# Patient Record
Sex: FEMALE | Race: WHITE
[De-identification: names, ages, dates, MRNs, and addresses within clinical notes are randomized per-mention and may not be internally consistent; named-entity substitution may affect disease eponyms.]

---

## 2018-04-04 ENCOUNTER — HOSPITAL ENCOUNTER (EMERGENCY)
Dept: HOSPITAL 97 - ER | Age: 58
LOS: 1 days | Discharge: HOME | End: 2018-04-05
Payer: COMMERCIAL

## 2018-04-04 DIAGNOSIS — R10.9: Primary | ICD-10-CM

## 2018-04-04 DIAGNOSIS — Z88.6: ICD-10-CM

## 2018-04-04 DIAGNOSIS — Z88.1: ICD-10-CM

## 2018-04-04 DIAGNOSIS — Z85.3: ICD-10-CM

## 2018-04-04 DIAGNOSIS — Z88.5: ICD-10-CM

## 2018-04-04 DIAGNOSIS — Z90.13: ICD-10-CM

## 2018-04-04 DIAGNOSIS — F17.210: ICD-10-CM

## 2018-04-04 DIAGNOSIS — E11.9: ICD-10-CM

## 2018-04-04 LAB
ALBUMIN SERPL BCP-MCNC: 3.7 G/DL (ref 3.2–5.5)
ALP SERPL-CCNC: 188 IU/L (ref 42–121)
ALT SERPL W P-5'-P-CCNC: 45 IU/L (ref 10–60)
AMYLASE SERPL-CCNC: 40 U/L (ref 28–100)
AST SERPL W P-5'-P-CCNC: 55 IU/L (ref 10–42)
BUN BLD-MCNC: 9 MG/DL (ref 6–20)
GLUCOSE SERPLBLD-MCNC: 471 MG/DL (ref 65–120)
HCT VFR BLD CALC: 40.3 % (ref 36–45)
LIPASE SERPL-CCNC: 39 U/L (ref 22–51)
LYMPHOCYTES # SPEC AUTO: 1.4 K/UL (ref 0.7–4.9)
MCH RBC QN AUTO: 26.5 PG (ref 27–35)
MCV RBC: 81.7 FL (ref 80–100)
PMV BLD: 8.7 FL (ref 7.6–11.3)
POTASSIUM SERPL-SCNC: 3.5 MEQ/L (ref 3.6–5)
RBC # BLD: 4.93 M/UL (ref 3.86–4.86)
UA COMPLETE W REFLEX CULTURE PNL UR: (no result)

## 2018-04-04 PROCEDURE — 96361 HYDRATE IV INFUSION ADD-ON: CPT

## 2018-04-04 PROCEDURE — 80076 HEPATIC FUNCTION PANEL: CPT

## 2018-04-04 PROCEDURE — 82009 KETONE BODYS QUAL: CPT

## 2018-04-04 PROCEDURE — 99284 EMERGENCY DEPT VISIT MOD MDM: CPT

## 2018-04-04 PROCEDURE — 82962 GLUCOSE BLOOD TEST: CPT

## 2018-04-04 PROCEDURE — 96372 THER/PROPH/DIAG INJ SC/IM: CPT

## 2018-04-04 PROCEDURE — 80048 BASIC METABOLIC PNL TOTAL CA: CPT

## 2018-04-04 PROCEDURE — 83690 ASSAY OF LIPASE: CPT

## 2018-04-04 PROCEDURE — 81003 URINALYSIS AUTO W/O SCOPE: CPT

## 2018-04-04 PROCEDURE — 81015 MICROSCOPIC EXAM OF URINE: CPT

## 2018-04-04 PROCEDURE — 74177 CT ABD & PELVIS W/CONTRAST: CPT

## 2018-04-04 PROCEDURE — 82140 ASSAY OF AMMONIA: CPT

## 2018-04-04 PROCEDURE — 96375 TX/PRO/DX INJ NEW DRUG ADDON: CPT

## 2018-04-04 PROCEDURE — 85025 COMPLETE CBC W/AUTO DIFF WBC: CPT

## 2018-04-04 PROCEDURE — 82150 ASSAY OF AMYLASE: CPT

## 2018-04-04 PROCEDURE — 96374 THER/PROPH/DIAG INJ IV PUSH: CPT

## 2018-04-04 PROCEDURE — 36415 COLL VENOUS BLD VENIPUNCTURE: CPT

## 2018-04-04 PROCEDURE — 71045 X-RAY EXAM CHEST 1 VIEW: CPT

## 2018-04-04 NOTE — RAD REPORT
EXAM DESCRIPTION:  Chanel Single View4/4/2018 8:16 pm

 

CLINICAL HISTORY:  Abd  pain

 

COMPARISON:  2016

 

FINDINGS:   The lungs appear clear of acute infiltrate. The heart is normal size

 

IMPRESSION:   No acute abnormalities displayed

## 2018-04-04 NOTE — XMS REPORT
Patient Summary Document

 Created on:2018



Patient:SUZIE GROVER

Sex:Female

:1960

External Reference #:[id number]





Demographics







 Address  [address] [place] [address]

 

 Home Phone  [phone]

 

 Email Address  NONE

 

 Preferred Language  Unknown

 

 Marital Status  Unknown

 

 Mandaen Affiliation  Unknown

 

 Race  Unknown

 

 Additional Race(s)  Unavailable

 

 Ethnic Group  Unknown









Author







 Organization  [facility]

 

 Address  [address] [place] [address] [address] [address]

 

 Phone  [phone]









Care Team Providers







 Name  Role  Phone

 

 TONY MCMAHAN  Unavailable  Unavailable









Problems

This patient has no known problems.



Allergies, Adverse Reactions, Alerts

This patient has no known allergies or adverse reactions.



Medications

This patient has no known medications.



Results







 Test Description  Test Time  Test Comments  Text Results  Atomic Results  
Result Comments









 [facility]   [address] [address]      Patient Name: VALENTE GROVER  



 SERIES      MR #: [id number]    : 1960 Age/Sex: 56/F  Acct #: 
[id number]  



 W/PA CXR      Req #: [id number]  Adm Physician:     Ordered by: SMITHA MCMAHAN MD  Report #: [id number]   Location: ER  Room/Bed:  



       ________________________________________________________________________
_  



       __________________________    Procedure: [id number] DX/ABDOMEN ACUTE  



       SERIES W/PA CXR  Exam Date: 10/06/17                            Exam  



       Time: 0210       REPORT STATUS: Signed    EXAM: ABDOMEN ACUTE SERIES W/
PA  



       CXR, supine and erect views of the abdomen, AP   view of the chest  



       DATE: 10/6/2017 1:29 AM  Time stamp on exam: 0155 hours   INDICATION:  



       Abdominal pain   COMPARISON: CT of the abdomen and pelvis 2017  



         FINDINGS:   LINES/TUBES: None      LUNGS: No consolidations or edema.  



           PLEURA: No effusions or pneumothorax.      HEART AND MEDIASTINUM:  



       Normal size and contour.      BOWEL PATTERN: Non-obstructed bowel gas  



       pattern.      BONES AND SOFT TISSUES: No acute bone findings. No 
abnormal  



       calcifications. No   mass effect. Bilateral chest surgical clips.  



       Surgical clips right upper   quadrant of the abdomen.      IMPRESSION:  



       No acute thoracic abnormality.      No evidence for bowel obstruction.  



                  Signed by: Dr. Akiko Mehta M.D. on 10/6/2017 2:40 AM  



           Dictated By: AKIKO MEHTA MD  Electronically Signed By: AKIKO MEHTA MD on 10/06/17 0240  Transcribed By: CLIFFORD on 10/06/17 0240  



         COPY TO:   SMITHA MCMAHAN MD

## 2018-04-05 NOTE — ER
Nurse's Notes                                                                                     

 [facility]                                                                

Name: Deborah Alatorre                                                                              

Age: 57 yrs                                                                                       

Sex: Female                                                                                       

: 1960                                                                                   

MRN: [id number]                                                                                   

Arrival Date: 2018                                                                          

Time: 18:17                                                                                       

Account#: [id number]                                                                            

Bed 20                                                                                            

Private MD: Patricio Vicente                                                                        

Diagnosis: Unspecified abdominal pain                                                             

                                                                                                  

Presentation:                                                                                     

                                                                                             

18:36 Presenting complaint: Patient states: "I checked my sugar other and it read high. I     lk1 

      took my Novalog and the last reading was 576. My vision is blurry and I am having           

      abdominal pain. I feel lethargic". Transition of care: patient was not received from        

      another setting of care. Onset of symptoms was 2018 at 08:00. Care prior to       

      arrival: None.                                                                              

18:36 Method Of Arrival: Ambulatory                                                           lk1 

18:36 Acuity: CASEY 3                                                                           lk1 

                                                                                                  

Triage Assessment:                                                                                

18:38 General: Appears in no apparent distress. Behavior is calm, cooperative, appropriate    lk1 

      for age. Pain: Complains of pain in abdomen Pain currently is 8 out of 10 on a pain         

      scale. GI: Reports diarrhea. : Reports urinary frequency.                                 

                                                                                                  

Historical:                                                                                       

- Allergies:                                                                                      

18:38 Darvocet-N 100;                                                                         lk1 

18:38 tramadol;                                                                               lk1 

18:38 Zithromax;                                                                              lk1 

- PMHx:                                                                                           

18:38 Anxiety; MUSCLE WEAKNESS; Diabetes - IDDM; Depression; COPD; Cancer, Breast; colitis;   lk1 

- PSHx:                                                                                           

18:38 Appendectomy; Hysterectomy; Cholecystectomy; ; bilateral mastectomy; breast    lk1 

      reconstructive surgery;                                                                     

                                                                                                  

- Immunization history:: Adult Immunizations up to date.                                          

- Social history:: Smoking status: Patient uses tobacco products, smokes one pack                 

  cigarettes per day.                                                                             

                                                                                                  

                                                                                                  

Screenin:08 Abuse screen: Denies threats or abuse. Nutritional screening: No deficits noted.        jd3 

      Tuberculosis screening: No symptoms or risk factors identified. Fall Risk None              

      identified.                                                                                 

                                                                                                  

Assessment:                                                                                       

18:52 Reassessment: Patient states she checked her sugar at approximately 1700 and it was in  ae1 

      the "600s", then took 8 units Novolog insulin, then checked her sugar again                 

      approximately an hour later and sugar was still "576". Patient states she was in the        

      hospital 2 days prior, and was diagnosed with colitis and placed on antibiotics, and is     

      still having abdominal pain.                                                                

19:03 Reassessment: Report and hand off care to ROSANA Spring RN.                              ae1 

19:06 General: Appears in no apparent distress. uncomfortable, Behavior is calm, cooperative, jd3 

      appropriate for age. Pain: Complains of pain in abdomen Quality of pain is described as     

      stabbing, throbbing. Neuro: Level of Consciousness is awake, alert, obeys commands,         

      Oriented to person, place, time, situation. Cardiovascular: Heart tones S1 S2 present       

      Capillary refill < 3 seconds Patient's skin is warm and dry. Respiratory: Airway is         

      patent Respiratory effort is even, unlabored, Respiratory pattern is regular,               

      symmetrical, Breath sounds are clear bilaterally. GI: Abdomen is round Bowel sounds         

      present X 4 quads. Abd is soft Abdomen is tender to palpation X 4 quads. Reports lower      

      abdominal pain, upper abdominal pain, diarrhea. : No signs and/or symptoms were           

      reported regarding the genitourinary system. EENT: No signs and/or symptoms were            

      reported regarding the EENT system. Derm: Skin is intact, Skin is dry, Skin is normal,      

      Skin temperature is warm. Musculoskeletal: Circulation, motion, and sensation intact.       

      Range of motion: intact in all extremities.                                                 

20:11 Reassessment: Pt complained of abd pain and after discussion with Page PA pt was given  fc  

      Morphine.                                                                                   

20:49 Reassessment: Patient appears in no apparent distress at this time. Patient and/or      jd3 

      family updated on plan of care and expected duration. Pain level reassessed. Patient is     

      alert, oriented x 3, equal unlabored respirations, skin warm/dry/pink. pt finished oral     

      contrast CT notified.                                                                       

21:18 Reassessment: Patient appears in no apparent distress at this time. Patient and/or      jd3 

      family updated on plan of care and expected duration. Pain level reassessed. Patient is     

      alert, oriented x 3, equal unlabored respirations, skin warm/dry/pink. pt reporting         

      continued pain, provider notified, new orders received, see MAR.                            

22:46 Reassessment: Patient appears in no apparent distress at this time. Patient and/or      jd3 

      family updated on plan of care and expected duration. Pain level reassessed. Patient is     

      alert, oriented x 3, equal unlabored respirations, skin warm/dry/pink. pt back from CT.     

23:54 Reassessment: Patient appears in no apparent distress at this time. Patient and/or      jd3 

      family updated on plan of care and expected duration. Pain level reassessed. Patient is     

      alert, oriented x 3, equal unlabored respirations, skin warm/dry/pink.                      

                                                                                             

00:22 Reassessment: Patient appears in no apparent distress at this time. Patient and/or      jd3 

      family updated on plan of care and expected duration. Pain level reassessed. Patient is     

      alert, oriented x 3, equal unlabored respirations, skin warm/dry/pink. pt reported          

      understanding of discharge instructions, even and steady gait to front of ER to wait        

      for her ride.                                                                               

                                                                                                  

Vital Signs:                                                                                      

                                                                                             

18:39  / 109; Pulse 111; Resp 18; Temp 98.6(O); Pulse Ox 100% on R/A; Weight 69.4 kg    lk1 

      (R); Height 5 ft. 4 in. (162.56 cm) (R); Pain 8/10;                                         

19:18  / 100; Pulse 110; Resp 18 S; Pulse Ox 93% on R/A; Pain 8/10;                     jd3 

20:11  / 94; Pulse 103; Resp 16; Pulse Ox 94% on R/A; Pain 8/10;                        fc  

21:18  / 98; Pulse 102; Resp 18 S; Pulse Ox 98% on R/A;                                 jd3 

22:46  / 89; Pulse 90; Resp 17 S; Pulse Ox 97% on R/A;                                  jd3 

23:54  / 87; Pulse 96; Resp 17 S; Pulse Ox 100% ;                                       jd3 

18:39 Body Mass Index 26.26 (69.40 kg, 162.56 cm)                                             lk1 

                                                                                                  

ED Course:                                                                                        

18:17 Patient arrived in ED.                                                                  mr  

18:17 Patricio Vicente is Private Physician.                                                    mr  

18:37 Triage completed.                                                                       lk1 

18:41 Arm band placed on right wrist.                                                         lk1 

19:00 Placed in gown. Bed in low position. Call light in reach. Side rails up X 1. Adult w/   ae1 

      patient. Pulse ox on. NIBP on. Warm blanket given.                                          

19:06 Juan Franco, RN is Primary Nurse.                                                  jd3 

19:11 Sukhwinder Fernandez PA is PHCP.                                                                cp  

19:11 Sukhwinder Her MD is Attending Physician.                                             cp  

19:21 Initial lab(s) drawn, by me, sent to lab. Missed attempt(s): 22 gauge in left forearm.  cb2 

      Bleeding controlled, band aid applied, catheter tip intact.                                 

19:42 Urine collected: clean catch specimen, clear, nayely colored.                            cb2 

19:54 Inserted 18 gauge 10 cm midline to left upper basilic vein on first attempt. Pt         fc  

      tolerated it well. Line with good blood return and flushes well.                            

20:02 Notified Nurse Practitioner and/or Physician Assistant of a critical lab result(s),     jd3 

      glucose of 471.                                                                             

20:14 XRAY Chest (1 view) In Process Unspecified.                                             EDMS

20:14 X-ray completed. Portable x-ray completed in exam room. Patient tolerated procedure     jb2 

      well.                                                                                       

22:26 Manjeet Khanna MD is Attending Physician.                                                cp  

22:26 PHCP role handed off by Sukhwinder Fernandez PA                                                 snw 

22:26 Chelsi Aponte FNP-C is PHCP.                                                        snw 

22:30 Patient moved to CT via wheelchair.                                                     eh  

22:34 CT Abd/Pelvis - W/Contrast In Process Unspecified.                                      EDMS

23:59 Patricio Vicente is Referral Physician.                                                   snw 

04                                                                                             

00:17 No provider procedures requiring assistance completed. IV discontinued, intact,         jd3 

      bleeding controlled, No redness/swelling at site. Pressure dressing applied.                

                                                                                                  

Administered Medications:                                                                         

                                                                                             

20:05 Drug: NS 0.9% 500 ml Route: IV; Rate: bolus; Site: left upper arm;                      fc  

21:02 Follow up: Response: No adverse reaction; IV Status: Completed infusion; IV Intake:     jd3 

      500ml                                                                                       

20:06 Drug: Zofran 4 mg Route: IVP; Site: left upper arm;                                     fc  

21:02 Follow up: Response: No adverse reaction                                                jd3 

20:09 Drug: morphine 4 mg Route: IVP; Site: left upper arm;                                   fc  

21:02 Follow up: Response: No adverse reaction                                                jd3 

20:32 Drug: Insulin Regular Human 10 units {Co-Signature: bs1 (Eugenia Piper RN).} Route:  jd3 

      IVP; Site: left antecubital;                                                                

23:55 Follow up: Response: Blood sugar is lowered                                             jd3 

20:32 Drug: NS 0.9% 1000 ml Route: IV; Rate: 125 ml/hr; Site: left antecubital;               jd3 

                                                                                             

00:16 Follow up: Response: No adverse reaction; IV Status: Order to discontinue infusion; IV  jd3 

      Intake: 450ml                                                                               

                                                                                             

21:18 Drug: morphine 4 mg Route: IVP; Site: left antecubital;                                 jd3 

23:55 Follow up: Response: No adverse reaction                                                jd3 

                                                                                             

00:13 Drug: morphine 4 mg Route: IM; Site: right deltoid;                                     jd3 

00:23 Follow up: Response: Medication administered at discharge.                              jd3 

                                                                                                  

                                                                                                  

Point of Care Testing:                                                                            

      Blood Glucose:                                                                              

                                                                                             

18:59 Blood Glucose: 472 mg/dL;                                                               ae1 

21:23 Blood Glucose: 269 mg/dL;                                                               cb2 

      Ranges:                                                                                     

                                                                                                  

Intake:                                                                                           

21:02 IV: 500ml; Total: 500ml.                                                                jd3 

                                                                                             

00:16 IV: 450ml; Total: 950ml.                                                                jd3 

                                                                                                  

Outcome:                                                                                          

00:00 Discharge ordered by MD.                                                                snw 

00:18 Condition: stable                                                                       jd3 

00:18 Discharge instructions given to patient, Instructed on discharge instructions, follow       

      up and referral plans. Demonstrated understanding of instructions, follow-up care.          

00:22 Discharged to home ambulatory, with family.                                             jd3 

00:23 Patient left the ED.                                                                    jd3 

                                                                                                  

Signatures:                                                                                       

Dispatcher [employer]                           EDMS                                                 

Chelsi Aponte, FNP-C                 FNP-Csnw                                                  

Genny Lópze                                mr RossRolf                              jbEdilberto Shafer Felicia, RN                   RN   fc                                                   

Sukhwinder Fernandez PA PA cp Kluge, Leah, RN                         RN   lk1                                                  

Neville Parker RN                     RN   ae1                                                  

Murphy Morales Jonathon, RN                    RN   jd3                                                  

Eugenia Piper RN                          bs1                                                  

                                                                                                  

**************************************************************************************************

## 2018-04-05 NOTE — EDPHYS
Physician Documentation                                                                           

 [facility]                                                                

Name: Deborah Alatorre                                                                              

Age: 57 yrs                                                                                       

Sex: Female                                                                                       

: 1960                                                                                   

MRN: [id number]                                                                                   

Arrival Date: 2018                                                                          

Time: 18:17                                                                                       

Account#: [id number]                                                                            

Bed 20                                                                                            

Private MD: Patricio Vicente                                                                        

ED Physician Manjeet Khanna                                                                         

HPI:                                                                                              

                                                                                             

19:30 This 57 yrs old  Female presents to ER via Ambulatory with complaints of High  cp  

      Blood Sugar, Abdominal Pain.                                                                

19:30 The patient or guardian reports hyperglycemia. Onset: The symptoms/episode              cp  

      began/occurred gradually. Associated signs and symptoms: Pertinent positives: abdominal     

      pain.                                                                                       

19:30 Current symptoms: In the emergency department the patient's symptoms are unchanged from cp  

      the initial presentation, despite home interventions.                                       

                                                                                                  

Historical:                                                                                       

- Allergies:                                                                                      

18:38 Darvocet-N 100;                                                                         lk1 

18:38 tramadol;                                                                               lk1 

18:38 Zithromax;                                                                              lk1 

- PMHx:                                                                                           

18:38 Anxiety; MUSCLE WEAKNESS; Diabetes - IDDM; Depression; COPD; Cancer, Breast; colitis;   lk1 

- PSHx:                                                                                           

18:38 Appendectomy; Hysterectomy; Cholecystectomy; ; bilateral mastectomy; breast    lk1 

      reconstructive surgery;                                                                     

                                                                                                  

- Immunization history:: Adult Immunizations up to date.                                          

- Social history:: Smoking status: Patient uses tobacco products, smokes one pack                 

  cigarettes per day.                                                                             

                                                                                                  

                                                                                                  

ROS:                                                                                              

19:35 Constitutional: Negative for body aches, chills, fever, poor PO intake.                 cp  

19:35 Eyes: Negative for injury, pain, redness, and discharge.                                cp  

19:35 ENT: Negative for drainage from ear(s), ear pain, sore throat, difficulty swallowing,       

      difficulty handling secretions.                                                             

19:35 Cardiovascular: Negative for chest pain, edema, palpitations.                               

19:35 Respiratory: Negative for cough, shortness of breath, wheezing.                             

19:35 Abdomen/GI: Positive for abdominal pain, Negative for diarrhea, constipation,               

      black/tarry stool, rectal bleeding.                                                         

19:35 Back: Negative for pain at rest, pain with movement, radiated pain.                         

19:35 : Negative for urinary symptoms.                                                          

19:35 Skin: Negative for cellulitis, rash.                                                        

19:35 Neuro: Positive for general weakness, Negative for altered mental status, headache.         

19:35 All other systems are negative.                                                             

                                                                                                  

Exam:                                                                                             

19:42 Constitutional: The patient appears in no acute distress, alert, awake, non-toxic, well cp  

      developed, well nourished.                                                                  

19:42 Head/Face:  Normocephalic, atraumatic.                                                  cp  

19:42 Eyes: Periorbital structures: appear normal, Pupils: equal, round, and reactive to          

      light and accomodation, Extraocular movements: intact throughout, Conjunctiva: normal,      

      no exudate, no injection, Sclera: no appreciated abnormality, Lids and lashes: appear       

      normal, bilaterally.                                                                        

19:42 ENT: External ear(s): are unremarkable, Ear canal(s): are normal, clear, TM's:              

      dullness, bilaterally, Nose: is normal, Mouth: Lips: moist, Oral mucosa: dry, Posterior     

      pharynx: Airway: no evidence of obstruction, patent, Tonsils: are normal in appearance,     

      Uvula: midline, non-edematous, no erythema, swelling, is not appreciated, erythema,         

      that is mild, exudate, is not appreciated, Voice: is normal.                                

19:42 Neck: ROM/movement: is normal, is supple, without pain, no range of motions                 

      limitations, no nuchal rigidity.                                                            

19:42 Chest/axilla: Inspection: normal, Palpation: is normal, no crepitus, no tenderness.         

19:42 Cardiovascular: Rate: tachycardic, Rhythm: regular.                                         

19:42 Respiratory: the patient does not display signs of respiratory distress,  Respirations:     

      normal, no use of accessory muscles, no retractions, no splinting, no tachypnea,            

      labored breathing, is not present, Breath sounds: are clear throughout, no decreased        

      breath sounds, no stridor, no wheezing.                                                     

19:42 Abdomen/GI: Inspection: distension, that is mild, Bowel sounds: active, all quadrants,      

      Palpation: soft, in all quadrants, moderate abdominal tenderness, in all quadrants,         

      rebound tenderness, is not appreciated, voluntary guarding, is elicited in all              

      quadrants, involuntary guarding, is not appreciated.                                        

19:42 Back: ROM is normal.                                                                        

19:42 Skin: cellulitis, is not appreciated, no rash present.                                      

19:42 Neuro: Orientation: to person, place \T\ time. Mentation: lucid, able to follow commands,   

      Motor: moves all fours, negative for focal deficits, Sensation: no obvious gross            

      deficits.                                                                                   

                                                                                                  

Vital Signs:                                                                                      

18:39  / 109; Pulse 111; Resp 18; Temp 98.6(O); Pulse Ox 100% on R/A; Weight 69.4 kg    lk1 

      (R); Height 5 ft. 4 in. (162.56 cm) (R); Pain 8/10;                                         

19:18  / 100; Pulse 110; Resp 18 S; Pulse Ox 93% on R/A; Pain 8/10;                     jd3 

20:11  / 94; Pulse 103; Resp 16; Pulse Ox 94% on R/A; Pain 8/10;                        fc  

21:18  / 98; Pulse 102; Resp 18 S; Pulse Ox 98% on R/A;                                 jd3 

22:46  / 89; Pulse 90; Resp 17 S; Pulse Ox 97% on R/A;                                  jd3 

23:54  / 87; Pulse 96; Resp 17 S; Pulse Ox 100% ;                                       jd3 

18:39 Body Mass Index 26.26 (69.40 kg, 162.56 cm)                                             lk1 

                                                                                                  

MDM:                                                                                              

19:12 Patient medically screened.                                                             cp  

20:00 Differential diagnosis: DKA, hyperglycemia, ascites, colitis, diverticulitis.           cp  

22:10 Data reviewed: vital signs, nurses notes, lab test result(s).                           cp  

                                                                                                  

04                                                                                             

19:15 Order name: Amylase, Serum; Complete Time: 20:20                                        jd3 

                                                                                             

19:15 Order name: Basic Metabolic Panel; Complete Time: 20:20                                 jd3 

                                                                                             

19:15 Order name: CBC with Diff; Complete Time: 20:20                                         jd3 

                                                                                             

19:15 Order name: Creatinine for Radiology; Complete Time: 20:20                              jd3 

                                                                                             

19:15 Order name: Hepatic Function; Complete Time: 20:20                                      jd3 

                                                                                             

19:15 Order name: Lipase; Complete Time: 20:20                                                jd3 

                                                                                             

19:15 Order name: Urine Microscopic Only; Complete Time: 21:07                                jd3 

                                                                                             

19:26 Order name: Ketone, Serum; Complete Time: 20:20                                         cp  

                                                                                             

19:26 Order name: AMMONIA; Complete Time: 20:20                                               cp  

                                                                                             

19:26 Order name: XRAY Chest (1 view); Complete Time: 21:07                                   cp  

                                                                                             

19:48 Order name: Urine Dipstick--Ancillary (enter results); Complete Time: 20:20             rg2 

                                                                                             

20:21 Order name: CT Abd/Pelvis - W/Contrast                                                  cp  

                                                                                             

19:15 Order name: IV Saline Lock; Complete Time: 19:55                                        jd3 

                                                                                             

19:15 Order name: Labs collected and sent; Complete Time: 19:55                               jd3 

                                                                                             

19:15 Order name: Urine Dipstick-Ancillary (obtain specimen); Complete Time: 19:55            jd3 

                                                                                                  

Administered Medications:                                                                         

20:05 Drug: NS 0.9% 500 ml Route: IV; Rate: bolus; Site: left upper arm;                      fc  

21:02 Follow up: Response: No adverse reaction; IV Status: Completed infusion; IV Intake:     jd3 

      500ml                                                                                       

20:06 Drug: Zofran 4 mg Route: IVP; Site: left upper arm;                                     fc  

21:02 Follow up: Response: No adverse reaction                                                jd3 

20:09 Drug: morphine 4 mg Route: IVP; Site: left upper arm;                                   fc  

21:02 Follow up: Response: No adverse reaction                                                jd3 

20:32 Drug: Insulin Regular Human 10 units {Co-Signature: bs1 (Eugenia Piper RN).} Route:  jd3 

      IVP; Site: left antecubital;                                                                

23:55 Follow up: Response: Blood sugar is lowered                                             jd3 

20:32 Drug: NS 0.9% 1000 ml Route: IV; Rate: 125 ml/hr; Site: left antecubital;               jd3 

                                                                                             

00:16 Follow up: Response: No adverse reaction; IV Status: Order to discontinue infusion; IV  jd3 

      Intake: 450ml                                                                               

                                                                                             

21:18 Drug: morphine 4 mg Route: IVP; Site: left antecubital;                                 jd3 

23:55 Follow up: Response: No adverse reaction                                                jd3 

                                                                                             

00:13 Drug: morphine 4 mg Route: IM; Site: right deltoid;                                     jd3 

00:23 Follow up: Response: Medication administered at discharge.                              jd3 

                                                                                                  

                                                                                                  

Point of Care Testing:                                                                            

      Blood Glucose:                                                                              

                                                                                             

18:59 Blood Glucose: 472 mg/dL;                                                               ae1 

21:23 Blood Glucose: 269 mg/dL;                                                               cb2 

      Ranges:                                                                                     

      Critical Glucose Levels:Adult <50 mg/dl or >400 mg/dl  <40 mg/dl or >180 mg/dl       

Disposition:                                                                                      

                                                                                             

04:26 Co-signature as Attending Physician, Manjeet Khanna MD., rn  

                                                                                                  

Disposition:                                                                                      

18 00:00 Discharged to Home. Impression: Unspecified abdominal pain.                        

- Condition is Stable.                                                                            

- Discharge Instructions: Abdominal Pain, Adult, Hyperglycemia, Hypertension.                     

                                                                                                  

- Medication Reconciliation Form, Thank You Letter, Antibiotic Education, Prescription            

  Opioid Use form.                                                                                

- Follow up: Patricio Vicente; When: 1 - 2 days; Reason: Recheck today's complaints,                

  Continuance of care, Re-evaluation by your physician. Follow up: Emergency                      

  Department; When: As needed; Reason: Worsening of condition.                                    

                                                                                                  

                                                                                                  

                                                                                                  

Signatures:                                                                                       

Dispatcher [employer]                           EDMS                                                 

Chelsi Aponte, FNP-C                 FNP-Csnw                                                  

Lena Jackson, RN                   RN   fc                                                   

Manjeet Khanna MD MD rn Page, Corey, PA PA cp Kluge, Leah RN                         RN   lk1                                                  

Juan Franco RN                    RN   jd3                                                  

Eugenia Piper RN                          bs1                                                  

                                                                                                  

**************************************************************************************************

## 2018-04-05 NOTE — RAD REPORT
EXAM DESCRIPTION:  CT - Abdomen   Pelvis W Contrast - 4/5/2018 6:50 am

 

CLINICAL HISTORY:   Abdominal pain. Upper abdominal pain

 

COMPARISON:  February 2018

 

TECHNIQUE:  Computed axial tomography of the abdomen and pelvis was obtained. 100 cc Isovue-300 is ad
ministered intravenously. Oral contrast was given. A preliminary report was given by virtual radiolog
ic and reviewed prior to this dictation

 

All CT scans are performed using dose optimization technique as appropriate and may include automated
 exposure control or mA/KV adjustment according to patient size.

 

FINDINGS:

A cirrhotic liver is present. A discrete lesion is not seen the portal vein is distended. It is paten
t. The spleen measures 18 centimeters. A couple of granulomas are present within the liver and spleen


 

The pancreas and adrenals are unremarkable. Renal cysts are unchanged.

 

The appendix has been removed. There is no evidence of diverticulitis.

 

No ascites is seen. A moderate amount of stool is present throughout colon

 

IMPRESSION:  Cirrhosis with portal venous hypertension.

 

Moderate amount stool throughout the colon

## 2018-04-07 ENCOUNTER — HOSPITAL ENCOUNTER (EMERGENCY)
Dept: HOSPITAL 97 - ER | Age: 58
Discharge: HOME | End: 2018-04-07
Payer: COMMERCIAL

## 2018-04-07 DIAGNOSIS — E11.9: ICD-10-CM

## 2018-04-07 DIAGNOSIS — Z88.1: ICD-10-CM

## 2018-04-07 DIAGNOSIS — Z90.13: ICD-10-CM

## 2018-04-07 DIAGNOSIS — Z85.3: ICD-10-CM

## 2018-04-07 DIAGNOSIS — R19.7: Primary | ICD-10-CM

## 2018-04-07 DIAGNOSIS — F17.210: ICD-10-CM

## 2018-04-07 DIAGNOSIS — Z88.5: ICD-10-CM

## 2018-04-07 DIAGNOSIS — Z88.6: ICD-10-CM

## 2018-04-07 LAB
ALBUMIN SERPL BCP-MCNC: 3.6 G/DL (ref 3.2–5.5)
ALP SERPL-CCNC: 178 IU/L (ref 42–121)
ALT SERPL W P-5'-P-CCNC: 51 IU/L (ref 10–60)
ANISOCYTOSIS BLD QL: (no result)
AST SERPL W P-5'-P-CCNC: 72 IU/L (ref 10–42)
BLD SMEAR INTERP: (no result)
BUN BLD-MCNC: 12 MG/DL (ref 6–20)
GLUCOSE SERPLBLD-MCNC: 303 MG/DL (ref 65–120)
HCT VFR BLD CALC: 38.8 % (ref 36–45)
LIPASE SERPL-CCNC: 44 U/L (ref 22–51)
LYMPHOCYTES # SPEC AUTO: 1.3 K/UL (ref 0.7–4.9)
MCH RBC QN AUTO: 26.1 PG (ref 27–35)
MCV RBC: 80.5 FL (ref 80–100)
MORPHOLOGY BLD-IMP: (no result)
PMV BLD: 8.2 FL (ref 7.6–11.3)
POTASSIUM SERPL-SCNC: 3.1 MEQ/L (ref 3.6–5)
RBC # BLD: 4.81 M/UL (ref 3.86–4.86)
UA COMPLETE W REFLEX CULTURE PNL UR: (no result)

## 2018-04-07 PROCEDURE — 87088 URINE BACTERIA CULTURE: CPT

## 2018-04-07 PROCEDURE — 81003 URINALYSIS AUTO W/O SCOPE: CPT

## 2018-04-07 PROCEDURE — 93005 ELECTROCARDIOGRAM TRACING: CPT

## 2018-04-07 PROCEDURE — 36415 COLL VENOUS BLD VENIPUNCTURE: CPT

## 2018-04-07 PROCEDURE — 80048 BASIC METABOLIC PNL TOTAL CA: CPT

## 2018-04-07 PROCEDURE — 99285 EMERGENCY DEPT VISIT HI MDM: CPT

## 2018-04-07 PROCEDURE — 84484 ASSAY OF TROPONIN QUANT: CPT

## 2018-04-07 PROCEDURE — 85025 COMPLETE CBC W/AUTO DIFF WBC: CPT

## 2018-04-07 PROCEDURE — 71045 X-RAY EXAM CHEST 1 VIEW: CPT

## 2018-04-07 PROCEDURE — 80076 HEPATIC FUNCTION PANEL: CPT

## 2018-04-07 PROCEDURE — 87086 URINE CULTURE/COLONY COUNT: CPT

## 2018-04-07 PROCEDURE — 81015 MICROSCOPIC EXAM OF URINE: CPT

## 2018-04-07 PROCEDURE — 83690 ASSAY OF LIPASE: CPT

## 2018-04-07 NOTE — ER
Nurse's Notes                                                                                     

 [facility]                                                                

Name: Deborah Alatorre                                                                              

Age: 57 yrs                                                                                       

Sex: Female                                                                                       

: 1960                                                                                   

MRN: [id number]                                                                                   

Arrival Date: 2018                                                                          

Time: 07:42                                                                                       

Account#: [id number]                                                                            

Bed 8                                                                                             

Private MD:                                                                                       

Diagnosis: Diarrhea, unspecified                                                                  

                                                                                                  

Presentation:                                                                                     

                                                                                             

07:58 Presenting complaint: Patient states: seen at [facility] a week ago and        ss  

      diagnosed with colitis and seen in [facility] ER 3 days ago for same symptoms. PT           

      reports she is still having diarrhea and abd pain and now she is having a sharp pain to     

      her R anterior chest wall when taking a deep breath. Transition of care: patient was        

      not received from another setting of care. Onset of symptoms was 2018. Care       

      prior to arrival: None.                                                                     

07:58 Method Of Arrival: Ambulatory                                                             

07:58 Acuity: CASEY 3                                                                           ss  

                                                                                                  

Historical:                                                                                       

- Allergies:                                                                                      

08:00 Darvocet-N 100;                                                                         ss  

08:00 Zithromax;                                                                              ss  

08:00 tramadol;                                                                               ss  

- PMHx:                                                                                           

08:00 Anxiety; Cancer, Breast; Colitis; COPD; Depression; Diabetes - IDDM; MUSCLE WEAKNESS;   ss  

- PSHx:                                                                                           

08:00 Appendectomy; Hysterectomy; Cholecystectomy; ; bilateral mastectomy; breast    ss  

      reconstructive surgery;                                                                     

                                                                                                  

- Immunization history:: Adult Immunizations up to date.                                          

- Social history:: Smoking status: Patient uses tobacco products, smokes one-half pack            

  cigarettes per day.                                                                             

- Family history:: not pertinent.                                                                 

- Hospitalizations: : No recent hospitalization is reported.                                      

                                                                                                  

                                                                                                  

Screenin:44 Abuse screen: Denies threats or abuse. Denies injuries from another. Nutritional        ph  

      screening: No deficits noted. Tuberculosis screening: No symptoms or risk factors           

      identified. Fall Risk None identified.                                                      

                                                                                                  

Assessment:                                                                                       

08:32 General: Appears in no apparent distress. uncomfortable, well groomed, Behavior is      ph  

      calm, cooperative, appropriate for age, Denies fever. Pain: Complains of pain in chest      

      and abdomen. Neuro: Level of Consciousness is awake, alert, obeys commands, Oriented to     

      person, place, time, situation. Cardiovascular: Reports chest pain, fatigue,                

      lightheadedness, nausea, Denies palpitations, shortness of breath, syncope, Capillary       

      refill < 3 seconds in bilateral fingers Patient's skin is warm and dry. Chest pain          

      quality is sharp, stabbing, is located in right anterior chest wall is aggravated by        

      breathing. Respiratory: Airway is patent Respiratory effort is even, unlabored,             

      Respiratory pattern is regular, symmetrical. GI: Abdomen is round non-distended, Bowel      

      sounds present X 4 quads. Reports lower abdominal pain, upper abdominal pain, diarrhea,     

      nausea. Derm: Skin is intact, is healthy with good turgor, Skin is pink, warm \T\ dry.      

      Musculoskeletal: Circulation, motion, and sensation intact. Range of motion: intact in      

      all extremities.                                                                            

09:04 Reassessment: Patient appears in no apparent distress at this time. Patient is alert,   ph  

      oriented x 3, equal unlabored respirations, skin warm/dry/pink. Pt c/o abdominal and        

      chest pain 10/10, ERP notified, pt medicated per provider order.                            

10:08 Reassessment: Patient appears in no apparent distress at this time. Patient and/or      ph  

      family updated on plan of care and expected duration. Pain level reassessed. Patient is     

      alert, oriented x 3, equal unlabored respirations, skin warm/dry/pink. Pt reports that      

      chest pain and nausea have resolved, continues to c/o abdominal pain 7/10, ERP notified.    

                                                                                                  

Vital Signs:                                                                                      

08:00  / 107; Pulse 105; Resp 17; Pulse Ox 97% on R/A; Weight 72.57 kg; Height 5 ft. 4  ss  

      in. (162.56 cm); Pain 10/10;                                                                

09:04  / 98; Pulse 95; Resp 16; Pulse Ox 93% on R/A; Pain 10/10;                        ph  

10:08  / 94; Pulse 104; Resp 18; Pulse Ox 98% on R/A;                                   ph  

08:00 Body Mass Index 27.46 (72.57 kg, 162.56 cm)                                               

                                                                                                  

ED Course:                                                                                        

07:42 Patient arrived in ED.                                                                  as  

07:49 Manjeet Khanna MD is Attending Physician.                                                rn  

07:59 Triage completed.                                                                       ss  

08:00 Arm band placed on right wrist.                                                         ss  

08:05 Marija Naidu, BART is Primary Nurse.                                                    ph  

08:25 X-ray completed. Portable x-ray completed in exam room. Patient tolerated procedure     kp1 

      well.                                                                                       

08:26 XRAY Chest (1 view) In Process Unspecified.                                             EDMS

08:30 Missed attempt(s): 22 gauge in left forearm. Bleeding controlled, band aid applied,     ph  

      catheter tip intact. Patient maintains SpO2 saturation greater than 95% on room air.        

08:45 Patient has correct armband on for positive identification. Placed in gown. Bed in low  ph  

      position. Call light in reach. Side rails up X 1. Cardiac monitor on. Pulse ox on. NIBP     

      on. Warm blanket given.                                                                     

08:48 Inserted saline lock: 22 gauge in right forearm, using aseptic technique. inserted by   mary PLUMMER ED tech.                                                                           

10:46 No provider procedures requiring assistance completed. IV discontinued, intact,         ss  

      bleeding controlled, No redness/swelling at site. Pressure dressing applied.                

                                                                                                  

Administered Medications:                                                                         

09:02 Drug: NS 0.9% 500 ml Route: IV; Rate: bolus; Site: right forearm;                       ph  

10:07 Follow up: Response: No adverse reaction; IV Status: Completed infusion                 ph  

09:02 Drug: Demerol 25 mg Route: IVP; Site: right antecubital;                                ph  

09:44 Follow up: Response: No adverse reaction; Pain is decreased                             ph  

09:03 Drug: Zofran 4 mg Route: IVP; Site: right forearm;                                      ph  

09:45 Follow up: Response: No adverse reaction; Nausea is decreased                           ph  

09:44 Drug: Potassium Chloride 40 mEq Route: PO;                                              ph  

10:07 Follow up: Response: No adverse reaction                                                ph  

09:44 Drug: LoMOTIL 2 tabs Route: PO;                                                         ph  

10:07 Follow up: Response: No adverse reaction                                                ph  

                                                                                                  

                                                                                                  

Outcome:                                                                                          

10:10 Discharge ordered by MD.                                                                rn  

10:46 Discharged to home ambulatory.                                                          ss  

10:46 Condition: good                                                                             

10:46 Discharge instructions given to patient, Instructed on discharge instructions, follow       

      up and referral plans. Demonstrated understanding of instructions, follow-up care.          

10:47 Patient left the ED.                                                                    ss  

                                                                                                  

Signatures:                                                                                       

Dispatcher [employer]                           Ghada Coelman Roman, MD MD rn Smirch, Shelby, RN RN ss Hall, Patricia, RN RN ph Poole, Kathy                                 1                                                  

                                                                                                  

**************************************************************************************************

## 2018-04-07 NOTE — XMS REPORT
Patient Summary Document

 Created on:2018



Patient:SUZIE GROVER

Sex:Female

:1960

External Reference #:[id number]





Demographics







 Address  [address] [place] [address]

 

 Home Phone  [phone]

 

 Email Address  NONE

 

 Preferred Language  Unknown

 

 Marital Status  Unknown

 

 Congregation Affiliation  Unknown

 

 Race  Unknown

 

 Additional Race(s)  Unavailable

 

 Ethnic Group  Unknown









Author







 Organization  [facility]

 

 Address  [address] [place] [address] [address] [address]

 

 Phone  [phone]









Care Team Providers







 Name  Role  Phone

 

 TONY MCMAHAN  Unavailable  Unavailable









Problems

This patient has no known problems.



Allergies, Adverse Reactions, Alerts

This patient has no known allergies or adverse reactions.



Medications

This patient has no known medications.



Results







 Test Description  Test Time  Test Comments  Text Results  Atomic Results  
Result Comments









 [facility]   [address] [address]      Patient Name: VALENTE GROVER  



 SERIES      MR #: [id number]    : 1960 Age/Sex: 56/F  Acct #: 
[id number]  



 W/PA CXR      Req #: [id number]  Adm Physician:     Ordered by: SMITHA MCMAHAN MD  Report #: [id number]   Location: ER  Room/Bed:  



       ________________________________________________________________________
_  



       __________________________    Procedure: [id number] DX/ABDOMEN ACUTE  



       SERIES W/PA CXR  Exam Date: 10/06/17                            Exam  



       Time: 0210       REPORT STATUS: Signed    EXAM: ABDOMEN ACUTE SERIES W/
PA  



       CXR, supine and erect views of the abdomen, AP   view of the chest  



       DATE: 10/6/2017 1:29 AM  Time stamp on exam: 0155 hours   INDICATION:  



       Abdominal pain   COMPARISON: CT of the abdomen and pelvis 2017  



         FINDINGS:   LINES/TUBES: None      LUNGS: No consolidations or edema.  



           PLEURA: No effusions or pneumothorax.      HEART AND MEDIASTINUM:  



       Normal size and contour.      BOWEL PATTERN: Non-obstructed bowel gas  



       pattern.      BONES AND SOFT TISSUES: No acute bone findings. No 
abnormal  



       calcifications. No   mass effect. Bilateral chest surgical clips.  



       Surgical clips right upper   quadrant of the abdomen.      IMPRESSION:  



       No acute thoracic abnormality.      No evidence for bowel obstruction.  



                  Signed by: Dr. Akiko Mehta M.D. on 10/6/2017 2:40 AM  



           Dictated By: AKIKO MEHTA MD  Electronically Signed By: AKIKO MEHTA MD on 10/06/17 0240  Transcribed By: CLIFFORD on 10/06/17 0240  



         COPY TO:   SMITHA MCMAHAN MD

## 2018-04-07 NOTE — RAD REPORT
EXAM DESCRIPTION:  RAD - Chest Single View - 4/7/2018 8:31 am

 

CLINICAL HISTORY:  Chest pain.

 

COMPARISON:  04/04/2018

 

FINDINGS:  Portable technique limits examination quality.

 

The lungs are grossly clear. The heart is normal in size. No displaced fractures.Postsurgical clips a
re noted.

 

IMPRESSION:  No acute intrathoracic process suspected.

## 2018-04-07 NOTE — EDPHYS
Physician Documentation                                                                           

 [facility]                                                                

Name: Deborah Alatorre                                                                              

Age: 57 yrs                                                                                       

Sex: Female                                                                                       

: 1960                                                                                   

MRN: [id number]                                                                                   

Arrival Date: 2018                                                                          

Time: 07:42                                                                                       

Account#: [id number]                                                                            

Bed 8                                                                                             

Private MD:                                                                                       

ED Physician Manjeet Khanna                                                                         

HPI:                                                                                              

                                                                                             

09:00 This 57 yrs old  Female presents to ER via Ambulatory with complaints of Chest rn  

      Pain, Abdominal Pain.                                                                       

09:00 The patient presents with abdominal pain that is diffuse. Onset: The symptoms/episode   rn  

      began/occurred 1 week(s) ago. The symptoms do not radiate. Associated signs and             

      symptoms: Pertinent positives: diarrhea, Pertinent negatives: blood in stools, fever,       

      vomiting, vomiting blood. The symptoms are described as crampy. Severity of pain: At        

      its worst the pain was mild in the emergency department the pain is unchanged. The          

      patient has experienced similar episodes in the past. Reports diffuse abd cramping,         

      began a week ago, no fever, took abx for colitis, no blood in stool, reports chronic        

      abd pain, also report sharp right sided chest pain with breathing, no sob. .                

                                                                                                  

Historical:                                                                                       

- Allergies:                                                                                      

08:00 Darvocet-N 100;                                                                         ss  

08:00 Zithromax;                                                                              ss  

08:00 tramadol;                                                                               ss  

- PMHx:                                                                                           

08:00 Anxiety; Cancer, Breast; Colitis; COPD; Depression; Diabetes - IDDM; MUSCLE WEAKNESS;   ss  

- PSHx:                                                                                           

08:00 Appendectomy; Hysterectomy; Cholecystectomy; ; bilateral mastectomy; breast    ss  

      reconstructive surgery;                                                                     

                                                                                                  

- Immunization history:: Adult Immunizations up to date.                                          

- Social history:: Smoking status: Patient uses tobacco products, smokes one-half pack            

  cigarettes per day.                                                                             

- Family history:: not pertinent.                                                                 

- Hospitalizations: : No recent hospitalization is reported.                                      

                                                                                                  

                                                                                                  

ROS:                                                                                              

09:00 Constitutional: Negative for fever, chills, and weight loss, Eyes: Negative for injury, rn  

      pain, redness, and discharge, Neck: Negative for injury, pain, and swelling,                

      Cardiovascular: Negative for palpitations, and edema, Respiratory: Negative for             

      shortness of breath, wheezing Abdomen/GI: Negative for nausea, vomiting and                 

      constipation, Back: Negative for injury and pain, MS/Extremity: Negative for injury and     

      deformity, Skin: Negative for injury, rash, and discoloration, Neuro: Negative for          

      headache, weakness, numbness, tingling, and seizure.                                        

                                                                                                  

Exam:                                                                                             

09:00 Constitutional:  This is a well developed, well nourished patient who is awake, alert,  rn  

      and in no acute distress. Head/Face:  Normocephalic, atraumatic. Eyes:  Pupils equal        

      round and reactive to light, extra-ocular motions intact.  Lids and lashes normal.          

      Conjunctiva and sclera are non-icteric and not injected.  Cornea within normal limits.      

      Periorbital areas with no swelling, redness, or edema. Cardiovascular:  Regular rate        

      and rhythm with a normal S1 and S2.  No gallops, murmurs, or rubs.  Normal PMI, no JVD.     

       No pulse deficits. Respiratory:  Lungs have equal breath sounds bilaterally, clear to      

      auscultation and percussion.  No rales, rhonchi or wheezes noted.  No increased work of     

      breathing, no retractions or nasal flaring. Abdomen/GI:  soft, non-tender, + soft           

      periumbilical mass, no peritoneal signs MS/ Extremity:  Pulses equal, no cyanosis.          

      Neurovascular intact.  Full, normal range of motion.  Equal circumference. Neuro:           

      Awake and alert, GCS 15, oriented to person, place, time, and situation.  Cranial           

      nerves II-XII grossly intact.  Motor strength 5/5 in all extremities.  Sensory grossly      

      intact.  Cerebellar exam normal.  Normal gait.                                              

                                                                                                  

Vital Signs:                                                                                      

08:00  / 107; Pulse 105; Resp 17; Pulse Ox 97% on R/A; Weight 72.57 kg; Height 5 ft. 4  ss  

      in. (162.56 cm); Pain 10/10;                                                                

09:04  / 98; Pulse 95; Resp 16; Pulse Ox 93% on R/A; Pain 10/10;                        ph  

10:08  / 94; Pulse 104; Resp 18; Pulse Ox 98% on R/A;                                   ph  

08:00 Body Mass Index 27.46 (72.57 kg, 162.56 cm)                                             ss  

                                                                                                  

MDM:                                                                                              

07:49 Patient medically screened.                                                             rn  

10:10 Differential diagnosis: non-specific abd pain, dehydration, abd cramping, diarrhea.     rn  

      Data reviewed: vital signs, nurses notes, lab test result(s), radiologic studies, plain     

      films, and as a result, I will discharge patient. Counseling: I had a detailed              

      discussion with the patient and/or guardian regarding: the historical points, exam          

      findings, and any diagnostic results supporting the discharge/admit diagnosis, lab          

      results, radiology results, the need for outpatient follow up, to return to the             

      emergency department if symptoms worsen or persist or if there are any questions or         

      concerns that arise at home. Special discussion: Based on the patient's history, exam,      

      and Dx evaluation, there is no indication for emergent intervention or inpatient Tx. It     

      is understood by the patient/guardian that if the Sx's persist or worsen they need to       

      return immediately for re-evaluation. Based on the patient's Hx, exam, and Dx               

      evaluation, there is no indication for emergent surgery or inpatient Tx. It is              

      understood by the patient/guardian that if the Sx's persist or worsen they need to          

      return immediately for re-evaluation. I discussed with the patient/guardian in detail       

      that at this point there is no indication for admission to the hospital. It is              

      understood, however, that if the symptoms persist or worsen the patient needs to return     

      immediately for re-evaluation.                                                              

                                                                                                  

                                                                                             

08:02 Order name: Basic Metabolic Panel; Complete Time: 09:32                                 rn  

                                                                                             

08:02 Order name: CBC with Diff; Complete Time: 10:                                         rn  

                                                                                             

08:02 Order name: Creatinine for Radiology; Complete Time: 09:32                              rn  

                                                                                             

08:02 Order name: Hepatic Function; Complete Time: 09:32                                      rn  

                                                                                             

08:02 Order name: Lipase; Complete Time: 09:32                                                rn  

                                                                                             

08:02 Order name: Urine Microscopic Only                                                      rn  

                                                                                             

08:02 Order name: Troponin (emerg Dept Use Only); Complete Time: 09:32                        rn  

                                                                                             

08:02 Order name: XRAY Chest (1 view); Complete Time: 10:09                                   rn  

                                                                                             

09:05 Order name: CBC Smear Scan; Complete Time: 10:09                                        EDMS

                                                                                             

10:36 Order name: Urine Dipstick--Ancillary (enter results)                                   bd  

                                                                                             

08:02 Order name: IV Saline Lock; Complete Time: 08:49                                        rn  

                                                                                             

08:02 Order name: Labs collected and sent; Complete Time: 08:49                               rn  

                                                                                             

08:02 Order name: Urine Dipstick-Ancillary (obtain specimen); Complete Time: 08:49            rn  

                                                                                             

08:02 Order name: EKG; Complete Time: 08:02                                                   rn  

                                                                                             

08:02 Order name: EKG - Nurse/Tech; Complete Time: 08:17                                      rn  

                                                                                                  

Administered Medications:                                                                         

09:02 Drug: NS 0.9% 500 ml Route: IV; Rate: bolus; Site: right forearm;                       ph  

10:07 Follow up: Response: No adverse reaction; IV Status: Completed infusion                 ph  

09:02 Drug: Demerol 25 mg Route: IVP; Site: right antecubital;                                ph  

09:44 Follow up: Response: No adverse reaction; Pain is decreased                             ph  

09:03 Drug: Zofran 4 mg Route: IVP; Site: right forearm;                                      ph  

09:45 Follow up: Response: No adverse reaction; Nausea is decreased                           ph  

09:44 Drug: Potassium Chloride 40 mEq Route: PO;                                              ph  

10:07 Follow up: Response: No adverse reaction                                                ph  

09:44 Drug: LoMOTIL 2 tabs Route: PO;                                                         ph  

10:07 Follow up: Response: No adverse reaction                                                ph  

                                                                                                  

                                                                                                  

Disposition:                                                                                      

18 10:10 Discharged to Home. Impression: Diarrhea, unspecified.                             

- Condition is Stable.                                                                            

- Discharge Instructions: Dehydration, Adult, Diarrhea, Hypokalemia.                              

                                                                                                  

- Medication Reconciliation Form, Thank You Letter, Antibiotic Education, Prescription            

  Opioid Use form.                                                                                

- Follow up: Private Physician; When: As needed; Reason: Recheck today's complaints,              

  Re-evaluation by your physician.                                                                

- Problem is new.                                                                                 

- Symptoms have improved.                                                                         

                                                                                                  

                                                                                                  

                                                                                                  

Signatures:                                                                                       

Dispatcher [employer]                           Manjeet Petty MD MD rn Smirch, Shelby, RN RN                                                      

Marija Naidu RN RN                                                      

                                                                                                  

**************************************************************************************************

## 2018-04-07 NOTE — EKG
Test Date:    2018-04-07               Test Time:    08:13:13

Technician:   HB                                     

                                                     

MEASUREMENT RESULTS:                                       

Intervals:                                           

Rate:         92                                     

OH:           148                                    

QRSD:         72                                     

QT:           370                                    

QTc:          457                                    

Axis:                                                

P:            49                                     

OH:           148                                    

QRS:          13                                     

T:            51                                     

                                                     

INTERPRETIVE STATEMENTS:                                       

                                                     

Normal sinus rhythm

Normal ECG

Compared to ECG 02/01/2018 11:23:31

Sinus tachycardia no longer present



Electronically Signed On 04-07-18 12:01:53 CDT by Louis Grullon

## 2018-04-18 ENCOUNTER — HOSPITAL ENCOUNTER (OUTPATIENT)
Dept: HOSPITAL 88 - FSED | Age: 58
Setting detail: OBSERVATION
LOS: 2 days | Discharge: LEFT BEFORE DISCHARGE | End: 2018-04-20
Attending: INTERNAL MEDICINE | Admitting: INTERNAL MEDICINE
Payer: MEDICARE

## 2018-04-18 VITALS — BODY MASS INDEX: 24.98 KG/M2 | WEIGHT: 146.31 LBS | HEIGHT: 64 IN

## 2018-04-18 DIAGNOSIS — Z85.3: ICD-10-CM

## 2018-04-18 DIAGNOSIS — K70.30: ICD-10-CM

## 2018-04-18 DIAGNOSIS — R10.11: ICD-10-CM

## 2018-04-18 DIAGNOSIS — I69.351: ICD-10-CM

## 2018-04-18 DIAGNOSIS — I10: ICD-10-CM

## 2018-04-18 DIAGNOSIS — R07.9: Primary | ICD-10-CM

## 2018-04-18 DIAGNOSIS — K20.9: ICD-10-CM

## 2018-04-18 DIAGNOSIS — E11.65: ICD-10-CM

## 2018-04-18 PROCEDURE — 81003 URINALYSIS AUTO W/O SCOPE: CPT

## 2018-04-18 PROCEDURE — 96375 TX/PRO/DX INJ NEW DRUG ADDON: CPT

## 2018-04-18 PROCEDURE — 99284 EMERGENCY DEPT VISIT MOD MDM: CPT

## 2018-04-18 PROCEDURE — 83880 ASSAY OF NATRIURETIC PEPTIDE: CPT

## 2018-04-18 PROCEDURE — 78452 HT MUSCLE IMAGE SPECT MULT: CPT

## 2018-04-18 PROCEDURE — 71250 CT THORAX DX C-: CPT

## 2018-04-18 PROCEDURE — 85610 PROTHROMBIN TIME: CPT

## 2018-04-18 PROCEDURE — 96374 THER/PROPH/DIAG INJ IV PUSH: CPT

## 2018-04-18 PROCEDURE — 80076 HEPATIC FUNCTION PANEL: CPT

## 2018-04-18 PROCEDURE — 96372 THER/PROPH/DIAG INJ SC/IM: CPT

## 2018-04-18 PROCEDURE — 93306 TTE W/DOPPLER COMPLETE: CPT

## 2018-04-18 PROCEDURE — 85025 COMPLETE CBC W/AUTO DIFF WBC: CPT

## 2018-04-18 PROCEDURE — 36415 COLL VENOUS BLD VENIPUNCTURE: CPT

## 2018-04-18 PROCEDURE — 82607 VITAMIN B-12: CPT

## 2018-04-18 PROCEDURE — 82553 CREATINE MB FRACTION: CPT

## 2018-04-18 PROCEDURE — 93017 CV STRESS TEST TRACING ONLY: CPT

## 2018-04-18 PROCEDURE — 80307 DRUG TEST PRSMV CHEM ANLYZR: CPT

## 2018-04-18 PROCEDURE — 84484 ASSAY OF TROPONIN QUANT: CPT

## 2018-04-18 PROCEDURE — 93005 ELECTROCARDIOGRAM TRACING: CPT

## 2018-04-18 PROCEDURE — 96376 TX/PRO/DX INJ SAME DRUG ADON: CPT

## 2018-04-18 PROCEDURE — 71046 X-RAY EXAM CHEST 2 VIEWS: CPT

## 2018-04-18 PROCEDURE — 84443 ASSAY THYROID STIM HORMONE: CPT

## 2018-04-18 PROCEDURE — 82746 ASSAY OF FOLIC ACID SERUM: CPT

## 2018-04-18 PROCEDURE — 96360 HYDRATION IV INFUSION INIT: CPT

## 2018-04-18 PROCEDURE — 80048 BASIC METABOLIC PNL TOTAL CA: CPT

## 2018-04-18 PROCEDURE — 82948 REAGENT STRIP/BLOOD GLUCOSE: CPT

## 2018-04-18 PROCEDURE — 82550 ASSAY OF CK (CPK): CPT

## 2018-04-18 NOTE — XMS REPORT
Continuity of Care Document (Transition of Care)

 Created on: 2018



YARI SHI

External Reference #: [id number]

: 1960

Sex: Female



Demographics







 Address  [address] [place]  [address]

 

 Home Phone  [phone]

 

 Preferred Language  English

 

 Marital Status  Never 

 

 Christianity Affiliation  Worship

 

 Race  White

 

 Ethnic Group  Unknown





Author







 Author  [facility]  [address] [place]  [address]



 

 Phone  [phone]







Support







 Name  Relationship  Address  Phone

 

 CARISSA PEGUERO  [place]  [address] [phone]

 

 Vijay Rozina  PRS  [address], [place]  [address] [phone]







Care Team Providers







 Care Team Member Name  Role  Phone

 

 Unknown, U  PCP  Unavailable

 

 Nawaf Moore  Rndphys  [phone]

 

 Rozina Linares  Attphys  [phone]

 

 Flaquito Shaw  Rndphys  [phone]







Allergies, Adverse Reactions, Alerts







 Allergen  Type  Severity  Reaction  Last Updated  Verified  Status

 

 acetaminophen  Allergy     Unknown  2016  N  Active

 

 azithromycin  Allergy     Unknown  2016  N  Active

 

 propoxyphene napsylate  Allergy     Unknown  2016  N  Active

 

 tramadol  Allergy     Unknown  2016  N  Active

 

 tramadol HCl  Allergy     Unknown  2016  N  Active







Medications

Active Medications





 Medication  Dose  Units  Route  Sig  Qty  Days  Start Date  Discontinued Date  
Status  Instructions

 

 Hydrocodone/Acetaminophen  1  TAB  ORAL  EVERY EIGHT HOURS AS NEEDED PRN For 
Pain        2018     Active   







Problem List

Active Problems





 Medical Problem  Onset Date  Status

 

 Hyperglycemia due to type 2 diabetes mellitus     Active

 

 Diabetes mellitus     Active

 

 Anxiety     Active

 

 Enteritis     Active

 

 Cirrhosis     Active

 

 Breast cancer     Active

 

 COPD (chronic obstructive pulmonary disease)     Active

 

 HTN (hypertension)     Active







Procedures







 Procedure  Date  Status

 

 Abdomen & Pelvis W Contrast  2018  completed

 

 Influenza Type B Antigen Screen  2018  completed

 

 Influenza Type A Antigen Screen  2018  completed

 

 [place] Count  2018  active

 

    2018  active







Relevant Diagnostic Tests and/or Laboratory Data

Laboratory Results





 Test  Date/Time  Result  Interp.  Ref. Range  Result Comment

 

 White Blood Count  2018 11:15am  7.7 K/uL     4.3-10.9   

 

 Red Blood Count  2018 11:15am  5.04 M/uL  High  3.86-4.86   

 

 Hemoglobin  2018 11:15am  14.0 g/dL     12.0-15.0   

 

 Hematocrit  2018 11:15am  44.4 %     36.0-45.0   

 

 Mean Corpuscular Volume  2018 11:15am  87.9 fL        

 

 Mean Corpuscular Hemoglobin  2018 11:15am  27.8 pg     27.0-35.0   

 

 Mean Corpuscular Hemoglobin Concent  2018 11:15am  31.6 g/dL  Low  
32.0-36.0   

 

 Platelet Count  2018 11:15am  81 K/uL  Low  152-406   

 

 Red Cell Distribution Width  2018 11:15am  16.9 %  High  12.1-15.2
   

 

 Mean Platelet Volume  2018 11:15am  9.1 fL     7.6-11.3   

 

 Neutrophils %  2018 11:15am  74.9 %  High  41.7-73.7   

 

 Lymphocytes %  2018 11:15am  19.4 %     15.3-44.8   

 

 Monocytes %  2018 11:15am  5.2 %     3.3-12.3   

 

 Eosinophils %  2018 11:15am  0.1 %     0-4.4   

 

 Basophils %  2018 11:15am  0.4 %     0-1.3   

 

 Absolute Neutrophil  2018 11:15am  5.8 K/uL     1.8-8.0   

 

 Absolute Lymphocytes (CBC)  2018 11:15am  1.5 K/uL     0.7-4.9   

 

 Absolute Monocytes (CBC)  2018 11:15am  0.4 K/uL     0.1-1.3   

 

 Absolute Eosinophils (CBC)  2018 11:15am  0.0 K/uL     0-0.5   

 

 Absolute Basophils (CBC)  2018 11:15am  0.0 K/uL     0-0.5   

 

 Blood Morphology Comment  2018 11:15am  Not seen         

 

 Sodium Level  2018 2:52pm  132 mEq/L  Low  135-145   

 

 Potassium Level  2018 2:52pm  4.1 mEq/L     3.6-5.0   

 

 Chloride Level  2018 2:52pm  102 mEq/L     101-111   

 

 Carbon Dioxide Level  2018 2:52pm  24 mEq/L     21-31   

 

 Glucose Level  2018 2:52pm  528 mg/dL  High    Repeated & 
Called to ROSSANA AMBROSIO RN on 18 at 1517 by JARETH

Was there 100% Readback? Y

ADA Clinical Practice Recommendation:

<100 mg/dl=Normal Fasting Glucose

 

 Bedside Glucose  2018 7:31pm  295 mg/dl  High     

 

 Blood Urea Nitrogen  2018 2:52pm  16 mg/dL     6-20   

 

 Creatinine  2018 2:52pm  0.52 mg/dL     0.44-1.00  The creatinine 
method used has been calibrated to be

traceable to Isotope dilution Mass Spectrometry (IDMS). 

For more information:  www.nkdep.nih.gov 

 

 Estimat Glomerular Filtration Rate  2018 2:52pm  > 90 mL/min     90
-  FOR CHRONIC KIDNEY DISEASE:

 GFR       STAGE           DESCRIPTION 

=/>90    STAGE 1    NORMAL--OR-- 

                     MINIMAL KIDNEY DAMAGE WITH NORMAL GFR

 60-89    STAGE 2    MILD DECREASE IN GFR         

 30-59    STAGE 3    MODERATE DECREASE IN GFR

 15-29    STAGE 4    SEVERE DECREASE IN GFR

 <15      STAGE 5    KIDNEY FAILURE

The Glomerular Filtration Rate (GFR) has been calculated

using the IDMS-Traceable MDRD Study Equation.

 

 Aspartate Amino Transf (AST/SGOT)  2018 11:10am  61 IU/L  High  10-
42   

 

 Alanine Aminotransferase (ALT/SGPT)  2018 11:10am  47 IU/L     10-
60   

 

 Alkaline Phosphatase  2018 11:10am  209 IU/L  High     

 

 Total Bilirubin  2018 11:10am  1.5 mg/dL  High  0.3-1.2   

 

 Direct Bilirubin  2018 11:10am  0.4 mg/dL  High  0-0.2   

 

 Calcium Level  2018 2:52pm  8.3 mg/dL  Low  8.5-10.5  Delta: 9.5 
on 

 

 Serum Total Protein  2018 11:10am  7.8 g/dL     6.0-8.3   

 

 Albumin  2018 11:10am  3.6 g/dL     3.2-5.5   

 

 Globulin  2018 11:10am  4.2 g/dL  High  2.3-3.5   

 

 Albumin/Globulin Ratio  2018 11:10am  0.9  Low  1.1-1.8   

 

 Rapid Troponin I  2018 11:15am  < 0.03 ng/mL         

 

 Ammonia  2018 11:21am  63 umol/L  High  10-45   

 

 Lipase  2018 11:10am  67 U/L  High  22-51   

 

 Urine Specific Gravity  2018 11:40am  1.010         

 

 Urine Glucose  2018 11:40am  3+  High      

 

 Urine Ketones  2018 11:40am  Trace         

 

 Urine Blood  2018 11:40am  Negative         

 

 Urine pH  2018 11:40am  7.0         

 

 Urine Total Protein  2018 11:40am  Negative         

 

 Urine Nitrite  2018 11:40am  Negative         

 

 Urine Leukocyte Esterase  2018 11:40am  Negative         

 

 Urine WBC  2018 11:20am  <5 /HPF         

 

 Urine RBC  2018 11:20am  5-10 /HPF  High      

 

 Urine Bacteria  2018 11:20am  <20 /HPF         

 

 Urine Squamous Epithelial Cells  2018 11:20am  <5 /HPF         

 

 Urine Culture Reflexed  2018 11:20am  Not needed        Culture 
was ordered previously.





Microbiology Results





 Procedure  Source  Result  Collection Date/Time  Result Date/Time

 

 Influenza Type B Antigen Screen  Nasopharnyx  No results entered  2018 11:15am   

 

 Influenza Type A Antigen Screen  Nasopharnyx  No results entered  2018 11:15am   













 Discharge Summary

 

 Encounter: Discharged Inpatient

Admit Date: 2018 2:44pm

Discharge Date: 2018 10:53pm

 

  [facility]

 

NAME: YARI SHI  MRN: [id number]  

ADMITTING:   Rozina Linares MD  ADMIT DATE: 18  

ATTENDING: Rozina Linares MD  : 1960  

ACCOUNT NO: [id number]  PATIENT TYPE: ADM IN  

LOCATION: [address] [place] [address]                                            
                    

 Name: Yari Shi                                                          
                    

 Age: 57 yrs                                                                   
                    

 Sex: Female                                                                   
                    

 : 1960                                                               
                    

 MRN: [id number]                                                               
                    

 Arrival Date: 2018                                                      
                    

 Time: 10:17                                                                   
                    

 Account#: [id number]                                                        
                    

 Bed 23                                                                        
                    

 Private MD:                                                                   
                    

 Diagnosis: Enteritis;Dehydration;Hepatic Encephalopathy;Hyperglycemia, 
unspecified                

                                                                               
                    

 Presentation:                                                                 
                    

                                                                          
                    

 10:43 Presenting complaint: Patient states: "i have been nauseous and throwing 
up for 4 days, tw2 

       and i feel weak". Transition of care: patient was not received from 
another setting of      

       care. Onset of symptoms was 2018. Care prior to arrival: 
None.                 

 10:43 Method Of Arrival: Ambulatory                                           
                tw2 

 10:43 Acuity: ABIOLA 3                                                           
                tw2 

                                                                               
                    

 Triage Assessment:                                                            
                    

 10:45 General: Appears in no apparent distress. Behavior is calm, cooperative, 
appropriate    tw2 

       for age. Pain: Complains of pain in right lower quadrant. GI: Abdomen is 
round Bowel        

       sounds present X 4 quads. Reports lower abdominal pain, intolerance of 
fluids,              

       intolerance of food, nausea, vomiting.                                  
                    

                                                                               
                    

 Historical:                                                                   
                    

 - Allergies:                                                                  
                    

 10:45 Zithromax;                                                              
                tw2 

 10:45 Darvocet-N 100;                                                         
                tw2 

 10:45 tramadol;                                                               
                tw2 

 - Home Meds:                                                                  
                    

 17:20 Cymbalta oral oral [Active]; Lorazepam Oral 3 times per day [Active]; 
Phenergan Oral    kr2 

       [Active]; Seroquel 50 mg Oral tab 1 tab every night [Active]; Lantus 100 
unit/mL Sub-Q      

       soln 40 units [Active]; Novolog 100 unit/mL Sub-Q soln 8 units [Active]; 
metformin 500      

       mg Oral tab 1 tab [Active]; Norco  mg Oral tab 1 tab three times 
per day [Active];    

 - PMHx:                                                                       
                    

 10:45 Anxiety; Diabetes - IDDM; Cancer, Breast; COPD; Depression; MUSCLE 
WEAKNESS;            tw2 

 - PSHx:                                                                       
                    

 10:45 Cholecystectomy; Appendectomy; Hysterectomy; ; breast 
reconstructive surgery;  tw2 

       bilateral mastectomy;                                                   
                    

                                                                               
                    

 - Ebola Screening: : Patient denies travel to an Ebola-affected area in the 21 
days               

   before illness onset.                                                       
                    

 - Immunization history:: Adult Immunizations Adult Immunizations up to date, 
Flu                  

   vaccine is up to date.                                                      
                    

 - Social history:: Smoking status: Patient uses tobacco products, smokes one-
half pack            

   cigarettes per day.                                                         
                    

 - Family history:: not pertinent.                                             
                    

 - Hospitalizations: : No recent hospitalization is reported.                  
                    

                                                                               
                    

                                                                               
                    

 Screenin:29 Abuse screen: Denies threats or abuse. Denies injuries from another. 
Nutritional        ss  

       screening: No deficits noted. Tuberculosis screening: Never had TB. Fall 
Risk None          

       identified.                                                             
                    

                                                                               
                    

 Assessment:                                                                   
                    

 11:10 General: Appears in no apparent distress. comfortable, Behavior is calm, 
cooperative,   ss  

       appropriate for age, Reports feeling ill for > 3 days, Denies fever. Pain
: Complains of     

       pain in right upper quadrant Pain currently is 10 out of 10 on a pain 
scale. Quality of     

       pain is described as aching, pressure, tingling, Is continuous. Neuro: 
Level of             

       Consciousness is awake, alert, obeys commands, Oriented to person, place
, time,             

       situation. Cardiovascular: Heart tones S1 S2 present Capillary refill < 
3 seconds is        

       brisk in bilateral fingers. Respiratory: Airway is patent Respiratory 
effort is even,       

       unlabored, Respiratory pattern is regular, symmetrical, Breath sounds 
are clear             

       bilaterally. Denies cough, shortness of breath. GI: Reports lower 
abdominal pain, upper     

       abdominal pain, bloating, nausea, vomiting, since x 4 days. GI: Abdomen 
is round            

       distended, Bowel sounds present X 4 quads. Abd is soft X 4 quads Abdomen 
is tender to       

       palpation in right upper quadrant. : Reports urgency, urinary 
frequency. EENT: Oral       

       mucosa is moist. Throat is clear. Derm: Skin is intact, is fragile, Skin 
is dry, Skin       

       is pink, warm \T\ dry. normal. Musculoskeletal: Circulation, motion, and 
sensation          

       intact. Capillary refill < 3 seconds, is brisk, in bilateral fingers. 
Range of motion:      

       intact in all extremities.                                              
                    

 12:19 Reassessment: Patient appears in no apparent distress at this time. 
Suzanna Brown RN  ss  

       at bedside attempting to gain peripheral IV access. Respiratory: Airway 
is patent           

       Respiratory effort is even, unlabored, Respiratory pattern is regular, 
symmetrical.         

       Derm: Skin is intact, is healthy with good turgor, Skin is pink, warm \T
\ dry.              

 13:12 Reassessment: Pt is resting at this time, eyes closed. Respirations even 
and unlabored. ss  

       Pt awoke easily to soft verbal stimuli and reports that pain has 
improved.                  

 14:18 Reassessment: pt back from CT at this time. Awaiting results. Pt 
ambulated to bathroom  ss  

       with steady gait. Respirtaions remain even and unlabored.               
                    

 15:30 Reassessment: Patient appears in no apparent distress at this time. 
Patient is alert,   kr2 

       oriented x 3, equal unlabored respirations, skin warm/dry/pink.         
                    

 16:58 Reassessment: Attempted to call report to floor, held for 5 minutes, no 
answer from     kr2 

       nurse at this time.                                                     
                    

                                                                               
                    

 Vital Signs:                                                                  
                    

 10:43  / 100; Pulse 126; Resp 18; Temp 98.1(TE); Pulse Ox 97% on R/A; 
Weight 72.57 kg;  tw2 

       Height 5 ft. 4 in. (162.56 cm); Pain 10/10;                             
                    

 12:30  / 83; Pulse 114; Resp 15; Pulse Ox 98% on R/A; Pain 8/10;        
                ss  

 13:30  / 96; Pulse 112; Resp 26; Pulse Ox 97% on R/A;                   
                dh3 

 16:51  / 99; Pulse 98; Resp 13; Pulse Ox 95% on R/A;                    
                kr2 

 10:43 Body Mass Index 27.46 (72.57 kg, 162.56 cm)                             
                tw2 

                                                                               
                    

 ED Course:                                                                    
                    

 10:17 Patient arrived in ED.                                                  
                sb2 

 10:43 Triage completed.                                                       
                tw2 

 10:44 Arm band placed on.                                                     
                tw2 

 10:50 Nawaf Moroe MD is Attending Physician.                                
                rn  

 11:10 Missed attempt(s): 22 gauge in right antecubital area. Bleeding 
controlled, band aid    ss  

       applied, catheter tip intact.                                           
                    

 11:15 Inserted saline lock: 24 gauge in right antecubital area, using aseptic 
technique.      ss  

       Blood collected. Patient maintains SpO2 saturation greater than 95% on 
room air.            

 11:27 Rossana Ambrosio, RN is Primary Nurse.                                    
                ss  

 11:29 Patient has correct armband on for positive identification. Bed in low 
position. Call   ss  

       light in reach. Side rails up X 1.                                      
                    

 11:30 Flu and/or RSV swab sent to lab.                                        
                dh3 

 11:36 EKG done, by EKG tech.                                                  
                3 

 11:41 Inserted saline lock: 22 gauge in right antecubital area, using aseptic 
technique.      3 

 12:00 Missed attempt(s): 22 gauge in left antecubital area. missed attempt by 
Carolyn Harper RN. Bleeding controlled, band aid applied, catheter tip 
intact.                  

 13:24 Patient moved to CT via wheelchair.                                     
                jg1 

 13:33 Repeat lab(s) drawn. by me, sent to lab.                                
                3 

 13:59 Patient moved to CT via stretcher.                                      
                vr  

 14:09 CT Abd/Pelvis - W/Contrast In Process Unspecified.                      
                EDMS

 14:35 Rozina Linares MD is Hospitalizing Provider.                           
                rn  

 14:57 Repeat lab(s) drawn. by me, sent to lab.                                
                [facility] 

                                                                               
                    

 Administered Medications:                                                     
                    

 12:00 Drug: Zofran 4 mg Route: IVP; Site: left antecubital;                   
                ss  

 13:11 Follow up: Response: No adverse reaction; Nausea is decreased           
                ss  

 12:35 Drug: NS 0.9% 1000 ml Route: IV; Rate: 1000 ml; Site: right wrist;      
                ss  

 14:18 Follow up: IV Status: Completed infusion; IV Intake: 1000ml             
                ss  

 12:42 Drug: Insulin Regular Human 10 units {Co-Signature: hb (Zina Alanis RN
).} Route:     ss  

       Sub-Q; Site: right upper arm;                                           
                    

 16:04 Follow up: Response: No adverse reaction                                
                kr2 

 12:45 Drug: morphine 4 mg Route: IVP; Site: right wrist;                      
                ss  

 14:31 Follow up: Response: No adverse reaction; Pain is decreased             
                ss  

 12:46 Drug: Insulin Regular Human 10 units {Co-Signature: hb (Zina Alanis RN
).} Route:     ss  

       IVP; Site: right wrist;                                                 
                    

 16:04 Follow up: Response: No adverse reaction                                
                kr2 

 14:30 Drug: NS 0.9% 1000 ml Route: IV; Rate: 1000 ml; Site: right antecubital;
                ss  

 15:40 Follow up: Response: No adverse reaction; IV Status: Completed infusion 
                kr2 

 16:00 Drug: Insulin Regular Human 10 units {Co-Signature: rk2 (Audelia Goodrich RN
).} Route:     kr2 

       Sub-Q; Site: left upper arm;                                            
                    

 17:27 Follow up: Response: No adverse reaction; Blood sugar is lowered        
                kr2 

 16:03 Drug: morphine 4 mg Route: IVP; Site: right antecubital;                
                kr2 

 17:28 Follow up: Response: No adverse reaction; Pain is decreased             
                kr2 

 16:03 Drug: Zofran 4 mg Route: IVP; Site: right antecubital;                  
                kr2 

 17:28 Follow up: Response: No adverse reaction                                
                kr2 

                                                                               
                    

                                                                               
                    

 Point of Care Testing:                                                        
                    

       Blood Glucose:                                                          
                    

 10:49 Blood Glucose: High (>450 mg/dL);                                       
                tw2 

 13:20 Blood Glucose: High (>450 mg/dL);                                       
                ss  

 14:42 Blood Glucose: High (>450 mg/dL);                                       
                dh3 

 16:51 Blood Glucose: 369 mg/dL;                                               
                kr2 

 10:49 paty segura notified, pt moved to room #23 for f/u care, blood sugar by 
Franklin Torres      tw2 

 13:20 will send serum glucose level.                                          
                  

       Ranges:                                                                 
                    

                                                                               
                    

 Intake:                                                                       
                    

 14:18 IV: 1000ml; Total: 1000ml.                                              
                ss  

                                                                               
                    

 Outcome:                                                                      
                    

 14:36 Decision to Hospitalize by Provider.                                    
                rn  

 17:41 Patient left the ED.                                                    
                rk2 

                                                                               
                    

 Signatures:                                                                   
                    

 Dispatcher [employer]                           JASON Taylor Chanda                              [email or web address]                              
                    

 Nawaf Moore MD MD rn Smirch, Shelby, RN RN   ss                               
                    

 Viky Palomares Carrie, EKG Tech              EKG [facility]3                              
                    

 Sowmya Perez RN                          RN   tw2                              
                    

 Melissa Garza                              3                              
                    

 Emily Ayala RN                       RN   kr2                              
                    

 Audelia Goodrich RN                      RN   rk2                              
                    

 Alexa Saavedra                               2                              
                    

 Zina Alanis RN                                                           
                    

 Audelia Goodrich RN                             rk2                              
                    

                                                                               
                    

 *******************************************************************************
*******************

 

         18 1741

         







Advance Directives







 Advance Directive  Response  Recorded Date/Time

 

 Does Patient Have Living Will  No  2018 5:36pm

 

 Durable Power of  for Health Care  No  2018 5:36pm

 

 Would you like additional information  No  2018 4:29pm







Chief Complaint and Reason for Visit







 Encounter  Admit Date  Chief Complaint  Reason for Visit

 

 Discharged Inpatient  2018 2:44pm  HYPERGLYCEMIA,ENTERITIS,
DEHYDRATION,HEPATIC ENCEPH  Malignant neoplasm of breast

Hepatic cirrhosis

Enteritis

Type 2 diabetes mellitus with hyperglycemia

Anxiety

Chronic obstructive pulmonary disease

Diabetes mellitus

Hypertension







Hospital Discharge Instructions

No known hospital discharge instructions.



Hospital Discharge Medications







 Medication  Dose  Units  Route  Sig  Qty  Days  Order Date  Status  
Instructions

 

 Hydrocodone/Acetaminophen  1  TAB  ORAL  EVERY EIGHT HOURS AS NEEDED PRN For 
Pain        2018  Active   







Encounters







 Encounter  Facility  Location  Admit Date  Discharge Date  Attending Provider

 

 Discharged Inpatient  [facility]  MED/SURG FLOOR  2018 2:44pm  2018 10:53pm  Rozina Linares











 Encounter Diagnosis  Onset Date

 

 Malignant neoplasm of breast   

 

 Hepatic cirrhosis   

 

 Enteritis   

 

 Type 2 diabetes mellitus with hyperglycemia   

 

 Anxiety   

 

 Chronic obstructive pulmonary disease   

 

 Diabetes mellitus   

 

 Hypertension   







Family History







 Query  Response  Instance  Date Recorded  Comment

 

 Nurses notes  77 healthy woman  Mother  2018 4:29pm   

 

 Nurses notes  85 y/o healthy man  Father  2018 4:29pm   

 

 Medical History  Other (see notes)  Mother  2018 4:29pm   

 

 Medical History  Other (see notes)  Father  2018 4:29pm   







Functional Status







 Query  Response  Date Recorded  Comment

 

 Mental Status  Oriented to Own Ability  2018 4:29pm   











 Query  Response  Date Recorded  Comment

 

 [place] Self  Independent  2018 4:29pm   

 

 Completes ADL's Without Assistance  Yes  2018 4:29pm   

 

 Cook for Self  Independent  2018 4:29pm   

 

 Dress/Moran Self  Independent  2018 4:29pm   

 

 Feed Self  Independent  2018 4:29pm   

 

 Toileting  Independent  2018 4:29pm   







Immunizations

No known immunizations.



Payers







 Payer Name  Policy Type  Covered Party  Covered Party Id  Relationship  
Subscriber  Subscriber Id

 

 MEDICARE Medicare Primary  YARI SHI  [id number]  SAME AS PATIENT (SELF)
      

 

 [place] [employer]  Medicaid  YARI SHI  [id number]  SAME AS 
PATIENT (SELF)      







Plan of Care

No known plan of care.



Social History







 Query  Response  Date Recorded  Comment

 

 Alcohol Use?  No  2018 4:29pm   

 

 CD- Drugs?  No  2018 4:29pm   











 Query  Response  Start Date  Stop Date

 

 Smoking Status  Current every day smoker  1975   







Vital Signs







 Vital Reading  Result  Reference Range  Collection Date/Time

 

 Height  5 ft 4 in     2018 4:51pm

 

 Weight  160 lb     2018 4:29pm

 

 Temperature  98 F  96.8 F-100.9 F  2018 8:00pm

 

 Pulse  102 BPM  50-90  2018 8:00pm

 

 Respiration  18 RPM  12-20  2018 8:00pm

 

 Pulse Oximetry  92 %  91-  2018 8:00pm

 

 Blood Pressure Systolic  140    2018 8:00pm

 

 Blood Pressure Diastolic  71  60-90  2018 8:00pm

 

 Body Mass Index  27.4     2018 4:29pm

## 2018-04-18 NOTE — XMS REPORT
Patient Summary Document

 Created on: 2018



YARI JOSHUA

External Reference #: [id number]

: 1960

Sex: Female



Demographics







 Address  [address] [place] [address]  [address]

 

 Home Phone  [phone]

 

 Preferred Language  Unknown

 

 Marital Status  Unknown

 

 [employer] Affiliation  Unknown

 

 Race  Unknown

 

 Additional Race(s) 

 

 

 Ethnic Group  Unknown





Author







 Author  [facility]

 

 Address  Unknown

 

 Phone  Unavailable







Care Team Providers







 Care Team Member Name  Role  Phone

 

 Nawaf Moore  Unavailable  Unavailable

 

 Rozina Linares  Unavailable  Unavailable

 

 JARRETT TUCKER  Unavailable  Unavailable







Problems

This patient has no known problems.



Allergies, Adverse Reactions, Alerts

This patient has no known allergies or adverse reactions.



Medications

This patient has no known medications.



Results







 Test Description  Test Time  Test Comments  Text Results  Atomic Results  
Result Comments









 [place] count  2018 07:39:00     BETWEEN 10,000 & 100,000 CFU/ML^BETWEEN 
10,000 & 100,000 CFU/ML^L   

 

 Urine culture  2018 07:39:00       









   

 

 Urine culture (test code=630-4)  MIXED MACY.      





Microscopic examination of gmfnw5162-13-56 11:41:00* 





 Test Item  Value  Reference Range  Comments

 

 Urine WBC (test code=UWBC)  5-10  <5   

 

 Urine sediment erythrocyte count by microscopy (number/high power field) (test 
code=13945-1)  <5  NONE SEEN   

 

 Bacteria detection in urine sediment by light microscopy (test code=25145-4)  
20-50  <20   

 

 Sqamous Epithelial (test code=SQEP)  5-10  NONE SEEN   

 

 Urinalysis with reflex to culture (test code=58077-9)  REFLEXED      





Urine dipstick testing at point-of-zshq3873-47-83 10:51:00* 





 Test Item  Value  Reference Range  Comments

 

 Urine specific gravity measurement (test code=2965-2)  1.015  1.005-1.030   

 

 Urine glucose detection (test code=2349-9)  2+  NEG   

 

 Urine Ketones (test code=UKET)  NEGATIVE  NEG   

 

 Urine blood detection (test code=33051-4)  TRACE  NEG   

 

 Urine pH (test code=2756-5)  7.0  5.0-7.0   

 

 Urinalysis with microscopy (test code=24356-8)  TRACE  NEG   

 

 Urine Nitrate (test code=UNIT)  NEGATIVE  NEG   

 

 Urine Leukocyte Esterase (test code=UESTR)  1+  NEG   





Comment Bed:8 mrs TRACE 2+ NEG 1+ NEG 7.0 TRACE Y 1.015Complete blood count (CBC
) with automated white blood cell (WBC) szmtvxtbuakg7630-05-23 09:51:00* 





 Test Item  Value  Reference Range  Comments

 

 White blood cell count (test code=IMO0002)  6.8  4.3-10.9   

 

 Blood erythrocytes count (number/volume) (test code=26453-1)  4.81 M/ul  3.86-
4.86   

 

 Hemoglobin measurement (test code=IMO0002)  12.5 g/dL  12.0-15.0   

 

 Blood hematocrit (volume fraction) (test code=20570-8)  38.8 %  36.0-45.0   

 

 MCV (test code=30428-7)  80.5 fL     

 

 MCH (test code=28539-5)  26.1 pg  27.0-35.0   

 

 MCHC (test code=MCHC)  32.4 g/dL  32.0-36.0   

 

 Platelets (test code=PLT)  74  152-406  Repeated, Pt history OF LOW PLT COUNTS
, Broadcast at 0903 by [employer]

 

 Red Cell Distribution Width (test code=RDW)  15.7 %  12.1-15.2   

 

 Blood platelet mean volume (test code=28542-9)  8.2 fL  7.6-11.3   

 

 Neutrophils % (test code=JANE%)  71.5 %  41.7-73.7   

 

 Lymphocytes/leuk NFr Bld (test code=26478-8)  20.0 %  15.3-44.8   

 

 Monocyte percentage (test code=5905-5)  7.4 %  3.3-12.3   

 

 Eosinophil % (test code=713-8)  0.7 %  0-4.4   

 

 Basophil % (test code=26445-7)  0.4 %  0-1.3   

 

 Absolute neutrophil count (test code=751-8)  4.8  1.8-8.0   

 

 Absolute lymphocyte count (test code=20585-6)  1.3  0.7-4.9   

 

 Absolute monocyte count (test code=742-7)  0.5  0.1-1.3   

 

 Absolute Eosinophils (test code=EOA)  0.0  0-0.5   

 

 Absolute Basophils (test code=BASA)  0.0  0-0.5   





Blood smear scan (BSS)2018 09:51:00* 





 Test Item  Value  Reference Range  Comments

 

 Blood morphology interpretation narrative (test code=18314-5)  NOTED  NOT SEEN
   

 

 Blood anisocytosis detection (test code=38892-6)  1+      





Basic Metabolic Mymph7762-46-22 08:51:00* 





 Test Item  Value  Reference Range  Comments

 

 Sodium level (test code=IMO0002)  133 meq/L  135-145  3.1

 

 Chloride measurement (test code=IMO0002)  96 meq/L  101-111   

 

 Bicarbonate (test code=CO2)  30 meq/L  21-31   

 

 Glucose measurement (test code=IMO0002)  303 mg/dL    ADA Clinical 
Practice Recommendation: <100 mg/dl=Normal Fasting Glucose

 

 BUN Bld-mCnc (test code=6299-2)  12 mg/dL  6-20   

 

 Creatinine measurement (test code=IMO0002)  0.55 mg/dL  0.44-1.00  The 
creatinine method used has been calibrated to be traceable to Isotope dilution 
Mass Spectrometry (IDMS). For more information:  www.nkdep.nih.gov

 

 Estimated glomerular filtration rate (GFR) determination (test code=69405-9)  >
90 mL  =/>90  FOR CHRONIC KIDNEY DISEASE:  GFR       STAGE           DESCRIPTION
=/>90    STAGE 1    NORMAL--OR--                      MINIMAL KIDNEY DAMAGE 
WITH NORMAL GFR  60-89    STAGE 2    MILD DECREASE IN GFR  30-59    STAGE 3    
MODERATE DECREASE IN GFR  15-29    STAGE 4    SEVERE DECREASE IN GFR  <15      
STAGE 5    KIDNEY FAILURE The Glomerular Filtration Rate (GFR) has been 
calculated using the IDMS-Traceable MDRD Study Equation.

 

 Calcium Level (test code=CA)  9.0 mg/dL  8.5-10.5   





Liver (Hepatic) Qjrfzwvx8941-04-95 08:51:00* 





 Test Item  Value  Reference Range  Comments

 

 Aspartate aminotransferase (AST) measurement (test code=IMO0002)  72 [iU]/L  10
-42   

 

 ALT/SGPT (test code=SGPT)  51 [iU]/L  10-60   

 

 Alkaline Phosphatase (test code=ALK)  178 [iU]/L     

 

 Bilirubin total (test code=IMO0002)  1.2 mg/dL  0.3-1.2   

 

 Bilirubin direct (test code=1968-7)  0.3 mg/dL  0-0.2   

 

 Serum total protein measurement (test code=2885-2)  7.3 g/dL  6.0-8.3   

 

 Albumin measurement (test code=IMO0002)  3.6 g/dL  3.2-5.5   

 

 Globulin (test code=GLOB)  3.7 g/dL  2.3-3.5   

 

 Albumin/Globulin Ratio (test code=A/G)  1.0  1.1-1.8   





Lipase jodcgzzqlou2047-98-28 08:51:00* 





 Test Item  Value  Reference Range  Comments

 

 Lipase measurement (test code=3040-3)  44 U/L  22-51   





Troponin (Emerg Dept Use Only)2018 08:51:00* 





 Test Item  Value  Reference Range  Comments

 

 Troponin (Emerg Dept Use Only) (test code=TROPED)  < 0.03  <0.03   





Comment Bed:8Creatinine (Radiology Only)2018 08:47:00* 





 Test Item  Value  Reference Range  Comments

 

 Creatinine measurement (test code=IMO0002)  0.50 mg/dL  0.44-1.00  The 
creatinine method used has been calibrated to be traceable to Isotope dilution 
Mass Spectrometry (IDMS). For more information:  www.nkdep.nih.gov

 

 Estimated glomerular filtration rate (GFR) determination (test code=69405-9)  >
60 mL  >60   FOR CHRONIC KIDNEY DISEASE:        GFR             STAGE        >
60             1 or 2        30-59             3        15-29             4    
    <15(or dialysis)  5 The Glomerular Filtration Rate (GFR) has been 
calculated using the IDMS-Traceable MDRD Study Equation.





Glucose blood dhllcprkdpz5226-00-74 13:44:00* 





 Test Item  Value  Reference Range  Comments

 

 Glucose blood fingerstick (test code=IMO0001)  269 mg/dL     





Glucose blood zcjpckwdvrd9926-05-67 13:44:00* 





 Test Item  Value  Reference Range  Comments

 

 Glucose blood fingerstick (test code=IMO0001)  > 450     





Microscopic examination of qamqw7665-40-48 20:21:00* 





 Test Item  Value  Reference Range  Comments

 

 Urine WBC (test code=UWBC)  <5  <5   

 

 Urine sediment erythrocyte count by microscopy (number/high power field) (test 
code=13945-1)  <5  NONE SEEN   

 

 Bacteria detection in urine sediment by light microscopy (test code=25145-4)  <
20  <20   

 

 Sqamous Epithelial (test code=SQEP)  <5  NONE SEEN   

 

 Urinalysis with reflex to culture (test code=58077-9)  NOT NEEDED     Culture 
is not indicated. 





Comment Bed:20Ammonia ojpzedbrjhu4107-70-87 20:12:00* 





 Test Item  Value  Reference Range  Comments

 

 Ammonia measurement (test code=23656-2)  18 umol/L  10-45   





Comment Bed:20Acetone Gkfxs0442-70-26 20:11:00* 





 Test Item  Value  Reference Range  Comments

 

 Acetone Level (test code=ACET)  NEG  NEG   





Urine dipstick testing at point-of-jtai9409-57-51 20:06:00* 





 Test Item  Value  Reference Range  Comments

 

 Urine specific gravity measurement (test code=2965-2)  1.015  1.005-1.030   

 

 Urine glucose detection (test code=2349-9)  2+  NEG   

 

 Urine Ketones (test code=UKET)  NEGATIVE  NEG   

 

 Urine blood detection (test code=33051-4)  Negative  NEG   

 

 Urine pH (test code=2756-5)  7.5  5.0-7.0   

 

 Urinalysis with microscopy (test code=24356-8)  NEGATIVE  NEG   

 

 Urine Nitrate (test code=UNIT)  NEGATIVE  NEG   

 

 Urine Leukocyte Esterase (test code=UESTR)  NEGATIVE  NEG   





Comment Bed:20 cb NEG 2+ NEG NEG NEG 7.5 NEG Y 1.015Basic Metabolic Gpkux5119-47
-04 20:06:00* 





 Test Item  Value  Reference Range  Comments

 

 Sodium level (test code=IMO0002)  132 meq/L  135-145  3.5

 

 Chloride measurement (test code=IMO0002)  93 meq/L  101-111   

 

 Bicarbonate (test code=CO2)  30 meq/L  21-31   

 

 Glucose measurement (test code=IMO0002)  471 mg/dL    Repeated & Called 
to MADDY KOTHARI RN on 18 at 2002 by LULAHO Was there 100% Readback? Y 
ADA Clinical Practice Recommendation: <100 mg/dl=Normal Fasting Glucose

 

 BUN Bld-mCnc (test code=6299-2)  9 mg/dL  6-20   

 

 Creatinine measurement (test code=IMO0002)  0.57 mg/dL  0.44-1.00  The 
creatinine method used has been calibrated to be traceable to Isotope dilution 
Mass Spectrometry (IDMS). For more information:  www.nkdep.nih.gov

 

 Estimated glomerular filtration rate (GFR) determination (test code=69405-9)  >
90 mL  =/>90  FOR CHRONIC KIDNEY DISEASE:  GFR       STAGE           DESCRIPTION
=/>90    STAGE 1    NORMAL--OR--                      MINIMAL KIDNEY DAMAGE 
WITH NORMAL GFR  60-89    STAGE 2    MILD DECREASE IN GFR  30-59    STAGE 3    
MODERATE DECREASE IN GFR  15-29    STAGE 4    SEVERE DECREASE IN GFR  <15      
STAGE 5    KIDNEY FAILURE The Glomerular Filtration Rate (GFR) has been 
calculated using the IDMS-Traceable MDRD Study Equation.

 

 Calcium Level (test code=CA)  9.8 mg/dL  8.5-10.5   





Liver (Hepatic) Qgantwka4454-28-35 20:06:00* 





 Test Item  Value  Reference Range  Comments

 

 Aspartate aminotransferase (AST) measurement (test code=IMO0002)  55 [iU]/L  10
-42   

 

 ALT/SGPT (test code=SGPT)  45 [iU]/L  10-60   

 

 Alkaline Phosphatase (test code=ALK)  188 [iU]/L     

 

 Bilirubin total (test code=IMO0002)  1.0 mg/dL  0.3-1.2   

 

 Bilirubin direct (test code=1968-7)  0.2 mg/dL  0-0.2   

 

 Serum total protein measurement (test code=2885-2)  7.9 g/dL  6.0-8.3   

 

 Albumin measurement (test code=IMO0002)  3.7 g/dL  3.2-5.5   

 

 Globulin (test code=GLOB)  4.2 g/dL  2.3-3.5   

 

 Albumin/Globulin Ratio (test code=A/G)  0.9  1.1-1.8   





Amylase cizsmlkwvqp0562-48-76 20:06:00* 





 Test Item  Value  Reference Range  Comments

 

 Amylase measurement (test code=IMO0002)  40 U/L     





Lipase jjkdmhvfsjk1682-34-68 20:06:00* 





 Test Item  Value  Reference Range  Comments

 

 Lipase measurement (test code=3040-3)  39 U/L  22-51   





Complete blood count (CBC) with automated white blood cell (WBC) 
wvteklipijjb0294-82-46 19:59:00* 





 Test Item  Value  Reference Range  Comments

 

 White blood cell count (test code=IMO0002)  7.5  4.3-10.9   

 

 Blood erythrocytes count (number/volume) (test code=26453-1)  4.93 M/ul  3.86-
4.86   

 

 Hemoglobin measurement (test code=IMO0002)  13.1 g/dL  12.0-15.0   

 

 Blood hematocrit (volume fraction) (test code=20570-8)  40.3 %  36.0-45.0   

 

 MCV (test code=30428-7)  81.7 fL     

 

 MCH (test code=28539-5)  26.5 pg  27.0-35.0   

 

 MCHC (test code=MCHC)  32.4 g/dL  32.0-36.0   

 

 Platelets (test code=PLT)  80  152-406  Repeated, Pt history, Broadcast at 
1959 by [employer]

 

 Red Cell Distribution Width (test code=RDW)  16.0 %  12.1-15.2   

 

 Blood platelet mean volume (test code=28542-9)  8.7 fL  7.6-11.3   

 

 Neutrophils % (test code=JANE%)  73.0 %  41.7-73.7   

 

 Lymphocytes/leuk NFr Bld (test code=26478-8)  19.3 %  15.3-44.8   

 

 Monocyte percentage (test code=5905-5)  6.9 %  3.3-12.3   

 

 Eosinophil % (test code=713-8)  0.4 %  0-4.4   

 

 Basophil % (test code=26445-7)  0.4 %  0-1.3   

 

 Absolute neutrophil count (test code=751-8)  5.5  1.8-8.0   

 

 Absolute lymphocyte count (test code=20585-6)  1.4  0.7-4.9   

 

 Absolute monocyte count (test code=742-7)  0.5  0.1-1.3   

 

 Absolute Eosinophils (test code=EOA)  0.0  0-0.5   

 

 Absolute Basophils (test code=BASA)  0.0  0-0.5   





Creatinine (Radiology Only)2018 19:45:00* 





 Test Item  Value  Reference Range  Comments

 

 Creatinine measurement (test code=IMO0002)  0.60 mg/dL  0.44-1.00  The 
creatinine method used has been calibrated to be traceable to Isotope dilution 
Mass Spectrometry (IDMS). For more information:  www.nkdep.nih.gov

 

 Estimated glomerular filtration rate (GFR) determination (test code=69405-9)  >
60 mL  >60   FOR CHRONIC KIDNEY DISEASE:        GFR             STAGE        >
60             1 or 2        30-59             3        15-29             4    
    <15(or dialysis)  5 The Glomerular Filtration Rate (GFR) has been 
calculated using the IDMS-Traceable MDRD Study Equation.





Glucose blood opatbonxndk3130-06-96 16:22:00* 





 Test Item  Value  Reference Range  Comments

 

 Glucose blood fingerstick (test code=IMO0001)  369 mg/dL     





Glucose blood uvufiqeugri6026-05-64 16:22:00* 





 Test Item  Value  Reference Range  Comments

 

 Glucose blood fingerstick (test code=IMO0001)  > 450     





Glucose blood thiptdpnpdv3232-20-20 16:22:00* 





 Test Item  Value  Reference Range  Comments

 

 Glucose blood fingerstick (test code=IMO0001)  > 450     





Glucose blood yehqqalnlkg9056-93-00 16:22:00* 





 Test Item  Value  Reference Range  Comments

 

 Glucose blood fingerstick (test code=IMO0001)  > 450     





[place] rqofl3430-97-75 10:18:00Comment Bed:23BETWEEN 10,000 & 100,000 CFU/ML^
BETWEEN 10,000 & 100,000 CFU/ML^LUrine ypayxii3294-61-07 10:18:00* 





 Test Item  Value  Reference Range  Comments

 

 Urine culture (test code=630-4)  MIXED MACY.      





Comment Bed:23Glucose blood xgksxvxmook6347-73-90 21:19:00* 





 Test Item  Value  Reference Range  Comments

 

 Glucose blood fingerstick (test code=IMO0001)  295 mg/dL     





Basic Metabolic Zlisp4208-30-60 15:18:00* 





 Test Item  Value  Reference Range  Comments

 

 Sodium level (test code=IMO0002)  132 meq/L  135-145  4.1

 

 Chloride measurement (test code=IMO0002)  102 meq/L  101-111   

 

 Bicarbonate (test code=CO2)  24 meq/L  21-31   

 

 Glucose measurement (test code=IMO0002)  528 mg/dL    Repeated & Called 
to ADRIANA AMBROSIO RN on 18 at 1517 by JARETH Was there 100% Readback? Y 
ADA Clinical Practice Recommendation: <100 mg/dl=Normal Fasting Glucose

 

 BUN Bld-mCnc (test code=6299-2)  16 mg/dL  6-20   

 

 Creatinine measurement (test code=IMO0002)  0.52 mg/dL  0.44-1.00  The 
creatinine method used has been calibrated to be traceable to Isotope dilution 
Mass Spectrometry (IDMS). For more information:  www.nkdep.nih.gov

 

 Estimated glomerular filtration rate (GFR) determination (test code=69405-9)  >
90 mL  =/>90  FOR CHRONIC KIDNEY DISEASE:  GFR       STAGE           DESCRIPTION
=/>90    STAGE 1    NORMAL--OR--                      MINIMAL KIDNEY DAMAGE 
WITH NORMAL GFR  60-89    STAGE 2    MILD DECREASE IN GFR  30-59    STAGE 3    
MODERATE DECREASE IN GFR  15-29    STAGE 4    SEVERE DECREASE IN GFR  <15      
STAGE 5    KIDNEY FAILURE The Glomerular Filtration Rate (GFR) has been 
calculated using the IDMS-Traceable MDRD Study Equation.

 

 Calcium Level (test code=CA)  8.3 mg/dL  8.5-10.5   





Comment Bed:23Glucose nquxndxiduc2399-90-66 14:19:00* 





 Test Item  Value  Reference Range  Comments

 

 Glucose measurement (test code=IMO0002)  683 mg/dL    Repeated, Pt 
history, Broadcast at 1419 by MARY ADA Clinical Practice Recommendation: <
100 mg/dl=Normal Fasting Glucose





Comment Bed:23Complete blood count (CBC) with automated white blood cell (WBC) 
prxuudmkurgn4792-15-00 14:06:00* 





 Test Item  Value  Reference Range  Comments

 

 White blood cell count (test code=IMO0002)  7.7  4.3-10.9   

 

 Blood erythrocytes count (number/volume) (test code=26453-1)  5.04 M/ul  3.86-
4.86   

 

 Hemoglobin measurement (test code=IMO0002)  14.0 g/dL  12.0-15.0   

 

 Blood hematocrit (volume fraction) (test code=20570-8)  44.4 %  36.0-45.0   

 

 MCV (test code=30428-7)  87.9 fL     

 

 MCH (test code=28539-5)  27.8 pg  27.0-35.0   

 

 MCHC (test code=MCHC)  31.6 g/dL  32.0-36.0   

 

 Platelets (test code=PLT)  81  152-406   

 

 Red Cell Distribution Width (test code=RDW)  16.9 %  12.1-15.2   

 

 Blood platelet mean volume (test code=28542-9)  9.1 fL  7.6-11.3   

 

 Neutrophils % (test code=JANE%)  74.9 %  41.7-73.7   

 

 Lymphocytes/leuk NFr Bld (test code=26478-8)  19.4 %  15.3-44.8   

 

 Monocyte percentage (test code=5905-5)  5.2 %  3.3-12.3   

 

 Eosinophil % (test code=713-8)  0.1 %  0-4.4   

 

 Basophil % (test code=26445-7)  0.4 %  0-1.3   

 

 Absolute neutrophil count (test code=751-8)  5.8  1.8-8.0   

 

 Absolute lymphocyte count (test code=20585-6)  1.5  0.7-4.9   

 

 Absolute monocyte count (test code=742-7)  0.4  0.1-1.3   

 

 Absolute Eosinophils (test code=EOA)  0.0  0-0.5   

 

 Absolute Basophils (test code=BASA)  0.0  0-0.5   





Blood smear scan (BSS)2018 14:06:00* 





 Test Item  Value  Reference Range  Comments

 

 Blood morphology interpretation narrative (test code=18314-5)  NOT SEEN  NOT 
SEEN   





Basic Metabolic Oafdg0624-58-93 12:24:00* 





 Test Item  Value  Reference Range  Comments

 

 Sodium level (test code=IMO0002)  130 meq/L  135-145  5.0

 

 Chloride measurement (test code=IMO0002)  94 meq/L  101-111   

 

 Bicarbonate (test code=CO2)  23 meq/L  21-31   

 

 Glucose measurement (test code=IMO0002)  832 mg/dL    Repeated & Called 
to ROGELIO LINK RN on 18 at 1222 by MARY Was there 100% Readback? Y 
ADA Clinical Practice Recommendation: <100 mg/dl=Normal Fasting Glucose

 

 BUN Bld-mCnc (test code=6299-2)  20 mg/dL  6-20   

 

 Creatinine measurement (test code=IMO0002)  0.64 mg/dL  0.44-1.00  The 
creatinine method used has been calibrated to be traceable to Isotope dilution 
Mass Spectrometry (IDMS). For more information:  www.nkdep.nih.gov

 

 Estimated glomerular filtration rate (GFR) determination (test code=69405-9)  >
90 mL  =/>90  FOR CHRONIC KIDNEY DISEASE:  GFR       STAGE           DESCRIPTION
=/>90    STAGE 1    NORMAL--OR--                      MINIMAL KIDNEY DAMAGE 
WITH NORMAL GFR  60-89    STAGE 2    MILD DECREASE IN GFR  30-59    STAGE 3    
MODERATE DECREASE IN GFR  15-29    STAGE 4    SEVERE DECREASE IN GFR  <15      
STAGE 5    KIDNEY FAILURE The Glomerular Filtration Rate (GFR) has been 
calculated using the IDMS-Traceable MDRD Study Equation.

 

 Calcium Level (test code=CA)  9.5 mg/dL  8.5-10.5   





Liver (Hepatic) Oostzemo1601-41-97 12:24:00* 





 Test Item  Value  Reference Range  Comments

 

 Aspartate aminotransferase (AST) measurement (test code=IMO0002)  61 [iU]/L  10
-42   

 

 ALT/SGPT (test code=SGPT)  47 [iU]/L  10-60   

 

 Alkaline Phosphatase (test code=ALK)  209 [iU]/L     

 

 Bilirubin total (test code=IMO0002)  1.5 mg/dL  0.3-1.2   

 

 Bilirubin direct (test code=1968-7)  0.4 mg/dL  0-0.2   

 

 Serum total protein measurement (test code=2885-2)  7.8 g/dL  6.0-8.3   

 

 Albumin measurement (test code=IMO0002)  3.6 g/dL  3.2-5.5   

 

 Globulin (test code=GLOB)  4.2 g/dL  2.3-3.5   

 

 Albumin/Globulin Ratio (test code=A/G)  0.9  1.1-1.8   





Lipase fznyvodlnjr9319-08-71 12:24:00* 





 Test Item  Value  Reference Range  Comments

 

 Lipase measurement (test code=3040-3)  67 U/L  22-51   





Troponin (Emerg Dept Use Only)2018 11:53:00* 





 Test Item  Value  Reference Range  Comments

 

 Troponin (Emerg Dept Use Only) (test code=TROPED)  < 0.03  <0.03   





Comment Bed:23Ammonia wcadawaozbc3828-76-10 11:46:00* 





 Test Item  Value  Reference Range  Comments

 

 Ammonia measurement (test code=23656-2)  63 umol/L  10-45   





Influenza Type A Kfcgipd8823-74-15 11:46:00* 





 Test Item  Value  Reference Range  Comments

 

 FLU A ----- (test code=FLUA)  NEGATIVE (could be below detectable limits, 
suggest culture)      





Comment Bed:23Influenza Type B Pjrnfjy5415-22-92 11:46:00* 





 Test Item  Value  Reference Range  Comments

 

 FLU B ----- (test code=FLUB)  NEGATIVE (could be below detectable levels, 
suggest culture)      





Comment Bed:23Creatinine (Radiology Only)2018 11:45:00* 





 Test Item  Value  Reference Range  Comments

 

 Creatinine measurement (test code=IMO0002)  0.64 mg/dL  0.44-1.00  The 
creatinine method used has been calibrated to be traceable to Isotope dilution 
Mass Spectrometry (IDMS). For more information:  www.nkdep.nih.gov

 

 Estimated glomerular filtration rate (GFR) determination (test code=69405-9)  >
60 mL  >60   FOR CHRONIC KIDNEY DISEASE:        GFR             STAGE        >
60             1 or 2        30-59             3        15-29             4    
    <15(or dialysis)  5 The Glomerular Filtration Rate (GFR) has been 
calculated using the IDMS-Traceable MDRD Study Equation.





Urine dipstick testing at point-of-vzdc2550-71-14 11:42:00* 





 Test Item  Value  Reference Range  Comments

 

 Urine specific gravity measurement (test code=2965-2)  1.010  1.005-1.030   

 

 Urine glucose detection (test code=2349-9)  3+  NEG   

 

 Urine Ketones (test code=UKET)  TRACE  NEG   

 

 Urine blood detection (test code=33051-4)  Negative  NEG   

 

 Urine pH (test code=2756-5)  7.0  5.0-7.0   

 

 Urinalysis with microscopy (test code=24356-8)  NEGATIVE  NEG   

 

 Urine nitrate measurement (test code=21420-5)  NEGATIVE  NEG   

 

 Urine Leukocyte Esterase (test code=UESTR)  NEGATIVE  NEG   





Comment Bed:23 sbs NEG 3+ TRACE NEG NEG 7.0 NEG Y 1.010Microscopic examination 
of dizlf2671-21-95 11:42:00* 





 Test Item  Value  Reference Range  Comments

 

 Urine WBC (test code=UWBC)  <5  <5   

 

 Urine sediment erythrocyte count by microscopy (number/high power field) (test 
code=13945-1)  5  NONE SEEN   

 

 Bacteria detection in urine sediment by light microscopy (test code=25145-4)  <
20  <20   

 

 Sqamous Epithelial (test code=SQEP)  <5  NONE SEEN   

 

 Urinalysis with reflex to culture (test code=58077-9)  NOT NEEDED     Culture 
was ordered previously.





[place] [address]      RADIOLOGY SERVICES REPORT    Name: YARI JOSHUA         Acct Number: [id number]  :1960 Age:57 Sex:F Ord Phys: 
Nawaf Moore MD            Unit Number: [id number]         [facility]: Kennedy Sanchez MD  Status: REG ER ER    Accession #: [id number]                  Exam 
Date: 18    EXAM DESCRIPTION:  RAD - Chest Single View - 2018 8:31 am
       CLINICAL HISTORY:  Chest pain.       COMPARISON:  2018       
FINDINGS:  Portable technique limits examination quality.       The lungs are 
grossly clear. The heart is normal in size. No displaced fractures.Postsurgical 
clips are noted.       IMPRESSION:  No acute intrathoracic process suspected.  
       Signed By: Moises Rao MD        Signed AT: 18 1005           
Abdomen   [place] [address]      RADIOLOGY SERVICES REPORT    Name: YARI JOSHUA         Acct Number: [id number]  :1960 Age:57 Sex:F Ord 
Phys: Mehul Albert PAC            Unit Number: [id number]         [facility] [address]
: LAVERNE  Status: DEP ER ER    Accession #: [id number]                  Exam Date: 
18    EXAM DESCRIPTION:  CT - Abdomen   Pelvis W Contrast - 2018 6:50 
am       CLINICAL HISTORY:   Abdominal pain. Upper abdominal pain       
COMPARISON:  2018       TECHNIQUE:  Computed axial tomography of the 
abdomen and pelvis was obtained. 100 cc Isovue-300 is administered 
intravenously. Oral contrast was given. A preliminary report was given by 
virtual radiologic and reviewed prior to this dictation       All CT scans are 
performed using dose optimization technique as appropriate and may include 
automated exposure control or mA/KV adjustment according to patient size.       
FINDINGS:   A cirrhotic liver is present. A discrete lesion is not seen the 
portal vein is distended. It is patent. The spleen measures 18 centimeters. A 
couple of granulomas are present within the liver and spleen       The pancreas 
and adrenals are unremarkable. Renal cysts are unchanged.       The appendix 
has been removed. There is no evidence of diverticulitis.       No ascites is 
seen. A moderate amount of stool is present throughout colon       IMPRESSION:  
Cirrhosis with portal venous hypertension.       Moderate amount stool 
throughout the colon             Signed By: Daniel Cortez MD        Signed AT
: 18 0749           [place] [address]      RADIOLOGY SERVICES REPORT    
Name: YARI JOSHUA         Acct Number: [id number]  :1960 Age:
57 Sex:F Ord Phys: Rogelio Mehul RODOLFO BURNETT            Unit Number: [id number]         
[facility] Dr: U  Status: [facility]    Accession #: [id number]                  
Exam Date: 18    EXAM DESCRIPTION:  RADChest Single View2018 8:16 pm 
      CLINICAL HISTORY:  Abd  pain       COMPARISON:         FINDINGS:   
The lungs appear clear of acute infiltrate. The heart is normal size       
IMPRESSION:   No acute abnormalities displayed             Signed By: Daniel Cortez MD        Signed AT: 18           Abdomen   Pelvis W 
[place] [address]      RADIOLOGY SERVICES REPORT    Name: YARI JOSHUA         Acct 
Number: [id number]  :1960 Age:57 Sex:F Ord Phys: Nawaf Moore MD    
        Unit Number: [id number]         [facility] Dr: U  Status: [facility]    
Accession #: [id number]                  Exam Date: 18    EXAM 
DESCRIPTION:  CTAbdomen   Pelvis W Contrast - 2018 2:10 pm       CLINICAL 
HISTORY:  Abdominal pain.       COMPARISON:  2016       TECHNIQUE:  
Biphasic CT imaging of the abdomen and pelvis was performed with 100 ml non-
ionic IV contrast.       All CT scans are performed using dose optimization 
technique as appropriate and may include automated exposure control or mA/KV 
adjustment according to patient size.       FINDINGS:  The lung bases are 
clear.       Liver size is mildly prominent with a nodular liver contour 
compatible with cirrhosis. No aggressive liver lesion is seen. No biliary 
dilatation. Cholecystectomy clips are present. Mild to moderate splenomegaly is 
seen. The pancreas and adrenal glands are normal. A prominent duodenal 
diverticulum is noted. Kidneys show no stone or hydronephrosis. No mass is 
seen. 24 mm cyst is identified involving the right kidney, benign appearance.  
     Several thickened loops of small intestine are seen in the mid and upper 
abdomen. No pneumatosis coli seen. Mild edematous appearance to the small bowel 
mesentery and greater omentum is seen. No bowel obstruction, free fluid or 
abscess. The appendix is not identified as a discrete structure, however, no 
secondary findings of appendicitis are identified.   Mildly prominent lymph 
nodes are present in the small bowel mesentery and hepatic hilum.       No 
suspicious bony findings.       IMPRESSION:  Liver cirrhosis with significant 
splenomegaly.       Moderate thickening of multiple small bowel loops is 
identified, which may indicate infectious/ inflammatory enteritis or portal 
enteropathy.         Signed By: Moises Rao MD        Signed AT: 18 1426 
          ABDOMEN ACUTE SERIES W/PA CXR    [place] [address]      Patient Name: 
YARI JOSHUA   MR #: [id number]    : 1960 Age/Sex: 56/F  Acct #: 
[id number] Req #: [id number]  Adm Physician:     Ordered by: JARRETT TUCKER MD  Report #: [id number]   Location: ER  Room/Bed:     ________________
________________________________________________________________________________
___    Procedure: [id number] DX/ABDOMEN ACUTE SERIES W/PA CXR  Exam Date: 10/06/
17                            Exam Time: 0210       REPORT STATUS: Signed    
EXAM: ABDOMEN ACUTE SERIES W/PA CXR, supine and erect views of the abdomen, AP 
  view of the chest   DATE: 10/6/2017 1:29 AM  Time stamp on exam: 0155 hours   
INDICATION: Abdominal pain   COMPARISON: CT of the abdomen and pelvis 2017      FINDINGS:   LINES/TUBES: None      LUNGS: No consolidations or edema.
       PLEURA: No effusions or pneumothorax.      HEART AND MEDIASTINUM: Normal 
size and contour.      BOWEL PATTERN: Non-obstructed bowel gas pattern.      
BONES AND SOFT TISSUES: No acute bone findings. No abnormal calcifications. No 
  mass effect. Bilateral chest surgical clips. Surgical clips right upper   
quadrant of the abdomen.      IMPRESSION:   No acute thoracic abnormality.      
No evidence for bowel obstruction.               Signed by: Dr. Nabeel Aceves M.D. on 10/6/2017 2:40 AM        Dictated By: NABEEL ACEVES MD  
Electronically Signed By: NABEEL ACEVES MD on 10/06/17 0240  Transcribed By: 
JANETH on 10/06/17 0240       COPY TO:   JARRETT TUCKER MD

## 2018-04-19 VITALS — DIASTOLIC BLOOD PRESSURE: 81 MMHG | SYSTOLIC BLOOD PRESSURE: 151 MMHG

## 2018-04-19 VITALS — DIASTOLIC BLOOD PRESSURE: 82 MMHG | SYSTOLIC BLOOD PRESSURE: 159 MMHG

## 2018-04-19 VITALS — DIASTOLIC BLOOD PRESSURE: 75 MMHG | SYSTOLIC BLOOD PRESSURE: 129 MMHG

## 2018-04-19 VITALS — SYSTOLIC BLOOD PRESSURE: 165 MMHG | DIASTOLIC BLOOD PRESSURE: 80 MMHG

## 2018-04-19 VITALS — SYSTOLIC BLOOD PRESSURE: 148 MMHG | DIASTOLIC BLOOD PRESSURE: 88 MMHG

## 2018-04-19 VITALS — SYSTOLIC BLOOD PRESSURE: 159 MMHG | DIASTOLIC BLOOD PRESSURE: 82 MMHG

## 2018-04-19 VITALS — SYSTOLIC BLOOD PRESSURE: 185 MMHG | DIASTOLIC BLOOD PRESSURE: 93 MMHG

## 2018-04-19 VITALS — DIASTOLIC BLOOD PRESSURE: 91 MMHG | SYSTOLIC BLOOD PRESSURE: 186 MMHG

## 2018-04-19 VITALS — SYSTOLIC BLOOD PRESSURE: 116 MMHG | DIASTOLIC BLOOD PRESSURE: 64 MMHG

## 2018-04-19 VITALS — DIASTOLIC BLOOD PRESSURE: 86 MMHG | SYSTOLIC BLOOD PRESSURE: 176 MMHG

## 2018-04-19 LAB
CK MB SERPL-MCNC: 0.8 NG/ML (ref 0–5)
CK MB SERPL-MCNC: 1 NG/ML (ref 0–5)
CK MB SERPL-MCNC: 1.1 NG/ML (ref 0–5)
CK SERPL-CCNC: 35 IU/L (ref 29–168)
CK SERPL-CCNC: 38 IU/L (ref 29–168)
CK SERPL-CCNC: 39 IU/L (ref 29–168)
FOLATE SERPL-MCNC: 8.8 NG/ML (ref 7–15.4)
VIT B12 BLD-MCNC: 789 PG/ML (ref 213–816)

## 2018-04-19 RX ADMIN — Medication PRN MG: at 06:55

## 2018-04-19 RX ADMIN — INSULIN HUMAN SCH UNIT: 100 INJECTION, SOLUTION PARENTERAL at 12:00

## 2018-04-19 RX ADMIN — Medication PRN MG: at 17:27

## 2018-04-19 RX ADMIN — INSULIN HUMAN SCH UNIT: 100 INJECTION, SOLUTION PARENTERAL at 20:16

## 2018-04-19 RX ADMIN — METOPROLOL TARTRATE SCH MG: 50 TABLET, FILM COATED ORAL at 09:50

## 2018-04-19 RX ADMIN — FAMOTIDINE SCH MG: 20 TABLET, FILM COATED ORAL at 20:15

## 2018-04-19 RX ADMIN — Medication PRN MG: at 12:14

## 2018-04-19 RX ADMIN — FAMOTIDINE SCH MG: 20 TABLET, FILM COATED ORAL at 02:10

## 2018-04-19 RX ADMIN — INSULIN HUMAN SCH UNIT: 100 INJECTION, SOLUTION PARENTERAL at 16:30

## 2018-04-19 RX ADMIN — FAMOTIDINE SCH MG: 20 TABLET, FILM COATED ORAL at 08:40

## 2018-04-19 RX ADMIN — INSULIN HUMAN SCH UNIT: 100 INJECTION, SOLUTION PARENTERAL at 08:00

## 2018-04-19 RX ADMIN — Medication PRN MG: at 20:29

## 2018-04-19 RX ADMIN — METOPROLOL TARTRATE SCH MG: 50 TABLET, FILM COATED ORAL at 17:26

## 2018-04-19 RX ADMIN — Medication PRN MG: at 02:40

## 2018-04-19 RX ADMIN — LISINOPRIL SCH MG: 20 TABLET ORAL at 08:40

## 2018-04-19 NOTE — CONSULTATION
DATE OF CONSULTATION:  April 19, 2018 



CARDIOLOGY CONSULTATION 



ATTENDING PHYSICIAN:  Dr. Santi Seth



Thank you so much for asking me to see this nice lady in consultation.



Ms. Shi is a complex, 57-year-old woman with multiple medical problems, 

a resident of [facility].  



CHIEF COMPLAINT:  She visited an emergency room last evening.  She reports 

that she was "visiting with neighbors" and developed substernal chest 

discomfort.  



HISTORY OF PRESENT ILLNESS:  The patient reports she does not think she has 

ever felt this kind of symptoms before and has had no previous cardiac 

evaluation by her report.



PAST MEDICAL HISTORY:  Significant for hypertension and type-2 diabetes.  

She did have a cerebrovascular accident about 4 years ago, would have been 

about 2014, with some right-sided weakness, which she thinks has caused her 

to live in a nursing home.  She reports that she had bilateral breast 

cancer about 11 years ago, which would have been 2007, treated with 

bilateral mastectomies and reconstruction and also treated with 

chemotherapy and radiation treatments.  She reports that those treatments 

caused problems for her liver, causing cirrhosis, and some problems with 

the pancreas as well.  



HOME MEDICATIONS:  Include:

1. Insulin.  

2. Metoprolol tartrate 50 mg twice a day.  

3. Pepcid 20 mg twice a day.  

4. Lisinopril 20 mg daily.  



ALLERGIES:  SHE REPORTS SHE IS ALLERGIC TO AZITHROMYCIN, TRAMADOL,  AND 

PROPOXYPHENE. 



REVIEW OF SYSTEMS 

CARDIAC:  She has not known of any coronary problem and tells me she has 

not had a cholesterol check before.  

GASTROINTESTINAL:  She knows of cirrhosis and had an upper endoscopy 

probably 6 months ago that showed esophagitis.  She follows with a 

gastroenterologist in [place], who tells her that she has cancer in her 

liver as well, but she cannot provide any further details. 

 

PHYSICAL EXAMINATION

GENERAL:  Exam at this time shows a pleasant white woman who looks much 

older than her stated age. 

VITALS:  Blood pressure 160/80.

HEENT:  Unremarkable. 

NECK:  No jugular venous distention, no bruits audible.

THORAX:  Chest wall shows bilateral breast reconstructions.  Chest wall is 

nontender to palpation. 

HEART:  Sounds S1, S2 are equal.  No murmurs.  

LUNGS:  Clear. 

ABDOMEN:  Protuberant with a scar on the right lower quadrant which she 

reports is where her breast reconstruction materials were removed.  Her 

liver is hard and easily palpable at about 5 cm blow the right costal 

margin.  No other mass or organomegaly. 

EXTREMITIES:  No cyanosis, clubbing or edema.  



EKG shows sinus tachycardia. 



LABORATORY DATA:  Limited as she was seen an outside emergency room, but 

troponin is normal.



ASSESSMENT

1. Chest discomfort, etiology not clear.  

2. Type-2, adult-onset diabetes.

3. Hypertension.  

4. Cirrhosis with question of malignancy in the liver.

5. Esophagitis by recent history.  



PLAN:  Will review echocardiogram already ordered for day.  Will plan 

Cardiolite for the morning to assess for significance of any underlying 

coronary disease.  Her prognosis is guarded. 



Thank for asking me to see her in consultation.  



 





DD:  04/19/2018 10:40

DT:  04/19/2018 10:51

Job#:  [id number]

## 2018-04-19 NOTE — DIAGNOSTIC IMAGING REPORT
PROCEDURE: CT CHEST WITHOUT CONTRAST

CT scan of the chest WITHOUT intravenous contrast, using standard 

protocol.

 

TECHNIQUE:

The chest was scanned utilizing a multidetector helical scanner from 

the apex to the level of the adrenal glands.  No IV contrast was 

administered as per physician request.  Coronal and sagittal 

multiplanar reformations were obtained.

 

COMPARISON: None.

 

INDICATIONS:   CHEST PAIN

     

FINDINGS:

Lines/tubes:  None.

 

Lungs and Airways:  The lungs and airways are normal with no focal 

abnormality demonstrated. Right upper lobe scarring. Bibasilar 

dependent atelectasis.

 

Pleura: The pleural spaces are clear.

 

Heart and mediastinum:  The thyroid gland is normal. No significant 

mediastinal, hilar or axillary lymphadenopathy is seen. The heart and 

pericardium are within normal limits.

 

Soft tissues: Normal.

 

Abdomen: Nodular contour of the liver. The spleen is enlarged, 

measuring 17 cm in AP length. Multiple splenic and esophageal varices 

are partially visualized. Trace ascites is present in the right upper 

quadrant. The adrenal glands are normal.

 

Bones: The visualized bony thorax is within normal limits. 

 

IMPRESSION: 

 

1. No acute abnormality of the chest. 

2. Hepatic parenchymal appearance consistent with cirrhotic morphology. 

 

3. Splenomegaly and varices consistent with portal hypertension.

 

 

Dictated by:  Santi Lindquist M.D. on 4/19/2018 at 13:18     

Electronically approved by:  Santi Lindquist M.D. on 4/19/2018 at 13:18

## 2018-04-19 NOTE — HISTORY AND PHYSICAL
CHIEF COMPLAINT:  Chest pain and pressure, associated with nausea and 

vomiting.



HISTORY:  This is a 57-year-old female just came out of [facility] after she was treated for colitis approximately 10 to 14 

days ago.  Patient is resident of assisted living [facility].  She has 

history of CVA with right-sided weakness.  The patient came in this time 

with chest pain, severe, 10/10, pressure like.  No history of previous 

cardiac workup.  The patient set of cardiac enzyme was negative.  Patient 

was at outpatient ER when transferred to here to the acute care.  The 

patient is otherwise stable, baseline hypertension, diabetes, and 

depression.



PAST MEDICAL HISTORY:  Hypertension, diabetes, depression, CVA 4 years ago 

with right-sided weakness.  Recent colitis.  History of bilateral breast 

cancer, status post bilateral mastectomy.  Total abdominal hysterectomy.  

Liver cirrhosis secondary to chemotherapy and radiation for the breast 

cancer.  History of esophagitis on EGD approximately 6 months ago.  

.



SOCIAL HISTORY:  Patient is a resident of assisted living [facility].  No 

alcohol, but she does smoke.



ALLERGIES:  TO AZITHROMYCIN, __________ AND TRAMADOL.



HOME MEDICATIONS:  Xanax, Norco, insulin Levemir, and Seroquel.



REVIEW OF SYSTEMS:  As mentioned.



PHYSICAL EXAMINATION:

VITAL SIGNS:  Temperature is 98, blood pressure 177/106, pulse rate is 130, 

respiration 20.

GENERAL:  The patient is not in acute distress.  She is awake.

HEENT:  Normocephalic, atraumatic.  Sclerae anicteric.

NECK:  Supple grossly.

PULMONARY:  Diminished breath sounds bilaterally with some coarseness.

CARDIOVASCULAR:  Tachycardia.

ABDOMEN:  Soft.  Abdominal scar healed.  Non-distention but does have some 

tenderness.

EXTREMITIES:  No gross cyanosis or edema.

NEUROLOGIC:  There is no gross focal deficit.



LABORATORY:  Will be reviewed when available from outpatient longstanding 

ER.



IMPRESSION:

1. Chest pain with history of cerebrovascular accident; hypertension; 

diabetes, on insulin therapy.  The patient does have moderate to high risk 

for unstable angina, coronary disease.  She also is a smoker.

2. Multiple baseline problems.



PLAN:  Cardiology consultation.  Home medication resume, insulin sliding 

scale coverage.  TSH, glycohemoglobin A1c.  Echocardiogram.  CT of the 

chest without IV contrast.







DD:  2018 09:06

DT:  2018 09:40

Job#:  [id number]

## 2018-04-20 VITALS — SYSTOLIC BLOOD PRESSURE: 169 MMHG | DIASTOLIC BLOOD PRESSURE: 99 MMHG

## 2018-04-20 VITALS — DIASTOLIC BLOOD PRESSURE: 78 MMHG | SYSTOLIC BLOOD PRESSURE: 154 MMHG

## 2018-04-20 VITALS — SYSTOLIC BLOOD PRESSURE: 174 MMHG | DIASTOLIC BLOOD PRESSURE: 93 MMHG

## 2018-04-20 RX ADMIN — INSULIN HUMAN SCH UNIT: 100 INJECTION, SOLUTION PARENTERAL at 07:30

## 2018-04-20 RX ADMIN — INSULIN HUMAN SCH UNIT: 100 INJECTION, SOLUTION PARENTERAL at 12:28

## 2018-04-20 RX ADMIN — FAMOTIDINE SCH MG: 20 TABLET, FILM COATED ORAL at 11:23

## 2018-04-20 RX ADMIN — Medication PRN MG: at 11:27

## 2018-04-20 RX ADMIN — METOPROLOL TARTRATE SCH MG: 50 TABLET, FILM COATED ORAL at 11:23

## 2018-04-20 RX ADMIN — LISINOPRIL SCH MG: 20 TABLET ORAL at 11:23

## 2018-04-20 RX ADMIN — Medication PRN MG: at 04:08

## 2018-04-20 NOTE — CARDIOLOGY REPORT
DATE OF STUDY:  April 20, 2018 



LEXISCAN MYOVIEW



The patient had resting perfusion images after an injection of 11 

millicuries of technetium-99m Myoview.  Later, due to inability to 

exercise, she was given Lexiscan 0.4 mg intravenously, and shortly 

afterwards 33 millicuries of technetium-99m Myoview.  Perfusion images were 

taken by rotational tomography.



Comparison resting and Lexiscan stress images show no evidence of any 

perfusion defect.  Uptake is smooth and regular throughout.  No suggestion 

of any scar or any ischemia.  Additionally, gaited wall motion images were 

obtained and calculated ejection fraction normal at 54% without regional 

wall motion abnormality.  



FINAL IMPRESSION 

1.  Normal Lexiscan Myoview for perfusion.

2.  Normal left ventricular function, calculated ejection fraction 54%.  









DD:  04/20/2018 16:58

DT:  04/20/2018 18:08

Job#:  [id number]



cc:SADIQ BETHEA MD

## 2018-04-20 NOTE — DISCHARGE SUMMARY
Patient left against medical advice on April 20, 2018.



Patient is a 57-year-old female with baseline liver cirrhosis, came in with 

severe chest pain.  The patient underwent stress test.  The patient, 

subsequently, left against advice while waiting for the stress test 

results.  Patient left without being seen.  



The patient was instructed by nursing staff to follow up with her family 

doctor.  The patient was stable on left the hospital.  









DD:  04/20/2018 16:30

DT:  04/20/2018 22:41

Job#:  [id number]

## 2018-07-29 ENCOUNTER — HOSPITAL ENCOUNTER (EMERGENCY)
Dept: HOSPITAL 97 - ER | Age: 58
Discharge: HOME | End: 2018-07-29
Payer: COMMERCIAL

## 2018-07-29 DIAGNOSIS — Z88.3: ICD-10-CM

## 2018-07-29 DIAGNOSIS — Z79.4: ICD-10-CM

## 2018-07-29 DIAGNOSIS — Z88.6: ICD-10-CM

## 2018-07-29 DIAGNOSIS — F17.210: ICD-10-CM

## 2018-07-29 DIAGNOSIS — B19.10: ICD-10-CM

## 2018-07-29 DIAGNOSIS — Z88.1: ICD-10-CM

## 2018-07-29 DIAGNOSIS — E11.9: ICD-10-CM

## 2018-07-29 DIAGNOSIS — R10.11: Primary | ICD-10-CM

## 2018-07-29 LAB
ALBUMIN SERPL BCP-MCNC: 3.1 G/DL (ref 3.4–5)
ALP SERPL-CCNC: 184 U/L (ref 45–117)
ALT SERPL W P-5'-P-CCNC: 60 U/L (ref 12–78)
AMYLASE SERPL-CCNC: 45 U/L (ref 25–115)
AST SERPL W P-5'-P-CCNC: 63 U/L (ref 15–37)
BUN BLD-MCNC: 12 MG/DL (ref 7–18)
GLUCOSE SERPLBLD-MCNC: 169 MG/DL (ref 74–106)
HCT VFR BLD CALC: 37.7 % (ref 36–45)
LIPASE SERPL-CCNC: 128 U/L (ref 73–393)
LYMPHOCYTES # SPEC AUTO: 0.8 K/UL (ref 0.7–4.9)
MCH RBC QN AUTO: 26.3 PG (ref 27–35)
MCV RBC: 80.8 FL (ref 80–100)
PMV BLD: 7.7 FL (ref 7.6–11.3)
POTASSIUM SERPL-SCNC: 3.6 MMOL/L (ref 3.5–5.1)
RBC # BLD: 4.66 M/UL (ref 3.86–4.86)
UA COMPLETE W REFLEX CULTURE PNL UR: (no result)

## 2018-07-29 PROCEDURE — 82150 ASSAY OF AMYLASE: CPT

## 2018-07-29 PROCEDURE — 99284 EMERGENCY DEPT VISIT MOD MDM: CPT

## 2018-07-29 PROCEDURE — 83690 ASSAY OF LIPASE: CPT

## 2018-07-29 PROCEDURE — 74177 CT ABD & PELVIS W/CONTRAST: CPT

## 2018-07-29 PROCEDURE — 81003 URINALYSIS AUTO W/O SCOPE: CPT

## 2018-07-29 PROCEDURE — 96361 HYDRATE IV INFUSION ADD-ON: CPT

## 2018-07-29 PROCEDURE — 36415 COLL VENOUS BLD VENIPUNCTURE: CPT

## 2018-07-29 PROCEDURE — 96374 THER/PROPH/DIAG INJ IV PUSH: CPT

## 2018-07-29 PROCEDURE — 85025 COMPLETE CBC W/AUTO DIFF WBC: CPT

## 2018-07-29 PROCEDURE — 81015 MICROSCOPIC EXAM OF URINE: CPT

## 2018-07-29 PROCEDURE — 80048 BASIC METABOLIC PNL TOTAL CA: CPT

## 2018-07-29 PROCEDURE — 80076 HEPATIC FUNCTION PANEL: CPT

## 2018-07-29 PROCEDURE — 96375 TX/PRO/DX INJ NEW DRUG ADDON: CPT

## 2018-07-29 NOTE — XMS REPORT
Patient Summary Document

 Created on:2018



Patient:VALENTE GROVER

Sex:Female

:1960

External Reference #:[id number]





Demographics







 Address  [address] [place] [address]

 

 Home Phone  [phone]

 

 Email Address  NONE

 

 Preferred Language  Unknown

 

 Marital Status  Unknown

 

 Caodaism Affiliation  Unknown

 

 Race  Unknown

 

 Additional Race(s)  Unavailable

 

 Ethnic Group  Unknown









Author







 Organization  [facility]

 

 Address  [address] [address]

 

 Phone  [phone]









Care Team Providers







 Name  Role  Phone

 

 ROSE KITCHEN  Unavailable  Unavailable

 

 SMITHA MCMAHAN  Unavailable  Unavailable









Problems

This patient has no known problems.



Allergies, Adverse Reactions, Alerts

This patient has no known allergies or adverse reactions.



Medications

This patient has no known medications.



Results







 Test Description  Test Time  Test Comments  Text Results  Atomic Results  
Result Comments









 Stress Test -       [address]  



 Treadmill ONLY      [place], [place] [address]    Patient Name :  



       VALENTE GROVER         MR #: [id number]   : 1960  



         Age/Sex: 57/F      Acct # : [id number]           Adm  



       Physician  : ROSE KITCHEN MD       Admit Date  :  18  



       Location : [facility]    Room/Bed : [place] [address]  



       _______________________________________________________________  



       ____________________________________     REPORT: Cardiology  



       Report       DATE OF STUDY:  2018       LEXISCAN  



       MYOVIEW      The patient had resting perfusion images after an  



       injection of 11    millicuries of technetium-99m Myoview.  



       Later, due to inability to    exercise, she was given Lexiscan  



       0.4 mg intravenously, and shortly    afterwards 33 millicuries  



       of technetium-99m Myoview.  Perfusion images were    taken by  



       rotational tomography.      Comparison resting and Lexiscan  



       stress images show no evidence of any    perfusion defect.  



       Uptake is smooth and regular throughout.  No suggestion    of  



       any scar or any ischemia.  Additionally, gaited wall motion  



       images were    obtained and calculated ejection fraction normal  



       at 54% without regional    wall motion abnormality.  



       FINAL IMPRESSION    1.  Normal Lexiscan Myoview for perfusion.  



        2.  Normal left ventricular function, calculated ejection  



       fraction 54%.                 DD:  2018 16:58   DT:  



       2018 18:08   Job#:  [id number]      cc:   ROSE KITCHEN MD  



                Signature  



                            Date                           Dictated  



       By: OLIVIA ESTRADA MD  Transcribed By: SMEDS on 18  



       <Electronically signed by OLIVIA ESTRADA MD><<Signature on  



       File>&gt;18 1416    COPY TO:  

 

 CT CHEST WO          [place] [address]      Patient Name:  



       VALENTE GROVER   MR #: [id number]    : 1960 Age/Sex:  



       57/F  Acct #: [id number] Req #: [id number]  Adm Physician:  



       ROSE KITCHEN MD    Ordered by: ROSE KITCHEN MD  Report #:  



       [id number]   Location: [facility]  Room/Bed: [place] [address]  



       _______________________________________________________________  



       ____________________________________    Procedure: [id number]  



       CT/CT CHEST WO  Exam Date: 18  



       Exam Time: 1115       REPORT STATUS: Signed    PROCEDURE: CT  



       CHEST WITHOUT CONTRAST   CT scan of the chest WITHOUT  



       intravenous contrast, using standard    protocol.  



       TECHNIQUE:   The chest was scanned utilizing a multidetector  



       helical scanner from    the apex to the level of the adrenal  



       glands.  No IV contrast was    administered as per physician  



       request.  Coronal and sagittal    multiplanar reformations were  



       obtained.       COMPARISON: None.       INDICATIONS:   CHEST  



       PAIN           FINDINGS:   Lines/tubes:  None.       Lungs and  



       Airways:  The lungs and airways are normal with no focal  



       abnormality demonstrated. Right upper lobe scarring. Bibasilar  



         dependent atelectasis.       Pleura: The pleural spaces are  



       clear.       Heart and mediastinum:  The thyroid gland is  



       normal. No significant    mediastinal, hilar or axillary  



       lymphadenopathy is seen. The heart and    pericardium are  



       within normal limits.       Soft tissues: Normal.  



       Abdomen: Nodular contour of the liver. The spleen is enlarged,  



         measuring 17 cm in AP length. Multiple splenic and esophageal  



       varices    are partially visualized. Trace ascites is present  



       in the right upper    quadrant. The adrenal glands are normal.  



            Bones: The visualized bony thorax is within normal limits.  



              IMPRESSION:        1. No acute abnormality of the chest.  



          2. Hepatic parenchymal appearance consistent with cirrhotic  



       morphology.        3. Splenomegaly and varices consistent with  



       portal hypertension.           Dictated by:  Rose Newton M.D.  



       on 2018 at 13:18        Electronically approved by:  Rose Newton M.D. on 2018 at 13:18                Dictated By:  



       ROSE NEWTON MD  Electronically Signed By: ROSE NEWTON MD on  



       18  Transcribed By: PILAR on 18  



       COPY TO:   ROSE KITCHEN MD  

 

 ABDOMEN ACUTE          [address]  



 SERIES W/PA CXR      [place] [address]      Patient Name:  



       VALENTE GROVER   MR #: [id number]    : 1960 Age/Sex:  



       56/F  Acct #: [id number] Req #: [id number]  Adm Physician:  



        Ordered by: SMITHA MCMAHAN MD  Report #: [id number]  



       Location: ER  Room/Bed:  



       _______________________________________________________________  



       ____________________________________    Procedure: [id number]  



       DX/ABDOMEN ACUTE SERIES W/PA CXR  Exam Date: 10/06/17  



                        Exam Time: 0210       REPORT STATUS: Signed  



       EXAM: ABDOMEN ACUTE SERIES W/PA CXR, supine and erect views of  



       the abdomen, AP   view of the chest   DATE: 10/6/2017 1:29 AM  



       Time stamp on exam: 0155 hours   INDICATION: Abdominal pain  



       COMPARISON: CT of the abdomen and pelvis 2017  



       FINDINGS:   LINES/TUBES: None      LUNGS: No consolidations or  



       edema.       PLEURA: No effusions or pneumothorax.      HEART  



       AND MEDIASTINUM: Normal size and contour.      BOWEL PATTERN:  



       Non-obstructed bowel gas pattern.      BONES AND SOFT TISSUES:  



       No acute bone findings. No abnormal calcifications. No   mass  



       effect. Bilateral chest surgical clips. Surgical clips right  



       upper   quadrant of the abdomen.      IMPRESSION:   No acute  



       thoracic abnormality.      No evidence for bowel obstruction.  



                   Signed by: Dr. Akiko Mehta M.D. on 10/6/2017  



       2:40 AM        Dictated By: AKIKO MEHTA MD  Electronically  



       Signed By: AKIKO MEHTA MD on 10/06/17 0240  Transcribed By:  



       CLIFFORD on 10/06/17 0240       COPY TO:   SMITHA MCMAHAN MD

## 2018-07-29 NOTE — ER
Nurse's Notes                                                                                     

 [facility]                                                                

Name: Deborah Alatorre                                                                              

Age: 57 yrs                                                                                       

Sex: Female                                                                                       

: 1960                                                                                   

MRN: [id number]                                                                                   

Arrival Date: 2018                                                                          

Time: 10:54                                                                                       

Account#: [id number]                                                                            

Bed 15                                                                                            

Private MD:                                                                                       

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       

  hepatitis C;Medication Refill                                                                   

                                                                                                  

Presentation:                                                                                     

                                                                                             

11:03 Presenting complaint: Patient states: Abdominal pain with chills since last night.      aj  

      Patient reports she is out of her pain medication. Denies fever. Transition of care:        

      patient was not received from another setting of care. Onset of symptoms was 2018. Risk Assessment: Do you want to hurt yourself or someone else? Patient reports no     

      desire to harm self or others. Initial Sepsis Screen: Does the patient meet any 2           

      criteria? No. Patient's initial sepsis screen is negative. Does the patient have a          

      suspected source of infection? No. Patient's initial sepsis screen is negative. Care        

      prior to arrival: None.                                                                     

11:03 Method Of Arrival: Ambulatory                                                             

11:03 Acuity: CASEY 3                                                                           aj  

                                                                                                  

Triage Assessment:                                                                                

11:06 General: Appears in no apparent distress. comfortable, Behavior is calm, cooperative,   aj  

      appropriate for age. Pain: Complains of pain in right upper quadrant and left upper         

      quadrant. Neuro: Level of Consciousness is awake, alert, obeys commands, Oriented to        

      person, place, time, situation, Appropriate for age. Respiratory: Airway is patent          

      Respiratory effort is even, unlabored, Respiratory pattern is regular, symmetrical. GI:     

      Reports upper abdominal pain. Derm: Skin is intact, is healthy with good turgor, Skin       

      is pink, warm \T\ dry. normal.                                                              

                                                                                                  

Historical:                                                                                       

- Allergies:                                                                                      

11:06 Darvocet-N 100;                                                                         aj  

11:06 tramadol;                                                                               aj  

11:06 Zithromax;                                                                              aj  

11:06 Erythromycin;                                                                           aj  

- Home Meds:                                                                                      

11:06 lisinopril 40 mg Oral tab 1 tab once daily [Active]; Oxycodone HCl Oral [Active];       aj  

      Methadone Oral [Active]; Novolog 100 unit/mL Sub-Q soln 8 units [Active]; Lantus 100        

      unit/mL Sub-Q soln 40 units [Active]; Seroquel 50 mg Oral tab 1 tab every night             

      [Active]; Phenergan Oral [Active]; Cymbalta Oral [Active];                                  

- PMHx:                                                                                           

11:06 Anxiety; Cancer, Breast; Colitis; COPD; Depression; Diabetes - IDDM; MUSCLE WEAKNESS;   aj  

      Chronic pain;                                                                               

- PSHx:                                                                                           

11:06 Appendectomy; Hysterectomy; Cholecystectomy; ; bilateral mastectomy; breast    aj  

      reconstructive surgery; Liver Ablation;                                                     

                                                                                                  

- Immunization history:: Adult Immunizations up to date.                                          

- Social history:: Smoking status: Patient uses tobacco products, smokes one-half pack            

  cigarettes per day.                                                                             

- Ebola Screening: : Patient negative for fever greater than or equal to 101.5 degrees            

  Fahrenheit, and additional compatible Ebola Virus Disease symptoms Patient denies               

  exposure to infectious person Patient denies travel to an Ebola-affected area in the            

  21 days before illness onset No symptoms or risks identified at this time.                      

- Family history:: not pertinent.                                                                 

- Hospitalizations: : No recent hospitalization is reported.                                      

                                                                                                  

                                                                                                  

Screenin:45 Abuse screen: Denies threats or abuse. Nutritional screening: No deficits noted.        rb1 

      Tuberculosis screening: No symptoms or risk factors identified. Fall Risk None              

      identified.                                                                                 

                                                                                                  

Assessment:                                                                                       

11:45 General: Appears uncomfortable, Behavior is calm, cooperative, Denies fever. Pain:      rb1 

      Complains of pain in right upper quadrant Pain currently is 8 out of 10 on a pain           

      scale. Pain began 1 day ago. Neuro: Level of Consciousness is awake, alert, obeys           

      commands, Oriented to person, place, time, situation. Cardiovascular: Capillary refill      

      < 3 seconds is brisk in bilateral fingers. Respiratory: Airway is patent Respiratory        

      effort is even, unlabored, Respiratory pattern is regular, symmetrical. GI: Bowel           

      sounds present X 4 quads. Abdomen is tender to palpation in right upper quadrant            

      Reports nausea. : Derm: Skin is pink, warm \T\ dry. Musculoskeletal: Range of motion:     

      intact in all extremities.                                                                  

12:40 Reassessment: Patient appears in no apparent distress at this time. No changes from     rb1 

      previously documented assessment.                                                           

13:37 Reassessment: Patient appears in no apparent distress at this time. Patient and/or      rb1 

      family updated on plan of care and expected duration. Pain level reassessed. Patient is     

      alert, oriented x 3, equal unlabored respirations, skin warm/dry/pink.                      

14:25 Reassessment: Patient appears in no apparent distress at this time. Patient and/or      rb1 

      family updated on plan of care and expected duration. Pain level reassessed. Patient is     

      alert, oriented x 3, equal unlabored respirations, skin warm/dry/pink. pain unchanged;      

      provider notified.                                                                          

15:22 Reassessment: Patient appears in no apparent distress at this time. No changes from     [facility] 

      previously documented assessment.                                                           

16:00 Reassessment: Patient appears in no apparent distress at this time. Patient and/or      rb1 

      family updated on plan of care and expected duration. Pain level reassessed. Patient is     

      alert, oriented x 3, equal unlabored respirations, skin warm/dry/pink.                      

                                                                                                  

Vital Signs:                                                                                      

11:06  / 91; Pulse 89; Resp 17; Temp 98.3; Pulse Ox 99% on R/A; Weight 63.5 kg; Height  aj  

      5 ft. 4 in. (162.56 cm);                                                                    

13:00  / 80; Pulse 62; Resp 18; Pulse Ox 99% on R/A;                                    rb1 

14:00  / 79; Pulse 62; Resp 19; Pulse Ox 96% on R/A;                                    rb1 

15:00  / 68; Pulse 71; Resp 17; Pulse Ox 100% on R/A;                                   rb1 

16:00  / 79; Pulse 78; Resp 17; Pulse Ox 95% on R/A;                                    rb1 

11:06 Body Mass Index 24.03 (63.50 kg, 162.56 cm)                                               

                                                                                                  

ED Course:                                                                                        

10:54 Patient arrived in ED.                                                                  as  

11:04 Triage completed.                                                                       aj  

11:06 Arm band placed on right wrist. Patient placed in waiting room, Patient notified of       

      wait time.                                                                                  

11:45 Patient has correct armband on for positive identification. Bed in low position. Call   [facility] 

      light in reach. Side rails up X 1. Pulse ox on. NIBP on.                                    

12:06 Jo Diaz FNP is PHCP.                                                           kav 

12:06 Mahamed Hook MD is Attending Physician.                                              kav 

12:36 Giselle Mata, RN is Primary Nurse.                                                   rb1 

12:40 Inserted saline lock: 22 gauge in right hand, using aseptic technique. Blood collected. ss  

15:01 Patient moved to CT via stretcher.                                                      cw1 

15:07 CT Abd/Pelvis - W/Contrast In Process Unspecified.                                      EDMS

15:07 CT completed. Patient moved back from CT.                                               cw1 

16:12 No provider procedures requiring assistance completed. IV discontinued, intact,         rb1 

      bleeding controlled, No redness/swelling at site. Pressure dressing applied.                

                                                                                                  

Administered Medications:                                                                         

13:33 Drug: NS 0.9% 1000 ml Route: IV; Rate: 125 ml/hr; Site: right hand;                     rb1 

16:10 Follow up: IV Status: Completed infusion; IV Intake: 275ml                              rb1 

13:34 Drug: Zofran 4 mg Route: IVP; Site: right hand;                                         rb1 

14:10 Follow up: Response: No adverse reaction; Nausea is decreased                           rb1 

13:36 Drug: morphine 4 mg Route: IVP; Site: right hand;                                       rb1 

14:10 Follow up: Response: No adverse reaction; Pain is unchanged, physician notified         rb1 

14:33 Drug: fentaNYL (PF) 25 mcg Route: IVP; Site: right hand;                                rb1 

14:49 Follow up: Response: No adverse reaction; Pain is decreased                             rb1 

                                                                                                  

                                                                                                  

Intake:                                                                                           

16:10 IV: 275ml; Total: 275ml.                                                                rb1 

                                                                                                  

Outcome:                                                                                          

15:44 Discharge ordered by MD.                                                                burt 

16:12 Patient left the ED.                                                                    rb1 

16:12 Discharged to home ambulatory.                                                          rb1 

16:12 Discharged to home ambulatory, with family.                                                 

16:12 Condition: stable                                                                           

16:12 Discharge instructions given to patient, Instructed on discharge instructions, follow       

      up and referral plans. medication usage, Demonstrated understanding of instructions,        

      follow-up care, medications, Prescriptions given X 2.                                       

                                                                                                  

Signatures:                                                                                       

Dispatcher [employer]                           Natali Lezama, RN                       Jo Potter, FNP                    FNP  Ghada Land Shelby, RN RN ss Woodley, Crystal                             cw1                                                  

Giselle Mata, RN                     RN   rb1                                                  

                                                                                                  

**************************************************************************************************

## 2018-07-29 NOTE — EDPHYS
Physician Documentation                                                                           

 [facility]                                                                

Name: Deborah Alatorre                                                                              

Age: 57 yrs                                                                                       

Sex: Female                                                                                       

: 1960                                                                                   

MRN: [id number]                                                                                   

Arrival Date: 2018                                                                          

Time: 10:54                                                                                       

Account#: [id number]                                                                            

Bed 15                                                                                            

Private MD:                                                                                       

ED Physician Mahamed Hook                                                                       

HPI:                                                                                              

                                                                                             

12:50 This 57 yrs old  Female presents to ER via Ambulatory with complaints of       kav 

      Abdominal Pain.                                                                             

12:50 The patient presents with abdominal pain in the right upper quadrant. Onset: The        kav 

      symptoms/episode began/occurred acutely, 1 day(s) ago. The symptoms do not radiate.         

      Associated signs and symptoms: Pertinent positives: nausea, Pertinent negatives:            

      diarrhea. The symptoms are described as achy. Modifying factors: The symptoms are           

      alleviated by oxycodone and methadone. Severity of pain: At its worst the pain was          

      severe just prior to arrival. The patient has experienced a previous episode,               

      approximately 2 weeks ago. The patient has been recently seen by a physician: MD            

      [facility] approximately 2 weeks ago for a Liver Ablation. PMHX:      

      Liver Cancer \T\ Hepatitis B and Hepatitis C.                                               

12:53 Patient reports that she is supposed to see Dr. Vargas next week for pain managment.. kav 

15:40 Patient reports that she is out of her Methadone and Oxycodone and needs to make an     kav 

      appointment to f/u with Dr. Madison.                                                       

                                                                                                  

Historical:                                                                                       

- Allergies:                                                                                      

11:06 Darvocet-N 100;                                                                         aj  

11:06 tramadol;                                                                               aj  

11:06 Zithromax;                                                                              aj  

11:06 Erythromycin;                                                                           aj  

- Home Meds:                                                                                      

11:06 lisinopril 40 mg Oral tab 1 tab once daily [Active]; Oxycodone HCl Oral [Active];       aj  

      Methadone Oral [Active]; Novolog 100 unit/mL Sub-Q soln 8 units [Active]; Lantus 100        

      unit/mL Sub-Q soln 40 units [Active]; Seroquel 50 mg Oral tab 1 tab every night             

      [Active]; Phenergan Oral [Active]; Cymbalta Oral [Active];                                  

- PMHx:                                                                                           

11:06 Anxiety; Cancer, Breast; Colitis; COPD; Depression; Diabetes - IDDM; MUSCLE WEAKNESS;   aj  

      Chronic pain;                                                                               

- PSHx:                                                                                           

11:06 Appendectomy; Hysterectomy; Cholecystectomy; ; bilateral mastectomy; breast    aj  

      reconstructive surgery; Liver Ablation;                                                     

                                                                                                  

- Immunization history:: Adult Immunizations up to date.                                          

- Social history:: Smoking status: Patient uses tobacco products, smokes one-half pack            

  cigarettes per day.                                                                             

- Ebola Screening: : Patient negative for fever greater than or equal to 101.5 degrees            

  Fahrenheit, and additional compatible Ebola Virus Disease symptoms Patient denies               

  exposure to infectious person Patient denies travel to an Ebola-affected area in the            

  21 days before illness onset No symptoms or risks identified at this time.                      

- Family history:: not pertinent.                                                                 

- Hospitalizations: : No recent hospitalization is reported.                                      

                                                                                                  

                                                                                                  

ROS:                                                                                              

12:53 Constitutional: Negative for fever, chills, and weight loss, Eyes: Negative for injury, kav 

      pain, redness, and discharge, ENT: Negative for injury, pain, and discharge, Neck:          

      Negative for injury, pain, and swelling, Cardiovascular: Negative for chest pain,           

      palpitations, and edema, Respiratory: Negative for shortness of breath, cough,              

      wheezing, and pleuritic chest pain, Back: Negative for injury and pain, : Negative        

      for injury, bleeding, discharge, and swelling, MS/Extremity: Negative for injury and        

      deformity, Skin: Negative for injury, rash, and discoloration, Neuro: Negative for          

      headache, weakness, numbness, tingling, and seizure, Psych: Negative for depression,        

      anxiety, suicide ideation, homicidal ideation, and hallucinations, Allergy/Immunology:      

      Negative for hives, rash, and allergies, Endocrine: Negative for neck swelling,             

      polydipsia, polyuria, polyphagia, and marked weight changes, Hematologic/Lymphatic:         

      Negative for swollen nodes, abnormal bleeding, and unusual bruising.                        

12:53 Abdomen/GI: Positive for abdominal pain, nausea.                                            

                                                                                                  

Exam:                                                                                             

12:53 Constitutional:  This is a well developed, well nourished patient who is awake, alert,  kav 

      and in no acute distress. Head/Face:  Normocephalic, atraumatic. Eyes:  Pupils equal        

      round and reactive to light, extra-ocular motions intact.  Lids and lashes normal.          

      Conjunctiva and sclera are non-icteric and not injected.  Cornea within normal limits.      

      Periorbital areas with no swelling, redness, or edema. ENT:  Nares patent. No nasal         

      discharge, no septal abnormalities noted.  Tympanic membranes are normal and external       

      auditory canals are clear.  Oropharynx with no redness, swelling, or masses, exudates,      

      or evidence of obstruction, uvula midline.  Mucous membranes moist. Neck:  Trachea          

      midline, no thyromegaly or masses palpated, and no cervical lymphadenopathy.  Supple,       

      full range of motion without nuchal rigidity, or vertebral point tenderness.  No            

      Meningismus. Chest/axilla:  Normal chest wall appearance and motion.  Nontender with no     

      deformity.  No lesions are appreciated. Cardiovascular:  Regular rate and rhythm with a     

      normal S1 and S2.  No gallops, murmurs, or rubs.  Normal PMI, no JVD.  No pulse             

      deficits. Respiratory:  Lungs have equal breath sounds bilaterally, clear to                

      auscultation and percussion.  No rales, rhonchi or wheezes noted.  No increased work of     

      breathing, no retractions or nasal flaring. Back:  No spinal tenderness.  No                

      costovertebral tenderness.  Full range of motion. Skin:  Warm, dry with normal turgor.      

      Normal color with no rashes, no lesions, and no evidence of cellulitis. MS/ Extremity:      

      Pulses equal, no cyanosis.  Neurovascular intact.  Full, normal range of motion. Neuro:     

       Awake and alert, GCS 15, oriented to person, place, time, and situation.  Cranial          

      nerves II-XII grossly intact.  Motor strength 5/5 in all extremities.  Sensory grossly      

      intact.  Cerebellar exam normal.  Normal gait. Psych:  Awake, alert, with orientation       

      to person, place and time.  Behavior, mood, and affect are within normal limits.            

12:53 Abdomen/GI: Inspection: obese Bowel sounds: normal, in all quadrants, Palpation:            

      moderate abdominal tenderness, in all quadrants.                                            

                                                                                                  

Vital Signs:                                                                                      

11:06  / 91; Pulse 89; Resp 17; Temp 98.3; Pulse Ox 99% on R/A; Weight 63.5 kg; Height  aj  

      5 ft. 4 in. (162.56 cm);                                                                    

13:00  / 80; Pulse 62; Resp 18; Pulse Ox 99% on R/A;                                    rb1 

14:00  / 79; Pulse 62; Resp 19; Pulse Ox 96% on R/A;                                    rb1 

15:00  / 68; Pulse 71; Resp 17; Pulse Ox 100% on R/A;                                   rb1 

16:00  / 79; Pulse 78; Resp 17; Pulse Ox 95% on R/A;                                    rb1 

11:06 Body Mass Index 24.03 (63.50 kg, 162.56 cm)                                             aj  

                                                                                                  

MDM:                                                                                              

12:06 Medical screening is not applicable.                                                    kav 

15:33 Data reviewed: vital signs, nurses notes, lab test result(s), CBC, urinalysis, EKG.     kav 

      Awaiting: CT scan results.                                                                  

15:40 Data reviewed: radiologic studies, CT scan.                                             kav 

                                                                                                  

                                                                                             

12:14 Order name: Amylase, Serum; Complete Time: 13:52                                        v 

                                                                                             

12:14 Order name: Basic Metabolic Panel; Complete Time: 13:52                                                                                                                              

12:14 Order name: CBC with Diff; Complete Time: 13:52                                                                                                                                      

12:14 Order name: Creatinine for Radiology; Complete Time: 13:52                              v 

                                                                                             

12:14 Order name: Hepatic Function; Complete Time: 13:52                                                                                                                                   

12:14 Order name: Lipase; Complete Time: 13:52                                                v 

                                                                                             

12:14 Order name: Urine Microscopic Only; Complete Time: 13:52                                                                                                                             

12:14 Order name: IV Saline Lock; Complete Time: 13:37                                                                                                                                     

13:03 Order name: Urine Dipstick--Ancillary (enter results); Complete Time: 13:52             1 

                                                                                             

14:38 Order name: CT Abd/Pelvis - W/Contrast; Complete Time: 15:34                                                                                                                         

12:14 Order name: Labs collected and sent; Complete Time: 13:37                                                                                                                            

12:14 Order name: Urine Dipstick-Ancillary (obtain specimen); Complete Time: 13:03                                                                                                         

13:57 Order name: VS Recheck; Complete Time: 14:26                                            kav 

                                                                                                  

Administered Medications:                                                                         

13:33 Drug: NS 0.9% 1000 ml Route: IV; Rate: 125 ml/hr; Site: right hand;                     rb1 

16:10 Follow up: IV Status: Completed infusion; IV Intake: 275ml                              rb1 

13:34 Drug: Zofran 4 mg Route: IVP; Site: right hand;                                         rb1 

14:10 Follow up: Response: No adverse reaction; Nausea is decreased                           rb1 

13:36 Drug: morphine 4 mg Route: IVP; Site: right hand;                                       rb1 

14:10 Follow up: Response: No adverse reaction; Pain is unchanged, physician notified         rb1 

14:33 Drug: fentaNYL (PF) 25 mcg Route: IVP; Site: right hand;                                rb1 

14:49 Follow up: Response: No adverse reaction; Pain is decreased                             rb1 

                                                                                                  

                                                                                                  

Disposition:                                                                                      

16:21 Co-signature as Attending Physician, Mahamed Hook MD I agree with the assessment and   kdr 

      plan of care.                                                                               

                                                                                                  

Disposition:                                                                                      

18 15:44 Discharged to Home. Impression: Upper abdominal pain, unspecified, Unspecified     

  viral hepatitis B, Carrier of viral hepatitis C, Medication Refill.                             

- Condition is Stable.                                                                            

- Discharge Instructions: Abdominal Pain, Adult, Hepatitis B, Hepatitis C.                        

- Prescriptions for Tylenol- Codeine #3 300-30 mg Oral Tablet - take 2 tablets by ORAL            

  route every 6 hours As needed; 30 tablet. Zofran 8 mg Oral Tablet - take 1 tablet by            

  ORAL route every 12 hours As needed; 20 tablet.                                                 

- Medication Reconciliation Form, Thank You Letter, Antibiotic Education, Prescription            

  Opioid Use form.                                                                                

- Follow up: Private Physician; When: 2 - 3 days; Reason: Recheck today's complaints,             

  Continuance of care, Re-evaluation by your physician.                                           

- Problem is new.                                                                                 

- Symptoms have improved.                                                                         

- Notes: Please f/u with Dr. Madison/Pain Management in 3-5 days for post-ED                     

  evaluation and treatment.                                                                       

                                                                                                  

                                                                                                  

Signatures:                                                                                       

Dispatcher [employer]                           EDMS                                                 

Natali Parks, RN                       RN   Mahamed Diehl MD MD kdr Vern, Katherine, BENJAMINP                    FNP  Giselle Solano, RN                     RN   rb1                                                  

                                                                                                  

Corrections: (The following items were deleted from the chart)                                    

16:12 15:44 2018 15:44 Discharged to Home. Impression: Upper abdominal pain,            rb1 

      unspecified; Unspecified viral hepatitis B; Carrier of viral hepatitis C; Medication        

      Refill. Condition is Stable. Discharge Instructions: Abdominal Pain, Adult, Hepatitis       

      B, Hepatitis C. Forms are Medication Reconciliation Form, Thank You Letter, Antibiotic      

      Education, Prescription Opioid Use. Follow up: Private Physician; When: 2 - 3 days;         

      Reason: Recheck today's complaints, Continuance of care, Re-evaluation by your              

      physician. Problem is new. Symptoms have improved. kav                                      

                                                                                                  

**************************************************************************************************

## 2018-07-29 NOTE — XMS REPORT
Clinical Summary

 Created on:2018



Patient:Deborah Alatorre

Sex:Female

:1960

External Reference #:[id number]





Demographics







 Address  [address] [place] [address]

 

 Home Phone  [phone]

 

 Mobile Phone  [phone]

 

 Preferred Language  English

 

 Marital Status  Unknown

 

 [employer] Affiliation  Unknown

 

 Race  White

 

 Ethnic Group  Not  or 









Author







 Organization  [facility]

 

 Address  [address] [place] [id number]

 

 Phone  [phone]









Support







 Name  Relationship  Address  Phone

 

 Unavailable  Unavailable  Unavailable  [phone]









Care Team Providers







 Name  Role  Phone

 

 Unavailable  Primary Care Provider  Unavailable









Allergies

Not on File



Current Medications

Not on file



Active Problems

Not on file



Social History







 Tobacco Use  Types  Packs/Day  Years Used  Date

 

 Never Assessed        









 Sex Assigned at Birth  Date Recorded

 

 Not on file  







Last Filed Vital Signs

Not on file



Plan of Treatment

Not on file



Results

Not on fileafter 2017

## 2018-07-29 NOTE — RAD REPORT
EXAM DESCRIPTION:  CT - Abdomen   Pelvis W Contrast - 7/29/2018 3:07 pm

 

CLINICAL HISTORY:  Abdominal pain, fever, chills, history of breast cancer, colitis history and COPD 
history, history of liver ablation procedure

 

COMPARISON:  CT study April 2018

 

TECHNIQUE:  Biphasic, helical CT imaging of the abdomen and pelvis was performed following 100 ml non
-ionic IV contrast. Oral contrast was given.

 

All CT scans are performed using dose optimization technique as appropriate and may include automated
 exposure control or mA/KV adjustment according to patient size.

 

FINDINGS:  No suspicious findings in the lung bases.

 

Liver is prominent in size with a pronounced nodular liver capsule. Nodularity is similar to the prio
r study. In the posterior inferior right lobe of the liver there is a 6 cm x 3.7 cm area of diminishe
d attenuation. Patient detailed a history of liver lesion ablation. This irregularly shaped area of d
iminished attenuation would be compatible with that procedure. Configuration or appearance is not typ
ical for neoplastic lesion. No other focal or significant finding within the liver parenchyma. Absces
s within the ablated parenchyma is not suspected.

 

Splenomegaly to 18 cm noted. No pancreatic acute finding. Gallbladder is absent. No biliary tree dila
tation.

 

Symmetric renal function is seen with no hydronephrosis or suspicious renal mass. No urinary bladder 
abnormality. Uterus is absent.

 

No gastric dilatation or wall thickening. No acute large or small bowel finding. Moderate stool volum
e in the colon.  No free air, free fluid or inflammatory stranding.  Postsurgical changes are noted t
o the anterior abdominal wall from reconstruction. No hernia, mass or bulky lymphadenopathy. No adren
al abnormality.

 

No suspicious bony findings.

 

 

IMPRESSION:  Cirrhosis or hepatic parenchymal disease changes. There is a 6 x 4 cm irregularly shaped
 low-density mass effect in the posterior inferior right lobe new from April.

 

There finding is believed to be the sequela of the historically stated liver ablation. Primary or brenda
plastic process is a lesser consideration. No imaging is available demonstrating the liver lesion sub
jected to ablation.

 

Splenomegaly without focal splenic finding. No acute GI or  process identified.

## 2018-07-29 NOTE — XMS REPORT
Continuity of Care Document

 Created on:2018



Patient:DEBORAH ALATORRE

Sex:Female

:1960

External Reference #:[id number]





Demographics







 Address  [address] [place] [address]

 

 Home Phone  [phone]

 

 Preferred Language  Unknown

 

 Marital Status  Unknown

 

 Advent Affiliation  Unknown

 

 Race  White

 

 Ethnic Group  Unknown









Author







 Organization  [facility]

 

 Address  [address] [place] [address]

 

 Phone  Unavailable









Support







 Name  Relationship  Address  Phone

 

 NO, FAMILY  Unavailable  Unavailable  Unavailable

 

 NO, PCP  Unavailable  Unavailable  Unavailable

 

 NIMO SLAUGHTER MD  Unavailable  Unavailable  Unavailable

 

 ROSE KITCHEN MD  Unavailable  [address]  [phone]



     [place] [address]  

 

 ROSE KITCHEN MD  Unavailable  [address]  [phone]



     [place] [address]  

 

 MAYELIN ROJAS  Unavailable  [address]  [phone]



     [place] [address]  









Care Team Providers







 Name  Role  Phone

 

 NO, PCP  Primary Care Physician  Unavailable









Insurance Providers







 Guarantor  Deborah Alatorre

 

 Address  [address] [place] [address]

 

 Phone  [phone]

 

 Email  NONE











 Payer  [employer]

 

 Policy Number  [id number]

 

 Subscriber's Name  Deborah Alatorre

 

 Relationship  18 Self / Same As Patient

 

 Effective Date  18











 Payer  [employer]

 

 Policy Number  [id number]

 

 Subscriber's Name  Deborah Alatorre

 

 Relationship  18 Self / Same As Patient

 

 Effective Date  17







Advance Directives







 Directive  Response  Recorded Date/Time

 

 Does the patient have an advance directive?  No  18 2:10am

 

 If yes, is advance directive on file with [facility]?  No  17 6:50pm

 

 If not on file with [facility] will patient provide a copy?  Yes  10/06/17 3:40am

 

 Do you have a Directive to Physician?  No  18 12:03am

 

 Do you have a Medical Power of ?  No  18 12:03am

 

 Do you have an out of hospital Do Not Resuscitate Order?  No  18 12:03am

 

 Do you have any special needs we should be aware of?  No  18 12:03am

 

 Do you have a support person here with you today?  No  18 12:03am

 

 Did patient receive Notice of Privacy Practices?  Yes  18 12:03am

 

 Did patient receive patient rights and responsibilities?  Yes  18 12:03am







Problems







 Medical Problem  Onset Date  Status

 

 Chest pain  Unknown  

 

 Cirrhosis  Unknown  







Medications

Current Home Medications





 Medication  Dose  Units  Route  Directions  Days  Qty  Instructions  Start



                 Date

 

 Alprazolam  0.25  Mg  Oral  Twice A Day        



 (Xanax) 0.25                



 Mg Tablet                

 

 Hydrocodone  1  Tab  Oral  Every 4 Hours        



 Bit/Acetaminop        as needed for        



 hen ([place]        Pain        



  Tablet)                



 1 Each Tablet                

 

 Insulin  35    Subcutaneously  Every 12        



 Detemir        Hours        



 (Levemir) 100                



 Unit/1 Ml Vial                

 

 Quetiapine  50  Mg  Oral  Bedtime    60 Tab    



 Fumarate                



 (Seroquel) 25                



 Mg Tablet                





Past Home Medications





 Medication  Directions  Ordered  Status

 

 Insulin Aspart (Novolog Mix 70-30 Vial) 100 Units/Ml      Discontinued



 Ml, Unknown Dose      

 

 Insulin Glargine (Lantus) 100 Units/Ml Ml, Unknown      Discontinued



 Dose      

 

 Trazodone Hcl 50 Mg Tablet, Unknown Dose  Oral  Daily    Discontinued







Social History







 Social History Problem  Response  Recorded Date/Time  Onset Date  Status

 

 Hx Psychiatric Problems  No  2018 2:10am  Not Applicable  Not Applicable

 

 Hx Eating Disorder  No  2018 2:10am  Not Applicable  Not Applicable

 

 Hx Substance Use Disorder  No  2018 2:10am  Not Applicable  Not 
Applicable

 

 Hx Depression  No  2018 2:10am  Not Applicable  Not Applicable

 

 Hx Alcohol Use  No  2018 2:10am  Not Applicable  Not Applicable

 

 Hx Substance Use Treatment  No  2018 2:10am  Not Applicable  Not 
Applicable

 

 Hx Physical Abuse  No  2018 2:10am  Not Applicable  Not Applicable











 Smoking Status  Start Date  Stop Date

 

 Current every day smoker    







Hospital Discharge Instructions

No hospital discharge instruction information available.



Plan of Care







 Discharge Date  18 1:30pm

 

 Disposition  HOME, SELF-CARE

 

 Prescriptions  See Medication Section







Functional Status







 Query  Response  Date Recorded

 

 Assistive Devices  None  2018 2:10am

 

 Ambulation Ability  Independent  2018 2:10am

 

 Toileting Ability  Independent  2018 2:10am







Allergies, Adverse Reactions, Alerts







 Allergen  Type  Severity  Reaction  Status  Last Updated

 

 Propoxyphene  Allergy  Unknown  Migraines  Active  17

 

 Azithromycin  Allergy  Unknown  Rash  Active  17

 

 Tramadol  Allergy  Unknown  Insomnia for days  Active  17







Immunizations

No immunization information available.



Vital Signs

Acute Vital Signs





 Vital  Response  Date/Time

 

 Temperature (Fahrenheit)  97.0 degrees F (97.6 - 99.5)  2018 12:30pm

 

 Pulse    

 

    Pulse Rate (adult)  89 bpm (60 - 90)  2018 12:30pm

 

 Respiratory Rate  18 bpm (12 - 24)  2018 12:30pm

 

 Blood Pressure  169/99 mm Hg  2018 12:30pm

 

 Height  5 ft 4 in  2018 9:38pm

 

 Weight  146.31 lb  2018 11:55pm

 

 Body Mass Index  25.1 kg/m^2  2018 11:55pm







Results

Laboratory Results





 Test Name  Result  Units  Flags  Reference  Collection  Result  Comments



           Date/Time  Date/Time  

 

 Platelet  SLIGHTLY        2017  



 Estimate  DECREASED        3:44pm  5:00pm  

 

 Platelet  FEW LARGE        2017  No platelet



 Morphology          3:44pm  5:00pm  clumps seen on



 Comment              smear

 

 Red Cell  NORMAL        2017  



 Morphology          3:44pm  5:00pm  



 Comment              

 

 Urine Hyaline  2-5     H  0-1  2017  



 Casts          1:10pm  2:57pm  

 

 Urine Yeast  FEW     H  NONE  2017  



           1:10pm  2:57pm  

 

 Amylase Level  49  U/L      10/06/2017  10/06/2017  



           1:34am  2:32am  

 

 Lipase  51  U/L    8-78  10/06/2017  10/06/2017  



           1:34am  2:32am  

 

 Prothrombin Time  12.9  seconds    11.9-14.5  2017  



           7:44pm  8:34pm  

 

 Prothromb Time  0.93        2017  Oral Anticoagulant Therapy 
INR Values:

 



 International          7:44pm  8:34pm  1. Low Intensity Therapy        1.5 - 
2.0

 



 Ratio              2. Moderate Intensity Therapy   2.0 - 3.0

 



               3. High Intensity Therapy(1)    2.5 - 3.5

 



               4. High Intensity Therapy(2)    3.0 - 4.0

 



               5. Panic Value INR              > 5.0

 

 Activated  27.5  seconds    23.8-35.5  2017  



 Partial          7:44pm  8:34pm  



 Thromboplast              



 Time              

 

 Urine Color  STRAW      YELLOW  2017  



           6:53pm  7:42pm  

 

 Urine Clarity  CLEAR      CLEAR  2017  



           6:53pm  7:42pm  

 

 Urine Specific  1.005     L  1.010-1.02  2017  



 Gravity        5  6:53pm  7:42pm  

 

 Urine pH  7      5 - 7  2017  



           6:53pm  7:42pm  

 

 Urine Leukocyte  NEGATIVE      NEGATIVE  2017  



 Esterase          6:53pm  7:42pm  

 

 Urine Nitrite  NEGATIVE      NEGATIVE  2017  



           6:53pm  7:42pm  

 

 Urine Protein  NEGATIVE      NEGATIVE  2017  



           6:53pm  7:42pm  

 

 Urine Glucose  3+     H  NEGATIVE  2017  



 (UA)          6:53pm  7:42pm  

 

 Urine Ketones  NEGATIVE      NEGATIVE  2017  



           6:53pm  7:42pm  

 

 Urine  1  mg/dL    0.2 - 1  2017  



 Urobilinogen          6:53pm  7:42pm  

 

 Urine Bilirubin  NEGATIVE      NEGATIVE  2017  



           6:53pm  7:42pm  

 

 Urine Blood  NEGATIVE      NEGATIVE  2017  



           6:53pm  7:42pm  

 

 Urine WBC  0-5  /HPF    0-5  2017  



           6:53pm  8:13pm  

 

 Urine RBC  NONE  /HPF    0-5  2017  



           6:53pm  8:13pm  

 

 Urine Bacteria  FEW  /HPF    NONE  2017  



           6:53pm  8:13pm  

 

 Urine Epithelial  FEW  /LPF    NONE  2017  



 Cells          6:53pm  8:13pm  

 

 Magnesium Level  1.7  MG/DL    1.3-2.1  2017  



           7:44pm  8:42pm  

 

 White Blood  6.48  x10e3/uL    4.8-10.8  11/10/2017  11/10/2017  



 Count          8:48pm  8:53pm  

 

 Red Blood Count  4.15  x10e6/uL    3.6-5.1  11/10/2017  11/10/2017  



           8:48pm  8:53pm  

 

 Hemoglobin  12.4  g/dL    12.0-16.0  11/10/2017  11/10/2017  



           8:48pm  8:53pm  

 

 Hematocrit  36.8  %    34.2-44.1  11/10/2017  11/10/2017  



           8:48pm  8:53pm  

 

 Mean Corpuscular  88.7  fL    81-99  11/10/2017  11/10/2017  



 Volume          8:48pm  8:53pm  

 

 Mean Corpuscular  29.9  pg    28-32  11/10/2017  11/10/2017  



 Hemoglobin          8:48pm  8:53pm  

 

 Mean Corpuscular  33.7  g/dL    31-35  11/10/2017  11/10/2017  



 Hemoglobin          8:48pm  8:53pm  



 Concent              

 

 Red Cell  14.0  %    11.7-14.4  11/10/2017  11/10/2017  



 Distribution          8:48pm  8:53pm  



 Width              

 

 Platelet Count  54  x10e3/uL   L  140-360  11/10/2017  11/10/2017  



           8:48pm  8:53pm  

 

 Neutrophils (%)  72.3  %    38.7-80.0  11/10/2017  11/10/2017  



 (Auto)          8:48pm  8:53pm  

 

 Lymphocytes (%)  21.1  %    18.0-39.1  11/10/2017  11/10/2017  



 (Auto)          8:48pm  8:53pm  

 

 Monocytes (%)  5.1   %    4.4-11.3  11/10/2017  11/10/2017  



 (Auto)          8:48pm  8:53pm  

 

 Eosinophils (%)  0.9  %    0.0-6.0  11/10/2017  11/10/2017  



 (Auto)          8:48pm  8:53pm  

 

 Basophils (%)  0.3  %    0.0-1.0  11/10/2017  11/10/2017  



 (Auto)          8:48pm  8:53pm  

 

 IM GRANULOCYTES  0.3  %    0.0-1.0  11/10/2017  11/10/2017  



 %          8:48pm  8:53pm  

 

 Neutrophils #  4.7      2.1-6.9  11/10/2017  11/10/2017  



 (Auto)          8:48pm  8:53pm  

 

 Lymphocytes #  1.4      1.0-3.2  11/10/2017  11/10/2017  



 (Auto)          8:48pm  8:53pm  

 

 Monocytes #  0.3      0.2-0.8  11/10/2017  11/10/2017  



 (Auto)          8:48pm  8:53pm  

 

 Eosinophils #  0.1      0.0-0.4  11/10/2017  11/10/2017  



 (Auto)          8:48pm  8:53pm  

 

 Basophils #  0.0      0.0-0.1  11/10/2017  11/10/2017  



 (Auto)          8:48pm  8:53pm  

 

 Absolute  0.02  x10e3/uL    0-0.1  11/10/2017  11/10/2017  



 Immature          8:48pm  8:53pm  



 Granulocyte              



 (auto              

 

 Sodium Level  139  mmol/L  #  136-145  2017  



           12:32am  12:57am  

 

 Potassium Level  2.9  mmol/L  #*L  3.5-5.1  2017  Results 
called to [SAMMY ARRIAGA/ER] at 0053 on 17 by



           12:32am  12:57am  Carlie Chen RB OK.

 

 Chloride Level  109  mmol/L   H    2017  



           12:32am  12:57am  

 

 Carbon Dioxide  20  mmol/L   L  22-29  2017  



 Level          12:32am  12:57am  

 

 Anion Gap  12.9  mmol/L    8-16  2017  



           12:32am  12:57am  

 

 Blood Urea  6  mg/dL   L  7-26  2017  



 Nitrogen          12:32am  12:57am  

 

 Creatinine  0.67  mg/dL    0.57-1.11  2017  



           12:32am  12:57am  

 

 BUN/Creatinine  9      6-25  2017  



 Ratio          12:32am  12:57am  

 

 Estimat  > 60  ML/MIN    60-  2017  Ranges were taken from 
the National Kidney Disease Education



 Glomerular          12:32am  12:57am  Program and the National Kidney 
Foundation literature.

 



 Filtration Rate              

 



               Reference ranges:

 



               60 or greater: Normal

 



               16-59 (for 3 consecutive months): Chronic kidney disease 

 



               15 or less: Kidney failure

 

 Glucose Level  272  mg/dL   H    2017  



           12:32am  12:57am  

 

 Calcium Level  8.1  mg/dL   L  8.4-10.2  2017  



           12:32am  12:57am  

 

 Total Bilirubin  0.6  mg/dL    0.2-1.2  11/10/2017  11/10/2017  



           8:48pm  9:06pm  

 

 Aspartate Amino  50  IU/L   H  5-34  11/10/2017  11/10/2017  



 Transf          8:48pm  9:06pm  



 (AST/SGOT)              

 

 Alanine  46  IU/L    0-55  11/10/2017  11/10/2017  



 Aminotransferase          8:48pm  9:06pm  



 (ALT/SGPT)              

 

 Total Protein  7.3  g/dL    6.5-8.1  11/10/2017  11/10/2017  



           8:48pm  9:06pm  

 

 Albumin  3.1  g/dL   L  3.5-5.0  11/10/2017  11/10/2017  



           8:48pm  9:06pm  

 

 Globulin  4.2  g/dL   H  2.3-3.5  11/10/2017  11/10/2017  



           8:48pm  9:06pm  

 

 Albumin/Globulin  0.7     L  0.8-2.0  11/10/2017  11/10/2017  



 Ratio          8:48pm  9:06pm  

 

 Alkaline  196  IU/L   H    11/10/2017  11/10/2017  



 Phosphatase          8:48pm  9:06pm  

 

 Bedside Glucose  310  mg/dL   H    2018  Meter ID:



           11:20am  11:44am  [id number]

 

 

 Creatine Kinase  35  IU/L      2018  



           7:10pm  7:44pm  

 

 Creatine Kinase  0.80  ng/mL    0-5.0  2018  



 MB          7:10pm  7:51pm  

 

 Troponin I  < 0.001  ng/mL    0-0.300  2018  



           7:10pm  7:51pm  

 

 Vitamin B12  789  pg/mL    213-816  2018  



 Level          3:31am  10:03am  

 

 Folate  8.8  ng/mL    7.0-15.4  2018  



           3:31am  10:03am  

 

 Thyroid  2.053  uIU/mL    0.350-4.94  2018  



 Stimulating        0  3:31am  9:51am  



 Hormone (TSH)              





Microbiology Results





 Procedure  Source  Organism/Result  Collection  Result  Result



       Date/Time  Date/Time  Status

 

 Blood Culture  Blood  NO GROWTH AFTER 5  2017  Final



     DAYS, FINAL REPORT  7:53pm  8:02pm  







Procedures







 Procedure  Status  Date  Provider(s)

 

 Computed tomography of chest without contrast  Active  18  ROSE KITCHEN MD







Encounters







 Encounter  Location  Arrival/Admit Date  Discharge/Depart Date  Attending



         Provider

 

 Discharged  [facility]  18 1:06am  18 1:30pm  ROSE KITCHEN MD



 Inpatient (obs)  Patients [facility]      

 

 Departed  [facility]  11/10/17 7:37pm  17 1:16am  MARTIR



 Emergency Room  Patients [facility]      SMITHA JIMENEZ MD



   [place]      

 

 Departed  [facility]  17 5:55pm  17 2:48am  SWEET, LAIRD A



 Emergency Room  Patients [facility]      MD



   [place]      

 

 Registered  [facility]  10/06/17 1:09am    MARTIR



 Emergency Room  Patients [facility]      SMITHA JIMENEZ MD



   [place]      

 

 Departed  [facility]  17 12:49pm  17 7:28pm  ZANE DE LA CRUZ



 Emergency Room  Patients [facility]      MD



   [place]

## 2018-08-30 ENCOUNTER — HOSPITAL ENCOUNTER (EMERGENCY)
Dept: HOSPITAL 97 - ER | Age: 58
Discharge: HOME | End: 2018-08-30
Payer: COMMERCIAL

## 2018-08-30 DIAGNOSIS — Z88.3: ICD-10-CM

## 2018-08-30 DIAGNOSIS — Z88.1: ICD-10-CM

## 2018-08-30 DIAGNOSIS — Z90.13: ICD-10-CM

## 2018-08-30 DIAGNOSIS — Z85.3: ICD-10-CM

## 2018-08-30 DIAGNOSIS — Z88.5: ICD-10-CM

## 2018-08-30 DIAGNOSIS — E11.65: ICD-10-CM

## 2018-08-30 DIAGNOSIS — R03.0: Primary | ICD-10-CM

## 2018-08-30 DIAGNOSIS — F17.210: ICD-10-CM

## 2018-08-30 LAB
ALBUMIN SERPL BCP-MCNC: 2.9 G/DL (ref 3.4–5)
ALP SERPL-CCNC: 179 U/L (ref 45–117)
ALT SERPL W P-5'-P-CCNC: 41 U/L (ref 12–78)
AMYLASE SERPL-CCNC: 63 U/L (ref 25–115)
AST SERPL W P-5'-P-CCNC: 49 U/L (ref 15–37)
BUN BLD-MCNC: 18 MG/DL (ref 7–18)
GLUCOSE SERPLBLD-MCNC: 293 MG/DL (ref 74–106)
HCT VFR BLD CALC: 34.6 % (ref 36–45)
INR BLD: 1.03
LIPASE SERPL-CCNC: 342 U/L (ref 73–393)
LYMPHOCYTES # SPEC AUTO: 0.7 K/UL (ref 0.7–4.9)
MAGNESIUM SERPL-MCNC: 1.8 MG/DL (ref 1.8–2.4)
MCH RBC QN AUTO: 25.9 PG (ref 27–35)
MCV RBC: 79.1 FL (ref 80–100)
PMV BLD: 7.7 FL (ref 7.6–11.3)
POTASSIUM SERPL-SCNC: 3.3 MMOL/L (ref 3.5–5.1)
RBC # BLD: 4.37 M/UL (ref 3.86–4.86)
UA COMPLETE W REFLEX CULTURE PNL UR: (no result)

## 2018-08-30 PROCEDURE — 36415 COLL VENOUS BLD VENIPUNCTURE: CPT

## 2018-08-30 PROCEDURE — 80076 HEPATIC FUNCTION PANEL: CPT

## 2018-08-30 PROCEDURE — 99285 EMERGENCY DEPT VISIT HI MDM: CPT

## 2018-08-30 PROCEDURE — 96374 THER/PROPH/DIAG INJ IV PUSH: CPT

## 2018-08-30 PROCEDURE — 82150 ASSAY OF AMYLASE: CPT

## 2018-08-30 PROCEDURE — 80048 BASIC METABOLIC PNL TOTAL CA: CPT

## 2018-08-30 PROCEDURE — 93005 ELECTROCARDIOGRAM TRACING: CPT

## 2018-08-30 PROCEDURE — 96361 HYDRATE IV INFUSION ADD-ON: CPT

## 2018-08-30 PROCEDURE — 85730 THROMBOPLASTIN TIME PARTIAL: CPT

## 2018-08-30 PROCEDURE — 82010 KETONE BODYS QUAN: CPT

## 2018-08-30 PROCEDURE — 83690 ASSAY OF LIPASE: CPT

## 2018-08-30 PROCEDURE — 74177 CT ABD & PELVIS W/CONTRAST: CPT

## 2018-08-30 PROCEDURE — 83735 ASSAY OF MAGNESIUM: CPT

## 2018-08-30 PROCEDURE — 82140 ASSAY OF AMMONIA: CPT

## 2018-08-30 PROCEDURE — 71045 X-RAY EXAM CHEST 1 VIEW: CPT

## 2018-08-30 PROCEDURE — 81015 MICROSCOPIC EXAM OF URINE: CPT

## 2018-08-30 PROCEDURE — 85610 PROTHROMBIN TIME: CPT

## 2018-08-30 PROCEDURE — 85025 COMPLETE CBC W/AUTO DIFF WBC: CPT

## 2018-08-30 PROCEDURE — 82962 GLUCOSE BLOOD TEST: CPT

## 2018-08-30 NOTE — XMS REPORT
Clinical Summary

 Created on:2018



Patient:Deborah Alatorre

Sex:Female

:1960

External Reference #:[id number]





Demographics







 Address  [address] [place] [address]

 

 Home Phone  [phone]

 

 Mobile Phone  [phone]

 

 Preferred Language  English

 

 Marital Status  Unknown

 

 [employer] Affiliation  Unknown

 

 Race  White

 

 Ethnic Group  Not  or 









Author







 Organization  [facility]

 

 Address  [address] [place] [address]

 

 Phone  [phone]









Support







 Name  Relationship  Address  Phone

 

 Unavailable  Unavailable  Unavailable  [phone]









Care Team Providers







 Name  Role  Phone

 

 Unavailable  Primary Care Provider  Unavailable









Allergies

Not on File



Current Medications

Not on file



Active Problems

Not on file



Social History







 Tobacco Use  Types  Packs/Day  Years Used  Date

 

 Never Assessed        









 Sex Assigned at Birth  Date Recorded

 

 Not on file  







Last Filed Vital Signs

Not on file



Plan of Treatment

Not on file



Results

Not on fileafter 2017

## 2018-08-30 NOTE — EKG
Test Date:    2018-08-30               Test Time:    15:52:16

Technician:   ANKIT                                     

                                                     

MEASUREMENT RESULTS:                                       

Intervals:                                           

Rate:         75                                     

KS:           156                                    

QRSD:         114                                    

QT:           436                                    

QTc:          486                                    

Axis:                                                

P:            51                                     

KS:           156                                    

QRS:          -11                                    

T:            61                                     

                                                     

INTERPRETIVE STATEMENTS:                                       

                                                     

Normal sinus rhythm

Incomplete left bundle branch block

Moderate voltage criteria for LVH, may be normal variant

Prolonged QT

Abnormal ECG

Compared to ECG 04/07/2018 08:13:13

Left bundle-branch block now present

Left ventricular hypertrophy now present

Prolonged QT interval now present



Electronically Signed On 08-30-18 16:25:42 CDT by Jacek Burgess

## 2018-08-30 NOTE — RAD REPORT
EXAM DESCRIPTION:  RADChest Single View8/30/2018 4:22 pm

 

CLINICAL HISTORY:  abdominal pain /hypertension

 

COMPARISON:  Chest Single View dated 4/7/2018;

 

FINDINGS:   The lungs appear clear of acute infiltrate. The heart is normal size

 

IMPRESSION:   No acute abnormalities displayed

## 2018-08-30 NOTE — XMS REPORT
Patient Summary Document

 Created on:2018



Patient:VALENTE GROVER

Sex:Female

:1960

External Reference #:[id number]





Demographics







 Address  [address] [place] [address]

 

 Home Phone  [phone]

 

 Email Address  NONE

 

 Preferred Language  Unknown

 

 Marital Status  Unknown

 

 [employer] Affiliation  Unknown

 

 Race  Unknown

 

 Additional Race(s)  Unavailable

 

 Ethnic Group  Unknown









Author







 Organization  [facility]

 

 Address  [address] [address]

 

 Phone  [phone]









Care Team Providers







 Name  Role  Phone

 

 ROSE KITCHEN  Unavailable  Unavailable

 

 SMITHA MCMAHAN  Unavailable  Unavailable









Problems

This patient has no known problems.



Allergies, Adverse Reactions, Alerts

This patient has no known allergies or adverse reactions.



Medications

This patient has no known medications.



Results







 Test Description  Test Time  Test Comments  Text Results  Atomic Results  
Result Comments









 Stress Test -       [address]  



 Treadmill ONLY      [place], [place] [address]    Patient Name :  



       VALENTE GROVER         MR #: [id number]   : 1960  



         Age/Sex: 57/F      Acct # : [id number]           Adm  



       Physician  : ROSE KITCHEN MD       Admit Date  :  18  



       Location : [facility]    Room/Bed : [place] [address]  



       _______________________________________________________________  



       ____________________________________     REPORT: Cardiology  



       Report       DATE OF STUDY:  2018       LEXISCAN  



       MYOVIEW      The patient had resting perfusion images after an  



       injection of 11    millicuries of technetium-99m Myoview.  



       Later, due to inability to    exercise, she was given Lexiscan  



       0.4 mg intravenously, and shortly    afterwards 33 millicuries  



       of technetium-99m Myoview.  Perfusion images were    taken by  



       rotational tomography.      Comparison resting and Lexiscan  



       stress images show no evidence of any    perfusion defect.  



       Uptake is smooth and regular throughout.  No suggestion    of  



       any scar or any ischemia.  Additionally, gaited wall motion  



       images were    obtained and calculated ejection fraction normal  



       at 54% without regional    wall motion abnormality.  



       FINAL IMPRESSION    1.  Normal Lexiscan Myoview for perfusion.  



        2.  Normal left ventricular function, calculated ejection  



       fraction 54%.                 DD:  2018 16:58   DT:  



       2018 18:08   Job#:  [id number]      cc:   ROSE KITCHEN MD  



                Signature  



                            Date                           Dictated  



       By: OLIVIA ESTRADA MD  Transcribed By: SMEDS on 18  



       <Electronically signed by OLIVIA ESTRADA MD><<Signature on  



       File>&gt;18 1416    COPY TO:  

 

 CT CHEST WO          [place] [address]      Patient Name:  



       VALENTE GROVER   MR #: [id number]    : 1960 Age/Sex:  



       57/F  Acct #: [id number] Req #: [id number]  Adm Physician:  



       ROSE KITCHEN MD    Ordered by: ROSE KITCHEN MD  Report #:  



       [id number]   Location: [facility]  Room/Bed: [place] [address]  



       _______________________________________________________________  



       ____________________________________    Procedure: [id number]  



       CT/CT CHEST WO  Exam Date: 18  



       Exam Time: 1115       REPORT STATUS: Signed    PROCEDURE: CT  



       CHEST WITHOUT CONTRAST   CT scan of the chest WITHOUT  



       intravenous contrast, using standard    protocol.  



       TECHNIQUE:   The chest was scanned utilizing a multidetector  



       helical scanner from    the apex to the level of the adrenal  



       glands.  No IV contrast was    administered as per physician  



       request.  Coronal and sagittal    multiplanar reformations were  



       obtained.       COMPARISON: None.       INDICATIONS:   CHEST  



       PAIN           FINDINGS:   Lines/tubes:  None.       Lungs and  



       Airways:  The lungs and airways are normal with no focal  



       abnormality demonstrated. Right upper lobe scarring. Bibasilar  



         dependent atelectasis.       Pleura: The pleural spaces are  



       clear.       Heart and mediastinum:  The thyroid gland is  



       normal. No significant    mediastinal, hilar or axillary  



       lymphadenopathy is seen. The heart and    pericardium are  



       within normal limits.       Soft tissues: Normal.  



       Abdomen: Nodular contour of the liver. The spleen is enlarged,  



         measuring 17 cm in AP length. Multiple splenic and esophageal  



       varices    are partially visualized. Trace ascites is present  



       in the right upper    quadrant. The adrenal glands are normal.  



            Bones: The visualized bony thorax is within normal limits.  



              IMPRESSION:        1. No acute abnormality of the chest.  



          2. Hepatic parenchymal appearance consistent with cirrhotic  



       morphology.        3. Splenomegaly and varices consistent with  



       portal hypertension.           Dictated by:  Rose Newton M.D.  



       on 2018 at 13:18        Electronically approved by:  Rose Newton M.D. on 2018 at 13:18                Dictated By:  



       ROSE NEWTON MD  Electronically Signed By: ROSE NEWTON MD on  



       18  Transcribed By: PILAR on 18  



       COPY TO:   ROSE KITCHEN MD  

 

 ABDOMEN ACUTE          [address]  



 SERIES W/PA CXR      [place] [address]      Patient Name:  



       VALENTE GROVER   MR #: [id number]    : 1960 Age/Sex:  



       56/F  Acct #: [id number] Req #: [id number]  Adm Physician:  



        Ordered by: SMITHA MCMAHAN MD  Report #: [id number]  



       Location: ER  Room/Bed:  



       _______________________________________________________________  



       ____________________________________    Procedure: [id number]  



       DX/ABDOMEN ACUTE SERIES W/PA CXR  Exam Date: 10/06/17  



                        Exam Time: 0210       REPORT STATUS: Signed  



       EXAM: ABDOMEN ACUTE SERIES W/PA CXR, supine and erect views of  



       the abdomen, AP   view of the chest   DATE: 10/6/2017 1:29 AM  



       Time stamp on exam: 0155 hours   INDICATION: Abdominal pain  



       COMPARISON: CT of the abdomen and pelvis 2017  



       FINDINGS:   LINES/TUBES: None      LUNGS: No consolidations or  



       edema.       PLEURA: No effusions or pneumothorax.      HEART  



       AND MEDIASTINUM: Normal size and contour.      BOWEL PATTERN:  



       Non-obstructed bowel gas pattern.      BONES AND SOFT TISSUES:  



       No acute bone findings. No abnormal calcifications. No   mass  



       effect. Bilateral chest surgical clips. Surgical clips right  



       upper   quadrant of the abdomen.      IMPRESSION:   No acute  



       thoracic abnormality.      No evidence for bowel obstruction.  



                   Signed by: Dr. Akiko Mehta M.D. on 10/6/2017  



       2:40 AM        Dictated By: AKIKO MEHTA MD  Electronically  



       Signed By: AKIKO MEHTA MD on 10/06/17 0240  Transcribed By:  



       CLIFFORD on 10/06/17 0240       COPY TO:   SMITHA MCMAHAN MD

## 2018-08-30 NOTE — EDPHYS
Physician Documentation                                                                           

 [facility]                                                                

Name: Deborah Alatorre                                                                              

Age: 57 yrs                                                                                       

Sex: Female                                                                                       

: 1960                                                                                   

MRN: [id number]                                                                                   

Arrival Date: 2018                                                                          

Time: 13:51                                                                                       

Account#: [id number]                                                                            

Bed 28                                                                                            

Private MD:                                                                                       

ED Physician Manjeet Khanna                                                                         

HPI:                                                                                              

                                                                                             

15:45 This 57 yrs old  Female presents to ER via Ambulatory with complaints of High  cp  

      Blood Pressure, Abdominal Pain, Back Pain.                                                  

15:45 The patient has elevated blood pressure and discovered this at home, with a home        cp  

      device. Onset: The symptoms/episode began/occurred today. Associated signs and              

      symptoms: Pertinent positives: abdominal pain times 1 week, elevated blood glucose.         

      Associated signs and symptoms: Pertinent negatives: chest pain, headache,                   

      lightheadedness, vomiting, weakness. Severity of symptoms: in the emergency department      

      the blood pressure is unchanged, despite home interventions.                                

                                                                                                  

Historical:                                                                                       

- Allergies:                                                                                      

14:31 Darvocet-N 100;                                                                         ss  

14:31 Erythromycin;                                                                           ss  

14:31 tramadol;                                                                               ss  

14:31 Zithromax;                                                                              ss  

17:17 Toradol;                                                                                rv  

- PMHx:                                                                                           

14:31 Anxiety; Cancer, Breast; Chronic pain; Colitis; COPD; MUSCLE WEAKNESS; Diabetes - IDDM; ss  

      Depression;                                                                                 

- PSHx:                                                                                           

14:31 Appendectomy; Hysterectomy; Cholecystectomy; ; bilateral mastectomy; breast    ss  

      reconstructive surgery; Liver Ablation;                                                     

                                                                                                  

- Immunization history:: Adult Immunizations up to date.                                          

- Social history:: Smoking status: Patient uses tobacco products, smokes one-half pack            

  cigarettes per day.                                                                             

- Ebola Screening: : Patient denies exposure to infectious person Patient denies travel           

  to an Ebola-affected area in the 21 days before illness onset.                                  

                                                                                                  

                                                                                                  

ROS:                                                                                              

16:00 Constitutional: Negative for body aches, chills, fever, poor PO intake.                 cp  

16:00 Eyes: Negative for injury, pain, redness, and discharge.                                cp  

16:00 ENT: Negative for drainage from ear(s), ear pain, sore throat, difficulty swallowing,       

      difficulty handling secretions.                                                             

16:00 Cardiovascular: Negative for chest pain, edema, palpitations.                               

16:00 Respiratory: Negative for cough, shortness of breath, wheezing.                             

16:00 Abdomen/GI: Positive for abdominal pain, of the right upper quadrant and left upper         

      quadrant, Negative for nausea, vomiting, and diarrhea, constipation, black/tarry stool,     

      rectal bleeding.                                                                            

16:00 Back: Negative for pain at rest, pain with movement, radiated pain.                         

16:00 : Negative for urinary symptoms.                                                          

16:00 Skin: Negative for cellulitis, rash.                                                        

16:00 Neuro: Negative for altered mental status, dizziness, headache, syncope, near syncope,      

      weakness.                                                                                   

16:00 All other systems are negative.                                                             

                                                                                                  

Exam:                                                                                             

16:00 ECG was reviewed by the Attending Physician.                                            cp  

16:05 Constitutional: The patient appears in no acute distress, alert, awake, non-toxic, well cp  

      developed, well nourished.                                                                  

16:05 Head/Face:  Normocephalic, atraumatic.                                                  cp  

16:05 Eyes: Periorbital structures: appear normal, Conjunctiva: normal, no exudate, no            

      injection, Sclera: no appreciated abnormality, Lids and lashes: appear normal,              

      bilaterally.                                                                                

16:05 ENT: External ear(s): are unremarkable, Nose: is normal, Mouth: Lips: moist, Oral           

      mucosa: pink and intact, moist, Posterior pharynx: is normal, airway is patent, no          

      erythema, no exudate.                                                                       

16:05 Neck: ROM/movement: is normal, is supple, without pain, no range of motions                 

      limitations, no nuchal rigidity.                                                            

16:05 Chest/axilla: Inspection: normal, Palpation: is normal, no crepitus, no tenderness.         

16:05 Cardiovascular: Rate: normal, Rhythm: regular, Edema: is not appreciated, JVD: is not       

      appreciated.                                                                                

16:05 Respiratory: the patient does not display signs of respiratory distress,  Respirations:     

      normal, no use of accessory muscles, no retractions, no splinting, no tachypnea,            

      labored breathing, is not present, Breath sounds: are clear throughout, no decreased        

      breath sounds, no stridor, no wheezing.                                                     

16:05 Abdomen/GI: Inspection: scar(s), Bowel sounds: active, all quadrants, Palpation: soft,      

      in all quadrants, moderate abdominal tenderness, in the right upper quadrant, rebound       

      tenderness, is not appreciated, voluntary guarding, is not appreciated, involuntary         

      guarding, is not appreciated.                                                               

16:05 Back: pain, is absent, ROM is normal.                                                       

16:05 Musculoskeletal/extremity: Exam is negative for decreased range of motion, deformity,       

      injury.                                                                                     

16:05 Skin: cellulitis, is not appreciated, no rash present.                                      

16:05 Neuro: Orientation: to person, place \T\ time. Mentation: lucid, able to follow commands,   

      Cerebellar function: is grossly normal, Motor: moves all fours, strength is normal,         

      Sensation: no obvious gross deficits, Gait: is steady.                                      

                                                                                                  

Vital Signs:                                                                                      

14:29  / 94; Pulse 89; Resp 16; Temp 98.0(TE); Pulse Ox 99% on R/A; Weight 64.86 kg;    ss  

      Height 5 ft. 4 in. (162.56 cm); Pain 8/10;                                                  

15:11  / 88; Pulse 79; Pulse Ox 99% on R/A;                                             rv  

16:57  / 94; Pulse 80; Pulse Ox 100% on R/A;                                            rv  

18:17  / 93; Pulse 72; Pulse Ox 100% on R/A;                                            rv  

18:54  / 89; Pulse 71; Pulse Ox 100% on R/A;                                            rv  

14:29 Body Mass Index 24.55 (64.86 kg, 162.56 cm)                                             ss  

                                                                                                  

MDM:                                                                                              

15:17 Patient medically screened.                                                             cp  

16:00 Differential diagnosis: hypertensive crisis, Malignant HTN, bowel obstruction,          cp  

      constipation, pancreatitis.                                                                 

18:40 Data reviewed: vital signs, nurses notes, lab test result(s), EKG, radiologic studies,  cp  

      CT scan, plain films.                                                                       

18:40 Test interpretation: by ED physician or midlevel provider: ECG, plain radiologic        cp  

      studies. Counseling: I had a detailed discussion with the patient and/or guardian           

      regarding: the historical points, exam findings, and any diagnostic results supporting      

      the discharge/admit diagnosis, lab results, radiology results, to return to the             

      emergency department if symptoms worsen or persist or if there are any questions or         

      concerns that arise at home. Response to treatment: the patient's symptoms have             

      markedly improved after treatment, and as a result, I will discharge patient. Special       

      discussion: Based on the patient's Hx, exam, and Dx evaluation, there is no indication      

      for emergent surgery or inpatient Tx. It is understood by the patient/guardian that if      

      the Sx's persist or worsen they need to return immediately for re-evaluation.               

                                                                                                  

                                                                                             

15:40 Order name: Amylase, Serum; Complete Time: 16:47                                        cp  

                                                                                             

15:40 Order name: Basic Metabolic Panel; Complete Time: 16:47                                 cp  

                                                                                             

16:47 Interpretation: Normal except: K 3.3; GLUC 293; GFR 51.                                 cp  

                                                                                             

15:40 Order name: CBC with Diff; Complete Time: 17:29                                         cp  

                                                                                             

17:29 Interpretation: Normal except: WBC 4.0; HGB 11.3; HCT 34.6; MCV 79.1; MCH 25.9; PLT 58; cp  

      RDW 17.3; SHANA% 73.8.                                                                        

                                                                                             

15:40 Order name: Creatinine for Radiology; Complete Time: 16:47                              cp  

                                                                                             

16:48 Interpretation: Normal except: GFR 51.                                                  cp  

                                                                                             

15:40 Order name: Hepatic Function; Complete Time: 16:47                                      cp  

                                                                                             

16:47 Interpretation: Normal except: AST 49; ; ALB 2.9; GLOB 4.3; A/G 0.7.             cp  

                                                                                             

15:40 Order name: Lipase; Complete Time: 16:47                                                cp  

                                                                                             

16:47 Interpretation: Within normal limits: .                                          cp  

                                                                                             

15:40 Order name: Urine Microscopic Only; Complete Time: 17:08                                cp  

                                                                                             

15:40 Order name: Magnesium; Complete Time: 16:47                                             cp  

                                                                                             

15:40 Order name: AMMONIA; Complete Time: 16:47                                               cp  

                                                                                             

15:40 Order name: PT-INR; Complete Time: 17:29                                                cp  

                                                                                             

15:40 Order name: Ptt, Activated; Complete Time: 17:29                                        cp  

                                                                                             

15:40 Order name: Ketone, Serum; Complete Time: 16:47                                         cp  

                                                                                             

15:40 Order name: XRAY Chest (1 view); Complete Time: 16:47                                   cp  

                                                                                             

18:05 Order name: Glucose, Ancillary Testing; Complete Time: 18:24                            EDMS

                                                                                             

15:40 Order name: IV Saline Lock; Complete Time: 16:19                                        cp  

                                                                                             

15:40 Order name: Labs collected and sent; Complete Time: 15:56                               cp  

                                                                                             

15:40 Order name: Urine Dipstick-Ancillary (obtain specimen); Complete Time: 15:41            cp  

                                                                                             

15:40 Order name: EKG; Complete Time: 15:40                                                   cp  

                                                                                             

15:40 Order name: CT Abd/Pelvis - W/Contrast; Complete Time: 18:32                            cp  

                                                                                             

18:36 Order name: PO challenge                                                                cp  

                                                                                                  

EC:00 Rate is 75 beats/min. Rhythm is regular. WA interval is normal. QRS interval is         cp  

      prolonged at 114 msec. QT interval is prolonged at 436 msec. Interpreted by me.             

      Reviewed by me.                                                                             

                                                                                                  

Administered Medications:                                                                         

16:18 Drug: NS 0.9% 1000 ml Route: IV; Rate: 1 bolus; Site: left forearm;                     rv  

18:54 Follow up: Response: No adverse reaction; IV Status: Completed infusion                 rv  

16:18 Drug: Bentyl 20 mg Route: PO;                                                           rv  

18:54 Follow up: Response: No adverse reaction; Pain is unchanged, physician notified         rv  

16:19 Drug: Insulin Regular Human 5 units {Co-Signature: kr2 (Es Reyes RN).} Route: IVP;  rv  

      Site: left forearm;                                                                         

18:54 Follow up: Response: No adverse reaction                                                rv  

17:17 Not Given (Patient Refused): TORadol 30 mg IVP once                                     rv  

18:27 CANCELLED (Physician Discretion): Insulin Regular Human 10 units IVP once               cp  

18:27 Not Given (Physician Discretion): NS 0.9% 1000 ml IV at 1 bolus Per protocol; 1000 mL   cp  

      bolus                                                                                       

18:49 Drug: Potassium Effervescent Tablet 25 mEq Route: PO;                                   rv  

18:49 Follow up: Response: Medication administered at discharge.                              rv  

                                                                                                  

                                                                                                  

Point of Care Testing:                                                                            

      Blood Glucose:                                                                              

15:11 Blood Glucose: 328 mg/dL;                                                               rv  

16:57 Blood Glucose: 156 mg/dL;                                                               rv  

      Ranges:                                                                                     

      Critical Glucose Levels:Adult <50 mg/dl or >400 mg/dl  <40 mg/dl or >180 mg/dl       

Disposition:                                                                                      

18:58 Co-signature as Attending Physician, Manjeet Khanna MD.                                    rn  

                                                                                                  

Disposition:                                                                                      

18 18:42 Discharged to Home. Impression: Unspecified abdominal pain, Elevated               

  blood-pressure reading, without diagnosis of hypertension, Hyperglycemia,                       

  unspecified.                                                                                    

- Condition is Stable.                                                                            

- Discharge Instructions: Abdominal Pain, Adult, Constipation, Adult.                             

- Prescriptions for Bentyl 20 mg Oral Tablet - take 2 tablet by ORAL route every 6                

  hours As needed; 40 tablet. Zofran 4 mg Oral Tablet - take 1 tablet by ORAL route               

  every 12 hours As needed; 20 tablet. Miralax 17 gram/dose Oral - take 1 packet by               

  ORAL route once daily dilute powder in 8 ounces of water or juice; 15 packet.                   

- Medication Reconciliation Form, Thank You Letter, Antibiotic Education, Prescription            

  Opioid Use form.                                                                                

- Follow up: Private Physician; When: 1 - 2 days; Reason: Recheck today's complaints.             

- Problem is new.                                                                                 

- Symptoms have improved.                                                                         

                                                                                                  

                                                                                                  

                                                                                                  

Signatures:                                                                                       

Dispatcher [employer]                           EDMS                                                 

Manjeet Khanna MD MD rn Smirch, Shelby, RN RN ss Page, Corey, PA PA   cp                                                   

Breezy Reinoso RN RN   rv                                                   

Es Reyes RN                              kr2                                                  

                                                                                                  

Corrections: (The following items were deleted from the chart)                                    

18:27 18:25 Insulin Regular Human 10 units IVP once ordered. cp                               cp  

18:51 18:42 2018 18:42 Discharged to Home. Impression: Unspecified abdominal pain.      rv  

      Condition is Stable. Forms are Medication Reconciliation Form, Thank You Letter,            

      Antibiotic Education, Prescription Opioid Use. Follow up: Private Physician; When: 1 -      

      2 days; Reason: Recheck today's complaints. Problem is new. Symptoms have improved. cp      

18:53 18:51 2018 18:42 Discharged to Home. Impression: Unspecified abdominal pain.      cp  

      Condition is Stable. Discharge Instructions: Abdominal Pain, Adult, Constipation,           

      Adult. Prescriptions for Bentyl 20 mg Oral Tablet - take 2 tablet by ORAL route every 6     

      hours As needed; 40 tablet, Zofran 4 mg Oral Tablet - take 1 tablet by ORAL route every     

      12 hours As needed; 20 tablet, Miralax 17 gram/dose Oral - take 1 packet by ORAL route      

      once daily dilute powder in 8 ounces of water or juice; 15 packet. and Forms are            

      Medication Reconciliation Form, Thank You Letter, Antibiotic Education, Prescription        

      Opioid Use. Follow up: Private Physician; When: 1 - 2 days; Reason: Recheck today's         

      complaints. Problem is new. Symptoms have improved. rv                                      

18:55 18:53 2018 18:42 Discharged to Home. Impression: Unspecified abdominal pain;      rv  

      Elevated blood-pressure reading, without diagnosis of hypertension; Hyperglycemia,          

      unspecified. Condition is Stable. Discharge Instructions: Abdominal Pain, Adult,            

      Constipation, Adult. Prescriptions for Bentyl 20 mg Oral Tablet - take 2 tablet by ORAL     

      route every 6 hours As needed; 40 tablet, Zofran 4 mg Oral Tablet - take 1 tablet by        

      ORAL route every 12 hours As needed; 20 tablet, Miralax 17 gram/dose Oral - take 1          

      packet by ORAL route once daily dilute powder in 8 ounces of water or juice; 15 packet.     

      and Forms are Medication Reconciliation Form, Thank You Letter, Antibiotic Education,       

      Prescription Opioid Use. Follow up: Private Physician; When: 1 - 2 days; Reason:            

      Recheck today's complaints. Problem is new. Symptoms have improved. cp                      

                                                                                                  

**************************************************************************************************

## 2018-08-30 NOTE — RAD REPORT
EXAM DESCRIPTION:  CTAbdomen   Pelvis W Contrast - 8/30/2018 6:19 pm

 

CLINICAL HISTORY:  Abdominal pain.

ABD PAIN

 

COMPARISON:  Abdomen   Pelvis W Contrast dated 7/29/2018; Abdomen   Pelvis W Contrast dated 4/4/2018;
 Abdomen   Pelvis W Contrast dated 2/1/2018

 

TECHNIQUE:  Biphasic CT imaging of the abdomen and pelvis was performed with 100 ml non-ionic IV cont
rast.

 

All CT scans are performed using dose optimization technique as appropriate and may include automated
 exposure control or mA/KV adjustment according to patient size.

 

FINDINGS:  The lung bases are clear.

 

Prominent liver cirrhosis pattern is again noted. Area of diminished density in the right lobe of the
 liver inferiorly is again noted, likely related to prior ablation. Cholecystectomy clips are seen. S
ignificant splenomegaly is present. Portal hypertension is seen. The adrenal glands, pancreas and kid
neys are within normal limits. The benign cyst is present in the right kidney.

 

No bowel obstruction, free air, free fluid or abscess. Aortoiliac atherosclerosis. Appendectomy noted
. Moderate stool is present in the colon. No evidence of significant lymphadenopathy.

 

No suspicious bony findings.

 

IMPRESSION:  Hepatomegaly with liver cirrhosis, splenomegaly and portal hypertension.

 

Moderate stool is retained in the colon.

## 2018-08-30 NOTE — ER
Nurse's Notes                                                                                     

 [facility]                                                                

Name: Deborah Alatorre                                                                              

Age: 57 yrs                                                                                       

Sex: Female                                                                                       

: 1960                                                                                   

MRN: [id number]                                                                                   

Arrival Date: 2018                                                                          

Time: 13:51                                                                                       

Account#: [id number]                                                                            

Bed 28                                                                                            

Private MD:                                                                                       

Diagnosis: Unspecified abdominal pain;Elevated blood-pressure reading, without diagnosis of       

  hypertension;Hyperglycemia, unspecified                                                         

                                                                                                  

Presentation:                                                                                     

                                                                                             

14:29 Presenting complaint: Patient states: high blood pressure, high blood sugar, and        ss  

      increased abd pain that began 1 week ago, but is worse today. Transition of care:           

      patient was not received from another setting of care. Onset of symptoms was 2018. Risk Assessment: Do you want to hurt yourself or someone else? Patient reports no     

      desire to harm self or others. Initial Sepsis Screen: Does the patient meet any 2           

      criteria? No. Patient's initial sepsis screen is negative. Does the patient have a          

      suspected source of infection? No. Patient's initial sepsis screen is negative. Note        

      Patient reports a history of Liver CA and usually has this abd pain, but is worse the       

      past few days. Care prior to arrival: None.                                                 

14:29 Method Of Arrival: Ambulatory                                                           ss  

14:29 Acuity: CASEY 3                                                                           ss  

                                                                                                  

Historical:                                                                                       

- Allergies:                                                                                      

14:31 Darvocet-N 100;                                                                         ss  

14:31 Erythromycin;                                                                           ss  

14:31 tramadol;                                                                               ss  

14:31 Zithromax;                                                                              ss  

17:17 Toradol;                                                                                rv  

- PMHx:                                                                                           

14:31 Anxiety; Cancer, Breast; Chronic pain; Colitis; COPD; MUSCLE WEAKNESS; Diabetes - IDDM; ss  

      Depression;                                                                                 

- PSHx:                                                                                           

14:31 Appendectomy; Hysterectomy; Cholecystectomy; ; bilateral mastectomy; breast    ss  

      reconstructive surgery; Liver Ablation;                                                     

                                                                                                  

- Immunization history:: Adult Immunizations up to date.                                          

- Social history:: Smoking status: Patient uses tobacco products, smokes one-half pack            

  cigarettes per day.                                                                             

- Ebola Screening: : Patient denies exposure to infectious person Patient denies travel           

  to an Ebola-affected area in the 21 days before illness onset.                                  

                                                                                                  

                                                                                                  

Screening:                                                                                        

15:18 Abuse screen: Denies threats or abuse. Denies injuries from another. Nutritional        rv  

      screening: No deficits noted. Tuberculosis screening: No symptoms or risk factors           

      identified. Fall Risk None identified.                                                      

                                                                                                  

Assessment:                                                                                       

15:17 General: Appears in no apparent distress. comfortable, Behavior is calm, cooperative.   rv  

      Pain: Denies pain. Neuro: Level of Consciousness is awake, alert, obeys commands,           

      Oriented to person, place, time, situation. Cardiovascular: Capillary refill < 3            

      seconds. Respiratory: Airway is patent. GI: Bowel sounds present X 4 quads. Abd is soft     

      and non tender X 4 quads. : No signs and/or symptoms were reported regarding the          

      genitourinary system. EENT: No signs and/or symptoms were reported regarding the EENT       

      system. Derm: Skin is intact.                                                               

                                                                                                  

Vital Signs:                                                                                      

14:29  / 94; Pulse 89; Resp 16; Temp 98.0(TE); Pulse Ox 99% on R/A; Weight 64.86 kg;    ss  

      Height 5 ft. 4 in. (162.56 cm); Pain 8/10;                                                  

15:11  / 88; Pulse 79; Pulse Ox 99% on R/A;                                             rv  

16:57  / 94; Pulse 80; Pulse Ox 100% on R/A;                                            rv  

18:17  / 93; Pulse 72; Pulse Ox 100% on R/A;                                            rv  

18:54  / 89; Pulse 71; Pulse Ox 100% on R/A;                                            rv  

14:29 Body Mass Index 24.55 (64.86 kg, 162.56 cm)                                             ss  

                                                                                                  

ED Course:                                                                                        

13:51 Patient arrived in ED.                                                                  sb2 

14:29 Arm band placed on right wrist.                                                         ss  

14:30 Triage completed.                                                                       ss  

15:14 Sukhwinder Fernandez PA is PHCP.                                                                cp  

15:14 Manjeet Khanna MD is Attending Physician.                                                cp  

15:19 Patient has correct armband on for positive identification. Placed in gown. Bed in low  rv  

      position. Call light in reach. Side rails up X 1. Pulse ox on. NIBP on.                     

15:45 Initial lab(s) drawn, by ED staff, sent to lab. Urine collected: clean catch specimen,  jp3 

      clear, nayely colored.                                                                       

16:03 EKG done, by EKG tech. reviewed by Sukhwinder VELASQUEZ.                                       sm3 

16:04 Warm blanket given. Pillow given.                                                       jp3 

16:06 Ketone, Serum Sent.                                                                     rv  

16:06 Ptt, Activated Sent.                                                                    rv  

16:07 PT-INR Sent.                                                                            rv  

16:07 AMMONIA Sent.                                                                           rv  

16:07 Magnesium Sent.                                                                         rv  

16:07 Amylase, Serum Sent.                                                                    rv  

16:07 Basic Metabolic Panel Sent.                                                             rv  

16:07 CBC with Diff Sent.                                                                     rv  

16:07 Creatinine for Radiology Sent.                                                          rv  

16:07 Hepatic Function Sent.                                                                  rv  

16:07 Lipase Sent.                                                                            rv  

16:07 Urine Microscopic Only Sent.                                                            rv  

16:18 Inserted saline lock: 22 gauge in left forearm, using aseptic technique.                rv  

16:21 X-ray completed. Portable x-ray completed in exam room. Patient tolerated procedure     mh1 

      well.                                                                                       

16:23 XRAY Chest (1 view) In Process Unspecified.                                             EDMS

16:50 Lab(s) recollected, by me, sent to lab.                                                 jp3 

16:55 Ptt, Activated Sent.                                                                    jp3 

16:55 PT-INR Sent.                                                                            jp3 

16:55 CBC with Diff Sent.                                                                     jp3 

18:15 Patient moved to CT.                                                                    nj  

18:19 CT Abd/Pelvis - W/Contrast In Process Unspecified.                                      EDMS

18:20 CT completed. Patient tolerated procedure well. Patient moved back from CT.             nj  

18:50 No provider procedures requiring assistance completed. IV discontinued, bleeding        rv  

      controlled, No redness/swelling at site. Pressure dressing applied.                         

                                                                                                  

Administered Medications:                                                                         

16:18 Drug: NS 0.9% 1000 ml Route: IV; Rate: 1 bolus; Site: left forearm;                     rv  

18:54 Follow up: Response: No adverse reaction; IV Status: Completed infusion                 rv  

16:18 Drug: Bentyl 20 mg Route: PO;                                                           rv  

18:54 Follow up: Response: No adverse reaction; Pain is unchanged, physician notified         rv  

16:19 Drug: Insulin Regular Human 5 units {Co-Signature: kr2 (Es Reyes RN).} Route: IVP;  rv  

      Site: left forearm;                                                                         

18:54 Follow up: Response: No adverse reaction                                                rv  

17:17 Not Given (Patient Refused): TORadol 30 mg IVP once                                     rv  

18:27 CANCELLED (Physician Discretion): Insulin Regular Human 10 units IVP once               cp  

18:27 Not Given (Physician Discretion): NS 0.9% 1000 ml IV at 1 bolus Per protocol; 1000 mL   cp  

      bolus                                                                                       

18:49 Drug: Potassium Effervescent Tablet 25 mEq Route: PO;                                   rv  

18:49 Follow up: Response: Medication administered at discharge.                              rv  

                                                                                                  

                                                                                                  

Point of Care Testing:                                                                            

      Blood Glucose:                                                                              

15:11 Blood Glucose: 328 mg/dL;                                                               rv  

16:57 Blood Glucose: 156 mg/dL;                                                               rv  

      Ranges:                                                                                     

                                                                                                  

Outcome:                                                                                          

18:42 Discharge ordered by MD.                                                                cp  

18:50 Discharged to home ambulatory.                                                          rv  

18:50 Condition: good                                                                             

18:50 Discharge instructions given to patient, Instructed on discharge instructions, follow       

      up and referral plans. medication usage, Prescriptions given X 2.                           

18:51 Patient left the ED.                                                                    rv  

18:55 Patient left the ED.                                                                    rv  

                                                                                                  

Signatures:                                                                                       

Dispatcher [employer]                           EDMS                                                 

Citlalli Emmanuel                               1                                                  

Roxane Mcleod, RN                      RN   ss                                                   

Sukhwinder Fernandez PA PA cp Jordan, Nathan nj Billeau, Sheri                               sb2                                                  

Tati Colvin                              3                                                  

Breezy Reinoso, RN                    RN   rv                                                   

Justin Joiner                              3                                                  

Es Reyes RN                              kr2                                                  

                                                                                                  

**************************************************************************************************

## 2018-09-26 ENCOUNTER — HOSPITAL ENCOUNTER (EMERGENCY)
Dept: HOSPITAL 97 - ER | Age: 58
Discharge: LEFT BEFORE DISCHARGE | End: 2018-09-26
Payer: COMMERCIAL

## 2018-09-26 DIAGNOSIS — Z88.3: ICD-10-CM

## 2018-09-26 DIAGNOSIS — Z88.5: ICD-10-CM

## 2018-09-26 DIAGNOSIS — R07.9: Primary | ICD-10-CM

## 2018-09-26 DIAGNOSIS — Z90.13: ICD-10-CM

## 2018-09-26 DIAGNOSIS — Z85.3: ICD-10-CM

## 2018-09-26 LAB
BUN BLD-MCNC: 13 MG/DL (ref 7–18)
GLUCOSE SERPLBLD-MCNC: 533 MG/DL (ref 74–106)
HCT VFR BLD CALC: 42.5 % (ref 36–45)
LYMPHOCYTES # SPEC AUTO: 1.8 K/UL (ref 0.7–4.9)
MCH RBC QN AUTO: 26.9 PG (ref 27–35)
MCV RBC: 80.2 FL (ref 80–100)
PMV BLD: 8 FL (ref 7.6–11.3)
POTASSIUM SERPL-SCNC: 3.9 MMOL/L (ref 3.5–5.1)
RBC # BLD: 5.3 M/UL (ref 3.86–4.86)
TROPONIN (EMERG DEPT USE ONLY): < 0.02 NG/ML (ref 0–0.04)

## 2018-09-26 PROCEDURE — 84484 ASSAY OF TROPONIN QUANT: CPT

## 2018-09-26 PROCEDURE — 51702 INSERT TEMP BLADDER CATH: CPT

## 2018-09-26 PROCEDURE — 96361 HYDRATE IV INFUSION ADD-ON: CPT

## 2018-09-26 PROCEDURE — 85025 COMPLETE CBC W/AUTO DIFF WBC: CPT

## 2018-09-26 PROCEDURE — 96375 TX/PRO/DX INJ NEW DRUG ADDON: CPT

## 2018-09-26 PROCEDURE — 96374 THER/PROPH/DIAG INJ IV PUSH: CPT

## 2018-09-26 PROCEDURE — 82962 GLUCOSE BLOOD TEST: CPT

## 2018-09-26 PROCEDURE — 71045 X-RAY EXAM CHEST 1 VIEW: CPT

## 2018-09-26 PROCEDURE — 93005 ELECTROCARDIOGRAM TRACING: CPT

## 2018-09-26 PROCEDURE — 82010 KETONE BODYS QUAN: CPT

## 2018-09-26 PROCEDURE — 80048 BASIC METABOLIC PNL TOTAL CA: CPT

## 2018-09-26 PROCEDURE — 99285 EMERGENCY DEPT VISIT HI MDM: CPT

## 2018-09-26 PROCEDURE — 36415 COLL VENOUS BLD VENIPUNCTURE: CPT

## 2018-09-26 PROCEDURE — 96372 THER/PROPH/DIAG INJ SC/IM: CPT

## 2018-09-26 NOTE — EDPHYS
Physician Documentation                                                                           

 [facility]                                                                

Name: Deborah Alatorre                                                                              

Age: 57 yrs                                                                                       

Sex: Female                                                                                       

: 1960                                                                                   

MRN: [id number]                                                                                   

Arrival Date: 2018                                                                          

Time: 11:48                                                                                       

Account#: [id number]                                                                            

Bed 3                                                                                             

Private MD:                                                                                       

ED Physician Manjeet Khanna                                                                         

HPI:                                                                                              

                                                                                             

11:50 This 57 yrs old  Female presents to ER via [facility] with complaints of chest rn  

      pain, shaking.                                                                              

11:50 The patient or guardian reports chest pain that is located primarily in the chest       rn  

      diffusely. Onset: 2 hour(s) ago. The pain does not radiate. Associated signs and            

      symptoms: Pertinent positives: None. Pertinent negatives: abdominal pain, cough,            

      diaphoresis, near syncope, palpitations, shortness of breath, syncope, vomiting. The        

      chest pain is described as sharp. Duration: The patient or guardian reports a single        

      episode, that is still ongoing. Severity of pain: At its worst the pain was moderate in     

      the emergency department the pain is unchanged. It is unknown whether or not the            

      patient has had similar symptoms in the past. Reports chest pain that began 2 hours         

      ago, no recent procedures, no fever/cough/sob/abd pain, no vomiting/diarrhea. Reports       

      known hx of liver problems/cancer, and has had hepatic encephalopathy in past. .            

                                                                                                  

Historical:                                                                                       

- Allergies:                                                                                      

14:02 Darvocet-N 100;                                                                         sv  

14:02 Erythromycin;                                                                           sv  

14:02 Toradol;                                                                                sv  

14:02 tramadol;                                                                               sv  

14:02 Zithromax;                                                                              sv  

14:02 Demerol;                                                                                sv  

- PMHx:                                                                                           

14:02 Anxiety; Cancer, Breast; Chronic pain; Colitis; COPD; Depression; Diabetes - IDDM;      sv  

      MUSCLE WEAKNESS;                                                                            

- PSHx:                                                                                           

14:02 Appendectomy; Hysterectomy; Cholecystectomy; ; bilateral mastectomy; breast    sv  

      reconstructive surgery; Liver Ablation;                                                     

                                                                                                  

- Immunization history:: Adult Immunizations unknown.                                             

- Family history:: not pertinent.                                                                 

- Ebola Screening: : No symptoms or risks identified at this time.                                

- Social history:: Smoking status: unknown.                                                       

- Hospitalizations: : No recent hospitalization is reported.                                      

                                                                                                  

                                                                                                  

ROS:                                                                                              

11:50 Constitutional: Negative for fever, chills, and weight loss, Eyes: Negative for injury, rn  

      pain, redness, and discharge, Neck: Negative for injury, pain, and swelling,                

      Cardiovascular: Negative for edema Respiratory: Negative for shortness of breath,           

      cough, wheezing, and pleuritic chest pain, Abdomen/GI: Negative for abdominal pain,         

      nausea, vomiting, diarrhea, and constipation, Back: Negative for injury and pain,           

      MS/Extremity: Negative for injury and deformity, Skin: Negative for injury, rash, and       

      discoloration, Neuro: Negative for headache, numbness, tingling, and seizure.               

                                                                                                  

Exam:                                                                                             

11:50 Constitutional:  This is a well developed, well nourished patient who is awake, alert,  rn  

      shaking all 4 extremities but wide awake, speaking, and seems able to control shaking       

      at times.  Head/Face:  Normocephalic, atraumatic. Eyes:  Pupils equal round and             

      reactive to light, extra-ocular motions intact.  Lids and lashes normal.  Conjunctiva       

      and sclera are non-icteric and not injected.  Cornea within normal limits.  Periorbital     

      areas with no swelling, redness, or edema. ENT:  MMM, no stridor Cardiovascular:            

      Regular rate and rhythm with a normal S1 and S2.  No gallops, murmurs, or rubs.  Normal     

      PMI, no JVD.  No pulse deficits. Respiratory:  Lungs have equal breath sounds               

      bilaterally, clear to auscultation and percussion.  No rales, rhonchi or wheezes noted.     

       No increased work of breathing, no retractions or nasal flaring. Abdomen/GI:  Soft,        

      non-tender, with normal bowel sounds.  No distension or tympany.  No guarding or            

      rebound.  No evidence of tenderness throughout. Skin:  Warm, dry, and no evidence of        

      cellulitis. MS/ Extremity:  Pulses equal, no cyanosis.  Neurovascular intact.  Full,        

      normal range of motion.  Equal circumference. Neuro:  Awake and alert, GCS 15, oriented     

      to person, place, time, and situation.  Cranial nerves II-XII grossly intact.  Motor        

      strength 5/5 in all extremities.  Sensory grossly intact.                                   

                                                                                                  

Vital Signs:                                                                                      

12:13  / 97; Pulse 116; Resp 24; Pulse Ox 99% on R/A; Pain 8/10;                        sv  

13:00  / 98; Pulse 98; Resp 16; Pulse Ox 96% on R/A;                                    sv  

14:00  / 98; Pulse 96; Resp 20; Pulse Ox 96% on R/A;                                    dh3 

                                                                                                  

MDM:                                                                                              

11:48 Patient medically screened.                                                             rn  

14:16 Differential diagnosis: acute myocardial infarction, coronary artery disease            rn  

      costochondritis, pleurisy, pneumonia, pneumothorax, hyperglycemia, DKA. Data reviewed:      

      vital signs, nurses notes, lab test result(s), radiologic studies, plain films, and as      

      a result, I will admit patient. Counseling: I had a detailed discussion with the            

      patient and/or guardian regarding: the historical points, exam findings, and any            

      diagnostic results supporting the discharge/admit diagnosis, lab results, radiology         

      results, the need for further work-up and treatment in the hospital. Response to            

      treatment: the patient's symptoms have mildly improved after treatment, and as a            

      result, I will admit patient. Admission orders: after a detailed discussion of the          

      patient's condition and case, the admit orders are written by me. ED course: Pt             

      admitted to [facility] for hyperglycemia, acidosis, and chest pain, improved glucose,        

      ketones still pending. .                                                                    

                                                                                                  

                                                                                             

11:50 Order name: CBC with Diff; Complete Time: 12:40                                         rn  

                                                                                             

11:50 Order name: Basic Metabolic Panel; Complete Time: 12:55                                 rn  

                                                                                             

11:50 Order name: Troponin (emerg Dept Use Only); Complete Time: 12:55                        rn  

                                                                                             

13:01 Order name: Acetone, Serum                                                              bd  

                                                                                             

14:02 Order name: Glucose, Ancillary Testing; Complete Time: 14:16                            [facility]

                                                                                             

14:02 Order name: Glucose, Ancillary Testing; Complete Time: 14:16                            [facility]

                                                                                             

11:50 Order name: IV Start; Complete Time: 12:21                                              rn  

                                                                                             

11:50 Order name: EKG; Complete Time: 11:50                                                   rn  

                                                                                             

11:50 Order name: XRAY Chest (1 view); Complete Time: 13:39                                   rn  

                                                                                             

11:50 Order name: EKG - Nurse/Tech; Complete Time: 13:23                                      rn  

                                                                                                  

Administered Medications:                                                                         

12:20 Drug: NS 0.9% 500 ml Route: IV; Rate: bolus; Site: right forearm;                       sv  

13:00 Follow up: Response: No adverse reaction; IV Status: Completed infusion; IV Intake:     sv  

      500ml                                                                                       

13:00 Follow up: Response: No adverse reaction; IV Status: Completed infusion; IV Intake:     sv  

      500ml                                                                                       

12:21 Drug: Demerol 25 mg Route: IVP; Site: right forearm;                                    sv  

12:30 Follow up: Response: No adverse reaction; No change in condition                        sv  

13:13 Drug: Insulin Regular Human 10 units {Co-Signature: raymond (Lauren Luu RN).} Route:    hb  

      Sub-Q; Site: right upper arm;                                                               

14:00 Follow up: Response: No adverse reaction; Blood sugar is lowered                        sv  

13:14 Drug: Insulin Regular Human 5 units {Co-Signature: ramyond (Lauren Luu RN).} Route:     hb  

      IVP; Site: right forearm;                                                                   

14:00 Follow up: Response: No adverse reaction                                                sv  

13:30 Drug: Demerol 25 mg Route: IVP; Site: right forearm;                                    sv  

14:00 Follow up: Response: No adverse reaction                                                sv  

                                                                                                  

                                                                                                  

Point of Care Testing:                                                                            

      Blood Glucose:                                                                              

14:01 Blood Glucose: 305 mg/dL;                                                               sv  

      Ranges:                                                                                     

      Critical Glucose Levels:Adult <50 mg/dl or >400 mg/dl  <40 mg/dl or >180 mg/dl       

Disposition:                                                                                      

18 14:48 Patient has left against medical advice. - Patients states they are going to       

  Home.                                                                                           

- Condition is Stable.                                                                            

                                                                                                  

                                                                                                  

                                                                                                  

                                                                                                  

                                                                                                  

                                                                                                  

                                                                                                  

Signatures:                                                                                       

Dispatcher [employer]                           Lauren Dimas RN RN   sv                                                   

Manjeet Khanna MD MD rn Baxter, Heather, RN RN                                                      

Lauren Luu RN                                                                              

                                                                                                  

Corrections: (The following items were deleted from the chart)                                    

14:48 14:18 Hospitalization Ordered by Rob Zuluaga MD for Observation. Preliminary diagnosis sv  

      is Chest pain, unspecified; Hyperglycemia, unspecified; Dehydration. Bed requested for      

      Telemetry/MedSurg (observation). Status is Observation. Condition is Stable. Problem is     

      new. Symptoms have improved. UTI on Admission? No. rn                                       

                                                                                                  

**************************************************************************************************

## 2018-09-26 NOTE — EKG
Test Date:    2018-09-26               Test Time:    12:29:33

Technician:   BLADIMIR                                   

                                                     

MEASUREMENT RESULTS:                                       

Intervals:                                           

Rate:         111                                    

OH:           142                                    

QRSD:         70                                     

QT:           376                                    

QTc:          511                                    

Axis:                                                

P:            76                                     

OH:           142                                    

QRS:          75                                     

T:            65                                     

                                                     

INTERPRETIVE STATEMENTS:                                       

                                                     

Sinus tachycardia

Otherwise normal ECG

Compared to ECG 08/30/2018 15:52:16

Sinus rhythm no longer present

Left bundle-branch block no longer present

Left ventricular hypertrophy no longer present

Prolonged QT interval no longer present



Electronically Signed On 09-26-18 13:29:07 CDT by Louis Grullon

## 2018-09-26 NOTE — RAD REPORT
EXAM DESCRIPTION:  Chanel Single View9/26/2018 1:23 pm

 

CLINICAL HISTORY:  Chest pain

 

COMPARISON:  April 2018

 

FINDINGS:   The lungs appear clear of acute infiltrate. The heart is normal size

 

IMPRESSION:   No acute abnormalities displayed

## 2018-09-26 NOTE — XMS REPORT
Patient Summary Document

 Created on:2018



Patient:VALENTE GROVER

Sex:Female

:1960

External Reference #:[id number]





Demographics







 Address  [address] [place] [address]

 

 Home Phone  [phone]

 

 Email Address  NONE

 

 Preferred Language  Unknown

 

 Marital Status  Unknown

 

 Gnosticism Affiliation  Unknown

 

 Race  Unknown

 

 Additional Race(s)  Unavailable

 

 Ethnic Group  Unknown









Author







 Organization  [facility]

 

 Address  [address] [address]

 

 Phone  [phone]









Care Team Providers







 Name  Role  Phone

 

 ROSE KITCHEN  Unavailable  Unavailable

 

 SMITHA MCMAHAN  Unavailable  Unavailable









Problems

This patient has no known problems.



Allergies, Adverse Reactions, Alerts

This patient has no known allergies or adverse reactions.



Medications

This patient has no known medications.



Results







 Test Description  Test Time  Test Comments  Text Results  Atomic Results  
Result Comments









 Stress Test -       [address]  



 Treadmill ONLY      [place], [place] [address]    Patient Name :  



       VALENTE GROVER         MR #: [id number]   : 1960  



         Age/Sex: 57/F      Acct # : [id number]           Adm  



       Physician  : ROSE KITCHEN MD       Admit Date  :  18  



       Location : [facility]    Room/Bed : [place] [address]  



       _______________________________________________________________  



       ____________________________________     REPORT: Cardiology  



       Report       DATE OF STUDY:  2018       LEXISCAN  



       MYOVIEW      The patient had resting perfusion images after an  



       injection of 11    millicuries of technetium-99m Myoview.  



       Later, due to inability to    exercise, she was given Lexiscan  



       0.4 mg intravenously, and shortly    afterwards 33 millicuries  



       of technetium-99m Myoview.  Perfusion images were    taken by  



       rotational tomography.      Comparison resting and Lexiscan  



       stress images show no evidence of any    perfusion defect.  



       Uptake is smooth and regular throughout.  No suggestion    of  



       any scar or any ischemia.  Additionally, gaited wall motion  



       images were    obtained and calculated ejection fraction normal  



       at 54% without regional    wall motion abnormality.  



       FINAL IMPRESSION    1.  Normal Lexiscan Myoview for perfusion.  



        2.  Normal left ventricular function, calculated ejection  



       fraction 54%.                 DD:  2018 16:58   DT:  



       2018 18:08   Job#:  [id number]      cc:   ROSE KITCHEN MD  



                Signature  



                            Date                           Dictated  



       By: OLIVIA ESTRADA MD  Transcribed By: SMEDS on 18  



       <Electronically signed by OLIVIA ESTRADA MD><<Signature on  



       File>&gt;18 1416    COPY TO:  

 

 CT CHEST WO          [place] [address]      Patient Name:  



       VALENTE GROVER   MR #: [id number]    : 1960 Age/Sex:  



       57/F  Acct #: [id number] Req #: [id number]  Adm Physician:  



       ROSE KITCHEN MD    Ordered by: ROSE KITCHEN MD  Report #:  



       [id number]   Location: [facility]  Room/Bed: [place] [address]  



       _______________________________________________________________  



       ____________________________________    Procedure: [id number]  



       CT/CT CHEST WO  Exam Date: 18  



       Exam Time: 1115       REPORT STATUS: Signed    PROCEDURE: CT  



       CHEST WITHOUT CONTRAST   CT scan of the chest WITHOUT  



       intravenous contrast, using standard    protocol.  



       TECHNIQUE:   The chest was scanned utilizing a multidetector  



       helical scanner from    the apex to the level of the adrenal  



       glands.  No IV contrast was    administered as per physician  



       request.  Coronal and sagittal    multiplanar reformations were  



       obtained.       COMPARISON: None.       INDICATIONS:   CHEST  



       PAIN           FINDINGS:   Lines/tubes:  None.       Lungs and  



       Airways:  The lungs and airways are normal with no focal  



       abnormality demonstrated. Right upper lobe scarring. Bibasilar  



         dependent atelectasis.       Pleura: The pleural spaces are  



       clear.       Heart and mediastinum:  The thyroid gland is  



       normal. No significant    mediastinal, hilar or axillary  



       lymphadenopathy is seen. The heart and    pericardium are  



       within normal limits.       Soft tissues: Normal.  



       Abdomen: Nodular contour of the liver. The spleen is enlarged,  



         measuring 17 cm in AP length. Multiple splenic and esophageal  



       varices    are partially visualized. Trace ascites is present  



       in the right upper    quadrant. The adrenal glands are normal.  



            Bones: The visualized bony thorax is within normal limits.  



              IMPRESSION:        1. No acute abnormality of the chest.  



          2. Hepatic parenchymal appearance consistent with cirrhotic  



       morphology.        3. Splenomegaly and varices consistent with  



       portal hypertension.           Dictated by:  Rose Newton M.D.  



       on 2018 at 13:18        Electronically approved by:  Rose Newton M.D. on 2018 at 13:18                Dictated By:  



       ROSE NEWTON MD  Electronically Signed By: ROSE NEWTON MD on  



       18  Transcribed By: PILAR on 18  



       COPY TO:   ROSE KITCHEN MD  

 

 ABDOMEN ACUTE          [address]  



 SERIES W/PA CXR      [place] [address]      Patient Name:  



       VALENTE GROVER   MR #: [id number]    : 1960 Age/Sex:  



       56/F  Acct #: [id number] Req #: [id number]  Adm Physician:  



        Ordered by: SMITHA MCMAHAN MD  Report #: [id number]  



       Location: ER  Room/Bed:  



       _______________________________________________________________  



       ____________________________________    Procedure: [id number]  



       DX/ABDOMEN ACUTE SERIES W/PA CXR  Exam Date: 10/06/17  



                        Exam Time: 0210       REPORT STATUS: Signed  



       EXAM: ABDOMEN ACUTE SERIES W/PA CXR, supine and erect views of  



       the abdomen, AP   view of the chest   DATE: 10/6/2017 1:29 AM  



       Time stamp on exam: 0155 hours   INDICATION: Abdominal pain  



       COMPARISON: CT of the abdomen and pelvis 2017  



       FINDINGS:   LINES/TUBES: None      LUNGS: No consolidations or  



       edema.       PLEURA: No effusions or pneumothorax.      HEART  



       AND MEDIASTINUM: Normal size and contour.      BOWEL PATTERN:  



       Non-obstructed bowel gas pattern.      BONES AND SOFT TISSUES:  



       No acute bone findings. No abnormal calcifications. No   mass  



       effect. Bilateral chest surgical clips. Surgical clips right  



       upper   quadrant of the abdomen.      IMPRESSION:   No acute  



       thoracic abnormality.      No evidence for bowel obstruction.  



                   Signed by: Dr. Akiko Mehta M.D. on 10/6/2017  



       2:40 AM        Dictated By: AKIKO MEHTA MD  Electronically  



       Signed By: AKIKO MEHTA MD on 10/06/17 0240  Transcribed By:  



       CLIFFORD on 10/06/17 0240       COPY TO:   SMITHA MCMAHAN MD

## 2018-09-26 NOTE — XMS REPORT
Clinical Summary

 Created on:2018



Patient:Deborah Alatorre

Sex:Female

:1960

External Reference #:[id number]





Demographics







 Address  [address] [place] [address]

 

 Home Phone  [phone]

 

 Mobile Phone  [phone]

 

 Preferred Language  English

 

 Marital Status  Unknown

 

 Church Affiliation  Unknown

 

 Race  White

 

 Ethnic Group  Not  or 









Author







 Organization  [facility] [facility]

 

 Address  [address] [place] [address]

 

 Phone  [phone]









Support







 Name  Relationship  Address  Phone

 

 Unavailable  Unavailable  Unavailable  [phone]









Care Team Providers







 Name  Role  Phone

 

 Unavailable  Primary Care Provider  Unavailable









Allergies

Not on File



Current Medications

Not on file



Active Problems

Not on file



Social History







 Tobacco Use  Types  Packs/Day  Years Used  Date

 

 Never Assessed        









 Sex Assigned at Birth  Date Recorded

 

 Not on file  







Last Filed Vital Signs

Not on file



Plan of Treatment

Not on file



Results

Not on fileafter 2017

## 2018-09-26 NOTE — ER
Nurse's Notes                                                                                     

 [facility]                                                                

Name: Deborah Alatorre                                                                              

Age: 57 yrs                                                                                       

Sex: Female                                                                                       

: 1960                                                                                   

MRN: [id number]                                                                                   

Arrival Date: 2018                                                                          

Time: 11:48                                                                                       

Account#: [id number]                                                                            

Bed 3                                                                                             

Private MD:                                                                                       

Diagnosis:                                                                                        

                                                                                                  

Presentation:                                                                                     

                                                                                             

11:45 Presenting complaint: Patient states: chest pain, was reported by Ann Marie ARRIAGA, triage       sv  

      nurse, that pt signed in and was placed in a wheelchair and started "shaking." Pt was       

      brought back to ER #3 in a wheelchair and was talking and shaking. Pt able to get           

      herself up from the wheelchair and to the stretcher with 1 person assist. Transition of     

      care: patient was not received from another setting of care. Onset of symptoms is           

      unknown. Risk Assessment: Do you want to hurt yourself or someone else? Patient reports     

      no desire to harm self or others. Initial Sepsis Screen: Does the patient meet any 2        

      criteria? No. Patient's initial sepsis screen is negative. Does the patient have a          

      suspected source of infection? No. Patient's initial sepsis screen is negative. Care        

      prior to arrival: None.                                                                     

11:45 Method Of Arrival: Wheelchair                                                           sv  

11:45 Acuity: CASEY 2                                                                           sv  

                                                                                                  

Triage Assessment:                                                                                

11:45 General: Appears distressed, Behavior is cooperative, shaking. Pain: Complains of pain  sv  

      in chest Quality of pain is described as unable to assess Pain began unknown Noted to       

      be restless, shaking. EENT: No signs and/or symptoms were reported regarding the EENT       

      system. Neuro: Level of Consciousness is awake, alert, obeys commands, Oriented to          

      person, Speech Pt able to answer questions. Cardiovascular: Patient's skin is warm and      

      dry. Rhythm is sinus tachycardia. Respiratory: Respiratory effort is even, labored,         

      Respiratory pattern is tachypnea. Derm: Skin is normal.                                     

                                                                                                  

Historical:                                                                                       

- Allergies:                                                                                      

14:02 Darvocet-N 100;                                                                         sv  

14:02 Erythromycin;                                                                           sv  

14:02 Toradol;                                                                                sv  

14:02 tramadol;                                                                               sv  

14:02 Zithromax;                                                                              sv  

14:02 Demerol;                                                                                sv  

- PMHx:                                                                                           

14:02 Anxiety; Cancer, Breast; Chronic pain; Colitis; COPD; Depression; Diabetes - IDDM;      sv  

      MUSCLE WEAKNESS;                                                                            

- PSHx:                                                                                           

14:02 Appendectomy; Hysterectomy; Cholecystectomy; ; bilateral mastectomy; breast    sv  

      reconstructive surgery; Liver Ablation;                                                     

                                                                                                  

- Immunization history:: Adult Immunizations unknown.                                             

- Family history:: not pertinent.                                                                 

- Ebola Screening: : No symptoms or risks identified at this time.                                

- Social history:: Smoking status: unknown.                                                       

- Hospitalizations: : No recent hospitalization is reported.                                      

                                                                                                  

                                                                                                  

Screenin:20 Abuse screen: Denies threats or abuse. Denies injuries from another. Nutritional        sv  

      screening: No deficits noted. Tuberculosis screening: No symptoms or risk factors           

      identified. Fall Risk No fall in past 12 months (0 pts). No secondary diagnosis (0          

      pts). IV access (20 points). Ambulatory Aid- None/Bed Rest/Nurse Assist (0 pts). Gait-      

      Impaired (20 pts.). Mental Status- Overestimates/Forgets Limitations (15 pts.). Total       

      Toribio Fall Scale indicates High Risk Score (45 or more points). Fall prevention             

      measures have been instituted. Side Rails Up X 2 Placed Close to Nursing Station            

      Frequent Obs/Assessments Occuring As available patient and family educated on Fall          

      Prevention Program and Strategies.                                                          

                                                                                                  

Assessment:                                                                                       

12:20 Reassessment: Patient appears in no apparent distress at this time. Patient and/or      sv  

      family updated on plan of care and expected duration. Pain level reassessed. Patient is     

      alert, oriented x 3, equal unlabored respirations, skin warm/dry/pink.                      

13:13 Reassessment: Patient appears in no apparent distress at this time. Patient and/or      sv  

      family updated on plan of care and expected duration. Pain level reassessed. Patient is     

      alert, oriented x 3, equal unlabored respirations, skin warm/dry/pink. Patient states       

      feeling better. Patient states symptoms have improved.                                      

14:20 Reassessment: Pt stated that she did not want to stay and be admitted, she said that    sv  

      she has an appt at [facility] for treatment that she cannot miss. Informed pt the          

      importance of staying for further evaluation. Pt stated that she cannot change her          

      appt. Informed Dr Khanna. Pt to sign AMA form.                                               

                                                                                                  

Vital Signs:                                                                                      

12:13  / 97; Pulse 116; Resp 24; Pulse Ox 99% on R/A; Pain 8/10;                        sv  

13:00  / 98; Pulse 98; Resp 16; Pulse Ox 96% on R/A;                                    sv  

14:00  / 98; Pulse 96; Resp 20; Pulse Ox 96% on R/A;                                    dh3 

                                                                                                  

ED Course:                                                                                        

11:45 Missed attempt(s): 22 gauge in left upper arm. Bleeding controlled, band aid applied,   sv  

      catheter tip intact.                                                                        

11:45 Patient has correct armband on for positive identification. Placed in gown. Bed in low  sv  

      position. Side rails up X2. Seizure precautions initiated. Cardiac monitor on. Pulse ox     

      on. NIBP on. Door closed. Warm blanket given. Head of bed elevated.                         

11:45 Arm band placed on right wrist. Patient placed in an exam room, on a stretcher, in view sv  

      of staff members, on cardiac monitor, on pulse oximetry.                                    

11:48 Patient arrived in ED.                                                                  rn  

11:48 Manjeet Khanna MD is Attending Physician.                                                rn  

11:50 Missed attempt(s): 20 gauge in right antecubital area. Bleeding controlled, band aid    sv  

      applied, catheter tip intact.                                                               

12:00 Initial lab(s) drawn, by me, sent to lab. Inserted saline lock: 20 gauge in right       sv  

      forearm, using aseptic technique. Blood collected. Flushed right forearm with 5 ml          

      normal saline.                                                                              

12:20 Lauren Luu RN is Primary Nurse.                                                  sv  

12:20 Magaña cath inserted, using sterile technique, 16 Fr., by me, balloon inflated, to       sv  

      gravity drainage, returned clear yellow urine. Patient tolerated poorly.                    

12:25 Triage completed.                                                                       sv  

12:39 EKG done, by EKG tech. reviewed by Manjeet Khanna MD.                                      at1 

13:23 XRAY Chest (1 view) In Process Unspecified.                                             EDMS

13:23 X-ray completed. Portable x-ray completed in exam room. Patient tolerated procedure     sw  

      well.                                                                                       

14:18 Rob Zuluaga MD is Hospitalizing Provider.                                            rn  

14:23 Urine collected: Magaña catheter specimen, clear, Amount Returned: 200mL.                3 

14:45 No provider procedures requiring assistance completed. IV discontinued, intact,         sv  

      bleeding controlled, No redness/swelling at site. Pressure dressing applied.                

14:46 Magaña cath removed intact, balloon deflated.                                            sv  

                                                                                                  

Administered Medications:                                                                         

12:20 Drug: NS 0.9% 500 ml Route: IV; Rate: bolus; Site: right forearm;                       sv  

13:00 Follow up: Response: No adverse reaction; IV Status: Completed infusion; IV Intake:     sv  

      500ml                                                                                       

13:00 Follow up: Response: No adverse reaction; IV Status: Completed infusion; IV Intake:     sv  

      500ml                                                                                       

12:21 Drug: Demerol 25 mg Route: IVP; Site: right forearm;                                    sv  

12:30 Follow up: Response: No adverse reaction; No change in condition                        sv  

13:13 Drug: Insulin Regular Human 10 units {Co-Signature: raymond (Lauren Luu RN).} Route:    hb  

      Sub-Q; Site: right upper arm;                                                               

14:00 Follow up: Response: No adverse reaction; Blood sugar is lowered                        sv  

13:14 Drug: Insulin Regular Human 5 units {Co-Signature: sv (Lauren Luu RN).} Route:     hb  

      IVP; Site: right forearm;                                                                   

14:00 Follow up: Response: No adverse reaction                                                sv  

13:30 Drug: Demerol 25 mg Route: IVP; Site: right forearm;                                    sv  

14:00 Follow up: Response: No adverse reaction                                                sv  

                                                                                                  

                                                                                                  

Point of Care Testing:                                                                            

      Blood Glucose:                                                                              

14:01 Blood Glucose: 305 mg/dL;                                                               sv  

      Ranges:                                                                                     

                                                                                                  

Intake:                                                                                           

13:00 IV: 500ml; Total: 500ml.                                                                sv  

13:00 IV: 500ml; Total: 1000ml.                                                               sv  

                                                                                                  

Outcome:                                                                                          

14:18 Decision to Hospitalize by Provider.                                                    rn  

14:47 AMA AMA form signed                                                                     sv  

14:47 Condition: stable                                                                           

14:48 Patient left the ED.                                                                    sv  

                                                                                                  

Signatures:                                                                                       

Dispatcher [employer]                           Lauren Dimas, RN                    BART   sv                                                   

Manjeet Khanna MD MD rn Gonzales, Amanda, EKG Tech              EKG Tat1                                                  

Heide Rees Heather, RN RN hb Herrera, Deanna                              3                                                  

Laruen Luu RN                           sv                                                   

                                                                                                  

Corrections: (The following items were deleted from the chart)                                    

15:29 14:00 Blood Glucose: Blood Glucose Aatghjb=811 mg/dL. sv                                sv  

                                                                                                  

**************************************************************************************************

## 2019-01-18 ENCOUNTER — HOSPITAL ENCOUNTER (EMERGENCY)
Dept: HOSPITAL 97 - ER | Age: 59
Discharge: HOME | End: 2019-01-18
Payer: COMMERCIAL

## 2019-01-18 DIAGNOSIS — Z85.05: ICD-10-CM

## 2019-01-18 DIAGNOSIS — Z85.3: ICD-10-CM

## 2019-01-18 DIAGNOSIS — R10.30: Primary | ICD-10-CM

## 2019-01-18 DIAGNOSIS — Z88.5: ICD-10-CM

## 2019-01-18 DIAGNOSIS — Z88.3: ICD-10-CM

## 2019-01-18 NOTE — EDPHYS
Physician Documentation                                                                           

 [facility]                                                                

Name: Deborah Alatorre                                                                              

Age: 58 yrs                                                                                       

Sex: Female                                                                                       

: 1960                                                                                   

MRN: [id number]                                                                                   

Arrival Date: 2019                                                                          

Time: 17:18                                                                                       

Account#: [id number]                                                                            

Bed 25                                                                                            

Private MD:                                                                                       

ED Physician Manjeet Khanna                                                                         

HPI:                                                                                              

                                                                                             

18:08 This 58 yrs old  Female presents to ER via Ambulatory with complaints of Lump  jmm 

      on abd.                                                                                     

18:08 The patient presents with abdominal pain in the lower abdomen. Onset: The               jmm 

      symptoms/episode began/occurred gradually, 4 day(s) ago. The symptoms do not radiate.       

      Associated signs and symptoms: Pertinent positives: nausea, Pertinent negatives: fever.     

      This is a 58 year old female with a history of colitis, breast cancer presents to the       

      ED with complains of abdominal swelling, pain. Patient states her abdoment feel like it     

      is torn inside. CT was performed today by Dr. Hewitt. Denies vomiting or diarrhea.         

      Denies fever. Complains of nausea. .                                                        

                                                                                                  

Historical:                                                                                       

- Allergies:                                                                                      

17:23 Darvocet-N 100;                                                                         sv  

17:23 Demerol;                                                                                sv  

17:23 Erythromycin;                                                                           sv  

17:23 Toradol;                                                                                sv  

17:23 tramadol;                                                                               sv  

17:23 Zithromax;                                                                              sv  

- PMHx:                                                                                           

17:23 Anxiety; Cancer, Breast; Chronic pain; Colitis; COPD; Depression; Diabetes - IDDM;      sv  

      MUSCLE WEAKNESS; Liver cell carcinoma;                                                      

- PSHx:                                                                                           

17:23 Appendectomy; Hysterectomy; Cholecystectomy; ; bilateral mastectomy; breast    sv  

      reconstructive surgery; Liver Ablation;                                                     

                                                                                                  

- Immunization history:: Flu vaccine is not up to date.                                           

- Social history:: Smoking status: .                                                              

- Ebola Screening: : No symptoms or risks identified at this time.                                

                                                                                                  

                                                                                                  

ROS:                                                                                              

18:08 Constitutional: Negative for fever, chills, and weight loss, Cardiovascular: Negative   jmm 

      for chest pain, palpitations, and edema, Respiratory: Negative for shortness of breath,     

      cough, wheezing, and pleuritic chest pain.                                                  

18:08 Abdomen/GI: Positive for abdominal pain, nausea.                                            

18:08 All other systems are negative.                                                             

                                                                                                  

Exam:                                                                                             

18:08 Head/Face:  atraumatic. Eyes:  EOMI, no conjunctival erythema appreciated ENT:  Moist   jmm 

      Mucus Membranes Neck:  Trachea midline, Supple Chest/axilla:  Normal chest wall             

      appearance and motion.   Cardiovascular:  Regular rate and rhythm.  No edema                

      appreciated Respiratory:  Normal respirations, no respiratory distress appreciated          

18:08 Skin:  General appearance color normal MS/ Extremity:  Moves all extremities, no            

      obvious deformities appreciated, no edema noted to the lower extremities  Neuro:  Awake     

      and alert, normal gait Psych:  Behavior is normal, Mood is normal, Patient is               

      cooperative and pleasant                                                                    

18:08 Constitutional: The patient appears in no acute distress, alert, awake.                     

18:08 Abdomen/GI: Inspection: distension, that is moderate, Bowel sounds: normal, Palpation:      

      soft, in all quadrants, mild abdominal tenderness, in the left lower quadrant.              

                                                                                                  

Vital Signs:                                                                                      

17:22  / 94; Pulse 103; Resp 18; Temp 98.6; Pulse Ox 97% ; Height 5 ft. 4 in. (162.56   sv  

      cm); Pain 10/10;                                                                            

18:29  / 70; Pulse 90; Resp 18; Pulse Ox 100% on R/A; Pain 2/10;                        mg2 

                                                                                                  

MDM:                                                                                              

18:08 Patient medically screened.                                                             [facility] 

18:08 Data reviewed: vital signs, nurses notes, radiologic studies, CT scan. ED course: Ct    [facility] 

      abdomen was reviewed. no acute process is found. patient's abdomen is soft. i do not        

      suspect an acute process. patient is alert and non toxic in appearance. patient given       

      strict return precautions. .                                                                

                                                                                                  

Administered Medications:                                                                         

18:19 Not Given (Patient Refused): Zofran 4 mg IVP once; over 2 minutes                       tl3 

                                                                                                  

                                                                                                  

Disposition:                                                                                      

19:04 Co-signature as Attending Physician, Manjeet Khanna MD.                                    rn  

                                                                                                  

Disposition:                                                                                      

19 18:25 Discharged to Home. Impression: Unspecified abdominal pain.                        

- Condition is Stable.                                                                            

- Discharge Instructions: Abdominal Pain, Adult.                                                  

                                                                                                  

- Medication Reconciliation Form, Thank You Letter, Antibiotic Education, Prescription            

  Opioid Use form.                                                                                

- Follow up: Private Physician; When: 2 - 3 days; Reason: Recheck today's complaints,             

  Continuance of care, Re-evaluation by your physician.                                           

                                                                                                  

                                                                                                  

                                                                                                  

Signatures:                                                                                       

Lauren Luu, BART                    RN   Ricardo Grover PA PA jmm Nieto, Roman, MD MD rn Gardose, Michele, RN RN mg2 Lowrey, Tammy RN   tl3                                                  

                                                                                                  

Corrections: (The following items were deleted from the chart)                                    

18:19 18:09 Urine Dipstick-Ancillary ordered. [facility]                                             tl3 

18:36 18:25 2019 18:25 Discharged to Home. Impression: Unspecified abdominal pain.      mg2 

      Condition is Stable. Forms are Medication Reconciliation Form, Thank You Letter,            

      Antibiotic Education, Prescription Opioid Use. Follow up: Private Physician; When: 2 -      

      3 days; Reason: Recheck today's complaints, Continuance of care, Re-evaluation by your      

      physician. denisa                                                                              

                                                                                                  

**************************************************************************************************

## 2019-01-18 NOTE — XMS REPORT
Patient Summary Document

 Created on:2019



Patient:VALENTE GROVER

Sex:Female

:1960

External Reference #:[id number]





Demographics







 Address  [address] [place] [address]

 

 Home Phone  [phone]

 

 Preferred Language  Unknown

 

 Marital Status  Unknown

 

 Hoahaoism Affiliation  Unknown

 

 Race  Unknown

 

 Additional Race(s)  Unavailable

 

 Ethnic Group  Unknown









Author







 Organization  [facility]

 

 Address  [address] [address]

 

 Phone  [phone]









Care Team Providers







 Name  Role  Phone

 

 ROSE KITCHEN  Unavailable  Unavailable

 

 SMITHA MCMAHAN  Unavailable  Unavailable









Problems

This patient has no known problems.



Allergies, Adverse Reactions, Alerts

This patient has no known allergies or adverse reactions.



Medications

This patient has no known medications.



Results







 Test Description  Test Time  Test Comments  Text Results  Atomic Results  
Result Comments









 Stress Test -       [address]  



 Treadmill ONLY      [place] [address]    Patient Name :  



       VALENTE GROVER         MR #: [id number]   : 1960  



         Age/Sex: 57/F      Acct # : [id number]           Adm  



       Physician  : ROSE KITCHEN MD       Admit Date  :  18  



       Location : [facility]    Room/Bed : [place] [address]  



       _______________________________________________________________  



       ____________________________________     REPORT: Cardiology  



       Report       DATE OF STUDY:  2018       LEXISCAN  



       MYOVIEW      The patient had resting perfusion images after an  



       injection of 11    millicuries of technetium-99m Myoview.  



       Later, due to inability to    exercise, she was given Lexiscan  



       0.4 mg intravenously, and shortly    afterwards 33 millicuries  



       of technetium-99m Myoview.  Perfusion images were    taken by  



       rotational tomography.      Comparison resting and Lexiscan  



       stress images show no evidence of any    perfusion defect.  



       Uptake is smooth and regular throughout.  No suggestion    of  



       any scar or any ischemia.  Additionally, gaited wall motion  



       images were    obtained and calculated ejection fraction normal  



       at 54% without regional    wall motion abnormality.  



       FINAL IMPRESSION    1.  Normal Lexiscan Myoview for perfusion.  



        2.  Normal left ventricular function, calculated ejection  



       fraction 54%.                 DD:  2018 16:58   DT:  



       2018 18:08   Job#:  [id number]      cc:   ROSE KITCHEN MD  



                Signature  



                            Date                           Dictated  



       By: OLIVIA ESTRADA MD  Transcribed By: SMEDS on 18  



       <Electronically signed by OLIVIA ESTRADA MD><<Signature on  



       File>&gt;18 1416    COPY TO:  

 

 CT CHEST WO          [place] [address]      Patient Name:  



       VALENTE GROVER   MR #: [id number]    : 1960 Age/Sex:  



       57/F  Acct #: [id number] Req #: [id number]  Adm Physician:  



       ROSE KITCHEN MD    Ordered by: ROSE KITCHEN MD  Report #:  



       [id number]   Location: [facility]  Room/Bed: [place] [address]  



       _______________________________________________________________  



       ____________________________________    Procedure: [id number]  



       CT/CT CHEST WO  Exam Date: 18  



       Exam Time: 1115       REPORT STATUS: Signed    PROCEDURE: CT  



       CHEST WITHOUT CONTRAST   CT scan of the chest WITHOUT  



       intravenous contrast, using standard    protocol.  



       TECHNIQUE:   The chest was scanned utilizing a multidetector  



       helical scanner from    the apex to the level of the adrenal  



       glands.  No IV contrast was    administered as per physician  



       request.  Coronal and sagittal    multiplanar reformations were  



       obtained.       COMPARISON: None.       INDICATIONS:   CHEST  



       PAIN           FINDINGS:   Lines/tubes:  None.       Lungs and  



       Airways:  The lungs and airways are normal with no focal  



       abnormality demonstrated. Right upper lobe scarring. Bibasilar  



         dependent atelectasis.       Pleura: The pleural spaces are  



       clear.       Heart and mediastinum:  The thyroid gland is  



       normal. No significant    mediastinal, hilar or axillary  



       lymphadenopathy is seen. The heart and    pericardium are  



       within normal limits.       Soft tissues: Normal.  



       Abdomen: Nodular contour of the liver. The spleen is enlarged,  



         measuring 17 cm in AP length. Multiple splenic and esophageal  



       varices    are partially visualized. Trace ascites is present  



       in the right upper    quadrant. The adrenal glands are normal.  



            Bones: The visualized bony thorax is within normal limits.  



              IMPRESSION:        1. No acute abnormality of the chest.  



          2. Hepatic parenchymal appearance consistent with cirrhotic  



       morphology.        3. Splenomegaly and varices consistent with  



       portal hypertension.           Dictated by:  Rose Newton M.D.  



       on 2018 at 13:18        Electronically approved by:  Rose Newton M.D. on 2018 at 13:18                Dictated By:  



       ROSE NEWTON MD  Electronically Signed By: ROSE NEWTON MD on  



       18 1318  Transcribed By: PILAR on 18 1318  



       COPY TO:   ROSE KITCHEN MD  

 

 ABDOMEN ACUTE          [address]  



 SERIES W/PA CXR      [place] [address]      Patient Name:  



       VALENTE GROVER   MR #: [id number]    : 1960 Age/Sex:  



       56/F  Acct #: [id number] Req #: [id number]  Adm Physician:  



        Ordered by: SMITHA MCMAHAN MD  Report #: [id number]  



       Location: ER  Room/Bed:  



       _______________________________________________________________  



       ____________________________________    Procedure: [id number]  



       DX/ABDOMEN ACUTE SERIES W/PA CXR  Exam Date: 10/06/17  



                        Exam Time: 0210       REPORT STATUS: Signed  



       EXAM: ABDOMEN ACUTE SERIES W/PA CXR, supine and erect views of  



       the abdomen, AP   view of the chest   DATE: 10/6/2017 1:29 AM  



       Time stamp on exam: 0155 hours   INDICATION: Abdominal pain  



       COMPARISON: CT of the abdomen and pelvis 2017  



       FINDINGS:   LINES/TUBES: None      LUNGS: No consolidations or  



       edema.       PLEURA: No effusions or pneumothorax.      HEART  



       AND MEDIASTINUM: Normal size and contour.      BOWEL PATTERN:  



       Non-obstructed bowel gas pattern.      BONES AND SOFT TISSUES:  



       No acute bone findings. No abnormal calcifications. No   mass  



       effect. Bilateral chest surgical clips. Surgical clips right  



       upper   quadrant of the abdomen.      IMPRESSION:   No acute  



       thoracic abnormality.      No evidence for bowel obstruction.  



                   Signed by: Dr. Akiko Mehta M.D. on 10/6/2017  



       2:40 AM        Dictated By: AKIKO MEHTA MD  Electronically  



       Signed By: AKIKO MEHTA MD on 10/06/17 0240  Transcribed By:  



       CLIFFORD on 10/06/17 0240       COPY TO:   SMITHA MCMAHAN MD

## 2019-01-18 NOTE — XMS REPORT
Clinical Summary

 Created on:2019



Patient:Deborah Alatorre

Sex:Female

:1960

External Reference #:[id number]





Demographics







 [place] [address]

 

 Home Phone  [phone]

 

 Mobile Phone  [phone]

 

 Preferred Language  English

 

 Marital Status  Unknown

 

 Worship Affiliation  Unknown

 

 Race  White

 

 Ethnic Group  Not  or 









Author







 Organization  [facility]

 

 Address  [address] [place] [address]









Support







 Name  Relationship  Address  Phone

 

 Lavern Hoover  Unavailable  Unavailable  [phone]









Care Team Providers







 Name  Role  Phone

 

 Unavailable  Primary Care Provider  Unavailable









Allergies

Not on File



Medications

Not on file



Active Problems

Not on file



Social History







 Tobacco Use  Types  Packs/Day  Years Used  Date

 

 Never Assessed        









 Sex Assigned at Birth  Date Recorded

 

 Not on file  









 Job Start Date  Occupation  Industry

 

 Not on file  Not on file  Not on file









 Travel History  Travel Start  Travel End









 No recent travel history available.







Last Filed Vital Signs

Not on file



Plan of Treatment

Not on file



Results

Not on fileafter 2018



Insurance







 Payer  Benefit Plan / Group  Subscriber ID  Type  Phone  Address

 

 [place]ICA[place]  xxxxxxxxx  Medicaid    









 Guarantor Name  Account Type  Relation to  Date of  Phone  Billing Address



     Patient  Birth    

 

 Deborah Alatorre  Personal/Famil  Self  1960  [phone]   
Sirisha [address]yes      (Home)  [place]



           [address]

## 2019-01-18 NOTE — ER
Nurse's Notes                                                                                     

 [facility]                                                                

Name: Deborah Alatorre                                                                              

Age: 58 yrs                                                                                       

Sex: Female                                                                                       

: 1960                                                                                   

MRN: [id number]                                                                                   

Arrival Date: 2019                                                                          

Time: 17:18                                                                                       

Account#: [id number]                                                                            

Bed 25                                                                                            

Private MD:                                                                                       

Diagnosis: Unspecified abdominal pain                                                             

                                                                                                  

Presentation:                                                                                     

                                                                                             

17:21 Presenting complaint: Patient states: left sided "lump" started 2 weeks ago and now has sv  

      a "lump" on her right side, has seen Dr Hewitt and had a CT done today but states that     

      the pain has gotten worse. c/o abd numbness x 3 days. Transition of care: patient was       

      not received from another setting of care. Onset of symptoms was 2019. Care         

      prior to arrival: None.                                                                     

17:21 Method Of Arrival: Ambulatory                                                           sv  

17:21 Acuity: CASEY 3                                                                           sv  

18:31 Risk Assessment: Do you want to hurt yourself or someone else? Patient reports no       mg2 

      desire to harm self or others.                                                              

18:35 Initial Sepsis Screen: Does the patient meet any 2 criteria? No. Patient's initial      mg2 

      sepsis screen is negative. Does the patient have a suspected source of infection? No.       

      Patient's initial sepsis screen is negative.                                                

                                                                                                  

Historical:                                                                                       

- Allergies:                                                                                      

17:23 Darvocet-N 100;                                                                         sv  

17:23 Demerol;                                                                                sv  

17:23 Erythromycin;                                                                           sv  

17:23 Toradol;                                                                                sv  

17:23 tramadol;                                                                               sv  

17:23 Zithromax;                                                                              sv  

- PMHx:                                                                                           

17:23 Anxiety; Cancer, Breast; Chronic pain; Colitis; COPD; Depression; Diabetes - IDDM;      sv  

      MUSCLE WEAKNESS; Liver cell carcinoma;                                                      

- PSHx:                                                                                           

17:23 Appendectomy; Hysterectomy; Cholecystectomy; ; bilateral mastectomy; breast    sv  

      reconstructive surgery; Liver Ablation;                                                     

                                                                                                  

- Immunization history:: Flu vaccine is not up to date.                                           

- Social history:: Smoking status: .                                                              

- Ebola Screening: : No symptoms or risks identified at this time.                                

                                                                                                  

                                                                                                  

Screenin:15 Fall Risk IV access (20 points).                                                        mg2 

18:30 Abuse screen: Denies threats or abuse. Denies injuries from another. Nutritional        mg2 

      screening: No deficits noted. Tuberculosis screening: No symptoms or risk factors           

      identified.                                                                                 

                                                                                                  

Assessment:                                                                                       

18:20 General: Appears comfortable, well groomed, well developed, well nourished, Behavior is tl3 

      calm, cooperative, appropriate for age. Pain: Complains of pain in abdomen. Neuro:          

      Level of Consciousness is awake, alert, obeys commands, Oriented to person, place,          

      time, situation, Appropriate for age. Cardiovascular: Patient's skin is warm and dry.       

      Respiratory: Airway is patent Respiratory effort is even, unlabored, Respiratory            

      pattern is regular, symmetrical. GI: Abdomen is round. : No signs and/or symptoms         

      were reported regarding the genitourinary system. EENT: No signs and/or symptoms were       

      reported regarding the EENT system. Derm: No signs and/or symptoms reported regarding       

      the dermatologic system.                                                                    

18:30 Reassessment: Patient appears in no apparent distress at this time. patient refused for mg2 

      the urine dip and medication, she prefers her own medication at home. discharged            

      ambulatory.                                                                                 

                                                                                                  

Vital Signs:                                                                                      

17:22  / 94; Pulse 103; Resp 18; Temp 98.6; Pulse Ox 97% ; Height 5 ft. 4 in. (162.56   sv  

      cm); Pain 10/10;                                                                            

18:29  / 70; Pulse 90; Resp 18; Pulse Ox 100% on R/A; Pain 2/10;                        mg2 

                                                                                                  

ED Course:                                                                                        

17:18 Patient arrived in ED.                                                                  mr  

17:22 Triage completed.                                                                       sv  

17:23 Arm band placed on.                                                                       

17:47 [facility]                                                              [facility] 

17:47 Manjeet Khanna MD is Attending Physician.                                                [facility] 

18:20 Yasmeen Michaud, RN is Primary Nurse.                                                     tl3 

18:29 No provider procedures requiring assistance completed. IV discontinued, intact,         mg2 

      bleeding controlled, No redness/swelling at site. Pressure dressing applied.                

18:35 Patient has correct armband on for positive identification. Pulse ox on. NIBP on.       mg2 

                                                                                                  

Administered Medications:                                                                         

18:19 Not Given (Patient Refused): Zofran 4 mg IVP once; over 2 minutes                       tl3 

                                                                                                  

                                                                                                  

Outcome:                                                                                          

18:25 Discharge ordered by MD.                                                                [facility] 

18:36 Discharged to home ambulatory.                                                          mg2 

18:36 Condition: stable                                                                           

18:36 Discharge instructions given to patient, Instructed on discharge instructions, follow       

      up and referral plans. Demonstrated understanding of instructions, follow-up care.          

18:36 Patient left the ED.                                                                    mg2 

                                                                                                  

Signatures:                                                                                       

Lauren Luu RN RN                                                      

Ricardo Hannah PA                       PA   [facility]                                                  

Sarahy López                                 mr                                                   

Yasmeen Michaud, RN                       RN   tl3                                                  

Gardose, Ken, BART                    RN   mg2                                                  

                                                                                                  

Corrections: (The following items were deleted from the chart)                                    

17:25 17:21 Presenting complaint: Patient states: left sided "lump" started 2 weeks ago and   sv  

      now has a "lump" on her right side, has seen Dr Hewitt and had a CT done today but         

      states that the pain has gotten worse. sv                                                   

17:26 17:22 Pulse 103bpm; Resp 18bpm; Pulse Ox 97%; Temp 98.6F; Height 5 ft. 4 in.; Pain      sv  

      10/10; sv                                                                                   

                                                                                                  

**************************************************************************************************

## 2019-04-01 ENCOUNTER — HOSPITAL ENCOUNTER (EMERGENCY)
Dept: HOSPITAL 97 - ER | Age: 59
Discharge: HOME | End: 2019-04-01
Payer: COMMERCIAL

## 2019-04-01 DIAGNOSIS — Z88.5: ICD-10-CM

## 2019-04-01 DIAGNOSIS — Z90.13: ICD-10-CM

## 2019-04-01 DIAGNOSIS — Z88.1: ICD-10-CM

## 2019-04-01 DIAGNOSIS — Z85.3: ICD-10-CM

## 2019-04-01 DIAGNOSIS — R19.7: Primary | ICD-10-CM

## 2019-04-01 DIAGNOSIS — Z88.3: ICD-10-CM

## 2019-04-01 LAB
ALBUMIN SERPL BCP-MCNC: 3.1 G/DL (ref 3.4–5)
ALP SERPL-CCNC: 172 U/L (ref 45–117)
ALT SERPL W P-5'-P-CCNC: 51 U/L (ref 12–78)
AST SERPL W P-5'-P-CCNC: 55 U/L (ref 15–37)
BLD SMEAR INTERP: (no result)
BUN BLD-MCNC: 15 MG/DL (ref 7–18)
GLUCOSE SERPLBLD-MCNC: 207 MG/DL (ref 74–106)
HCT VFR BLD CALC: 38.8 % (ref 36–45)
LIPASE SERPL-CCNC: 72 U/L (ref 73–393)
LYMPHOCYTES # SPEC AUTO: 1.1 K/UL (ref 0.7–4.9)
MORPHOLOGY BLD-IMP: (no result)
PMV BLD: 7.4 FL (ref 7.6–11.3)
POTASSIUM SERPL-SCNC: 3.5 MMOL/L (ref 3.5–5.1)
RBC # BLD: 4.62 M/UL (ref 3.86–4.86)

## 2019-04-01 PROCEDURE — 99284 EMERGENCY DEPT VISIT MOD MDM: CPT

## 2019-04-01 PROCEDURE — 85025 COMPLETE CBC W/AUTO DIFF WBC: CPT

## 2019-04-01 PROCEDURE — 36415 COLL VENOUS BLD VENIPUNCTURE: CPT

## 2019-04-01 PROCEDURE — 80048 BASIC METABOLIC PNL TOTAL CA: CPT

## 2019-04-01 PROCEDURE — 80076 HEPATIC FUNCTION PANEL: CPT

## 2019-04-01 PROCEDURE — 96375 TX/PRO/DX INJ NEW DRUG ADDON: CPT

## 2019-04-01 PROCEDURE — 74176 CT ABD & PELVIS W/O CONTRAST: CPT

## 2019-04-01 PROCEDURE — 96374 THER/PROPH/DIAG INJ IV PUSH: CPT

## 2019-04-01 PROCEDURE — 82962 GLUCOSE BLOOD TEST: CPT

## 2019-04-01 PROCEDURE — 83690 ASSAY OF LIPASE: CPT

## 2019-04-01 NOTE — XMS REPORT
Patient Summary Document

 Created on:2019



Patient:VALENTE GROVER

Sex:Female

:1960

External Reference #:[id number]





Demographics







 Address  [address] [place] [address]

 

 Home Phone  [phone]

 

 Preferred Language  Unknown

 

 Marital Status  Unknown

 

 Sabianist Affiliation  Unknown

 

 Race  Unknown

 

 Additional Race(s)  Unavailable

 

 Ethnic Group  Unknown









Author







 Organization  [facility]

 

 Address  [address] [place] [address]

 

 Phone  [phone]









Care Team Providers







 Name  Role  Phone

 

 GILES SAMUEL ESTRADA  Unavailable  Unavailable

 

 ROSE KITCHEN  Unavailable  Unavailable

 

 SMITHA MCMAHAN  Unavailable  Unavailable









Problems

This patient has no known problems.



Allergies, Adverse Reactions, Alerts

This patient has no known allergies or adverse reactions.



Medications

This patient has no known medications.



Results







 Test Description  Test Time  Test Comments  Text Results  Atomic Results  
Result Comments









 ALPHA FETOPROTEIN (AFP), TUMOR MARKER  2019 16:30:00    









   Test Item  Value  Reference Range  Comments









 ALPHA-FETOPROTEIN (BEAKER) (test code=1094)  10.9 ng/mL  <10.0  



HEPATIC FUNCTION [id number] 16:13:00





 Test Item  Value  Reference Range  Comments

 

 TOTAL PROTEIN (BEAKER) (test code=770)  7.2 gm/dL  6.0-8.3  

 

 ALBUMIN (BEAKER) (test code=1145)  3.5 g/dL  3.5-5.0  

 

 BILIRUBIN TOTAL (BEAKER) (test code=377)  0.8 mg/dL  0.2-1.2  

 

 BILIRUBIN DIRECT (BEAKER) (test code=706)  0.4 mg/dL  0.1-0.5  

 

 ALKALINE PHOSPHATASE (BEAKER) (test code=346)  107 U/L    

 

 AST (SGOT) (BEAKER) (test code=353)  31 U/L  5-34  

 

 ALT (SGPT) (BEAKER) (test code=347)  15 U/L  6-55  



BASIC METABOLIC [id number] 16:13:00





 Test Item  Value  Reference Range  Comments

 

 SODIUM (BEAKER) (test  138 meq/L  136-145  



 axtn=032)      

 

 POTASSIUM (BEAKER) (test  3.8 meq/L  3.5-5.1  



 code=379)      

 

 CHLORIDE (BEAKER) (test  103 meq/L    



 code=382)      

 

 CO2 (BEAKER) (test  27 meq/L  22-29  



 code=355)      

 

 BLOOD UREA NITROGEN  7 mg/dL  7-21  



 (BEAKER) (test code=354)      

 

 CREATININE (BEAKER) (test  0.78 mg/dL  0.57-1.25  



 code=358)      

 

 GLUCOSE RANDOM (BEAKER)  226 mg/dL    



 (test code=652)      

 

 CALCIUM (BEAKER) (test  9.2 mg/dL  8.4-10.2  



 code=697)      

 

 EGFR (BEAKER) (test  76 mL/min/1.73 sq m    ESTIMATED GFR IS NOT AS



 code=1092)      ACCURATE AS CREATININE



       CLEARANCE IN PREDICTING



       GLOMERULAR FILTRATION



       RATE. ESTIMATED GFR IS



       NOT APPLICABLE FOR



       DIALYSIS PATIENTS.



PROTHROMBIN [id number] 16:05:00





 Test Item  Value  Reference Range  Comments

 

 PROTIME (BEAKER) (test code=759)  14.9 seconds  11.7-14.7  

 

 INR (BEAKER) (test code=370)  1.2  <=5.9  



RECOMMENDED COUMADIN/WARFARIN INR THERAPY RANGESSTANDARD DOSE: 2.0 - 3.0   
Includes: PROPHYLAXIS forvenous thrombosis, systemic embolization; TREATMENT 
for venous thrombosis and/or pulmonary embolus.HIGH RISK: Target INR is 2.5-3.5 
for patients with mechanical heart valves.CBC W/PLT COUNT &amp; AUTO 
[id number] 15:57:00





 Test Item  Value  Reference Range  Comments

 

 WHITE BLOOD CELL COUNT (BEAKER) (test code=775)  3.3 K/ L  3.5-10.5  

 

 RED BLOOD CELL COUNT (BEAKER) (test code=761)  3.88 M/ L  3.93-5.22  

 

 HEMOGLOBIN (BEAKER) (test code=410)  10.4 GM/DL  11.2-15.7  

 

 HEMATOCRIT (BEAKER) (test code=411)  34.3 %  34.1-44.9  

 

 MEAN CORPUSCULAR VOLUME (BEAKER) (test code=753)  88.4 fL  79.4-94.8  

 

 MEAN CORPUSCULAR HEMOGLOBIN (BEAKER) (test  26.8 pg  25.6-32.2  



 kkms=810)      

 

 MEAN CORPUSCULAR HEMOGLOBIN CONC (BEAKER) (test  30.3 GM/DL  32.2-35.5  



 code=752)      

 

 RED CELL DISTRIBUTION WIDTH (BEAKER) (test  17.0 %  11.7-14.4  



 code=412)      

 

 PLATELET COUNT (BEAKER) (test code=756)  42 K/CU MM  150-450  

 

 MEAN PLATELET VOLUME (BEAKER) (test code=754)  10.6 fL  9.4-12.3  

 

 NUCLEATED RED BLOOD CELLS (BEAKER) (test  0 /100 WBC  0-0  



 code=413)      

 

 NEUTROPHILS RELATIVE PERCENT (BEAKER) (test  68 %    



 code=429)      

 

 LYMPHOCYTES RELATIVE PERCENT (BEAKER) (test  25 %    



 code=430)      

 

 MONOCYTES RELATIVE PERCENT (BEAKER) (test  7 %    



 code=431)      

 

 EOSINOPHILS RELATIVE PERCENT (BEAKER) (test  1 %    



 code=432)      

 

 BASOPHILS RELATIVE PERCENT (BEAKER) (test  0 %    



 code=437)      

 

 NEUTROPHILS ABSOLUTE COUNT (BEAKER) (test  2.21 K/ L  1.56-6.13  



 code=670)      

 

 LYMPHOCYTES ABSOLUTE COUNT (BEAKER) (test  0.80 K/ L  1.18-3.74  



 code=414)      

 

 MONOCYTES ABSOLUTE COUNT (BEAKER) (test code=415)  0.22 K/ L  0.24-0.36  

 

 EOSINOPHILS ABSOLUTE COUNT (BEAKER) (test  0.03 K/ L  0.04-0.36  



 code=416)      

 

 BASOPHILS ABSOLUTE COUNT (BEAKER) (test code=417)  0.01 K/ L  0.01-0.08  

 

 IMMATURE GRANULOCYTES-RELATIVE PERCENT (BEAKER)  0 %  0-1  



 (test code=2801)      



Stress Test - Treadmill ONLY [place] [address]    Patient Name : VALENTE GROVER  
       MR #: [id number]   : 1960         Age/Sex: 57/F      Acct # : 
[id number]           Adm Physician  : ROSE KITCHEN MD       Admit Date  :         Location : [facility]    Room/Bed : [place] [address]          ____________________
_______________________________________________________________________________
     REPORT: Cardiology Report       DATE OF STUDY:  2018       
LEXISCAN MYOVIEW      The patient had resting perfusion images after an 
injection of11    millicuries of technetium-99m Myoview.  Later, due to 
inability to    exercise, she was given Lexiscan 0.4 mg intravenously, and 
shortly    afterwards 33 millicuries of technetium-99m Myoview.  Perfusion 
images were    taken by rotational tomography.      Comparison resting and 
Lexiscan stress images show no evidence of any    perfusion defect.  Uptake is 
smooth and regular throughout.  No suggestion    of any scar or any ischemia.  
Additionally, gaited wall motion images were    obtained and calculated 
ejection fraction normal at 54% without regional    wall motion abnormality.   
     FINAL IMPRESSION    1.  Normal Lexiscan Myoview for perfusion.   2.  
Normal left ventricular function, calculated ejection fraction 54%.            
     DD:  2018 16:58   DT:  2018 18:08   Job#:  [id number]      cc:
   ROSE KITCHEN MD           Signature                     Date                 
          Dictated By: OLIVIA ESTRADA MD  Transcribed By: EDS on 18   &
lt;Electronically signed by OLIVIA ESTRADA MD&gt;&lt;&lt;Signature on File&gt;&
gt;18 1416    COPY TO:CT CHEST WO    [place] [address]  Patient Name: VALENTE GROVER   MR #: [id number]    : 1960 Age/Sex: 57/F  Acct #: 
[id number] Req #: [id number]  [facility] Physician: ROSE KITCHEN MD    Ordered by: ROSE KITCHEN MD  Report #: [id number]   Location: [facility]  Room/Bed: [place] [address]    ______
________________________________________________________________________________
_____________    Procedure: [id number] CT/CT CHEST WO  Exam Date: 18      
                      Exam Time: 1115       REPORT STATUS: Signed    PROCEDURE: 
CT CHEST WITHOUT CONTRAST   CT scan of the chest WITHOUT intravenous contrast, 
using standard    protocol.       TECHNIQUE:   The chest was scanned utilizing 
a multidetector helical scanner from    the apex to the level of the adrenal 
glands.  No IV contrast was    administered as per physician request.  Coronal 
and sagittal    multiplanar reformations were obtained.       COMPARISON: None.
       INDICATIONS:   CHEST PAIN           FINDINGS:   Lines/tubes:  None.     
  Lungs and Airways:  The lungs and airways are normal with no focal    
abnormality demonstrated. Right upper lobe scarring. Bibasilar    
dependentatelectasis.       Pleura: The pleural spaces are clear.       Heart 
and mediastinum:  The thyroid gland is normal. No significant    mediastinal, 
hilar or axillary lymphadenopathy is seen. The heart and    pericardium are 
within normal limits.       Soft tissues: Normal.       Abdomen: Nodular 
contour of the liver. The spleen is enlarged,    measuring 17 cm in AP length. 
Multiple splenic and esophageal varices    are partially visualized. Trace 
ascites is present in the right upper    quadrant. Theadrenal glands are 
normal.       Bones: The visualized bony thorax is within normal limits.        
IMPRESSION:        1. No acute abnormality of the chest.    2. Hepatic 
parenchymal appearance consistent with cirrhotic morphology.        3. 
Splenomegaly and varices consistent with portal hypertension.          Dictated 
by:  Rose Netwon M.D. on 2018 at 13:18        Electronically approved by:
  Rose Newton M.D. on 2018 at 13:18                Dictated By: ROSE NEWTON MD  Electronically Signed By: ROSE NEWTON MD on 18 1318  
Transcribed By: PILAR on 18 1318       COPY TO:   ROSE KITCHEN [facility] 
ACUTE SERIES W/PA CXR    [place] [address]  Patient Name: VALENTE GROVER   MR #
: [id number]    : 1960 Age/Sex: 56/F  Acct #: [id number] Req #: [id number]
[id number]  Adm Physician:     Ordered by: SMITHA MCMAHAN MD  Report #: [id number]
-0006 Location: ER  Room/Bed:     ______________________________________________
_____________________________________________________    Procedure: [id number] DX
/ABDOMEN ACUTE SERIES W/PA CXR  Exam Date: 10/06/17                            
Exam Time: 0210       REPORT STATUS: Signed    EXAM: ABDOMEN ACUTE SERIES W/PA 
CXR, supine and erect views of the abdomen, AP   view of the chest   DATE: 10/6/
2017 1:29 AM  Time stamp on exam: 0155 hours   INDICATION: Abdominal pain   
COMPARISON: CT of the abdomen and pelvis 2017      FINDINGS:   LINES/
TUBES: None      LUNGS: No consolidations or edema.       PLEURA: No effusions 
or pneumothorax.      HEART AND MEDIASTINUM: Normal size and contour.      
BOWEL PATTERN: Non-obstructed bowel gas pattern.      BONES AND SOFT TISSUES: 
No acute bone findings. No abnormal calcifications. No   mass effect. Bilateral 
chest surgical clips. Surgical clips right upperquadrant of the abdomen.      
IMPRESSION:   No acute thoracic abnormality.      No evidence for bowel 
obstruction.               Signed by: Dr. Akiko Mehta M.D. on 10/6/2017 2
:40 AM        Dictated By: AKIKO MEHTA MD  Electronically Signed By: AKIKO MEHTA MD on 10/06/17 0240  Transcribed By: CLIFFORD on 10/06/17 0240       
COPY TO:   SMITHA MCMAHAN MD

## 2019-04-01 NOTE — ER
Nurse's Notes                                                                                     

 [facility]                                                                 

Name: Deborah Alatorre                                                                              

Age: 58 yrs                                                                                       

Sex: Female                                                                                       

: 1960                                                                                   

MRN: [id number]                                                                                   

Arrival Date: 2019                                                                          

Time: 18:21                                                                                       

Account#: [id number]                                                                            

Bed 5                                                                                             

Private MD:                                                                                       

Diagnosis: Vomiting, unspecified;Diarrhea, unspecified                                            

                                                                                                  

Presentation:                                                                                     

                                                                                             

18:28 Presenting complaint: Patient states: RLQ, groin, suprapubic pain, diarrhea, n/v x 1    sv  

      day. Transition of care: patient was not received from another setting of care. Onset       

      of symptoms was 2019. Care prior to arrival: None.                                

18:28 Method Of Arrival: Wheelchair                                                           sv  

18:28 Acuity: CASEY 3                                                                           sv  

22:01 Risk Assessment: Do you want to hurt yourself or someone else? Patient reports no       ao  

      desire to harm self or others. Initial Sepsis Screen: Does the patient meet any 2           

      criteria? No. Patient's initial sepsis screen is negative. Does the patient have a          

      suspected source of infection? No. Patient's initial sepsis screen is negative.             

                                                                                                  

Historical:                                                                                       

- Allergies:                                                                                      

18:29 Darvocet-N 100;                                                                         sv  

18:29 Demerol;                                                                                sv  

18:29 Erythromycin;                                                                           sv  

18:29 Toradol;                                                                                sv  

18:29 tramadol;                                                                               sv  

18:29 Zithromax;                                                                              sv  

- PMHx:                                                                                           

18:29 Anxiety; Cancer, Breast; Chronic pain; Colitis; COPD; Depression; Diabetes - IDDM;      sv  

      Liver cell carcinoma; MUSCLE WEAKNESS;                                                      

- PSHx:                                                                                           

18:29 Appendectomy; Hysterectomy; Cholecystectomy; ; bilateral mastectomy; breast    sv  

      reconstructive surgery; Liver Ablation;                                                     

                                                                                                  

- Immunization history:: Adult Immunizations unknown.                                             

- Social history:: Smoking status: Patient/guardian denies using tobacco.                         

- Ebola Screening: : Patient negative for fever greater than or equal to 101.5 degrees            

  Fahrenheit, and additional compatible Ebola Virus Disease symptoms Patient denies               

  exposure to infectious person Patient denies travel to an Ebola-affected area in the            

  21 days before illness onset.                                                                   

                                                                                                  

                                                                                                  

Screenin:59 Abuse screen: Denies threats or abuse. Denies injuries from another. Nutritional        ao  

      screening: No deficits noted. Tuberculosis screening: No symptoms or risk factors           

      identified. Fall Risk None identified.                                                      

                                                                                                  

Assessment:                                                                                       

10:20 Pain: Complains of pain in abdomen Pain currently is 8 out of 10 on a pain scale.       ao  

      Neuro: Level of Consciousness is awake, alert, obeys commands, Oriented to person,          

      place, time, situation, Appropriate for age Moves all extremities. Full function Speech     

      is normal, Facial symmetry appears normal. Cardiovascular: Capillary refill < 3 seconds     

      Patient's skin is warm and dry. Respiratory: Airway is patent Respiratory effort is         

      even, unlabored, Respiratory pattern is regular, symmetrical. GI: Abdomen is                

      non-distended, Bowel sounds present X 4 quads. Abd is soft and non tender Abd is soft.      

      GI: Reports lower abdominal pain, upper abdominal pain, bloating, diarrhea, nausea. :     

      No signs and/or symptoms were reported regarding the genitourinary system. EENT: No         

      signs and/or symptoms were reported regarding the EENT system. Derm: No signs and/or        

      symptoms reported regarding the dermatologic system. Musculoskeletal: No signs and/or       

      symptoms reported regarding the musculoskeletal system.                                     

19:20 General: Appears in no apparent distress. uncomfortable, Behavior is calm, cooperative, ao  

      anxious.                                                                                    

20:20 Reassessment: Patient appears in no apparent distress at this time. Patient and/or      ao  

      family updated on plan of care and expected duration. Pain level reassessed. Patient is     

      alert, oriented x 3, equal unlabored respirations, skin warm/dry/pink.                      

21:40 Reassessment: Patient appears in no apparent distress at this time. Patient and/or      ao  

      family updated on plan of care and expected duration. Pain level reassessed. Patient is     

      alert, oriented x 3, equal unlabored respirations, skin warm/dry/pink.                      

21:55 Reassessment: Patient's IV blow and patient refused get a new IV. Dr Buitrago was         ao  

      notified and ordered CT without contrast.                                                   

22:40 Reassessment: Patient appears in no apparent distress at this time. Patient and/or      ao  

      family updated on plan of care and expected duration. Pain level reassessed.                

23:54 Reassessment: DC instructions given to patient. Patient expressed she is unhappy with   ao  

      the care given. Dr Buitrago was notified and Charge nurse BART Inman. Both Dr Buitrago and      

      Charge nurse spoke to patient.                                                              

                                                                                                  

Vital Signs:                                                                                      

18:29  / 95; Pulse 96; Resp 20; Temp 98; Pulse Ox 99% ; Weight 63.05 kg; Height 5 ft. 4 sv  

      in. (162.56 cm);                                                                            

21:55  / 92; Pulse 78; Resp 18; Pulse Ox 100% on R/A; Pain 7/10;                        ao  

22:50  / 83; Pulse 87; Resp 20; Pulse Ox 99% on R/A;                                    ao  

23:57  / 84; Pulse 82; Resp 16; Pulse Ox 100% on R/A; Pain 0/10;                        ao  

18:29 Body Mass Index 23.86 (63.05 kg, 162.56 cm)                                             sv  

                                                                                                  

ED Course:                                                                                        

18:21 Patient arrived in ED.                                                                  mr  

18:21 Ayaan Vicentee is Private Physician.                                                    mr  

18:29 Triage completed.                                                                       sv  

18:54 Arm band placed on.                                                                     hb  

19:14 Mikhail Malone, RN is Primary Nurse.                                                       ao  

19:21 Dmitri Buitrago MD is Attending Physician.                                            tw4 

19:58 Radiology exam delayed due to lab results not completed at this time. (BUN/Creatinine). vm2 

20:14 Radiology exam delayed due to lab results not completed at this time. (BUN/Creatinine). vm2 

20:21 Inserted saline lock: 22 gauge in left hand, using aseptic technique.                   ao  

20:50 Radiology exam delayed due to IV insertion attempt and/or patient not having            vm2 

      appropriate IV at this time.                                                                

21:21 Radiology exam delayed due to IV insertion attempt and/or patient not having            nj  

      appropriate IV at this time.                                                                

21:31 Inserted saline lock: 22 gauge in left upper arm, using aseptic technique.              ao  

21:56 CT completed. Pt tolerated procedure poorly. Patient moved back from CT.                vm2 

22:00 No provider procedures requiring assistance completed.                                  ao  

22:01 Patient has correct armband on for positive identification. Pulse ox on. NIBP on.       ao  

22:03 Abdomen In Process Unspecified.                                                         EDMS

23:58 IV discontinued, intact, bleeding controlled, No redness/swelling at site. Pressure     ao  

      dressing applied.                                                                           

                                                                                                  

Administered Medications:                                                                         

20:44 Drug: Zofran 4 mg Route: IVP; Site: left hand;                                          ao  

23:37 Follow up: Response: No adverse reaction                                                ao  

21:03 Drug: morphine 4 mg Route: IVP; Site: left hand;                                        ao  

23:37 Follow up: Response: No adverse reaction                                                ao  

21:04 Not Given (Patient Refused): Bentyl 20 mg IM once                                       ao  

                                                                                                  

                                                                                                  

Point of Care Testing:                                                                            

      Blood Glucose:                                                                              

18:29 Blood Glucose: 228 mg/dL;                                                               sv  

      Ranges:                                                                                     

                                                                                                  

Outcome:                                                                                          

23:23 Discharge ordered by MD.                                                                tw4 

23:57 Discharged to home ambulatory.                                                          ao  

23:57 Condition: stable                                                                           

23:57 Discharge instructions given to patient, Instructed on discharge instructions, follow       

      up and referral plans. Demonstrated understanding of Patient refused to sign dc papers      

23:58 Patient left the ED.                                                                    ao  

                                                                                                  

Signatures:                                                                                       

Dispatcher [employer]                           EDLauren Garrison RN RN sv                                                   

LópezSarahy Alex, RN RN ao Baxter, Heather, RN RN hb Jordan, Clementina Loera                            [facility]                                                  

Dmitri Buitrago MD MD   tw4                                                  

                                                                                                  

Corrections: (The following items were deleted from the chart)                                    

21:55 19:20 General: Appears ao                                                               ao  

                                                                                                  

**************************************************************************************************

## 2019-04-01 NOTE — XMS REPORT
Clinical Summary

 Created on:2019



Patient:Deborah Alatorre

Sex:Female

:1960

External Reference #:[id number]





Demographics







 Address  [address] [place] [address]

 

 Home Phone  [phone]

 

 Mobile Phone  [phone]

 

 Preferred Language  English

 

 Marital Status  Unknown

 

 [employer] Affiliation  Unknown

 

 Race  White

 

 Ethnic Group  Not  or 









Author







 Organization  [facility]

 

 Address  [address] [place] [address]









Support







 Name  Relationship  Address  Phone

 

 Lavern Hoover  Unavailable  Unavailable  [phone]









Care Team Providers







 Name  Role  Phone

 

 Pcp, No  Primary Care Provider  Unavailable









Allergies







 Active Allergy  Reactions  Severity  Noted Date  Comments

 

 Azithromycin  Rash, Other (See Comments)  Low  2016  

 

 Erythromycin      2019  

 

 Ketorolac      2019  

 

 Tramadol  Other (See Comments)    2016  Other reaction(s):



         Insomnia for days







         Unable to sleep







         







Medications







 Medication  Sig  Dispensed  Refills  Start Date  End Date  Status

 

 sertraline  Take 100 mg by mouth    0      Active



 (ZOLOFT) 100 MG  daily.          



 tablet            

 

 metFORMIN  Take 500 mg by mouth    0      Active



 (GLUCOPHAGE) 500  2 (two) times daily          



 MG tablet  with breakfast and          



   dinner.          

 

 methadone HCl  Take 100 mg by mouth    0      Active



 (METHADONE ORAL)  daily.          

 

 insulin lispro  Inject 30 Units    0      Active



 (HUMALOG) 100  subcutaneously 3          



 unit/mL injection  (three) times daily          



   before meals.          

 

 insulin   Inject 35 Units    0      Active



 unit/mL (3 mL)  subcutaneously 2          



 InPn  (two) times daily          



   before meals NOVOLIN          



   .          

 

 furosemide  Take 1 tablet (20 mg  30 tablet  11  2019  Active



 (LASIX) 20 MG  total) by mouth          



 tabletIndications  daily.          



 : Lower extremity            



 edema            







Active Problems







 Problem  Noted Date

 

 Cirrhosis  2019









 Last Assessment & Plan: 







 Diagnosis based on the laboratory parameters and imaging. Etiology is likely 
due to



 HBV/HCV. Her condition has decompensated with features of portal hypertension.



 Cirrhosis guidelines reviewed.









 Hepatocellular carcinoma  2019









 Last Assessment & Plan: 







 Diagnosis of HCC in 2018 s/p radiofrequency ablation. She was referred 
for liver



 transplant evaluation at this time but expressed desire not to follow 
through.There



 is no evidence of residual tumor on recent imaging. Extensive discussion 
provided



 that liver transplant is the only definitive treatment for HCC and recurrence 
is



 likely. She understands and maintains her position.









 Immunity status testing  2019









 Last Assessment & Plan: 



All patients with chronic liver disease should be immunized to prevent 
hepatitis A and hepatitis B. Previous serology indicates immunity to HAV. 
Recommend HBV booster which can be provided by primary care.









 Hepatitis C  2019









 Last Assessment & Plan: 







 HCV diagnosed in  s/p partial treatment with Interferon / Ribavirin. HCV 
RNA 2018 was undetectable. No further intervention necessary.









 Chronic viral hepatitis B without delta agent and without coma  2019









 Last Assessment & Plan: 



She has evidence of past HBV infection without immunity. HBV DNA from May 2018 
undetectable. She may benefit from vaccination which can be obtained by her 
primary care.









 Hernia of anterior abdominal wall  2019









 Last Assessment & Plan: 







 She has an umbilical hernia that is being evaluated for repair. She has a 34% 
one



 year mortality based on the [place] surgical risk score. In addition, she is Child
-Clark



 Class A giving her a 10% abdominal surgery barron-operative mortality risk.









 Heart murmur  2019









 Last Assessment & Plan: 







 Physical examination revealed an audible fixed S2 split on cardiac examination 
which



 may be secondary to a bundle branch block. We recommend cardiology 
consultation prior



 to procedure.









 Lower extremity edema  2019









 Last Assessment & Plan: 







 Assymetrical lower extremity edema on physical examination. We ordered a venous



 doppler of the lower extremity to assess for venous thrombosis. Previously on



 Furosemide 20 mg daily. We write for a refill.







Encounters







 Date  Type  Specialty  Care Team  Description

 

 2019  Office Visit  Hepatology  Bar Jin  Cirrhosis of liver 
without ascites, unspecified hepatic cirrhosis type (HCC);



       MD Radha  Hepatocellular carcinoma (HCC);



         Immunity status testing;



         Chronic hepatitis C without hepatic coma (HCC);



         Chronic viral hepatitis B without delta agent and without coma (HCC);



         Hernia of anterior abdominal wall;



         Heart murmur;



         Lower extremity edema

 

 2019  Telephone  Hepatology  Gertrude Edouard,  Rec CD (1)



       MA  



after 2018



Social History







 Tobacco Use  Types  Packs/Day  Years Used  Date

 

 Current Every Day Smoker  Cigarettes  0.25  40  









 Alcohol Use  Drinks/Week  oz/Week  Comments

 

 No      









 Alcohol Habits  Answer  Date Recorded

 

 How often do you have a drink containing alcohol?  Never  2019

 

 How many drinks containing alcohol do you have on a typical  Not asked  



 day when you are drinking?    

 

 How often do you have six or more drinks on one occasion?  Not asked  









 Sex Assigned at Birth  Date Recorded

 

 Not on file  









 Job Start Date  Occupation  Industry

 

 Not on file  Not on file  Not on file









 Travel History  Travel Start  Travel End









 No recent travel history available.







Last Filed Vital Signs







 Vital Sign  Reading  Time Taken

 

 Blood Pressure  137/85  2019  1:12 PM CST

 

 Pulse  100  2019  1:12 PM CST

 

 Temperature  36.8 C (98.3 F)  2019  1:12 PM CST

 

 Respiratory Rate  16  2019  1:12 PM CST

 

 Oxygen Saturation  97%  2019  1:12 PM CST

 

 Inhaled Oxygen Concentration  -  -

 

 Weight  71.6 kg (157 lb 14.4 oz)  2019  1:12 PM CST

 

 Height  158.5 cm (5' 2.4")  2019  1:12 PM CST

 

 Body Mass Index  28.51  2019  1:12 PM CST







Plan of Treatment

Not on file



Procedures







 Procedure Name  Priority  Date/Time  Associated Diagnosis  Comments

 

 CBC W/PLT COUNT &  Routine  2019  2:47  Cirrhosis of liver  Results for 
this



 AUTO DIFFERENTIAL    PM CST  without ascites,  procedure are in



       unspecified hepatic  the results



       cirrhosis type (HCC)  section.

 

 ALPHA FETOPROTEIN  Routine  2019  2:47  Cirrhosis of liver  Results for 
this



 (AFP), TUMOR MARKER    PM CST  without ascites,  procedure are in



       unspecified hepatic  the results



       cirrhosis type (HCC)  section.

 

 PROTHROMBIN TIME/INR  Routine  2019  2:47  Cirrhosis of liver  Results 
for this



     PM CST  without ascites,  procedure are in



       unspecified hepatic  the results



       cirrhosis type (HCC)  section.

 

 CBC W/PLT COUNT &  Routine  2019  2:47  Cirrhosis of liver  Results for 
this



 AUTO DIFFERENTIAL    PM CST  without ascites,  procedure are in



       unspecified hepatic  the results



       cirrhosis type (HCC)  section.

 

 HEPATIC FUNCTION  Routine  2019  2:46  Cirrhosis of liver  Results for 
this



 PANEL    PM CST  without ascites,  procedure are in



       unspecified hepatic  the results



       cirrhosis type (HCC)  section.

 

 BASIC METABOLIC PANEL  Routine  2019  2:46  Cirrhosis of liver  Results 
for this



 (7)    PM CST  without ascites,  procedure are in



       unspecified hepatic  the results



       cirrhosis type (HCC)  section.



after 2018



Results

CBC with platelet count + automated diff (2019  2:47 PM CST)





 Component  Value  Ref Range  Performed At

 

 WBC  3.3 (L)  3.5 - 10.5 K/L  [facility]

 

 RBC  3.88 (L)  3.93 - 5.22 M/L  [facility]

 

 Hemoglobin  10.4 (L)  11.2 - 15.7 GM/DL  [facility]

 

 Hematocrit  34.3  34.1 - 44.9 %  [facility]

 

 MCV  88.4  79.4 - 94.8 fL  [facility]

 

 MCH  26.8  25.6 - 32.2 pg  [facility]

 

 MCHC  30.3 (L)  32.2 - 35.5 GM/DL  [facility]

 

 RDW  17.0 (H)  11.7 - 14.4 %  [facility]

 

 Platelets  42 (L)  150 - 450 K/CU MM  [facility]

 

 MPV  10.6  9.4 - 12.3 fL  [facility]

 

 nRBC  0  0 - 0 /100 WBC  [facility]

 

 % Neutros  68  %  [facility]

 

 % Lymphs  25  %  [facility]

 

 % Monos  7  %  [facility]

 

 % Eos  1  %  [facility]



       [facility] [place]

 

 % Baso  0  %  [facility]

 

 # Neutros  2.21  1.56 - 6.13 K/L  [facility]

 

 # Lymphs  0.80 (L)  1.18 - 3.74 K/L  [facility]

 

 # Monos  0.22 (L)  0.24 - 0.36 K/L  [facility]

 

 # Eos  0.03 (L)  0.04 - 0.36 K/L  [facility]

 

 # Baso  0.01  0.01 - 0.08 K/L  [facility]

 

 Immature Granulocytes-Relative  0  0 - 1 %  [facility]









 Specimen

 

 Blood









 Performing Organization  Address  City/[facility]/Zipcode  Phone Number

 

 [facility]  [address] [place] [id number] [phone]
[phone]



 [place]      



Alpha fetoprotein (AFP), tumor marker (2019  2:47 PM CST)





 Component  Value  Ref Range  Performed At

 

 Alpha-Fetoprotein  10.9 (H)  <10.0 ng/mL  [facility]









 Specimen

 

 Blood









 Performing Organization  Address  City/[facility]  Phone Number

 

 [id number] [place] [address] [phone]
[phone]



 CENTER      



Pro-time/INR (2019  2:47 PM CST)





 Component  Value  Ref Range  Performed At

 

 Protime  14.9 (H)  11.7 - 14.7 seconds  [facility]

 

 INR  1.2  <=5.9  [facility]









 Specimen

 

 Blood









 Narrative  Performed At

 

 RECOMMENDED COUMADIN/WARFARIN INR THERAPY  [facility]



 RANGES



  



 STANDARD DOSE: 2.0 - 3.0 Includes:  



 PROPHYLAXIS for venous thrombosis, systemic  



 embolization; TREATMENT for venous thrombosis  



 and/or pulmonary embolus.



  



 HIGH RISK: Target INR is 2.5-3.5 for patients  



 with mechanical heart valves.  









 Performing Organization  Address  City/[facility]  Phone Number

 

 [address] [address] [phone]
[phone]



 [place]      



Hepatic function panel (2019  2:46 PM CST)





 Component  Value  Ref Range  Performed At

 

 Protein, Total  7.2  6.0 - 8.3 gm/dL  [facility]

 

 Albumin  3.5  3.5 - 5.0 g/dL  [facility]

 

 Total Bilirubin  0.8  0.2 - 1.2 mg/dL  [facility]

 

 Bilirubin, Direct  0.4  0.1 - 0.5 mg/dL  [facility]

 

 Alkaline Phosphatase  107  40 - 150 U/L  [facility]

 

 AST  31  5 - 34 U/L  [facility]

 

 ALT  15  6 - 55 U/L  [facility]









 Specimen

 

 Blood









 Performing Organization  Address  City/State/Zipcode  Phone Number

 

 [facility]  [address] [place] [address] [phone]
[phone]



 [place]      



Basic Metabolic Panel (2019  2:46 PM CST)





 Component  Value  Ref Range  Performed At

 

 Sodium  138  136 - 145 meq/L  [facility]

 

 Potassium  3.8  3.5 - 5.1 meq/L  [facility]

 

 Chloride  103  98 - 107 meq/L  [facility]

 

 CO2  27  22 - 29 meq/L  [facility]

 

 BUN  7  7 - 21 mg/dL  [facility]

 

 Creatinine  0.78  0.57 - 1.25 mg/dL  [facility]

 

 Glucose  226 (H)  70 - 105 mg/dL  [facility]

 

 Calcium  9.2  8.4 - 10.2 mg/dL  [facility]

 

 EGFR  76Comment: ESTIMATED GFR IS  mL/min/1.73 sq m  [facility]



   NOT AS ACCURATE AS CREATININE    [facility]



   CLEARANCE IN PREDICTING    



   GLOMERULAR FILTRATION RATE.    



   ESTIMATED GFR IS NOT    



   APPLICABLE FOR DIALYSIS    



   PATIENTS.    









 Specimen

 

 Blood









 Performing Organization  Address  City/State/Zipcode  Phone Number

 

 [facility]  [address] [place] [address] [phone]
[phone]



 CENTER      



after 2018



Insurance







 Payer  Benefit Plan /  Subscriber ID  Type  Phone  Address



   Group        

 

 MEDICARE  MEDICARE A B  xxxxxxxxxxx  Medicare    

 

 MEDICAID - MEDICAID  [facility] STAR  [id number]  Medicaid Contracted    



 [employer] CARE  PLAN        









 Guarantor Name  Account Type  Relation to  Date of  Phone  Billing



     Patient  Birth    Address

 

 Deborah Alatorre  Personal/Family  Self  1960  [phone] [address]        (Home)  [place] [address]

## 2019-04-01 NOTE — EDPHYS
Physician Documentation                                                                           

 [facility]                                                                 

Name: Deborah Alatorre                                                                              

Age: 58 yrs                                                                                       

Sex: Female                                                                                       

: 1960                                                                                   

MRN: [id number]                                                                                   

Arrival Date: 2019                                                                          

Time: 18:21                                                                                       

Account#: [id number]                                                                            

Bed 5                                                                                             

Private MD:                                                                                       

ED Physician Dmitri Buitrago                                                                     

HPI:                                                                                              

                                                                                             

22:59 This 58 yrs old  Female presents to ER via Wheelchair with complaints of       tw4 

      Abdominal Pain, Vomiting/Diarrhea.                                                          

23:20 The patient presents to the emergency department with nausea, that is moderate,         tw4 

      vomiting. Onset: The symptoms/episode began/occurred today. Possible causes: unknown.       

      The symptoms are aggravated by nothing. The symptoms are alleviated by nothing.             

      Associated signs and symptoms: The patient has no apparent associated signs or              

      symptoms. Severity of symptoms: At their worst the symptoms were moderate in the            

      emergency department the symptoms. The patient has not experienced similar symptoms in      

      the past.                                                                                   

                                                                                                  

Historical:                                                                                       

- Allergies:                                                                                      

18:29 Darvocet-N 100;                                                                         sv  

18:29 Demerol;                                                                                sv  

18:29 Erythromycin;                                                                           sv  

18:29 Toradol;                                                                                sv  

18:29 tramadol;                                                                               sv  

18:29 Zithromax;                                                                              sv  

- PMHx:                                                                                           

18:29 Anxiety; Cancer, Breast; Chronic pain; Colitis; COPD; Depression; Diabetes - IDDM;      sv  

      Liver cell carcinoma; MUSCLE WEAKNESS;                                                      

- PSHx:                                                                                           

18:29 Appendectomy; Hysterectomy; Cholecystectomy; ; bilateral mastectomy; breast    sv  

      reconstructive surgery; Liver Ablation;                                                     

                                                                                                  

- Immunization history:: Adult Immunizations unknown.                                             

- Social history:: Smoking status: Patient/guardian denies using tobacco.                         

- Ebola Screening: : Patient negative for fever greater than or equal to 101.5 degrees            

  Fahrenheit, and additional compatible Ebola Virus Disease symptoms Patient denies               

  exposure to infectious person Patient denies travel to an Ebola-affected area in the            

  21 days before illness onset.                                                                   

                                                                                                  

                                                                                                  

ROS:                                                                                              

23:20 Constitutional: Negative for fever, chills, and weight loss, Cardiovascular: Negative   tw4 

      for chest pain, palpitations, and edema, Respiratory: Negative for shortness of breath,     

      cough, wheezing, and pleuritic chest pain, MS/Extremity: Negative for injury and            

      deformity, Skin: Negative for injury, rash, and discoloration, Neuro: Negative for          

      headache, weakness, numbness, tingling, and seizure.                                        

23:20 Abdomen/GI: Positive for abdominal pain, nausea and vomiting, nausea, vomiting, and         

      diarrhea, nausea, vomiting, diarrhea, Negative for anorexia, dysphagia, hematemesis,        

      black/tarry stool, rectal pain.                                                             

                                                                                                  

Exam:                                                                                             

23:20 Constitutional:  This is a well developed, well nourished patient who is awake, alert,  tw4 

      and in no acute distress. Head/Face:  Normocephalic, atraumatic. Eyes:  Pupils equal        

      round and reactive to light, extra-ocular motions intact.  Lids and lashes normal.          

      Conjunctiva and sclera are non-icteric and not injected.  Cornea within normal limits.      

      Periorbital areas with no swelling, redness, or edema. Chest/axilla:  Normal chest wall     

      appearance and motion.  Nontender with no deformity.  No lesions are appreciated.           

      Cardiovascular:  Regular rate and rhythm with a normal S1 and S2.  No gallops, murmurs,     

      or rubs.  Normal PMI, no JVD.  No pulse deficits. Respiratory:  Lungs have equal breath     

      sounds bilaterally, clear to auscultation and percussion.  No rales, rhonchi or wheezes     

      noted.  No increased work of breathing, no retractions or nasal flaring. Back:  No          

      spinal tenderness.  No costovertebral tenderness.  Full range of motion. MS/ Extremity:     

       Pulses equal, no cyanosis.  Neurovascular intact.  Full, normal range of motion.           

      Neuro:  Awake and alert, GCS 15, oriented to person, place, time, and situation.            

      Cranial nerves II-XII grossly intact.  Motor strength 5/5 in all extremities.  Sensory      

      grossly intact.  Cerebellar exam normal.  Normal gait.                                      

23:20 Abdomen/GI: Inspection: abdomen appears normal, Bowel sounds: normal, Palpation:            

      moderate abdominal tenderness, in the right upper quadrant and right lower quadrant.        

                                                                                                  

Vital Signs:                                                                                      

18:29  / 95; Pulse 96; Resp 20; Temp 98; Pulse Ox 99% ; Weight 63.05 kg; Height 5 ft. 4 sv  

      in. (162.56 cm);                                                                            

21:55  / 92; Pulse 78; Resp 18; Pulse Ox 100% on R/A; Pain 7/10;                        ao  

22:50  / 83; Pulse 87; Resp 20; Pulse Ox 99% on R/A;                                    ao  

23:57  / 84; Pulse 82; Resp 16; Pulse Ox 100% on R/A; Pain 0/10;                        ao  

18:29 Body Mass Index 23.86 (63.05 kg, 162.56 cm)                                             sv  

                                                                                                  

MDM:                                                                                              

19:21 Patient medically screened.                                                             tw4 

23:25 Differential diagnosis: Nonspecific abd pain, cholecystitis, pancreatitis. Data         tw4 

      reviewed: vital signs, nurses notes. Data interpreted: Pulse oximetry: Interpretation:      

      normal. Test interpretation: by ED physician or midlevel provider: ECG. Counseling: I       

      had a detailed discussion with the patient and/or guardian regarding: the historical        

      points, exam findings, and any diagnostic results supporting the discharge/admit            

      diagnosis. Special discussion: I discussed with the patient/guardian in detail that at      

      this point there is no indication for admission to the hospital. It is understood,          

      however, that if the symptoms persist or worsen the patient needs to return immediately     

      for re-evaluation.                                                                          

                                                                                                  

                                                                                             

19:11 Order name: Glucose, Ancillary Testing                                                  [facility]

                                                                                             

19:21 Order name: Basic Metabolic Panel; Complete Time: 22:58                                 [facility] 

                                                                                             

22:58 Interpretation: Normal except: GLUC 207.                                                tw 

                                                                                             

19:21 Order name: CBC with Diff; Complete Time: 22:58                                         tw 

                                                                                             

22:58 Interpretation: Normal except: MCV 84.0; SHANA% 76.9; MPV 7.4; RDW 16.8.                  tw 

                                                                                             

19:21 Order name: Creatinine for Radiology; Complete Time: 22:59                              tw 

                                                                                             

19:21 Order name: Hepatic Function; Complete Time: 22:58                                      [facility] 

                                                                                             

22:58 Interpretation: Normal except: AST 55; ; BILIT 1.4; BILID 0.5; ALB 3.1; GLOB     tw4 

      4.9; A/G 0.6.                                                                               

                                                                                             

19:21 Order name: Lipase; Complete Time: 22:58                                                tw 

                                                                                             

22:59 Interpretation: Within normal limits: LIP 72.                                           tw 

                                                                                             

21:11 Order name: CBC Smear Scan                                                              [facility]

                                                                                             

21:50 Order name: Abdomen                                                                     [facility]

                                                                                             

19:21 Order name: IV Saline Lock; Complete Time: 20:20                                        tw4 

                                                                                             

19:21 Order name: Labs collected and sent; Complete Time: 20:20                               tw4 

                                                                                                  

Administered Medications:                                                                         

20:44 Drug: Zofran 4 mg Route: IVP; Site: left hand;                                          ao  

23:37 Follow up: Response: No adverse reaction                                                ao  

21:03 Drug: morphine 4 mg Route: IVP; Site: left hand;                                        ao  

23:37 Follow up: Response: No adverse reaction                                                ao  

21:04 Not Given (Patient Refused): Bentyl 20 mg IM once                                       ao  

                                                                                                  

                                                                                                  

Point of Care Testing:                                                                            

      Blood Glucose:                                                                              

18:29 Blood Glucose: 228 mg/dL;                                                               sv  

      Ranges:                                                                                     

      Critical Glucose Levels:Adult <50 mg/dl or >400 mg/dl  <40 mg/dl or >180 mg/dl       

Disposition:                                                                                      

19 23:23 Discharged to Home. Impression: Vomiting, unspecified, Diarrhea, unspecified.      

- Condition is Stable.                                                                            

- Discharge Instructions: Diarrhea, Adult, Nausea and Vomiting, Adult.                            

- Prescriptions for Zofran 4 mg Oral Tablet - take 1 tablet by ORAL route every 12                

  hours As needed; 6 tablet. Lomotil 2.5- 0.025 mg Oral Tablet - take 2 tablet by ORAL            

  route once daily As needed; 20 tablet.                                                          

- Medication Reconciliation Form, Thank You Letter, Antibiotic Education, Prescription            

  Opioid Use form.                                                                                

- Follow up: Private Physician; When: Upon discharge from the Emergency Department;               

  Reason: If symptoms return, Recheck today's complaints, Continuance of care.                    

- Problem is new.                                                                                 

- Symptoms have improved.                                                                         

                                                                                                  

                                                                                                  

                                                                                                  

Signatures:                                                                                       

Dispatcher [employer]                           Lauren Dimas RN                    RN   Mikhail Andrea RN                         RN   Dmitri Hutchinson MD MD   tw4                                                  

                                                                                                  

Corrections: (The following items were deleted from the chart)                                    

21:50 19:45 Abdomen Pelvis W Con+CT.RAD.BRZ ordered. EDMS                                     EDMS

22:57 22:52 Abdomen Pelvis Wo Con+CT.RAD.BRZ ordered. [facility]                                    EDMS

23:54 19:21 Urine Dipstick-Ancillary ordered. tw4                                             ao  

23:58 23:23 2019 23:23 Discharged to Home. Impression: Vomiting, unspecified; Diarrhea, ao  

      unspecified. Condition is Stable. Forms are Medication Reconciliation Form, Thank You       

      Letter, Antibiotic Education, Prescription Opioid Use. Follow up: Private Physician;        

      When: Upon discharge from the Emergency Department; Reason: If symptoms return, Recheck     

      today's complaints, Continuance of care. Problem is new. Symptoms have improved. tw4        

                                                                                                  

**************************************************************************************************

## 2019-04-02 NOTE — RAD REPORT
EXAM DESCRIPTION:  CT - Abdomen   Pelvis Wo Contrast - 4/1/2019 10:27 pm

 

CLINICAL HISTORY:  58 years   Female   right lower quadrant pain, groin and suprapubic pain, diarrhea
, nausea/vomiting x1 day.

 

TECHNIQUE:  Contiguous axial images obtained through the abdomen and pelvis without the administratio
n of IV contrast.   Coronal and sagittal reformatted images provided.

This CT exam was performed according to our departmental dose-optimization program, which includes on
e or more of the following dose reduction techniques: automated exposure control, adjustment of the m
A and/or kV according to patient size, and/or use of iterative reconstruction technique.

 

COMPARISON:  Comparison is made to the prior examination dated 1/18/2019.

 

FINDINGS:  Patchy bibasilar atelectasis.

Again seen is cirrhosis of the liver. Stable dystrophic calcifications in the liver and spleen consis
tent with prior granulomatous disease. Stable scarring in the inferior right lobe of the liver. No vi
sualized new hepatic lesion on this noncontrast study.

Again seen is moderate splenomegaly. There is trace perihepatic ascites with diffuse abdominal varice
s.

Prior cholecystectomy without abnormal biliary dilatation.

Pancreatic atrophy without focal lesion on this noncontrast study. Shotty periportal and peripancreat
ic lymph nodes are stable.

Stable right renal cyst. No hydronephrosis on either side. Both adrenal glands, the left kidney, and 
the urinary bladder are stable in appearance. Prior hysterectomy

Mild diffuse thickening of the distal stomach, ascending colon, and rectosigmoid are nonspecific in t
he setting of portal hypertension. There is no bowel obstruction, pneumatosis, free intraperitoneal a
ir, or visualized abscess. Prior appendectomy.

Atherosclerosis of the abdominal aorta without aneurysm or evidence of acute retroperitoneal hemorrha
ge.

Chronic degenerative changes present throughout the spine.   Stable chronic compression deformity at 
the superior endplate of L2. Likely bone island again noted in the left femoral neck.

 

IMPRESSION:  Cirrhosis and portal hypertension. No new hepatic lesion on this noncontrast study.

Mild diffuse thickening of the distal stomach, ascending colon, and rectosigmoid are nonspecific in t
he setting of portal hypertension. Correlate clinically for gastritis and/or colitis. No bowel obstru
ction or perforation. Prior appendectomy.

Electronically signed by:   Blanca Lomax MD   4/1/2019 10:15 PM CDT Workstation: [id number]

 

 

Due to temporary technical issues with the PACS/Fluency reporting system, reports are being signed by
 the in house radiologist as a courtesy to ensure prompt reporting. The interpreting radiologist is f
ully responsible for the content of the report.

## 2019-07-21 ENCOUNTER — HOSPITAL ENCOUNTER (EMERGENCY)
Dept: HOSPITAL 97 - ER | Age: 59
Discharge: HOME | End: 2019-07-21
Payer: COMMERCIAL

## 2019-07-21 DIAGNOSIS — R51: ICD-10-CM

## 2019-07-21 DIAGNOSIS — Z79.4: ICD-10-CM

## 2019-07-21 DIAGNOSIS — F41.9: ICD-10-CM

## 2019-07-21 DIAGNOSIS — C50.919: ICD-10-CM

## 2019-07-21 DIAGNOSIS — F32.9: ICD-10-CM

## 2019-07-21 DIAGNOSIS — E11.9: ICD-10-CM

## 2019-07-21 DIAGNOSIS — Z88.1: ICD-10-CM

## 2019-07-21 DIAGNOSIS — J44.9: ICD-10-CM

## 2019-07-21 DIAGNOSIS — N39.0: Primary | ICD-10-CM

## 2019-07-21 DIAGNOSIS — Z88.5: ICD-10-CM

## 2019-07-21 DIAGNOSIS — C22.7: ICD-10-CM

## 2019-07-21 DIAGNOSIS — R20.2: ICD-10-CM

## 2019-07-21 LAB
BLD SMEAR INTERP: (no result)
HCT VFR BLD CALC: 35 % (ref 36–45)
LYMPHOCYTES # SPEC AUTO: 0.9 K/UL (ref 0.7–4.9)
MORPHOLOGY BLD-IMP: (no result)
PMV BLD: 8.1 FL (ref 7.6–11.3)
RBC # BLD: 3.87 M/UL (ref 3.86–4.86)
UA COMPLETE W REFLEX CULTURE PNL UR: (no result)

## 2019-07-21 PROCEDURE — 81003 URINALYSIS AUTO W/O SCOPE: CPT

## 2019-07-21 PROCEDURE — 70450 CT HEAD/BRAIN W/O DYE: CPT

## 2019-07-21 PROCEDURE — 81015 MICROSCOPIC EXAM OF URINE: CPT

## 2019-07-21 PROCEDURE — 93005 ELECTROCARDIOGRAM TRACING: CPT

## 2019-07-21 PROCEDURE — 71045 X-RAY EXAM CHEST 1 VIEW: CPT

## 2019-07-21 PROCEDURE — 36415 COLL VENOUS BLD VENIPUNCTURE: CPT

## 2019-07-21 PROCEDURE — 87088 URINE BACTERIA CULTURE: CPT

## 2019-07-21 PROCEDURE — 99284 EMERGENCY DEPT VISIT MOD MDM: CPT

## 2019-07-21 PROCEDURE — 87086 URINE CULTURE/COLONY COUNT: CPT

## 2019-07-21 PROCEDURE — 85025 COMPLETE CBC W/AUTO DIFF WBC: CPT

## 2019-07-21 NOTE — XMS REPORT
Clinical Summary

 Created on:2019



Patient:Deborah Alatorre

Sex:Female

:1960

External Reference #:[id number]





Demographics







 Address  [address] [place] [address]

 

 Home Phone  [phone]

 

 Mobile Phone  [phone]

 

 Preferred Language  English

 

 Marital Status  Unknown

 

 Anabaptist Affiliation  Unknown

 

 Race  White

 

 Ethnic Group  Not  or 









Author







 Organization  [facility]

 

 Address  [address] [place] [address]









Support







 Name  Relationship  Address  Phone

 

 Lavern Hoover  Unavailable  Unavailable  [phone]









Care Team Providers







 Name  Role  Phone

 

 Pcp, No  Primary Care Provider  Unavailable









Allergies







 Active Allergy  Reactions  Severity  Noted Date  Comments

 

 Azithromycin  Rash, Other (See Comments)  Low  2016  

 

 Erythromycin      2019  

 

 Ketorolac      2019  

 

 Tramadol  Other (See Comments)    2016  Other reaction(s):



         Insomnia for days







         Unable to sleep







         







Medications







 Medication  Sig  Dispensed  Refills  Start Date  End Date  Status

 

 sertraline  Take 100 mg by mouth    0      Active



 (ZOLOFT) 100 MG  daily.          



 tablet            

 

 metFORMIN  Take 500 mg by mouth    0      Active



 (GLUCOPHAGE) 500  2 (two) times daily          



 MG tablet  with breakfast and          



   dinner.          

 

 methadone HCl  Take 100 mg by mouth    0      Active



 (METHADONE ORAL)  daily.          

 

 insulin lispro  Inject 30 Units    0      Active



 (HUMALOG) 100  subcutaneously 3          



 unit/mL injection  (three) times daily          



   before meals.          

 

 insulin   Inject 35 Units    0      Active



 unit/mL (3 mL)  subcutaneously 2          



 InPn  (two) times daily          



   before meals NOVOLIN          



   .          

 

 furosemide  Take 1 tablet (20 mg  30 tablet  11  2019  Active



 (LASIX) 20 MG  total) by mouth          



 tabletIndications  daily.          



 : Lower extremity            



 edema            







Active Problems







 Problem  Noted Date

 

 Cirrhosis  2019









 Last Assessment & Plan: 







 Diagnosis based on the laboratory parameters and imaging. Etiology is likely 
due to



 HBV/HCV. Her condition has decompensated with features of portal hypertension.



 Cirrhosis guidelines reviewed.









 Hepatocellular carcinoma  2019









 Last Assessment & Plan: 







 Diagnosis of HCC in 2018 s/p radiofrequency ablation. She was referred 
for liver



 transplant evaluation at this time but expressed desire not to follow 
through.There



 is no evidence of residual tumor on recent imaging. Extensive discussion 
provided



 that liver transplant is the only definitive treatment for HCC and recurrence 
is



 likely. She understands and maintains her position.









 Immunity status testing  2019









 Last Assessment & Plan: 



All patients with chronic liver disease should be immunized to prevent 
hepatitis A and hepatitis B. Previous serology indicates immunity to HAV. 
Recommend HBV booster which can be provided by primary care.









 Hepatitis C  2019









 Last Assessment & Plan: 







 HCV diagnosed in  s/p partial treatment with Interferon / Ribavirin. HCV 
RNA 2018 was undetectable. No further intervention necessary.









 Chronic viral hepatitis B without delta agent and without coma  2019









 Last Assessment & Plan: 



She has evidence of past HBV infection without immunity. HBV DNA from May 2018 
undetectable. She may benefit from vaccination which can be obtained by her 
primary care.









 Hernia of anterior abdominal wall  2019









 Last Assessment & Plan: 







 She has an umbilical hernia that is being evaluated for repair. She has a 34% 
one



 year mortality based on the [place] surgical risk score. In addition, she is Child
-Clark



 Class A giving her a 10% abdominal surgery barron-operative mortality risk.









 Heart murmur  2019









 Last Assessment & Plan: 







 Physical examination revealed an audible fixed S2 split on cardiac examination 
which



 may be secondary to a bundle branch block. We recommend cardiology 
consultation prior



 to procedure.









 Lower extremity edema  2019









 Last Assessment & Plan: 







 Assymetrical lower extremity edema on physical examination. We ordered a venous



 doppler of the lower extremity to assess for venous thrombosis. Previously on



 Furosemide 20 mg daily. We write for a refill.







Encounters







 Date  Type  Specialty  Care Team  Description

 

 2019  Office Visit  Hepatology  Bar Jin  Cirrhosis of liver 
without ascites, unspecified hepatic cirrhosis type (HCC);



       MD Radha  Hepatocellular carcinoma (HCC);



         Immunity status testing;



         Chronic hepatitis C without hepatic coma (HCC);



         Chronic viral hepatitis B without delta agent and without coma (HCC);



         Hernia of anterior abdominal wall;



         Heart murmur;



         Lower extremity edema

 

 2019  Telephone  Hepatology  Gertrude Edouard,  Rec CD (1)



       MA  



after 2018



Social History







 Tobacco Use  Types  Packs/Day  Years Used  Date

 

 Current Every Day Smoker  Cigarettes  0.25  40  









 Alcohol Use  Drinks/Week  oz/Week  Comments

 

 No      









 Alcohol Habits  Answer  Date Recorded

 

 How often do you have a drink containing alcohol?  Never  2019

 

 How many drinks containing alcohol do you have on a typical  Not asked  



 day when you are drinking?    

 

 How often do you have six or more drinks on one occasion?  Not asked  









 Sex Assigned at Birth  Date Recorded

 

 Not on file  









 Job Start Date  Occupation  Industry

 

 Not on file  Not on file  Not on file









 Travel History  Travel Start  Travel End









 No recent travel history available.







Last Filed Vital Signs







 Vital Sign  Reading  Time Taken

 

 Blood Pressure  137/85  2019  1:12 PM CST

 

 Pulse  100  2019  1:12 PM CST

 

 Temperature  36.8 C (98.3 F)  2019  1:12 PM CST

 

 Respiratory Rate  16  2019  1:12 PM CST

 

 Oxygen Saturation  97%  2019  1:12 PM CST

 

 Inhaled Oxygen Concentration  -  -

 

 Weight  71.6 kg (157 lb 14.4 oz)  2019  1:12 PM CST

 

 Height  158.5 cm (5' 2.4")  2019  1:12 PM CST

 

 Body Mass Index  28.51  2019  1:12 PM CST







Plan of Treatment

Not on file



Procedures







 Procedure Name  Priority  Date/Time  Associated Diagnosis  Comments

 

 CBC W/PLT COUNT &  Routine  2019  2:47  Cirrhosis of liver  Results for 
this



 AUTO DIFFERENTIAL    PM CST  without ascites,  procedure are in



       unspecified hepatic  the results



       cirrhosis type (HCC)  section.

 

 ALPHA FETOPROTEIN  Routine  2019  2:47  Cirrhosis of liver  Results for 
this



 (AFP), TUMOR MARKER    PM CST  without ascites,  procedure are in



       unspecified hepatic  the results



       cirrhosis type (HCC)  section.

 

 PROTHROMBIN TIME/INR  Routine  2019  2:47  Cirrhosis of liver  Results 
for this



     PM CST  without ascites,  procedure are in



       unspecified hepatic  the results



       cirrhosis type (HCC)  section.

 

 CBC W/PLT COUNT &  Routine  2019  2:47  Cirrhosis of liver  Results for 
this



 AUTO DIFFERENTIAL    PM CST  without ascites,  procedure are in



       unspecified hepatic  the results



       cirrhosis type (HCC)  section.

 

 HEPATIC FUNCTION  Routine  2019  2:46  Cirrhosis of liver  Results for 
this



 PANEL    PM CST  without ascites,  procedure are in



       unspecified hepatic  the results



       cirrhosis type (HCC)  section.

 

 BASIC METABOLIC PANEL  Routine  2019  2:46  Cirrhosis of liver  Results 
for this



 (7)    PM CST  without ascites,  procedure are in



       unspecified hepatic  the results



       cirrhosis type (HCC)  section.



after 2018



Results

CBC with platelet count + automated diff (2019  2:47 PM CST)





 Component  Value  Ref Range  Performed At

 

 WBC  3.3 (L)  3.5 - 10.5 K/L  [facility]

 

 RBC  3.88 (L)  3.93 - 5.22 M/L  [facility]

 

 Hemoglobin  10.4 (L)  11.2 - 15.7 GM/DL  [facility]

 

 Hematocrit  34.3  34.1 - 44.9 %  [facility]

 

 MCV  88.4  79.4 - 94.8 fL  [facility]

 

 MCH  26.8  25.6 - 32.2 pg  [facility]

 

 MCHC  30.3 (L)  32.2 - 35.5 GM/DL  [facility]

 

 RDW  17.0 (H)  11.7 - 14.4 %  [facility]

 

 Platelets  42 (L)  150 - 450 K/CU MM  [facility]

 

 MPV  10.6  9.4 - 12.3 fL  [facility]

 

 nRBC  0  0 - 0 /100 WBC  [facility]

 

 % Neutros  68  %  [facility]

 

 % Lymphs  25  %  [facility]

 

 % Monos  7  %  [facility]

 

 % Eos  1  %  [facility]



       [facility] [place]

 

 % Baso  0  %  [facility]

 

 # Neutros  2.21  1.56 - 6.13 K/L  [facility]

 

 # Lymphs  0.80 (L)  1.18 - 3.74 K/L  [facility]

 

 # Monos  0.22 (L)  0.24 - 0.36 K/L  [facility]

 

 # Eos  0.03 (L)  0.04 - 0.36 K/L  [facility]

 

 # Baso  0.01  0.01 - 0.08 K/L  [facility]

 

 Immature Granulocytes-Relative  0  0 - 1 %  [facility]









 Specimen

 

 Blood









 Performing Organization  Address  City/[facility]/Zipcode  Phone Number

 

 [facility]  [address] [place] [address] [phone]
[phone]



 [place]      



Alpha fetoprotein (AFP), tumor marker (2019  2:47 PM CST)





 Component  Value  Ref Range  Performed At

 

 Alpha-Fetoprotein  10.9 (H)  <10.0 ng/mL  [facility]









 Specimen

 

 Blood









 Performing Organization  Address  City/[facility]  Phone Number

 

 [place] [id number] [place] [address] [phone]
[phone]



 CENTER      



Pro-time/INR (2019  2:47 PM CST)





 Component  Value  Ref Range  Performed At

 

 Protime  14.9 (H)  11.7 - 14.7 seconds  [facility]

 

 INR  1.2  <=5.9  [facility]









 Specimen

 

 Blood









 Narrative  Performed At

 

 RECOMMENDED COUMADIN/WARFARIN INR THERAPY  [facility]



 RANGES



  



 STANDARD DOSE: 2.0 - 3.0 Includes:  



 PROPHYLAXIS for venous thrombosis, systemic  



 embolization; TREATMENT for venous thrombosis  



 and/or pulmonary embolus.



  



 HIGH RISK: Target INR is 2.5-3.5 for patients  



 with mechanical heart valves.  









 Performing Organization  Address  City/[facility]  Phone Number

 

 [address] [address] [phone]
[phone]



 [place]      



Hepatic function panel (2019  2:46 PM CST)





 Component  Value  Ref Range  Performed At

 

 Protein, Total  7.2  6.0 - 8.3 gm/dL  [facility]

 

 Albumin  3.5  3.5 - 5.0 g/dL  [facility]

 

 Total Bilirubin  0.8  0.2 - 1.2 mg/dL  [facility]

 

 Bilirubin, Direct  0.4  0.1 - 0.5 mg/dL  [facility]

 

 Alkaline Phosphatase  107  40 - 150 U/L  [facility]

 

 AST  31  5 - 34 U/L  [facility]

 

 ALT  15  6 - 55 U/L  [facility]









 Specimen

 

 Blood









 Performing Organization  Address  City/State/Zipcode  Phone Number

 

 [facility]  [address] [place] [address] [phone]
[phone]



 [place]      



Basic Metabolic Panel (2019  2:46 PM CST)





 Component  Value  Ref Range  Performed At

 

 Sodium  138  136 - 145 meq/L  [facility]

 

 Potassium  3.8  3.5 - 5.1 meq/L  [facility]

 

 Chloride  103  98 - 107 meq/L  [facility]

 

 CO2  27  22 - 29 meq/L  [facility]

 

 BUN  7  7 - 21 mg/dL  [facility]

 

 Creatinine  0.78  0.57 - 1.25 mg/dL  [facility]

 

 Glucose  226 (H)  70 - 105 mg/dL  [facility]

 

 Calcium  9.2  8.4 - 10.2 mg/dL  [facility]

 

 EGFR  76Comment: ESTIMATED GFR IS  mL/min/1.73 sq m  [facility]



   NOT AS ACCURATE AS CREATININE    [facility]



   CLEARANCE IN PREDICTING    



   GLOMERULAR FILTRATION RATE.    



   ESTIMATED GFR IS NOT    



   APPLICABLE FOR DIALYSIS    



   PATIENTS.    









 Specimen

 

 Blood









 Performing Organization  Address  City/State/Zipcode  Phone Number

 

 [facility]  [address] [place] [address] [phone]
[phone]



 CENTER      



after 2018



Insurance







 Payer  Benefit Plan /  Subscriber ID  Type  Phone  Address



   Group        

 

 MEDICARE  MEDICARE A B  xxxxxxxxxxx  Medicare    

 

 MEDICAID - MEDICAID  [facility] STAR  [id number]  Medicaid Contracted    



 [employer] CARE  PLAN        









 Guarantor Name  Account Type  Relation to  Date of  Phone  Billing



     Patient  Birth    Address

 

 Deborah Alatorre  Personal/Family  Self  1960  [phone] [address]        (Home)  [place] [address]

## 2019-07-21 NOTE — EDPHYS
Physician Documentation                                                                           

 [facility]                                                                 

Name: Deborah Alatorre                                                                              

Age: 58 yrs                                                                                       

Sex: Female                                                                                       

: 1960                                                                                   

MRN: [id number]                                                                                   

Arrival Date: 2019                                                                          

Time: 20:01                                                                                       

Account#: [id number]                                                                            

Bed 7                                                                                             

Private MD:                                                                                       

ED Physician Dmitri Buitrago                                                                     

HPI:                                                                                              

                                                                                             

20:39 This 58 yrs old  Female presents to ER via Ambulatory with complaints of       pm1 

      Headache, right side of chest numbness.                                                     

20:39 The patient complains of pain to the right side of head. The patient describes the      pm1 

      headache as aching. Onset: The symptoms/episode began/occurred today. Associated signs      

      and symptoms: Pertinent positives: reports right sided breast numbness, Pertinent           

      negatives: dizziness, fever, nausea, rash, vomiting, weakness. Severity of symptoms: in     

      the emergency department the pain is unchanged. The symptoms are alleviated by nothing.     

      the symptoms are aggravated by nothing. The patient has not experienced similar             

      symptoms in the past. The patient has not recently seen a physician. Patient also           

      complaining of five days of burning with urination.                                         

                                                                                                  

Historical:                                                                                       

- Allergies:                                                                                      

20:07 Zithromax;                                                                              aj1 

20:07 tramadol;                                                                               aj1 

20:07 Toradol;                                                                                aj1 

20:07 Erythromycin;                                                                           aj1 

20:07 Demerol;                                                                                aj1 

20:07 Darvocet-N 100;                                                                         aj1 

- Home Meds:                                                                                      

20:07 insulin 70/30 [Active]; Coreg Oral [Active]; ernestro [Active]; Seroquel Oral [Active]; aj1 

      Methadone Oral [Active];                                                                    

- PMHx:                                                                                           

20:07 Anxiety; Cancer, Breast; Chronic pain; Colitis; COPD; Depression;                       aj1 

20:51 Diabetes - IDDM; Liver cell carcinoma; MUSCLE WEAKNESS;                                 ak1 

- PSHx:                                                                                           

20:51 Appendectomy; Hysterectomy; Cholecystectomy; ; bilateral mastectomy; breast    ak1 

      reconstructive surgery; Liver Ablation;                                                     

                                                                                                  

- Immunization history:: Flu vaccine is not up to date.                                           

- Social history:: Smoking status: Patient uses tobacco products, smokes one-half pack            

  cigarettes per day.                                                                             

- Ebola Screening: : Patient denies travel to an Ebola-affected area in the 21 days               

  before illness onset.                                                                           

                                                                                                  

                                                                                                  

ROS:                                                                                              

20:39 Constitutional: Negative for fever, chills, and weight loss, Eyes: Negative for injury, pm1 

      pain, redness, and discharge, ENT: Negative for injury, pain, and discharge, Neck:          

      Negative for injury, pain, and swelling, Cardiovascular: Negative for chest pain,           

      palpitations, and edema, Respiratory: Negative for shortness of breath, cough,              

      wheezing, and pleuritic chest pain, Abdomen/GI: Negative for abdominal pain, nausea,        

      vomiting, diarrhea, and constipation, Back: Negative for injury and pain, MS/Extremity:     

      Negative for injury and deformity, Skin: Negative for injury, rash, and discoloration.      

20:39 : Positive for burning with urination.                                                    

20:39 Neuro: Positive for headache, right breast numbness, Negative for dizziness, weakness.      

                                                                                                  

Exam:                                                                                             

20:39 Constitutional:  This is a well developed, well nourished patient who is awake, alert,  pm1 

      and in no acute distress. Head/Face:  Normocephalic, atraumatic. Eyes:  Pupils equal        

      round and reactive to light, extra-ocular motions intact.  Lids and lashes normal.          

      Conjunctiva and sclera are non-icteric and not injected.  Cornea within normal limits.      

      Periorbital areas with no swelling, redness, or edema. ENT:  Nares patent. No nasal         

      discharge, no septal abnormalities noted.  Tympanic membranes are normal and external       

      auditory canals are clear.  Oropharynx with no redness, swelling, or masses, exudates,      

      or evidence of obstruction, uvula midline.  Mucous membranes moist. Neck:  Trachea          

      midline, no thyromegaly or masses palpated, and no cervical lymphadenopathy.  Supple,       

      full range of motion without nuchal rigidity, or vertebral point tenderness.  No            

      Meningismus. Chest/axilla:  Normal chest wall appearance and motion.  Nontender with no     

      deformity.  No lesions are appreciated. Cardiovascular:  Regular rate and rhythm with a     

      normal S1 and S2.  No gallops, murmurs, or rubs.  No pulse deficits. Respiratory:           

      Lungs have equal breath sounds bilaterally, clear to auscultation and percussion.  No       

      rales, rhonchi or wheezes noted.  No increased work of breathing, no retractions or         

      nasal flaring. Abdomen/GI:  Soft, non-tender, with normal bowel sounds.  No distension      

      or tympany.  No guarding or rebound.  No evidence of tenderness throughout. Back:  No       

      spinal tenderness.  No costovertebral tenderness.  Full range of motion. Skin:  Warm,       

      dry with normal turgor.  Normal color with no rashes, no lesions, and no evidence of        

      cellulitis. MS/ Extremity:  Pulses equal, no cyanosis.  Neurovascular intact.  Full,        

      normal range of motion.                                                                     

20:39 Neuro: Orientation: is normal, Motor: is normal, moves all fours, Sensation: is normal,     

      no obvious gross deficits, Gait: is steady, at a normal pace, without difficulty.           

                                                                                                  

Vital Signs:                                                                                      

20:07  / 91; Pulse 98; Resp 20; Temp 98.6(O); Pulse Ox 100% on R/A; Weight 63.5 kg (R); aj1 

      Height 5 ft. 4 in. (162.56 cm) (R); Pain 7/10;                                              

22:06  / 84; Pulse 88; Resp 16; Temp 98.5; Pulse Ox 100% on R/A;                        ak1 

20:07 Body Mass Index 24.03 (63.50 kg, 162.56 cm)                                             aj1 

                                                                                                  

MDM:                                                                                              

20:23 Patient medically screened.                                                             pm1 

20:39 Data reviewed: vital signs. Data interpreted: Pulse oximetry: on room air is 100 %.     pm1 

      Interpretation: normal.                                                                     

21:59 Refusal of service: The patient/guardian displays adequate decision making capability   pm1 

      and despite a detailed discussion of alternatives, benefits, risks, and consequences        

      refuses: all lab tests, Patient does not want recollection of her lab work. Patient's       

      headache has resolved and she wants to go home. Will give the patient prescription          

      medications for UTI.                                                                        

22:01 Counseling: I had a detailed discussion with the patient and/or guardian regarding: the pm1 

      historical points, exam findings, and any diagnostic results supporting the                 

      discharge/admit diagnosis, the need for outpatient follow up, to return to the              

      emergency department if symptoms worsen or persist or if there are any questions or         

      concerns that arise at home.                                                                

                                                                                                  

                                                                                             

20:27 Order name: LFT's                                                                       pm1 

                                                                                             

20:27 Order name: Basic Metabolic Panel                                                       pm1 

                                                                                             

20:27 Order name: CBC with Diff; Complete Time: 22:02                                         pm1 

                                                                                             

20:27 Order name: Urine Microscopic Only; Complete Time: 21:15                                pm1 

                                                                                             

20:27 Order name: XRAY Chest (1 view)                                                         pm1 

                                                                                             

20:27 Order name: CT Head Brain wo Cont                                                       pm1 

                                                                                             

20:49 Order name: Urine Dipstick--Ancillary (enter results); Complete Time: 21:48             cm6 

                                                                                             

21:01 Order name: CBC Smear Scan; Complete Time: 22:02                                        [facility]

                                                                                             

21:16 Order name: Urine Culture                                                               [facility]

                                                                                             

20:27 Order name: EKG; Complete Time: 20:29                                                   pm1 

                                                                                             

20:27 Order name: Cardiac monitoring; Complete Time: 21:15                                    pm1 

                                                                                             

20:27 Order name: EKG - Nurse/Tech; Complete Time: 21:15                                      pm1 

                                                                                             

20:27 Order name: IV Saline Lock; Complete Time: 20:52                                        pm1 

                                                                                             

20:27 Order name: Labs collected and sent; Complete Time: 20:51                               pm1 

                                                                                             

20:27 Order name: O2 Per Protocol; Complete Time: 20:51                                       pm1 

                                                                                             

20:27 Order name: O2 Sat Monitoring; Complete Time: 20:51                                     pm1 

                                                                                             

20:27 Order name: Urine Dipstick-Ancillary (obtain specimen); Complete Time: 20:51            pm1 

                                                                                                  

Administered Medications:                                                                         

22:05 Not Given (Patient Refused; pt removed her IV): Rocephin 1 grams IV at calculated rate  ak1 

      once; Given slow IV push per pharmacy instructions                                          

                                                                                                  

                                                                                                  

Disposition:                                                                                      

19 22:01 Discharged to Home. Impression: Headache, Urinary tract infection, site not        

  specified, Paresthesia of skin.                                                                 

- Condition is Stable.                                                                            

- Discharge Instructions: General Headache Without Cause, Paresthesia, Urinary Tract              

  Infection, Adult.                                                                               

- Prescriptions for Macrobid 100 mg Oral Capsule - take 1 capsule by ORAL route every             

  12 hours for 10 days; 20 capsule.                                                               

- Medication Reconciliation Form, Thank You Letter, Antibiotic Education, Prescription            

  Opioid Use form.                                                                                

- Follow up: Emergency Department; When: As needed; Reason: Worsening of condition.               

  Follow up: Private Physician; When: 2 - 3 days; Reason: Recheck today's complaints,             

  Continuance of care, Re-evaluation by your physician.                                           

- Problem is new.                                                                                 

- Symptoms have improved.                                                                         

                                                                                                  

                                                                                                  

                                                                                                  

Addendum:                                                                                         

2019                                                                                        

     04:47 Co-signature as Attending Physician, Dmitri Buitrago MD I agree with the assessment and t
w4

           plan of care.                                                                          

                                                                                                  

Signatures:                                                                                       

Dispatcher [employer]                           Ursula Oro RN                     RN   aj1                                                  

Krenek, Fiorella, RN                       RN   ak1                                                  

Michael Garcia, NP                    NP   pm1                                                  

Dmitri Buitrago MD MD   tw4                                                  

                                                                                                  

Corrections: (The following items were deleted from the chart)                                    

                                                                                             

22:05 20:27 Urine Pregnancy Test ordered. pm1                                                 ak1 

22:14 22:01 2019 22:01 Discharged to Home. Impression: Headache; Urinary tract          ak1 

      infection, site not specified; Paresthesia of skin. Condition is Stable. Forms are          

      Medication Reconciliation Form, Thank You Letter, Antibiotic Education, Prescription        

      Opioid Use. Follow up: Emergency Department; When: As needed; Reason: Worsening of          

      condition. Follow up: Private Physician; When: 2 - 3 days; Reason: Recheck today's          

      complaints, Continuance of care, Re-evaluation by your physician. Problem is new.           

      Symptoms have improved. pm1                                                                 

                                                                                                  

**************************************************************************************************

## 2019-07-21 NOTE — XMS REPORT
Patient Summary Document

 Created on:2019



Patient:VALENTE GROVER

Sex:Female

:1960

External Reference #:[id number]





Demographics







 Address  [address] [place] [address]

 

 Home Phone  [phone]

 

 Email Address  NONE

 

 Preferred Language  Unknown

 

 Marital Status  Unknown

 

 Scientologist Affiliation  Unknown

 

 Race  Unknown

 

 Additional Race(s)  Unavailable

 

 Ethnic Group  Unknown









Author







 Organization  [facility]

 

 Address  [address] [place] [address]

 

 Phone  [phone]









Care Team Providers







 Name  Role  Phone

 

 SAMUEL SKAGGS  Unavailable  Unavailable

 

 ROSE KITCHEN  Unavailable  Unavailable

 

 SMITHA MCMAHAN  Unavailable  Unavailable









Problems

This patient has no known problems.



Allergies, Adverse Reactions, Alerts

This patient has no known allergies or adverse reactions.



Medications

This patient has no known medications.



Results







 Test Description  Test Time  Test Comments  Text Results  Atomic Results  
Result Comments









 ALPHA FETOPROTEIN (AFP), TUMOR MARKER  2019 16:30:00    









   Test Item  Value  Reference Range  Comments









 ALPHA-FETOPROTEIN (BEAKER) (test code=1094)  10.9 ng/mL  <10.0  



HEPATIC FUNCTION [id number] 16:13:00





 Test Item  Value  Reference Range  Comments

 

 TOTAL PROTEIN (BEAKER) (test code=770)  7.2 gm/dL  6.0-8.3  

 

 ALBUMIN (BEAKER) (test code=1145)  3.5 g/dL  3.5-5.0  

 

 BILIRUBIN TOTAL (BEAKER) (test code=377)  0.8 mg/dL  0.2-1.2  

 

 BILIRUBIN DIRECT (BEAKER) (test code=706)  0.4 mg/dL  0.1-0.5  

 

 ALKALINE PHOSPHATASE (BEAKER) (test code=346)  107 U/L    

 

 AST (SGOT) (BEAKER) (test code=353)  31 U/L  5-34  

 

 ALT (SGPT) (BEAKER) (test code=347)  15 U/L  6-55  



BASIC METABOLIC [id number] 16:13:00





 Test Item  Value  Reference Range  Comments

 

 SODIUM (BEAKER) (test  138 meq/L  136-145  



 axcc=024)      

 

 POTASSIUM (BEAKER) (test  3.8 meq/L  3.5-5.1  



 code=379)      

 

 CHLORIDE (BEAKER) (test  103 meq/L    



 code=382)      

 

 CO2 (BEAKER) (test  27 meq/L  22-29  



 code=355)      

 

 BLOOD UREA NITROGEN  7 mg/dL  7-21  



 (BEAKER) (test code=354)      

 

 CREATININE (BEAKER) (test  0.78 mg/dL  0.57-1.25  



 code=358)      

 

 GLUCOSE RANDOM (BEAKER)  226 mg/dL    



 (test code=652)      

 

 CALCIUM (BEAKER) (test  9.2 mg/dL  8.4-10.2  



 code=697)      

 

 EGFR (BEAKER) (test  76 mL/min/1.73 sq m    ESTIMATED GFR IS NOT AS



 code=1092)      ACCURATE AS CREATININE



       CLEARANCE IN PREDICTING



       GLOMERULAR FILTRATION



       RATE. ESTIMATED GFR IS



       NOT APPLICABLE FOR



       DIALYSIS PATIENTS.



PROTHROMBIN TIME/[id number] 16:05:00





 Test Item  Value  Reference Range  Comments

 

 PROTIME (BEAKER) (test code=759)  14.9 seconds  11.7-14.7  

 

 INR (BEAKER) (test code=370)  1.2  <=5.9  



RECOMMENDED COUMADIN/WARFARIN INR THERAPY RANGESSTANDARD DOSE: 2.0 - 3.0   
Includes: PROPHYLAXIS forvenous thrombosis, systemic embolization; TREATMENT 
for venous thrombosis and/or pulmonary embolus.HIGH RISK: Target INR is 2.5-3.5 
for patients with mechanical heart valves.CBC W/PLT COUNT &amp; AUTO 
[id number] 15:57:00





 Test Item  Value  Reference Range  Comments

 

 WHITE BLOOD CELL COUNT (BEAKER) (test code=775)  3.3 K/ L  3.5-10.5  

 

 RED BLOOD CELL COUNT (BEAKER) (test code=761)  3.88 M/ L  3.93-5.22  

 

 HEMOGLOBIN (BEAKER) (test code=410)  10.4 GM/DL  11.2-15.7  

 

 HEMATOCRIT (BEAKER) (test code=411)  34.3 %  34.1-44.9  

 

 MEAN CORPUSCULAR VOLUME (BEAKER) (test code=753)  88.4 fL  79.4-94.8  

 

 MEAN CORPUSCULAR HEMOGLOBIN (BEAKER) (test  26.8 pg  25.6-32.2  



 zmgh=733)      

 

 MEAN CORPUSCULAR HEMOGLOBIN CONC (BEAKER) (test  30.3 GM/DL  32.2-35.5  



 code=752)      

 

 RED CELL DISTRIBUTION WIDTH (BEAKER) (test  17.0 %  11.7-14.4  



 code=412)      

 

 PLATELET COUNT (BEAKER) (test code=756)  42 K/CU MM  150-450  

 

 MEAN PLATELET VOLUME (BEAKER) (test code=754)  10.6 fL  9.4-12.3  

 

 NUCLEATED RED BLOOD CELLS (BEAKER) (test  0 /100 WBC  0-0  



 code=413)      

 

 NEUTROPHILS RELATIVE PERCENT (BEAKER) (test  68 %    



 code=429)      

 

 LYMPHOCYTES RELATIVE PERCENT (BEAKER) (test  25 %    



 code=430)      

 

 MONOCYTES RELATIVE PERCENT (BEAKER) (test  7 %    



 code=431)      

 

 EOSINOPHILS RELATIVE PERCENT (BEAKER) (test  1 %    



 code=432)      

 

 BASOPHILS RELATIVE PERCENT (BEAKER) (test  0 %    



 code=437)      

 

 NEUTROPHILS ABSOLUTE COUNT (BEAKER) (test  2.21 K/ L  1.56-6.13  



 code=670)      

 

 LYMPHOCYTES ABSOLUTE COUNT (BEAKER) (test  0.80 K/ L  1.18-3.74  



 code=414)      

 

 MONOCYTES ABSOLUTE COUNT (BEAKER) (test code=415)  0.22 K/ L  0.24-0.36  

 

 EOSINOPHILS ABSOLUTE COUNT (BEAKER) (test  0.03 K/ L  0.04-0.36  



 code=416)      

 

 BASOPHILS ABSOLUTE COUNT (BEAKER) (test code=417)  0.01 K/ L  0.01-0.08  

 

 IMMATURE GRANULOCYTES-RELATIVE PERCENT (BEAKER)  0 %  0-1  



 (test code=2801)      



Stress Test - Treadmill ONLY [place] [address]    Patient Name : VALENTE GROVER  
       MR #: [id number]   : 1960         Age/Sex: 57/F      Acct # : 
[id number]           Adm Physician  : ROSE KITCHEN MD       Admit Date  :         Location : [facility]    Room/Bed : [place] [address]          ____________________
_______________________________________________________________________________
     REPORT: Cardiology Report       DATE OF STUDY:  2018       
LEXISCAN MYOVIEW      The patient had resting perfusion images after an 
injection of11    millicuries of technetium-99m Myoview.  Later, due to 
inability to    exercise, she was given Lexiscan 0.4 mg intravenously, and 
shortly    afterwards 33 millicuries of technetium-99m Myoview.  Perfusion 
images were    taken by rotational tomography.      Comparison resting and 
Lexiscan stress images show no evidence of any    perfusion defect.  Uptake is 
smooth and regular throughout.  No suggestion    of any scar or any ischemia.  
Additionally, gaited wall motion images were    obtained and calculated 
ejection fraction normal at 54% without regional    wall motion abnormality.   
     FINAL IMPRESSION    1.  Normal Lexiscan Myoview for perfusion.   2.  
Normal left ventricular function, calculated ejection fraction 54%.            
     DD:  2018 16:58   DT:  2018 18:08   Job#:  [id number]      cc:
   ROSE KITCHEN MD           Signature                     Date                 
          Dictated By: OLIVIA ESTRADA MD  Transcribed By: EDS on 18   &
lt;Electronically signed by OLIVIA ESTRADA MD&gt;&lt;&lt;Signature on File&gt;&
gt;18 7183    COPY TO:CT CHEST WO    [place] [address]  Patient Name: VALENTE GROVER   MR #: [id number]    : 1960 Age/Sex: 57/F  Acct #: 
[id number] Req #: [id number]  [facility] Physician: ROSE KITCHEN MD    Ordered by: ROSE KITCHEN MD  Report #: [id number]   Location: [facility]  Room/Bed: [place] [address]    ______
________________________________________________________________________________
_____________    Procedure: [id number] CT/CT CHEST WO  Exam Date: 18      
                      Exam Time: 1115       REPORT STATUS: Signed    PROCEDURE: 
CT CHEST WITHOUT CONTRAST   CT scan of the chest WITHOUT intravenous contrast, 
using standard    protocol.       TECHNIQUE:   The chest was scanned utilizing 
a multidetector helical scanner from    the apex to the level of the adrenal 
glands.  No IV contrast was    administered as per physician request.  Coronal 
and sagittal    multiplanar reformations were obtained.       COMPARISON: None.
       INDICATIONS:   CHEST PAIN           FINDINGS:   Lines/tubes:  None.     
  Lungs and Airways:  The lungs and airways are normal with no focal    
abnormality demonstrated. Right upper lobe scarring. Bibasilar    
dependentatelectasis.       Pleura: The pleural spaces are clear.       Heart 
and mediastinum:  The thyroid gland is normal. No significant    mediastinal, 
hilar or axillary lymphadenopathy is seen. The heart and    pericardium are 
within normal limits.       Soft tissues: Normal.       Abdomen: Nodular 
contour of the liver. The spleen is enlarged,    measuring 17 cm in AP length. 
Multiple splenic and esophageal varices    are partially visualized. Trace 
ascites is present in the right upper    quadrant. Theadrenal glands are 
normal.       Bones: The visualized bony thorax is within normal limits.        
IMPRESSION:        1. No acute abnormality of the chest.    2. Hepatic 
parenchymal appearance consistent with cirrhotic morphology.        3. 
Splenomegaly and varices consistent with portal hypertension.          Dictated 
by:  Rose Newton M.D. on 2018 at 13:18        Electronically approved by:
  Rose Newton M.D. on 2018 at 13:18                Dictated By: ROSE NEWTON MD  Electronically Signed By: ROSE NEWTON MD on 18 1318  
Transcribed By: PILAR on 18 1318       [facility] W/[facility]    [place] [address]  Patient Name: VALENTE GROVER   MR #
: [id number]    : 1960 Age/Sex: 56/F  Acct #: [id number] Req #: [id number]
[id number]  Adm Physician:     Ordered by: SMITHA MCMAHAN MD  Report #: [id number]
-0006 Location: ER  Room/Bed:     ______________________________________________
_____________________________________________________    Procedure: [id number] DX
/ABDOMEN ACUTE SERIES W/PA CXR  Exam Date: 10/06/17                            
Exam Time: 0210       REPORT STATUS: Signed    EXAM: ABDOMEN ACUTE SERIES W/PA 
CXR, supine and erect views of the abdomen, AP   view of the chest   DATE: 10/6/
2017 1:29 AM  Time stamp on exam: 0155 hours   INDICATION: Abdominal pain   
COMPARISON: CT of the abdomen and pelvis 2017      FINDINGS:   LINES/
TUBES: None      LUNGS: No consolidations or edema.       PLEURA: No effusions 
or pneumothorax.      HEART AND MEDIASTINUM: Normal size and contour.      
BOWEL PATTERN: Non-obstructed bowel gas pattern.      BONES AND SOFT TISSUES: 
No acute bone findings. No abnormal calcifications. No   mass effect. Bilateral 
chest surgical clips. Surgical clips right upperquadrant of the abdomen.      
IMPRESSION:   No acute thoracic abnormality.      No evidence for bowel 
obstruction.               Signed by: Dr. Akiko Garcia M.D. on 10/6/2017 2
:40 AM        Dictated By: AKIKO GARCIA MD  Electronically Signed By: AKIKO GARCIA MD on 10/06/17 0240  Transcribed By: CLIFFORD on 10/06/17 0240       
COPY TO:   SMITHA MCMAHAN MD

## 2019-07-21 NOTE — ER
Nurse's Notes                                                                                     

 [facility]                                                                 

Name: Deborah Alatorre                                                                              

Age: 58 yrs                                                                                       

Sex: Female                                                                                       

: 1960                                                                                   

MRN: [id number]                                                                                   

Arrival Date: 2019                                                                          

Time: 20:01                                                                                       

Account#: [id number]                                                                            

Bed 7                                                                                             

Private MD:                                                                                       

Diagnosis: Headache;Urinary tract infection, site not specified;Paresthesia of skin               

                                                                                                  

Presentation:                                                                                     

                                                                                             

20:04 Presenting complaint: Patient states: "My chest is numb on the right side and my head   aj1 

      is hurting" Reports the numbness started at 1600 today and the headache started an hour     

      ago. Patient also reports that she is having some swelling to her legs and abdomen.         

      Denies SOB. Transition of care: patient was not received from another setting of care.      

      Onset of symptoms was 2019. Risk Assessment: Do you want to hurt yourself or       

      someone else? Patient reports no desire to harm self or others. Initial Sepsis Screen:      

      Does the patient meet any 2 criteria? HR > 90 bpm. No. Patient's initial sepsis screen      

      is negative. Does the patient have a suspected source of infection? No. Patient's           

      initial sepsis screen is negative. Care prior to arrival: None.                             

20:04 Method Of Arrival: Ambulatory                                                           aj1 

20:04 Acuity: CASEY 3                                                                           aj1 

                                                                                                  

Triage Assessment:                                                                                

20:07 Headache History: Denies prior headaches. General: Appears in no apparent distress.     aj1 

      comfortable, Behavior is calm, cooperative, appropriate for age. Pain: Complains of         

      pain in forehead Pain does not radiate. Pain currently is 7 out of 10 on a pain scale.      

      Pain began 1 hour ago. Also complains of no other associated symptoms. Neuro: Level of      

      Consciousness is awake, alert, obeys commands, Oriented to person, place, time,             

      situation, Reports headache. Cardiovascular: Patient's skin is warm and dry.                

      Respiratory: Airway is patent Respiratory effort is even, unlabored, Respiratory            

      pattern is regular, symmetrical.                                                            

                                                                                                  

Historical:                                                                                       

- Allergies:                                                                                      

20:07 Zithromax;                                                                              aj1 

20:07 tramadol;                                                                               aj1 

20:07 Toradol;                                                                                aj1 

20:07 Erythromycin;                                                                           aj1 

20:07 Demerol;                                                                                aj1 

20:07 Darvocet-N 100;                                                                         aj1 

- Home Meds:                                                                                      

20:07 insulin 70/30 [Active]; Coreg Oral [Active]; ernestro [Active]; Seroquel Oral [Active]; aj1 

      Methadone Oral [Active];                                                                    

- PMHx:                                                                                           

20:07 Anxiety; Cancer, Breast; Chronic pain; Colitis; COPD; Depression;                       aj1 

20:51 Diabetes - IDDM; Liver cell carcinoma; MUSCLE WEAKNESS;                                 ak1 

- PSHx:                                                                                           

20:51 Appendectomy; Hysterectomy; Cholecystectomy; ; bilateral mastectomy; breast    ak1 

      reconstructive surgery; Liver Ablation;                                                     

                                                                                                  

- Immunization history:: Flu vaccine is not up to date.                                           

- Social history:: Smoking status: Patient uses tobacco products, smokes one-half pack            

  cigarettes per day.                                                                             

- Ebola Screening: : Patient denies travel to an Ebola-affected area in the 21 days               

  before illness onset.                                                                           

                                                                                                  

                                                                                                  

Screenin:14 Abuse screen: Denies threats or abuse. Denies injuries from another. Nutritional        ak1 

      screening: No deficits noted. Tuberculosis screening: No symptoms or risk factors           

      identified. Fall Risk None identified.                                                      

                                                                                                  

Assessment:                                                                                       

20:48 General: Appears in no apparent distress. comfortable, Behavior is calm, cooperative.   ak1 

      Pain: Complains of pain in chest and face and forehead. Neuro: Level of Consciousness       

      is awake, alert, obeys commands, Oriented to person, place, time, situation,  are      

      equal bilaterally Moves all extremities. Gait is steady, Speech is normal, Facial           

      symmetry appears normal, Numbness in chest pt was able to feel provider touching her        

      chest during assessment. . Cardiovascular: Reports chest pain, Heart tones S1 S2            

      present Capillary refill < 3 seconds. Respiratory: No deficits noted. GI: No signs          

      and/or symptoms were reported involving the gastrointestinal system. : No signs           

      and/or symptoms were reported regarding the genitourinary system. EENT: No signs and/or     

      symptoms were reported regarding the EENT system. Derm: Reports numbness to chest. pt       

      able to feel sensation with eyes closed when provider assessed numbness to chest area.      

      Musculoskeletal: Circulation, motion, and sensation intact. pt with steady gait to ER       

      restroom.                                                                                   

22:02 Reassessment: pt refused recollect lab work. pt stated she is ready to go home. ERP     ak1 

      notified.                                                                                   

22:06 Reassessment: pt refused IV antibiotics for UTI, pt removed her own IV. pt left with    ak1 

      steady gait to ER lobby where she stated her friend was waiting.                            

                                                                                                  

Vital Signs:                                                                                      

20:07  / 91; Pulse 98; Resp 20; Temp 98.6(O); Pulse Ox 100% on R/A; Weight 63.5 kg (R); aj1 

      Height 5 ft. 4 in. (162.56 cm) (R); Pain 7/10;                                              

22:06  / 84; Pulse 88; Resp 16; Temp 98.5; Pulse Ox 100% on R/A;                        ak1 

20:07 Body Mass Index 24.03 (63.50 kg, 162.56 cm)                                             aj1 

                                                                                                  

ED Course:                                                                                        

20:01 Patient arrived in ED.                                                                  ag3 

20:05 Triage completed.                                                                       aj1 

20:07 Arm band placed on Patient placed in an exam room.                                      aj1 

20:11 Michael Garcia NP is PHCP.                                                           pm1 

20:11 Dmitri Buitrago MD is Attending Physician.                                            pm1 

20:13 Fiorella Nova, RN is Primary Nurse.                                                     ak1 

20:14 Patient has correct armband on for positive identification.                             ak1 

20:47 Initial lab(s) drawn, by me, sent to lab. Urine collected: clean catch specimen, clear. ak1 

      Inserted saline lock: 22 gauge in right hand, using aseptic technique. Blood collected.     

20:50 No provider procedures requiring assistance completed.                                  ak1 

20:51 XRAY Chest (1 view) Sent.                                                               ak1 

20:53 XRAY Chest (1 view) In Process Unspecified.                                             EDMS

20:59 CT Head Brain wo Cont In Process Unspecified.                                           EDMS

22:14 IV discontinued, intact, bleeding controlled, No redness/swelling at site. Pressure     ak1 

      dressing applied.                                                                           

                                                                                                  

Administered Medications:                                                                         

22:05 Not Given (Patient Refused; pt removed her IV): Rocephin 1 grams IV at calculated rate  ak1 

      once; Given slow IV push per pharmacy instructions                                          

                                                                                                  

                                                                                                  

Outcome:                                                                                          

22:01 Discharge ordered by MD.                                                                pm1 

22:14 Discharged to home ambulatory, with friend.                                             ak1 

22:14 Condition: good                                                                             

22:14 Discharge instructions given to patient, Instructed on discharge instructions, follow       

      up and referral plans. no drinking with medication, no driving heavy equipment,             

      medication usage, Demonstrated understanding of instructions, follow-up care,               

      medications, Prescriptions given X 1.                                                       

22:14 Patient left the ED.                                                                    ak1 

                                                                                                  

Signatures:                                                                                       

Dispatcher [employer]                           EDMS                                                 

Ursula Garcia RN RN   aj                                                  

Fiorella Nova RN                       RN   ak1                                                  

Marinas, Michael, NP                    NP   pm1                                                  

Laboy, Barbi                                 ag3                                                  

                                                                                                  

**************************************************************************************************

## 2019-07-22 NOTE — RAD REPORT
EXAM DESCRIPTION:  CT - Head Brain Wo Cont - 7/21/2019 9:38 pm

 

CLINICAL HISTORY:  58 years   Female   HEADACHE

 

COMPARISON:  None

 

TECHNIQUE:  Images were obtained in axial, sagittal, and coronal planes.

This exam was performed according to our departmental dose-optimization program which includes use of
 Automated Exposure Control, adjustment of the mA and/or kV according to patient size and/or use of i
terative reconstruction technique.

 

FINDINGS:  Ventricular system appears normal.

No abnormal areas of increased or decreased attenuation are seen involving the brain parenchyma.

No evidence for skull fracture. Unremarkable paranasal sinuses. Sclerotic changes left mastoid air ce
lls.

 

IMPRESSION:  No acute intracranial abnormality. No evidence for hemorrhage, mass lesion, or large acu
te infarction.

 

Electronically signed by:   Judi Callahan MD   7/21/2019 9:33 PM CDT Workstation: [id number]

 

 

 

 

Due to temporary technical issues with the PACS/Fluency reporting system, reports are being signed by
 the in house radiologist as a courtesy to ensure prompt reporting. The interpreting radiologist is f
ully responsible for the content of the report.

## 2019-07-22 NOTE — RAD REPORT
EXAM DESCRIPTION:  Chanel Single View7/21/2019 8:53 pm

 

CLINICAL HISTORY:  Chest pain

 

COMPARISON:  September 2018

 

FINDINGS:   The lungs appear clear of acute infiltrate. Calcified lung granulomas.

 

The heart is normal size

 

IMPRESSION:   No acute abnormalities displayed

## 2019-07-22 NOTE — EKG
Test Date:    2019-07-21               Test Time:    20:58:53

Technician:   EDWAR                                     

                                                     

MEASUREMENT RESULTS:                                       

Intervals:                                           

Rate:         93                                     

LA:           190                                    

QRSD:         126                                    

QT:           422                                    

QTc:          524                                    

Axis:                                                

P:            69                                     

LA:           190                                    

QRS:          8                                      

T:            86                                     

                                                     

INTERPRETIVE STATEMENTS:                                       

                                                     

Normal sinus rhythm

Left bundle branch block

Abnormal ECG

Compared to ECG 09/26/2018 12:29:33

Left bundle-branch block now present

Sinus tachycardia no longer present



Electronically Signed On 07-22-19 10:16:09 CDT by Louis Grullon

## 2020-07-14 ENCOUNTER — HOSPITAL ENCOUNTER (EMERGENCY)
Dept: HOSPITAL 97 - ER | Age: 60
Discharge: HOME | End: 2020-07-14
Payer: COMMERCIAL

## 2020-07-14 VITALS — DIASTOLIC BLOOD PRESSURE: 64 MMHG | OXYGEN SATURATION: 94 % | SYSTOLIC BLOOD PRESSURE: 104 MMHG | TEMPERATURE: 98.2 F

## 2020-07-14 DIAGNOSIS — Z85.05: ICD-10-CM

## 2020-07-14 DIAGNOSIS — L03.116: ICD-10-CM

## 2020-07-14 DIAGNOSIS — Z88.5: ICD-10-CM

## 2020-07-14 DIAGNOSIS — F34.1: ICD-10-CM

## 2020-07-14 DIAGNOSIS — Z88.1: ICD-10-CM

## 2020-07-14 DIAGNOSIS — L03.115: ICD-10-CM

## 2020-07-14 DIAGNOSIS — F17.210: ICD-10-CM

## 2020-07-14 DIAGNOSIS — I10: ICD-10-CM

## 2020-07-14 DIAGNOSIS — Z85.3: ICD-10-CM

## 2020-07-14 DIAGNOSIS — Z90.13: ICD-10-CM

## 2020-07-14 DIAGNOSIS — E11.65: Primary | ICD-10-CM

## 2020-07-14 DIAGNOSIS — Z88.3: ICD-10-CM

## 2020-07-14 LAB
BUN BLD-MCNC: 13 MG/DL (ref 7–18)
GLUCOSE SERPLBLD-MCNC: 532 MG/DL (ref 74–106)
HCT VFR BLD CALC: 34.2 % (ref 36–45)
LYMPHOCYTES # SPEC AUTO: 2 K/UL (ref 0.7–4.9)
MORPHOLOGY BLD-IMP: (no result)
PMV BLD: 9.4 FL (ref 7.6–11.3)
POTASSIUM SERPL-SCNC: 4.1 MMOL/L (ref 3.5–5.1)
RBC # BLD: 3.68 M/UL (ref 3.86–4.86)

## 2020-07-14 PROCEDURE — 83605 ASSAY OF LACTIC ACID: CPT

## 2020-07-14 PROCEDURE — 71045 X-RAY EXAM CHEST 1 VIEW: CPT

## 2020-07-14 PROCEDURE — 36415 COLL VENOUS BLD VENIPUNCTURE: CPT

## 2020-07-14 PROCEDURE — 84145 PROCALCITONIN (PCT): CPT

## 2020-07-14 PROCEDURE — 85025 COMPLETE CBC W/AUTO DIFF WBC: CPT

## 2020-07-14 PROCEDURE — 80048 BASIC METABOLIC PNL TOTAL CA: CPT

## 2020-07-14 PROCEDURE — 82010 KETONE BODYS QUAN: CPT

## 2020-07-14 PROCEDURE — 96374 THER/PROPH/DIAG INJ IV PUSH: CPT

## 2020-07-14 PROCEDURE — 99284 EMERGENCY DEPT VISIT MOD MDM: CPT

## 2020-07-14 PROCEDURE — 82947 ASSAY GLUCOSE BLOOD QUANT: CPT

## 2020-07-14 PROCEDURE — 93970 EXTREMITY STUDY: CPT

## 2020-07-14 PROCEDURE — 87040 BLOOD CULTURE FOR BACTERIA: CPT

## 2020-07-14 NOTE — ER
Nurse's Notes                                                                                     

 [facility]                                                                 

Name: Deborah Alatorre                                                                              

Age: 59 yrs                                                                                       

Sex: Female                                                                                       

: 1960                                                                                   

MRN: [id number]                                                                                   

Arrival Date: 2020                                                                          

Time: 10:23                                                                                       

Account#: [id number]                                                                            

Bed 27                                                                                            

Private MD: Sarah Barone C                                                                  

Diagnosis: Hyperglycemia, unspecified;Cellulitis of right lower limb;Cellulitis of left lower limb

                                                                                                  

Presentation:                                                                                     

                                                                                             

10:51 Coronavirus screen: Patient reports a cough. Patient denies shortness of breath or      dm5 

      difficulty breathing. Patient reports a measured and/or subjective temperature greater      

      than 100.4F. Patient denies travel on a cruise ship or to a country the [facility][note 3] currently       

      lists as an affected area. Patient denies contact with known and/or suspected case of       

      COVID-19. Ebola Screen: Patient negative for fever greater than or equal to 101.5           

      degrees Fahrenheit, and additional compatible Ebola Virus Disease symptoms Patient          

      denies exposure to infectious person. Patient denies travel to an Ebola-affected area       

      in the 21 days before illness onset. No symptoms or risks identified at this time.          

      Initial Sepsis Screen: Does the patient meet any 2 criteria? No. Patient's initial          

      sepsis screen is negative. Does the patient have a suspected source of infection? No.       

      Patient's initial sepsis screen is negative. Risk Assessment: Do you want to hurt           

      yourself or someone else? Patient reports no desire to harm self or others. Onset of        

      symptoms was 2020.                                                                  

10:51 Method Of Arrival: Wheelchair                                                           dm5 

10:51 Acuity: CASEY 3                                                                           dm5 

10:52 Chief complaint: Patient states: Pt reports cough, fever, high blood sugar, lower       dm5 

      extremity swelling/redness/warmth for 2 weeks. Went to [facility]ai's office and was sent        

      here for evaluation.                                                                        

                                                                                                  

Historical:                                                                                       

- Allergies:                                                                                      

10:56 Zithromax;                                                                              dm5 

10:56 tramadol;                                                                               dm5 

10:56 Toradol;                                                                                dm5 

10:56 Erythromycin;                                                                           dm5 

10:56 Demerol;                                                                                dm5 

10:56 Darvocet-N 100;                                                                         dm5 

- Home Meds:                                                                                      

10:56 Methadone Oral [Active]; Seroquel Oral [Active]; Coreg Oral [Active]; ernestro [Active];dm5 

- PMHx:                                                                                           

10:56 Anxiety; Cancer, Breast; Chronic pain; Colitis; COPD; Depression; Diabetes - IDDM;      dm5 

      Liver cell carcinoma; MUSCLE WEAKNESS; CHF; Hypertension;                                   

- PSHx:                                                                                           

10:56 Cholecystectomy; ; Mastectomy, Left; Mastectomy, Right; Appendectomy;          dm5 

      Hysterectomy;                                                                               

                                                                                                  

- Immunization history:: Adult Immunizations up to date.                                          

- Social history:: Smoking status: Patient reports the use of cigarette tobacco                   

  products, smokes one-half pack cigarettes per day.                                              

                                                                                                  

                                                                                                  

Vital Signs:                                                                                      

10:51  / 64; Pulse 77; Resp 18; Temp 98.2; Pulse Ox 94% ; Weight 72.57 kg; Height 5 ft. dm5 

      3 in. (160.02 cm); Pain 10/10;                                                              

14:30  / 67; Pulse 71; Pulse Ox 95% on R/A;                                             vc  

10:51 Body Mass Index 28.34 (72.57 kg, 160.02 cm)                                             dm5 

                                                                                                  

ED Course:                                                                                        

10:23 Patient arrived in ED.                                                                  am2 

10:24 Sarah Barone FNP is Private Physician.                                            am2 

10:28 [facility] Pad[facility]                                                        kb  

10:28 Sukhwinder Her MD is Attending Physician.                                             kb  

10:52 Triage completed.                                                                       dm5 

10:57 Arm band placed on right wrist.                                                         dm5 

13:21 Roxane Mcleod, RN is Primary Nurse.                                                    ss  

13:24 US Extremity Venous W Compression Theron In Process Unspecified.                           EDMS

13:48 Chest Single View XRAY In Process Unspecified.                                          EDMS

13:50 Inserted saline lock: 22 gauge in left antecubital area, using aseptic technique. Blood jp3 

      collected.                                                                                  

13:52 Initial lab(s) drawn, by me, sent to lab. First set of blood cultures drawn by me,      jp3 

      X-ray(s) taken. Patient maintains SpO2 saturation greater than 95% on room air.             

14:05 Second set of blood cultures drawn by me.                                               jp3 

14:10 Placed in gown. Bed in low position. Call light in reach. Verbal reassurance given.     jp3 

      Pulse ox on. NIBP on.                                                                       

                                                                                                  

Administered Medications:                                                                         

13:45 Drug: Insulin Regular Human 10 units {Co-Signature: vc (Christie Lane RN).} Route:    ah  

      IVP; Site: right antecubital;                                                               

14:36 Not Given (Patient Refused): NS 0.9% 1000 ml IV at 1000 ml once                           

                                                                                                  

                                                                                                  

Point of Care Testing:                                                                            

      Blood Glucose:                                                                              

10:57 Blood Glucose: 493 mg/dL;                                                               dm5 

      Ranges:                                                                                     

                                                                                                  

Outcome:                                                                                          

14:46 Discharge ordered by MD.                                                                  

14:55 Patient left the ED.                                                                    jp3 

                                                                                                  

Signatures:                                                                                       

Dispatcher [employer]                           EDMarietta Solano FNP-C FNP-Renita Trevino RN RN   dm5                                                  

Roxane Mcleod RN RN                                                      

Natali Barnett Jacob                              jp3                                                  

Christie Lane RN RN vc Harris, Amy, RN RN                                                      

Christie Lane RN, vc                                                   

                                                                                                  

**************************************************************************************************

## 2020-07-14 NOTE — XMS REPORT
Continuity of Care Document

                            Created on:2020



Patient:VALENTE GROVER

Sex:Female

:1960

External Reference #:[id number]





Demographics







                          Address                   [address] 



                                                    [place] [address]

 

                          Home Phone                [phone]

 

                          Work Phone                [phone]

 

                          Mobile Phone              [phone]

 

                          Email Address             [email or web address]

 

                          Preferred Language        English

 

                          Marital Status            Unknown

 

                          Evangelical Affiliation     Unknown

 

                          Race                      Unknown

 

                          Additional Race(s)        Unavailable



                                                    Unavailable



                                                    Unavailable



                                                    White

 

                          Ethnic Group              Unknown









Author







                          Organization              [facility]

t

 

                          Address                   [address]



                                                    [place] [address]

 

                          Phone                     [phone]









Support







                Name            Relationship    Address         Phone

 

                Raheem          Other           Unavailable     [phone]

 

                Sneha         Relative        Unavailable     [phone]

 

                Raheem          Mother          [address] [phone]

[phone]



                                                [place] [address] 

 

                one else per patient Unavailable     Unavailable     Unavailable









Care Team Providers







                    Name                Role                Phone

 

                    MARIA ELENA               Primary Care Physician Unavailable

 

                    MARIA ELENA               Attending Clinician Unavailable

 

                    ANG SARABIA     Attending Clinician Unavailable

 

                    SIMA LEAVITT          Attending Clinician Unavailable

 

                    LISHA Gay        Attending Clinician [phone]

 

                    Singer WILDER           Attending Clinician [phone]

 

                    Kenan ARRIAGA            Attending Clinician [phone]

 

                    Dioni Stahl MD  Attending Clinician [phone]

 

                    NATALIE SKAGGS    Attending Clinician Unavailable

 

                    WECHSLER            Attending Clinician Unavailable

 

                    FIDELINA                Attending Clinician Unavailable

 

                    TONY MCMAHAN      Attending Clinician Unavailable

 

                    FIDELINA                Admitting Clinician Unavailable









Payers







           Payer Name Policy     Policy Number Effective  Expiration Source



                      Type                  Date       Date       

 

           MEDICARE PART A AND B            [id number] 2007            



                                            00:00:00              

 

           [place]ICA[place]            [id number]  2017            



           TRADITIONAL STAR PLUS                       00:00:00              



           [facility]                                                    

 

           MEDICAREMEDICARE PART            xxxxxxxxxxx 2007            [facility]                            00:00:00              [facility]



           [id number]                                             



           [employer] CastanedaMedicare                                             

 

           [facility] [place]ICAID[place]            xxxxxxxxx  2019            [place]

[place]



           COMM STAR+                       00:00:00              [facility]



           [id number]-                                             



           [facility]                                             

 

           [employer]            [id number]  2018            [facility]

s



                                            00:00:00              - Patients



                                                                  Medical



                                                                  Center

 

           [employer]            [id number]  2017            [facility] 

[facility]



                                            00:00:00              - Patients



                                                                  Medical



                                                                  Center







Problems







       Condition Condition Condition Status Onset  Resolution Last   Treating Co

mments 

Source



       Name   Details Category        Date   Date   Treatment Clinician        



                                                 Date                 

 

       Cirrhosis Cirrhosis Disease Active                       [facility]

 St



                                   2-                        [facility]



                                   00:00:                      t & Plan: Medical



                                   00                          Diagnosis Center



                                                               based on 



                                                               the    



                                                               laborator 



                                                               y      



                                                               parameter 



                                                               s and  



                                                               imaging. 



                                                               Etiology 



                                                               is likely 



                                                               due to 



                                                               HBV/HCV. 



                                                               Her    



                                                               condition 



                                                               has    



                                                               decompens 



                                                               ated with 



                                                               features 



                                                               of portal 



                                                               hypertens 



                                                               ion.   



                                                               Cirrhosis 



                                                               guideline 



                                                               s      



                                                               reviewed. 

 

       Hepatocell Hepatocell Disease Active                       Last   C

[facility]



       ular   ular                                         [facility]



       carcinoma carcinoma               00:00:                      t & Plan: M

edical



                                   00                          Diagnosis Center



                                                               of HCC in 



                                                               2018 



                                                               s/p    



                                                               radiofreq 



                                                               uency  



                                                               ablation. 



                                                               She was 



                                                               referred 



                                                               for liver 



                                                               transplan 



                                                               t      



                                                               evaluatio 



                                                               n at this 



                                                               time but 



                                                               expressed 



                                                               desire 



                                                               not to 



                                                               follow 



                                                               through.T 



                                                               here is 



                                                               no     



                                                               evidence 



                                                               of     



                                                               residual 



                                                               tumor on 



                                                               recent 



                                                               imaging. 



                                                               Extensive 



                                                               discussio 



                                                               n      



                                                               provided 



                                                               that   



                                                               liver  



                                                               transplan 



                                                               t is the 



                                                               only   



                                                               definitiv 



                                                               e      



                                                               treatment 



                                                               for HCC 



                                                               and    



                                                               recurrenc 



                                                               e is   



                                                               likely. 



                                                               She    



                                                               understan 



                                                               ds and 



                                                               maintains 



                                                               her    



                                                               position. 

 

       Immunity Immunity Disease Active                       Last   [facility]

t



       status status               2                        [facility]



       testing testing               00:00:                      t & Plan: Medic

al



                                   00                          All    Center



                                                               patients 



                                                               with   



                                                               chronic 



                                                               liver  



                                                               disease 



                                                               should be 



                                                               immunized 



                                                               to     



                                                               prevent 



                                                               hepatitis 



                                                               A and  



                                                               hepatitis 



                                                               B.     



                                                               Previous 



                                                               serology 



                                                               indicates 



                                                               immunity 



                                                               to HAV. 



                                                               Recommend 



                                                               HBV    



                                                               booster 



                                                               which can 



                                                               be     



                                                               provided 



                                                               by     



                                                               primary 



                                                               care.  

 

       Hepatitis Hepatitis Disease Active                       [facility]



       C      C                    -                        [facility]



                                   00:00:                      t & Plan: Medical



                                   00                          HCV    Center



                                                               diagnosed 



                                                               in  



                                                               s/p    



                                                               partial 



                                                               treatment 



                                                               with   



                                                               Interfero 



                                                               n /    



                                                               Ribavirin 



                                                               . HCV RNA 



                                                               2018 



                                                               was    



                                                               undetecta 



                                                               ble. No 



                                                               further 



                                                               intervent 



                                                               ion    



                                                               necessary 



                                                               .      

 

       Chronic Chronic Disease Active                       Last   [facility]



       viral  viral                2-                        [facility]



       hepatitis hepatitis               00:00:                      t & Plan: M

edical



       B without B without               00                          She has [facility]

ter



       delta  delta                                            evidence 



       agent and agent and                                           of past 



       without without                                           HBV    



       coma   coma                                             infection 



                                                               without 



                                                               immunity. 



                                                               HBV DNA 



                                                               from May 



                                                               2018   



                                                               undetecta 



                                                               ble. She 



                                                               may    



                                                               benefit 



                                                               from   



                                                               vaccinati 



                                                               on which 



                                                               can be 



                                                               obtained 



                                                               by her 



                                                               primary 



                                                               care.  

 

       Hernia of Hernia of Disease Active                       [facility]



       anterior anterior               2-                        [facility]

es -



       abdominal abdominal               00:00:                      t & Plan: M

edical



       wall   wall                 00                          She has Center



                                                               an     



                                                               umbilical 



                                                               hernia 



                                                               that is 



                                                               being  



                                                               evaluated 



                                                               for    



                                                               repair. 



                                                               She has a 



                                                               34% one 



                                                               year   



                                                               mortality 



                                                               based on 



                                                               the [place] 



                                                               surgical 



                                                               risk   



                                                               score. In 



                                                               addition, 



                                                               she is 



                                                               Child-Pug 



                                                               h Class A 



                                                               giving 



                                                               her a 10% 



                                                               abdominal 



                                                               surgery 



                                                               barron-oper 



                                                               ative  



                                                               mortality 



                                                               risk.  

 

       Heart  Heart  Disease Active                       Last   [facility]



       murmur murmur               2                        [facility]



                                   00:00:                      t & Plan: Medical



                                   00                          Physical Center



                                                               examinati 



                                                               on     



                                                               revealed 



                                                               an     



                                                               audible 



                                                               fixed S2 



                                                               split on 



                                                               cardiac 



                                                               examinati 



                                                               on which 



                                                               may be 



                                                               secondary 



                                                               to a   



                                                               bundle 



                                                               branch 



                                                               block. We 



                                                               recommend 



                                                               cardiolog 



                                                               y      



                                                               consultat 



                                                               ion prior 



                                                               to     



                                                               procedure 



                                                               .      

 

       Lower  Lower  Disease Active                       Last   [facility]



       extremity extremity               2-                        [facility]

ukes -



       edema  edema                00:00:                      t & Plan: Medical



                                   00                          [facility]



                                                               carlos lower 



                                                               extremity 



                                                               edema on 



                                                               physical 



                                                               examinati 



                                                               on. We 



                                                               ordered a 



                                                               venous 



                                                               doppler 



                                                               of the 



                                                               lower  



                                                               extremity 



                                                               to assess 



                                                               for    



                                                               venous 



                                                               thrombosi 



                                                               s.     



                                                               Previousl 



                                                               y on   



                                                               Furosemid 



                                                               e 20 mg 



                                                               daily. We 



                                                               write for 



                                                               a refill. 

 

       Chest pain Chest pain Problem Active                                    C

[facility]



                                                                      [facility]



                                                                      Patient



                                                                      s



                                                                      Medical



                                                                      Center







Allergies, Adverse Reactions, Alerts







       Allergy Allergy Status Severity Reaction(s) Onset  Inactive Treating Comm

ents 

Source



       Name   Type                        Date   Date   Clinician        

 

       Erythrom Propensi Active                                     [facility]



       ycin   ty to                                               [facility]



              adverse                      00:00:                      Medical



              reaction                      00                          Center



              s                                                       

 

       Ketorola Propensi Active                                     [facility]



       c      ty to                                               [facility]



              adverse                      00:00:                      Medical



              reaction                      00                          Center



              s                                                       

 

       Propoxyp Allergy Active        Migraines                       [facility]



       hene   to                                                  [facility]



              Substanc                      00:00:                      Patient



              e                           00                          s



                                                                      Medical



                                                                      Center

 

       Azithrom Allergy Active        Rash                         [facility]



       ycin   to                                                  [facility]



              Substanc                      00:00:                      Patient



              e                           00                          [facility]

 

       Tramadol Allergy Active        Insomnia for                       C

[facility]



              to                   days                           [facility]



              Substanc                      00:00:                      Patient



              e                           00                          [facility]

 

       PROPOXYP DRUG   Active                                     MD PASCUAL                               5-18                        Anderso



       N-ACETAM                             00:00:                      n



       INOPHEN                             00                          

 

       PROPOXYP DRUG   Active                                     MD PASCUAL                               5-18                        Anderso



       N-ACETAM                             00:00:                      n



       INOPHEN                             00                          

 

       Azithrom Propensi Active        Rash, Other                       C

[facility]



       ycin   ty to                (See                           [facility]



              adverse               Comments) 00:00:                      Medica

l



              reaction                      00                          Center



              s                                                       

 

       Tramadol Propensi Active        Other (See                Other  [facility]

[facility]



              ty to                Comments)                  reaction( [facility]

s -



              adverse                      00:00:               s):    Medical



              reaction                      00                   [facility]



              s                                                for    



                                                               daysUnabl 



                                                               e to   



                                                               sleep  

 

       Toradol Adverse Active        Info Not                             [facility]



              Reaction               Available                             [facility]

 

       Zithroma Adverse Active        Info Not                             [facility]

t



       x      Reaction               Available                             [facility]

 

       Tramadol Adverse Active        Info Not                             [facility]

t



       HCl    Reaction               Available                             [facility]

 

       Erythrom Adverse Active        Info Not                             [facility]

t



       ycin   Reaction               Available                             [facility]

 

       Demerol Adverse Active        Info Not                             [facility]



              Reaction               Available                             [facility]

 

       Darvocet Adverse Active        Info Not                             [facility]

t



       -N 100 Reaction               Available                             [facility]

 

       Azithrom Adverse Active        Info Not                             [facility]

t



       ycin   Reaction               Available                             [facility]







Social History







           Social Habit Start Date Stop Date  Quantity   Comments   Source

 

           History of tobacco                       Cigarette Smoker            

[facility]



           use                                                    [facility]

 

           History [facility]

 -



           Alcohol Std Drinks                                             Medica

[facility]

 

           History [facility]

 -



           Alcohol Binge                                             Medical [facility]

ter

 

           Sex Assigned At                                             [facility]



           Birth                                                  [facility]

 

           Alcohol intake 2019 Lifetime              [place]



                      00:00:00   00:00:00   non-drinker            [facility]



                                            (finding)             

 

           Cigarettes smoked 2019                       [facility]



           current (pack per 00:00:00   00:00:00                         [facility]

 Center



           day) - Reported                                             

 

           Cigarette  2019                       [facility] -



           pack-years 00:00:00   00:00:00                         [facility]

 

           History SDOH 2019 1                     [facility]

 -



           Alcohol Frequency 00:00:00   00:00:00                         [facility]

 Center









                Smoking Status  Start Date      Stop Date       Source

 

                Current every day smoker 2019 00:00:00                 [facility]







Medications







       Ordered Filled Start  Stop   Current Ordering Indication Dosage Frequency

 Signature

                    Comments            Components          Source



     Medication Medication Date Date Medication? Clinician                (SIG) 

          



     Name Name                                                   

 

     Lactulose Lactulose       Yes  Alfredo                1 packet       

    [facility]



               1-20           [facility]                               [facility]



               00:00:                                              [facility]



               00                                                l



                                                                 Outpati



                                                                 ent



                                                                 Clinics

 

     Valsartan Valsartan       Yes  Alfredo                1 tablet       

    [facility]



               1-20           [facility]                               [facility]



               00:00:                                              [facility]



               00                                                l



                                                                 Outpati



                                                                 ent



                                                                 Clinics

 

     insulin NPH            Yes            35U       Inject 35           C

[facility]



     100 unit/mL      2-21                               Units           [facility] -



     (3 mL) InPn      13:37:                               subcutaneo           

Medical



               55                                 usly 2           Center



                                                  (two)           



                                                  times           



                                                  daily           



                                                  before           



                                                  meals           



                                                  NOVOLIN .           

 

     methadone            Yes            100mg QD   Take 100           [facility]



     HCl       2-21                               mg by           [facility]



     (METHADONE      13:33:                               mouth           Medica

l



     ORAL)      34                                 daily.           Center

 

     insulin            Yes            30U       Inject 30           [facility]

t



     lispro      2-21                               Units           [facility]



     (HUMALOG)      13:33:                               subcutaneo           Me

dical



     100 unit/mL      34                                 usly 3           Center



     injection                                         (three)           



                                                  times           



                                                  daily           



                                                  before           



                                                  meals.           

 

     sertraline            Yes            100mg QD   Take 100           [facility]

[facility]



     (ZOLOFT)      2-21                               mg by           [facility]



     100 MG      13:33:                               mouth           Medical



     tablet      33                                 daily.           Center

 

     metFORMIN            Yes            500mg      Take 500           [facility]



     (GLUCOPHAGE      2-21                               mg by           [facility]



     ) 500 MG      13:33:                               mouth 2           Medica

l



     tablet      33                                 (two)           Center



                                                  times           



                                                  daily with           



                                                  breakfast           



                                                  and            



                                                  dinner.           

 

     furosemide      2020- No        Lower 20mg QD   Take 1           [facility]



     (LASIX) 20      2-21 02-21           extremity           tablet (20        

   [facility]



     MG tablet      00:00: 23:59           edema           mg total)           M

edical



               00   :00                           by mouth           Center



                                                  daily.           

 

     Quetiapine Quetiapine           Yes  Alfredo                not            

[facility]



     Fumarate Fumarate                [facility]                defined           [facility]

s -



                                                                 [facility]



                                                                 l



                                                                 Outpati



                                                                 ent



                                                                 Clinics

 

     Carvedilol Carvedilol           Yes  Alfredo                not            

[facility]



                              [facility]                defined           [facility] -



                                                                 [facility]



                                                                 l



                                                                 Outpati



                                                                 ent



                                                                 Clinics

 

     Furosemide Furosemide           Yes  Alfredo                not            

[facility]



                              [facility]                defined           [facility]



                                                                 [facility]



                                                                 l



                                                                 Outpati



                                                                 ent



                                                                 Clinics

 

     NovoLog Mix NovoLog Mix           Yes  Alfredo                not          

  [facility]



     70/30 70/30                [facility]                defined           [facility]



     Flexpen Flexpen                                                   [facility]



                                                                 l



                                                                 Outpati



                                                                 ent



                                                                 Clinics

 

     Alprazolam Alprazolam           Yes            .25       Twice A           

[facility]



     (Xanax) (Xanax)                                    Day            [facility]



     0.25 Mg 0.25 Mg                                                   Patient



     Tablet Tablet                                                   s



                                                                 Medical



                                                                 Center

 

     Hydrocodone Hydrocodone           Yes            1         Every 4         

  [facility]



     Bit/Acetami Bit/Acetami                                    Hours as        

   [facility]



     nophen nophen                                    needed for           [facility]

nt



     (Norco (Norco                                    Pain           s



                                                         Medical



     Tablet) 1 Tablet) 1                                                   [facility]

r



     Each Tablet Each Tablet                                                   

 

     Insulin Insulin           Yes            35        Every 12           [facility]

t.



     Detemir Detemir                                    Hours           [facility]



     (Levemir) (Levemir)                                                   [facility]

nt



     100 Unit/1 100 Unit/1                                                   s



     Ml Vial Ml Vial                                                   Medical



                                                                 Center

 

     Quetiapine Quetiapine           Yes            50        Bedtime           

[facility]



     Fumarate Fumarate                                                   [facility]



     (Seroquel) (Seroquel)                                                   Pat

ient



     25 Mg 25 Mg                                                   s



     Tablet Tablet                                                   Medical



                                                                 Center

 

     Insulin Insulin      2018- No                                      [facility]



     Aspart Aspart                                               [facility]



     (Novolog (Novolog      00:00                                         [facility]

t



     Mix 70-30 Mix 70-30      :00                                          s



     Vial) 100 Vial) 100                                                   Medic

al



     Units/Ml Units/Ml                                                   Center



     Ml, Unknown Ml, Unknown                                                   



     Dose Dose                                                   

 

     Insulin Insulin      2018- No                                      [facility]



     Glargine Glargine                                               [facility] 

-



     (Lantus) (Lantus)      00:00                                         [facility]

t



     100  100       :00                                          s



     Units/Ml Units/Ml                                                   Medical



     Ml, Unknown Ml, Unknown                                                   C

enter



     Dose Dose                                                   

 

     Trazodone Trazodone      2018- No                       Daily           [facility]



     Hcl 50 Mg Hcl 50 Mg                                               [facility]

s -



     Tablet, Tablet,      00:00                                         Patient



     Unknown Unknown      :00                                          s



     Dose  Oral Dose  Oral                                                   Med

[facility]



                                                                 Center







Procedures







                Procedure       Date / Time Performed Performing Clinician [facility]

e

 

                Computed tomography of 2018 00:00:00 [facility]

t. [facility]



                chest without contrast                                 Patients 

Medical



                                                                Center







Plan of Care







             Planned Activity Planned Date Details      Comments     Source

 

             Future Scheduled 2020   INFLUENZA VACCINE              [facility]

[facility]



             Test         00:00:00     [code = INFLUENZA              



                                       VACCINE]                  

 

             Future Scheduled 2011-10-22   BREAST CANCER              [facility]

thodist



             Test         00:00:00     SCREENING [code =              



                                       BREAST CANCER              



                                       SCREENING]                

 

             Future Scheduled 2011-10-22   COLONOSCOPY SCREENING              

yess[facility]



             Test         00:00:00     [code = COLONOSCOPY              



                                       SCREENING]                

 

             Future Scheduled 2011-10-22   SHINGLES VACCINES              Jerto

n [facility]



             Test         00:00:00     (#1) [code = SHINGLES              



                                       VACCINES (#1)]              

 

             Future Scheduled 1982-10-22   Screening for              [place] [facility]

[facility]



             Test         00:00:00     malignant neoplasm of              



                                       cervix (procedure)              



                                       [code = 063864001]              

 

             Future Scheduled 1971-10-22   DIABETIC FOOT EXAM              [place]

on [facility]



             Test         00:00:00     [code = DIABETIC FOOT              



                                       EXAM]                     

 

             Future Scheduled 1961   DIABETIC RETINAL EYE              [facility]le

[facility]



             Test         00:00:00     EXAM [code = DIABETIC              



                                       RETINAL EYE EXAM]              







Encounters







        Start   End     Encounter Admission Attending Care    Care    Encounter 

Source



        Date/Time Date/Time Type    Type    Clinicians Facility Department ID   

   

 

        2020 Outpatient ANGELICA ARREDONDOCARLTON     [id number]

841 MD



        00:00:00 00:00:00                 TARSHA mercado

 

        2020 Outpatient ANGELICA      [place] SARABIA     [id number]

[id number] MD



        00:00:00 00:00:00                 NORRIS mercado

 

        2020 Outpatient ANGELICA      DAGMARCARLTON     [id number]

635 MD



        00:00:00 00:00:00                 JASEN mercado

 

        2020 Emergency                 [place]     MDA     [id number]

93 MD



        08:14:08 08:14:08                                                 Guillermo mercado

 

        2020 Emergency         Mahamed Bain [facility]    [id number] 

[id number] 



        19:10:30 01:03:00                 LISHA Rich [id number]         



                                                [place] [id number]         



                                                [place]  [phone]         



                                                        084             

 

        2020 Emergency         Singer [facility]    [id number]

[id number] 



        12:20:39 14:51:00                 Pk Rich [id number]         



                                                [place] [id number]         



                                                [place]  [phone]         



                                                        084             

 

        2020 Transition         Pilar Grayson  [id number]

[id number] 



        00:00:00 00:00:00 of Care         Mayra Hutchins   [id number]         



                                                [place]   [id number]         



                                                        [phone]         



                                                        403             

 

        2020 Outpatient                 [place] [address]

[id number] [facility]



        13:42:00 13:42:00                         t Bone  Bone and         [facility]

 -



                                                and [facility]           [facility]

a



                                                [facility]         ent



                                                                        [facility]

 

        2020 Outpatient                 Neli Roberson 28

[id number] [facility]



        08:30:00 08:30:00                         t Bone  Bone and         [facility]

 -



                                                and Joint Joint           [facility]

a



                                                [facility]         ent



                                                                        Clinics

 

        2018 Outpatient EL WECHSLER, MDA     [facility]     [id number]

[id number] MD



        00:00:00 00:00:00                 ADRIAN Li

o



                                                                        n

 

        2018 Discharged ER      ROSE KITCHEN [facility]  A00

[id number] [facility]



        01:06:00 13:30:00 Inpatient                                 07      [facility]

s -



                        (obs)                                           [facility]

 

        2017-11-10 2017 [facility]  [id number]

[address] [facility]



        19:37:00 01:16:00 Emergency                                 41      [place]

s -



                        Room                                            Patient



                                                                        [facility]

 

        2017 [facility]  [id number]

[address] [facility]



        17:55:00 02:48:00 Emergency                                 [address]      [place]

s -



                        Room                                            Patient



                                                                        [facility]

 

        2017-10-06 2017-10-06 Registered ER      [facility]  A00

[id number] [facility]



        01:09:00 01:09:00 Emergency         SMITHA                 [address]      [place]

s -



                        Room                                            Patient



                                                                        [facility]

 

        2017 [facility]  [id number]

[address] [facility]



        12:49:00 19:28:00 Emergency                                 [address]      [place]

s -



                        Room                                            Patient



                                                                        [facility]







Results







           Test Description Test Time  Test Comments Results    Result Comments 

Source









                    ALPHA FETOPROTEIN (AFP), TUMOR MARKER 2019 16:30:00 









                      Test Item  Value      Reference Range Interpretation Comme

nts









             ALPHA-FETOPROTEIN (BEAKER) (test code = 1094) 10.9 ng/mL   <10.0   

     H            



HEPATIC FUNCTION [id number] 16:13:00





             Test Item    Value        Reference Range Interpretation Comments

 

             TOTAL PROTEIN (BEAKER) (test code = 7.2 gm/dL    6.0-8.3           

        



             770)                                                

 

             ALBUMIN (BEAKER) (test code = 1145) 3.5 g/dL     3.5-5.0           

        

 

             BILIRUBIN TOTAL (BEAKER) (test code 0.8 mg/dL    0.2-1.2           

        



             = 377)                                              

 

             BILIRUBIN DIRECT (BEAKER) (test 0.4 mg/dL    0.1-0.5               

    



             code = 706)                                         

 

             ALKALINE PHOSPHATASE (BEAKER) (test 107 U/L                  

        



             code = 346)                                         

 

             AST (SGOT) (BEAKER) (test code = 31 U/L       5-34                 

     



             353)                                                

 

             ALT (SGPT) (BEAKER) (test code = 15 U/L       6-55                 

     



             347)                                                



BASIC METABOLIC [id number] 16:13:00





             Test Item    Value        Reference Range Interpretation Comments

 

             SODIUM (BEAKER) 138 meq/L    136-145                   



             (test code = 381)                                        

 

             POTASSIUM (BEAKER) 3.8 meq/L    3.5-5.1                   



             (test code = 379)                                        

 

             CHLORIDE (BEAKER) 103 meq/L                        



             (test code = 382)                                        

 

             CO2 (BEAKER) (test 27 meq/L     22-29                     



             code = 355)                                         

 

             BLOOD UREA NITROGEN 7 mg/dL      7-21                      



             (BEAKER) (test code                                        



             = 354)                                              

 

             CREATININE (BEAKER) 0.78 mg/dL   0.57-1.25                 



             (test code = 358)                                        

 

             GLUCOSE RANDOM 226 mg/dL           H            



             (BEAKER) (test code                                        



             = 652)                                              

 

             CALCIUM (BEAKER) 9.2 mg/dL    8.4-10.2                  



             (test code = 697)                                        

 

             EGFR (BEAKER) (test 76 mL/min/1.73                           ESTIMA

NIKOLAI GFR IS



             code = 1092) sq m                                   NOT AS ACCURATE

 AS



                                                                 CREATININE



                                                                 CLEARANCE IN



                                                                 PREDICTING



                                                                 GLOMERULAR



                                                                 FILTRATION RATE

.



                                                                 ESTIMATED GFR I

S



                                                                 NOT APPLICABLE 

FOR



                                                                 DIALYSIS PATIEN

TS.



PROTHROMBIN TIME/[id number] 16:05:00





             Test Item    Value        Reference Range Interpretation Comments

 

             PROTIME (BEAKER) (test code = 14.9 seconds 11.7-14.7    H          

  



             759)                                                

 

             INR (BEAKER) (test code = 370) 1.2          <=5.9                  

   



RECOMMENDED COUMADIN/WARFARIN INR THERAPY RANGESSTANDARD DOSE: 2.0 - 3.0   
Includes: PROPHYLAXIS forvenous thrombosis, systemic embolization; TREATMENT for
venous thrombosis and/or pulmonary embolus.HIGH RISK: Target INR is 2.5-3.5 for 
patients with mechanical heart valves.CBC W/PLT COUNT &amp; AUTO DIFFERENTIAL
2019 15:57:00





             Test Item    Value        Reference Range Interpretation Comments

 

             WHITE BLOOD CELL COUNT (BEAKER) 3.3 K/ L     3.5-10.5     L        

    



             (test code = 775)                                        

 

             RED BLOOD CELL COUNT (BEAKER) 3.88 M/ L    3.93-5.22    L          

  



             (test code = 761)                                        

 

             HEMOGLOBIN (BEAKER) (test code = 10.4 GM/DL   11.2-15.7    L       

     



             410)                                                

 

             HEMATOCRIT (BEAKER) (test code = 34.3 %       34.1-44.9            

     



             411)                                                

 

             MEAN CORPUSCULAR VOLUME (BEAKER) 88.4 fL      79.4-94.8            

     



             (test code = 753)                                        

 

             MEAN CORPUSCULAR HEMOGLOBIN 26.8 pg      25.6-32.2                 



             (BEAKER) (test code = 751)                                        

 

             MEAN CORPUSCULAR HEMOGLOBIN CONC 30.3 GM/DL   32.2-35.5    L       

     



             (BEAKER) (test code = 752)                                        

 

             RED CELL DISTRIBUTION WIDTH 17.0 %       11.7-14.4    H            



             (BEAKER) (test code = 412)                                        

 

             PLATELET COUNT (BEAKER) (test code 42 K/CU MM   150-450      L     

       



             = 756)                                              

 

             MEAN PLATELET VOLUME (BEAKER) 10.6 fL      9.4-12.3                

  



             (test code = 754)                                        

 

             NUCLEATED RED BLOOD CELLS (BEAKER) 0 /100 WBC   0-0                

       



             (test code = 413)                                        

 

             NEUTROPHILS RELATIVE PERCENT 68 %                                  

 



             (BEAKER) (test code = 429)                                        

 

             LYMPHOCYTES RELATIVE PERCENT 25 %                                  

 



             (BEAKER) (test code = 430)                                        

 

             MONOCYTES RELATIVE PERCENT 7 %                                    



             (BEAKER) (test code = 431)                                        

 

             EOSINOPHILS RELATIVE PERCENT 1 %                                   

 



             (BEAKER) (test code = 432)                                        

 

             BASOPHILS RELATIVE PERCENT 0 %                                    



             (BEAKER) (test code = 437)                                        

 

             NEUTROPHILS ABSOLUTE COUNT 2.21 K/ L    1.56-6.13                 



             (BEAKER) (test code = 670)                                        

 

             LYMPHOCYTES ABSOLUTE COUNT 0.80 K/ L    1.18-3.74    L            



             (BEAKER) (test code = 414)                                        

 

             MONOCYTES ABSOLUTE COUNT (BEAKER) 0.22 K/ L    0.24-0.36    L      

      



             (test code = 415)                                        

 

             EOSINOPHILS ABSOLUTE COUNT 0.03 K/ L    0.04-0.36    L            



             (BEAKER) (test code = 416)                                        

 

             BASOPHILS ABSOLUTE COUNT (BEAKER) 0.01 K/ L    0.01-0.08           

      



             (test code = 417)                                        

 

             IMMATURE GRANULOCYTES-RELATIVE 0 %          0-1                    

   



             PERCENT (BEAKER) (test code =                                      

  



             2801)                                               



Bedside [id number] 11:44:00





             Test Item    Value        Reference Range Interpretation Comments

 

             Bedside Glucose (test code = 06171-5) 310                 H  

          



Meter ID: [id number] [facility] CenterCreat[facility] Kin[facility] MB
2018 19:51:00





             Test Item    Value        Reference Range Interpretation Comments

 

             Creatine Kinase MB (test code = 0.80         0-5.0                 

    



             38487-7)                                            



[facility] -47-28 19:51:00





             Test Item    Value        Reference Range Interpretation Comments

 

             Troponin I (test code = VYW1714) -0.001       0-0.300              

     



[facility] HospitalCreatine Sdgqcz1236-72-84 19:44:00





             Test Item    Value        Reference Range Interpretation Comments

 

             Creatine Kinase (test code = 2157-6) 35                      

         



[facility] HospitalVitamin B12 Ymand1283-90-83 10:03:00





             Test Item    Value        Reference Range Interpretation Comments

 

             Vitamin B12 Level (test code = 60636-9) 789          213-816       

            



[facility] HospitalFolate2018-04-19 10:03:00





             Test Item    Value        Reference Range Interpretation Comments

 

             Folate (test code = 2284-8) 8.8          7.0-15.4                  



[facility] HospitalThyroid Stimulating Hormone (TSH)
2018 09:51:00





             Test Item    Value        Reference Range Interpretation Comments

 

             Thyroid Stimulating Hormone (TSH) (test 2.053        0.350-4.940   

            



             code = 73388-4)                                        



[facility] HospitalBlood Fgxraga0596-67-97 20:02:00





             Test Item    Value        Reference Range Interpretation Comments

 

             Blood Culture (test NO GROWTH AFTER 5                           



             code = 52687319) DAYS, FINAL REPORT                           



[facility] Epthrnb6327-24-85 20:02:00





             Test Item    Value        Reference Range Interpretation Comments

 

             Blood Culture (test NO GROWTH AFTER 5                           



             code = 29741602) DAYS, FINAL REPORT                           



[facility] Center[facility] Brami5800-54-10 00:57:00





             Test Item    Value        Reference Range Interpretation Comments

 

             Sodium Level (test code = 2951-2) 139          136-145             

      



[facility] HospitalPotassium Mcqjy3340-90-31 00:57:00





             Test Item    Value        Reference Range Interpretation Comments

 

             Potassium Level (test code = 2823-3) 2.9          3.5-5.1      LL  

         



Results called to [SAMMY RN/ER] at 0053 on 17 by Carlie Leiva. RB OK.
[facility] HospitalChloride Wkein2971-18-46 00:57:00





             Test Item    Value        Reference Range Interpretation Comments

 

             Chloride Level (test code = 2075-0) 109                 H    

        



[facility] HospitalCarbon Dioxide Qketn8310-22-60 00:57:00





             Test Item    Value        Reference Range Interpretation Comments

 

             Carbon Dioxide Level (test code = 20           22-29        L      

      



             -9)                                             



[facility] HospitalAnion Kac5168-24-18 00:57:00





             Test Item    Value        Reference Range Interpretation Comments

 

             Anion Gap (test code = 33037-3) 12.9         8-16                  

    



[facility] HospitalBlood Urea Weowtcvw9096-77-18 00:57:00





             Test Item    Value        Reference Range Interpretation Comments

 

             Blood Urea Nitrogen (test code = 6            7-26         L       

     



             3094-0)                                             



[facility] HospitalCreatinine2017-11-11 00:57:00





             Test Item    Value        Reference Range Interpretation Comments

 

             Creatinine (test code = 2160-0) 0.67         0.57-1.11             

    



[facility] HospitalBUN/Creatinine Tybiq1407-36-80 00:57:00





             Test Item    Value        Reference Range Interpretation Comments

 

             BUN/Creatinine Ratio (test code = 9            6-25                

      



             3097-3)                                             



[facility] HospitalEstimat Glomerular Filtration Rate
2017 00:57:00





             Test Item    Value        Reference Range Interpretation Comments

 

             Estimat Glomerular Filtration Rate 60-          >60                

       



             (test code = 07014-0)                                        



Ranges were taken from the National Kidney Disease Education Program and the 
National Kid[employer] Foundation literature.Reference ranges:60 or greater: Cpterr83-
59 (for 3 consecutive months): Chronic kidneydisease 15 or less: Kidney failure
[facility] HospitalGlucose Asqdn4860-96-35 00:57:00





             Test Item    Value        Reference Range Interpretation Comments

 

             Glucose Level (test code = HZW0971) 272                 H    

        



[facility] HospitalCalcium Iszkk9287-07-69 00:57:00





             Test Item    Value        Reference Range Interpretation Comments

 

             Calcium Level (test code = 08358-6) 8.1          8.4-10.2     L    

        



[facility] Centerodium Yjxei1170-98-36 00:57:00





             Test Item    Value        Reference Range Interpretation Comments

 

             Sodium Level (test code = 2951-2) 139          136-145             

      



[facility] HospitalPotassium Erwut1753-81-49 00:57:00





             Test Item    Value        Reference Range Interpretation Comments

 

             Potassium Level (test code = 2823-3) 2.9          3.5-5.1      LL  

         



Results called to [SAMMY RN/ER] at 0053 on 17 by Carlie Lieva. RB OK.
[facility] HospitalChloride Gfitc7180-35-52 00:57:00





             Test Item    Value        Reference Range Interpretation Comments

 

             Chloride Level (test code = 2075-0) 109                 H    

        



[facility] HospitalCarbon Dioxide Fldxq6601-24-25 00:57:00





             Test Item    Value        Reference Range Interpretation Comments

 

             Carbon Dioxide Level (test code = 20           22-29        L      

      



             8-9)                                             



[facility] HospitalAnion Nrw3274-27-58 00:57:00





             Test Item    Value        Reference Range Interpretation Comments

 

             Anion Gap (test code = 33037-3) 12.9         8-16                  

    



[facility] HospitalBlood Urea Ytlrnbgc6881-08-19 00:57:00





             Test Item    Value        Reference Range Interpretation Comments

 

             Blood Urea Nitrogen (test code = 6            7-26         L       

     



             3094-0)                                             



[facility] HospitalCreatinine2017-11-11 00:57:00





             Test Item    Value        Reference Range Interpretation Comments

 

             Creatinine (test code = 2160-0) 0.67         0.57-1.11             

    



[facility] HospitalBUN/Creatinine Fkzpm8547-27-66 00:57:00





             Test Item    Value        Reference Range Interpretation Comments

 

             BUN/Creatinine Ratio (test code = 9            6-25                

      



             3097-3)                                             



[facility] HospitalEstimat Glomerular Filtration Rate
2017 00:57:00





             Test Item    Value        Reference Range Interpretation Comments

 

             Estimat Glomerular Filtration Rate 60-          >60                

       



             (test code = 99628-2)                                        



Ranges were taken from the National Kidney Disease Education Program and the 
National Kidney Foundation literature.Reference ranges:60 or greater: Adbkut28-
59 (for 3 consecutive months): Chronic kidneydisease 15 or less: Kidney failure
[facility] HospitalGlucose Rylcl1278-76-28 00:57:00





             Test Item    Value        Reference Range Interpretation Comments

 

             Glucose Level (test code = NJZ9893) 272                 H    

        



[facility] HospitalCalcium Xhzkm4765-89-30 00:57:00





             Test Item    Value        Reference Range Interpretation Comments

 

             Calcium Level (test code = 23545-4) 8.1          8.4-10.2     L    

        



[facility] HospitalTo[facility] Bilirubin2017-11-10 21:06:00





             Test Item    Value        Reference Range Interpretation Comments

 

             Total Bilirubin (test code = 1975-2) 0.6          0.2-1.2          

         



[facility] HospitalAspartate Amino Transf (AST/SGOT)
2017-11-10 21:06:00





             Test Item    Value        Reference Range Interpretation Comments

 

             Aspartate Amino Transf (AST/SGOT) (test 50           5-34         H

            



             code = Aspartate Amino Transf                                      

  



             (AST/SGOT))                                         



[facility] HospitalAlanine Aminotransferase (ALT/SGPT)
2017-11-10 21:06:00





             Test Item    Value        Reference Range Interpretation Comments

 

             Alanine Aminotransferase (ALT/SGPT) 46           0-55              

        



             (test code = 1742-6)                                        



[facility] HospitalTotal Protein2017-11-10 21:06:00





             Test Item    Value        Reference Range Interpretation Comments

 

             Total Protein (test code = 2885-2) 7.3          6.5-8.1            

       



[facility] HospitalAlbumin2017-11-10 21:06:00





             Test Item    Value        Reference Range Interpretation Comments

 

             Albumin (test code = 1751-7) 3.1          3.5-5.0      L           

 



[facility] HospitalGlobulin2017-11-10 21:06:00





             Test Item    Value        Reference Range Interpretation Comments

 

             Globulin (test code = 50691-4) 4.2          2.3-3.5      H         

   



[facility] HospitalAlbumin/Globulin Ratio2017-11-10 21:06:00





             Test Item    Value        Reference Range Interpretation Comments

 

             Albumin/Globulin Ratio (test code = 0.7          0.8-2.0      L    

        



             1759-0)                                             



[facility] HospitalAlkaline Phosphatase2017-11-10 21:06:00





             Test Item    Value        Reference Range Interpretation Comments

 

             Alkaline Phosphatase (test code = 196                 H      

      



             6768-6)                                             



[facility] HospitalTotal Bilirubin2017-11-10 21:06:00





             Test Item    Value        Reference Range Interpretation Comments

 

             Total Bilirubin (test code = 1975-2) 0.6          0.2-1.2          

         



[facility] HospitalAspartate Amino Transf (AST/SGOT)
2017-11-10 21:06:00





             Test Item    Value        Reference Range Interpretation Comments

 

             Aspartate Amino Transf (AST/SGOT) (test 50           5-34         H

            



             code = Aspartate Amino Transf                                      

  



             (AST/SGOT))                                         



[facility] HospitalAlanine Aminotransferase (ALT/SGPT)
2017-11-10 21:06:00





             Test Item    Value        Reference Range Interpretation Comments

 

             Alanine Aminotransferase (ALT/SGPT) 46           0-55              

        



             (test code = 1742-6)                                        



[facility] Protein2017-11-10 21:06:00





             Test Item    Value        Reference Range Interpretation Comments

 

             Total Protein (test code = 2885-2) 7.3          6.5-8.1            

       



[facility] HospitalAlbumin2017-11-10 21:06:00





             Test Item    Value        Reference Range Interpretation Comments

 

             Albumin (test code = 1751-7) 3.1          3.5-5.0      L           

 



[facility] HospitalGlobulin2017-11-10 21:06:00





             Test Item    Value        Reference Range Interpretation Comments

 

             Globulin (test code = 14550-7) 4.2          2.3-3.5      H         

   



[facility] HospitalAlbumin/Globulin Ratio2017-11-10 21:06:00





             Test Item    Value        Reference Range Interpretation Comments

 

             Albumin/Globulin Ratio (test code = 0.7          0.8-2.0      L    

        



             1759-0)                                             



[facility] HospitalAlkaline Phosphatase2017-11-10 21:06:00





             Test Item    Value        Reference Range Interpretation Comments

 

             Alkaline Phosphatase (test code = 196                 H      

      



             6768-6)                                             



[facility] HospitalWhite Blood Count2017-11-10 20:53:00





             Test Item    Value        Reference Range Interpretation Comments

 

             White Blood Count (test code = 6690-2) 6.48         4.8-10.8       

           



[facility] HospitalRed Blood Count2017-11-10 20:53:00





             Test Item    Value        Reference Range Interpretation Comments

 

             Red Blood Count (test code = 789-8) 4.15         3.6-5.1           

        



[facility] HospitalHemoglobin2017-11-10 20:53:00





             Test Item    Value        Reference Range Interpretation Comments

 

             Hemoglobin (test code = 83304-6) 12.4         12.0-16.0            

     



[facility] HospitalHematocrit2017-11-10 20:53:00





             Test Item    Value        Reference Range Interpretation Comments

 

             Hematocrit (test code = 4544-3) 36.8         34.2-44.1             

    



[facility]2017[facility] 
20:53:00





             Test Item    Value        Reference Range Interpretation Comments

 

             Mean Corpuscular Volume (test code = 88.7         81-99            

         



             787-2)                                              



[facility] Hemoglobin2017-11-10 
20:53:00





             Test Item    Value        Reference Range Interpretation Comments

 

             Mean Corpuscular Hemoglobin (test code 29.9         28-32          

           



             = 785-6)                                            



[facility] Hemoglobin Concent
2017-11-10 20:53:00





             Test Item    Value        Reference Range Interpretation Comments

 

             Mean Corpuscular Hemoglobin Concent 33.7         31-35             

        



             (test code = 786-4)                                        



[facility] Width2017-11-10 
20:53:00





             Test Item    Value        Reference Range Interpretation Comments

 

             Red Cell Distribution Width (test code 14.0         11.7-14.4      

           



             = 93371-6)                                          



[facility] HospitalPlatelet Count2017-11-10 20:53:00





             Test Item    Value        Reference Range Interpretation Comments

 

             Platelet Count (test code = 777-3) 54           140-360      L     

       



[facility] HospitalNeutrophils (%) (Auto)2017-11-10 20:53:00





             Test Item    Value        Reference Range Interpretation Comments

 

             Neutrophils (%) (Auto) (test code = 72.3         38.7-80.0         

        



             94171-2)                                            



[facility] HospitalLymphocytes (%) (Auto)2017-11-10 20:53:00





             Test Item    Value        Reference Range Interpretation Comments

 

             Lymphocytes (%) (Auto) (test code = 21.1         18.0-39.1         

        



             736-9)                                              



[facility] HospitalMonocytes (%) (Auto)2017-11-10 20:53:00





             Test Item    Value        Reference Range Interpretation Comments

 

             Monocytes (%) (Auto) (test code = 5.1          4.4-11.3            

      



             5905-5)                                             



[facility] HospitalEosinophils (%) (Auto)2017-11-10 20:53:00





             Test Item    Value        Reference Range Interpretation Comments

 

             Eosinophils (%) (Auto) (test code = 0.9          0.0-6.0           

        



             713-8)                                              



[facility] HospitalBasophils (%) (Auto)2017-11-10 20:53:00





             Test Item    Value        Reference Range Interpretation Comments

 

             Basophils (%) (Auto) (test code = 0.3          0.0-1.0             

      



             706-2)                                              



[facility] HospitalIM GRANULOCYTES %2017-11-10 20:53:00





             Test Item    Value        Reference Range Interpretation Comments

 

             IM GRANULOCYTES % (test code = IM 0.3          0.0-1.0             

      



             GRANULOCYTES %)                                        



[facility] HospitalNeutrophils # (Auto)2017-11-10 20:53:00





             Test Item    Value        Reference Range Interpretation Comments

 

             Neutrophils # (Auto) (test code = 4.7          2.1-6.9             

      



             751-8)                                              



[facility] HospitalLymphocytes # (Auto)2017-11-10 20:53:00





             Test Item    Value        Reference Range Interpretation Comments

 

             Lymphocytes # (Auto) (test code = 1.4          1.0-3.2             

      



             14550-6)                                            



[facility] HospitalMonocytes # (Auto)2017-11-10 20:53:00





             Test Item    Value        Reference Range Interpretation Comments

 

             Monocytes # (Auto) (test code = 742-7) 0.3          0.2-0.8        

           



[facility] HospitalEosinophils # (Auto)2017-11-10 20:53:00





             Test Item    Value        Reference Range Interpretation Comments

 

             Eosinophils # (Auto) (test code = 0.1          0.0-0.4             

      



             711-2)                                              



[facility] HospitalBasophils # (Auto)2017-11-10 20:53:00





             Test Item    Value        Reference Range Interpretation Comments

 

             Basophils # (Auto) (test code = 704-7) 0.0          0.0-0.1        

           



[facility] HospitalAbsolute Immature Granulocyte (auto
2017-11-10 20:53:00





             Test Item    Value        Reference Range Interpretation Comments

 

             Absolute Immature Granulocyte (auto 0.02         0-0.1             

        



             (test code = Absolute Immature                                     

   



             Granulocyte (auto)                                        



[facility] HospitalWhite Blood Count2017-11-10 20:53:00





             Test Item    Value        Reference Range Interpretation Comments

 

             White Blood Count (test code = 6690-2) 6.48         4.8-10.8       

           



[facility] HospitalRed Blood Count2017-11-10 20:53:00





             Test Item    Value        Reference Range Interpretation Comments

 

             Red Blood Count (test code = 789-8) 4.15         3.6-5.1           

        



[facility] HospitalHemoglobin2017-11-10 20:53:00





             Test Item    Value        Reference Range Interpretation Comments

 

             Hemoglobin (test code = 50576-4) 12.4         12.0-16.0            

     



[facility] HospitalHematocrit2017-11-10 20:53:00





             Test Item    Value        Reference Range Interpretation Comments

 

             Hematocrit (test code = 4544-3) 36.8         34.2-44.1             

    



[facility]Mean Corpuscular Volume2017-11-10 
20:53:00





             Test Item    Value        Reference Range Interpretation Comments

 

             Mean Corpuscular Volume (test code = 88.7         81-99            

         



             787-2)                                              



[facility]Mean Corpuscular Hemoglobin2017-11-10 
20:53:00





             Test Item    Value        Reference Range Interpretation Comments

 

             Mean Corpuscular Hemoglobin (test code 29.9         28-32          

           



             = 785-6)                                            



[facility]Mean Corpuscular Hemoglobin Concent
2017-11-10 20:53:00





             Test Item    Value        Reference Range Interpretation Comments

 

             Mean Corpuscular Hemoglobin Concent 33.7         31-35             

        



             (test code = 786-4)                                        



[facility] HospitalRed Cell Distribution Width2017-11-10 
20:53:00





             Test Item    Value        Reference Range Interpretation Comments

 

             Red Cell Distribution Width (test code 14.0         11.7-14.4      

           



             = 19137-7)                                          



[facility] HospitalPlatelet Count2017-11-10 20:53:00





             Test Item    Value        Reference Range Interpretation Comments

 

             Platelet Count (test code = 777-3) 54           140-360      L     

       



[facility] HospitalNeutrophils (%) (Auto)2017-11-10 20:53:00





             Test Item    Value        Reference Range Interpretation Comments

 

             Neutrophils (%) (Auto) (test code = 72.3         38.7-80.0         

        



             78159-8)                                            



[facility] HospitalLymphocytes (%) (Auto)2017-11-10 20:53:00





             Test Item    Value        Reference Range Interpretation Comments

 

             Lymphocytes (%) (Auto) (test code = 21.1         18.0-39.1         

        



             736-9)                                              



[facility] HospitalMonocytes (%) (Auto)2017-11-10 20:53:00





             Test Item    Value        Reference Range Interpretation Comments

 

             Monocytes (%) (Auto) (test code = 5.1          4.4-11.3            

      



             5905-5)                                             



[facility] HospitalEosinophils (%) (Auto)2017-11-10 20:53:00





             Test Item    Value        Reference Range Interpretation Comments

 

             Eosinophils (%) (Auto) (test code = 0.9          0.0-6.0           

        



             713-8)                                              



[facility] HospitalBasophils (%) (Auto)2017-11-10 20:53:00





             Test Item    Value        Reference Range Interpretation Comments

 

             Basophils (%) (Auto) (test code = 0.3          0.0-1.0             

      



             706-2)                                              



[facility] HospitalIM GRANULOCYTES %2017-11-10 20:53:00





             Test Item    Value        Reference Range Interpretation Comments

 

             IM GRANULOCYTES % (test code = IM 0.3          0.0-1.0             

      



             GRANULOCYTES %)                                        



[facility] HospitalNeutrophils # (Auto)2017-11-10 20:53:00





             Test Item    Value        Reference Range Interpretation Comments

 

             Neutrophils # (Auto) (test code = 4.7          2.1-6.9             

      



             751-8)                                              



[facility] HospitalLymphocytes # (Auto)2017-11-10 20:53:00





             Test Item    Value        Reference Range Interpretation Comments

 

             Lymphocytes # (Auto) (test code = 1.4          1.0-3.2             

      



             20235-2)                                            



[facility] HospitalMonocytes # (Auto)2017-11-10 20:53:00





             Test Item    Value        Reference Range Interpretation Comments

 

             Monocytes # (Auto) (test code = 742-7) 0.3          0.2-0.8        

           



[facility] HospitalEosinophils # (Auto)2017-11-10 20:53:00





             Test Item    Value        Reference Range Interpretation Comments

 

             Eosinophils # (Auto) (test code = 0.1          0.0-0.4             

      



             711-2)                                              



[facility] HospitalBasophils # (Auto)2017-11-10 20:53:00





             Test Item    Value        Reference Range Interpretation Comments

 

             Basophils # (Auto) (test code = 704-7) 0.0          0.0-0.1        

           



[facility] HospitalAbsolute Immature Granulocyte (auto
2017-11-10 20:53:00





             Test Item    Value        Reference Range Interpretation Comments

 

             Absolute Immature Granulocyte (auto 0.02         0-0.1             

        



             (test code = Absolute Immature                                     

   



             Granulocyte (auto)                                        



[facility] HospitalBedside Tbjzjlc2284-29-75 02:22:00





             Test Item    Value        Reference Range Interpretation Comments

 

             Bedside Glucose (test code = 51212-4) 337                 H  

          



Meter ID: [id number] [facility] HospitalCreatine Kinase MB
2017 20:46:00





             Test Item    Value        Reference Range Interpretation Comments

 

             Creatine Kinase MB (test code = 2.30         0.00-5.00             

    



             75531-0)                                            



[facility] HospitalTroponin -54-72 20:46:00





             Test Item    Value        Reference Range Interpretation Comments

 

             Troponin I (test code = JHM3964) 0.007        0-0.300              

     



[facility] HospitalMagnesium Lyohv8410-60-83 20:42:00





             Test Item    Value        Reference Range Interpretation Comments

 

             Magnesium Level (test code = 83552-4) 1.7          1.3-2.1         

          



[facility] HospitalCreatine Dmwmcp2491-14-21 20:42:00





             Test Item    Value        Reference Range Interpretation Comments

 

             Creatine Kinase (test code = 2157-6) 81                      

         



[facility] HospitalMagnesium Gfgjz8743-65-51 20:42:00





             Test Item    Value        Reference Range Interpretation Comments

 

             Magnesium Level (test code = 69759-3) 1.7          1.3-2.1         

          



[facility] HospitalProthrombin Pvyw2526-17-48 20:34:00





             Test Item    Value        Reference Range Interpretation Comments

 

             Prothrombin Time (test code = 5902-2) 12.9         11.9-14.5       

          



[facility] HospitalProthromb Time International Ratio
2017 20:34:00





             Test Item    Value        Reference Range Interpretation Comments

 

             Prothromb Time International Ratio 0.93                            

       



             (test code = 6301-6)                                        



Oral Anticoagulant Therapy INR Values:1. Low Intensity Therapy        1.5 - 
2.02. Moderate IntensityTherapy   2.0 - 3.03. High Intensity Therapy(1)    2.5 -
3.54. High Intensity Therapy(2)    3.0 - 4.05. Panic Value INR              &gt;
5.0[facility] HospitalActivated Partial Thromboplast Time
2017 20:34:00





             Test Item    Value        Reference Range Interpretation Comments

 

             Activated Partial Thromboplast Time 27.5         23.8-35.5         

        



             (test code = 65979-3)                                        



[facility] HospitalProthrombin Kguw5139-55-78 20:34:00





             Test Item    Value        Reference Range Interpretation Comments

 

             Prothrombin Time (test code = 5902-2) 12.9         11.9-14.5       

          



[facility] HospitalProthromb Time International Ratio
2017 20:34:00





             Test Item    Value        Reference Range Interpretation Comments

 

             Prothromb Time International Ratio 0.93                            

       



             (test code = 6301-6)                                        



Oral Anticoagulant Therapy INR Values:1. Low Intensity Therapy        1.5 - 
2.02. Moderate IntensityTherapy   2.0 - 3.03. High Intensity Therapy(1)    2.5 -
3.54. High Intensity Therapy(2)    3.0 - 4.05. Panic Value INR              &gt;
5.0[facility] HospitalActivated Partial Thromboplast Time
2017 20:34:00





             Test Item    Value        Reference Range Interpretation Comments

 

             Activated Partial Thromboplast Time 27.5         23.8-35.5         

        



             (test code = 34795-1)                                        



[facility] [id number] 20:13:00





             Test Item    Value        Reference Range Interpretation Comments

 

             Urine WBC (test code = 5821-4) 0-5          0-5                    

   



[facility] [id number] 20:13:00





             Test Item    Value        Reference Range Interpretation Comments

 

             Urine RBC (test code = 49257-5) NONE         0-5                   

    



[facility] Jwgxeobc6019-13-76 20:13:00





             Test Item    Value        Reference Range Interpretation Comments

 

             Urine Bacteria (test code = 42434-9) FEW          NONE             

         



[facility] HospitalUrine Epithelial Kyzpo3292-33-88 20:13:00





             Test Item    Value        Reference Range Interpretation Comments

 

             Urine Epithelial Cells (test code = FEW          NONE              

        



             18798-9)                                            



[facility] UIB5971-41-93 20:13:00





             Test Item    Value        Reference Range Interpretation Comments

 

             Urine WBC (test code = 5821-4) 0-5          0-5                    

   



[facility] GVC4787-81-80 20:13:00





             Test Item    Value        Reference Range Interpretation Comments

 

             Urine RBC (test code = 76732-3) NONE         0-5                   

    



[facility] Znewkjrg5890-27-30 20:13:00





             Test Item    Value        Reference Range Interpretation Comments

 

             Urine Bacteria (test code = 50948-2) FEW          NONE             

         



[facility] HospitalUrine Epithelial Dsnqo6373-72-40 20:13:00





             Test Item    Value        Reference Range Interpretation Comments

 

             Urine Epithelial Cells (test code = FEW          NONE              

        



             34552-6)                                            



[facility] Gnino7365-36-99 19:42:00





             Test Item    Value        Reference Range Interpretation Comments

 

             Urine Color (test code = 5778-6) STRAW        YELLOW               

     



[facility] HospitalUrine Idgxsae5731-60-79 19:42:00





             Test Item    Value        Reference Range Interpretation Comments

 

             Urine Clarity (test code = 10389-3) CLEAR        CLEAR             

        



[facility] HospitalUrine Specific Rimfiho8266-74-33 19:42:00





             Test Item    Value        Reference Range Interpretation Comments

 

             Urine Specific Gravity (test code = 1.005        1.010-1.025  L    

        



             5811-5)                                             



[facility] HospitalUrine jI6094-26-17 19:42:00





             Test Item    Value        Reference Range Interpretation Comments

 

             Urine pH (test code = 16816-5) 7            5-7                    

   



[facility] Leukocyte Ncljzanu7791-25-80 
19:42:00





             Test Item    Value        Reference Range Interpretation Comments

 

             Urine Leukocyte Esterase (test code NEGATIVE     NEGATIVE          

        



             = 5799-2)                                           



[facility] Jxuzkce7964-82-40 19:42:00





             Test Item    Value        Reference Range Interpretation Comments

 

             Urine Nitrite (test code = 59963-6) NEGATIVE     NEGATIVE          

        



[facility] Pwxktxq8917-78-98 19:42:00





             Test Item    Value        Reference Range Interpretation Comments

 

             Urine Protein (test code = 5804-0) NEGATIVE     NEGATIVE           

       



[facility] Glucose (UA)2017 19:42:00





             Test Item    Value        Reference Range Interpretation Comments

 

             Urine Glucose (UA) (test code = 2349-9) 3+           NEGATIVE     H

            



[facility] Gbygkaj4429-72-06 19:42:00





             Test Item    Value        Reference Range Interpretation Comments

 

             Urine Ketones (test code = 19065-7) NEGATIVE     NEGATIVE          

        



[facility] Obarjagruwqp8971-29-87 19:42:00





             Test Item    Value        Reference Range Interpretation Comments

 

             Urine Urobilinogen (test code = 1            0.2-1                 

    



             98973-1)                                            



[facility] Khbyftqmb0523-95-87 19:42:00





             Test Item    Value        Reference Range Interpretation Comments

 

             Urine Bilirubin (test code = 1978-6) NEGATIVE     NEGATIVE         

         



[facility] Iakgh8844-27-05 19:42:00





             Test Item    Value        Reference Range Interpretation Comments

 

             Urine Blood (test code = 89139-4) NEGATIVE     NEGATIVE            

      



[facility] HospitalU[facility] Rvion5866-93-47 19:42:00





             Test Item    Value        Reference Range Interpretation Comments

 

             Urine Color (test code = 5778-6) STRAW        YELLOW               

     



[facility] HospitalUrine Imtqbwg5593-61-86 19:42:00





             Test Item    Value        Reference Range Interpretation Comments

 

             Urine Clarity (test code = 22128-6) CLEAR        CLEAR             

        



[facility] HospitalUrine Specific Ktysvuy1734-91-23 19:42:00





             Test Item    Value        Reference Range Interpretation Comments

 

             Urine Specific Gravity (test code = 1.005        1.010-1.025  L    

        



             5811-5)                                             



[facility] HospitalUrine aW6330-08-60 19:42:00





             Test Item    Value        Reference Range Interpretation Comments

 

             Urine pH (test code = 94795-3) 7            5-7                    

   



[facility] Leukocyte Gllizwtm1871-29-55 
19:42:00





             Test Item    Value        Reference Range Interpretation Comments

 

             Urine Leukocyte Esterase (test code NEGATIVE     NEGATIVE          

        



             = 5799-2)                                           



[facility] Hhplqro7457-19-68 19:42:00





             Test Item    Value        Reference Range Interpretation Comments

 

             Urine Nitrite (test code = 79268-3) NEGATIVE     NEGATIVE          

        



[facility] Mcnpvcg7299-47-68 19:42:00





             Test Item    Value        Reference Range Interpretation Comments

 

             Urine Protein (test code = 5804-0) NEGATIVE     NEGATIVE           

       



[facility] HospitalU[facility] Glucose (UA)2017 19:42:00





             Test Item    Value        Reference Range Interpretation Comments

 

             Urine Glucose (UA) (test code = 2349-9) 3+           NEGATIVE     H

            



[facility] Dgenbht0361-68-01 19:42:00





             Test Item    Value        Reference Range Interpretation Comments

 

             Urine Ketones (test code = 68554-3) NEGATIVE     NEGATIVE          

        



[facility] Knbuhizrjpqg6982-02-37 19:42:00





             Test Item    Value        Reference Range Interpretation Comments

 

             Urine Urobilinogen (test code = 1            0.2-1                 

    



             55270-6)                                            



[facility] Zglgjwonq7693-82-98 19:42:00





             Test Item    Value        Reference Range Interpretation Comments

 

             Urine Bilirubin (test code = 1978-6) NEGATIVE     NEGATIVE         

         



[facility] HospitalU[facility] Ttzgf9632-81-92 19:42:00





             Test Item    Value        Reference Range Interpretation Comments

 

             Urine Blood (test code = 03184-1) NEGATIVE     NEGATIVE            

      



[facility] HospitalAmylase Level2017-10-06 02:32:00





             Test Item    Value        Reference Range Interpretation Comments

 

             Amylase Level (test code = 1798-8) 49                        

       



[facility]





             Test Item    Value        Reference Range Interpretation Comments

 

             Lipase (test code = 3040-3) 51           8-78                      



[facility]2017-10[facility] 02:32:00





             Test Item    Value        Reference Range Interpretation Comments

 

             Amylase Level (test code = 1798-8) 49                        

       



[facility] 02:32:00





             Test Item    Value        Reference Range Interpretation Comments

 

             Lipase (test code = 3040-3) 51           -78                      



[facility] Kuofucei8679-47-38 17:00:00





             Test Item    Value        Reference Range Interpretation Comments

 

             Platelet Estimate (test SLIGHTLY DECREASED                         

  



             code = 54783-2)                                        



[facility] Morph[facility] Nqkvzjw1592-05-23 
17:00:00





             Test Item    Value        Reference Range Interpretation Comments

 

             Platelet Morphology Comment (test FEW LARGE                        

      



             code = 18040-4)                                        



No platelet clumps seen on smearFormerly [facility] HospitalRed Cell 
Morphology Ygqijwl2626-36-46 17:00:00





             Test Item    Value        Reference Range Interpretation Comments

 

             Red Cell Morphology Comment (test code NORMAL                      

           



             = 6742-1)                                           



[facility] Gnsqazgh3516-27-04 17:00:00





             Test Item    Value        Reference Range Interpretation Comments

 

             Platelet Estimate (test SLIGHTLY DECREASED                         

  



             code = 23009-1)                                        



[facility] Morph[facility] Pocayun0977-83-81 
17:00:00





             Test Item    Value        Reference Range Interpretation Comments

 

             Platelet Morphology Comment (test FEW LARGE                        

      



             code = 48818-1)                                        



No platelet clumps seen on smearFormerly [facility] Hospital[facility] Cell 
Morphology Cshxojn2169-04-29 17:00:00





             Test Item    Value        Reference Range Interpretation Comments

 

             Red Cell Morphology Comment (test code NORMAL                      

           



             = 6742-1)                                           



[facility] Hyaline Aqmuq1196-90-72 14:57:00





             Test Item    Value        Reference Range Interpretation Comments

 

             Urine Hyaline Casts (test code = 2-5          0-1          H       

     



             14309-0)                                            



[facility] Epotb6336-52-43 14:57:00





             Test Item    Value        Reference Range Interpretation Comments

 

             Urine Yeast (test code = 19626-3) FEW          NONE         H      

      



[facility] Hyaline Syxek0587-56-84 14:57:00





             Test Item    Value        Reference Range Interpretation Comments

 

             Urine Hyaline Casts (test code = 2-5          0-1          H       

     



             43496-3)                                            



[facility] Lvdqu9661-40-55 14:57:00





             Test Item    Value        Reference Range Interpretation Comments

 

             Urine Yeast (test code = 49120-6) FEW          NONE         H      

      



[facility] [place] [address]    Patient Name : VALENTE GROVER         MR #: [id number]   : 
1960         Age/Sex: 57/F      Acct # : [id number]           Adm 
Physician  : ROSE KITCHEN MD       Admit Date  :  18       Location : [facility]  
 Room/Bed : [place] [address]          
________________________________________________________
___________________________________________     REPORT: Cardiology Report       
DATE OF STUDY:  2018       LEXISCAN MYOVIEW      The patient had 
resting perfusion images after an injection of11    millicuries of technetium-
99m Myoview.  Later, due to inability to    exercise, she was given Lexiscan 0.4
mg intravenously, and shortly    afterwards 33 millicuries of technetium-99m 
Myoview.  Perfusion images were    taken by rotational tomography.      
Comparison resting and Lexiscan stress images show no evidence of any    
perfusion defect.  Uptake is smooth and regular throughout.  No suggestion    of
any scar or any ischemia.  Additionally, gaited wall motion images were    
obtained and calculated ejection fraction normal at 54% without regional    wall
motion abnormality.        FINAL IMPRESSION    1.  Normal Lexiscan Myoview for 
perfusion.   2.  Normal left ventricular function, calculated ejection fraction 
54%.                 DD:  2018 16:58   DT:  2018 18:08   Job#:  [id number]
[id number]      cc:   ROSE KITCHEN MD           Signature                     Date
                          Dictated By: OLIVIA ESTRADA MD  Transcribed By: SMEDS 
on 18   &lt;Electronically signed by OLIVIA ESTRADA MD&gt;&lt;&lt;Signature on File&gt;&gt;18 9699    COPY TO:CT CHEST WO    
[place] [address]  Patient Name: VALENTE GROVER   MR #: [id number]    : 
1960 Age/Sex: 57/F  Acct #: [id number] Req #: [id number]  Adm Physician:
ROSE KITCHEN MD    Ordered by: ROSE KITCHEN MD  Report #: [id number]   Location: 
[facility]  Room/Bed: [place] [address]    
_________________________________________________________
__________________________________________    Procedure: [id number] CT/CT CHEST 
WO  Exam Date: 18                            Exam Time: 1115       REPORT 
STATUS: Signed    PROCEDURE: CT CHEST WITHOUT CONTRAST   CT scan of the chest 
WITHOUT intravenous contrast, using standard    protocol.       TECHNIQUE:   The
chest was scanned utilizing a multidetector helical scanner from    the apex to 
the level of the adrenal glands.  No IV contrast was    administered as per 
physician request.  Coronal and sagittal    multiplanar reformations were 
obtained.       COMPARISON: None.       INDICATIONS:   CHEST PAIN           
FINDINGS:   Lines/tubes:  None.       Lungs and Airways:  The lungs and airways 
are normal with no focal    abnormality demonstrated. Right upper lobe scarring.
Bibasilar    dependentatelectasis.       Pleura: The pleural spaces are clear.  
    Heart and mediastinum:  The thyroid gland is normal. No significant    
mediastinal, hilar or axillary lymphadenopathy is seen. The heart and    
pericardium are within normal limits.       Soft tissues: Normal.       Abdomen:
Nodular contour of the liver. The spleen is enlarged,    measuring 17 cm in AP 
length. Multiple splenic and esophageal varices    are partially visualized. 
Trace ascites is present in the right upper    quadrant. Theadrenal glands are 
normal.       Bones: The visualized bony thorax is within normal limits.        
IMPRESSION:        1. No acute abnormality of the chest.    2. Hepatic 
parenchymal appearance consistent with cirrhotic morphology.        3. 
Splenomegaly and varices consistent with portal hypertension.          Dictated 
by:  Rose Newton M.D. on 2018 at 13:18        Electronically approved by: 
Rose Newton M.D. on 2018 at 13:18                Dictated By: ROSE NEWTON MD  Electronically Signed By: ROSE NEWTON MD on 18 1318  Transcribed By: 
PILAR on 18 1318       COPY TO:   ROSE KITCHENBDCHARLES ACUTE SERIES W/PA 
CXR    [place] [address]  Patient Name: VALENTE GROVER   MR #: [id number]    : 
1960 Age/Sex: 56/F  Acct #: [id number] Req #: [id number]  Adm Physician:
    Ordered by: SMITHA MCMAHAN MD  Report #: [id number] Location: ER  
Room/Bed:     
________________________________________________________________________
___________________________    Procedure: [id number] DX/ABDOMEN ACUTE SERIES W/PA
CXR  Exam Date: 10/06/17                            Exam Time: 0210       REPORT
STATUS: Signed    EXAM: ABDOMEN ACUTE SERIES W/PA CXR, supine and erect views of
the abdomen, AP   view of the chest   DATE: 10/6/2017 1:29 AM  Time stamp on 
exam: 0155 hours   INDICATION: Abdominal pain   COMPARISON: CT of the abdomen 
and pelvis 2017      FINDINGS:   LINES/TUBES: None      LUNGS: No 
consolidations or edema.       PLEURA: No effusions or pneumothorax.      HEART 
AND MEDIASTINUM: Normal size and contour.      BOWEL PATTERN: Non-obstructed 
bowel gas pattern.      BONES AND SOFT TISSUES: No acute bone findings. No abn
ormal calcifications. No   mass effect. Bilateral chest surgical clips. Surgical
clips right upperquadrant of the abdomen.      IMPRESSION:   No acute thoracic 
abnormality.      No evidence for bowel obstruction.               Signed by: 
Dr. Akiko Mehta M.D. on 10/6/2017 2:40 AM        Dictated By: AKIKO MEHTA MD  Electronically Signed By: AKIKO MEHTA MD on 10/06/17 0240  
Transcribed By: CLIFFORD on 10/06/17 0240       COPY TO:   SMITHA MCMAHAN MD

## 2020-07-14 NOTE — XMS REPORT
Clinical Summary

                            Created on:2020



Patient:Deborah Alatorre

Sex:Female

:1960

External Reference #:[id number]





Demographics







                          Address                   [address] [place] [address]

 

                          Home Phone                [phone]

 

                          Mobile Phone              [phone]

 

                          Preferred Language        English

 

                          Marital Status            Unknown

 

                          Jewish Affiliation     Unknown

 

                          Race                      White

 

                          Ethnic Group              Not  or 









Author







                          Organization              [facility]

 

                          Address                   [address] [place] [address]









Support







                Name            Relationship    Address         Phone

 

                Lavern Hoover   Unavailable     Unavailable     [phone]









Care Team Providers







                    Name                Role                Phone

 

                    Pcp                 Primary Care Provider Unavailable









Allergies







             Active Allergy Reactions    Severity     Noted Date   Comments

 

             Azithromycin Rash, Other (See Comments) Low          2016   

 

             Erythromycin                           2019   

 

             Ketorolac                              2019   

 

             Tramadol     Other (See Comments)              2016   Other r

eaction(s):



                                                                 Insomnia for da

ys







                                                                 Unable to sleep







                                                                 







Medications







          Medication Sig       Dispensed Refills   Start Date End Date  Status

 

          sertraline Take 100 mg by mouth           0                           

  Active



          (ZOLOFT) 100 MG daily.                                            



          tablet                                                      

 

          metFORMIN Take 500 mg by mouth           0                            

 Active



          (GLUCOPHAGE) 500 2 (two) times daily                                  

       



          MG tablet with breakfast and                                         



                    dinner.                                           

 

          methadone HCl Take 100 mg by mouth           0                        

     Active



          (METHADONE ORAL) daily.                                            

 

          insulin lispro Inject 30 Units           0                            

 Active



          (HUMALOG) 100 subcutaneously 3                                        

 



          unit/mL injection (three) times daily                                 

        



                    before meals.                                         

 

          insulin  Inject 35 Units           0                           

  Active



          unit/mL (3 mL) subcutaneously 2                                       

  



          InPn      (two) times daily                                         



                    before meals NOVOLIN                                        

 



                    .                                                 

 

          furosemide Take 1 tablet (20 mg 30 tablet 11        2019

020 



          (LASIX) 20 MG total) by mouth                                         



          tabletIndications daily.                                            



          : Lower extremity                                                   



          edema                                                       







Active Problems







                          Problem                   Noted Date

 

                          Cirrhosis                 2019









                                        Last Assessment & Plan: 







                                        Diagnosis based on the laboratory parame

ters and imaging. Etiology is likely due

to



                                        HBV/HCV. Her condition has decompensated

 with features of portal hypertension.



                                        Cirrhosis guidelines reviewed.









                          Hepatocellular carcinoma  2019









                                        Last Assessment & Plan: 







                                        Diagnosis of HCC in 2018 s/p radiof

requency ablation. She was referred for 

liver



                                        transplant evaluation at this time but e

xpressed desire not to follow 

through.There



                                        is no evidence of residual tumor on rece

nt imaging. Extensive discussion 

provided



                                        that liver transplant is the only defini

tive treatment for HCC and recurrence is



                                        likely. She understands and maintains he

r position.









                          Immunity status testing   2019









                                        Last Assessment & Plan: 



All patients with chronic liver disease should be immunized to prevent hepatitis
A and hepatitis B. Previous serology indicates immunity to HAV. Recommend HBV 
booster which can be provided by primary care.









                          Hepatitis C               2019









                                        Last Assessment & Plan: 







                                        HCV diagnosed in  s/p partial treatm

ent with Interferon / Ribavirin. HCV RNA

2018 was undetectable. No further interv

ention necessary.









                          Chronic viral hepatitis B without delta agent and with

out coma 2019









                                        Last Assessment & Plan: 



She has evidence of past HBV infection without immunity. HBV DNA from May 2018 
undetectable. She may benefit from vaccination which can be obtained by her 
primary care.









                          Hernia of anterior abdominal wall 2019









                                        Last Assessment & Plan: 







                                        She has an umbilical hernia that is bein

g evaluated for repair. She has a 34% 

one



                                        year mortality based on the [place] surgica

l risk score. In addition, she is 

Child-Clark



                                        Class A giving her a 10% abdominal surge

ry barron-operative mortality risk.









                          Heart murmur              2019









                                        Last Assessment & Plan: 







                                        Physical examination revealed an audible

 fixed S2 split on cardiac examination 

which



                                        may be secondary to a bundle branch bloc

k. We recommend cardiology consultation 

prior



                                        to procedure.









                          Lower extremity edema     2019









                                        Last Assessment & Plan: 







                                        Assymetrical lower extremity edema on ph

ysical examination. We ordered a venous



                                        doppler of the lower extremity to assess

 for venous thrombosis. Previously on



                                        Furosemide 20 mg daily. We write for a r

efill.







Social History







             Tobacco Use  Types        Packs/Day    Years Used   Date

 

             Current Every Day Smoker Cigarettes   0.25         40           









                Alcohol Use     Drinks/Week     oz/Week         Comments

 

                No                                              









                    Alcohol Habits      Answer              Date Recorded

 

                    How often do you have a drink containing alcohol? Never     

          2019

 

                    How many drinks containing alcohol do you have on a typical 

Not asked           



                    day when you are drinking?                     

 

                    How often do you have six or more drinks on one occasion? No

t asked           









                          Sex Assigned at Birth     Date Recorded

 

                          Not on file               









                    Job Start Date      Occupation          Industry

 

                    Not on file         Not on file         Not on file









                    Travel History      Travel Start        Travel End









                                        No recent travel history available.







Last Filed Vital Signs

Not on file



Plan of Treatment

Not on file



Results

Not on fileafter 2019



Insurance







           Payer      Benefit Plan / Subscriber ID Type       Phone      Address



                      Group                                       

 

           MEDICARE   MEDICARE A B xxxxxxxxxxx Medicare              

 

           MEDICAID - MEDICAID [place] [facility] [employer] xxxxxxxxx  Medicaid Contracted  

          



           [facility] C[employer]   PLAN                                        









           Guarantor Name Account Type Relation to Date of    Phone      Billing



                                 Patient    Birth                 Address

 

           Deborah Alatorre Personal/Family Self       1960 [phone]                                        (Home)     [place] [address]

## 2020-07-14 NOTE — RAD REPORT
EXAM DESCRIPTION:  USExtrem Venous W Compress Bil7/14/2020 1:24 pm

 

CLINICAL HISTORY:  Bilateral leg swelling

 

COMPARISON:  none

 

FINDINGS:  The common femoral, superficial femoral, popliteal and posterior tibial veins bilaterally 
are compressible and demonstrate augmentation.

 

Doppler demonstrates good flow.

 

IMPRESSION:  No evidence of deep venous thrombosis involving  either lower extremity.

## 2020-07-14 NOTE — XMS REPORT
Clinical Summary

                            Created on:2020



Patient:Deborah Alatorre

Sex:Female

:1960

External Reference #:[id number]





Demographics







                          Address                   [address] [place] [address]

 

                          Home Phone                [phone]

 

                          Mobile Phone              [phone]

 

                          Email Address             [email or web address]

 

                          Preferred Language        English

 

                          Marital Status            

 

                          Amish Affiliation     Unknown

 

                          Race                      White

 

                          Ethnic Group              Not  or 









Author







                          Organization              [facility]

 

                          Address                   [address] [place] [address]









Support







                Name            Relationship    Address         Phone

 

                Ankush Maciel    Unavailable     Unavailable     [phone]









Care Team Providers







                    Name                Role                Phone

 

                    Asked,  Pcp         Primary Care Provider Unavailable









Allergies

No Known Allergies



Medications

No known medications



Active Problems

No known active problems



Encounters







             Date         Type         Specialty    Care Team    Description

 

             2019   Office Visit General Surgery Ayo Stahl   Constipation

, unspecified



                                                    MD Dioni constipation ty

pe (Primary



                                                                 Dx)



after 2019



Social History







             Tobacco Use  Types        Packs/Day    Years Used   Date

 

             Current Every Day Smoker                           5            









                Smokeless Tobacco: Never Used                                 









                Alcohol Use     Drinks/Week     oz/Week         Comments

 

                Never                                           









                    Alcohol Habits      Answer              Date Recorded

 

                    How often do you have a drink containing alcohol? Never     

          2019

 

                    How many drinks containing alcohol do you have on a typical 

Not asked           



                    day when you are drinking?                     

 

                    How often do you have six or more drinks on one occasion? No

t asked           









                          Sex Assigned at Birth     Date Recorded

 

                          Not on file               









                    Job Start Date      Occupation          Industry

 

                    Not on file         Not on file         Not on file









                    Travel History      Travel Start        Travel End









                                        No recent travel history available.







Last Filed Vital Signs

Not on file



Plan of Treatment







                Health Maintenance Due Date        Last Done       Comments

 

                DIABETIC RETINAL EYE EXAM 1960                      

 

                DIABETIC FOOT EXAM 10/22/1971                      

 

                CERVICAL CANCER SCREENING 10/22/1982                      

 

                BREAST CANCER SCREENING 10/22/2011                      

 

                COLONOSCOPY SCREENING 10/22/2011                      

 

                SHINGLES VACCINES (#1) 10/22/2011                      

 

                INFLUENZA VACCINE 2020                      







Results

Not on fileafter 2019



Insurance







          Payer     Benefit Plan / Subscriber ID Effective Dates Phone     Addre

ss   Type



                    Group                                             

 

          MEDICARE  MEDICARE PART A xxxxxxxxxxx 3/1/2007-Present           [place]

[place]



                    AND B                                             

 

          [facility] MEDICAID [facility] COMM xxxxxxxxx 2019-Present                 

    HMO



                    [employer]                                         









           Guarantor Name Account Type Relation to Date of    Phone      Billing

 Address



                                 Patient    Birth                 

 

           Deborah Alatorre Personal/Famil Self       1960 [phone] [address]

[address]                                ([address]







                                                                  [place], [place]



                                                                  [address]







Advance Directives

For more information, please contact: [phone]





                Type            Date Recorded   Patient Representative Explanati

on

 

                Advance Directives, Living Will and                             

    



                Medical Power of

## 2020-07-14 NOTE — XMS REPORT
Summary of Care

                             Created on:May 6, 2020



Patient:Deborah Alatorre

Sex:Female

:1960

External Reference #:[id number]





Demographics







                          Address                   [address]



                                                    [place] [address]

 

                          Mobile Phone              [phone]

 

                          Home Phone                [phone]

 

                          Work Phone                [phone]

 

                          Preferred Language        English

 

                          Marital Status            Single

 

                          Yazidism Affiliation     Unknown

 

                          Race                      White

 

                          Ethnic Group              Not  or 









Author







                          Organization              [facility]

 

                          Address                   [address] [place] [address]









Support







                Name            Relationship    Address         Phone

 

                Rekha Maciel  Unavailable     [address] [phone]

[phone]



                                                [place] [address] 

 

                No one else per patient Unavailable     Unavailable     Unavaila

ble









Care Team Providers







                    Name                Role                Phone

 

                    DANN Barone         Primary Care Provider [phone]









Reason for Visit







                          Reason                    Comments

 

                          Transition Of Care        







Encounter Details







             Date         Type         Department   Care Team    Description

 

             2020   Transition of Care [facility] Mayra Grayson

 Transition Of 

Care



                                                [facility]



                                        



                                                    [phone] 







Allergies







             Active Allergy Reactions    Severity     Noted Date   Comments

 

             Propoxyphene N-Acetaminophen Other - See comments              2018   migraine

 

             Ondansetron  Itching                   2020   

 

             Tramadol     Other - See comments              2018   Anxious

 

             Azithromycin Rash                      2018   



documented as of this encounter (statuses as of 2020)



Medications







          Medication Sig       Dispensed Refills   Start Date End Date  Status

 

          QUEtiapine (SEROQUEL) Take 350 mg by           0                      

       Active



          300 mg tablet mouth at                                          



                    bedtime.                                          

 

          carvediloL 6.25 mg Take 6.25 mg by           0                        

     Active



          tablet    mouth 2 (two)                                         



                    times daily                                         



                    with meals.                                         

 

          sacubitril-valsartan Take 1 tablet           0                        

     Active



          (ENTRESTO) 49-51 mg by mouth 2                                        

 



          tablet    (two) times                                         



                    daily.                                            

 

          insulin aspart inject 30 Units           0                            

 Active



          prot/insuln asp (NOVOLOG under the skin                               

          



          MIX 70-30 SC) 3 (three) times                                         



                    daily.                                            

 

          potassium chloride Take 40 mEq by           0                         

    Active



          (KCL-20 ORAL) mouth at                                          



                    bedtime.                                          

 

          LACTULOSE ORAL Take 30 mg by           0                             A

ctive



                    mouth 2 (two)                                         



                    times daily.                                         

 

          furosemide (LASIX) 40 mg Take 80 mg by           0                    

         Active



          tablet    mouth daily.                                         

 

          HYDROcodone-acetaminophe Take 1 tablet           0                    

         Active



          n  mg tablet by mouth every                                     

    



                    4 (four) hours                                         



                    as needed.                                         

 

          methadone 5 mg tablet Take 10 mg by           0                       

      Active



                    mouth 3 (three)                                         



                    times daily.                                         

 

          vancomycin 250 mg Take 2 capsules 22 capsule 0         2020     

      Active



          capsuleIndications: by mouth 4                                        

 



          Vomiting and diarrhea, (four) times                                   

      



          Partial small bowel daily.                                            



          obstruction, Colitis,                                                 

  



          Gastrointestinal                                                   



          hemorrhage with melena,                                               

    



          C. difficile colitis,                                                 

  



          Hematemesis with nausea                                               

    



documented as of this encounter (statuses as of 2020)



Active Problems







                          Problem                   Noted Date

 

                          Obesity (BMI 30-39.9)     2020

 

                          Hematemesis               2020

 

                          Melena                    2020

 

                          Partial small bowel obstruction 2020

 

                          Hyperglycemia             2020

 

                          Cocaine dependence, continuous 2006



documented as of this encounter (statuses as of 2020)



Social History







             Tobacco Use  Types        Packs/Day    Years Used   Date

 

             Current Every Day Smoker                                        









                Smokeless Tobacco: Never Used                                 









                          Sex Assigned at Birth     Date Recorded

 

                          Not on file               









                    Job Start Date      Occupation          Industry

 

                    Not on file         Not on file         Not on file









                    Travel History      Travel Start        Travel End









                                        No recent travel history available.









                    COVID-19 Exposure   Response            Date Recorded

 

                    In the last month, have you been in contact with No / Unsure

         2020  6:20 PM 

CDT



                    someone who was confirmed or suspected to have              

       



                    Coronavirus / COVID-19?                     



documented as of this encounter



Last Filed Vital Signs

Not on filedocumented in this encounter



Plan of Treatment







                Health Maintenance Due Date        Last Done       Comments

 

                HEPATITIS C (HCV) SCREEN 1960                      

 

                PNEUMOCOCCAL 0-64 YEARS COMBINED SERIES (1 of  - 10/22/1967    

                  



                PPSV23)                                         

 

                DTaP,Tdap,and Td Vaccines (1 - Tdap) 10/22/1972                 

     

 

                PAP SMEAR       10/22/1982                      

 

                Breast Cancer Screening (MAMMOGRAM) 10/22/2001                  

    

 

                COLONOSCOPY     10/22/2011                      

 

                Zoster Recombinant Vaccine (SHINGRIX) (1 of 2) 10/22/2011       

               

 

                LUNG CANCER SCREEN: Recommended for age 55-80 with 30 + 10/22/20

16                      



                pack year history                                 

 

                INFLUENZA VACCINE (Season Ended) 2020                     

 



documented as of this encounter



Results

Not on filedocumented in this encounter



Insurance







          Payer     Benefit Plan / Subscriber ID Effective Dates Phone     Addre

   Type



                    Group                                             

 

          MEDICARE  MEDICARE PART xxxxxxxxxxx 3/1/2007-Ben [phone] [address] 

[facility] 

Medicare



                      [address]                 t          2          [id number]



                                        



                                                            [place] 



                                                            [id number] 

 

          [facility]      MEDICAID OF xxxxxxxxx 2018-Prese [phone] [address]   

Medicaid



                      [place]                 [address]         [address]          [id number]



                                        



                                                            [place], [place] 



                                                            [address] 



documented as of this encounter

## 2020-07-14 NOTE — XMS REPORT
Summary of Care

                             Created on:May 5, 2020



Patient:Deborah Alatorre

Sex:Female

:1960

External Reference #:[id number]





Demographics







                          Address                   [address]



                                                    [place] [address]

 

                          Mobile Phone              [phone]

 

                          Home Phone                [phone]

 

                          Work Phone                [phone]

 

                          Preferred Language        English

 

                          Marital Status            Single

 

                          Buddhist Affiliation     Unknown

 

                          Race                      White

 

                          Ethnic Group              Not  or 









Author







                          Organization              [facility]

 

                          Address                   [address] [place] [address]









Support







                Name            Relationship    Address         Phone

 

                Rekha Maciel  Unavailable     [address] [phone]

[phone]



                                                [place] [address] 

 

                No one else per patient Unavailable     Unavailable     Unavaila

[facility]









Care Team Providers







                    Name                Role                Phone

 

                    DANN Zayas         Primary Care Provider [phone]









Reason for Referral

 (Routine)





           Status     Reason     Specialty  Diagnoses / Referred By Referred To



                                            Procedures Contact    Contact

 

                New Request                     UNKNOWN PHYSICIAN Diagnoses



Vomiting and diarrhea



Partial small bowel obstruction



Colitis



Gastrointestinal hemorrhage with melena



C. difficile colitis



Hematemesis with nausea   Stephani Lynsey               



                                                SPECIALTY       



Procedures



Discharge Follow-Up: Specialty Service UNKNOWN PHYSICIAN SPECIALTY; As Needed 

DO Latha



                                        



                                                       [address] 



                                                                 [address]



                                        



                                                       Phone:     



                                                                 [phone]



                                        



                                                       Fax: [phone] 





 (Routine)





           Status     Reason     Specialty  Diagnoses / Referred By Referred To



                                            Procedures Contact    Contact

 

                Pending Review                                  Diagnoses



Vomiting and diarrhea



Partial small bowel obstruction



Colitis



Gastrointestinal hemorrhage with melena



C. difficile colitis



Hematemesis with nausea   Styles, Lynsey Abisai Azul,



                                                                



Procedures



Discharge Follow-up: PCP LAUREN ZAYAS; 2 Weeks DO Lauren QUIGLEY







                                                       [address] [place] [address]



                                        [address]







                                                       [place], [place] Phone:



                                                                 [phone]







                                                       Phone:     Fax: [phone]



                                        



                                                       Fax: [phone] 





Radiology Services (ASAP)





           Status     Reason     Specialty  Diagnoses / Referred By Referred To



                                            Procedures Contact    Contact

 

                New Request                     Diagnostic      Diagnoses



Partial small bowel obstruction Styles, Lynsey               



                                                Radiology       



Procedures



XR KUB                                  DO Latha



                                        



                                                       [address] 



                                                                 [address]



                                        



                                                                 [address] [place] [address] 



                                                                 [address]



                                        



                                                       Phone:     



                                                                 [phone]



                                        



                                                       Fax: [phone] 





Radiology Services (Routine)





           Status     Reason     Specialty  Diagnoses / Referred By Referred To



                                            Procedures Contact    Contact

 

                New Request                     Diagnostic      Diagnoses



Gastrointestinal hemorrhage with melena Styles, Lynsey               



                                                Radiology       



Procedures



XR KUB                                  Latha, DO



                                        



                                                       [address] 



                                                                 [address]



                                        



                                                                 [place] [address] [place] [address] 



                                                                 [address]



                                        



                                                       Phone:     



                                                                 [phone]



                                        



                                                       Fax: [phone] 





Radiology Services (ASAP)





           Status     Reason     Specialty  Diagnoses / Referred By Referred To



                                            Procedures Contact    Contact

 

                New Request                     Diagnostic      Diagnoses



Partial small bowel obstruction Styles, Lynsey               



                                                Radiology       



Procedures



XR KUB                                  Latha, DO



                                        



                                                       [address] 



                                                                 [address]



                                        



                                                                 [place] [address] [place] [address] 



                                                                 [address]



                                        



                                                       Phone:     



                                                                 [phone]



                                        



                                                       Fax: [phone] 





MRI/CAT Scan (STAT)





           Status     Reason     Specialty  Diagnoses / Referred By Referred To



                                            Procedures Contact    Contact

 

                New Request                     Diagnostic      Diagnoses



Gastrointestinal hemorrhage with melena



Hematochezia              Julio Shah,           



                                                Radiology       



Procedures



CT ANGIOGRAM ABDOMEN/PELVIS



CT ANGIOGRAM ABDOMEN/PELVIS             MD



                                        



                                                       [address] 



                                                                 [id number] [place] [address]



                                        



                                                       Phone:     



                                                                 [phone]



                                        



                                                       Fax:       



                                                       [phone] 





MRI/CAT Scan (ASAP)





           Status     Reason     Specialty  Diagnoses / Referred By Referred To



                                            Procedures Contact    Contact

 

                New Request                     Diagnostic      Diagnoses



Partial small bowel obstruction Julio Shah,           



                                                Radiology       



Procedures



CT ABDOMEN PELVIS W CONTRAST            MD



                                        



                                                       [address] [facility] 



                                                                 [id number] [place] [address]



                                        



                                                       Phone:     



                                                                 [phone]



                                        



                                                       Fax:       



                                                       [phone] 





Radiology Services (Routine)





           Status     Reason     Specialty  Diagnoses / Referred By Referred To



                                            Procedures Contact    Contact

 

                New Request                     Diagnostic      Diagnoses



Fever in adult



Partial small bowel obstruction



Colitis



Gastrointestinal hemorrhage with melena Julio Shah,           



                                                Radiology       



Procedures



Abdominal 1 View - To confirm nasogastric tube placement. MD



                                        



                                                       [address] 



                                                                 [id number] [place], [place] [address]



                                        



                                                       Phone:     



                                                                 [phone]



                                        



                                                       Fax:       



                                                       [phone] 





Radiology Services (STAT)





           Status     Reason     Specialty  Diagnoses / Referred By Referred To



                                            Procedures Contact    Contact

 

                New Request                     Diagnostic      Diagnoses



Fever in adult            Vincent,                  



                                                Radiology       



Procedures



XR CHEST 1 VW                           Shinta, FNP



                                        



                                                       [address] [facility] 



                                                                 [id number]



                                        



                                                       [place] 



                                                                 [address]



                                        



                                                       Phone:     



                                                                 [phone]



                                        



                                                       Fax:       



                                                       [phone] 





MRI/CAT Scan (STAT)





           Status     Reason     Specialty  Diagnoses / Referred By Referred To



                                            Procedures Contact    Contact

 

                New Request                     Diagnostic      Diagnoses



Fever in adult



Melena



Vomiting and diarrhea     Tata Mcneil             



                                                Radiology       



Procedures



CT ABDOMEN PELVIS W CONTRAST            R, EMBIJAL



                                        



                                                       [address] 



                                                                 [address]



                                        



                                                       Phone:     



                                                                 [phone]



                                        



                                                       Fax:       



                                                       [phone] 









Reason for Visit







                          Reason                    Comments

 

                          Vomiting                  

 

                          Weakness                  



Auth/Cert





           Status     Reason     Specialty  Diagnoses / Referred By Referred To



                                            Procedures Contact    Contact

 

                                                Emergency Medicine Diagnoses



SOB;VOMITING                                        [facility]



                                                                  Dept







                                                                  [address] [facility]



                                                                  [place]



                                                                  [address]







                                                                  Phone:



                                                                  [phone]







                                                                  Fax: [phone]

[phone]









Encounter Details







             Date         Type         Department   Care Team    Description

 

                    2020 -        Hospital            Medicine ([facility])



                                        Tata Mcneil R, EMNP



[address] [address] [phone] (Fax)                      Partial small bowel



                    2020          Encounter           [address] [facility]



                                        



Alberto Rivera FNP



[address] [id number] [phone] (Fax)                      obstruction



                                                [place], [place]   



Shamar Beaver MD



[address] [place] [id number] [phone] (Fax)                      



                                                            [address]



                                        



Julio Shah MD



[address] [place] [address] [id number] [place] [id number] [phone] (Fax)                      



                                                [phone]    



Lynsey Styles, 



[address]



[address]



[place], [place] [address] [phone] (Fax)                      







Allergies







             Active Allergy Reactions    Severity     Noted Date   Comments

 

             Propoxyphene N-Acetaminophen Other - See comments              2018   migraine

 

             Ondansetron  Itching                   2020   

 

             Tramadol     Other - See comments              2018   Anxious

 

             Azithromycin Rash                      2018   



documented as of this encounter (statuses as of 2020)



Medications







          Medication Sig       Dispensed Refills   Start Date End Date  Status

 

          QUEtiapine (SEROQUEL) Take 350 mg by           0                      

       Active



          300 mg tablet mouth at                                          



                    bedtime.                                          

 

          carvediloL 6.25 mg Take 6.25 mg by           0                        

     Active



          tablet    mouth 2 (two)                                         



                    times daily                                         



                    with meals.                                         

 

          sacubitril-valsartan Take 1 tablet           0                        

     Active



          (ENTRESTO) 49-51 mg by mouth 2                                        

 



          tablet    (two) times                                         



                    daily.                                            

 

          insulin aspart inject 30 Units           0                            

 Active



          prot/insuln asp (NOVOLOG under the skin                               

          



          MIX 70-30 SC) 3 (three) times                                         



                    daily.                                            

 

          potassium chloride Take 40 mEq by           0                         

    Active



          (KCL-20 ORAL) mouth at                                          



                    bedtime.                                          

 

          LACTULOSE ORAL Take 30 mg by           0                             A

ctive



                    mouth 2 (two)                                         



                    times daily.                                         

 

          furosemide (LASIX) 40 mg Take 80 mg by           0                    

         Active



          tablet    mouth daily.                                         

 

          HYDROcodone-acetaminophe Take 1 tablet           0                    

         Active



          n  mg tablet by mouth every                                     

    



                    4 (four) hours                                         



                    as needed.                                         

 

          methadone 5 mg tablet Take 10 mg by           0                       

      Active



                    mouth 3 (three)                                         



                    times daily.                                         

 

          vancomycin 250 mg Take 2 capsules 22 capsule 0         2020     

      Active



          capsuleIndications: by mouth 4                                        

 



          Vomiting and diarrhea, (four) times                                   

      



          Partial small bowel daily.                                            



          obstruction, Colitis,                                                 

  



          Gastrointestinal                                                   



          hemorrhage with melena,                                               

    



          C. difficile colitis,                                                 

  



          Hematemesis with nausea                                               

    



documented as of this encounter (statuses as of 2020)



Active Problems







                          Problem                   Noted Date

 

                          Obesity (BMI 30-39.9)     2020

 

                          Hematemesis               2020

 

                          Melena                    2020

 

                          Partial small bowel obstruction 2020

 

                          Hyperglycemia             2020

 

                          Cocaine dependence, continuous 2006



documented as of this encounter (statuses as of 2020)



Social History







             Tobacco Use  Types        Packs/Day    Years Used   Date

 

             Current Every Day Smoker                                        









                Smokeless Tobacco: Never Used                                 









                          Sex Assigned at Birth     Date Recorded

 

                          Not on file               









                    Job Start Date      Occupation          Industry

 

                    Not on file         Not on file         Not on file









                    Travel History      Travel Start        Travel End









                                        No recent travel history available.









                    COVID-19 Exposure   Response            Date Recorded

 

                    In the last month, have you been in contact with No / Unsure

         2020  6:20 PM 

CDT



                    someone who was confirmed or suspected to have              

       



                    Coronavirus / COVID-19?                     



documented as of this encounter



Last Filed Vital Signs







                Vital Sign      Reading         Time Taken      Comments

 

                Blood Pressure  144/76          2020  7:41 AM CDT 

 

                Pulse           74              2020  7:41 AM CDT 

 

                Temperature     36.9 C (98.4 F) 2020  7:41 AM CDT 

 

                Respiratory Rate 18              2020  7:41 AM CDT 

 

                Oxygen Saturation 96%             2020  7:41 AM CDT 

 

                Inhaled Oxygen Concentration -               -               

 

                Weight          84 kg (185 lb 3 oz) 2020  8:00 AM CDT 

 

                Height          160 cm (5' 3")  2020 10:58 PM CDT 

 

                Body Mass Index 32.8            2020 10:58 PM CDT 



documented in this encounter



Discharge Instructions

AppointmentsVika Steele - 2020  8:40 AM CDTYour follow up 
appointment with Dr Zayas

Monday May 11th @ 10:00 a.m

[address] [place] [address] [address]

[facility] [address]

[phone]

If you need to make changes to this appointment please call the office.
Electronically signed by Vika Steele at 2020  8:40 AM CDT

AttachmentsThe following attachments cannot be sent through Care Everywhere.
Clostridium Difficile Toxin (Stool) (English)documented in this encounter



Progress Notes

Saw Chew MD - 2020  8:28 PM CDTBbrooklyn Luz Marina Alatorre is a 
59 year old female admitted for sevre C diff infection in background of chronic 
liver failure following with [facility]; Patient also with HX opiate abuse and DMT2 
appears decently controlled on 70/30 at home but was transitioned to Novolog and
Lantus while inpatient.



She became unresponsive overnight at  reported by nursing as lasting roughly
15 minutes; When I arrived bedside patient was speaking full sentences but 
confused which quickly resolved. BP at that time was 200s/100s as patient had 
refused meds earlier. Patient denied any issues when she started talking but was
scared as she stated this had never happened before. EKG normal sinus rhythm 
when assessed.



Reported it could be 2/2 to elevated BP which she continued stating she dies not
understand why she is on so many meds.Patient also requesting an insulin change 
due to uncontrolled BG though she has been refusing Novolog regularly while 
admitted.



In terms of diarrhea patient reporting it has improved significantly since 
admission with only 3 reported BMs today though par GI report they got from 
patient she is having multiple BMs (nursing only reported 1 BM today).



Concern patient is showing manipulative behavior at this time. Concern as she 
conts asking for increased opiate supplementation when she is showing no signs 
of withdrawal.



Will order basic labs including seizure CASTANEDA. Will monitor closely throughout 
night.



Malingering

Pseudoseizure

- BMP, Mg, Prolactin, CK, Trop



Saw Chew MD

Internal Medicine PGY-3

[facility] Team

Doctor # [id number]

Pager: [phone]

Electronically signed by Saw Chew MD at 2020  8:35 PM 
Miguel Link MD - 2020 12:38 PM CDTFormatting of this
note might be different from the original.

Medicine [facility] Progress Note

Date of Service: 2020 12:38, HD #: 6



CC: dark red colored stool/abdominal pain



24-Hour Events:

 KUB yesterday/today; normal. No SBO, no toxic megacolon

 Started methadone 30mg qd

 D4/10 PO vanc



Subjective:

States 3x BMs since midnight, with blood, but not as gross as prior. States she 
is withdrawing resulting to abdominal cramps and worsening her diarrhea



*OF NOTE, per chart review, patient has had x1 BM recorded



Physical Exam:

Temp:  [36.4 C (97.6 F)-37.1 C (98.7 F)]

Pulse:  []

Resp:  [18]

BP: (127-176)/()



No intake or output data in the 24 hours ending 20 1238



General: alert and oriented x 4 (person, place and date/time); no apparent 
distress

HEENT: normocephalic atraumatic

Neck: supple, no lymphadenopathy, no bruits, no JVD

Lungs: clear to auscultation bilaterally

Cardio: S1, S2 normal; no murmurs, rubs or gallops

Abdomen: soft, TTP midepigastric- unchanged

Extremities: no clubbing, cyanosis, or edema

Skin: no rashes

Neuro: alert and oriented x 3



Labs:

reviewed



Img:

Reviewed



Assessment and Plan:

Deborah Alatorre is a 59 year old female admitted with the following hospital
 issues



Abd pain/hematochezia/sepsis 2/2 cdiff (fulminant? Per GI) type colitis; 
negative CTA

Partial SBO- resolved per KUB 5/2

R Liver lesionx1 3.6cm per CTAP

Hx of HCV cirrhosis c/w HCC s/p ablation c/w esophageal varices s/p banding 

Anemia/thrombocytopenia 2/2 cirrhosis

Being treated for fulminant type CDI colitis c/w hematochezia which appears to 
be improving, along with sx. Partial sbo also improved. BCX NGTD, UCX NG

-Hgb checks q24

-tx HH &gt;7 PRN

-c/w PO VANC now

-c/w PO protonix BID

-GI on board; appreciate reccs; stop IV flagyl, monitor patient due to ongoing 
diarrhea w/ bleeding

-GS signed off; no intervention on partial SBO as patient improving

-adv diet PRN

-Serial KUBs- stable, normal so far



Hx of opioid withdrawal on home methadone

*With GI permission, MICU started morphine 2mh q8hPRN only to avoid withdrawal

-monitor withdrawal

-PAIN service: Methadone 15mg BID

-Start methadone 30mg qd

-hold morphine now



NICM

LBBB; old since 2018

-monitor



HTN

-slow resumption of BP meds as tolerated

-restart home lasix

-consider restarting coreg and entresto tomorrow if BP/P permits



Hx of breast CA s/p  tx

-monitor



DMII

-c/w SSI



Hospital Care Issues:

Pain control: tylenol.

Prophylaxis: DVT-SCDs

Stress Ulcer: pantoprazole

Code Status: FULL



Miguel Cano MD, MPH

Internal Medicine PGY-3

[place] Team

Physician # [id number]

Pager # [phone]





--------------------------------------END OF DAILY PROGRESS 
NOTE--------------------------------------



Hospital Course:

Deborah Alatorre is a 59 year old femalewith PMHx IDDM, HTN and 
cardiopathy, breast cancer treated in , Hep C cirrhosis c/b HCC s/p 
ablation in  and esophageal varices s/p banding admitted for 3 days history 
of hematemesis and melena. Concerning for ischemic colitis given acute abdominal
 pain. CT scan shows patent flow of SMA and celiac arteries per Surgery, not 
concerning for ischemiccolitis. Repeat CTAP r/o SBO with persistent colitis.+ 
c diff colitis. Started on PO and rectal Vanc as well as IV flagyl. Monitoring 
ongoing hematochezia, serial KUBs for pSBO progression, and ongoing PO VANC+IV 
flagyl for fulminant CDI c/w hematochezia. Abd remained tender so will monitor 
and advance diet cautiously. At floors, patient abd pain ongoing, but diarrhea 
with bleeding less now although patient states ongoing crampy sensation and 
diarrhea patient attributes now to opioid withdrawal. GI adjusted abx regimen, 
now on PO VANC for CDI. Pain service consulted for her extensive opioid on met
hadone hx now. Methadone restarted after PAIN service has discussed case with 
patient's OSH pain doctor. Ongoing PO VANC for now, and monitoring diarrhea

CURRENT MEDICATIONS

Current Facility-Administered Medications

Medication Dose Route Frequency Last Rate Last Dose

 insulin glargine (LANTUS U-100) injection 17 Units  0.2 Units/kg/day 
Subcutaneous QHS

 methadone (DOLOPHINE HCL) tablet 30 mg  30 mg Oral DAILY   30 mg at 20
 1100

 carvediloL (COREG) tablet 6.25 mg  6.25 mg Oral BID MEALS   6.25 mg at 
20 0820

 QUEtiapine (SEROQUEL) tablet 300 mg  300 mg Oral QHS   300 mg at 20 
2107

 pantoprazole (PROTONIX) EC tablet 40 mg  40 mg Oral BID   40 mg at 20 
0820

 Sliding Scale Insulin - Aspart (NOVOLOG) + Fsbg Testing   Subcutaneous TID 
MEALS+HS   3 Units at20 0819

 vancomycin (FIRVANQ) 50 mg/mL oral solution 500 mg  500 mg Oral QID   500 mg
 at 20 0820

 dextrose 10% (D10W) bolus infusion 250 mL  250 mL IV Infusion PRN - SEE 
INSTRUCTIONS

 glucagon (GLUCAGEN DIAGNOSTIC KIT) injection 1 mg  1 mg Intramuscular PRN

 proMETHazine (PHENERGAN) 25 mg in NaCl 0.9% (NS) 50 mL piggyback  25 mg IV 
Piggyback Q6HPRN   25mg at 20 0941



Electronically signed by Lynsey Styles DO at 2020  2:43 PM CDT

Associated attestation - Lynsey Styles DO - 2020  2:43 PM CDTI 
personally examined the patient on 20 and agree with Dr. Cano's resident
 note with the following addition(s):

- Pt presented with severe C.diff infection and has been improving on vancomycin
 enema, oral vancomycin, and iv metronidazole. Enema and iv metronidazole are 
discontinued. C/w oral vancomycin and monitor for symptoms. She continues to 
have bloody loose bowel movement (3 within 1 hour this morning).

- Opioid dependence: restart methadone today per pain service and outpatient 
pain physician.

- Patient stated that she is going through withdrawal and her symptoms are 
abdominal cramping, diarrhea, and anxiety. Pt was agitated during round, 
threatening to leave because her pain medication has not been addressed to her 
"liking".

- Plan of care was discussed with patient about methadone. She is in agreement 
with methadone 30 mg per day.



I actively participated in the decision-making process.  Please see the 
resident's note for additional details.



Lynsey Styles, /PhD

Chief Resident &amp; Clinical Instructor

# [id number]



Celia Saba, PT - 2020 12:17 PM CDTPHYSICAL THERAPY DISCHARGE NOTE

Patient seen for evaluation and functional training.  PT goals: all met except 
for stairs as she is on isolation, however, pateint demonstrates functional 
strength and balance and should be able to traverse stairs using railing.  Refer
 to PT note dated 20 for patients functional status upon discharge.  
Please also refer to the evaluation and progress notes for further functional 
details, progress and recommendations along with education provided.  Discharge 
from acute PT due to All goals met and does not require further PT.  Thank you.



Recommend discharge home.



Celia Saba PT, DPT, CWS

p

dept: [id number]



Electronically signed by Celia Saba, PT at 2020 12:18 PM Francois Duran, PIPO - 2020 11:57 AM CDTPhysical Therapy Progress Note



Recommendations

Primary Discharge Plan: Same as prior living situation

Equipment Recommendations: No Device



PAIN none



PRECAUTIONS

Weight Bearing Precaution: WBAT

General Precautions: Fall, surgical mask, gloves, extended contact isolation

Bracing/Cast present or required: none

Oxygen: none



S: Patient found in bed and agreeable to working with PT.  Patient stating she 
has been very mobile,active, and hasn't had any problems.  Patient stating she 
Independently showered last night.



O: Patient seen and instruction provided for correct and safe performance of all
 the following functional tasks:

Bed Mobility

 supine &lt;-&gt; sit: Mod I

 task performed with HOB slightly elevated

 verbal/visual cueing provided for correct and safe performance

 patient able to return demo correctly with cueing and time

 sitting static/dynamic balance: Excellent

Transfers

 sit &lt;-&gt; stand: Independent using no AD

 task performed from/onto bed

 standing static/dynamic balance: Excellent

Gait

 No AD x 300' performed Independently

 patient presenting slow and steady step-through gait pattern with no LOB

 patient instructed in directional changes and head movements in all planes 
during gait trial resulting in no LOB

Therapeutic Exercise

 sitting BLE AROM x multiple reps: AP, LAQ, knee raises

 provided patient with verbal and tactile cueing for correct technique

 provided patient with verbal HEP along with detailed instructions 
(visual/verbal/tactile) on how to correctly perform all exercise reps

 stressed the importance of compliance with performance of all exercises 
throughout day to help with increasing overall strength, endurance, flexibility,
 and ROM



Stair Training: unable to attempt task as patient currently is on extended 
contact isolation precautions however anticipate patient will be able to go 
up/down stairs when/if needed as evidenced by patient's current level of 
function



Patient Teaching

Provided patient with preferred teaching of verbal and visual information on 
above instructions. Patient presenting readiness to learn. Patient verbalizing 
understanding to all discussed.  Patient leftin bed and call bell provided.



A: Patient progressing towards goals as expected.



P: Will notify Supervising PT with patient current status.



Francois Stone PTA

Pager #: [phone]

Supervising PT Celia Saba, PT, DPT, CWS



Total Timed Tx Codes in Minutes: 25 min

Total Treatment Time in Minutes: 25 min



Electronically signed by Francois Stone PTA at 2020 12:02 PM Judit Winters MD - 2020 11:51 AM CDTGastroenterology and Hepatology
 Progress Note



Date of Service: 2020 11:51



Chief Complaint: Hematochezia



SUBJECTIVE/ MAJOR EVENTS:

- Patient reports having multiple loose and bloody bowel movements yesterday.  
She had one formed bowel movement this morning with scant amount of blood.



PHYSICAL EXAM:

Temp:  [36.4 C (97.6 F)-37.1 C (98.7 F)]

Pulse:  []

Resp:  [18]

BP: (127-176)/()



No intake or output data in the 24 hours ending 20 1151



General: Patient is alert and oriented x4, in no distress

Cardiovascular: Regular rate and rhythm, no murmurs; no LE edema.

Respiratory: Clear to auscultation bilaterally.

Abdomen: distended, tender to palpation (improved compared to prior), sluggish 
bowel sounds



LABS/IMAGING - REVIEWED



CURRENT MEDICATIONS - REVIEWED



ASSESSMENT/PLAN

Deborah Alatorre is a 59 year old female with PMH as listed above, admitted 
to the hospital with abdominal pain and hematochezia, GI consulted for:



Severe C.diff Colitis



- The patient is presenting with acute abdominal pain followed by hematochezia, 
stool studies showed C.diff. She has had 4 days of antibiotics with improvement 
in her clinical status (pain, bleeding and tenderness), however, she still has 
loose bowel movements. Tolerating diet well.



Plan

- c/w vancomycin 500 mg QID.

- Serial abdominal exams.

- Daily BMP, Mg and phos, replace as needed





Decompensated HCV Cirrhosis MELD 10

Chronic Hepatic Failure



Etiology: HCV, treated by has not achieved SVR. Decompensated with HE, ascites 
and bleeding EV.

Ascites: perihepatic ascites on CT scan. Low salt diet.

Encephalopathy: Grade 0. Minimize narcotics, correct electrolytes.

Varices: Hx of banding last year.

SBP: No previous episodes

HCC Surveillance: The hypodensity seen on CT is likely post-treatment changes. 
Surveillance imaging to be obtained on non urgent basis

Labs: Check daily INR, CBC and CMP.



Patient was seen and discussed with Dr. De La Torre. Please call with questions. GI 
will continue to follow.



Judit Perez MD

PGY-4 Gastroenterology and Hepatology

Pager [phone]





Electronically signed by Carmen De La Torre MD at 2020  7:27 PM CDT

Associated attestation - Carmen De La Torre MD - 2020  7:27 PM CDTI have 
reviewed this patient's case with Dr. Perez.  I have seen and examined the 
patient, and agree with Dr. Perez's findings and recommendations as above with 
the following annotations:



Ms. Alatorre reports that she continues to have frequent bloody bowel movements 
but this is not supported by nursing documentation. In discussion with primary 
team only 1 bowel movement was recorded overnight. Her Hb is stable. She has 
been insisting on narcotic pain medications for her abdominal pain symptoms. 
Continue oral vancomycin. Discontinue IV flagyl. Patient may be discharged after
 her diarrhea has demonstrated improvement.



Carmen De La Torre MD

Gastroenterology/ Transplant Hepatology

Pager [phone]

2020 19:25

Erik Conrad OT - 2020 10:16 AM CDT2020

1016



OCCUPATIONAL THERAPY NOTE:

Consult received and chart reviewed. Attempted OT eval, however patient reports 
she is already mobile and does not want to participate with OT services at this 
time. Pt states she is nauseas and does not want to get up right now. OT 
educated patient on benefits of participating in OT services, howeverpatient 
continues to refuse eval. Will attempt again later, as time permits.



SUSAN Osorio

Pager [phone] signed by Erik Conrad OT at 2020 
10:19 AM Kermit North MBBS - 2020  2:10 PM CDTFormatting of this 
note might be different from the original.

GI Service Progress Note



Reason for consult: hematochezia



Chief Complaint:

hematochezia



SUBJECTIVE/Interval history:

Has loose stools but attributes them to vancomycin enemas. Still has some blood 
in stool but has come down in amounts. Belly distended but able to tolerate 
clear liquid diet.



CURRENT MEDICATIONS - reviewed.



PHYSICAL EXAM:



BP (!) 153/78 (Patient Position: Supine)  | Pulse 83  | Temp 36.4 C (97.5 F)
 (Oral)  | Resp 18  | Ht 1.6 m (5' 3")  | Wt 84 kg (185 lb 3 oz)  | SpO2 95%  | 
BMI 32.80 kg/m



General :  awake and alert, NAD

ENT: Moist  mucous membranes, pupils equal, EOMI

Cardiovascular: RRR, normal S1 and S2, No lower extremity edema

Respiratory:  Clear to auscultation bilaterally, normal effort, no crackles or 
wheezing

Gastrointestinal: soft, diffuse tenderness to palpation, positive for 
distension, normal bowel sounds

Psychiatric: oriented x 3, appropriate affect and cognition



LABS/IMAGING - reviewed, pertinent results as below:



 5/3/2020 05:03

WBC x10^3 5.60

RBC x10^6 3.69 (L)

HGB 10.9 (L)

HCT 32.8 (L)

MCV 88.9

MCH 29.5

MCHC 33.2

RDW-SD 53.9 (H)

RDW-CV 17.2 (H)

PLT x10^3 45 (LL)



 5/3/2020 05:03



K 3.3 (L)



CO2 TOTAL 25

AGAP 6

BUN 12

GLUCOSE 275 (H)

CREATININE 0.60

TOTAL BILI 1.2 (H)

BILI UNCON 0.7

BILI CONJ 0.0

CALCIUM 7.4 (L)

MAGNESIUM 1.5 (L)

T PROTEIN 6.1 (L)

ALBUMIN 2.7 (L)

ALK PHOS 92

ALTv 20

AST(SGOT) 37



XR abdomen 5/3/20:

IMPRESSION



No radiographic findings to suggest with toxic megacolon.



ASSESSMENT/PLAN

Deborah Alatorre is a 59 year old female hx of decompensated cirrhosis of 
liver with abdominal pain, distension and hematochezia



C.diff colitis with hematochezia

On oral vancomycin and IV flagyl

Overall patient appears to be improving though she has signs of belly distension

HB stable and her diarrhea is improving.

She is able to tolerate liquid diet.



Decompensated cirrhosis of liver: Listed for transplant--No due to cardiac 
comorbidities as per patient



Etiology-HCV, duration 20 years ago



MELD-Na score: 10 at 5/3/2020  5:03 AM

MELD score: 10 at 5/3/2020  5:03 AM

Calculated from:

Serum Creatinine: 0.60 mg/dL (Rounded to 1 mg/dL) at 5/3/2020  5:03 AM

Serum Sodium: 136 mmol/L at 5/3/2020  5:03 AM

Total Bilirubin: 1.2 mg/dL at 5/3/2020  5:03 AM

INR(ratio): 1.3 at 5/3/2020  5:03 AM

Age: 59 years



Ascites - trace perihepatic ascites

Previous hx of SBP-No



Esophageal Varices:

Recent banding in 2019.(done at [facility])

EGD surveillance as outpatient



Hepatic Encephalopathy:

Grade -1

Lactulose at home. On hold sec to belly distension



HCC screening

Hx of HCC s/p ablation in 2018. CT triple phase as out patient.



TIPS/BRTO-No



Recommendations

Continue serial abdominal exams

Xray abdomen daily to follow on abdominal distension

Please do not use narcotics to treat pain

Continue oral vancomycin, and IV flagyl for now. Can discontinue vancomycin 
enema

Advance diet as tolerated to low sodium regular food



Patient seen and discussed with faculty Carmen Dumont



GI service will continue to follow



Dr. Kermit James

Division of Gastroenterology and Hepatology, PGY-4

Pager: [phone]



Electronically signed by Carmen De La Torre MD at 2020  6:17 PM CDT

Associated attestation - Carmen De La Torre MD - 2020  6:17 PM CDTI have 
reviewed this patient's case with Dr. James.  I have seen and examined the 
patient, and agree with Dr. James's findings and recommendations as above.



Carmen De La Torre MD

Gastroenterology/ Transplant Hepatology

Pager [phone]

5/3/2020 18:17



Miguel Cano MD - 2020  8:37 AM CDTFormatting of this 
note might be different from the original.

[facility] Progress Note

Date of Service: 5/3/2020 08:37, HD #: [id number]



CC: dark red colored stool/abdominal pain



24-Hour Events:

 Transferred from MICU

 Ongoing reported hematochezia but STABLE Hgb/GI following

 GenSurg: no intervention for partial SBO. Repeat KUB NO SBO now; signed off



Subjective:

States 5/10 abd pain if with morphine still. States able to move around ok. Also
 had 6xBM w/ blood still but less now compared to the past few days.



Physical Exam:

Temp:  [36.2 C (97.2 F)-36.8 C (98.2 F)]

Pulse:  [70-84]

Resp:  [16-18]

BP: (140-177)/(68-82)

MAP (mmHg):  [103]



Intake/Output Summary (Last 24 hours) at 5/3/2020 0837

Last data filed at 2020 0900

Gross per 24 hour

Intake 490.7 ml

Output 200 ml

Net 290.7 ml



General: alert and oriented x 4 (person, place and date/time); no apparent 
distress

HEENT: normocephalic atraumatic

Neck: supple, no lymphadenopathy, no bruits, no JVD

Lungs: clear to auscultation bilaterally

Cardio: S1, S2 normal; no murmurs, rubs or gallops

Abdomen: soft, TTP midepigastric

Extremities: no clubbing, cyanosis, or edema

Skin: no rashes

Neuro: alert and oriented x 3



Labs:

HH 9.9&gt; 10.2&gt;11.5&gt; 10.9



Img:

Xr Kub



Result Date: 2020

Nonspecific, mild gaseous distention of large and small bowel with no evidence 
of mechanical obstruction. Right-sided pleural effusion.



Ct Angiogram Abdomen/pelvis



Result Date: 2020

Study is limited secondary to presence of intraluminal contrast on the 
noncontrast study from the prior CT scan. No active extravasation of contrast 
seen in the area where there was no contrast on the noncontrast CT from today. 
no arterial secondary signs  aneurysm, early draining vein ( AVM ) seen Changes 
consistent  right and transverse colon with mild dilatation of transverse colon 
with air fluid levels . Consider infectious colitis.  No changes of ischemia or 
vessel occlusion. Small sliding hiatal hernia. Trace perihepatic and pericolonic
 ascites, likely reactive. Cirrhosis without focal hepatic lesion with sequela 
of portal hypertension including splenomegaly and gastroesophageal varices. Prel
iminary Report Dictated by Resident: David Boone MD., have reviewed thisstudy and agree with the above report.



Assessment and Plan:

Deborah Alatorre is a 59 year old female admitted with the following hospital
 issues



Abd pain/hematochezia/sepsis 2/2 cdiff (fulminant? Per GI) type colitis; 
negative CTA

Partial SBO- resolved per KUB 

R Liver lesionx1 3.6cm per CTAP

Hx of HCV cirrhosis c/w HCC s/p ablation c/w esophageal varices s/p banding 

Anemia/thrombocytopenia 2/2 cirrhosis

Being treated for fulminant type CDI colitis c/w hematochezia which appears to 
be improving, along with sx. Partial sbo also improved. BCX NGTD, UCX NG

-Hgb checks q12h&gt; q24h now

-tx HH &gt;7 PRN

-c/w IV flagyl + PO VANC +rectal vanc d#3/10

-c/w PO protonix BID

-GI on board; appreciate reccs; need PO VANC, as for IV flagyl, will check 
response, and decide if needs to be continued or not

-GS signed off; no intervention on partial SBO as patient improving

-adv diet PRN

-MEDICATION [facility] request for PO VANC +/- flagyl TBD per GI reccs



Hx of opioid withdrawal on home methadone

*With GI permission, MICU started morphine 2mh q8hPRN only to avoid withdrawal

-c/w q8hprn low dose morphine

-monitor withdrawal

-restart methadone at d/c

-consult PAIN service regarding methadone and opioid mgt



NICM

LBBB; old since 

-monitor



HTN

-slow resumption of BP meds as tolerated

-restart home lasix

-consider restarting coreg and entresto tomorrow if BP/P permits



Hx of breast CA s/p  tx

-monitor



DMII

-c/w SSI



Hospital Care Issues:

Pain control: tylenol.

Prophylaxis: DVT-SCDs

Stress Ulcer: pantoprazole

Code Status: FULL



Miguel Cano MD, MPH

Internal Medicine PGY-3

[place] Team

Physician # [id number]

Pager # [phone]





--------------------------------------END OF DAILY PROGRESS 
NOTE--------------------------------------



Hospital Course:

Deborah Alatorre is a 59 year old femalewith PMHx IDDM, HTN and 
cardiopathy, breast cancer treated in , Hep C cirrhosis c/b HCC s/p 
ablation in 2018 and esophageal varices s/p banding admitted for 3 days history 
of hematemesis and melena. Concerning for ischemic colitis given acute abdominal
 pain. CT scan shows patent flow of SMA and celiac arteries per Surgery, not 
concerning for ischemiccolitis. Repeat CTAP r/o SBO with persistent colitis.+ 
c diff colitis. Started on PO and rectal Vanc as well as IV flagyl. Monitoring 
ongoing hematochezia, serial KUBs for pSBO progression, and ongoing PO VANC+IV 
flagyl for fulminant CDI c/w hematochezia. Abd remained tender so will monitor 
and advance diet cautiously. At floors, patient abd pain ongoing, but bleeding 
less now. GI deciding whether to continue or stop IV flagyl. If so, will need 
d/c planning/[facility] medication request for PO VANC +/- IV flagyl. Pain service 
called for her extensive opioid on methadone hx now.

F/U:

1. GI reccs/ if no IV flagyl needed, [facility] medication request for PO vanc/PICC for
 d/c planning

2. F/U Methadone clinic at d/c (Pt very afraid of opioid withdrawal)



CURRENT MEDICATIONS

Current Facility-Administered Medications

Medication Dose Route Frequency Last Rate Last Dose

 furosemide (LASIX) tablet 80 mg  80 mg Oral DAILY   80 mg at 20 1241

 pantoprazole (PROTONIX) EC tablet 40 mg  40 mg Oral BID   40 mg at 20

 Sliding Scale Insulin - Aspart (NOVOLOG) + Fsbg Testing   Subcutaneous TID 
MEALS+HS

 metroNIDAZOLE (FLAGYL I.V.) Piggyback 500 mg  500 mg IV Piggyback Q8H ABX   
500 mg at 20 0455

 morpHINE injection 2 mg  2 mg Slow IV Push Q8HPRN   2 mg at 20 0456

 vancomycin (FIRVANQ) 50 mg/mL oral solution 500 mg  500 mg Oral QID   500 mg
 at 20

 vancomycin (VANCOCIN) 500 mg in NaCl 0.9% (NS) enema  500 mg Rectal Q6H ABX 
  500 mg at 200614

 dextrose 10% (D10W) bolus infusion 250 mL  250 mL IV Infusion PRN - SEE 
INSTRUCTIONS

 glucagon (GLUCAGEN DIAGNOSTIC KIT) injection 1 mg  1 mg Intramuscular PRN

 proMETHazine (PHENERGAN) 25 mg in NaCl 0.9% (NS) 50 mL piggyback  25 mg IV 
Piggyback Q6HPRN   25mg at 20 0958



Electronically signed by Lynsey Styles DO at 2020 12:06 AM CDT

Associated attestation - Lynsey Styles DO - 2020 12:06 AM CDTI 
personally examined the patient on 5/3/20 and agree with Dr. Cano's resident
 note with the following addition(s):

- C.diff infection: abdomen is soft and tympanic. KUB is reviewed and no sign of
 toxic megacolon as of now.Still endorsed hematochezia however has slowed down.

- Opioid dependent: patient is on methadone outpatient however narcotics has 
been minimalized due Adry. Diff infection. Pt reported feeling anxious and 
jittery however "not bad". She is on 6 mg morphine daily. Monitor for sign of 
opioid withdrawal. Appreciate pain service to help with her chronic painregiment
 in this setting.

- NICM and HTN: BP meds were held in the setting of multiple daily BM and 
hematochezia. Lasix was restarted however would not be the best choice at this 
time due to ongoing multiple BM and patient appears to be euvolemic on 
examination. Will monitor and if needed restart at low dose. BP is trending up
and will restart coreg and possibly adding entresto later.



I actively participated in the decision-making process.  Please see the 
resident's note for additional details.



Lynsey Styles DO/PhD

Chief Resident &amp; Clinical Instructor

# [id number]

Kermit James MBBS - 2020  5:15 PM CDTFormatting of this note might be 
different from the original.

GI Service Progress Note



Reason for consult: hematochezia



Chief Complaint:

hematochezia



SUBJECTIVE/Interval history:

Patient has 4-5 loose stools yesterday which are bloody. Overnight has 2 bowel 
movements. Able to tolerate clear liquids. CT abdomen and xray abdomen in past 
24 hrs were as below.



CURRENT MEDICATIONS - reviewed.



PHYSICAL EXAM:



BP (!) 152/81 (Patient Position: Supine)  | Pulse 72  | Temp 36.2 C (97.2 F)
 (Oral)  | Resp 16  | Ht 1.6 m (5' 3")  | Wt 84 kg (185 lb 3 oz)  | SpO2 94%  | 
BMI 32.80 kg/m



General :  awake and alert, NAD

ENT: Moist  mucous membranes, pupils equal, EOMI

Cardiovascular: RRR, normal S1 and S2, No lower extremity edema

Respiratory:  Clear to auscultation bilaterally, normal effort, no crackles or 
wheezing

Gastrointestinal: soft, diffuse tenderness to palpation, positive for 
distension, normal bowel sounds

Psychiatric: oriented x 3, appropriate affect and cognition



LABS/IMAGING - reviewed, pertinent results as below:



 2020 10:06

WBC x10^3 6.58

RBC x10^6 3.42 (L)

HGB 10.2 (L)

HCT 30.5 (L)

MCV 89.2

MCH 29.8

MCHC 33.4

RDW-SD 55.7 (H)

RDW-CV 17.5 (H)

PLT x10^3 45 (LL)



 2020 04:25



K 3.7

 (H)

CO2 TOTAL 21 (L)

AGAP 6

BUN 16

GLUCOSE 151 (H)

CREATININE 0.59

eGFR CALCULATION (non ) 104.7

eGFR CALCULATION () 126.9

TOTAL BILI 1.7 (H)

BILI UNCON 1.3 (H)

BILI CONJ 0.0

CALCIUM 7.1 (L)

MAGNESIUM 2.3

T PROTEIN 5.7 (L)

ALBUMIN 2.5 (L)

ALK PHOS 77

ALTv 20

AST(SGOT) 33



 CT Abdomen and pelvis with and without contrast: 20

IMPRESSION



Study is limited secondary to presence of intraluminal contrast on the

noncontrast study from the prior CT scan. No active extravasation of

contrast seen in the area where there was no contrast on the noncontrast CT

from today. no arterial secondary signs  aneurysm, early draining vein (

AVM ) seen



Changes consistent  right and transverse colon with mild dilatation of

transverse colon with air fluid levels . Consider infectious colitis.  No

changes of ischemia or vessel occlusion.



Small sliding hiatal hernia.



Trace perihepatic and pericolonic ascites, likely reactive.



Cirrhosis without focal hepatic lesion with sequela of portal hypertension

including splenomegaly and gastroesophageal varices



Xray abdomen: 20

IMPRESSION



Nonspecific, mild gaseous distention of large and small bowel with no

evidence of mechanical obstruction.



Right-sided pleural effusion.



ASSESSMENT/PLAN

Deborah Alatorre is a 59 year old female hx of decompensated cirrhosis of 
liver with abdominal pain, distension and hematochezia



C.diff colitis with hematochezia

On oral vancomycin, vancomycin enema and IV flagyl

Overall patient appears to be improving though she has signs of belly distension

CT showed colitis as above

HB stable and her diarrhea is improving.

She is able to tolerate liquid diet.

Surgery team on board as well



Decompensated cirrhosis of liver: Listed for transplant--No due to cardiac 
comorbidities as per patient



Etiology-HCV, duration 20 years ago



MELD-Na score: 10 at 2020  4:25 AM

MELD score: 10 at 2020  4:25 AM

Calculated from:

Serum Creatinine: 0.59 mg/dL (Rounded to 1 mg/dL) at 2020  4:25 AM

Serum Sodium: 136 mmol/L at 2020  4:25 AM

Total Bilirubin: 1.7 mg/dL at 2020  4:25 AM

INR(ratio): 1.1 at 2020  4:25 AM

Age: 59 years



Ascites - trace perihepatic ascites

Previous hx of SBP-No



Esophageal Varices:

Recent banding in 2019.(done at [facility])

EGD surveillance as outpatient



Hepatic Encephalopathy:

Grade -1

Lactulose at home. On hold sec to belly distension



HCC screening

Hx of HCC s/p ablation in 2018. CT triple phase as out patient.



TIPS/BRTO-No



Recommendations

Continue serial abdominal exams

Xray abdomen daily to follow on abdominal distension

Please do not use narcotics to treat pain

Continue oral vancomycin, vancomycin enemas and IV flagyl for now.

Discussed with primary team



Patient seen and discussed with faculty Carmen Dumont



GI service will continue to follow



Dr. Kermit James

Division of Gastroenterology and Hepatology, PGY-4

Pager: [phone]



Electronically signed by Carmen De La Torre MD at 2020 10:06 PM CDT

Associated attestation - Carmen De La Torre MD - 2020 10:06 PM CDTI have 
reviewed this patient's case with Dr. James.  I have seen and examined the 
patient, and agree with Dr. James's findings and recommendations as above.



Carmen De La Torre MD

Gastroenterology/ Transplant Hepatology

Pager [phone]

2020 22:06



Sarahy Naidu RN - 2020  2:43 PM CDTCare Management Continued Stay 
Assessment



LOS Day: 4      Estimated /Planned Discharge Date:  20



[place]

female 59 year old



Date CM/SW last Face to Face completed with patient/family: 20



Funding source: Payor: MEDICARE / Plan: MEDICARE PART A &amp; B / Product Type: 
Medicare /

Insurance DC planner:  none



PCP:Lauren Zayas



Patient/Family/MPOA/Caregiver Engaged with Transitional Care Plan:  yes

Patient/Family/MPOA/Caregiver concurs with proposed discharge plan:   yes

Name, Relationship to Patient and contact number of individual acting on behalf 
of the patient: Patient



Chief Complaint/Admitting Dx:Partial small bowel obstruction

Colitis



Hospital Problems:   Partial small bowel obstruction

  Hematemesis

  Melena

  Obesity (BMI 30-39.9)



Summary of hospital course: Per Chart:Deborah Alatorre is a 59 year old 
femalewith PMHx IDDM, HTN and cardiopathy, breast cancer treated in , 
Hep C cirrhosis c/b HCC s/p ablation in 2018 andesophageal varices s/p banding
 admitted for 3 days history of hematemesis and melena. Concerning forischemic 
colitis given acute abdominal pain. CT scan shows patent flow of SMA and celiac 
arteries per Surgery, not concerning for ischemic colitis. Repeat CTAP r/o SBO 
with persistent colitis.+ c diff colitis. Started on PO and rectal Vanc as 
well as IV flagyl. Monitoring ongoing hematochezia, serial KUBs for pSBO 
progression, and ongoing PO VANC+IV flagyl for fulminant CDI c/w hematochezia. 
Abd remained tender so will monitor and advance diet cautiously.



CM/SW Interventions/Resources provided: Initial CM screening and initial 
discharge plan established.



CM/SW Interventions/Resources still needed: continue to follow for d/c 
planning/needs, may need PAP depending on d/c meds (potential to d/c with PO 
Vanc)



Anticipated Discharge Destination:  Home



If DC to home, who will support patient: Mother



Anticipated DME needs:  None



Referrals sent: not applicable  If no, why/when will referral be sent:



Has patient been accepted: not applicable



Revised plan if not accepted: Home with Home health



What is the clinical care happening right now that must be done in the hospital 
and only the hospital:



GI clearance/final rec's pending

Serial KUB's monitoring SBO progression

Chg IV abx therapy to PO

Hgb check q24hr



SILVINA Farah, RN

[email or web address]

O: [phone]

C: [phone] (not for patient use)



_________________________________________________________________



Please addend note following Length of Stay rounds and complete section below



Were any recommendations made during LOS rounds on this patient:not applicable



If yes, what new recommendations were made at LOS:







Electronically signed by Sarahy Naidu RN at 2020  2:59 PM Miguel Link MD - 2020 10:37 AM CDTFormatting of this note might 
be different from the original.

[facility] Progress Note/ACCEPTANCE

Date of Service: 2020 10:37, HD #: 4



CC: dark red colored stool/abdominal pain



24-Hour Events:

 Transferred from MICU

 Ongoing reported hematochezia but STABLE Hgb/GI following

 GenSurg: no intervention for partial SBO. Repeat KUB NO SBO now; signed off



Subjective:

States 5/10 abd pain, still some blood on stool but better than yesterday. 
States hungry and wants to try advanced diet.



Physical Exam:

Temp:  [36 C (96.8 F)-36.7 C (98.1 F)]

Heart Rate (monitor):  [66-79]

Pulse:  []

Resp:  [8-19]

BP: (149-179)/()

MAP (mmHg):  []



Intake/Output Summary (Last 24 hours) at 2020 1037

Last data filed at 2020 0900

Gross per 24 hour

Intake 2816.7 ml

Output 3404 ml

Net -587.3 ml



General: alert and oriented x 4 (person, place and date/time); no apparent 
distress

HEENT: normocephalic atraumatic

Neck: supple, no lymphadenopathy, no bruits, no JVD

Lungs: clear to auscultation bilaterally

Cardio: S1, S2 normal; no murmurs, rubs or gallops

Abdomen: soft, TTP midepigastric

Extremities: no clubbing, cyanosis, or edema

Skin: no rashes

Neuro: alert and oriented x 3



Labs:

HH 9.9&gt; 10.2



Img:

Ct Angiogram Abdomen/pelvis



Result Date: 2020

Study is limited secondary to presence of intraluminal contrast on the 
noncontrast study from the prior CT scan. No active extravasation of contrast 
seen in the area where there was no contrast on the noncontrast CT from today. 
no arterial secondary signs  aneurysm, early draining vein ( AVM ) seen Changes 
consistent  right and transverse colon with mild dilatation of transverse colon 
with air fluid levels . Consider infectious colitis.  No changes of ischemia or 
vessel occlusion. Small sliding hiatal hernia. Trace perihepatic and pericolonic
 ascites, likely reactive. Cirrhosis without focal hepatic lesion with sequela 
of portal hypertension including splenomegaly and gastroesophageal varices. Prel
iminary Report Dictated by Resident: David Boone MD., have reviewed thisstudy and agree with the above report.



Assessment and Plan:

Deborah Alatorre is a 59 year old female admitted with the following hospital
 issues



Abd pain/hematochezia/sepsis 2/2 cdiff (fulminant? Per GI) type colitis; 
negative CTA

Partial SBO

R Liver lesionx1 3.6cm per CTAP

Hx of HCV cirrhosis c/w HCC s/p ablation c/w esophageal varices s/p banding 

Anemia/thrombocytopenia 2/2 cirrhosis

Being treated for fulminant type CDI colitis c/w hematochezia which appears to 
be improving, along with sx. Partial sbo also improved. BCX NGTD, UCX NG

-Hgb checks q12h; adjust tomorrow to q24s

-tx HH &gt;7 PRN

-c/w IV flagyl + PO VANC d#2/10

-switch IV to PO protonix BID

-serial KUBs; monitor pSBO progression

-GI on board; appreciate reccs; need PO VANC, as for IV flagyl, will check 
response, and decide if needs to be continued or not

-GS signed off; no intervention on partial SBO as patient improving

-adv diet PRN

-MEDICATION [facility] request for PO VANC +- flagyl TBD per GI reccs tomorrow



Hx of opioid withdrawal on home methadone

*With GI permission, MICU started morphine 2mh q8hPRN only to avoid withdrawal

-c/w q8hprn low dose morphine

-monitor withdrawal

-restart methadone at d/c



NICM

LBBB; old since 2018

-monitor



HTN

-slow resumption of BP meds as tolerated

-restart home lasix

-consider restarting coreg and entresto tomorrow if BP/P permits



Hx of breast CA s/p  tx

-monitor



DMII

-c/w SSI



Hospital Care Issues:

Pain control: tylenol.

Prophylaxis: DVT-SCDs

Stress Ulcer: pantoprazole

Code Status: FULL



Miguel Cano MD, MPH

Internal Medicine PGY-3

[place] Team

Physician # [id number]

Pager # [phone]





--------------------------------------END OF DAILY PROGRESS 
NOTE--------------------------------------



Hospital Course:

Deborah Alatorre is a 59 year old femalewith PMHx IDDM, HTN and 
cardiopathy, breast cancer treated in , Hep C cirrhosis c/b HCC s/p 
ablation in  and esophageal varices s/p banding admitted for 3 days history 
of hematemesis and melena. Concerning for ischemic colitis given acute abdominal
 pain. CT scan shows patent flow of SMA and celiac arteries per Surgery, not 
concerning for ischemiccolitis. Repeat CTAP r/o SBO with persistent colitis.+ 
c diff colitis. Started on PO and rectal Vanc as well as IV flagyl. Monitoring 
ongoing hematochezia, serial KUBs for pSBO progression, and ongoing PO VANC+IV 
flagyl for fulminant CDI c/w hematochezia. Abd remained tender so will monitor 
and advance diet cautiously.



CURRENT MEDICATIONS

Current Facility-Administered Medications

Medication Dose Route Frequency Last Rate Last Dose

 pantoprazole (PROTONIX) 40 mg in NaCl 0.9% (NS) 100 mL MINI-BAG  40 mg IV 
Piggyback Q12H

 metroNIDAZOLE (FLAGYL I.V.) Piggyback 500 mg  500 mg IV Piggyback Q8H ABX   
500 mg at 20 0448

 morpHINE injection 2 mg  2 mg Slow IV Push Q8HPRN   2 mg at 20 1003

 vancomycin (FIRVANQ) 50 mg/mL oral solution 500 mg  500 mg Oral QID   500 mg
 at 20 0721

 vancomycin (VANCOCIN) 500 mg in NaCl 0.9% (NS) enema  500 mg Rectal Q6H ABX 
  500 mg at 200718

 dextrose 10% (D10W) bolus infusion 250 mL  250 mL IV Infusion PRN - SEE 
INSTRUCTIONS

 glucagon (GLUCAGEN DIAGNOSTIC KIT) injection 1 mg  1 mg Intramuscular PRN

 proMETHazine (PHENERGAN) 25 mg in NaCl 0.9% (NS) 50 mL piggyback  25 mg IV 
Piggyback Q6HPRN   25mg at 20 0958

 Sliding Scale Insulin - Aspart (NOVOLOG) + Fsbg Testing   Subcutaneous Q4H  
 Stopped at 



Electronically signed by Miguel Cano MD at 2020  2:36 
PM Hans Sanders MD - 2020  9:15 AM CDTShort transfer note

 Hospital course

Deborah Alatorre is a 59 year old femalewith PMHx IDDM, HTN and 
cardiopathy, breast cancer treated in , Hep C cirrhosis c/b HCC s/p 
ablation in 2018 and esophageal varices s/p banding admitted for 3 days history 
of hematemesis and melena. Concerning for ischemic colitis given acute abdominal
 pain. CT scan shows patent flow of SMA and celiac arteries per Surgery, not 
concerning for ischemiccolitis. Repeat CTAP r/o SBO with persistent colitis. + c
 diff colitis. Started on PO and rectal Vanc as well as IV flagyl.



Items to follow up

- GI bleeding: likely related to c diff colitis. Currently treating as fulminant
 colitis with PO/rectal vanc and flagyl. Tolerating CLD. Still has some bloody 
BMs. Hgb remained stable after transfusions. HH q12 to q24.

Patient on opioids 2/2 methadone dependence and patient's fear of withdrawal.

GI following, surgery signed off.

- Hx of non ischemic HFrEF: Home meds: coreg, Entresto and lasix. Last EF in 
care everywhere EF 35%.EKG with LBB. Consider checking QTc if restarting home 
seroquel.Electronically signed by Hans Rangel MD at 2020  9:22 AM CDT
Genny Hand MD - 2020  8:46 AM CDTFormatting of this note might be 
different from the original.

TRAUMA SURGERY DAILY PROGRESS NOTE



Date of Service: 2020



Hospital Day #5

Patient Age: 59 year old

Gender: female



DIAGNOSIS/INJURIES/PROBLEMS

Active Problems:

  Partial small bowel obstruction

  Hematemesis

  Melena

  Obesity (BMI 30-39.9)





Deborah Alatorre is a 59 year old-year-old female with hx of cirrhosis who 
presented to ED on  with abdominal pain, emesis, and bloody diarrhea. CT 
revealed thickening of large bowel suggestive of colitis and partial small bowel
 obstruction. Being managed conservatively in MICU.



Events/Interventions past 24 hours:

C diff positive but afebrile with normal WBC

Continues to be on protonix and octreotide drips

Pain improved today per patient

Abdominal exam improved from yesterday



Medications:

Current Facility-Administered Medications

Medication Dose Route Frequency Last Rate Last Dose

 metroNIDAZOLE (FLAGYL I.V.) Piggyback 500 mg  500 mg IV Piggyback Q8H ABX   
500 mg at 20 0448

 morpHINE injection 2 mg  2 mg Slow IV Push Q8HPRN   2 mg at 20 0130

 vancomycin (FIRVANQ) 50 mg/mL oral solution 500 mg  500 mg Oral QID   500 mg
 at 20 0721

 vancomycin (VANCOCIN) 500 mg in NaCl 0.9% (NS) enema  500 mg Rectal Q6H ABX 
  500 mg at 200718

 dextrose 10% (D10W) bolus infusion 250 mL  250 mL IV Infusion PRN - SEE 
INSTRUCTIONS

 glucagon (GLUCAGEN DIAGNOSTIC KIT) injection 1 mg  1 mg Intramuscular PRN

 octreotide (SANDOSTATIN) 1,250 mcg in NaCl 0.9% (NS) infusion  50 mcg/hr IV 
Infusion CONTINUOUS 10 mL/hr at 20 0530 50 mcg/hr at 20 0530

 pantoprazole (PROTONIX) 80 mg in NaCl 0.9% (NS) 500 mL infusion  8 mg/hr IV 
Piggyback [id number] mL/hr at 20 2133 8 mg/hr at 20 2133

 proMETHazine (PHENERGAN) 25 mg in NaCl 0.9% (NS) 50 mL piggyback  25 mg IV 
Piggyback Q6HPRN   25mg at 20 0958

 Sliding Scale Insulin - Aspart (NOVOLOG) + Fsbg Testing   Subcutaneous Q4H  
 Stopped at 





Nutrition:

NPO



BP (!) 163/81  | Pulse 73  | Temp 36.7 C (98 F) (Tympanic)  | Resp 13  | Ht 
1.6 m (5' 3")  | Wt 84 kg (185 lb 3 oz)  | SpO2 95%  | BMI 32.80 kg/m



Intake/Output Summary (Last 24 hours) at 2020 0846

Last data filed at 2020 0600

Gross per 24 hour

Intake 2724 ml

Output 3204 ml

Net -480 ml



LABORATORY

CBC BMP PT/INR

WBC x10^3 (/CMM)

Date Value

2006 8.9



WBC (10*3/L)

Date Value

2020 6.40

  NA

Date Value

2020 136 mmol/L

2006 144 MMOL/L

  No results found for: PT

RBC x10^6 (/CMM)

Date Value

2006 5.11 (H)



RBC (10*6/L)

Date Value

2020 3.38 (L)

 K

Date Value

2020 3.7 mmol/L

2006 3.5 MMOL/L

 INR (no units)

Date Value

2020 1.1



PLT x10^3 (/CMM)

Date Value

2006 223



PLT (10*3/L)

Date Value

2020 44 (LL)

 CALCIUM

Date Value

2020 7.1 mg/dL (L)

2006 8.8 MG/DL



HGB

Date Value

2020 9.9 g/dL (L)

2006 14.5 G/DL

  CL

Date Value

2020 109 mmol/L (H)

2006 109 MMOL/L (H)

  aPTT

HCT (%)

Date Value

2020 30.1 (L)

2006 42.1

  BUN

Date Value

2020 16 mg/dL

2006 17 MG/DL

  APTT Patient (Seconds)

Date Value

2020 25



 CREATININE

Date Value

2020 0.59 mg/dL

2006 0.65 MG/DL (L)



 GLUCOSE

Date Value

2020 151 mg/dL (H)

2006 134 MG/DL (H)



 CO2 TOTAL

Date Value

2020 21 mmol/L (L)

2006 28 MMOL/L





PHYSICAL EXAM

General: alert and oriented, cooperative

CV: hemodynamically stable

Resp: unlabored, no increased work of breathing, equal bilateral chest rise

Extremities/Musculoskeletal: moves extremities well, no edema or cyanosis

Skin: skin color, texture, and turgor normal; no rashes or lesions

Abdomen: moderately distended but soft, mild tenderness diffusely



INJURIES/PROBLEMS/DIAGNOSES and TREATMENT PLAN:

59 year-old woman with colitis, likely infectious. Clinical picture is 
improving.

-No surgical intervention

-Continue antibiotics for C diff

-SURC will sign off. Please page if patient acutely worsens and surgical 
evaluation is needed.



Genny Hand MD

Plastic Surgery PGY-2

Pager [phone]

2020



Electronically signed by Amrik Whitman MD at 2020  9:38 AM CDT

Associated attestation - Amrik Whitman MD - 2020  9:38 AM CDTI 
personally examined the patient on  and agree with Dr. Hand's  note.

I actively participated in the decision-making process.

Please see the resident's note for additional details.

Diana Aguila DO - 2020  2:49 AM CDTFormatting of this note might be 
different from the original.

MICU Progress Note



Date of Service: 2020 02:49



Reason for ICU admission: hematemesis, melena

ICU Day: 4

Intubation Day: n/a



Code Status: Full



Last 24 hour events (major events):

- C diff positive

- started oral and rectal vanc and IV flagyl

- dark red BM overnight



Subjective:

Patient reports morphine is not enough for pain. She is going to get withdrawal.



Ventilator Bundle:

None



Lines (with dates):

Peripheral IV 20 1815 Left Arm Ultrasound not used 20  Size: 20 G

Peripheral IV 20 0045 Left Hand Ultrasound not used 20  Size: 22 G

Peripheral IV 20 Left Arm Ultrasound Used 20  Size: 18 G

Peripheral IV 20 1049 Right;Superior Arm Ultrasound Used 20  Size: 
18 G Ultrasound



Vann:

none



Intake/Output:



Intake/Output Summary (Last 24 hours) at 2020 0249

Last data filed at 2020 2100

Gross per 24 hour

Intake 4846 ml

Output 3005 ml

Net 1841 ml



Physical Exam:

Temp:  [36 C (96.8 F)-36.7 C (98.1 F)]

Heart Rate (monitor):  [67-79]

Pulse:  []

Resp:  [8-19]

BP: (137-179)/()

MAP (mmHg):  []



Constitutional: in mild distress from pain

Cardiovascular: Normal rate and regular rhythm.  No murmur or gallops noted

Pulmonary/Chest: CTAB. Effort normal

Abdominal: Soft. TTP diffusely

Musculoskeletal: Normal range of motion. no edema or tenderness.





Labs (pertinent only)/Imaging:



Hgb 10.4



Xr Chest 1 Vw



Result Date: 2020

No acute intrathoracic abnormality. Preliminary Report Dictated by Resident: 
Ziggy Longoria MD., have reviewed this study and agree with the 
above report.



Ct Abdomen Pelvis W Contrast



Result Date: 2020

1. Dilated small bowel loops in the upper abdomen with loops of jejunum 
measuring up to 2.4 cm representing partial small bowel obstruction. Transition 
point is seen in the central abdomen on 2:63 withdistally collapsed loops of 
jejunum. No evidence of ischemia. 2. Cirrhotic liver morphology with 4.5cm 
hypodense segment VI lesion measuring 47 Hounsfield units concerning for 
malignancy. Recommend GIconsultation and correlation with prior imaging studies 
and/or follow-up triple phase abdominal CT. 3. Moderate portal hypertension 
including splenomegaly, dilated main portal vein, small gastroesophageal varices
 and portal colopathy. 4. Diffuse thickening of the large bowel could represent 
infectiousor inflammatory colitis Findings relayed to Dr. Abarca at time of 
dictation. Preliminary Report Dictated by Resident: Juvencio Savage I, Ziggy Bowers MD., have reviewed this study and agree with the above report.



Microbiology:

 BCx [id number]

 Ucx contaminated



Assessment/Plan:

Deborah Alatorre is a 59 year old female admitted with Hematemesis and 
Hematochezia secondary to HCC cirrhosis



Neuro

No acute issues.

- Monitor mentation given cirrhosis and concern for encephalopathy



Resp

No acute issues



Cardiovascular

Hx of HTN

Possible Hx of cardiopathy

Patient with Hx of hypertension and cardiopathy on Coreg and Entresto as well as
 Lasix.

- Currently holding given ongoing GI bleed

- Reintroduce as tolerated



FEN/GI

C diff colitis

Acute blood loss anemia secondary to GIB 2/2 to C diff colitis

Sepsis secondary to infectious colitis with lactic acidosis, present on 
admission; resolved

Hep C Cirrhosis

Hx of esophageal varices s/p banding

Hx of HCC

Concerning liver lesion on CT

Hx of Hysterectomy and cholecystectomy

+ c diff colitis. Started on PO and rectal Vanc as well as IV flagyl.

- HH Q6H

- Transfuse for Hgb &lt;7

- Follow up GI recs

- Consult surgery

 - no surgical intervention

- vanc (PO and rectal )+ IV flagyl D2

- Continue with Protonix and Octreotide drips



ID

Possible infectious colitis

As above

- Stool studies including C diff

- merrem and linezolid



Renal

No acute issues



Endo

IDDM

Patient with hyperglycemia, AGAP closed. Recently admitted for hyperosmolar 
hyperglycemic coma.

- Continue with SSI



OtherDVT prophylaxis: contraindicated



Diana Aguila DO



Hospital Course

Deborah Alatorre is a 59 year old female with PMHx IDDM, HTN and cardiopathy,
 breast cancer treated in ,  Hep C cirrhosis c/b HCC s/p ablation in 2018 
and esophageal varices s/p banding admittedfor 3 days history of hematemesis and
 melena. Concerning for ischemic colitis given acute abdominal pain. CT scan 
shows patent flow of SMA and celiac arteries per Surgery, not concerning for 
ischemic colitis. Repeat CTAP r/o SBO with persistent colitis. + c diff colitis.
 Started on PO and rectal Vanc as well as IV flagyl.

Electronically signed by Julio Shah MD at 2020  7:52 AM CDT

Associated attestation - Julio Shah MD - 2020  7:52 AM CDTI saw and 
examined this pt and case discussed with Dr. Aguila on rounds this morning and I
 agree with the presentation, assessment, and plan from 2020.



Deborah Alatorre is a 59 year old female

1. Acute GIB due to colitis C diff nfectious (Hgb baseline 14 to 7)

2. Symptomatic anemia due to #1

3. TCP severe (20k), increasing INR



Plan:

-maintain 2 large bore PIV (16g or larger) or cordis

-Monitor H/H

-PRBC for goal Hgb &gt;7

-C diff Judit Apodaca MD - 2020  1:25 PM CDTFormatting of
 this note might be different from the original.

Gastroenterology and Hepatology Progress Note



Date of Service: 2020 13:25



Chief Complaint: Hematochezia



SUBJECTIVE/ MAJOR EVENTS:

- Patient reports worsening abdominal pain today. Had bloody bowel movements 
overnight. Hb dropped from 7.8 to 5.5 then increased to 11.3 after 2 units.



PHYSICAL EXAM:

Temp:  [36 C (96.8 F)-37.1 C (98.8 F)]

Heart Rate (monitor):  [72-95]

Pulse:  [66-95]

Resp:  [8-19]

BP: (115-176)/(57-95)

MAP (mmHg):  []



Intake/Output Summary (Last 24 hours) at 2020 1325

Last data filed at 2020 1254

Gross per 24 hour

Intake 6304 ml

Output 1600 ml

Net 4704 ml



General: Patient is alert and oriented x4, in distress

Cardiovascular: Regular rate and rhythm, no murmurs; no LE edema.

Respiratory: Clear to auscultation bilaterally.

Abdomen: distended, tender to palpation, sluggish bowel sounds



LABS/IMAGING - REVIEWED



CURRENT MEDICATIONS - REVIEWED



ASSESSMENT/PLAN

Deborah Alatorre is a 59 year old female with PMH as listed above, admitted 
to the hospital with abdominal pain and hematochezia, GI consulted for:



Abdominal Pain

Hematochezia

Severe C.diff Colitis



- The patient is presenting with acute abdominal pain followed by hematochezia, 
initial concern for ischemic colitis. Stool studies came back positive for 
C.diff.

- The patient has episodes of hematochezia and hemoglobin drop, likely related 
to colitis and worsened by thrombocytopenia. Hb drop this morning is likely 
dilutional given the amount of rise in Hb.



Plan

- Start vancomycin 500 mg QID PO and enema

- Start flagyl 500 mg IV TID

- Serial abdominal exams.

- Daily BMP, Mg and phos, replace as needed

- Minimize narcotic use



Decompensated HCV Cirrhosis MELD 16

Chronic Hepatic Failure



Etiology: HCV, treated by has not achieved SVR. Decompensated with HE, ascites 
and bleeding EV.

Ascites: perihepatic ascites on CT scan. Low salt diet when able to tolerate 
food.

Encephalopathy: Grade 1. Minimize narcotics, correct electrolytes.

Varices: Hx of banding last year.

SBP: No previous episodes

HCC Surveillance: The hypodensity seen on CT is likely post-treatment changes. 
Surveillance imaging to be obtained on non urgent basis

Labs: Check daily INR, CBC and CMP.



Patient was seen and discussed with Dr. Pereyra. Please call with questions. GI 
will continue to follow.



Judit Perez MD

PGY-4 Gastroenterology and Hepatology

Pager [phone]





Electronically signed by Stephan Pereyra MD at 2020  1:52 PM CDT

Associated attestation - Stephan Pereyra MD - 2020  1:52 PM CDTI personally
 examined the patient on 20 and agree with Dr. Preez's resident/fellow note
 as written .  I actively participated in the decision-making process.  Please 
see the resident's note for additional details.



Stephan Pereyra MD [facility]



Gastroenterology

Hans Rangel MD - 2020 12:24 PM CDTBrief MICU Note

Date: 2020 12:25

Code Status: Full

ICU day: 3

Intubation Day: NA



Deborah Alatorre is a 59 year old female who was admitted to the MICU with 
Upper and lower GI bleed



12 Hour Events (includes major events throughout the day, patient status, 
significant labs, radiology, consult updates):

- CT AP redemonstrated colitis, probably infectious, without signs of 
ischemia/vessel occlusion.

- Patient got overloaded after 2 u pRBC. Ordered lasix 40 IV once

- transfused 1U platelets

- Repeat HH shows Hgb 11, plt 60.

- Patient refuses Tylenol IV and requests methadone. Currently on morphine 2mg 
Q8

- C diff positive, d/c merrem and linezolid. Started oral and rectal vanc as 
well as IV flagyl

- CLD initiated.



Temp:  [36 C (96.8 F)-37.1 C (98.8 F)]

Heart Rate (monitor):  [72-95]

Pulse:  [66-95]

Resp:  [8-21]

BP: (115-183)/(57-95)

MAP (mmHg):  []



Family Update:

Family updated



Plan for the Next 12 Hours (includes anticipated events, complications to watch 
for, pending labs/radiology/consults):

- Abx: Vanc oral 500 QID and rectal, flagyl 500 TID IV d1 2020

-  Monitor HH and transfuse for Hgb &lt;7

- Monitor for sxs of opioid withdrawal

- Follow up on tolerance of CLD. Discontinue if N/V



Hans Rangel MD

PGY-1 Neurology

Pager number [phone]



Electronically signed by Hans Rangel MD at 2020  5:12 PM JULIÁNTCDee Dee valenzuela MD - 2020  8:35 AM CDTFormatting of this note might be 
different from the original.

TRAUMA/ACUTE CARE SURGERY PROGRESS NOTE



Patient: Deborah Alatorre

MRN: [id number]



SUBJECTIVE:

Hemoglobin down to 5.5 this morning, receiving pRBCs



OBJECTIVE:

Vitals:

 20 0700 20 0715 20 0730 20 0800

BP: (!) 158/73  (!) 167/77 (!) 164/82

Pulse: 69 74 89 72

Resp: 10  13 12

Temp:   36.6 C (97.8 F)

TempSrc:   Tympanic

SpO2: 92%  97% 94%

Weight:

Height:



Intake/Output Summary (Last 24 hours) at 2020 0836

Last data filed at 2020 0730

Gross per 24 hour

Intake 5599 ml

Output 2175 ml

Net 3424 ml





Labs: Hemoglobin 5.5, platelets up to 28, WBC 5.46



Physical Exam:

General: Awake and alert, appears comfortable

CV: Regular rate and rhythm

Pulm: Breathing comfortably, clear breath sounds

GI: Soft and non-distended, moderately tender diffusely, no peritonitis

MSK: No peripheral edema or cyanosis



ASSESSMENT/PLAN:

Deborah Alatorre is a 59 year old female with colitis of undetermined 
etiology.



- Will follow up new CT results once done

- Continue supportive care including ceci and linezolid

- Will continue to follow with serial abdominal exams



Patient seen and discussed with Dr. Whitman, Faculty.



Dee Dee Ortega MD

PGY-3 General Surgery

[phone]

2020Electronically signed by Amrik Whitman MD at 2020 11:28 AM 
CDT

Associated attestation - Amrik Whitman MD - 2020 11:28 AM CDTI 
personally examined the patient on  and agree with Dr Ortega's  note.

I actively participated in the decision-making process.

Please see the resident's note for additional details.

Diana Aguila DO - 2020  1:03 AM CDTFormatting of this note might be 
different from the original.

MICU Progress Note



Date of Service: 2020 01:03



Reason for ICU admission: hematemesis, melena

ICU Day: 3

Intubation Day: n/a



Code Status: Full



Last 24 hour events (major events):

- repeat CTAP r/o SBO with presence of colitis

- Hgb 6.9 -&gt; 1 unit PRBC -&gt; 7.7

- BRBPR x 1 (~100cc) at 1630

- Hgb 7.8 -&gt; 5.5 ( 2 unit pRBC ordered)



Subjective:

Patient reports that abd is more tense with diffuse pain. 1 bloody BM around 4. 
Denies hematemesis.



Ventilator Bundle:

None



Lines (with dates):

Peripheral IV 20 Left Arm Ultrasound not used 20  Size: 20 G

Peripheral IV 20 Left Hand Ultrasound not used 20  Size: 22 G

Peripheral IV 20 Left Arm Ultrasound Used 20  Size: 18 G

Peripheral IV 20 1049 Right;Superior Arm Ultrasound Used 20  Size: 
18 G Ultrasound



Vann:

none



Intake/Output:



Intake/Output Summary (Last 24 hours) at 2020 0103

Last data filed at 2020 2200

Gross per 24 hour

Intake 4068.8 ml

Output 2225 ml

Net 1843.8 ml



Physical Exam:

Temp:  [36.3 C (97.3 F)-37.1 C (98.8 F)]

Heart Rate (monitor):  [72-99]

Pulse:  [72-95]

Resp:  [8-21]

BP: (115-162)/()

MAP (mmHg):  []



Constitutional: in mild distress from pain

Cardiovascular: Normal rate and regular rhythm.  No murmur or gallops noted

Pulmonary/Chest: CTAB. Effort normal

Abdominal: Soft. TTP diffusely

Musculoskeletal: Normal range of motion. no edema or tenderness.





Labs (pertinent only)/Imaging:



Hgb 7.8

Plt 30



Xr Chest 1 Vw



Result Date: 2020

No acute intrathoracic abnormality. Preliminary Report Dictated by Resident: 
Ziggy Longoria MD., have reviewed this study and agree with the 
above report.



Ct Abdomen Pelvis W Contrast



Result Date: 2020

1. Dilated small bowel loops in the upper abdomen with loops of jejunum 
measuring up to 2.4 cm representing partial small bowel obstruction. Transition 
point is seen in the central abdomen on 2:63 withdistally collapsed loops of 
jejunum. No evidence of ischemia. 2. Cirrhotic liver morphology with 4.5cm 
hypodense segment VI lesion measuring 47 Hounsfield units concerning for 
malignancy. Recommend GIconsultation and correlation with prior imaging studies 
and/or follow-up triple phase abdominal CT. 3. Moderate portal hypertension 
including splenomegaly, dilated main portal vein, small gastroesophageal varices
 and portal colopathy. 4. Diffuse thickening of the large bowel could represent 
infectiousor inflammatory colitis Findings relayed to Dr. Abarca at time of 
dictation. Preliminary Report Dictated by Resident: Ziggy Gamboa MD., have reviewed this study and agree with the above report.



Microbiology:

 BCx NG48H

 Ucx contaminated



Assessment/Plan:

Deborah Alatorre is a 59 year old female admitted with Hematemesis and 
Hematochezia secondary to HCC cirrhosis



Neuro

No acute issues.

- Monitor mentation given cirrhosis and concern for encephalopathy



Resp

No acute issues



Cardiovascular

Hx of HTN

Possible Hx of cardiopathy

Patient with Hx of hypertension and cardiopathy on Coreg and Entresto as well as
 Lasix.

- Currently holding given ongoing GI bleed

- Reintroduce as tolerated



FEN/GI

Infectious colitis

Hep C Cirrhosis

Hx of esophageal varices s/p banding

Hx of HCC

Concerning liver lesion on CT

Hx of Hysterectomy and cholecystectomy

Repeat CTAP r/o SBO with persistent colitis. Pending stool studies.

- HH Q6H

- Transfuse for Hgb &lt;7

- Follow up GI recs

- Consult surgery

 - no surgical intervention

- merrem and linezolid

- Continue with Protonix and Octreotide drips



ID

Possible infectious colitis

As above

- Stool studies including C diff

- merrem and linezolid



Renal

No acute issues



Endo

IDDM

Patient with hyperglycemia, AGAP closed. Recently admitted for hyperosmolar 
hyperglycemic coma.

- Continue with SSI



OtherDVT prophylaxis: contraindicated



Diana Aguila, 



Hospital Course

Deborah Alatorre is a 59 year old female with PMHx IDDM, HTN and cardiopathy,
 breast cancer treated in ,  Hep C cirrhosis c/b HCC s/p ablation in 2018 
and esophageal varices s/p banding admittedfor 3 days history of hematemesis and
 melena. Concerning for ischemic colitis given acute abdominal pain. CT scan 
shows patent flow of SMA and celiac arteries per Surgery, not concerning for 
ischemic colitis. Repeat CTAP r/o SBO with persistent colitis. Pending stool 
studies.

Electronically signed by Julio Shah MD at 2020  8:46 AM CDT

Associated attestation - Julio Shah MD - 2020  8:46 AM CDTI saw and 
examined this pt and case discussed with Dr. Aguila on rounds this morning and I
 agree with the presentation, assessment, and plan from 2020.



Deborah Alatorre is a 59 year old female

1. Acute GIB due to colitis ischemic v infectious (Hgb baseline 14 to 7)

2. Symptomatic anemia due to #1

3. TCP severe (20k), increasing INR



Plan:

-maintain 2 large bore PIV (16g or larger) or cordis

-Monitor H/H

-PRBC for goal Hgb &gt;7

-f/u cultures, empiric antibiotics

-surgery and GI appreciated

-plts for &gt;20K

-pending CTA and poss GI procedureAmrani, Hans, MD - 2020  5:01 PM CDT
Brief MICU Note

Date: 2020 17:01

Code Status: Full

ICU day: 2

Intubation Day: NA



Deborah Alatorre is a 59 year old female who was admitted to the MICU with GI
 bleed



12 Hour Events (includes major events throughout the day, patient status, 
significant labs, radiology, consult updates):

- Repeat CT r/o SBO.

- Concern for colitis still present

- Remains afebrile

- Had a Hgb drop down to 6.9, received 1U pRBC.

- Had BRBPB around 100cc at 4.30PM



Temp:  [37 C (98.6 F)-37.9 C (100.2 F)]

Heart Rate (monitor):  []

Pulse:  [75-95]

Resp:  [8-18]

BP: (118-162)/()

MAP (mmHg):  []



Plan for the Next 12 Hours (includes anticipated events, complications to watch 
for, pending labs/radiology/consults):

- Respiratory Support On RA

- Pressors: NA

- Sedation: NA

- Abx: Merrem and Linezolid D2

-  Monitor Hemodynamics and hematemesis/melena/hematochezia



Hans Rangel MD

PGY-1 Neurology

Pager number [phone]



Electronically signed by Hans Rangel MD at 2020  5:04 PM JULIÁNTTJudit horne MD - 2020  1:49 PM CDTFormatting of this note might 
be different from the original.

Gastroenterology and Hepatology Progress Note



Date of Service: 2020 13:49



Chief Complaint: Hematochezia



SUBJECTIVE/ MAJOR EVENTS:

- Patient continues to have abdominal pain, rated as 8/10 vs 10/10 yesterday. 
She feels more distended, able to pass gas, no bowel movements since last night.
 No nausea or vomiting

- Hb and all cell lines dropped.



PHYSICAL EXAM:

Temp:  [37 C (98.6 F)-37.9 C (100.2 F)]

Heart Rate (monitor):  []

Pulse:  [80-95]

Resp:  [8-17]

BP: (110-149)/()

MAP (mmHg):  []



Intake/Output Summary (Last 24 hours) at 2020 1349

Last data filed at 2020 1200

Gross per 24 hour

Intake 1826.8 ml

Output 1875 ml

Net -48.2 ml



General: Patient is alert and oriented x4, in distress

Cardiovascular: Regular rate and rhythm, no murmurs; no LE edema.

Respiratory: Clear to auscultation bilaterally.

Abdomen: more distended than yesterday, tender to palpation, bowel sounds 
present.



LABS/IMAGING - REVIEWED



CURRENT MEDICATIONS - REVIEWED



ASSESSMENT/PLAN

Deborah Alatorre is a 59 year old female with PMH as listed above, admitted 
to the hospital with abdominal pain and hematochezia, GI consulted for:



Abdominal Pain

Hematochezia

Ischemic Colitis



- The patient is presenting with acute abdominal pain followed by maroon colored
 stool, likely representing ischemic colitis. Infectious etiology or portal 
hypertensive colopathy are less likely.

-Hemoglobin dropped with minimal amount of bleeding since admission, likely 
dilutional since all cell lines have dropped.

- Overall, there is a slight improvement in the patient's clinical status 
compared to yesterday.

- Agree with broad spectrum antibiotics.

- c/w IVF, avoid hypotension

- If the patient has bowel movement, then send for C.diff and stool PCR

- If repeat imaging to be done, please do CT angiogram.





Decompensated HCV Cirrhosis MELD 15

Etiology: HCV, treated by has not achieved SVR. Decompensated with HE, ascites 
and bleeding EV.

Ascites: Not present

Encephalopathy: Grade 0.

Varices: Hx of banding last year.

SBP: No previous episodes

HCC Surveillance: The hypodensity seen on CT is likely post-treatment changes. 
Surveillance imaging to be obtained on non urgent basis

Labs: Check daily INR, CBC and CMP.



Patient was seen and discussed with Dr. Pereyra. Please call with questions. GI 
will continue to follow.



Judit Perez MD

PGY-4 Gastroenterology and Hepatology

Pager [phone]





Electronically signed by Stephan Pereyra MD at 2020  2:58 PM CDT

Associated attestation - Stephan Pereyra MD - 2020  2:58 PM CDTI personally
 examined the patient on 20 and agree with Dr. Perez's resident/fellow 
note as written .  I actively participated in the decision-making process.  
Please see the resident's note for additional details.



Stephan Pereyra MD [facility]



Gastroenterology

Dustin Casillas MD - 2020  1:42 PM CDTFormatting of this note 
might be different from the original.

Trauma Surgery Progress Note



Date of Service: 2020



24-Hour Events:  No acute events, patient has had 2 episodes of bloody BMs since
 last night, abdominal pain has partially improved.



MEDICATIONS:

Current Facility-Administered Medications

Medication Dose Route Frequency Last Rate Last Dose

 linezolid in dextrose 5% (ZYVOX) 600 mg/300 mL infusion 600 mg  600 mg IV 
Piggyback Q12H   600 mg at 20 1105

 morpHINE injection 2 mg  2 mg Slow IV Push Q6HPRN   2 mg at 20 0901

 dextrose 10% (D10W) bolus infusion 250 mL  250 mL IV Infusion PRN - SEE 
INSTRUCTIONS

 glucagon (GLUCAGEN DIAGNOSTIC KIT) injection 1 mg  1 mg Intramuscular PRN

 insulin glargine (LANTUS U-100) injection 10 Units  10 Units Subcutaneous 
DAILY   10 Units at 20 0900

 lactated ringers IV infusion 1,000 mL  1,000 mL IV Infusion CONTINUOUS 125 
mL/hr at 20 1721 1,000 mL at 20 1721

 meropenem (MERREM) 500 mg in NaCl 0.9% (NS) 50 mL MINI-BAG  500 mg IV 
Piggyback Q6H ABX   500 mgat 20 1221

 NaCl 0.9% (NS) IV infusion 1,000 mL  1,000 mL IV Infusion CONTINUOUS   
Stopped at 20 1700

 octreotide (SANDOSTATIN) 1,250 mcg in NaCl 0.9% (NS) infusion  50 mcg/hr IV 
Infusion CONTINUOUS 10 mL/hr at 20 0333 50 mcg/hr at 20 0333

 pantoprazole (PROTONIX) 80 mg in NaCl 0.9% (NS) 500 mL infusion  8 mg/hr IV 
Piggyback [id number] mL/hr at 20 1055 8 mg/hr at 20 1055

 proMETHazine (PHENERGAN) 25 mg in NaCl 0.9% (NS) 50 mL piggyback  25 mg IV 
Piggyback Q6HPRN   25mg at 20

 Sliding Scale Insulin - Aspart (NOVOLOG) + Fsbg Testing   Subcutaneous Q4H  
 2 Units at 



PHYSICAL EXAM:

Temp:  [37 C (98.6 F)-37.9 C (100.2 F)]

Heart Rate (monitor):  []

Pulse:  [80-95]

Resp:  [8-17]

BP: (110-149)/()

MAP (mmHg):  []



Intake/Output Summary (Last 24 hours) at 2020 1342

Last data filed at 2020 1200

Gross per 24 hour

Intake 1826.8 ml

Output 1875 ml

Net -48.2 ml





General: A &amp; O x 3

HEENT: no scleral icterus, moist mucous membranes

Respiratory: CTAB

Cardio: normal rate and sinus rhythm

Abdomen: soft, moderate distension, tenderness to palpation diffusely, no signs 
of peritonitis.

Extremities: DP and PT pulses 2+ bilaterally.  No edema.

Skin: no rashes



LABORATORY:

Hemogram

Recent Labs

  20

1815 20

0057 20

1831 20

0027 20

0432 20

0912

WBC 20.39* 13.47* 10.04 7.81 7.81 8.56

HGB 14.4 12.6 8.8* 7.0* 7.3* 6.9*

HCT 43.2 38.9 27.7* 21.9* 23.6* 22.9*

PLT 82* 67* 34* 32* 22* 38*



Chemistry

Recent Labs

  20

1815 20

2102 20

0057 20

0028 20

0542

 137 139 138 136

K 5.8* 5.3* 4.7 3.6 3.6

 105 107 111* 110*

TCO2 28 22* 19* 20* 20*

BUN 27* 24* 22 17 17

CREAT 0.92 0.79 0.79 0.46* 0.47*

* 339* 344* 136* 153*

PHOS  --   --   --   --  1.4*

MG  --   --   --  1.3*  --

CA 9.2 8.1* 8.3* 6.5* 6.6*



Arterial Blood Gas

No results for input(s): ACPH, ACPCO2, ACPO2, ACHCO3, ACNA, ACK, ACCAIONZ, ACBE 
in the last 72 hours.



Coagulation Profile

Recent Labs

  20

0028

PTPAT 14.1 16.6*

PTINR 1.1 1.5

APTTPAT 25  --



Urinalysis

Recent Labs

  20

1931

UPROTEIN Negative

UGLUCOSE 500 mg/dL*

UKETONES 5 mg/dL*

UBILI Negative

ULEUKEST Negative

UNITRITE Negative

USPGRAV 1.020



LFTs

Recent Labs

  20

0057 20

0028 20

0542

AST 82* 61* 27 27

ALT 43* 36* 19 21

ALKPHOS 161* 128* 59 79

LIPASE 120  --   --   --

BILIT 3.3* 3.8* 2.1* 2.0*

BILICONJ  --  0.2 0.0  --

BILIUNCON  --  2.9* 1.6*  --



Hospital Problem list:

Patient Active Problem List

 Diagnosis Date Noted

 Hematemesis 2020

 Melena 2020

 Partial small bowel obstruction 2020

 Hyperglycemia 2020

 Cocaine dependence, continuous 2006



Assessment &amp; Plan:

Deborah Alatorre is a 59 year old female with moderate-severe abdominal pain,
 and dark blood per rectum with signs of acute infection.  Based on her clinic 
picture, patient likely has portal colopathy vs. severe infectious colitis.  
Celiac trunk and SMA are patent throughout with good vascularity to the colon 
making ischemic colitis less likely.  No signs of small bowel obstruction. 
Patient is slightly improving.



1. Continue Meropenem and Linezolid.

2. Continue aggressive resuscitation.

3. Continue management of baseline cirrhosis.

4. No surgical intervention will be recommended at this time. We will continue 
to follow with abdominal exams.

5. Please notify if any acute changes.



Dustin Minor MD   2020

General Surgery, PGY-2

C: [phone]



Electronically signed by Amrik Whitman MD at 2020 11:03 AM CDT

Associated attestation - Amrik Whitman MD - 2020 11:03 AM CDTI 
personally examined the patient on  and agree with Dr. Ramirez's  note.

I actively participated in the decision-making process.

Please see the resident's note for additional details.   Pt's pain improved, WBC
 .

Diana Aguila DO - 2020  3:31 AM CDTFormatting of this note might be 
different from the original.

MICU Progress Note



Date of Service: 2020 03:31



Reason for ICU admission: hematemesis, melena

ICU Day: 2

Intubation Day: n/a



Code Status: Full



Last 24 hour events (major events):

Admitted to ICU

GI recommended surgery consult for concern of ischemic colitis

Concern for infectious colitis per Surgery

Started merrem and linezolid

Patient continues to refuse NG tube

Surgery recommend CTAP with contrast in AM for evaluation of progress

Pt had a BM overnight, with small amount of blood clots, no bright red blood



Subjective:

Patient reports diffuse abd pain



Ventilator Bundle:

None



Lines (with dates):

Peripheral IV 20 Left Arm Ultrasound not used 20  Size: 20 G

Peripheral IV 20 Left Hand Ultrasound not used 20  Size: 22 G

Peripheral IV 20 Left Arm Ultrasound Used 20  Size: 20 G



Vann:

none



Intake/Output:



Intake/Output Summary (Last 24 hours) at 2020 0331

Last data filed at 2020 0000

Gross per 24 hour

Intake 1329 ml

Output 665 ml

Net 664 ml



Physical Exam:

Temp:  [36.3 C (97.3 F)-37.9 C (100.2 F)]

Heart Rate (monitor):  []

Pulse:  [79-95]

Resp:  [12-20]

BP: (103-136)/(55-91)

MAP (mmHg):  [71-98]



Constitutional: in no distress

Cardiovascular: Normal rate and regular rhythm.  No murmur or gallops noted

Pulmonary/Chest: CTAB. Effort normal

Abdominal: Soft. TTP diffusely

Musculoskeletal: Normal range of motion. no edema or tenderness.





Labs (pertinent only)/Imaging:



Hgb 14 -&gt; 12 -&gt; 8.8 -&gt; 7

Plt 82 -&gt; 67 -&gt; 34 -&gt; 32



Xr Chest 1 Vw



Result Date: 2020

No acute intrathoracic abnormality. Preliminary Report Dictated by Resident: 
Ziggy Longoria MD., have reviewed this study and agree with the 
above report.



Ct Abdomen Pelvis W Contrast



Result Date: 2020

1. Dilated small bowel loops in the upper abdomen with loops of jejunum 
measuring up to 2.4 cm representing partial small bowel obstruction. Transition 
point is seen in the central abdomen on 2:63 withdistally collapsed loops of 
jejunum. No evidence of ischemia. 2. Cirrhotic liver morphology with 4.5cm 
hypodense segment VI lesion measuring 47 Hounsfield units concerning for 
malignancy. Recommend GIconsultation and correlation with prior imaging studies 
and/or follow-up triple phase abdominal CT. 3. Moderate portal hypertension 
including splenomegaly, dilated main portal vein, small gastroesophageal varices
 and portal colopathy. 4. Diffuse thickening of the large bowel could represent 
infectiousor inflammatory colitis Findings relayed to Dr. Abarca at time of 
dictation. Preliminary Report Dictated by Resident: Ziggy Gamboa MD., have reviewed this study and agree with the above report.



Microbiology:

 BCx NG24H

 Ucx pending



Assessment/Plan:

Deborah Alatorre is a 59 year old female admitted with Hematemesis and 
Hematochezia secondary to HCC cirrhosis



Neuro

No acute issues.

- Monitor mentation given cirrhosis and concern for encephalopathy



Resp

Breathing on RA. No acute issues



Cardiovascular

Hx of HTN

Possible Hx of cardiopathy

Patient with Hx of hypertension and cardiopathy on Coreg and Entresto as well as
 Lasix.

- Currently holding given ongoing GI bleed

- Reintroduce as tolerated

- Follow up on TTE



FEN/GI

Infectious colitis

Hep C Cirrhosis

Hx of esophageal varices s/p banding

Hx of HCC

Concerning liver lesion on CT

Hx of Hysterectomy and cholecystectomy

CT scan shows patent flow of SMA and celiac arteries per Surgery, not concerning
 for ischemic colitis. SBO unlikely given patient has BM overnight. Will collect
 stool sample to r/o infectious etiology

- HH Q6H

- Transfuse for Hgb &lt;7

- Follow up GI recs

- Consult surgery

- merrem and linezolid

- Continue with Protonix and Octreotide drips



ID

Possible infectious colitis

As above

- Stool studies including C diff

- merrem and linezolid



Renal

No acute issues



Endo

IDDM

Patient with hyperglycemia, AGAP closed. Recently admitted for hyperosmolar 
hyperglycemic coma.

- Continue with SSI



OtherDVT prophylaxis: contraindicated



Diana Aguila DO



Hospital Course

Deborah Alatorre is a 59 year old female with PMHx IDDM, HTN and cardiopathy,
 breast cancer treated in ,  Hep C cirrhosis c/b HCC s/p ablation in 2018 
and esophageal varices s/p banding admittedfor 3 days history of hematemesis and
 melena. Concerning for ischemic colitis given acute abdominal pain. CT scan 
shows patent flow of SMA and celiac arteries per Surgery, not concerning for 
ischemic colitis. SBO unlikely given patient has BM overnight. Will collect 
stool sample to r/o infectious etiology

Electronically signed by Julio Shah MD at 2020  9:25 AM CDT

Associated attestation - Julio Shah MD - 2020  9:25 AM CDTI saw and 
examined this pt and case discussed with Dr. Aguila on rounds this morning and I
 agree with the presentation, assessment, and plan from 2020.  I have also 
reviewed the H&amp;P by Dr. Rangel and I agree with the history, physical 
examination, assessment and plan from the 20 admission.



1. Acute GIB due to colitis ischemic v infectious (Hgb baseline 14 to 7)

2. Symptomatic anemia due to #1

3. TCP severe (20k), increasing INR



Plan:

-maintain 2 large bore PIV (16g or larger) or cordis

-Monitor H/H

-PRBC for goal Hgb &gt;7

-f/u cultures, empiric antibiotics

-surgery and GI appreciated

-plts for &gt;20KPfost, Rosibel Norris MD - 2020  6:40 PM CDTFormatting 
of this note might be different from the original.

TAC Chief Note

Date 2020



S:  Abdominal pain, vomiting, hematochezia 3 days ago

PMH: alcoholic and Hep C cirrhosis, DM, CHF, smoker, breast cancer s/p 
mastectomy, hepatocellular carcinoma s/p ablation 1 year ago

PSH: tram flap reconstruction for breast cancer; c section; cholecystectomy, 
hysterectomy



O:

Vitals:

 20 0752 20 1110 20 1530 20 1800

BP: 103/60 107/75 110/57 118/57

Pulse: 79 90 85 90

Resp:  13 17

Temp: 37 C (98.6 F) 36.3 C (97.3 F) 37 C (98.6 F)

TempSrc: Tympanic Tympanic Tympanic

SpO2: 94% 93% 95% 97%

Weight:

Height:



     PE:

     Gen: alert and oriented

     Abdomen: soft, diffuse tenderness; large midline and transverse incisions

     Rectum: blood and stool in rectal vault



WBC 13 down from 20

Tbili 3.8 - 2.9 unconjugated

Lactic acid 2.4 down from 4.5



CT

4.5 cm lobulated lesion in liver with intrahepatic biliary dilation

Splenomegaly

Enlarged priscilla hepatis lymph nodes

Hiatal hernia

Distended stomach

Large bowel thickening and decreased enhancement with surrounding pericolonic 
stranding consistent with portal colopathy

Dilated main portal vein 1.5 cm

Gastroesophageal collateral vessels

Mesenteric root vessels patent



A/P: Deborah Alatorre is a 59 year old female with colitis. CT scan shows 
patent flow of SMA and celiac arteries. Likely etiology infectious vs portal 
hypertension colopathy vs phlegmonous colitis. Unlikely to have SBO due to stool
 in rectum and only 1 day of no bowel movement.



- continue medical therapy - antibiotics - merrem

- please add linezolid to cover enterococcus

- place NG tube and lavage

- no acute surgical intervention at this time



Rosibel Aguirre MD

Trauma/Acute Care Pager: [phone]

Electronically signed by Rosibel Aguirre MD at 2020  7:02 PM CDT
Abisai Avery MD - 2020 12:14 PM CDTFormatting of this note might be 
different from the original.

Hospitalist Progress Note



SUBJECTIVE:

+ abd pain, n/v, still having blood per rectum



CURRENT MEDICATIONS - reviewed.

Current Facility-Administered Medications

Medication Dose Route Frequency Last Rate Last Dose

 dextrose 50 % in water (D50W) injection 25 mL  25 mL Slow IV Push PRN

 glucagon (GLUCAGEN DIAGNOSTIC KIT) injection 1 mg  1 mg Intramuscular PRN

 octreotide (SANDOSTATIN) 1,250 mcg in NaCl 0.9% (NS) infusion  50 mcg/hr IV 
Infusion CONTINUOUS 10 mL/hr at 20 0115 50 mcg/hr at 20 0115

 pantoprazole (PROTONIX) 80 mg in NaCl 0.9% (NS) 500 mL infusion  8 mg/hr IV 
Piggyback [id number] mL/hr at 20 0115 8 mg/hr at 20 0115

 proMETHazine (PHENERGAN) 25 mg in NaCl 0.9% (NS) 50 mL piggyback  25 mg IV 
Piggyback Q6HPRN   25mg at 20 0950

 Sliding Scale Insulin - Lispro (HumaLOG) + Fsbg Testing   Subcutaneous Q4H  
 4 Units at 201138

 morpHINE injection 2 mg  2 mg Slow IV Push Q4HPRN   2 mg at 20 0941

 NaCl 0.9% (NS) IV infusion 1,000 mL  1,000 mL IV Infusion CONTINUOUS 125 
mL/hr at 20 0730 1,000 mL at 20 0730

 piperacillin-tazobactam (ZOSYN) 3.375 gram/50 mL Piggyback RTU 3.375 g  
3.375 g IV Piggyback Q6HABX   3.375 g at 20 1152



PHYSICAL EXAM:



/75  | Pulse 90  | Temp 36.3 C (97.3 F) (Tympanic)  | Resp 20  | Ht 
1.6 m (5' 3")  | Wt 74kg (163 lb 1.6 oz)  | SpO2 93%  | BMI 28.89 kg/m



Temp:  [36.3 C (97.3 F)-37.9 C (100.2 F)]

Heart Rate (monitor):  [100]

Pulse:  []

Resp:  [15-22]

BP: (103-130)/()

MAP (mmHg):  [76-92]



Intake/Output Summary (Last 24 hours) at 2020 1227

Last data filed at 2020 1000

Gross per 24 hour

Intake 1200 ml

Output 15 ml

Net 1185 ml



NAD

Anicteric sclera, oral mucosa clear

Good air entry b/l

RRR, nl s1s2

Abd soft, + tender

AAO, no gross deficits

Skin warm and dry



LABS/IMAGING - reviewed, pertinent results as below:



CBC BMP PT/INR

WBC x10^3 (/CMM)

Date Value

2006 8.9



WBC (10*3/L)

Date Value

2020 13.47 (H)

  NA

Date Value

2020 139 mmol/L

2006 144 MMOL/L

  No results found for: PT

RBC x10^6 (/CMM)

Date Value

2006 5.11 (H)



RBC (10*6/L)

Date Value

2020 4.15

 K

Date Value

2020 4.7 mmol/L

2006 3.5 MMOL/L

 INR (no units)

Date Value

2020 1.1



PLT x10^3 (/CMM)

Date Value

2006 223



PLT (10*3/L)

Date Value

2020 67 (L)

 CALCIUM

Date Value

2020 8.3 mg/dL (L)

2006 8.8 MG/DL



HGB

Date Value

2020 12.6 g/dL

2006 14.5 G/DL

  CL

Date Value

2020 107 mmol/L

2006 109 MMOL/L (H)

  aPTT

HCT (%)

Date Value

2020 38.9

2006 42.1

  BUN

Date Value

2020 22 mg/dL

2006 17 MG/DL

  APTT Patient (Seconds)

Date Value

2020 25



 CREATININE

Date Value

2020 0.79 mg/dL

2006 0.65 MG/DL (L)





IMAGING-

Hospital Encounter on 20

CT ABDOMEN PELVIS W CONTRAST

 Narrative

 CT ABDOMEN AND PELVIS WITH CONTRAST



HISTORY: Abd pain, acute, generalized, with fever



COMPARISON: None.



TECHNIQUE: Contiguous axial imaging from the level of the lung bases

through the pubic symphysis was performed after the administration of 120

cc of intravenous Omnipaque contrast. Coronal and sagittal reconstructions

were obtained.



FINDINGS:



LOWER THORAX: Scattered calcified granulomas are seen in the lung bases. No

pleural effusion is present. No cardiomegaly.



LIVER: The liver is enlarged measuring nearly 20 cm in craniocaudal

dimension with heterogenous parenchyma and micronodular contour, consistent

with cirrhosis. A 4.5 cm lobulated hypodense lesion in segment VI measures

47 Hounsfield units, indeterminate. Scattered subcentimeter calcifications

are seen.



GALLBLADDER: Prior cholecystectomy. Mild central intrahepatic biliary

ductal dilation and prominence of the distal common bile duct at 7 mm

likely representing reservoir phenomenon from prior cholecystectomy.



SPLEEN: Splenomegaly up to 16 cm in craniocaudal dimension.



PANCREAS: No ductal dilation or masses. Somewhat atrophic pancreas.



ADRENAL GLANDS: No adrenal nodules.



KIDNEYS: No hydronephrosis, stones, or solid masses. 3.0 cm simple cyst at

the right midpole. Subcentimeter cortical hypodensities bilaterally are too

small to characterize. No obstructive nephrolithiasis. No hydronephrosis.



PERITONEUM AND RETROPERITONEUM: No free air. Small volume of abdominal

ascites in the right subdiaphragmatic space and in the right paracolic

gutter. Diffuse ill-defined mesenteric stranding in the upper abdomen

around the mesenteric root vessels and large bowel is nonspecific, but

likely related to underlying liver disease.



LYMPH NODES: Mildly enlarged priscilla hepatis lymph nodes measuring up to 1.2

cm, likely reactive to liver disease.



GI TRACT: Small sliding hiatal hernia. The stomach is distended with gas

and layering ingested fluid material. Dilated small bowel loops in the

upper abdomen with loops of jejunum measuring up to 2.4 cm with transition

point on 2:63 in the central abdomen with collapsed loops of distal

jejunum. Fecalization of small bowel suggesting delayed GI transit. Large

bowel wall thickening and decreased enhancement with surrounding

pericolonic stranding consistent presenting portal colopathy. The

descending and rectosigmoid colon are normally enhancing. Status post

appendectomy.



PELVIS: The urinary bladder is underdistended. Prior hysterectomy



VESSELS: Dilated main portal vein measuring 1.5 cm. Tiny gastroesophageal

collateral vessels. Conventional hepatic arterial anatomy. The mesenteric

root vessels are patent.



BONES AND SOFT TISSUES: No aggressive or suspicious osseous lesions.

Diastasis recti and postsurgical changes in the anterior abdominal wall

without competition.



 Impression

 1. Dilated small bowel loops in the upper abdomen with loops of jejunum

measuring up to 2.4 cm representing partial small bowel obstruction.

Transition point is seen in the central abdomen on 2:63 with distally

collapsed loops of jejunum. No evidence of ischemia.



2. Cirrhotic liver morphology with 4.5 cm hypodense segment VI lesion

measuring 47 Hounsfield units concerning for malignancy. Recommend GI

consultation and correlation with prior imaging studies and/or follow-up

triple phase abdominal CT.



3. Moderate portal hypertension including splenomegaly, dilated main portal

vein, small gastroesophageal varices and portal colopathy.



4. Diffuse thickening of the large bowel could represent infectious or

inflammatory colitis



Findings relayed to Dr. Abarca at time of dictation.



Preliminary Report Dictated by Resident: Ziggy Gamboa MD., have reviewed this study and agree with the above

report.

XR CHEST 1 VW

 Narrative

 XR CHEST 1 VW



Comparison: Chest x-ray 2020



History: fever



Findings:



The lungs are clear. No focal consolidation. No pleural effusion or

pneumothorax is identified.



The heart is normal in size.



No acute osseous abnormality. Multiple surgical staples overlie the thorax.



 Impression

 No acute intrathoracic abnormality.



Preliminary Report Dictated by Resident: Ziggy Longoria MD., have reviewed this study and agree with the above

report.





ASSESSMENT/PLAN

Deborah Alatorre is a 59 year old female with



Sepsis 2/2 small bowel obstruction, colitis with lactic acidosis, Acute GI 
Bleeding (BRBPR, coffee ground emesis). Hx of HCV cirrhosis, liver cancer, 
esophageal varices s/p banding in the past.

Partial per CT. Concern for adhesions vs malignancy vs infectious colitis

NPO, IV fluids, anti-emetics

Given CHF, low EF, need to be careful with fluids. Holding lasix as npo

Lactic acid improving with fluids

Surgery consult. Dr. Gray was notified from ER.

Discussed with Dr. Angeles surgeon who recommends transfer to [place] for 
higher level of care. Also as per surgeon recommend will insert vann and ng 
tube as patient is vomiting currently in the morning.

Patient accepted to [facility] ICU by Dr. Shah

given low-grade fever, leukocytosis will start on zosyn day 1. Check C diff, 
stool panel as patient reported diarrhea on admission

F/u AFP

puls ox and telemetry monitoring





Possible hematemesis

Coags, Hb normal

IV protonix, octreotide drips for hx esophageal varices. Last banded 1 year ago 
at [facility].

GI consult Dr. Lacy





Hyperglycemia

Hx type 2 DM on insulin. Takes 50 units 70/30 BID.

A1c 8.2

SSI q4h and lantus 10 units daily while NPO





Hx liver cancer, HCV cirrhosis

Follows at [facility]. S/p ablation 1 year ago. Now on surveillance.





Thrombocytopenia

Hx cirrhosis. At baseline.

Avoid heparin products

INR 1.1





Hyperkalemia on admission

Unclear etiology. Resolved spontaneously.

Monitor BMP





Chronic pain syndrome

Hold methadone, norco

IV morphine prn





Nonischemic cardiomyopathy. Chronic systolic and diastolic CHF, EF 35%, Follows 
Dr. Carroll for cardiology.. Hold lasix 80 mg qd, coreg, kcl, entresto





Chronic hypoxic respiratory failure on 2 L at night

COPD

Former smoker

Mood disorder. Bipolar disorder. Holding seroquel as NPO

HCC s/p radio freq ablation, currently surveillance

Hx of breast cancer s/p chemo, b/l mastectomy, in remission

Chronic constipatoin

Hep C cirrhosis, portal hypertension, esophageal varices

AMRIT, unclear if compliant with CPAP

Umbilical hernia



Prophylaxis: DVT- SCD

Stress Ulcer: pantoprazole



Full Code, advance care planning discussed with patient on admission, surrogate 
decision maker in chartElectronically signed by Abisai Avery MD at 2020
 12:29 PM William Vuong LMSW - 2020 10:29 AM CDTCare Management Social
 Functional Assessment

Patient Name: Deborah Alatorre     Age: 59 year old     Sex: female     MRN: 
[id number]

Patient's Previous Admission Date at [facility]: 2020

Due to the current COVID-19 pandemic this form/assessment was completed 
telephonically.  Copies of required forms will be emailed or mailed.

Current diagnosis and co-morbidities:

Partial small bowel obstruction

Colitis



Readmission Questions:

Was patient discharged from any acute care hospital within the last 30 days: No



Social Functional Assessment:

Primary language spoken/preferred: English

Mental Status: Alert &amp; Oriented to Person,Place &amp; Time

Information given by: Self

Patient's support system: Parent

Name and number of support system: Raheem Da Silva [phone] (mother)

Primary Care Giver: Self

MPOA: No

Living Arrangement: Apartment

Address of living arrangement : [address], [place] [address]

Persons living in home: Self;Parent

Names &amp; numbers of persons living in home: Raheem Da Silva @[phone] 
(mother)

Barriers to returning home: None

Baseline functional status- ambulation: Independent

Functional status-baseline personal care: Independent

Baseline functional status- driving: Dependent

Baseline functional status- grocery shopping: Independent

Functional status-baseline housekeeping: Independent

Functional status-baseline meal prep: Independent

Current functional status same as prior: Yes

Do you have a PCP?: Yes

Name of PCP: Abisai Aguilar

Home Health Care Agency: No

Provider Services: No

DME Company: Yes

Previous or current DME company: Current(Pt does not remember the name of the 
company)

Equipment: O2: LPM(Pt does not know the name of the company she uses.At night pt
 uses O2)

Hemodialysis: No

Community resources utilized: [employer];SSA/SSI/Medicaid

Funding Resources: Medicare A &amp; B

Prescription coverage plan: Medicaid-3 slots

Pharmacy where meds are filled: Other

Other pharmacy: [facility],[place]

Anticipated services prior to disharge: Continue Medical Eval

Expected mode of discharge transportation: Family

Name and phone number of the friend or family member picking up the patient: 
Raheem Da Silva [phone] (mother)

Additional Recommendations for DC: Pending needs

Recommended discharge plan: Home



SFA Complete:

Social Functional Assessment complete: Yes



Alcohol Use Screening (AUDIT-C)

How often do you have a drink containing alcohol?: Never

SCORE: 0

Did patient elect to have resources provided: No



Role of Care Management explained.yes

William Crawford LMSW

, Care Management

Phone 

Fax 



Electronically signed by William Nguyễn LMSW at 2020 10:30 AM CDT
documented in this encounter



Plan of Treatment







             Name         Type         Priority     Associated Diagnoses Order S

chedule

 

             FECAL PATHOGENS BY PCR LAB          Routine                   ONCE 

for 1 Occurrences



                                                                 starting 2020 until



                                                                 2020

 

             FECES CULTURE LAB          Routine                   ONCE for 1 Occ

urrences



                                                                 starting 2020 until



                                                                 2020









                Health Maintenance Due Date        Last Done       Comments

 

                HEPATITIS C (HCV) SCREEN 1961                      

 

                PNEUMOCOCCAL 0-64 YEARS COMBINED SERIES (1 of 1 - 10/22/1967    

                  



                PPSV23)                                         

 

                DTaP,Tdap,and Td Vaccines (1 - Tdap) 10/22/1972                 

     

 

                PAP SMEAR       10/22/1982                      

 

                Breast Cancer Screening (MAMMOGRAM) 10/22/2001                  

    

 

                COLONOSCOPY     10/22/2011                      

 

                Zoster Recombinant Vaccine (SHINGRIX) (1 of 2) 10/22/2011       

               

 

                LUNG CANCER SCREEN: Recommended for age 55-80 with 30 + 10/22/20

16                      



                pack year history                                 

 

                INFLUENZA VACCINE (Season Ended) 2020                     

 



documented as of this encounter



Procedures







             Procedure Name Priority     Date/Time    Associated Diagnosis Comme

nts

 

             POCT GLUCOSE Routine      2020  6:02              Results for

 this



             (AUTOMATED)               AM CDT                    procedure are i

n



                                                                 the results



                                                                 section.

 

             POCT GLUCOSE Routine      2020 10:12              Results for

 this



             (AUTOMATED)               PM CDT                    procedure are i

n



                                                                 the results



                                                                 section.

 

             EXTRA TUBE RED Routine      2020  8:40              



                                       PM CDT                    

 

             EXTRA TUBE LT. BLUE Routine      2020  8:40              



                                       PM CDT                    

 

             EXTRA TUBE LAV Routine      2020  8:40              



                                       PM CDT                    

 

             BASIC METABOLIC Routine      2020  8:38              Results 

for this



             PANEL (NA, K, CL,              PM CDT                    procedure 

are in



             CO2, GLUCOSE, BUN,                                        the resul

ts



             CREATININE, CA)                                        section.

 

             TROPONIN I   Routine      2020  8:38              Results for

 this



                                       PM CDT                    procedure are i

n



                                                                 the results



                                                                 section.

 

             PROLACTIN    Routine      2020  8:38              Results for

 this



                                       PM CDT                    procedure are i

n



                                                                 the results



                                                                 section.

 

             MAGNESIUM    Routine      2020  8:38              Results for

 this



                                       PM CDT                    procedure are i

n



                                                                 the results



                                                                 section.

 

             CREATINE KINASE Routine      2020  8:38              Results 

for this



                                       PM CDT                    procedure are i

n



                                                                 the results



                                                                 section.

 

             POCT GLUCOSE Routine      2020  1:50              Results for

 this



             (AUTOMATED)               PM CDT                    procedure are i

n



                                                                 the results



                                                                 section.

 

             XR KUB       ASAP         2020 11:40 Partial small bowel Resu

lts for this



                                       AM CDT       obstruction  procedure are i

n



                                                                 the results



                                                                 section.

 

             POCT GLUCOSE Routine      2020  8:01              Results for

 this



             (AUTOMATED)               AM CDT                    procedure are i

n



                                                                 the results



                                                                 section.

 

             CBC WITH     Routine      2020  4:36              Results for

 this



             DIFFERENTIAL              AM CDT                    procedure are i

n



                                                                 the results



                                                                 section.

 

             CBC WITH     Routine      2020  4:36              Results for

 this



             DIFFERENTIAL              AM CDT                    procedure are i

n



                                                                 the results



                                                                 section.

 

             BASIC METABOLIC Routine      2020  4:36              Results 

for this



             PANEL (NA, K, CL,              AM CDT                    procedure 

are in



             CO2, GLUCOSE, BUN,                                        the resul

ts



             CREATININE, CA)                                        section.

 

             POCT GLUCOSE Routine      2020  9:59              Results for

 this



             (AUTOMATED)               PM CDT                    procedure are i

n



                                                                 the results



                                                                 section.

 

             POCT GLUCOSE Routine      2020  4:18              Results for

 this



             (AUTOMATED)               PM CDT                    procedure are i

n



                                                                 the results



                                                                 section.

 

             XR KUB       Routine      2020 12:04 Gastrointestinal Results

 for this



                                       PM CDT       hemorrhage with melena proce

dure are in



                                                                 the results



                                                                 section.

 

             POCT GLUCOSE Routine      2020 11:44              Results for

 this



             (AUTOMATED)               AM CDT                    procedure are i

n



                                                                 the results



                                                                 section.

 

             CBC WITH     Routine      2020  5:03              Results for

 this



             DIFFERENTIAL              AM CDT                    procedure are i

n



                                                                 the results



                                                                 section.

 

             PROTHROMBIN TIME / Routine      2020  5:03              Resul

ts for this



             INR                       AM CDT                    procedure are i

n



                                                                 the results



                                                                 section.

 

             CBC WITH     Routine      2020  5:03              Results for

 this



             DIFFERENTIAL              AM CDT                    procedure are i

n



                                                                 the results



                                                                 section.

 

             BASIC METABOLIC Routine      2020  5:03              Results 

for this



             PANEL (NA, K, CL,              AM CDT                    procedure 

are in



             CO2, GLUCOSE, BUN,                                        the resul

ts



             CREATININE, CA)                                        section.

 

             HEPATIC FUNCTION Routine      2020  5:03              Results

 for this



             PANEL ([id number])              AM CDT                    procedure are 

in



             (ALB,T.PRO,BILI                                        the results



             T,BU/BC,ALT,AST,ALK                                        section.



             PHOS)                                               

 

             MAGNESIUM    Routine      2020  5:03              Results for

 this



                                       AM CDT                    procedure are i

n



                                                                 the results



                                                                 section.

 

             POCT GLUCOSE Routine      2020 10:05              Results for

 this



             (AUTOMATED)               PM CDT                    procedure are i

n



                                                                 the results



                                                                 section.

 

             CBC WITH     Routine      2020  6:03              Results for

 this



             DIFFERENTIAL              PM CDT                    procedure are i

n



                                                                 the results



                                                                 section.

 

             CBC WITH     Routine      2020  6:03              Results for

 this



             DIFFERENTIAL              PM CDT                    procedure are i

n



                                                                 the results



                                                                 section.

 

             POCT GLUCOSE Routine      2020  5:57              Results for

 this



             (AUTOMATED)               PM CDT                    procedure are i

n



                                                                 the results



                                                                 section.

 

             XR KUB       ASAP         2020 12:01 Partial small bowel Resu

lts for this



                                       PM CDT       obstruction  procedure are i

n



                                                                 the results



                                                                 section.

 

             POCT GLUCOSE Routine      2020 11:52              Results for

 this



             (AUTOMATED)               AM CDT                    procedure are i

n



                                                                 the results



                                                                 section.

 

             EKG-12 LEAD  Routine      2020 11:46              



                                       AM CDT                    

 

             PROFILE / HEMOGRAM ASAP         2020 10:06              Resul

ts for this



                                       AM CDT                    procedure are i

n



                                                                 the results



                                                                 section.

 

             POCT GLUCOSE Routine      2020  7:54              Results for

 this



             (AUTOMATED)               AM CDT                    procedure are i

n



                                                                 the results



                                                                 section.

 

             PROTHROMBIN TIME / ASAP         2020  4:25              Resul

ts for this



             INR                       AM CDT                    procedure are i

n



                                                                 the results



                                                                 section.

 

             PROFILE / HEMOGRAM ASAP         2020  4:25              Resul

ts for this



                                       AM CDT                    procedure are i

n



                                                                 the results



                                                                 section.

 

             BASIC METABOLIC ASAP         2020  4:25              Results 

for this



             PANEL (NA, K, CL,              AM CDT                    procedure 

are in



             CO2, GLUCOSE, BUN,                                        the resul

ts



             CREATININE, CA)                                        section.

 

             HEPATIC FUNCTION ASAP         2020  4:25              Results

 for this



             PANEL ([id number])              AM CDT                    procedure are 

in



             (ALB,T.PRO,BILI                                        the results



             T,BU/BC,ALT,AST,ALK                                        section.



             PHOS)                                               

 

             FERRITIN SERUM Add-on       2020  4:25              Results f

or this



                                       AM CDT                    procedure are i

n



                                                                 the results



                                                                 section.

 

             MAGNESIUM    ASAP         2020  4:25              Results for

 this



                                       AM CDT                    procedure are i

n



                                                                 the results



                                                                 section.

 

             POCT GLUCOSE Routine      2020 11:32              Results for

 this



             (AUTOMATED)               PM CDT                    procedure are i

n



                                                                 the results



                                                                 section.

 

             PROFILE / HEMOGRAM ASAP         2020 10:14              Resul

ts for this



                                       PM CDT                    procedure are i

n



                                                                 the results



                                                                 section.

 

             POCT GLUCOSE Routine      2020  7:42              Results for

 this



             (AUTOMATED)               PM CDT                    procedure are i

n



                                                                 the results



                                                                 section.

 

             POCT GLUCOSE Routine      2020  4:27              Results for

 this



             (AUTOMATED)               PM CDT                    procedure are i

n



                                                                 the results



                                                                 section.

 

             PROFILE / HEMOGRAM ASAP         2020  4:15              Resul

ts for this



                                       PM CDT                    procedure are i

n



                                                                 the results



                                                                 section.

 

             BASIC METABOLIC ASAP         2020  4:15              Results 

for this



             PANEL (NA, K, CL,              PM CDT                    procedure 

are in



             CO2, GLUCOSE, BUN,                                        the resul

ts



             CREATININE, CA)                                        section.

 

             MAGNESIUM    ASAP         2020  4:15              Results for

 this



                                       PM CDT                    procedure are i

n



                                                                 the results



                                                                 section.

 

             TRANSFUSE PLATELETS STAT         2020 12:56              



                                       PM CDT                    

 

             POCT GLUCOSE Routine      2020 11:53              Results for

 this



             (AUTOMATED)               AM CDT                    procedure are i

n



                                                                 the results



                                                                 section.

 

             PROFILE / HEMOGRAM ASAP         2020 10:58              Resul

ts for this



                                       AM CDT                    procedure are i

n



                                                                 the results



                                                                 section.

 

             PREPARE PLATELETS STAT         2020 10:46              Result

s for this



                                       AM CDT                    procedure are i

n



                                                                 the results



                                                                 section.

 

             TRANSFUSE PACKED RBC Routine      2020 10:36              



                                       AM CDT                    

 

             CT ANGIOGRAM STAT         2020  9:33 Gastrointestinal Results

 for this



                ABDOMEN/PELVIS                  AM CDT          hemorrhage with 

melena



                                        procedure are in



                                                    Hematochezia the results



                                                                 section.

 

             PREPARE PACKED RBC Routine      2020  8:34              Resul

ts for this



                                       AM CDT                    procedure are i

n



                                                                 the results



                                                                 section.

 

             POCT GLUCOSE Routine      2020  7:38              Results for

 this



             (AUTOMATED)               AM CDT                    procedure are i

n



                                                                 the results



                                                                 section.

 

             TRANSFUSE PACKED RBC Routine      2020  7:36              



                                       AM CDT                    

 

             POCT GLUCOSE Routine      2020  5:06              Results for

 this



             (AUTOMATED)               AM CDT                    procedure are i

n



                                                                 the results



                                                                 section.

 

             POCT GLUCOSE Routine      2020  5:04              Results for

 this



             (AUTOMATED)               AM CDT                    procedure are i

n



                                                                 the results



                                                                 section.

 

             CBC WITH     Routine      2020  4:59              Results for

 this



             DIFFERENTIAL              AM CDT                    procedure are i

n



                                                                 the results



                                                                 section.

 

             PROTHROMBIN TIME / ASAP         2020  4:59              Resul

ts for this



             INR                       AM CDT                    procedure are i

n



                                                                 the results



                                                                 section.

 

             CBC WITH     Routine      2020  4:59              Results for

 this



             DIFFERENTIAL              AM CDT                    procedure are i

n



                                                                 the results



                                                                 section.

 

             BASIC METABOLIC ASAP         2020  4:59              Results 

for this



             PANEL (NA, K, CL,              AM CDT                    procedure 

are in



             CO2, GLUCOSE, BUN,                                        the resul

ts



             CREATININE, CA)                                        section.

 

             HEPATIC FUNCTION ASAP         2020  4:59              Results

 for this



             PANEL ([id number])              AM CDT                    procedure are 

in



             (ALB,T.PRO,BILI                                        the results



             T,BU/BC,ALT,AST,ALK                                        section.



             PHOS)                                               

 

             MAGNESIUM    ASAP         2020  4:59              Results for

 this



                                       AM CDT                    procedure are i

n



                                                                 the results



                                                                 section.

 

             POCT GLUCOSE Routine      2020 11:35              Results for

 this



             (AUTOMATED)               PM CDT                    procedure are i

n



                                                                 the results



                                                                 section.

 

             CBC WITH     Routine      2020 11:28              Results for

 this



             DIFFERENTIAL              PM CDT                    procedure are i

n



                                                                 the results



                                                                 section.

 

             CBC WITH     Routine      2020 11:28              Results for

 this



             DIFFERENTIAL              PM CDT                    procedure are i

n



                                                                 the results



                                                                 section.

 

             POCT GLUCOSE Routine      2020  8:14              Results for

 this



             (AUTOMATED)               PM CDT                    procedure are i

n



                                                                 the results



                                                                 section.

 

             CBC WITH     Routine      2020  4:54              Results for

 this



             DIFFERENTIAL              PM CDT                    procedure are i

n



                                                                 the results



                                                                 section.

 

             CBC WITH     Routine      2020  4:54              Results for

 this



             DIFFERENTIAL              PM CDT                    procedure are i

n



                                                                 the results



                                                                 section.

 

             POCT GLUCOSE Routine      2020  4:49              Results for

 this



             (AUTOMATED)               PM CDT                    procedure are i

n



                                                                 the results



                                                                 section.

 

             TRANSFUSE PACKED RBC STAT         2020  4:25              



                                       PM CDT                    

 

             CLOSTRIDIUM  Routine      2020  3:50              Results for

 this



             DIFFICILE TOXIN              PM CDT                    procedure ar

e in



                                                                 the results



                                                                 section.

 

             PREPARE PACKED RBC STAT         2020  1:52              Resul

ts for this



                                       PM CDT                    procedure are i

n



                                                                 the results



                                                                 section.

 

             HB ABO GROUPING Routine      2020 12:58              Results 

for this



                                       PM CDT                    procedure are i

n



                                                                 the results



                                                                 section.

 

             POCT GLUCOSE Routine      2020 12:28              Results for

 this



             (AUTOMATED)               PM CDT                    procedure are i

n



                                                                 the results



                                                                 section.

 

             POCT GLUCOSE Routine      2020 11:24              Results for

 this



             (AUTOMATED)               AM CDT                    procedure are i

n



                                                                 the results



                                                                 section.

 

             CT ABDOMEN PELVIS W ASAP         2020 10:05 Partial small bow

el Results for this



             CONTRAST                  AM CDT       obstruction  procedure are i

n



                                                                 the results



                                                                 section.

 

             CBC WITH     Routine      2020  9:12              Results for

 this



             DIFFERENTIAL              AM CDT                    procedure are i

n



                                                                 the results



                                                                 section.

 

             FIBRINOGEN   ASAP         2020  9:12              Results for

 this



                                       AM CDT                    procedure are i

n



                                                                 the results



                                                                 section.

 

             CBC WITH     Routine      2020  9:12              Results for

 this



             DIFFERENTIAL              AM CDT                    procedure are i

n



                                                                 the results



                                                                 section.

 

             POCT GLUCOSE Routine      2020  7:45              Results for

 this



             (AUTOMATED)               AM CDT                    procedure are i

n



                                                                 the results



                                                                 section.

 

             N-TERMINAL PRO-BNP ASAP         2020  5:42              Resul

ts for this



                                       AM CDT                    procedure are i

n



                                                                 the results



                                                                 section.

 

             COMP. METABOLIC ASAP         2020  5:42              Results 

for this



             PANEL ([id number])              AM CDT                    procedure are 

in



                                                                 the results



                                                                 section.

 

             PHOSPHORUS   ASAP         2020  5:42              Results for

 this



                                       AM CDT                    procedure are i

n



                                                                 the results



                                                                 section.

 

             CBC WITH     Routine      2020  4:32              Results for

 this



             DIFFERENTIAL              AM CDT                    procedure are i

n



                                                                 the results



                                                                 section.

 

             CBC WITH     Routine      2020  4:32              Results for

 this



             DIFFERENTIAL              AM CDT                    procedure are i

n



                                                                 the results



                                                                 section.

 

             POCT GLUCOSE Routine      2020  4:29              Results for

 this



             (AUTOMATED)               AM CDT                    procedure are i

n



                                                                 the results



                                                                 section.

 

             PROTHROMBIN TIME / ASAP         2020 12:28              Resul

ts for this



             INR                       AM CDT                    procedure are i

n



                                                                 the results



                                                                 section.

 

             BASIC METABOLIC ASAP         2020 12:28              Results 

for this



             PANEL (NA, K, CL,              AM CDT                    procedure 

are in



             CO2, GLUCOSE, BUN,                                        the resul

ts



             CREATININE, CA)                                        section.

 

             HEPATIC FUNCTION ASAP         2020 12:28              Results

 for this



             PANEL ([id number])              AM CDT                    procedure are 

in



             (ALB,T.PRO,BILI                                        the results



             T,BU/BC,ALT,AST,ALK                                        section.



             PHOS)                                               

 

             MAGNESIUM    ASAP         2020 12:28              Results for

 this



                                       AM CDT                    procedure are i

n



                                                                 the results



                                                                 section.

 

             CBC WITH     Routine      2020 12:27              Results for

 this



             DIFFERENTIAL              AM CDT                    procedure are i

n



                                                                 the results



                                                                 section.

 

             CBC WITH     Routine      2020 12:27              Results for

 this



             DIFFERENTIAL              AM CDT                    procedure are i

n



                                                                 the results



                                                                 section.

 

             POCT GLUCOSE Routine      2020 12:02              Results for

 this



             (AUTOMATED)               AM CDT                    procedure are i

n



                                                                 the results



                                                                 section.

 

             POCT GLUCOSE Routine      2020  9:27              Results for

 this



             (AUTOMATED)               PM CDT                    procedure are i

n



                                                                 the results



                                                                 section.

 

                XR ABDOMEN 1 VW STAT            2020  9:15 Fever in adult



                                        Results for this



                                       PM CDT       Partial small bowel procedur

e are in



                                                                obstruction



                                        the results



                                                                Colitis



                                        section.



                                                    Gastrointestinal 



                                                    hemorrhage with melena 

 

             MRSA / MSSA SCREEN ASAP         2020  8:23              Resul

ts for this



             BY PCR, NARES              PM CDT                    procedure are 

in



                                                                 the results



                                                                 section.

 

             CBC WITH     Routine      2020  6:31              Results for

 this



             DIFFERENTIAL              PM CDT                    procedure are i

n



                                                                 the results



                                                                 section.

 

             LACTIC ACID WHOLE STAT         2020  6:31              Result

s for this



             BLOOD                     PM CDT                    procedure are i

n



                                                                 the results



                                                                 section.

 

             CBC WITH     Routine      2020  6:31              Results for

 this



             DIFFERENTIAL              PM CDT                    procedure are i

n



                                                                 the results



                                                                 section.

 

             IRON PANEL   Add-on       2020  6:31              Results for

 this



                                       PM CDT                    procedure are i

n



                                                                 the results



                                                                 section.

 

             ALPHA FETOPROTEIN Routine      2020  6:31              Result

s for this



                                       PM CDT                    procedure are i

n



                                                                 the results



                                                                 section.

 

             POCT GLUCOSE Routine      2020  4:30              Results for

 this



             (AUTOMATED)               PM CDT                    procedure are i

n



                                                                 the results



                                                                 section.

 

             POCT GLUCOSE Routine      2020 11:10              Results for

 this



             (AUTOMATED)               AM CDT                    procedure are i

n



                                                                 the results



                                                                 section.

 

             POCT GLUCOSE Routine      2020  7:52              Results for

 this



             (AUTOMATED)               AM CDT                    procedure are i

n



                                                                 the results



                                                                 section.

 

             LACTIC ACID WHOLE STAT         2020  5:28              Result

s for this



             BLOOD                     AM CDT                    procedure are i

n



                                                                 the results



                                                                 section.

 

             POCT GLUCOSE Routine      2020  4:17              Results for

 this



             (AUTOMATED)               AM CDT                    procedure are i

n



                                                                 the results



                                                                 section.

 

             CBC WITH     Routine      2020 12:57              Results for

 this



             DIFFERENTIAL              AM CDT                    procedure are i

n



                                                                 the results



                                                                 section.

 

             N-TERMINAL PRO-BNP Add-on       2020 12:57              Resul

ts for this



                                       AM CDT                    procedure are i

n



                                                                 the results



                                                                 section.

 

             GLYCOSYLATED Add-on       2020 12:57              Results for

 this



             HEMOGLOBIN (A1C)              AM CDT                    procedure a

re in



                                                                 the results



                                                                 section.

 

             CBC WITH     Routine      2020 12:57              Results for

 this



             DIFFERENTIAL              AM CDT                    procedure are i

n



                                                                 the results



                                                                 section.

 

             BASIC METABOLIC Routine      2020 12:57              Results 

for this



             PANEL (NA, K, CL,              AM CDT                    procedure 

are in



             CO2, GLUCOSE, BUN,                                        the resul

ts



             CREATININE, CA)                                        section.

 

             HEPATIC FUNCTION Add-on       2020 12:57              Results

 for this



             PANEL ([id number])              AM CDT                    procedure are 

in



             (ALB,T.PRO,BILI                                        the results



             T,BU/BC,ALT,AST,ALK                                        section.



             PHOS)                                               

 

             LACTIC ACID WHOLE STAT         2020 12:56              Result

s for this



             BLOOD                     AM CDT                    procedure are i

n



                                                                 the results



                                                                 section.

 

             POCT GLUCOSE Routine      2020 11:07              Results for

 this



             (AUTOMATED)               PM CDT                    procedure are i

n



                                                                 the results



                                                                 section.

 

             XR CHEST 1 VW STAT         2020  9:46 Fever in adult Results 

for this



                                       PM CDT                    procedure are i

n



                                                                 the results



                                                                 section.

 

             URINE CULTURE STAT         2020  9:31 Fever in adult Results 

for this



                                       PM CDT                    procedure are i

n



                                                                 the results



                                                                 section.

 

                LACTIC ACID WHOLE STAT            2020  9:02 Fever in adul

t



                                        Results for this



                BLOOD                           PM CDT          [place]



                                        procedure are in



                                                    Vomiting and diarrhea the re

sults



                                                                 section.

 

             BASIC METABOLIC STAT         2020  9:02 Hyperkalemia Results 

for this



             PANEL (NA, K, CL,              PM CDT                    procedure 

are in



             CO2, GLUCOSE, BUN,                                        the resul

ts



             CREATININE, CA)                                        section.

 

                URINALYSIS      STAT            2020  7:31 Fever in adult



                                        Results for this



                                                PM CDT          [place]



                                        procedure are in



                                                    Vomiting and diarrhea the re

sults



                                                                 section.

 

             EKG-12 LEAD  Routine      2020  7:30              



                                       PM CDT                    

 

                CT ABDOMEN PELVIS W STAT            2020  7:23 Fever in ad

ult



                                        Results for this



                CONTRAST                        PM CDT          [place]



                                        procedure are in



                                                    Vomiting and diarrhea the re

sults



                                                                 section.

 

             EKG-12 LEAD  STAT         2020  6:53              



                                       PM CDT                    

 

                BLOOD CULTURE SCREEN STAT            2020  6:18 Fever in a

dult



                                        Results for this



                                                PM CDT          [place]



                                        procedure are in



                                                    Vomiting and diarrhea the re

sults



                                                                 section.

 

                LACTIC ACID WHOLE STAT            2020  6:17 Fever in adul

t



                                        Results for this



                BLOOD                           PM CDT          [place]



                                        procedure are in



                                                    Vomiting and diarrhea the re

sults



                                                                 section.

 

                BLOOD CULTURE SCREEN STAT            2020  6:16 Fever in a

dult



                                        Results for this



                                                PM CDT          [place]



                                        procedure are in



                                                    Vomiting and diarrhea the re

sults



                                                                 section.

 

                CORONAVIRUS COVID-19 STAT            2020  6:15 Fever in a

dult



                                        Results for this



                TESTING                         PM CDT          [place]



                                        procedure are in



                                                    Vomiting and diarrhea the re

sults



                                                                 section.

 

                CBC WITH        STAT            2020  6:15 Fever in adult



                                        Results for this



                DIFFERENTIAL                    PM CDT          [place]



                                        procedure are in



                                                    Vomiting and diarrhea the re

sults



                                                                 section.

 

                HB ABO GROUPING STAT            2020  6:15 Fever in adult



                                        Results for this



                                                PM CDT          [place]



                                        procedure are in



                                                    Vomiting and diarrhea the re

sults



                                                                 section.

 

                ACTIVATED PARTIAL STAT            2020  6:15 Fever in adul

t



                                        Results for this



                THRMPLAS DENISE                    PM CDT          [place]



                                        procedure are in



                                                    Vomiting and diarrhea the re

sults



                                                                 section.

 

                PROTHROMBIN TIME / STAT            2020  6:15 Fever in berry

lt



                                        Results for this



                INR                             PM CDT          [place]



                                        procedure are in



                                                    Vomiting and diarrhea the re

sults



                                                                 section.

 

                COMP. METABOLIC STAT            2020  6:15 Fever in adult



                                        Results for this



                PANEL ([id number])                   PM CDT          [place]



                                        procedure are in



                                                    Vomiting and diarrhea the re

sults



                                                                 section.

 

                LIPASE          STAT            2020  6:15 Fever in adult



                                        Results for this



                                                PM CDT          [place]



                                        procedure are in



                                                    Vomiting and diarrhea the re

sults



                                                                 section.

 

             HOSPITAL ADMISSION Routine      2020 12:01              



             MISC - MEDICARE              AM CDT                    



             PATIENTS RIGHTS                                        



             IMPORTANT MESSAGE                                        

 

             HOSPITAL ADMISSION Routine      2020 12:01              



                                       AM CDT                    

 

             EMERGENCY DEPARTMENT Routine      2020 12:01              



             DOCUMENTS                 AM CDT                    



documented in this encounter



Results

POCT GLUCOSE (AUTOMATED) (2020  6:02 AM CDT)





             Component    Value        Ref Range    Performed At [facility]

nature

 

             POCT GLU     272 (H)      70 - 110 mg/dL [facility] 









                                        Specimen

 

                                        Blood









                Performing Organization Address         City/[facility] Phone

 Number

 

                [facility] CLIA: [id number], [address] [place] [address]

[phone]



                                [facility]                 



POCT GLUCOSE (AUTOMATED) (2020 10:12 PM CDT)





             Component    Value        Ref Range    Performed At [facility]

nature

 

             POCT GLU     295 (H)      70 - 110 mg/dL [facility] 









                                        Specimen

 

                                        Blood









                Performing Organization Address         [facility] Phone

 Number

 

                [facility] CLIA: [id number], [address] [place] [address]

[phone]



                                [facility]                 



EXTRA TUBE RED (2020  8:40 PM CDT)





                                        Specimen

 

                                        Blood









                Performing Organization Address         City/[facility]/Zipcode Phone

 Number

 

                [facility] CLIA:  [id number], [address] [place] [address]

[phone]



                                [email or web address]                 



EXTRA TUBE LT. BLUE (2020  8:40 PM CDT)





                                        Specimen

 

                                        Blood









                Performing Organization Address         City/[facility]/Zipcode Phone

 Number

 

                [facility] CLIA:  [id number], [address] [place] [address]

[phone]



                                [address]                 



EXTRA TUBE LAV (2020  8:40 PM CDT)





                                        Specimen

 

                                        Blood









                Performing Organization Address         City/[facility]/Zipcode Phone

 Number

 

                [facility] CLIA:  [id number], [address] [place] [address]

[phone]



                                [facility]                 



TROPONIN I (2020  8:38 PM CDT)





             Component    Value        Ref Range    Performed At [facility]

[facility]

 

             TROPONIN I   0.003        <=0.034 ng/mL [facility] 









                                        Specimen

 

                                        Blood - ARM, RIGHT









                          Narrative                 Performed At

 

                                        Equal or Less than 0.034 ng/ml---Normal 





                                        [facility]



                          Note: Cardiac troponin begins to rise 3-4 hours after 

the 



                          onset of ischemia. Repeat in 4-6 hours if the sample w

as 



                          drawn within 3-4 hours of the onset of the symptom and

 found 



                                        normal. 



                                        



                                         



                                        



                          Between 0.035 and 0.120 ng/mL--- Borderline. Questiona

ble 



                                        myocardial injury or necrosis  



                                        



                          Note: Serial measurement may be necessary to confirm o

r 



                          exclude the diagnosis of myocardial injury or necrosis

; 



                          Clinical correlation (symptoms, EKGs, imaging studies,

 and 



                          others) required; Repeat in 4-6 hours if clinically 



                                        indicated.    



                                        



                                         



                                        



                          Equal or Higher than 0.121 ng/mL---Abnormal. Myocardia

l 



                                        Injury or Necrosis Likely     



                                        



                                         



                                        



                          Biotin has been reported to cause a negative bias, int

erpret 



                          results relative to patient's use of biotin.    

  



                                                     

    



                                        



                                            

   



                                               



                                        



                                                    









                Performing Organization Address         City/[facility] Phone

 Number

 

                [facility] CLIA:  [id number], [address] [place] [address]

[phone]



                                [facility]                 



PROLACTIN (2020  8:38 PM CDT)





             Component    Value        Ref Range    Performed At [facility]

[facility]

 

             PROLACTIN    8.1          2.7 - 19.6 ng/mL [facility]

 









                                        Specimen

 

                                        Blood - ARM, RIGHT









                Performing Organization Address         City/[facility]/Zipcode Phone

 Number

 

                [facility] CLIA:  [id number], [address] [place] [address]

[phone]



                                [facility]                 



CREATINE KINASE (2020  8:38 PM CDT)





             Component    Value        Ref Range    Performed At [facility]

nature

 

             CK           35           33 - 194 U/L [facility] 









                                        Specimen

 

                                        Blood - ARM, RIGHT









                Performing Organization Address         City/[facility]/Zipcode Phone

 Number

 

                [facility] CLIA:  [id number], [address] [place] [address]

[phone]



                                [facility]                 



MAGNESIUM (2020  8:38 PM CDT)





             Component    Value        Ref Range    Performed At [facility]

nature

 

             MAGNESIUM    1.8          1.7 - 2.4 mg/dL [facility] 









                                        Specimen

 

                                        Blood - ARM, RIGHT









                Performing Organization Address         City/State/Zipcode Phone

 Number

 

                [facility] CLIA:  [id number], [address] [place], [place] [address]

[phone]



                                [facility]                 



BASIC METABOLIC PANEL (NA, K, CL, CO2, GLUCOSE, BUN, CREATININE, CA) (2020
  8:38 PM CDT)





             Component    Value        Ref Range    Performed At [facility]

nature

 

             NA           137          135 - 145    [facility] 



                                       mmol/L       SERVICES     

 

             K            3.6          3.5 - 5.0    [facility] 



                                       mmol/L       SERVICES     

 

             CL           104          98 - 108 mmol/L [facility] 



                                                    SERVICES     

 

             CO2 TOTAL    24           23 - 31 mmol/L [facility] 



                                                    SERVICES     

 

             AGAP         9            2 - 16       [facility] 



                                                    SERVICES     

 

             BUN          11           7 - 23 mg/dL [facility] 



                                                    SERVICES     

 

             GLUCOSE      321 (H)      70 - 110 mg/dL [facility] 



                                                    SERVICES     

 

             CREATININE   0.65         0.50 - 1.04  [facility] 



                                       mg/dL        SERVICES     

 

             CALCIUM      8.2 (L)      8.6 - 10.6   [facility] 



                                       mg/dL        SERVICES     

 

             eGFR Calculation 93.6         mL/min/1.73m2 [facility] 



             (Non-                           SERVICES     



             American)                                           

 

             eGFR Calculation 113.5        mL/min/1.73m2 [facility] 



             (African American)                           SERVICES     









                                        Specimen

 

                                        Blood - ARM, RIGHT









                          Narrative                 Performed At

 

                          [facility] of Glomerular Filtration Rate (GFR) and St

aging [facility] 

SERVICES



                                        of Kidney Disease*



                                        



                                         



                                        



                          +-----------------------+---------------------+-------

------ 



                                        ------------+



                                        



                          | GFR (mL/min/1.73 m2) | With Kidney Damage | Wi

thout 



                                        Kidney Damage



                                        



                          +-----------------------+---------------------+-------

------ 



                                        ------------+



                                        



                          | >90         | Stage one   

  



                                        |  Normal        



                                        



                          +-----------------------+---------------------+-------

------ 



                                        ------------+



                                        



                          | 60-89        | Stage two   

  



                                        |  Decreased GFR     



                                        



                          +-----------------------+---------------------+-------

------ 



                                        ------------+



                                        



                          | 30-59        | Stage three  

  



                                        |  Stage three      



                                        



                          +-----------------------+---------------------+-------

------ 



                                        ------------+



                                        



                          | 15-29        | Stage four   

  



                                        |  Stage four      



                                        



                          +-----------------------+---------------------+-------

------ 



                                        ------------+



                                        



                          | <15 (or dialysis)  | Stage five     

|  



                                        Stage five      



                                        



                          +-----------------------+---------------------+-------

------ 



                                        ------------+



                                        



                                         



                                        



                          *Each stage assumes the associated GFR level has been 

in 



                          effect for at least three months. Stages 1 to 5, wit

h or 



                                        without kidney disease, indicate chronic

 kidney disease.



                                        



                                         



                                        



                          Notes: Determination of stages one and two (with eGFR 



                          >59mL/min/1.73 m2) requires estimation of kidney damag

e for 



                          at least three months as defined by structural or func

tional 



                                        abnormalities of the kidney, manifested 

by either:



                                        



                          Pathological abnormalities or Markers of kidney damage

 



                          (including abnormalities in the composition of the blo

od or 



                                        urine or abnormalities in imaging tests)

. 



                                        



                                                    









                Performing Organization Address         City/State/Zipcode Phone

 Number

 

                [facility] CLIA:  [id number], [address] [place] [address]

[phone]



                                [facility]                 



POCT GLUCOSE (AUTOMATED) (2020  1:50 PM CDT)





             Component    Value        Ref Range    Performed At Pathologist Sig

nature

 

             POCT GLU     310 (H)      70 - 110 mg/dL [facility] 









                                        Specimen

 

                                        Blood









                Performing Organization Address         City/[facility]/Zipcode Phone

 Number

 

                [facility] CLIA: [id number], [address] [place] [address]

[phone]



                                [facility]                 



XR KUB (2020 11:40 AM CDT)





                                        Specimen

 

                                        









                          Narrative                 Performed At

 

                                        EXAM: XR KUB



                                        PACS/VR/DOSE



                                         



                                        



                                        HISTORY: serial progression r/o toxic me

gacolon, ,sbo r/o 



                                        



                                         



                                        



                                        COMPARISON: None.



                                        



                                         



                                        



                                        FINDINGS:



                                        



                                         



                                        



                                        Gas is present throughout nondilated loo

ps of small and large intestine,



                                        



                          and obstruction is not suspected. Neither is toxic angelo

acolon 



                                        demonstrated.



                                        



                          The bowel gas pattern is normal and no opaque stones o

r masses are 



                          found.                    









                                        Procedure Note

 

                                        [facility], Radiant Results Inft User - 2020 12:27 PM CDT



EXAM: XR KUB



                                        



                                        HISTORY: serial progression r/o toxic me

gacolon,  ,sbo r/o



                                        



                                        COMPARISON: None.



                                        



                                        FINDINGS:



                                        



                                        Gas is present throughout nondilated loo

ps of small and large intestine,



                                        and obstruction is not suspected. Neithe

r is toxic megacolon demonstrated.



                                        The bowel gas pattern is normal and no o

paque stones or masses are found.









                Performing Organization Address         City/State/Zipcode Phone

 Number

 

                PACS/VR/DOSE                                    



POCT GLUCOSE (AUTOMATED) (2020  8:01 AM CDT)





             Component    Value        Ref Range    Performed At Pathologist Sig

nature

 

             POCT GLU     270 (H)      70 - 110 mg/dL [facility] 









                                        Specimen

 

                                        Blood









                Performing Organization Address         City/[facility]/Zipcode Phone

 Number

 

                [facility] CLIA: [id number], [address] [place] [address]

[phone]



                                [facility]                 



CBC WITH DIFFERENTIAL (2020  4:36 AM CDT)





             Component    Value        Ref Range    Performed At Pathologist



                                                                 Signature

 

             WBC          5.62         4.30 - 11.10 [facility] 



                                       10*3/L     SERVICES     

 

             RBC          3.64 (L)     3.93 - 5.25  [facility] 



                                       10*6/L     SERVICES     

 

             HGB          10.9 (L)     11.6 - 15.0  [facility] 



                                       g/dL         SERVICES     

 

             HCT          32.8 (L)     35.7 - 45.2 % [facility] 



                                                    SERVICES     

 

             MCV          90.1         80.6 - 95.5  [facility] 



                                       fL           SERVICES     

 

             MCH          29.9         25.9 - 32.8  [facility] 



                                       pg           SERVICES     

 

             MCHC         33.2         31.6 - 35.1  [facility] 



                                       g/dL         SERVICES     

 

             RDW-SD       54.9 (H)     39.0 - 49.9  [facility] 



                                       fL           SERVICES     

 

             RDW-CV       17.2 (H)     12.0 - 15.5 % [facility] 



                                                    SERVICES     

 

             PLT          45 (LL)      166 - 358    [facility] 



                                       10*3/L     SERVICES     

 

             MPV          9.9          9.5 - 12.9 fL [facility] 



                                                    SERVICES     

 

             IPF %        4.4Comment: Platelet 1.3 - 7.7 %  [facility] 



                          count measured by              SERVICES     



                          fluorescence method.                           

 

             NRBC/100 WBC 0.0          0.0 - 10.0   [facility] 



                                       /100 WBCs    SERVICES     

 

             NRBC x10^3   <0.01        10*3/L     [facility] 



                                                    SERVICES     

 

             GRAN MAT (NEUT) % 75.2         %            [facility] 



                                                    SERVICES     

 

             IMM GRAN %   1.40         %            [facility] 



                                                    SERVICES     

 

             LYMPH %      15.7         %            [facility] 



                                                    SERVICES     

 

             MONO %       6.8          %            [facility] 



                                                    SERVICES     

 

             EOS %        0.7          %            [facility] 



                                                    SERVICES     

 

             BASO %       0.2          %            [facility] 



                                                    SERVICES     

 

             GRAN MAT     4.23         1.88 - 7.09  [facility] 



             x10^3(ANC)                10*3/uL      SERVICES     

 

             IMM GRAN x10^3 0.08 (H)     0.00 - 0.06  [facility] 



                                       10*3/uL      SERVICES     

 

             LYMPH x10^3  0.88 (L)     1.32 - 3.29  [facility] 



                                       10*3/uL      SERVICES     

 

             MONO x10^3   0.38         0.33 - 0.92  [facility] 



                                       10*3/uL      SERVICES     

 

             EOS x10^3    0.04         0.03 - 0.39  [facility] 



                                       10*3/uL      SERVICES     

 

             BASO x10^3   <0.03        0.01 - 0.07  [facility] 



                                       10*3/uL      SERVICES     









                                        Specimen

 

                                        Blood - LINE, VENOUS









                Performing Organization Address         City/State/Zipcode Phone

 Number

 

                [facility] CLIA:  [id number], [address] [place], [place] [address]

[phone]



                                [facility]                 



Basic Metabolic Panel (NA, K, CL, CO2, Glucose, BUN, Creatinine, CA) (2020
  4:36 AM CDT)





             Component    Value        Ref Range    Performed At Pathologist



                                                                 Signature

 

             NA           137          135 - 145    [facility] 



                                       mmol/L       SERVICES     

 

             K            3.7Comment:  3.5 - 5.0    [facility] 



                          Slight hemolysis mmol/L       SERVICES     

 

             CL           105          98 - 108     [facility] 



                                       mmol/L       SERVICES     

 

             CO2 TOTAL    25           23 - 31      [facility] 



                                       mmol/L       SERVICES     

 

             AGAP         7            2 - 16       [facility] 



                                                    SERVICES     

 

             BUN          10Comment: Slight 7 - 23 mg/dL [facility] 



                          hemolysis                 SERVICES     

 

             GLUCOSE      307 (H)      70 - 110     [facility] 



                                       mg/dL        SERVICES     

 

             CREATININE   0.56         0.50 - 1.04  [facility] 



                                       mg/dL        SERVICES     

 

             CALCIUM      7.4 (L)      8.6 - 10.6   [facility] 



                                       mg/dL        SERVICES     

 

             eGFR Calculation 111.2        mL/min/1.73m2 [facility] 



             (Non-                           SERVICES     



             American)                                           

 

             eGFR Calculation 134.8        mL/min/1.73m2 [facility] 



             (African American)                           SERVICES     









                                        Specimen

 

                                        Blood - LINE, VENOUS









                          Narrative                 Performed At

 

                          Association of Glomerular Filtration Rate (GFR) and St

aging [facility] 

SERVICES



                                        of Kidney Disease*



                                        



                                         



                                        



                          +-----------------------+---------------------+-------

------ 



                                        ------------+



                                        



                          | GFR (mL/min/1.73 m2) | With Kidney Damage | Denis elam 



                                        Kidney Damage



                                        



                          +-----------------------+---------------------+-------

------ 



                                        ------------+



                                        



                          | >90         | Stage one   

  



                                        |  Normal        



                                        



                          +-----------------------+---------------------+-------

------ 



                                        ------------+



                                        



                          | 60-89        | Stage two   

  



                                        |  Decreased GFR     



                                        



                          +-----------------------+---------------------+-------

------ 



                                        ------------+



                                        



                          | 30-59        | Stage three  

  



                                        |  Stage three      



                                        



                          +-----------------------+---------------------+-------

------ 



                                        ------------+



                                        



                          | 15-29        | Stage four   

  



                                        |  Stage four      



                                        



                          +-----------------------+---------------------+-------

------ 



                                        ------------+



                                        



                          | <15 (or dialysis)  | Stage five     

|  



                                        Stage five      



                                        



                          +-----------------------+---------------------+-------

------ 



                                        ------------+



                                        



                                         



                                        



                          *Each stage assumes the associated GFR level has been 

in 



                          effect for at least three months. Stages 1 to 5, wit

h or 



                                        without kidney disease, indicate chronic

 kidney disease.



                                        



                                         



                                        



                          Notes: Determination of stages one and two (with eGFR 



                          >59mL/min/1.73 m2) requires estimation of kidney damag

e for 



                          at least three months as defined by structural or func

tional 



                                        abnormalities of the kidney, manifested 

by either:



                                        



                          Pathological abnormalities or Markers of kidney damage

 



                          (including abnormalities in the composition of the blo

od or 



                                        urine or abnormalities in imaging tests)

. 



                                        



                                                    









                Performing Organization Address         City/[facility] Phone

 Number

 

                [facility] CLIA:  [id number], [address] [place] [address]

[phone]



                                [facility]                 



POCT GLUCOSE (AUTOMATED) (2020  9:59 PM CDT)





             Component    Value        Ref Range    Performed At Pathologist Sig

nature

 

             POCT GLU     285 (H)      70 - 110 mg/dL [facility] 









                                        Specimen

 

                                        Blood









                Performing Organization Address         City/[facility] Phone

 Number

 

                [facility] CLIA: [id number], [address] [place] [address]

[phone]



                                [facility]                 



POCT GLUCOSE (AUTOMATED) (2020  4:18 PM CDT)





             Component    Value        Ref Range    Performed At Pathologist Sig

nature

 

             POCT GLU     337 (H)      70 - 110 mg/dL [facility] 









                                        Specimen

 

                                        Blood









                Performing Organization Address         City/[facility] Phone

 Number

 

                [facility] CLIA: [id number], [address] [place] [address]

[phone]



                                [facility]                 



XR KUB (2020 12:04 PM CDT)





                                        Specimen

 

                                        









                          Impressions               Performed At

 

                                         



                                        PACS/VR/DOSE



                          No radiographic findings to suggest with toxic megacol

on. 









                          Narrative                 Performed At

 

                                        EXAM: XR KUB



                                        PACS/VR/DOSE



                                         



                                        



                                        COMPARISON: 2020.



                                        



                                         



                                        



                                        HISTORY: monitor for toxic megacolon 



                                        



                                         



                                        



                                        FINDINGS:



                                        



                                         



                                        



                                        The colon appears normally dilated with 

preservation of the haustral



                                        



                                        markings and no evidence of mucosal mu

a. Air is noted throughout the



                                        



                                        colon to the level of the rectum. The nimco

wel gas pattern is overall



                                        



                                        nonobstructive.



                                        



                                         



                                        



                                        No intra-abdominal free air seen.



                                        



                                         



                                        



                                        Cholecystectomy clips are present. The b

gloria structures are grossly



                                        



                                        unremarkable.



                                        



                                                    









                                        Procedure Note

 

                                        [facility], Radiant Results Inft User - 2020 12:44 PM CDT



EXAM: XR KUB



                                        



                                        COMPARISON: 2020.



                                        



                                        HISTORY: monitor for toxic megacolon



                                        



                                        FINDINGS:



                                        



                                        The colon appears normally dilated with 

preservation of the haustral



                                        markings and no evidence of mucosal mu

a. Air is noted throughout the



                                        colon to the level of the rectum. The nimco

wel gas pattern is overall



                                        nonobstructive.



                                        



                                        No intra-abdominal free air seen.



                                        



                                        Cholecystectomy clips are present. The b

gloria structures are grossly



                                        unremarkable.



                                        



                                        IMPRESSION



                                        



                                        No radiographic findings to suggest with

 toxic megacolon.









                Performing Organization Address         City/State/Zipcode Phone

 Number

 

                PACS/VR/DOSE                                    



POCT GLUCOSE (AUTOMATED) (2020 11:44 AM CDT)





             Component    Value        Ref Range    Performed At Pathologist Sig

nature

 

             POCT GLU     319 (H)Comment: 70 - 110 mg/dL [facility] 



                          Notified Provider              HOSPITAL     









                                        Specimen

 

                                        Blood









                Performing Organization Address         City/State/Zipcode Phone

 Number

 

                [facility] CLIA: [id number], [address] [place], [place] [address]

[phone]



                                [facility]                 



CBC WITH DIFFERENTIAL (2020  5:03 AM CDT)





             Component    Value        Ref Range    Performed At Pathologist



                                                                 Signature

 

             WBC          5.60         4.30 - 11.10 [facility] 



                                       10*3/L     SERVICES     

 

             RBC          3.69 (L)     3.93 - 5.25  [facility] 



                                       10*6/L     SERVICES     

 

             HGB          10.9 (L)     11.6 - 15.0  [facility] 



                                       g/dL         SERVICES     

 

             HCT          32.8 (L)     35.7 - 45.2 % [facility] 



                                                    SERVICES     

 

             MCV          88.9         80.6 - 95.5  [facility] 



                                       fL           SERVICES     

 

             MCH          29.5         25.9 - 32.8  [facility] 



                                       pg           SERVICES     

 

             MCHC         33.2         31.6 - 35.1  [facility] 



                                       g/dL         SERVICES     

 

             RDW-SD       53.9 (H)     39.0 - 49.9  [facility] 



                                       fL           SERVICES     

 

             RDW-CV       17.2 (H)     12.0 - 15.5 % [facility] 



                                                    SERVICES     

 

             PLT          45 (LL)      166 - 358    [facility] 



                                       10*3/L     SERVICES     

 

             MPV          10.0         9.5 - 12.9 fL [facility] 



                                                    SERVICES     

 

             IPF %        3.2Comment: Platelet 1.3 - 7.7 %  [facility] 



                          count measured by              SERVICES     



                          fluorescence method.                           

 

             NRBC/100 WBC 0.0          0.0 - 10.0   [facility] 



                                       /100 WBCs    SERVICES     

 

             NRBC x10^3   <0.01        10*3/L     [facility] 



                                                    SERVICES     

 

             GRAN MAT (NEUT) % 73.9         %            [facility] 



                                                    SERVICES     

 

             IMM GRAN %   1.60         %            [facility] 



                                                    SERVICES     

 

             LYMPH %      16.1         %            [facility] 



                                                    SERVICES     

 

             MONO %       6.6          %            [facility] 



                                                    SERVICES     

 

             EOS %        1.4          %            [facility] 



                                                    SERVICES     

 

             BASO %       0.4          %            [facility] 



                                                    SERVICES     

 

             GRAN MAT     4.14         1.88 - 7.09  [facility] 



             x10^3(ANC)                10*3/uL      SERVICES     

 

             IMM GRAN x10^3 0.09 (H)     0.00 - 0.06  [facility] 



                                       10*3/uL      SERVICES     

 

             LYMPH x10^3  0.90 (L)     1.32 - 3.29  [facility] 



                                       10*3/uL      SERVICES     

 

             MONO x10^3   0.37         0.33 - 0.92  [facility] 



                                       10*3/uL      SERVICES     

 

             EOS x10^3    0.08         0.03 - 0.39  [facility] 



                                       10*3/uL      SERVICES     

 

             BASO x10^3   <0.03        0.01 - 0.07  [facility] 



                                       10*3/uL      SERVICES     

 

             BANDS        Increased (A)              [facility] 



                                                    SERVICES     









                                        Specimen

 

                                        Blood - LINE, VENOUS









                Performing Organization Address         City/[facility] Phone

 Number

 

                [facility] CLIA:  [id number], [address] [place] [address]

[phone]



                                [facility]                 



Hepatic Function Panel (ALB, T.PRO, BILI T, BU/BC, ALT, AST, ALK, PHOS) 
(2020  5:03 AM CDT)





             Component    Value        Ref Range    Performed At Pathologist Sig

nature

 

             TOTAL BILI   1.2 (H)      0.1 - 1.1 mg/dL [facility] 

 

             BILI UNCON   0.7          0.1 - 1.1 mg/dL [facility] 

 

             BILI CONJ    0.0          0.0 - 0.3 mg/dL [facility] 

 

             T PROTEIN    6.1 (L)      6.3 - 8.2 g/dL [facility] 

 

             ALBUMIN      2.7 (L)      3.5 - 5.0 g/dL [facility] 

 

             ALK PHOS     92           34 - 122 U/L [facility] 

 

             ALTv         20           5 - 35 U/L   [facility] 

 

             AST(SGOT)    37           13 - 40 U/L  [facility] 









                                        Specimen

 

                                        Blood - LINE, VENOUS









                Performing Organization Address         City/[facility] Phone

 Number

 

                [facility] CLIA:  [id number], [address] [place] [address] 77 [phone]



                                [facility]                 



Magnesium Serum (2020  5:03 AM CDT)





             Component    Value        Ref Range    Performed At Pathologist Sig

nature

 

             MAGNESIUM    1.5 (L)      1.7 - 2.4 mg/dL [facility] 









                                        Specimen

 

                                        Blood - LINE, VENOUS









                Performing Organization Address         City/State/Zipcode Phone

 Number

 

                [facility] CLIA:  [id number], [address], [place] [address]

[phone]



                                [facility]                 



Basic Metabolic Panel (NA, K, CL, CO2, Glucose, BUN, Creatinine, CA) (2020
  5:03 AM CDT)





             Component    Value        Ref Range    Performed At Pathologist



                                                                 Signature

 

             NA           136          135 - 145    [facility] 



                                       mmol/L       SERVICES     

 

             K            3.3 (L)Comment: 3.5 - 5.0    [facility] 



                          Slight hemolysis mmol/L       SERVICES     

 

             CL           105          98 - 108     [facility] 



                                       mmol/L       SERVICES     

 

             CO2 TOTAL    25           23 - 31      [facility] 



                                       mmol/L       SERVICES     

 

             AGAP         6            2 - 16       [facility] 



                                                    SERVICES     

 

             BUN          12Comment: Slight 7 - 23 mg/dL [facility] 



                          hemolysis                 SERVICES     

 

             GLUCOSE      275 (H)      70 - 110     [facility] 



                                       mg/dL        SERVICES     

 

             CREATININE   0.60         0.50 - 1.04  [facility] 



                                       mg/dL        SERVICES     

 

             CALCIUM      7.4 (L)      8.6 - 10.6   [facility] 



                                       mg/dL        SERVICES     

 

             eGFR Calculation 102.7        mL/min/1.73m2 [facility] 



             (Non-                           SERVICES     



             American)                                           

 

             eGFR Calculation 124.4        mL/min/1.73m2 [facility] 



             (African American)                           SERVICES     









                                        Specimen

 

                                        Blood - LINE, VENOUS









                          Narrative                 Performed At

 

                          Association of Glomerular Filtration Rate (GFR) and St

aging [facility] 

SERVICES



                                        of Kidney Disease*



                                        



                                         



                                        



                          +-----------------------+---------------------+-------

------ 



                                        ------------+



                                        



                          | GFR (mL/min/1.73 m2) | With Kidney Damage | Wi

thout 



                                        Kidney Damage



                                        



                          +-----------------------+---------------------+-------

------ 



                                        ------------+



                                        



                          | >90         | Stage one   

  



                                        |  Normal        



                                        



                          +-----------------------+---------------------+-------

------ 



                                        ------------+



                                        



                          | 60-89        | Stage two   

  



                                        |  Decreased GFR     



                                        



                          +-----------------------+---------------------+-------

------ 



                                        ------------+



                                        



                          | 30-59        | Stage three  

  



                                        |  Stage three      



                                        



                          +-----------------------+---------------------+-------

------ 



                                        ------------+



                                        



                          | 15-29        | Stage four   

  



                                        |  Stage four      



                                        



                          +-----------------------+---------------------+-------

------ 



                                        ------------+



                                        



                          | <15 (or dialysis)  | Stage five     

|  



                                        Stage five      



                                        



                          +-----------------------+---------------------+-------

------ 



                                        ------------+



                                        



                                         



                                        



                          *Each stage assumes the associated GFR level has been 

in 



                          effect for at least three months. Stages 1 to 5, wit

h or 



                                        without kidney disease, indicate chronic

 kidney disease.



                                        



                                         



                                        



                          Notes: Determination of stages one and two (with eGFR 



                          >59mL/min/1.73 m2) requires estimation of kidney damag

e for 



                          at least three months as defined by structural or func

tional 



                                        abnormalities of the kidney, manifested 

by either:



                                        



                          Pathological abnormalities or Markers of kidney damage

 



                          (including abnormalities in the composition of the blo

od or 



                                        urine or abnormalities in imaging tests)

. 



                                        



                                                    









                Performing Organization Address         City/[facility] Phone

 Number

 

                [facility] CLIA:  [id number], [address] [place] [address]

[phone]



                                [facility]                 



Prothrombin Time  / INR (2020  5:03 AM CDT)





             Component    Value        Ref Range    Performed At Pathologist



                                                                 [facility]

 

             PROTIME PATIENT 14.3 (H)     10.1 - 12.6  [facility] 



                                       Seconds      SERVICES     

 

             INR          1.3Comment: Normal              [facility] 



                          INR <1.1; Warfarin              SERVICES     



                          Therapeutic range                           



                          2.0 to 3.0 or 2.5                           



                          to 3.5, depending                           



                          upon the                               



                          indications.                           









                                        Specimen

 

                                        Blood - LINE, VENOUS









                Performing Organization Address         City/[facility] Phone

 Number

 

                [facility] CLIA:  [id number], [address] [place] [address]

[phone]



                                [facility]                 



POCT GLUCOSE (AUTOMATED) (2020 10:05 PM CDT)





             Component    Value        Ref Range    Performed At Pathologist Sig

nature

 

             POCT GLU     274 (H)      70 - 110 mg/dL [facility] 









                                        Specimen

 

                                        Blood









                Performing Organization Address         City/[facility] Phone

 Number

 

                [facility] CLIA: [id number], [address] [place] [address]

[phone]



                                [facility]                 



CBC WITH DIFFERENTIAL (2020  6:03 PM CDT)





             Component    Value        Ref Range    Performed At Pathologist



                                                                 [facility]

 

             WBC          8.24         4.30 - 11.10 [facility] 



                                       10*3/L     SERVICES     

 

             RBC          3.91 (L)     3.93 - 5.25  [facility] 



                                       10*6/L     SERVICES     

 

             HGB          11.5 (L)     11.6 - 15.0  [facility] 



                                       g/dL         SERVICES     

 

             HCT          35.1 (L)     35.7 - 45.2 % [facility] 



                                                    SERVICES     

 

             MCV          89.8         80.6 - 95.5  [facility] 



                                       fL           SERVICES     

 

             MCH          29.4         25.9 - 32.8  [facility] 



                                       pg           SERVICES     

 

             MCHC         32.8         31.6 - 35.1  [facility] 



                                       g/dL         SERVICES     

 

             RDW-SD       55.3 (H)     39.0 - 49.9  [facility] 



                                       fL           SERVICES     

 

             RDW-CV       17.3 (H)     12.0 - 15.5 % [facility] 



                                                    SERVICES     

 

             PLT          55 (L)       166 - 358    [facility] 



                                       10*3/L     SERVICES     

 

             MPV          10.2         9.5 - 12.9 fL [facility] 



                                                    SERVICES     

 

             IPF %        2.0Comment: Platelet 1.3 - 7.7 %  [facility] 



                          count measured by              SERVICES     



                          fluorescence method.                           

 

             NRBC/100 WBC 0.0          0.0 - 10.0   [facility] 



                                       /100 WBCs    SERVICES     

 

             NRBC x10^3   <0.01        10*3/L     [facility] 



                                                    SERVICES     

 

             GRAN MAT (NEUT) % 78.7         %            [facility] 



                                                    SERVICES     

 

             IMM GRAN %   1.30         %            [facility] 



                                                    SERVICES     

 

             LYMPH %      12.3         %            [facility] 



                                                    SERVICES     

 

             MONO %       6.4          %            [facility] 



                                                    SERVICES     

 

             EOS %        1.2          %            [facility] 



                                                    SERVICES     

 

             BASO %       0.1          %            [facility] 



                                                    SERVICES     

 

             GRAN MAT     6.48         1.88 - 7.09  [facility] 



             x10^3(ANC)                10*3/uL      SERVICES     

 

             IMM GRAN x10^3 0.11 (H)     0.00 - 0.06  [facility] 



                                       10*3/uL      SERVICES     

 

             LYMPH x10^3  1.01 (L)     1.32 - 3.29  [facility] 



                                       10*3/uL      SERVICES     

 

             MONO x10^3   0.53         0.33 - 0.92  [facility] 



                                       10*3/uL      SERVICES     

 

             EOS x10^3    0.10         0.03 - 0.39  [facility] 



                                       10*3/uL      SERVICES     

 

             BASO x10^3   <0.03        0.01 - 0.07  [facility] 



                                       10*3/uL      SERVICES     









                                        Specimen

 

                                        Blood - ARM, RIGHT









                Performing Organization Address         City/State/Zipcode Phone

 Number

 

                [facility] CLIA:  [id number], [address] [place] [address]

[phone]



                                [facility]                 



POCT GLUCOSE (AUTOMATED) (2020  5:57 PM CDT)





             Component    Value        Ref Range    Performed At Pathologist Sig

nature

 

             POCT GLU     181 (H)      70 - 110 mg/dL [facility] 









                                        Specimen

 

                                        Blood









                Performing Organization Address         City/[facility]/Zipcode Phone

 Number

 

                [facility] CLIA: [id number], [address] [place] [address]

[phone]



                                [facility]                 



XR KUB (2020 12:01 PM CDT)





                                        Specimen

 

                                        









                          Impressions               Performed At

 

                                         



                                        PACS/VR/DOSE



                                        Nonspecific, mild gaseous distention of 

large and small bowel with no



                                        



                                        evidence of mechanical obstruction.



                                        



                                         



                                        



                          Right-sided pleural effusion. 









                          Narrative                 Performed At

 

                                        EXAM: XR KUB



                                        PACS/VR/DOSE



                                         



                                        



                                        COMPARISON: CT dated 2020.



                                        



                                         



                                        



                                        HISTORY: partial SBO, monitor progressio

n 



                                        



                                         



                                        



                                        FINDINGS:



                                        



                                         



                                        



                          Nonspecific loops of large and small bowel demonstrate

 gaseous 



                                        distention



                                        



                                        with no localizing findings to suggest o

bstruction. There is air noted



                                        



                                        throughout the colon into the distal col

on.



                                        



                                         



                                        



                                        No intra-abdominal free air is seen.



                                        



                                         



                                        



                                        Cholecystectomy changes are noted.



                                        



                                         



                                        



                                        Incidental note of a right-sided pleural

 effusion.



                                        



                                         



                                        



                                        No acute or aggressive bony lesions.



                                        



                                                    









                                        Procedure Note

 

                                        [facility], Radiant Results Inft User - 2020  2:23 PM CDT



EXAM: XR KUB



                                        



                                        COMPARISON: CT dated 2020.



                                        



                                        HISTORY: partial SBO, monitor progressio

n



                                        



                                        FINDINGS:



                                        



                                        Nonspecific loops of large and small bow

el demonstrate gaseous distention



                                        with no localizing findings to suggest o

bstruction. There is air noted



                                        throughout the colon into the distal col

on.



                                        



                                        No intra-abdominal free air is seen.



                                        



                                        Cholecystectomy changes are noted.



                                        



                                        Incidental note of a right-sided pleural

 effusion.



                                        



                                        No acute or aggressive bony lesions.



                                        



                                        IMPRESSION



                                        



                                        Nonspecific, mild gaseous distention of 

large and small bowel with no



                                        evidence of mechanical obstruction.



                                        



                                        Right-sided pleural effusion.









                Performing Organization Address         City/State/Zipcode Phone

 Number

 

                PACS/VR/DOSE                                    



POCT GLUCOSE (AUTOMATED) (2020 11:52 AM CDT)





             Component    Value        Ref Range    Performed At Pathologist Sig

nature

 

             POCT GLU     181 (H)      70 - 110 mg/dL [facility] 









                                        Specimen

 

                                        Blood









                Performing Organization Address         City/State/Zipcode Phone

 Number

 

                [facility] CLIA: [id number], [address] [place], [place] [address]

[phone]



                                [facility]                 



PROFILE / HEMOGRAM (2020 10:06 AM CDT)





             Component    Value        Ref Range    Performed At Pathologist



                                                                 Signature

 

             WBC          6.58         4.30 - 11.10 [facility] 



                                       10*3/L     SERVICES     

 

             RBC          3.42 (L)     3.93 - 5.25  [facility] 



                                       10*6/L     SERVICES     

 

             HGB          10.2 (L)     11.6 - 15.0  [facility] 



                                       g/dL         SERVICES     

 

             HCT          30.5 (L)     35.7 - 45.2 % [facility] 



                                                    SERVICES     

 

             MCH          29.8         25.9 - 32.8 pg [facility] 



                                                    SERVICES     

 

             MCV          89.2         80.6 - 95.5 fL [facility] 



                                                    SERVICES     

 

             MCHC         33.4         31.6 - 35.1  [facility] 



                                       g/dL         SERVICES     

 

             PLT          45 (LL)      166 - 358    [facility] 



                                       10*3/L     SERVICES     

 

             MPV          9.0 (L)      9.5 - 12.9 fL [facility] 



                                                    SERVICES     

 

             RDW-CV       17.5 (H)     12.0 - 15.5 % [facility] 



                                                    SERVICES     

 

             RDW-SD       55.7 (H)     39.0 - 49.9 fL [facility] 



                                                    SERVICES     

 

             NRBC x10^3   0.02         10*3/L     [facility] 



                                                    SERVICES     

 

             NRBC/100 WBC 0.3          0.0 - 10.0   [facility] 



                                       /100 WBCs    SERVICES     

 

             IPF %        3.2Comment: Platelet 1.3 - 7.7 %  [facility] 



                          count measured by              SERVICES     



                          fluorescence method.                           









                                        Specimen

 

                                        Blood - VENOUS









                Performing Organization Address         City/[facility]/Zipcode Phone

 Number

 

                [facility] CLIA:  [id number], [address] [place] [address]

[phone]



                                [facility]                 



POCT GLUCOSE (AUTOMATED) (2020  7:54 AM CDT)





             Component    Value        Ref Range    Performed At Pathologist Sig

nature

 

             POCT GLU     151 (H)      70 - 110 mg/dL [facility] 









                                        Specimen

 

                                        Blood









                Performing Organization Address         City/[facility] Phone

 Number

 

                [facility] CLIA: [id number], [address] [place] [address]

[phone]



                                [facility]                 



FERRITIN SERUM (2020  4:25 AM CDT)





             Component    Value        Ref Range    Performed At Pathologist [facility]

[facility]

 

             FERRITIN     382.0 (H)    11.0 - 264.0 ng/mL [facility]

ES 









                                        Specimen

 

                                        Blood - VENOUS









                          Narrative                 Performed At

 

                          Biotin has been reported to cause a negative bias, int

erpret [facility] 

SERVICES



                          results relative to patient's use of biotin. 









                Performing Organization Address         City/[facility] Cent[facility] Phone

 Number

 

                [facility] CLIA:  [id number], [address] [place] [address]

[phone] [address]                 



Hepatic Function Panel (ALB, T.PRO, BILI T, BU/BC, ALT, AST, ALK, PHOS) 
(2020  4:25 AM CDT)





             Component    Value        Ref Range    Performed At Pathologist Sig

nature

 

             TOTAL BILI   1.7 (H)      0.1 - 1.1 mg/dL [facility] 

 

             BILI UNCON   1.3 (H)      0.1 - 1.1 mg/dL [facility] 

 

             BILI CONJ    0.0          0.0 - 0.3 mg/dL [facility] 

 

             T PROTEIN    5.7 (L)      6.3 - 8.2 g/dL [facility] 

 

             ALBUMIN      2.5 (L)      3.5 - 5.0 g/dL [facility] 

 

             ALK PHOS     77           34 - 122 U/L [facility] 

 

             ALTv         20           5 - 35 U/L   [facility] 

 

             AST(SGOT)    33           13 - 40 U/L  [facility] 









                                        Specimen

 

                                        Blood - VENOUS









                Performing Organization Address         City/State/Zipcode Phone

 Number

 

                [facility] CLIA:  [id number], [address] [place] [address]

[phone] [place] [address]                 



Magnesium Serum (2020  4:25 AM CDT)





             Component    Value        Ref Range    Performed At Pathologist Sig

nature

 

             MAGNESIUM    2.3          1.7 - 2.4 mg/dL [facility] 









                                        Specimen

 

                                        Blood - VENOUS









                Performing Organization Address         City/[facility]ode Phone

 Number

 

                [facility] CLIA:  [id number], [address] [place] [address]

[phone]



                                [facility]                 



Basic Metabolic Panel (NA, K, CL, CO2, Glucose, BUN, Creatinine, CA) (2020
  4:25 AM CDT)





             Component    Value        Ref Range    Performed At Pathologist Sig

nature

 

             NA           136          135 - 145    [facility] 



                                       mmol/L       SERVICES     

 

             K            3.7          3.5 - 5.0    [facility] 



                                       mmol/L       SERVICES     

 

             CL           109 (H)      98 - 108 mmol/L [facility] 



                                                    SERVICES     

 

             CO2 TOTAL    21 (L)       23 - 31 mmol/L [facility] 



                                                    SERVICES     

 

             AGAP         6            2 - 16       [facility] 



                                                    SERVICES     

 

             BUN          16           7 - 23 mg/dL [facility] 



                                                    SERVICES     

 

             GLUCOSE      151 (H)      70 - 110 mg/dL [facility] 



                                                    SERVICES     

 

             CREATININE   0.59         0.50 - 1.04  [facility] 



                                       mg/dL        SERVICES     

 

             CALCIUM      7.1 (L)      8.6 - 10.6   [facility] 



                                       mg/dL        SERVICES     

 

             eGFR Calculation 104.7        mL/min/1.73m2 [facility] 



             (Non-                           SERVICES     



             American)                                           

 

             eGFR Calculation 126.9        mL/min/1.73m2 [facility] 



             (African American)                           SERVICES     









                                        Specimen

 

                                        Blood - VENOUS









                          Narrative                 Performed At

 

                          Association of Glomerular Filtration Rate (GFR) and St

aging [facility] 

SERVICES



                                        of Kidney Disease*



                                        



                                         



                                        



                          +-----------------------+---------------------+-------

------ 



                                        ------------+



                                        



                          | GFR (mL/min/1.73 m2) | With Kidney Damage | Wi

thout 



                                        Kidney Damage



                                        



                          +-----------------------+---------------------+-------

------ 



                                        ------------+



                                        



                          | >90         | Stage one   

  



                                        |  Normal        



                                        



                          +-----------------------+---------------------+-------

------ 



                                        ------------+



                                        



                          | 60-89        | Stage two   

  



                                        |  Decreased GFR     



                                        



                          +-----------------------+---------------------+-------

------ 



                                        ------------+



                                        



                          | 30-59        | Stage three  

  



                                        |  Stage three      



                                        



                          +-----------------------+---------------------+-------

------ 



                                        ------------+



                                        



                          | 15-29        | Stage four   

  



                                        |  Stage four      



                                        



                          +-----------------------+---------------------+-------

------ 



                                        ------------+



                                        



                          | <15 (or dialysis)  | Stage five     

|  



                                        Stage five      



                                        



                          +-----------------------+---------------------+-------

------ 



                                        ------------+



                                        



                                         



                                        



                          *Each stage assumes the associated GFR level has been 

in 



                          effect for at least three months. Stages 1 to 5, wit

h or 



                                        without kidney disease, indicate chronic

 kidney disease.



                                        



                                         



                                        



                          Notes: Determination of stages one and two (with eGFR 



                          >59mL/min/1.73 m2) requires estimation of kidney damag

e for 



                          at least three months as defined by structural or func

tional 



                                        abnormalities of the kidney, manifested 

by either:



                                        



                          Pathological abnormalities or Markers of kidney damage

 



                          (including abnormalities in the composition of the blo

od or 



                                        urine or abnormalities in imaging tests)

. 



                                        



                                                    









                Performing Organization Address         City/State/Zipcode Phone

 Number

 

                [facility] CLIA:  [id number], [address] [place], [place] [address]

[phone]



                                [facility]                 



Prothrombin Time  / INR (2020  4:25 AM CDT)





             Component    Value        Ref Range    Performed At Pathologist



                                                                 [facility]

 

             PROTIME PATIENT 13.0 (H)     10.1 - 12.6  [facility] 



                                       Seconds      SERVICES     

 

             INR          1.1Comment: Normal              [facility] 



                          INR <1.1; Warfarin              SERVICES     



                          Therapeutic range                           



                          2.0 to 3.0 or 2.5                           



                          to 3.5, depending                           



                          upon the                               



                          indications.                           









                                        Specimen

 

                                        Blood - VENOUS









                Performing Organization Address         City/State/Zipcode Phone

 Number

 

                [facility] CLIA:  [id number], [address], [place] [address] [phone]



                                [facility]                 



PROFILE / HEMOGRAM (2020  4:25 AM CDT)





             Component    Value        Ref Range    Performed At Pathologist



                                                                 Signature

 

             WBC          6.40         4.30 - 11.10 [facility] 



                                       10*3/L     SERVICES     

 

             RBC          3.38 (L)     3.93 - 5.25  [facility] 



                                       10*6/L     SERVICES     

 

             HGB          9.9 (L)      11.6 - 15.0  [facility] 



                                       g/dL         SERVICES     

 

             HCT          30.1 (L)     35.7 - 45.2 % [facility] 



                                                    SERVICES     

 

             MCH          29.3         25.9 - 32.8 pg [facility] 



                                                    SERVICES     

 

             MCV          89.1         80.6 - 95.5 fL [facility] 



                                                    SERVICES     

 

             MCHC         32.9         31.6 - 35.1  [facility] 



                                       g/dL         SERVICES     

 

             PLT          44 (LL)      166 - 358    [facility] 



                                       10*3/L     SERVICES     

 

             MPV          8.4 (L)      9.5 - 12.9 fL [facility] 



                                                    SERVICES     

 

             RDW-CV       17.5 (H)     12.0 - 15.5 % [facility] 



                                                    SERVICES     

 

             RDW-SD       56.2 (H)     39.0 - 49.9 fL [facility] 



                                                    SERVICES     

 

             NRBC x10^3   0.02         10*3/L     [facility] 



                                                    SERVICES     

 

             NRBC/100 WBC 0.3          0.0 - 10.0   [facility] 



                                       /100 WBCs    SERVICES     

 

             IPF %        2.0Comment: Platelet 1.3 - 7.7 %  [facility] 



                          count measured by              SERVICES     



                          fluorescence method.                           









                                        Specimen

 

                                        Blood - VENOUS









                Performing Organization Address         City/State/Zipcode Phone

 Number

 

                [facility] CLIA:  [id number], [address] [place] [address]

[phone]



                                [facility]                 



POCT GLUCOSE (AUTOMATED) (2020 11:32 PM CDT)





             Component    Value        Ref Range    Performed At Pathologist [facility]

nature

 

             POCT GLU     164 (H)      70 - 110 mg/dL [facility] 









                                        Specimen

 

                                        Blood









                Performing Organization Address         City/State/Zipcode Phone

 Number

 

                [facility] CLIA: [id number], [address] [place] [address]

[phone]



                                [facility]                 



PROFILE / HEMOGRAM (2020 10:14 PM CDT)





             Component    Value        Ref Range    Performed At Pathologist



                                                                 Signature

 

             WBC          7.61         4.30 - 11.10 [facility] 



                                       10*3/L     SERVICES     

 

             RBC          3.55 (L)     3.93 - 5.25  [facility] 



                                       10*6/L     SERVICES     

 

             HGB          10.4 (L)     11.6 - 15.0  [facility] 



                                       g/dL         SERVICES     

 

             HCT          31.4 (L)     35.7 - 45.2 % [facility] 



                                                    SERVICES     

 

             MCH          29.3         25.9 - 32.8 pg [facility] 



                                                    SERVICES     

 

             MCV          88.5         80.6 - 95.5 fL [facility] 



                                                    SERVICES     

 

             MCHC         33.1         31.6 - 35.1  [facility] 



                                       g/dL         SERVICES     

 

             PLT          50 (LL)      166 - 358    [facility] 



                                       10*3/L     SERVICES     

 

             MPV          9.1 (L)      9.5 - 12.9 fL [facility] 



                                                    SERVICES     

 

             RDW-CV       17.6 (H)     12.0 - 15.5 % [facility] 



                                                    SERVICES     

 

             RDW-SD       56.1 (H)     39.0 - 49.9 fL [facility] 



                                                    SERVICES     

 

             NRBC x10^3   0.03         10*3/L     [facility] 



                                                    SERVICES     

 

             NRBC/100 WBC 0.4          0.0 - 10.0   [facility] 



                                       /100 WBCs    SERVICES     

 

             IPF %        1.9Comment: Platelet 1.3 - 7.7 %  [facility] 



                          count measured by              SERVICES     



                          fluorescence method.                           









                                        Specimen

 

                                        Blood - VENOUS









                Performing Organization Address         City/State/Zipcode Phone

 Number

 

                [facility] CLIA:  [id number], [address] [place] [address]

[phone]



                                [facility]                 



POCT GLUCOSE (AUTOMATED) (2020  7:42 PM CDT)





             Component    Value        Ref Range    Performed At Pathologist Sig

nature

 

             POCT GLU     201 (H)      70 - 110 mg/dL [facility] 









                                        Specimen

 

                                        Blood









                Performing Organization Address         City/[facility] Phone

 Number

 

                [facility] CLIA: [id number], [address] [place] [address]

[phone]



                                [facility]                 



POCT GLUCOSE (AUTOMATED) (2020  4:27 PM CDT)





             Component    Value        Ref Range    Performed At Pathologist Sig

nature

 

             POCT GLU     135 (H)      70 - 110 mg/dL [facility] 









                                        Specimen

 

                                        Blood









                Performing Organization Address         City/[facility] Phone

 Number

 

                [facility] CLIA: [id number], [address] [place] [address]

[phone]



                                [facility]                 



MAGNESIUM (2020  4:15 PM CDT)





             Component    Value        Ref Range    Performed At Pathologist Sig

nature

 

             MAGNESIUM    2.7 (H)      1.7 - 2.4 mg/dL [facility] 









                                        Specimen

 

                                        Blood - VENOUS









                Performing Organization Address         City/State/Zipcode Phone

 Number

 

                [facility] CLIA:  [id number], [address] [place], [place] [address]

[phone]



                                [facility]                 



BASIC METABOLIC PANEL (NA, K, CL, CO2, GLUCOSE, BUN, CREATININE, CA) (2020
  4:15 PM CDT)





             Component    Value        Ref Range    Performed At Pathologist Sig

nature

 

             NA           136          135 - 145    [facility] 



                                       mmol/L       SERVICES     

 

             K            4.1          3.5 - 5.0    [facility] 



                                       mmol/L       SERVICES     

 

             CL           107          98 - 108 mmol/L [facility] 



                                                    SERVICES     

 

             CO2 TOTAL    24           23 - 31 mmol/L [facility] 



                                                    SERVICES     

 

             AGAP         5            2 - 16       [facility] 



                                                    SERVICES     

 

             BUN          18           7 - 23 mg/dL [facility] 



                                                    SERVICES     

 

             GLUCOSE      133 (H)      70 - 110 mg/dL [facility] 



                                                    SERVICES     

 

             CREATININE   0.60         0.50 - 1.04  [facility] 



                                       mg/dL        SERVICES     

 

             CALCIUM      7.4 (L)      8.6 - 10.6   [facility] 



                                       mg/dL        SERVICES     

 

             eGFR Calculation 102.7        mL/min/1.73m2 [facility] 



             (Non-                           SERVICES     



             American)                                           

 

             eGFR Calculation 124.4        mL/min/1.73m2 [facility] 



             (African American)                           SERVICES     









                                        Specimen

 

                                        Blood - VENOUS









                          Narrative                 Performed At

 

                          Association of Glomerular Filtration Rate (GFR) and St

aging [facility] 

SERVICES



                                        of Kidney Disease*



                                        



                                         



                                        



                          +-----------------------+---------------------+-------

------ 



                                        ------------+



                                        



                          | GFR (mL/min/1.73 m2) | With Kidney Damage | Wi

thout 



                                        Kidney Damage



                                        



                          +-----------------------+---------------------+-------

------ 



                                        ------------+



                                        



                          | >90         | Stage one   

  



                                        |  Normal        



                                        



                          +-----------------------+---------------------+-------

------ 



                                        ------------+



                                        



                          | 60-89        | Stage two   

  



                                        |  Decreased GFR     



                                        



                          +-----------------------+---------------------+-------

------ 



                                        ------------+



                                        



                          | 30-59        | Stage three  

  



                                        |  Stage three      



                                        



                          +-----------------------+---------------------+-------

------ 



                                        ------------+



                                        



                          | 15-29        | Stage four   

  



                                        |  Stage four      



                                        



                          +-----------------------+---------------------+-------

------ 



                                        ------------+



                                        



                          | <15 (or dialysis)  | Stage five     

|  



                                        Stage five      



                                        



                          +-----------------------+---------------------+-------

------ 



                                        ------------+



                                        



                                         



                                        



                          *Each stage assumes the associated GFR level has been 

in 



                          effect for at least three months. Stages 1 to 5, wit

h or 



                                        without kidney disease, indicate chronic

 kidney disease.



                                        



                                         



                                        



                          Notes: Determination of stages one and two (with eGFR 



                          >59mL/min/1.73 m2) requires estimation of kidney damag

e for 



                          at least three months as defined by structural or func

tional 



                                        abnormalities of the kidney, manifested 

by either:



                                        



                          Pathological abnormalities or Markers of kidney damage

 



                          (including abnormalities in the composition of the blo

od or 



                                        urine or abnormalities in imaging tests)

. 



                                        



                                                    









                Performing Organization Address         City/State/Zipcode Phone

 Number

 

                [facility] CLIA:  [id number], [address] [place] [address]

[phone]



                                [facility]                 



PROFILE / HEMOGRAM (2020  4:15 PM CDT)





             Component    Value        Ref Range    Performed At Pathologist



                                                                 Signature

 

             WBC          9.67         4.30 - 11.10 [facility] 



                                       10*3/L     SERVICES     

 

             RBC          3.97         3.93 - 5.25  [facility] 



                                       10*6/L     SERVICES     

 

             HGB          11.4 (L)     11.6 - 15.0  [facility] 



                                       g/dL         SERVICES     

 

             HCT          35.5 (L)     35.7 - 45.2 % [facility] 



                                                    SERVICES     

 

             MCH          28.7         25.9 - 32.8 pg [facility] 



                                                    SERVICES     

 

             MCV          89.4         80.6 - 95.5 fL [facility] 



                                                    SERVICES     

 

             MCHC         32.1         31.6 - 35.1  [facility] 



                                       g/dL         SERVICES     

 

             PLT          60 (L)       166 - 358    [facility] 



                                       10*3/L     SERVICES     

 

             MPV          9.0 (L)      9.5 - 12.9 fL [facility] 



                                                    SERVICES     

 

             RDW-CV       17.5 (H)     12.0 - 15.5 % [facility] 



                                                    SERVICES     

 

             RDW-SD       56.3 (H)     39.0 - 49.9 fL [facility] 



                                                    SERVICES     

 

             NRBC x10^3   0.04         10*3/L     [facility] 



                                                    SERVICES     

 

             NRBC/100 WBC 0.4          0.0 - 10.0   [facility] 



                                       /100 WBCs    SERVICES     

 

             IPF %        2.3Comment: Platelet 1.3 - 7.7 %  [facility] 



                          count measured by              SERVICES     



                          fluorescence method.                           









                                        Specimen

 

                                        Blood - VENOUS









                Performing Organization Address         City/State/Zipcode Phone

 Number

 

                [facility] CLIA:  [id number], [address] [place] [address]

[phone]



                                [facility]                 



POCT GLUCOSE (AUTOMATED) (2020 11:53 AM CDT)





             Component    Value        Ref Range    Performed At Pathologist Sig

nature

 

             POCT GLU     127 (H)      70 - 110 mg/dL [facility] 









                                        Specimen

 

                                        Blood









                Performing Organization Address         City/[facility]/Zipcode Phone

 Number

 

                [facility] CLIA: [id number], [address] [place] [address]

[phone]



                                [facility]                 



PROFILE / HEMOGRAM - 30 minutes after transfusion of each RBC (2020 10:58 
AM CDT)





             Component    Value        Ref Range    Performed At Pathologist



                                                                 Signature

 

             WBC          9.97         4.30 - 11.10 [facility] 



                                       10*3/L     SERVICES     

 

             RBC          3.90 (L)     3.93 - 5.25  [facility] 



                                       10*6/L     SERVICES     

 

             HGB          11.3 (L)     11.6 - 15.0  [facility] 



                                       g/dL         SERVICES     

 

             HCT          35.1 (L)     35.7 - 45.2 % [facility] 



                                                    SERVICES     

 

             MCH          29.0         25.9 - 32.8 pg [facility] 



                                                    SERVICES     

 

             MCV          90.0         80.6 - 95.5 fL [facility] 



                                                    SERVICES     

 

             MCHC         32.2         31.6 - 35.1  [facility] 



                                       g/dL         SERVICES     

 

             PLT          40 (LL)      166 - 358    [facility] 



                                       10*3/L     SERVICES     

 

             MPV          9.1 (L)      9.5 - 12.9 fL [facility] 



                                                    SERVICES     

 

             RDW-CV       17.2 (H)     12.0 - 15.5 % [facility] 



                                                    SERVICES     

 

             RDW-SD       57.1 (H)     39.0 - 49.9 fL [facility] 



                                                    SERVICES     

 

             NRBC x10^3   0.03         10*3/L     [facility] 



                                                    SERVICES     

 

             NRBC/100 WBC 0.3          0.0 - 10.0   [facility] 



                                       /100 WBCs    SERVICES     

 

             IPF %        2.7Comment: Platelet 1.3 - 7.7 %  [facility] 



                          count measured by              SERVICES     



                          fluorescence method.                           









                                        Specimen

 

                                        Blood - LINE, VENOUS









                Performing Organization Address         City/State/Zipcode Phone

 Number

 

                [facility] CLIA:  [id number], [address] [place] [address]

[phone]



                                [facility]                 



Prepare Platelets (in units): 1 Units~Indication: 2) Platelets &lt; 50,000 with 
active hemorrhage orpotential to bleed from invasive procedure (2020 10:46
 AM CDT)





             Component    Value        Ref Range    Performed At Pathologist



                                                                 Signature

 

             Unit Blood Type A Pos                     LAB          

 

             ISBT Blood Type Code 6200                      LAB          

 

             Unit Number  [id number]              LAB          

 

             Blood Expiration Date &               LAB          



             Time                                                

 

             Status Information Issued                    LAB          

 

             Product Identification Platelets                 LAB          

 

             Product Code [id number]                  LAB          



                          Comment:                               



                          Performed at [facility]

                           



                          [address] [facility], [place], [place] [address]           

                



                          Toll Free: [phone]                           



                          IA No. [id number]                           



                                                                 









                                        Specimen

 

                                        









                Performing Organization Address         City/State/Zipcode Phone

 Number

 

                [facility]                                             

 

                LAB                                             



CT ANGIOGRAM ABDOMEN/PELVIS (2020  9:33 AM CDT)





                                        Specimen

 

                                        









                          Impressions               Performed At

 

                                         



                                        PACS/VR/DOSE



                                        Study is limited secondary to presence o

f intraluminal contrast on the



                                        



                                        noncontrast study from the prior CT scan

. No active extravasation of



                                        



                          contrast seen in the area where there was no contrast 

on the noncontrast 



                                        CT



                                        



                          from today. no arterial secondary signs aneurysm, ea

rly draining vein 



                                        (



                                        



                                        AVM ) seen



                                        



                                         



                                        



                                        Changes consistent right and transvers

e colon with mild dilatation of



                                        



                          transverse colon with air fluid levels . Consider infe

ctious colitis. 



                                        No



                                        



                                        changes of ischemia or vessel occlusion.



                                        



                                         



                                        



                                        Small sliding hiatal hernia.



                                        



                                         



                                        



                                        Trace perihepatic and pericolonic ascite

s, likely reactive.



                                        



                                         



                                        



                          Cirrhosis without focal hepatic lesion with sequela of

 portal 



                                        hypertension



                                        



                                        including splenomegaly and gastroesophag

eal varices.



                                        



                                         



                                        



                                        Preliminary Report Dictated by Resident:

 David Boone MD., have reviewed this study

 and agree with 



                                        the



                                        



                          above report.             









                          Narrative                 Performed At

 

                                        EXAM: CT ABDOMEN AND PELVIS WITH AND WIT

HOUT CONTRAST



                                        PACS/VR/DOSE



                                         



                                        



                                        HISTORY: GI bleed With and without contr

ast



                                        



                                         



                                        



                                        COMPARISON: 2020 CT abdomen an

d pelvis with contrast.



                                        



                                         



                                        



                                        DOSE: 3760.82 mGy-cm



                                        



                                         



                                        



                          TECHNIQUE AND FINDINGS: Contiguous axial imaging from 

the level of the 



                                        lung



                                        



                                        bases through the pubic symphysis was pe

rformed before and after the



                                        



                          uncomplicated administration of 120 mL of intravenous 

Omnipaque 



                                        contrast.



                                        



                                        Precontrast, arterial, venous and 90 sec

onds delayed phase images were



                                        



                                        obtained according to the GI bleed nay

col. Coronal and sagittal



                                        



                                        reconstructions were obtained. Auto mA

 and/or iterative reconstruction



                                        



                                        were used to reduce radiation dose.



                                        



                                         



                                        



                                        FINDINGS:



                                        



                                         



                                        



                                        Suboptimal study secondary to presence o

f contrast from the previous CT



                                        



                                        scan from 2020 within the bowel lum

en at various sites on the



                                        



                                        precontrast study..



                                        



                                         



                                        



                                        LOWER THORAX: Bibasilar atelectasis. Sca

ttered interstitial septal



                                        



                          thickening, bronchiectasis and bronchial wall thickeni

ng. No 



                                        cardiomegaly.



                                        



                                         



                                        



                          LIVER: Cirrhotic liver morphology with nodular contour

s and hypertrophy 



                                        of



                                        



                          the segment 4, 1 2 and 3. A nonenhancing 3.7 cm segmen

t 5 oblong 



                                        hypodense



                                        



                                        structure measures simple cyst attenuati

on. Mild periportal edema is



                                        



                                        present.



                                        



                                         



                                        



                                        GALLBLADDER AND BILIARY TREE: Prior chol

ecystectomy with



                                        



                                        postcholecystectomy reservoir phenomenon

.



                                        



                                         



                                        



                                        SPLEEN: Splenomegaly at 19.5 cm.



                                        



                                         



                                        



                                        PANCREAS: No ductal dilation or masses.



                                        



                                         



                                        



                                        ADRENAL GLANDS: No adrenal nodules.



                                        



                                         



                                        



                                        KIDNEYS: No hydronephrosis, stones, or m

asses. 2.4 cm right interpolar



                                        



                                        simple cyst.



                                        



                                         



                                        



                          PERITONEUM AND RETROPERITONEUM: No free air. Trace vol

ume perihepatic 



                                        and



                                        



                                        pericolonic simple ascites.



                                        



                                         



                                        



                                        LYMPH NODES: No lymphadenopathy.



                                        



                                         



                                        



                          GI TRACT: Small sliding hiatal hernia. Again noted is 

extensive 



                                        prominent



                                        



                          ascending colon, and transverse colon mucosal enhanc

ement and 



                                        subjacent



                                        



                                        fatty stranding, with relatively sparing

 of left colon. Dilatation of



                                        



                                        transverse colon and air fluid levels. A

ppendix is nonvisualized.



                                        



                                         



                                        



                                        PELVIS/BLADDER: Urinary bladder is under

distended with concern wall



                                        



                                        thickening secondary to underdistention.



                                        



                                         



                                        



                                        VESSELS: Mild scattered aortoiliac ather

osclerotic plaquing and



                                        



                                        calcification results in no significant 

stenosis of the branch vessel



                                        



                                        ostia.



                                        



                                         



                                        



                                        Small gastroesophageal varices..



                                        



                                         



                                        



                                        BONES AND SOFT TISSUES: No suspicious ly

tic or sclerotic bony lesions.



                                        



                                                    









                                        Procedure Note

 

                                        [facility], Radiant Results Inft User - 2020  3:22 PM CDT



EXAM: CT ABDOMEN AND PELVIS WITH AND WITHOUT CONTRAST



                                        



                                        HISTORY: GI bleed With and without contr

ast



                                        



                                        COMPARISON: 2020 CT abdomen an

d pelvis with contrast.



                                        



                                        DOSE: 3760.82 mGy-cm



                                        



                                        TECHNIQUE AND FINDINGS: Contiguous axial

 imaging from the level of the lung



                                        bases through the pubic symphysis was pe

rformed before and after the



                                        uncomplicated administration of 120 mL o

f intravenous Omnipaque contrast.



                                        Precontrast, arterial, venous and 90 sec

onds delayed phase images were



                                        obtained according to the GI bleed nay

col. Coronal and sagittal



                                        reconstructions were obtained.  Auto mA 

and/or iterative reconstruction



                                        were used to reduce radiation dose.



                                        



                                        FINDINGS:



                                        



                                        Suboptimal study secondary to presence o

f contrast from the previous CT



                                        scan from 2020 within the bowel lum

en at various sites on the



                                        precontrast study..



                                        



                                        LOWER THORAX: Bibasilar atelectasis. Sca

ttered interstitial septal



                                        thickening, bronchiectasis and bronchial

 wall thickening. No cardiomegaly.



                                        



                                        LIVER: Cirrhotic liver morphology with n

odular contours and hypertrophy of



                                        the segment 4, 1 2 and 3. A nonenhancing

 3.7 cm segment 5 oblong hypodense



                                        structure measures simple cyst attenuati

on. Mild periportal edema is



                                        present.



                                        



                                        GALLBLADDER AND BILIARY TREE: Prior chol

ecystectomy with



                                        postcholecystectomy reservoir phenomenon

.



                                        



                                        SPLEEN: Splenomegaly at 19.5 cm.



                                        



                                        PANCREAS: No ductal dilation or masses.



                                        



                                        ADRENAL GLANDS: No adrenal nodules.



                                        



                                        KIDNEYS: No hydronephrosis, stones, or m

asses. 2.4 cm right interpolar



                                        simple cyst.



                                        



                                        PERITONEUM AND RETROPERITONEUM: No free 

air. Trace volume perihepatic and



                                        pericolonic simple ascites.



                                        



                                        LYMPH NODES: No lymphadenopathy.



                                        



                                        GI TRACT: Small sliding hiatal hernia. A

gain noted is extensive prominent



                                        ascending colon, and transverse colon  m

ucosal enhancement and subjacent



                                        fatty stranding, with relatively sparing

 of left colon. Dilatation of



                                        transverse colon and air fluid levels. A

ppendix is nonvisualized.



                                        



                                        PELVIS/BLADDER: Urinary bladder is under

distended with concern wall



                                        thickening secondary to underdistention.



                                        



                                        VESSELS: Mild scattered aortoiliac ather

osclerotic plaquing and



                                        calcification results in no significant 

stenosis of the branch vessel



                                        ostia.



                                        



                                        Small gastroesophageal varices..



                                        



                                        BONES AND SOFT TISSUES: No suspicious ly

tic or sclerotic bony lesions.



                                        



                                        IMPRESSION



                                        



                                        Study is limited secondary to presence o

f intraluminal contrast on the



                                        noncontrast study from the prior CT scan

. No active extravasation of



                                        contrast seen in the area where there wa

s no contrast on the noncontrast CT



                                        from today. no arterial secondary signs 

 aneurysm, early draining vein (



                                        AVM ) seen



                                        



                                        Changes consistent  right and transverse

 colon with mild dilatation of



                                        transverse colon with air fluid levels .

 Consider infectious colitis.  No



                                        changes of ischemia or vessel occlusion.



                                        



                                        Small sliding hiatal hernia.



                                        



                                        Trace perihepatic and pericolonic ascite

s, likely reactive.



                                        



                                        Cirrhosis without focal hepatic lesion w

ith sequela of portal hypertension



                                        including splenomegaly and gastroesophag

eal varices.



                                        



                                        Preliminary Report Dictated by Resident:

 David Boone MD., have revie

wed this study and agree with the



                                        above report.









                Performing Organization Address         City/State/Zipcode Phone

 Number

 

                PACS/VR/DOSE                                    



Prepare Packed RBC (in units), 2 Units (2020  8:34 AM CDT)





             Component    Value        Ref Range    Performed At Pathologist



                                                                 Signature

 

             Cross Match Result Compatible                LAB          

 

             ISBT Blood Type Code 6200                      LAB          

 

             Unit Blood Type A Pos                     LAB          

 

             Unit Number  [id number]              LAB          

 

             Blood Expiration Date &               LAB          



             Time                                                

 

             Status Information Issued                    LAB          

 

             Product Identification Red Blood Cells              LAB          

 

             Product Code [id number]                  LAB          



                          Comment:                               



                          Performed at [facility] Blood [place] [address]           

                



                          Toll Free: [phone]                           



                          CLIA No. [id number]                           



                                                                 

 

             Cross Match Result Compatible                LAB          

 

             ISBT Blood Type Code 6200                      LAB          

 

             Unit Blood Type A Pos                     LAB          

 

             Unit Number  [id number]              LAB          

 

             Blood Expiration Date &               LAB          



             Time                                                

 

             Status Information Issued                    LAB          

 

             Product Identification Red Blood Cells              LAB          

 

             Product Code [id number]                  LAB          



                          Comment:                               



                          Performed at [facility] Blood [place] [address]           

                



                          Toll Free: [phone]                           



                          CLIA No. [id number]                           



                                                                 









                                        Specimen

 

                                        









                Performing Organization Address         City[facility] Phone

 Number

 

                [facility]                                             

 

                LAB                                             



POCT GLUCOSE (AUTOMATED) (2020  7:38 AM CDT)





             Component    Value        Ref Range    Performed At Pathologist Sig

nature

 

             POCT GLU     109          70 - 110 mg/dL [facility] 









                                        Specimen

 

                                        Blood









                Performing Organization Address         City/[facility] Phone

 Number

 

                [facility] CLIA: [id number], [address] [place] [address]

[phone]



                                [facility]                 



POCT GLUCOSE (AUTOMATED) (2020  5:06 AM CDT)





             Component    Value        Ref Range    Performed At Pathologist Sig

nature

 

             POCT GLU     101          70 - 110 mg/dL [facility] 









                                        Specimen

 

                                        Blood









                Performing Organization Address         City/[facility] Phone

 Number

 

                [facility] CLIA: [id number], [address] [place] [address]

[phone]



                                [facility]                 



POCT GLUCOSE (AUTOMATED) (2020  5:04 AM CDT)





             Component    Value        Ref Range    Performed At Pathologist Sig

nature

 

             POCT GLU     45 (LL)      70 - 110 mg/dL [facility] 









                                        Specimen

 

                                        Blood









                Performing Organization Address         City/[facility] Phone

 Number

 

                [facility] CLIA: [id number], [address] [place] [address]

[phone]



                                [facility]                 



CBC WITH DIFFERENTIAL (2020  4:59 AM CDT)





             Component    Value        Ref Range    Performed At Pathologist



                                                                 Signature

 

             WBC          5.46         4.30 - 11.10 [facility] 



                                       10*3/L     SERVICES     

 

             RBC          1.76 (L)     3.93 - 5.25  [facility] 



                                       10*6/L     SERVICES     

 

             HGB          5.5 (L)      11.6 - 15.0  [facility] 



                                       g/dL         SERVICES     

 

             HCT          17.1 (L)     35.7 - 45.2 % [facility] 



                                                    SERVICES     

 

             MCV          97.2 (H)     80.6 - 95.5  [facility] 



                                       fL           SERVICES     

 

             MCH          31.3         25.9 - 32.8  [facility] 



                                       pg           SERVICES     

 

             MCHC         32.2         31.6 - 35.1  [facility] 



                                       g/dL         SERVICES     

 

             RDW-SD       53.6 (H)     39.0 - 49.9  [facility] 



                                       fL           SERVICES     

 

             RDW-CV       15.3         12.0 - 15.5 % [facility] 



                                                    SERVICES     

 

             PLT          28 (LL)      166 - 358    [facility] 



                                       10*3/L     SERVICES     

 

             MPV          9.6          9.5 - 12.9 fL [facility] 



                                                    SERVICES     

 

             IPF %        1.9Comment: Platelet 1.3 - 7.7 %  [facility] 



                          count measured by              SERVICES     



                          fluorescence method.                           

 

             NRBC/100 WBC 0.0          0.0 - 10.0   [facility] 



                                       /100 WBCs    SERVICES     

 

             NRBC x10^3   <0.01        10*3/L     [facility] 



                                                    SERVICES     

 

             GRAN MAT (NEUT) % 81.5         %            [facility] 



                                                    SERVICES     

 

             IMM GRAN %   1.10         %            [facility] 



                                                    SERVICES     

 

             LYMPH %      11.7         %            [facility] 



                                                    SERVICES     

 

             MONO %       4.6          %            [facility] 



                                                    SERVICES     

 

             EOS %        0.9          %            [facility] 



                                                    SERVICES     

 

             BASO %       0.2          %            [facility] 



                                                    SERVICES     

 

             GRAN MAT     4.45         1.88 - 7.09  [facility] 



             x10^3(ANC)                10*3/uL      SERVICES     

 

             IMM GRAN x10^3 0.06         0.00 - 0.06  [facility] 



                                       10*3/uL      SERVICES     

 

             LYMPH x10^3  0.64 (L)     1.32 - 3.29  [facility] 



                                       10*3/uL      SERVICES     

 

             MONO x10^3   0.25 (L)     0.33 - 0.92  [facility] 



                                       10*3/uL      SERVICES     

 

             EOS x10^3    0.05         0.03 - 0.39  [facility] 



                                       10*3/uL      SERVICES     

 

             BASO x10^3   <0.03        0.01 - 0.07  [facility] 



                                       10*3/uL      SERVICES     









                                        Specimen

 

                                        Blood - ARM, LEFT









                Performing Organization Address         City/[facility] Phone

 Number

 

                [facility] CLIA:  [id number], [address] [place] [address]

[phone]



                                [facility]                 



Hepatic Function Panel (ALB, T.PRO, BILI T, BU/BC, ALT, AST, ALK, PHOS) 
(2020  4:59 AM CDT)





             Component    Value        Ref Range    Performed At Pathologist Sig

nature

 

             TOTAL BILI   0.7          0.1 - 1.1 mg/dL [facility] 

 

             BILI UNCON   0.5          0.1 - 1.1 mg/dL [facility] 

 

             BILI CONJ    0.0          0.0 - 0.3 mg/dL [facility] 

 

             T PROTEIN    3.0 (L)      6.3 - 8.2 g/dL [facility] 

 

             ALBUMIN      1.2 (L)      3.5 - 5.0 g/dL [facility] 

 

             ALK PHOS     39           34 - 122 U/L [facility] 

 

             ALTv         10           5 - 35 U/L   [facility] 

 

             AST(SGOT)    18           13 - 40 U/L  [facility] 









                                        Specimen

 

                                        Blood - ARM, LEFT









                Performing Organization Address         City/[facility] Phone

 Number

 

                [facility] CLIA:  [id number], [address] [place] [address]

[phone]



                                [facility]                 



Magnesium Serum (2020  4:59 AM CDT)





             Component    Value        Ref Range    Performed At Pathologist Sig

nature

 

             MAGNESIUM    1.2 (L)      1.7 - 2.4 mg/dL [facility] 









                                        Specimen

 

                                        Blood - ARM, LEFT









                Performing Organization Address         City/[facility] Phone

 Number

 

                [facility] CLIA:  [id number], [address] [place] [address]

[phone]



                                [facility]                 



Basic Metabolic Panel (NA, K, CL, CO2, Glucose, BUN, Creatinine, CA) (2020
  4:59 AM CDT)





             Component    Value        Ref Range    Performed At Pathologist



                                                                 Signature

 

             NA           133 (L)      135 - 145    [facility] 



                                       mmol/L       SERVICES     

 

             K            3.2 (L)      3.5 - 5.0    [facility] 



                                       mmol/L       SERVICES     

 

             CL           112 (H)      98 - 108 mmol/L [facility] 



                                                    SERVICES     

 

             CO2 TOTAL    12 (L)       23 - 31 mmol/L [facility] 



                                                    SERVICES     

 

             AGAP         9            2 - 16       [facility] 



                                                    SERVICES     

 

             BUN          9            7 - 23 mg/dL [facility] 



                                                    SERVICES     

 

             GLUCOSE      46 (LL)      70 - 110 mg/dL [facility] 



                                                    SERVICES     

 

             CREATININE   0.22 (L)     0.50 - 1.04  [facility] 



                                       mg/dL        SERVICES     

 

             CALCIUM      5.5 (LL)     8.6 - 10.6   [facility] 



                                       mg/dL        SERVICES     

 

             eGFR Calculation 326.8        mL/min/1.73m2 [facility] 



             (Non-                           SERVICES     



             American)                                           

 

             eGFR Calculation 396.1        mL/min/1.73m2 [facility] 



             (African American)                           SERVICES     









                                        Specimen

 

                                        Blood - ARM, LEFT









                          Narrative                 Performed At

 

                          [facility] of Glomerular Filtration Rate (GFR) and St

aging [facility] 

SERVICES



                                        of Kidney Disease*



                                        



                                         



                                        



                          +-----------------------+---------------------+-------

------ 



                                        ------------+



                                        



                          | GFR (mL/min/1.73 m2) | With Kidney Damage | [place]

thout 



                                        Kidney Damage



                                        



                          +-----------------------+---------------------+-------

------ 



                                        ------------+



                                        



                          | >90         | Stage one   

  



                                        |  Normal        



                                        



                          +-----------------------+---------------------+-------

------ 



                                        ------------+



                                        



                          | 60-89        | Stage two   

  



                                        |  Decreased GFR     



                                        



                          +-----------------------+---------------------+-------

------ 



                                        ------------+



                                        



                          | 30-59        | Stage three  

  



                                        |  Stage three      



                                        



                          +-----------------------+---------------------+-------

------ 



                                        ------------+



                                        



                          | 15-29        | Stage four   

  



                                        |  Stage four      



                                        



                          +-----------------------+---------------------+-------

------ 



                                        ------------+



                                        



                          | <15 (or dialysis)  | Stage five     

|  



                                        Stage five      



                                        



                          +-----------------------+---------------------+-------

------ 



                                        ------------+



                                        



                                         



                                        



                          *Each stage assumes the associated GFR level has been 

in 



                          effect for at least three months. Stages 1 to 5, wit

h or 



                                        without kidney disease, indicate chronic

 kidney disease.



                                        



                                         



                                        



                          Notes: Determination of stages one and two (with eGFR 



                          >59mL/min/1.73 m2) requires estimation of kidney damag

e for 



                          at least three months as defined by structural or func

tional 



                                        abnormalities of the kidney, manifested 

by either:



                                        



                          Pathological abnormalities or Markers of kidney damage

 



                          (including abnormalities in the composition of the blo

od or 



                                        urine or abnormalities in imaging tests)

. 



                                        



                                                    









                Performing Organization Address         City/State/Zipcode Phone

 Number

 

                [facility] CLIA:  [id number], [address] [address]

[phone]



                                [facility]                 



Prothrombin Time  / INR (2020  4:59 AM CDT)





             Component    Value        Ref Range    Performed At Pathologist



                                                                 Signature

 

             PROTIME PATIENT 17.8 (H)     10.1 - 12.6  [facility] 



                                       Seconds      SERVICES     

 

             INR          1.6Comment: Normal              [facility] 



                          INR <1.1; Warfarin              SERVICES     



                          Therapeutic range                           



                          2.0 to 3.0 or 2.5                           



                          to 3.5, depending                           



                          upon the                               



                          indications.                           









                                        Specimen

 

                                        Blood - ARM, LEFT









                Performing Organization Address         City/State/Zipcode Phone

 Number

 

                [facility] CLIA:  [id number], [address] [place], [place] [address]

[phone]



                                [address]                 



POCT GLUCOSE (AUTOMATED) (2020 11:35 PM CDT)





             Component    Value        Ref Range    Performed At Pathologist Sig

nature

 

             POCT GLU     113 (H)      70 - 110 mg/dL [facility] 









                                        Specimen

 

                                        Blood









                Performing Organization Address         City/State/Zipcode Phone

 Number

 

                [facility] CLIA: [id number], [address] [place], [place] [address]

[phone]



                                [facility]                 



CBC WITH DIFFERENTIAL (2020 11:28 PM CDT)





             Component    Value        Ref Range    Performed At Pathologist



                                                                 Signature

 

             WBC          6.52         4.30 - 11.10 [facility] 



                                       10*3/L     SERVICES     

 

             RBC          2.52 (L)     3.93 - 5.25  [facility] 



                                       10*6/L     SERVICES     

 

             HGB          7.8 (L)      11.6 - 15.0  [facility] 



                                       g/dL         SERVICES     

 

             HCT          23.8 (L)     35.7 - 45.2 % [facility] 



                                                    SERVICES     

 

             MCV          94.4         80.6 - 95.5  [facility] 



                                       fL           SERVICES     

 

             MCH          31.0         25.9 - 32.8  [facility] 



                                       pg           SERVICES     

 

             MCHC         32.8         31.6 - 35.1  [facility] 



                                       g/dL         SERVICES     

 

             RDW-SD       52.9 (H)     39.0 - 49.9  [facility] 



                                       fL           SERVICES     

 

             RDW-CV       15.4         12.0 - 15.5 % [facility] 



                                                    SERVICES     

 

             PLT          30 (LL)      166 - 358    [facility] 



                                       10*3/L     SERVICES     

 

             MPV          10.1         9.5 - 12.9 fL [facility] 



                                                    SERVICES     

 

             IPF %        2.1Comment: Platelet 1.3 - 7.7 %  [facility] 



                          count measured by              SERVICES     



                          fluorescence method.                           

 

             NRBC/100 WBC 0.0          0.0 - 10.0   [facility] 



                                       /100 WBCs    SERVICES     

 

             NRBC x10^3   <0.01        10*3/L     [facility] 



                                                    SERVICES     

 

             GRAN MAT (NEUT) % 78.1         %            [facility] 



                                                    SERVICES     

 

             IMM GRAN %   3.10         %            [facility] 



                                                    SERVICES     

 

             LYMPH %      13.3         %            [facility] 



                                                    SERVICES     

 

             MONO %       4.4          %            [facility] 



                                                    SERVICES     

 

             EOS %        0.9          %            [facility] 



                                                    SERVICES     

 

             BASO %       0.2          %            [facility] 



                                                    SERVICES     

 

             GRAN MAT     5.09         1.88 - 7.09  [facility] 



             x10^3(ANC)                10*3/uL      SERVICES     

 

             IMM GRAN x10^3 0.20 (H)     0.00 - 0.06  [facility] 



                                       10*3/uL      SERVICES     

 

             LYMPH x10^3  0.87 (L)     1.32 - 3.29  [facility] 



                                       10*3/uL      SERVICES     

 

             MONO x10^3   0.29 (L)     0.33 - 0.92  [facility] 



                                       10*3/uL      SERVICES     

 

             EOS x10^3    0.06         0.03 - 0.39  [facility] 



                                       10*3/uL      SERVICES     

 

             BASO x10^3   <0.03        0.01 - 0.07  [facility] 



                                       10*3/uL      SERVICES     

 

             BANDS        Increased (A)              [facility] 



                                                    SERVICES     

 

             TOXIC CHANGES Present (A)               [facility] 



                                                    SERVICES     









                                        Specimen

 

                                        Blood - ARM, LEFT









                Performing Organization Address         City/State/Zipcode Phone

 Number

 

                [facility] CLIA:  [id number], [address] [place] [address]

[phone]



                                [address]                 



POCT GLUCOSE (AUTOMATED) (2020  8:14 PM CDT)





             Component    Value        Ref Range    Performed At Pathologist Sig

nature

 

             POCT GLU     103          70 - 110 mg/dL [facility] 









                                        Specimen

 

                                        Blood









                Performing Organization Address         City/[facility]/Zipcode Phone

 Number

 

                [facility] CLIA: [id number], [address] [place] [address]

[phone]



                                [facility]                 



CBC WITH DIFFERENTIAL (2020  4:54 PM CDT)





             Component    Value        Ref Range    Performed At Pathologist



                                                                 Signature

 

             WBC          7.79         4.30 - 11.10 [facility] 



                                       10*3/L     SERVICES     

 

             RBC          2.49 (L)     3.93 - 5.25  [facility] 



                                       10*6/L     SERVICES     

 

             HGB          7.7 (L)      11.6 - 15.0  [facility] 



                                       g/dL         SERVICES     

 

             HCT          24.0 (L)     35.7 - 45.2 % [facility] 



                                                    SERVICES     

 

             MCV          96.4 (H)     80.6 - 95.5  [facility] 



                                       fL           SERVICES     

 

             MCH          30.9         25.9 - 32.8  [facility] 



                                       pg           SERVICES     

 

             MCHC         32.1         31.6 - 35.1  [facility] 



                                       g/dL         SERVICES     

 

             RDW-SD       53.6 (H)     39.0 - 49.9  [facility] 



                                       fL           SERVICES     

 

             RDW-CV       15.3         12.0 - 15.5 % [facility] 



                                                    SERVICES     

 

             PLT          38 (LL)      166 - 358    [facility] 



                                       10*3/L     SERVICES     

 

             MPV          10.5         9.5 - 12.9 fL [facility] 



                                                    SERVICES     

 

             IPF %        3.0Comment: Platelet 1.3 - 7.7 %  [facility] 



                          count measured by              SERVICES     



                          fluorescence method.                           

 

             NRBC/100 WBC 0.0          0.0 - 10.0   [facility] 



                                       /100 WBCs    SERVICES     

 

             NRBC x10^3   <0.01        10*3/L     [facility] 



                                                    SERVICES     

 

             GRAN MAT (NEUT) % 82.8         %            [facility] 



                                                    SERVICES     

 

             IMM GRAN %   1.50         %            [facility] 



                                                    SERVICES     

 

             LYMPH %      10.9         %            [facility] 



                                                    SERVICES     

 

             MONO %       4.2          %            [facility] 



                                                    SERVICES     

 

             EOS %        0.5          %            [facility] 



                                                    SERVICES     

 

             BASO %       0.1          %            [facility] 



                                                    SERVICES     

 

             GRAN MAT     6.44         1.88 - 7.09  [facility] 



             x10^3(ANC)                10*3/uL      SERVICES     

 

             IMM GRAN x10^3 0.12 (H)     0.00 - 0.06  [facility] 



                                       10*3/uL      SERVICES     

 

             LYMPH x10^3  0.85 (L)     1.32 - 3.29  [facility] 



                                       10*3/uL      SERVICES     

 

             MONO x10^3   0.33         0.33 - 0.92  [facility] 



                                       10*3/uL      SERVICES     

 

             EOS x10^3    0.04         0.03 - 0.39  [facility] 



                                       10*3/uL      SERVICES     

 

             BASO x10^3   <0.03        0.01 - 0.07  [facility] 



                                       10*3/uL      SERVICES     

 

             BANDS        Increased (A)              [facility] 



                                                    SERVICES     









                                        Specimen

 

                                        Blood - ARM, LEFT









                Performing Organization Address         City/[facility]/Zipcode Phone

 Number

 

                [facility] CLIA:  [id number], [address] [place] [address]

[phone]



                                [facility]                 



POCT GLUCOSE (AUTOMATED) (2020  4:49 PM CDT)





             Component    Value        Ref Range    Performed At Pathologist Sig

nature

 

             POCT GLU     105          70 - 110 mg/dL [facility] 









                                        Specimen

 

                                        Blood









                Performing Organization Address         City/[facility]/Zipcode Phone

 Number

 

                [facility] CLIA: [id number], [address] [place] [address]

[phone] [place] [address]                 



CLOSTRIDIUM DIFFICILE TOXIN (2020  3:50 PM CDT)





             Component    Value        Ref Range    Performed At Pathologist



                                                                 [facility]

 

             Clostridioides Positive (A) Negative     [facility] 



             (Clostridium) difficile                           SERVICES     









                                        Specimen

 

                                        Stool - ANAL









                Performing Organization Address         City/[facility]/Zipcode Phone

 Number

 

                [facility] CLIA:  [id number], [address] [place] [address]

555 [phone]



                                [facility]                 



Prepare Packed RBC (in units), 1 Units (2020  1:52 PM CDT)





             Component    Value        Ref Range    Performed At Pathologist



                                                                 [facility]

 

             Cross Match Result Compatible                LAB          

 

             ISBT Blood Type Code 6200                      LAB          

 

             Unit Blood Type A Pos                     LAB          

 

             Unit Number  [id number]              LAB          

 

             Blood Expiration Date & [id number]              LAB          



             Time                                                

 

             Status Information Issued                    LAB          

 

             Product Identification Red Blood Cells              LAB          

 

             Product Code [id number]                  LAB          



                          Comment:                               



                          Performed at [facility] [address] [place] [address]           

                



                          Toll Free: [phone]                           



                          CLIA No. [id number]                           



                                                                 









                                        Specimen

 

                                        









                Performing Organization Address         City/[facility] Phone

 Number

 

                [facility]                                             

 

                LAB                                             



Type and Screen - ONCE Routine (2020 12:58 PM CDT)





             Component    Value        Ref Range    Performed At Pathologist Sig

nature

 

             ABO & RH     A POSITIVE                LAB          



                          Comment:                               



                          Performed at [facility] [address] [place] [address]           

                



                          Toll Free: [phone]                           



                          CLIA No. [id number]                           



                                                                 

 

             IAT          Negative                  LAB          



                          Comment:                               



                          Performed at [facility] [address] [place] [address]           

                



                          Toll Free: [phone]                           



                          CLIA No. [id number]                           



                                                                 









                                        Specimen

 

                                        Blood - VENOUS









                Performing Organization Address         City/[facility]/Zipcode Phone

 Number

 

                [facility]                                             

 

                LAB                                             



POCT GLUCOSE (AUTOMATED) (2020 12:28 PM CDT)





             Component    Value        Ref Range    Performed At Pathologist Sig

nature

 

             POCT GLU     233 (H)      70 - 110 mg/dL [facility] 









                                        Specimen

 

                                        Blood









                Performing Organization Address         City/[facility]/Zipcode Phone

 Number

 

                [facility] CLIA: [id number], [address] [place] [address]

5 [phone]



                                [facility]                 



POCT GLUCOSE (AUTOMATED) (2020 11:24 AM CDT)





             Component    Value        Ref Range    Performed At Pathologist Sig

nature

 

             POCT GLU     211 (H)      70 - 110 mg/dL [facility] 









                                        Specimen

 

                                        Blood









                Performing Organization Address         City/State/Zipcode Phone

 Number

 

                [facility] CLIA: [id number], [address] [place], [place] [address]

5 [phone]



                                [facility]                 



CT ABDOMEN PELVIS W CONTRAST (2020 10:05 AM CDT)





                                        Specimen

 

                                        









                          Impressions               Performed At

 

                                         



                                        PACS/VR/DOSE



                          1. No evidence of bowel obstruction. Prominent mucos

al enhancement in 



                                        the



                                        



                          distal stomach suggests gastritis. Mural thickening an

d mucosal 



                                        enhancement



                                        



                          seen involving the ascending and transverse colon may 

represent 



                                        congestive



                                        



                          changes versus colitis. There is surrounding free flui

d and fat 



                                        stranding.



                                        



                                         



                                        



                          2. Cirrhosis with portal hypertension. A hypodensity

 is seen in the 



                                        right



                                        



                                        lobe of the liver (segment VI) measuring

 approximately 2.6 cm,



                                        



                          indeterminate on this single phase study. Recommend fu

rther evaluation 



                                        with



                                        



                                        MRI.



                                        



                                                    









                          Narrative                 Performed At

 

                                        EXAM: CT ABDOMEN AND PELVIS WITH CONTRAS

T



                                        PACS/VR/DOSE



                                         



                                        



                                        HISTORY: 59-year-old female with bowel o

bstruction.



                                        



                                         



                                        



                                        COMPARISON: 2020



                                        



                                         



                                        



                                        DOSE: Total exam  mGy-cm



                                        



                                         



                                        



                                        TECHNIQUE AND FINDINGS: Contiguous axial

 imaging was performed after the



                                        



                          uncomplicated administration of 120 cc of intravenous 

Omnipaque 



                                        contrast.



                                        



                                        Coronal and sagittal reconstructions wer

e obtained.



                                        



                                         



                                        



                                        FINDINGS:



                                        



                          LOWER THORAX: Bibasilar atelectasis is seen but no abundio

dence of pleural 



                                        or



                                        



                                        pericardial effusion. 



                                        



                                         



                                        



                                        LIVER: Cirrhosis with portal hypertensio

n. A hypodensity is seen in the



                                        



                                        right lobe of the liver (segment VI) neo

suring approximately 3.6 cm,



                                        



                                        indeterminate on this single phase study

. Patent portal vein and hepatic



                                        



                                        venous branches. Mild periportal edema i

s unchanged.



                                        



                                         



                                        



                          GALLBLADDER AND BILIARY TREE: Prominent extrahepatic C

BD likely 



                                        secondary



                                        



                                        to prior cholecystectomy.



                                        



                                         



                                        



                                        SPLEEN: Enlarged spleen but no focal les

ions.



                                        



                                         



                                        



                                        PANCREAS: No ductal dilation or focal le

sions.



                                        



                                         



                                        



                                        ADRENAL GLANDS: No adrenal nodules.



                                        



                                         



                                        



                          KIDNEYS: No hydronephrosis, stones, or solid lesions. 

Stable cyst is 



                                        seen



                                        



                                        in the right kidney.



                                        



                                         



                                        



                                        PERITONEUM AND RETROPERITONEUM: Minimal 

free fluid is seen in the upper



                                        



                                        abdomen, slightly worse when compared to

 the prior study.



                                        



                                         



                                        



                                        LYMPH NODES: No lymphadenopathy is seen.



                                        



                                         



                                        



                          GI TRACT: No evidence of dilated bowel loops. Prominen

t mucosal 



                                        enhancement



                                        



                          is seen in the distal stomach suggesting gastritis (30

1:41). The 



                                        ascending



                                        



                                        and transverse colon show mural thickeni

ng and mucosal enhancement with



                                        



                                        some surrounding fat stranding which may

 represent colitis versus



                                        



                                        congestive changes. No evidence of appen

dicitis or diverticulitis.



                                        



                                         



                                        



                          PELVIS/BLADDER: The urinary bladder appears normal. No

 adnexal masses 



                                        are



                                        



                                        seen.



                                        



                                         



                                        



                          VESSELS: Scattered atherosclerotic calcifications. An 

enlarged main 



                                        portal



                                        



                                        vein.



                                        



                                         



                                        



                                        BONES AND SOFT TISSUES: No suspicious ly

tic or sclerotic bone lesions.



                                        



                                         



                                        



                                                    









                                        Procedure Note

 

                                        [facility], Radiant Results Inft User - 2020 10:29 AM CDT



EXAM: CT ABDOMEN AND PELVIS WITH CONTRAST



                                        



                                        HISTORY: 59-year-old female with bowel o

bstruction.



                                        



                                        COMPARISON: 2020



                                        



                                        DOSE: Total exam  mGy-cm



                                        



                                        TECHNIQUE AND FINDINGS: Contiguous axial

 imaging was performed after the



                                        uncomplicated administration of 120 cc o

f intravenous Omnipaque contrast.



                                        Coronal and sagittal reconstructions wer

e obtained.



                                        



                                        FINDINGS:



                                        LOWER THORAX: Bibasilar atelectasis is s

een but no evidence of pleural or



                                        pericardial effusion.



                                        



                                        LIVER: Cirrhosis with portal hypertensio

n. A hypodensity is seen in the



                                        right lobe of the liver (segment VI) neo

suring approximately 3.6 cm,



                                        indeterminate on this single phase study

. Patent portal vein and hepatic



                                        venous branches. Mild periportal edema i

s unchanged.



                                        



                                        GALLBLADDER AND BILIARY TREE: Prominent 

extrahepatic CBD likely secondary



                                        to prior cholecystectomy.



                                        



                                        SPLEEN: Enlarged spleen but no focal les

ions.



                                        



                                        PANCREAS: No ductal dilation or focal le

sions.



                                        



                                        ADRENAL GLANDS: No adrenal nodules.



                                        



                                        KIDNEYS: No hydronephrosis, stones, or s

olid lesions. Stable cyst is seen



                                        in the right kidney.



                                        



                                        PERITONEUM AND RETROPERITONEUM: Minimal 

free fluid is seen in the upper



                                        abdomen, slightly worse when compared to

 the prior study.



                                        



                                        LYMPH NODES: No lymphadenopathy is seen.



                                        



                                        GI TRACT: No evidence of dilated bowel l

oops. Prominent mucosal enhancement



                                        is seen in the distal stomach suggesting

 gastritis (301:41). The  ascending



                                        and transverse colon show mural thickeni

ng and mucosal enhancement with



                                        some surrounding fat stranding which may

 represent colitis versus



                                        congestive changes. No evidence of appen

dicitis or diverticulitis.



                                        



                                        PELVIS/BLADDER: The urinary bladder appe

ars normal. No adnexal masses are



                                        seen.



                                        



                                        VESSELS: Scattered atherosclerotic calci

fications. An enlarged main portal



                                        vein.



                                        



                                        BONES AND SOFT TISSUES: No suspicious ly

tic or sclerotic bone lesions.



                                        



                                        



                                        IMPRESSION



                                        



                                        1.  No evidence of bowel obstruction. Pr

ominent mucosal enhancement in the



                                        distal stomach suggests gastritis. Mural

 thickening and mucosal enhancement



                                        seen involving the ascending and transve

rse colon may represent congestive



                                        changes versus colitis. There is surroun

ding free fluid and fat stranding.



                                        



                                        2.  Cirrhosis with portal hypertension. 

A hypodensity is seen in the right



                                        lobe of the liver (segment VI) measuring

 approximately 2.6 cm,



                                        indeterminate on this single phase study

. Recommend further evaluation with



                                        MRI.









                Performing Organization Address         City/State/Zipcode Phone

 Number

 

                PACS/VR/DOSE                                    



FIBRINOGEN (2020  9:12 AM CDT)





             Component    Value        Ref Range    Performed At Pathologist Sig

nature

 

             Fibrinogen   356          167 - 453 mg/dL [facility] 









                                        Specimen

 

                                        Blood - ARM, LEFT









                Performing Organization Address         City/State/Zipcode Phone

 Number

 

                [facility] CLIA:  [id number], [address] [place], [place] [address]

[phone]



                                [address]                 



CBC WITH DIFFERENTIAL (2020  9:12 AM CDT)





             Component    Value        Ref Range    Performed At Pathologist



                                                                 Signature

 

             WBC          8.56         4.30 - 11.10 [facility] 



                                       10*3/L     SERVICES     

 

             RBC          2.32 (L)     3.93 - 5.25  [facility] 



                                       10*6/L     SERVICES     

 

             HGB          6.9 (L)      11.6 - 15.0  [facility] 



                                       g/dL         SERVICES     

 

             HCT          22.9 (L)     35.7 - 45.2 % [facility] 



                                                    [facility]     

 

             MCV          98.7 (H)     80.6 - 95.5  [facility] 



                                       fL           SERVICES     

 

             MCH          29.7         25.9 - 32.8  [facility] 



                                       pg           [facility]ICES     

 

             MCHC         30.1 (L)     31.6 - 35.1  [facility] 



                                       g/dL         [facility]     

 

             RDW-SD       56.2 (H)     39.0 - 49.9  [facility] 



                                       fL           SERVICES     

 

             RDW-CV       15.9 (H)     12.0 - 15.5 % [facility] 



                                                    [facility]     

 

             PLT          38 (LL)      166 - 358    [facility] 



                                       10*3/L     SERVICES     

 

             MPV          10.7         9.5 - 12.9 fL [facility] 



                                                    [facility]     

 

             IPF %        3.6Comment: Platelet 1.3 - 7.7 %  [facility] 



                          count measured by              SERVICES     



                          fluorescence method.                           

 

             NRBC/100 WBC 0.0          0.0 - 10.0   [facility] 



                                       /100 WBCs    SERVICES     

 

             NRBC x10^3   <0.01        10*3/L     [facility] 



                                                    [facility]     

 

             GRAN MAT (NEUT) % 83.1         %            [facility] 



                                                    [facility]     

 

             IMM GRAN %   1.80         %            [facility] 



                                                    [facility]     

 

             LYMPH %      10.5         %            [facility] 



                                                    [facility]     

 

             MONO %       4.4          %            [facility] 



                                                    [facility]     

 

             EOS %        0.1          %            [facility] 



                                                    [facility]     

 

             BASO %       0.1          %            [facility] 



                                                    [facility]     

 

             GRAN MAT     7.11 (H)     1.88 - 7.09  [facility] 



             x10^3(ANC)                10*3/uL      SERVICES     

 

             IMM GRAN x10^3 0.15 (H)     0.00 - 0.06  [facility] 



                                       10*3/uL      SERVICES     

 

             LYMPH x10^3  0.90 (L)     1.32 - 3.29  [facility] 



                                       10*3/uL      SERVICES     

 

             MONO x10^3   0.38         0.33 - 0.92  [facility] 



                                       10*3/uL      SERVICES     

 

             EOS x10^3    <0.03 (L)    0.03 - 0.39  [facility] 



                                       10*3/uL      SERVICES     

 

             BASO x10^3   <0.03        0.01 - 0.07  [facility] 



                                       10*3/uL      SERVICES     

 

             BANDS        MARKED INCREASED (A)              [facility] 



                                                    [facility]     

 

             DOHLE BODIES Present (A)               [facility] 



                                                    [facility]     

 

             REACT LYMPHS Rare                      [facility] 



                                                    [facility]     

 

             TOXIC CHANGES Present (A)               [facility] 



                                                    [facility]     









                                        Specimen

 

                                        Blood - ARM, LEFT









                Performing Organization Address         City/[facility]/Zipcode Phone

 Number

 

                [facility] CLIA:  [id number], [address] [place] [address]

[phone]



                                [facility]                 



POCT GLUCOSE (AUTOMATED) (2020  7:45 AM CDT)





             Component    Value        Ref Range    Performed At Pathologist Sig

nature

 

             POCT GLU     228 (H)      70 - 110 mg/dL [facility] 









                                        Specimen

 

                                        Blood









                Performing Organization Address         City/[facility]/Zipcode Phone

 Number

 

                [facility] CLIA: [id number], [address] [place] [address]

[phone]



                                [facility]                 



N-TERMINAL PRO-BNP (2020  5:42 AM CDT)





             Component    Value        Ref Range    Performed At Pathologist Sig

nature

 

             NT-proBNP    400 (H)      <=125 pg/mL  [facility] 









                                        Specimen

 

                                        Blood - ARM, LEFT









                          Narrative                 Performed At

 

                          Biotin has been reported to cause a negative bias, int

erpret [facility] 

[facility]ICES



                          results relative to patient's use of biotin. 









                Performing Organization Address         City/State/Zipcode Phone

 Number

 

                [facility] CLIA:  [id number], [address] [place], [place] [address]

[phone]



                                [facility]                 



PHOSPHORUS (2020  5:42 AM CDT)





             Component    Value        Ref Range    Performed At Pathologist Sig

nature

 

             PHOSPHORUS   1.4 (L)      2.5 - 5.0 mg/dL [facility] 









                                        Specimen

 

                                        Blood - ARM, LEFT









                Performing Organization Address         City/State/Zipcode Phone

 Number

 

                [facility] CLIA:  [id number], [address] [place], [place] [address]

[phone]



                                [facility]                 



COMP. METABOLIC PANEL (11973) (2020  5:42 AM CDT)





             Component    Value        Ref Range    Performed At Pathologist



                                                                 Signature

 

             NA           136          135 - 145    [facility] 



                                       mmol/L       SERVICES     

 

             K            3.6          3.5 - 5.0    [facility] 



                                       mmol/L       SERVICES     

 

             CL           110 (H)      98 - 108 mmol/L [facility] 



                                                    [facility]ICES     

 

             CO2 TOTAL    20 (L)       23 - 31 mmol/L [facility] 



                                                    [facility]ICES     

 

             AGAP         6            2 - 16       [facility] 



                                                    SERVICES     

 

             BUN          17           7 - 23 mg/dL [facility] 



                                                    [facility]     

 

             GLUCOSE      153 (H)      70 - 110 mg/dL [facility] 



                                                    [facility]ICES     

 

             CREATININE   0.47 (L)     0.50 - 1.04  [facility] 



                                       mg/dL        SERVICES     

 

             TOTAL BILI   2.0 (H)      0.1 - 1.1 mg/dL [facility] 



                                                    SERVICES     

 

             CALCIUM      6.6 (L)      8.6 - 10.6   [facility] 



                                       mg/dL        SERVICES     

 

             T PROTEIN    5.0 (L)      6.3 - 8.2 g/dL [facility] 



                                                    [facility]ICES     

 

             ALBUMIN      2.1 (L)      3.5 - 5.0 g/dL [facility] 



                                                    [facility]     

 

             ALK PHOS     79           34 - 122 U/L [facility] 



                                                    [facility]     

 

             ALTv         21           5 - 35 U/L   [facility] 



                                                    [facility]ICES     

 

             AST(SGOT)    27           13 - 40 U/L  [facility] 



                                                    SERVICES     

 

             eGFR Calculation 136.1        mL/min/1.73m2 [facility] 



             (Non-                           SERVICES     



             American)                                           

 

             eGFR Calculation 165.0        mL/min/1.73m2 [facility] 



             (African American)                           SERVICES     









                                        Specimen

 

                                        Blood - ARM, LEFT









                          Narrative                 Performed At

 

                          Association of Glomerular Filtration Rate (GFR) and St

aging [facility] 

SERVICES



                                        of Kidney Disease*



                                        



                                         



                                        



                          +-----------------------+---------------------+-------

------ 



                                        ------------+



                                        



                          | GFR (mL/min/1.73 m2) | With Kidney Damage | Wi

thout 



                                        Kidney Damage



                                        



                          +-----------------------+---------------------+-------

------ 



                                        ------------+



                                        



                          | >90         | Stage one   

  



                                        |  Normal        



                                        



                          +-----------------------+---------------------+-------

------ 



                                        ------------+



                                        



                          | 60-89        | Stage two   

  



                                        |  Decreased GFR     



                                        



                          +-----------------------+---------------------+-------

------ 



                                        ------------+



                                        



                          | 30-59        | Stage three  

  



                                        |  Stage three      



                                        



                          +-----------------------+---------------------+-------

------ 



                                        ------------+



                                        



                          | 15-29        | Stage four   

  



                                        |  Stage four      



                                        



                          +-----------------------+---------------------+-------

------ 



                                        ------------+



                                        



                          | <15 (or dialysis)  | Stage five     

|  



                                        Stage five      



                                        



                          +-----------------------+---------------------+-------

------ 



                                        ------------+



                                        



                                         



                                        



                          *Each stage assumes the associated GFR level has been 

in 



                          effect for at least three months. Stages 1 to 5, wit

h or 



                                        without kidney disease, indicate chronic

 kidney disease.



                                        



                                         



                                        



                          Notes: Determination of stages one and two (with eGFR 



                          >59mL/min/1.73 m2) requires estimation of kidney damag

e for 



                          at least three months as defined by structural or func

tional 



                                        abnormalities of the kidney, manifested 

by either:



                                        



                          Pathological abnormalities or Markers of kidney damage

 



                          (including abnormalities in the composition of the blo

od or 



                                        urine or abnormalities in imaging tests)

. 



                                        



                                                    









                Performing Organization Address         City/State/Zipcode Phone

 Number

 

                [facility] CLIA:  [id number], [address] [address]

[phone]



                                [facility]                 



CBC WITH DIFFERENTIAL (2020  4:32 AM CDT)





             Component    Value        Ref Range    Performed At Pathologist



                                                                 Signature

 

             WBC          7.81         4.30 - 11.10 [facility] 



                                       10*3/L     SERVICES     

 

             RBC          2.40 (L)     3.93 - 5.25  [facility] 



                                       10*6/L     SERVICES     

 

             HGB          7.3 (L)      11.6 - 15.0  [facility] 



                                       g/dL         SERVICES     

 

             HCT          23.6 (L)     35.7 - 45.2 % [facility] 



                                                    SERVICES     

 

             MCV          98.3 (H)     80.6 - 95.5  [facility] 



                                       fL           SERVICES     

 

             MCH          30.4         25.9 - 32.8  [facility] 



                                       pg           SERVICES     

 

             MCHC         30.9 (L)     31.6 - 35.1  [facility] 



                                       g/dL         SERVICES     

 

             RDW-SD       57.1 (H)     39.0 - 49.9  [facility] 



                                       fL           SERVICES     

 

             RDW-CV       15.8 (H)     12.0 - 15.5 % [facility] 



                                                    SERVICES     

 

             PLT          22 (LL)      166 - 358    [facility] 



                                       10*3/L     SERVICES     

 

             MPV          11.7         9.5 - 12.9 fL [facility] 



                                                    SERVICES     

 

             IPF %        4.7Comment: Platelet 1.3 - 7.7 %  [facility] 



                          count measured by              SERVICES     



                          fluorescence method.                           

 

             NRBC/100 WBC 0.3          0.0 - 10.0   [facility] 



                                       /100 WBCs    SERVICES     

 

             NRBC x10^3   0.02         10*3/L     [facility] 



                                                    SERVICES     

 

             GRAN MAT (NEUT) % 78.4         %            [facility] 



                                                    SERVICES     

 

             IMM GRAN %   5.40         %            [facility] 



                                                    SERVICES     

 

             LYMPH %      11.3         %            [facility] 



                                                    SERVICES     

 

             MONO %       4.7          %            [facility] 



                                                    SERVICES     

 

             EOS %        0.1          %            [facility] 



                                                    SERVICES     

 

             BASO %       0.1          %            [facility] 



                                                    SERVICES     

 

             GRAN MAT     6.12         1.88 - 7.09  [facility] 



             x10^3(ANC)                10*3/uL      SERVICES     

 

             IMM GRAN x10^3 0.42 (H)     0.00 - 0.06  [facility] 



                                       10*3/uL      SERVICES     

 

             LYMPH x10^3  0.88 (L)     1.32 - 3.29  [facility] 



                                       10*3/uL      SERVICES     

 

             MONO x10^3   0.37         0.33 - 0.92  [facility] 



                                       10*3/uL      SERVICES     

 

             EOS x10^3    <0.03 (L)    0.03 - 0.39  [facility] 



                                       10*3/uL      SERVICES     

 

             BASO x10^3   <0.03        0.01 - 0.07  [facility] 



                                       10*3/uL      SERVICES     

 

             BASO STIPPLING Present (A)               [facility] 



                                                    SERVICES     









                                        Specimen

 

                                        Blood - ARM, LEFT









                Performing Organization Address         City/[facility]/Zipcode Phone

 Number

 

                [facility] CLIA:  [id number], [address] [place] [address]

[phone]



                                [facility]                 



POCT GLUCOSE (AUTOMATED) (2020  4:29 AM CDT)





             Component    Value        Ref Range    Performed At Pathologist Sig

nature

 

             POCT GLU     168 (H)      70 - 110 mg/dL [facility] 









                                        Specimen

 

                                        Blood









                Performing Organization Address         City/[facility]/Zipcode Phone

 Number

 

                [facility] CLIA: [id number], [address] [place] [address]

[phone]



                                [facility]                 



Hepatic Function Panel (ALB, T.PRO, BILI T, BU/BC, ALT, AST, ALK, PHOS) 
(2020 12:28 AM CDT)





             Component    Value        Ref Range    Performed At Pathologist Sig

[facility]

 

             TOTAL BILI   2.1 (H)      0.1 - 1.1 mg/dL [facility] 

 

             BILI UNCON   1.6 (H)      0.1 - 1.1 mg/dL [facility] 

 

             BILI CONJ    0.0          0.0 - 0.3 mg/dL [facility] 

 

             T PROTEIN    4.8 (L)      6.3 - 8.2 g/dL [facility] 

 

             ALBUMIN      2.0 (L)      3.5 - 5.0 g/dL [facility] 

 

             ALK PHOS     59           34 - 122 U/L [facility] 

 

             ALTv         19           5 - 35 U/L   [facility] 

 

             AST(SGOT)    27           13 - 40 U/L  [facility] 









                                        Specimen

 

                                        Blood - ARM, LEFT









                Performing Organization Address         City/[facility]/Zipcode Phone

 Number

 

                [facility] CLIA:  [id number], [address] [place] [address]

[phone]



                                [facility]                 



Magnesium Serum (2020 12:28 AM CDT)





             Component    Value        Ref Range    Performed At Pathologist Sig

nature

 

             MAGNESIUM    1.3 (L)      1.7 - 2.4 mg/dL [facility] 









                                        Specimen

 

                                        Blood - ARM, LEFT









                Performing Organization Address         City/[facility] Phone

 Number

 

                [facility] CLIA:  [id number], [address] [place] [address]

[phone]



                                [facility]                 



Basic Metabolic Panel (NA, K, CL, CO2, Glucose, BUN, Creatinine, CA) (2020
 12:28 AM CDT)





             Component    Value        Ref Range    Performed At Pathologist



                                                                 Signature

 

             NA           138          135 - 145    [facility] 



                                       mmol/L       SERVICES     

 

             K            3.6          3.5 - 5.0    [facility] 



                                       mmol/L       SERVICES     

 

             CL           111 (H)      98 - 108 mmol/L [facility] 



                                                    SERVICES     

 

             CO2 TOTAL    20 (L)       23 - 31 mmol/L [facility] 



                                                    SERVICES     

 

             AGAP         7            2 - 16       [facility] 



                                                    SERVICES     

 

             BUN          17           7 - 23 mg/dL [facility] 



                                                    SERVICES     

 

             GLUCOSE      136 (H)      70 - 110 mg/dL [facility] 



                                                    SERVICES     

 

             CREATININE   0.46 (L)     0.50 - 1.04  [facility] 



                                       mg/dL        SERVICES     

 

             CALCIUM      6.5 (L)      8.6 - 10.6   [facility] 



                                       mg/dL        SERVICES     

 

             eGFR Calculation 139.5        mL/min/1.73m2 [facility] 



             (Non-                           SERVICES     



             American)                                           

 

             eGFR Calculation 169.1        mL/min/1.73m2 [facility] 



             (African American)                           SERVICES     









                                        Specimen

 

                                        Blood - ARM, LEFT









                          Narrative                 Performed At

 

                          Association of Glomerular Filtration Rate (GFR) and St

aging [facility] 

SERVICES



                                        of Kidney Disease*



                                        



                                         



                                        



                          +-----------------------+---------------------+-------

------ 



                                        ------------+



                                        



                          | GFR (mL/min/1.73 m2) | With Kidney Damage | Wi

thout 



                                        Kidney Damage



                                        



                          +-----------------------+---------------------+-------

------ 



                                        ------------+



                                        



                          | >90         | Stage one   

  



                                        |  Normal        



                                        



                          +-----------------------+---------------------+-------

------ 



                                        ------------+



                                        



                          | 60-89        | Stage two   

  



                                        |  Decreased GFR     



                                        



                          +-----------------------+---------------------+-------

------ 



                                        ------------+



                                        



                          | 30-59        | Stage three  

  



                                        |  Stage three      



                                        



                          +-----------------------+---------------------+-------

------ 



                                        ------------+



                                        



                          | 15-29        | Stage four   

  



                                        |  Stage four      



                                        



                          +-----------------------+---------------------+-------

------ 



                                        ------------+



                                        



                          | <15 (or dialysis)  | Stage five     

|  



                                        Stage five      



                                        



                          +-----------------------+---------------------+-------

------ 



                                        ------------+



                                        



                                         



                                        



                          *Each stage assumes the associated GFR level has been 

in 



                          effect for at least three months. Stages 1 to 5, wit

h or 



                                        without kidney disease, indicate chronic

 kidney disease.



                                        



                                         



                                        



                          Notes: Determination of stages one and two (with eGFR 



                          >59mL/min/1.73 m2) requires estimation of kidney damag

e for 



                          at least three months as defined by structural or func

tional 



                                        abnormalities of the kidney, manifested 

by either:



                                        



                          Pathological abnormalities or Markers of kidney damage

 



                          (including abnormalities in the composition of the blo

od or 



                                        urine or abnormalities in imaging tests)

. 



                                        



                                                    









                Performing Organization Address         City/[facility] Cent[facility]ode Phone

 Number

 

                [facility] CLIA:  [id number], [address] [place] [address]

[phone]



                                [facility]                 



Prothrombin Time  / INR (2020 12:28 AM CDT)





             Component    Value        Ref Range    Performed At Pathologist



                                                                 [facility]

 

             PROTIME PATIENT 16.6 (H)     10.1 - 12.6  [facility] 



                                       Seconds      SERVICES     

 

             INR          1.5Comment: Normal              [facility] 



                          INR <1.1; Warfarin              SERVICES     



                          Therapeutic range                           



                          2.0 to 3.0 or 2.5                           



                          to 3.5, depending                           



                          upon the                               



                          indications.                           









                                        Specimen

 

                                        Blood - ARM, LEFT









                Performing Organization Address         City/[facility]/Zipcode Phone

 Number

 

                [facility] CLIA:  [id number], [address] [place] [address]

[phone]



                                [facility]                 



CBC WITH DIFFERENTIAL (2020 12:27 AM CDT)





             Component    Value        Ref Range    Performed At Pathologist



                                                                 Signature

 

             WBC          7.81         4.30 - 11.10 [facility] 



                                       10*3/L     SERVICES     

 

             RBC          2.27 (L)     3.93 - 5.25  [facility] 



                                       10*6/L     SERVICES     

 

             HGB          7.0 (L)      11.6 - 15.0  [facility] 



                                       g/dL         SERVICES     

 

             HCT          21.9 (L)     35.7 - 45.2 % [facility] 



                                                    SERVICES     

 

             MCV          96.5 (H)     80.6 - 95.5  [facility] 



                                       fL           SERVICES     

 

             MCH          30.8         25.9 - 32.8  [facility] 



                                       pg           SERVICES     

 

             MCHC         32.0         31.6 - 35.1  [facility] 



                                       g/dL         SERVICES     

 

             RDW-SD       56.0 (H)     39.0 - 49.9  [facility] 



                                       fL           SERVICES     

 

             RDW-CV       16.0 (H)     12.0 - 15.5 % [facility] 



                                                    SERVICES     

 

             PLT          32 (LL)      166 - 358    [facility] 



                                       10*3/L     SERVICES     

 

             MPV          10.2         9.5 - 12.9 fL [facility] 



                                                    SERVICES     

 

             IPF %        2.8Comment: Platelet 1.3 - 7.7 %  [facility] 



                          count measured by              SERVICES     



                          fluorescence method.                           

 

             NRBC/100 WBC 0.0          0.0 - 10.0   [facility] 



                                       /100 WBCs    SERVICES     

 

             NRBC x10^3   <0.01        10*3/L     [facility] 



                                                    SERVICES     

 

             GRAN MAT (NEUT) % 85.0         %            [facility] 



                                                    SERVICES     

 

             IMM GRAN %   0.90         %            [facility] 



                                                    SERVICES     

 

             LYMPH %      10.2         %            [facility] 



                                                    SERVICES     

 

             MONO %       3.8          %            [facility] 



                                                    SERVICES     

 

             EOS %        0.1          %            [facility] 



                                                    SERVICES     

 

             BASO %       0.0          %            [facility] 



                                                    SERVICES     

 

             GRAN MAT     6.63         1.88 - 7.09  [facility] 



             x10^3(ANC)                10*3/uL      SERVICES     

 

             IMM GRAN x10^3 0.07 (H)     0.00 - 0.06  [facility] 



                                       10*3/uL      SERVICES     

 

             LYMPH x10^3  0.80 (L)     1.32 - 3.29  [facility] 



                                       10*3/uL      SERVICES     

 

             MONO x10^3   0.30 (L)     0.33 - 0.92  [facility] 



                                       10*3/uL      SERVICES     

 

             EOS x10^3    <0.03 (L)    0.03 - 0.39  [facility] 



                                       10*3/uL      SERVICES     

 

             BASO x10^3   <0.03        0.01 - 0.07  [facility] 



                                       10*3/uL      SERVICES     

 

             BANDS        MARKED INCREASED (A)              [facility] 



                                                    SERVICES     









                                        Specimen

 

                                        Blood - ARM, LEFT









                Performing Organization Address         City/State/Zipcode Phone

 Number

 

                [facility] CLIA:  [id number], [address] [place] [address] 03 [phone]



                                [facility]                 



POCT GLUCOSE (AUTOMATED) (2020 12:02 AM CDT)





             Component    Value        Ref Range    Performed At Pathologist Sig

nature

 

             POCT GLU     191 (H)      70 - 110 mg/dL [facility] 









                                        Specimen

 

                                        Blood









                Performing Organization Address         City/[facility]/Zipcode Phone

 Number

 

                [facility] CLIA: [id number], [address] [place] [address]

[phone]



                                [facility]                 



POCT GLUCOSE (AUTOMATED) (2020  9:27 PM CDT)





             Component    Value        Ref Range    Performed At Pathologist Sig

nature

 

             POCT GLU     202 (H)      70 - 110 mg/dL [facility] 









                                        Specimen

 

                                        Blood









                Performing Organization Address         City/[facility] Centercode Phone

 Number

 

                [facility] CLIA: [id number], [address] [place] [address]

[phone]



                                [facility]                 



Abdominal 1 View - To confirm nasogastric tube placement. (2020  9:15 PM 
CDT)





                                        Specimen

 

                                        









                          Impressions               Performed At

 

                                         



                                        PACS/VR/DOSE



                                        Nonspecific gaseous dilatation of transv

erse colon, which can be seen in



                                        



                                        the setting of partial distal large ayan

l obstruction or adynamic ileus.



                                        



                                         



                                        



                                        No esophagogastric tube, visualized in t

he field-of-view.



                                        



                                         



                                        



                                        Preliminary Report Dictated by Resident:

 Paul Sanchez MD., have reviewe

d this study and agree with the



                                        



                          above report.             









                          Narrative                 Performed At

 

                                        EXAM: XR ABDOMEN 1 VW



                                        PACS/VR/DOSE



                                         



                                        



                                        HISTORY: 59 years-old Female; NG placeme

nt 



                                        



                                         



                                        



                                        TECHNIQUE: Frontal radiograph of the abd

omen and pelvis was obtained.



                                        



                                         



                                        



                                        COMPARISON: [facility] 2020. 



                                        



                                         



                                        



                                        FINDINGS:



                                        



                                         



                                        



                                        No esophagogastric tube was visualized i

n the field-of-view.



                                        



                                         



                                        



                          Nonspecific gaseous dilatation of transverse colon is 

noted, measuring 



                                        up



                                        



                          to 9 cm, new compared to the prior. Gas in the descend

ing and sigmoid 



                                        colon



                                        



                                        is noted.



                                        



                                         



                                        



                                        No abnormal calcifications or radiopaque

 stones are identified. 



                                        



                                         



                                        



                                        Cholecystectomy clips are present in the

 right upper quadrant. Scattered



                                        



                                        surgical clips are present in the thorax

.



                                        



                                         



                                        



                                        No acute bony abnormalities are noted.



                                        



                                                    









                                        Procedure Note

 

                                        [facility], Radiant Results Inft User - 2020  9:04 AM CDT



EXAM: XR ABDOMEN 1 VW



                                        



                                        HISTORY: 59 years-old Female; NG placeme

nt



                                        



                                        TECHNIQUE: Frontal radiograph of the abd

omen and pelvis was obtained.



                                        



                                        COMPARISON: [facility] 2020.



                                        



                                        FINDINGS:



                                        



                                        No esophagogastric tube was visualized i

n the field-of-view.



                                        



                                        Nonspecific gaseous dilatation of transv

erse colon is noted, measuring up



                                        to 9 cm, new compared to the prior. Gas 

in the descending and sigmoid colon



                                        is noted.



                                        



                                        No abnormal calcifications or radiopaque

 stones are identified.



                                        



                                        Cholecystectomy clips are present in the

 right upper quadrant. Scattered



                                        surgical clips are present in the thorax

.



                                        



                                        No acute bony abnormalities are noted.



                                        



                                        IMPRESSION



                                        



                                        Nonspecific gaseous dilatation of transv

erse colon, which can be seen in



                                        the setting of partial distal large ayan

l obstruction or adynamic ileus.



                                        



                                        No esophagogastric tube, visualized in t

he field-of-view.



                                        



                                        Preliminary Report Dictated by Resident:

 Ninoska Helms I, Paul Hatfield MD., have reviewed

 this study and agree with the



                                        above report.









                Performing Organization Address         City/[facility] Phone

 Number

 

                PACS/VR/DOSE                                    



MRSA / MSSA Screen by PCR, Nares (2020  8:23 PM CDT)





             Component    Value        Ref Range    Performed At Pathologist Sig

nature

 

             MRSA Screen by PCR, Negative     Negative     [facility] 



             [facility]                                  SERVICES     

 

             MSSA Screen by PCR, Negative     Negative     [facility] 



             [facility]                                  SERVICES     

 

             MRSA/MSSA Positive? No           No           [facility] 



                                                    SERVICES     









                                        Specimen

 

                                        Swab - NARES, BOTH SIDES









                Performing Organization Address         City/State/[facility] Phone

 Number

 

                [facility] CLIA:  [id number], [address] [place] [address]

[phone]



                                [facility]                 



IRON PANEL (2020  6:31 PM CDT)





             Component    Value        Ref Range    Performed At [facility]ologist [facility]

nature

 

             IRON         171 (H)      50 - 160 ug/dL [facility] 

 

             TIBC         319          250 - 410 ug/dL [facility] 

 

             % FE SAT     54 (H)       20 - 50 %    [facility] 









                                        Specimen

 

                                        Blood - LINE, VENOUS









                Performing Organization Address         City/State/[facility] Phone

 Number

 

                [facility] CLIA:  [id number], [address] [place] [address]

[phone]



                                [facility]                 



CBC WITH DIFFERENTIAL (2020  6:31 PM CDT)





             Component    Value        Ref Range    Performed At Pathologist



                                                                 Signature

 

             WBC          10.04        4.30 - 11.10 [facility] 



                                       10*3/L     SERVICES     

 

             RBC          2.91 (L)     3.93 - 5.25  [facility] 



                                       10*6/L     SERVICES     

 

             HGB          8.8 (L)      11.6 - 15.0  [facility] 



                                       g/dL         SERVICES     

 

             HCT          27.7 (L)     35.7 - 45.2 % [facility] 



                                                    SERVICES     

 

             MCV          95.2         80.6 - 95.5  [facility] 



                                       fL           SERVICES     

 

             MCH          30.2         25.9 - 32.8  [facility] 



                                       pg           SERVICES     

 

             MCHC         31.8         31.6 - 35.1  [facility] 



                                       g/dL         SERVICES     

 

             RDW-SD       53.6 (H)     39.0 - 49.9  [facility] 



                                       fL           SERVICES     

 

             RDW-CV       15.7 (H)     12.0 - 15.5 % [facility] 



                                                    SERVICES     

 

             PLT          34 (LL)      166 - 358    [facility] 



                                       10*3/L     SERVICES     

 

             MPV          10.5         9.5 - 12.9 fL [facility] 



                                                    SERVICES     

 

             IPF %        4.0Comment: Platelet 1.3 - 7.7 %  [facility] 



                          count measured by              SERVICES     



                          fluorescence method.                           

 

             NRBC/100 WBC 0.0          0.0 - 10.0   [facility] 



                                       /100 WBCs    SERVICES     

 

             NRBC x10^3   <0.01        10*3/L     [facility] 



                                                    SERVICES     

 

             GRAN MAT (NEUT) % 85.3         %            [facility] 



                                                    SERVICES     

 

             IMM GRAN %   2.50         %            [facility] 



                                                    SERVICES     

 

             LYMPH %      7.7          %            [facility] 



                                                    SERVICES     

 

             MONO %       4.4          %            [facility] 



                                                    SERVICES     

 

             EOS %        0.0          %            [facility] 



                                                    SERVICES     

 

             BASO %       0.1          %            [facility] 



                                                    SERVICES     

 

             GRAN MAT     8.57 (H)     1.88 - 7.09  [facility] 



             x10^3(ANC)                10*3/uL      SERVICES     

 

             IMM GRAN x10^3 0.25 (H)     0.00 - 0.06  [facility] 



                                       10*3/uL      SERVICES     

 

             LYMPH x10^3  0.77 (L)     1.32 - 3.29  [facility] 



                                       10*3/uL      SERVICES     

 

             MONO x10^3   0.44         0.33 - 0.92  [facility] 



                                       10*3/uL      SERVICES     

 

             EOS x10^3    <0.03 (L)    0.03 - 0.39  [facility] 



                                       10*3/uL      SERVICES     

 

             BASO x10^3   <0.03        0.01 - 0.07  [facility] 



                                       10*3/uL      SERVICES     

 

             BASO STIPPLING Present (A)               [facility] 



                                                    SERVICES     

 

             KALA CELLS   2+ (A)       (none)       [facility] 



                                                    SERVICES     

 

             BANDS        MARKED INCREASED (A)              [facility] 



                                                    SERVICES     









                                        Specimen

 

                                        Blood - LINE, VENOUS









                Performing Organization Address         City/[facility]/Zipcode Phone

 Number

 

                [facility] CLIA:  [id number], [address] [place] [address]

[phone]



                                [facility]                 



Lactic Acid Whole Blood (2020  6:31 PM CDT)





             Component    Value        Ref Range    Performed At [facility]ologist [facility]

nature

 

             LACTIC ACID  2.62 (H)     0.50 - 2.20 mmol/L [facility] 



                                                    SERVICES     









                                        Specimen

 

                                        Blood - LINE, VENOUS









                Performing Organization Address         City/[facility] Phone

 Number

 

                [facility] CLIA:  [id number], [address] [place] [address]

555 [phone]



                                [facility]                 



ALPHA FETOPROTEIN (2020  6:31 PM CDT)





             Component    Value        Ref Range    Performed At Pathologist Sig

nature

 

             AFP          8.4 (H)      <=7.5 ng/mL  [facility] 









                                        Specimen

 

                                        Blood - LINE, VENOUS









                          Narrative                 Performed At

 

                          Biotin has been reported to cause a negative bias, int

erpret [facility] 

SERVICES



                          results relative to patient's use of biotin. 









                Performing Organization Address         City/[facility] Phone

 Number

 

                [facility] CLIA:  [id number], [address] [place] [address]

[phone]



                                [facility]                 



POCT GLUCOSE (AUTOMATED) (2020  4:30 PM CDT)





             Component    Value        Ref Range    Performed At Pathologist Sig

nature

 

             POCT GLU     260 (H)      70 - 110 mg/dL [facility] 









                                        Specimen

 

                                        Blood









                Performing Organization Address         City/[facility] Phone

 Number

 

                [facility] CLIA: [id number], [address] [place] [address]

[phone]



                                [facility]                 



POCT GLUCOSE (AUTOMATED) (2020 11:10 AM CDT)





             Component    Value        Ref Range    Performed At Pathologist Sig

nature

 

             POCT GLU     299 (H)      70 - 110 mg/dL [facility] 



                                                    LABORATORY   









                                        Specimen

 

                                        Blood









                Performing Organization Address         City/[facility]/Zipcode Phone

 Number

 

                [facility] CLIA: [id number], [address], 

[phone]



                LABORATORY      [address]                  



POCT GLUCOSE (AUTOMATED) (2020  7:52 AM CDT)





             Component    Value        Ref Range    Performed At Pathologist Sig

[facility]

 

             POCT GLU     351 (H)      70 - 110 mg/dL [facility] 



                                                    LABORATORY   









                                        Specimen

 

                                        Blood









                Performing Organization Address         City/State/[facility] Phone

 Number

 

                [facility] CLIA: [id number], [address] [address]

[phone]



                LABORATORY      [address]                  



Lactic Acid Whole Blood (2020  5:28 AM CDT)





             Component    Value        Ref Range    Performed At Pathologist Sig

nature

 

             LACTIC ACID  2.41 (H)     0.50 - 2.20 mmol/L [facility] 









                                        Specimen

 

                                        Blood - HAND, LEFT









                Performing Organization Address         City/State/[facility] Phone

 Number

 

                [facility] CLIA: [id number], [address] [address]

[phone]



                LABORATORY      [address]                  



POCT GLUCOSE (AUTOMATED) (2020  4:17 AM CDT)





             Component    Value        Ref Range    Performed At Pathologist Sig

nature

 

             POCT GLU     371 (H)      70 - 110 mg/dL [facility] 



                                                    LABORATORY   









                                        Specimen

 

                                        Blood









                Performing Organization Address         City/State/[facility] Phone

 Number

 

                [facility] CLIA: [id number], [address] [address]

15 [phone]



                LABORATORY      [address]                  



N-TERMINAL PRO-BNP (2020 12:57 AM CDT)





             Component    Value        Ref Range    Performed At Pathologist Sig

nature

 

             NT-proBNP    209 (H)      <=125 pg/mL  [facility] 



                                                    LABORATORY   









                                        Specimen

 

                                        Blood - HAND, LEFT









                          Narrative                 Performed At

 

                          Biotin has been reported to cause a negative [facility] 

LABORATORY



                          bias, interpret results relative to patient's 



                          use of biotin.            









                Performing Organization Address         City/State/[facility] Phone

 Number

 

                [facility] CLIA: [id number], [address] [address]

15 [phone]



                LABORATORY      [address]                  



HEPATIC FUNCTION PANEL (60714) (ALB,T.PRO,BILI T,BU/BC,ALT,AST,ALK PHOS) 
(2020 12:57 AM CDT)





             Component    Value        Ref Range    Performed At Pathologist Sig

[facility]

 

             TOTAL BILI   3.8 (H)      0.1 - 1.1 mg/dL [facility]

 



                                                    LABORATORY   

 

             BILI UNCON   2.9 (H)      0.1 - 1.1 mg/dL [facility]

 



                                                    LABORATORY   

 

             BILI CONJ    0.2          0.0 - 0.3 mg/dL [facility]

 



                                                    LABORATORY   

 

             T PROTEIN    7.5          6.3 - 8.2 g/dL [facility] 



                                                    LABORATORY   

 

             ALBUMIN      3.7          3.5 - 5.0 g/dL [facility] 



                                                    LABORATORY   

 

             ALK PHOS     128 (H)      34 - 122 U/L [facility] 



                                                    LABORATORY   

 

             ALTv         36 (H)       5 - 35 U/L   [facility] 



                                                    LABORATORY   

 

             AST(SGOT)    61 (H)       13 - 40 U/L  [facility] 



                                                    LABORATORY   









                                        Specimen

 

                                        Blood - HAND, LEFT









                Performing Organization Address         City/[facility] Phone

 Number

 

                [facility] CLIA: [id number], [address] [address]

15 [phone]



                LABORATORY      [address]                  



Glycosylated Hemoglobin (A1C) (2020 12:57 AM CDT)





             Component    Value        Ref Range    Performed At Pathologist Sig

nature

 

             HGB A1C      8.2 (H)      4.0 - 6.0 % NGSP [facility] 



                                                    LABORATORY   









                                        Specimen

 

                                        Blood - HAND, LEFT









                          Narrative                 Performed At

 

                                        %A1C (NGSP) Interpretation (ADA)



                                        [facility] LABORATORY



                                        4.8-5.6   Normal or (Non-Diabetic Ra

nge)



                                        



                                        5.7-6.4   Increased Risk (Pre-Diabet

ic)



                                        



                          >6.5    Diabetes Indicated 









                Performing Organization Address         City/[facility] Phone

 Number

 

                [facility] CLIA: [id number], [address] [address]

15 [phone]



                LABORATORY      [address]                  



CBC WITH DIFFERENTIAL (2020 12:57 AM CDT)





             Component    Value        Ref Range    Performed At Pathologist



                                                                 Signature

 

             WBC          13.47 (H)    4.30 - 11.10 [facility] 



                                       10*3/L     [facility]     



                                                    LABORATORY   

 

             RBC          4.15         3.93 - 5.25  [facility] 



                                       10*6/L     [facility]     



                                                    LABORATORY   

 

             HGB          12.6         11.6 - 15.0  [facility] 



                                       g/dL         [facility]     



                                                    LABORATORY   

 

             HCT          38.9         35.7 - 45.2 % [facility]     



                                                    LABORATORY   

 

             MCV          93.7         80.6 - 95.5  [facility] 



                                       fL           [facility]     



                                                    LABORATORY   

 

             MCH          30.4         25.9 - 32.8  [facility] 



                                       pg           [facility]     



                                                    LABORATORY   

 

             MCHC         32.4         31.6 - 35.1  [facility] 



                                       g/dL         [facility]     



                                                    LABORATORY   

 

             RDW-SD       53.3 (H)     39.0 - 49.9  [facility] 



                                       fL           HOSPITAL     



                                                    LABORATORY   

 

             RDW-CV       15.7 (H)     12.0 - 15.5 % [facility]     



                                                    LABORATORY   

 

             PLT          67 (L)       166 - 358    [facility] 



                                       10*3/L     [facility]     



                                                    LABORATORY   

 

             MPV          9.7          9.5 - 12.9 fL [facility]     



                                                    LABORATORY   

 

             IPF %        3.2Comment: Platelet 1.3 - 7.7 %  [facility] 



                          count measured by              HOSPITAL     



                          fluorescence method.              LABORATORY   

 

             NRBC/100 WBC 0.0          0.0 - 10.0   [facility] 



                                       /100 WBCs    [facility]     



                                                    LABORATORY   

 

             NRBC x10^3   <0.01        10*3/L     [facility]     



                                                    LABORATORY   

 

             GRAN MAT (NEUT) % 89.8         %            [facility]     



                                                    LABORATORY   

 

             IMM GRAN %   0.60         %            [facility]     



                                                    LABORATORY   

 

             LYMPH %      6.1          %            [facility]     



                                                    LABORATORY   

 

             MONO %       3.3          %            [facility]     



                                                    LABORATORY   

 

             EOS %        0.0          %            [facility]     



                                                    LABORATORY   

 

             BASO %       0.2          %            [facility]     



                                                    LABORATORY   

 

             GRAN MAT     12.10 (H)    1.88 - 7.09  [facility] 



             x10^3(ANC)                10*3/uL      [facility]     



                                                    LABORATORY   

 

             IMM GRAN x10^3 0.08 (H)     0.00 - 0.06  [facility] 



                                       10*3/uL      [facility]     



                                                    LABORATORY   

 

             LYMPH x10^3  0.82 (L)     1.32 - 3.29  [facility] 



                                       10*3/uL      [facility]     



                                                    LABORATORY   

 

             MONO x10^3   0.44         0.33 - 0.92  [facility] 



                                       10*3/uL      [facility]     



                                                    LABORATORY   

 

             EOS x10^3    <0.03 (L)    0.03 - 0.39  [facility] 



                                       10*3/uL      [facility]     



                                                    LABORATORY   

 

             BASO x10^3   0.03         0.01 - 0.07  [facility] 



                                       10*3/uL      [facility]     



                                                    LABORATORY   

 

             PLT ESTIMATE Decreased (A) Normal       [facility]     



                                                    LABORATORY   









                                        Specimen

 

                                        Blood - HAND, LEFT









                Performing Organization Address         City/State/Zipcode Phone

 Number

 

                [facility] CLIA: [id number], [address], 

[address] [phone]



                LABORATORY      [address]                  



Basic Metabolic Panel (NA, K, CL, CO2, GLUCOSE, BUN, CREATININE, CA) (2020
 12:57 AM CDT)





             Component    Value        Ref Range    Performed At Pathologist Sig

nature

 

             NA           139          135 - 145    [facility] 



                                       mmol/L       [facility] LABORATORY 

 

             K            4.7          3.5 - 5.0    [facility] 



                                       mmol/L       [facility] LABORATORY 

 

             CL           107          98 - 108 mmol/L [facility] LABORATORY 

 

             CO2 TOTAL    19 (L)       23 - 31 mmol/L [facility] LABORATORY 

 

             AGAP         13           2 - 16       [facility] LABORATORY 

 

             BUN          22           7 - 23 mg/dL [facility] LABORATORY 

 

             GLUCOSE      344 (H)      70 - 110 mg/dL [facility] LABORATORY 

 

             CREATININE   0.79         0.50 - 1.04  [facility] 



                                       mg/dL        [facility] LABORATORY 

 

             CALCIUM      8.3 (L)      8.6 - 10.6   [facility] 



                                       mg/dL        [facility] LABORATORY 

 

             eGFR Calculation 74.7         mL/min/1.73m2 [facility] 



             ([facility] LABORATORY 



             American)                                           

 

             eGFR Calculation 90.6         mL/min/1.73m2 [facility] 



             (African American)                           [facility] LABORATORY 









                                        Specimen

 

                                        Blood - HAND, LEFT









                          Narrative                 Performed At

 

                          Association of Glomerular Filtration Rate (GFR) [facility] 

LABORATORY



                                        and Staging of Kidney Disease*



                                        



                                         



                                        



                          +-----------------------+---------------------+- 



                                        ------------------------+



                                        



                          | GFR (mL/min/1.73 m2) | With Kidney Damage 



                                        | Without Kidney Damage



                                        



                          +-----------------------+---------------------+- 



                                        ------------------------+



                                        



                          | >90         | Stage one 



                              |  Normal        



                                        



                                        



                          +-----------------------+---------------------+- 



                                        ------------------------+



                                        



                          | 60-89        | Stage two 



                                            |  Decreased GFR   

  



                                        



                          +-----------------------+---------------------+- 



                                        ------------------------+



                                        



                          | 30-59        | Stage three 



                                           |  Stage three     

 



                                        



                          +-----------------------+---------------------+- 



                                        ------------------------+



                                        



                          | 15-29        | Stage four 



                                            |  Stage four     

 



                                        



                          +-----------------------+---------------------+- 



                                        ------------------------+



                                        



                          | <15 (or dialysis)  | Stage five   



                                          |  Stage five      



                                        



                          +-----------------------+---------------------+- 



                                        ------------------------+



                                        



                                         



                                        



                          *Each stage assumes the associated GFR level has 



                          been in effect for at least three months. 



                          Stages 1 to 5, with or without kidney disease, 



                                        indicate chronic kidney disease.



                                        



                                         



                                        



                          Notes: Determination of stages one and two (with 



                          eGFR >59mL/min/1.73 m2) requires estimation of 



                          kidney damage for at least three months as 



                          defined by structural or functional 



                          abnormalities of the kidney, manifested by 



                                        either:



                                        



                          Pathological abnormalities or Markers of kidney 



                          damage (including abnormalities in the 



                          composition of the blood or urine or 



                                        abnormalities in imaging tests). 



                                        



                                                    









                Performing Organization Address         City/State/Zipcode Phone

 Number

 

                [facility] CLIA: [id number], [address], 

[address] [phone]



                [facility]      [address]                  



Lactic Acid Whole Blood (2020 12:56 AM CDT)





             Component    Value        Ref Range    Performed At Pathologist Sig

nature

 

             LACTIC ACID  3.22 (H)     0.50 - 2.20 mmol/L [facility] LABORATORY 









                                        Specimen

 

                                        Blood - HAND, LEFT









                Performing Organization Address         City/[facility]/Zipcode Phone

 Number

 

                [facility] CLIA: [id number], [address], 

[address] [phone]



                LABORATORY      [address]                  



POCT GLUCOSE (AUTOMATED) (2020 11:07 PM CDT)





             Component    Value        Ref Range    Performed At Pathologist Sig

nature

 

             POCT GLU     350 (H)      70 - 110 mg/dL [facility] 



                                                    LABORATORY   









                                        Specimen

 

                                        Blood









                Performing Organization Address         City/[facility] Phone

 Number

 

                [facility] CLIA: [id number], [address], 

[address] [phone]



                LABORATORY      [address]                  



XR CHEST 1 VW (2020  9:46 PM CDT)





                                        Specimen

 

                                        









                          Impressions               Performed At

 

                                         



                                        PACS/VR/DOSE



                                        No acute intrathoracic abnormality.



                                        



                                         



                                        



                                        Preliminary Report Dictated by Resident:

 Ziggy Longoria MD., have reviewed this study and 

agree with the 



                                        above



                                        



                          report.                   









                          Narrative                 Performed At

 

                                        XR CHEST 1 VW



                                        PACS/VR/DOSE



                                         



                                        



                                        Comparison: Chest x-ray 2020



                                        



                                         



                                        



                                        History: fever 



                                        



                                         



                                        



                                        Findings:



                                        



                                         



                                        



                                        The lungs are clear. No focal consolidat

ion. No pleural effusion or



                                        



                                        pneumothorax is identified.



                                        



                                         



                                        



                                        The heart is normal in size.



                                        



                                         



                                        



                          No acute osseous abnormality. Multiple surgical staple

s overlie the 



                                        thorax.



                                        



                                                    









                                        Procedure Note

 

                                        [facility], Radiant Results In[facility] User - 2020 10:02 PM CDT



XR CHEST 1 VW



                                        



                                        Comparison: Chest x-ray 2020



                                        



                                        History: fever



                                        



                                        Findings:



                                        



                                        The lungs are clear. No focal consolidat

ion. No pleural effusion or



                                        pneumothorax is identified.



                                        



                                        The heart is normal in size.



                                        



                                        No acute osseous abnormality. Multiple s

urgical staples overlie the thorax.



                                        



                                        IMPRESSION



                                        



                                        No acute intrathoracic abnormality.



                                        



                                        Preliminary Report Dictated by Resident:

 Ziggy Longoria MD., have reviewed th

is study and agree with the above



                                        report.









                Performing Organization Address         City/[facility] Cent[facility]ode Phone

 Number

 

                PACS/VR/DOSE                                    



URINE CULTURE (2020  9:31 PM CDT)





             Component    Value        Ref Range    Performed At Pathologist



                                                                 Signature

 

             URINE CULTURE 10,000 - 100,000              [facility] LABORATORY 



                          CFU/mL mixed aerobic              SERVICES     



                          organisms - suggests                           



                          endogenous microbial                           



                          contamination                           









                                        Specimen

 

                                        Urine - URINE, CLEAN CATCH









                Performing Organization Address         City/State/Zipcode Phone

 Number

 

                [facility] LABORATORY SERVICES CLIA:  [id number], [address] [place], [place] [address]

[address] [phone]



                                [facility]                 



BASIC METABOLIC PANEL (NA, K, CL, CO2, GLUCOSE, BUN, CREATININE, CA) (2020
  9:02 PM CDT)





             Component    Value        Ref Range    Performed At Pathologist [facility]

nature

 

             NA           137          135 - 145    [facility] 



                                       mmol/L       [facility] LABORATORY 

 

             K            5.3 (H)      3.5 - 5.0    [facility] 



                                       mmol/L       [facility] LABORATORY 

 

             CL           105          98 - 108 mmol/L [facility] LABORATORY 

 

             CO2 TOTAL    22 (L)       23 - 31 mmol/L [facility] LABORATORY 

 

             AGAP         10           2 - 16       [facility] LABORATORY 

 

             BUN          24 (H)       7 - 23 mg/dL [facility] LABORATORY 

 

             GLUCOSE      339 (H)      70 - 110 mg/dL [facility] LABORATORY 

 

             CREATININE   0.79         0.50 - 1.04  [facility] 



                                       mg/dL        [facility] LABORATORY 

 

             CALCIUM      8.1 (L)      8.6 - 10.6   [facility] 



                                       mg/dL        [facility] LABORATORY 

 

             eGFR Calculation 74.7         mL/min/1.73m2 [facility] 



             (Non[facility] LABORATORY 



             American)                                           

 

             eGFR Calculation 90.6         mL/min/1.73m2 [facility] 



             (African American)                           [facility] LABORATORY 









                                        Specimen

 

                                        Blood - VENOUS









                          Narrative                 Performed At

 

                          Association of Glomerular Filtration Rate (GFR) [facility] 

LABORATORY



                                        and Staging of Kidney Disease*



                                        



                                         



                                        



                          +-----------------------+---------------------+- 



                                        ------------------------+



                                        



                          | GFR (mL/min/1.73 m2) | With Kidney Damage 



                                        | Without Kidney Damage



                                        



                          +-----------------------+---------------------+- 



                                        ------------------------+



                                        



                          | >90         | Stage one 



                              |  Normal        



                                        



                                        



                          +-----------------------+---------------------+- 



                                        ------------------------+



                                        



                          | 60-89        | Stage two 



                                            |  Decreased GFR   

  



                                        



                          +-----------------------+---------------------+- 



                                        ------------------------+



                                        



                          | 30-59        | Stage three 



                                           |  Stage three     

 



                                        



                          +-----------------------+---------------------+- 



                                        ------------------------+



                                        



                          | 15-29        | Stage four 



                                            |  Stage four     

 



                                        



                          +-----------------------+---------------------+- 



                                        ------------------------+



                                        



                          | <15 (or dialysis)  | Stage five   



                                          |  Stage five      



                                        



                          +-----------------------+---------------------+- 



                                        ------------------------+



                                        



                                         



                                        



                          *Each stage assumes the associated GFR level has 



                          been in effect for at least three months. 



                          Stages 1 to 5, with or without kidney disease, 



                                        indicate chronic kidney disease.



                                        



                                         



                                        



                          Notes: Determination of stages one and two (with 



                          eGFR >59mL/min/1.73 m2) requires estimation of 



                          kidney damage for at least three months as 



                          defined by structural or functional 



                          abnormalities of the kidney, manifested by 



                                        either:



                                        



                          Pathological abnormalities or Markers of kidney 



                          damage (including abnormalities in the 



                          composition of the blood or urine or 



                                        abnormalities in imaging tests). 



                                        



                                                    









                Performing Organization Address         City/[facility] Centercode Phone

 Number

 

                [facility] CLIA: [id number], [address] [place] [address]

15 [phone]



                LABORATORY      [address]                  



Lactic Acid Whole Blood (2020  9:02 PM CDT)





             Component    Value        Ref Range    Performed At Pathologist Sig

nature

 

             LACTIC ACID  4.56         0.30 - 2.60 mmol/L [facility]

[facility] 



                                                    LABORATORY   









                                        Specimen

 

                                        Blood - VENOUS









                Performing Organization Address         City/State/[facility] Cent[facility]ode Phone

 Number

 

                [facility] CLIA: [id number], [address] [address] [phone]



                LABORATORY      [address]                  



URINALYSIS (2020  7:31 PM CDT)





             Component    Value        Ref Range    Performed At Pathologist Sig

nature

 

             APPEARANCE   Clear        Clear        [facility] LABORATORY 

 

             COLOR        Fiorella (A)    Yellow       [facility] LABORATORY 

 

             PH           5.0          4.8 - 8.0    [facility] LABORATORY 

 

             SP GRAVITY   1.020        1.003 - 1.030 [facility] LABORATORY 

 

             GLU U QUAL   500 mg/dL (A) Normal       [facility] LABORATORY 

 

             BLOOD        Negative     Negative     [facility] LABORATORY 

 

             KETONES      5 mg/dL (A)  Negative     [facility] LABORATORY 

 

             PROTEIN      Negative     Negative     [facility] LABORATORY 

 

             UROBILIN     4.0 mg/dL (A) Normal       [facility] LABORATORY 

 

             BILIRUBIN    Negative     Negative     [facility] LABORATORY 

 

             NITRITE      Negative     Negative     [facility] LABORATORY 

 

             LEUK MOE   Negative     Negative     [facility] LABORATORY 

 

             RBC/HPF      2            0 - 3 HPF    [facility] LABORATORY 

 

             WBC/HPF      <1           0 - 5 HPF    [facility] LABORATORY 

 

             BACTERIA     Moderate (A) Negative     [facility] 

 

             MUCOUS       Slight (A)   Negative LPF [facility] 

 

             SQ EPITH     4            HPF          [facility] 

 

             HYAL CAST    12 (H)       <=2 LPF      [facility] 









                                        Specimen

 

                                        Urine - URINE, CLEAN CATCH









                Performing Organization Address         City/[facility] Phone

 Number

 

                [facility] CLIA: [id number], [address] [address]

[phone]



                LABORATORY      [address]                  



CT ABDOMEN PELVIS W CONTRAST (2020  7:23 PM CDT)





                                        Specimen

 

                                        









                          Impressions               Performed At

 

                                         



                                        PACS/VR/DOSE



                                        1. Dilated small bowel loops in the uppe

r abdomen with loops of jejunum



                                        



                                        measuring up to 2.4 cm representing part

ial small bowel obstruction.



                                        



                                        Transition point is seen in the central 

abdomen on 2:63 with distally



                                        



                                        collapsed loops of jejunum. No evidence 

of ischemia.



                                        



                                         



                                        



                                        2. Cirrhotic liver morphology with 4.5 c

m hypodense segment VI lesion



                                        



                                        measuring 47 Hounsfield units concerning

 for malignancy. Recommend GI



                                        



                                        consultation and correlation with prior 

imaging studies and/or follow-up



                                        



                                        triple phase abdominal CT. 



                                        



                                         



                                        



                          3. Moderate portal hypertension including splenomegaly

, dilated main 



                                        portal



                                        



                                        vein, small gastroesophageal varices and

 portal colopathy.



                                        



                                         



                                        



                                        4. Diffuse thickening of the large bowel

 could represent infectious or



                                        



                                        inflammatory colitis



                                        



                                         



                                        



                                        Findings relayed to Dr. Abarca at time o

f dictation.



                                        



                                         



                                        



                                        Preliminary Report Dictated by Resident:

 Juvencio Savage I, Ziggy Bowers MD., have reviewed this study and 

agree with the 



                                        above



                                        



                          report.                   









                          Narrative                 Performed At

 

                                        CT ABDOMEN AND PELVIS WITH CONTRAST



                                        PACS/VR/DOSE



                                         



                                        



                                        HISTORY: Abd pain, acute, generalized, w

ith fever 



                                        



                                         



                                        



                                        COMPARISON: None.



                                        



                                         



                                        



                                        TECHNIQUE: Contiguous axial imaging from

 the level of the lung bases



                                        



                          through the pubic symphysis was performed after the ad

ministration of 



                                        120



                                        



                          cc of intravenous Omnipaque contrast. Coronal and sagi

ttal 



                                        reconstructions



                                        



                                        were obtained.



                                        



                                         



                                        



                                        FINDINGS:



                                        



                                         



                                        



                          LOWER THORAX: Scattered calcified granulomas are seen 

in the lung bases. 



                                        No



                                        



                                        pleural effusion is present. No cardiome

jesus.



                                        



                                         



                                        



                                        LIVER: The liver is enlarged measuring n

early 20 cm in craniocaudal



                                        



                          dimension with heterogenous parenchyma and micronodula

r contour, 



                                        consistent



                                        



                          with cirrhosis. A 4.5 cm lobulated hypodense lesion in

 segment VI 



                                        measures



                                        



                          47 Hounsfield units, indeterminate. Scattered subcenti

meter 



                                        calcifications



                                        



                                        are seen.



                                        



                                         



                                        



                                        GALLBLADDER: Prior cholecystectomy. Mild

 central intrahepatic biliary



                                        



                                        ductal dilation and prominence of the di

stal common bile duct at 7 mm



                                        



                                        likely representing reservoir phenomenon

 from prior cholecystectomy.



                                        



                                         



                                        



                                        SPLEEN: Splenomegaly up to 16 cm in cran

iocaudal dimension.



                                        



                                         



                                        



                                        PANCREAS: No ductal dilation or masses. 

Somewhat atrophic pancreas.



                                        



                                         



                                        



                                        ADRENAL GLANDS: No adrenal nodules.



                                        



                                         



                                        



                          KIDNEYS: No hydronephrosis, stones, or solid masses. 3

.0 cm simple cyst 



                                        at



                                        



                          the right midpole. Subcentimeter cortical hypodensitie

s bilaterally are 



                                        too



                                        



                          small to characterize. No obstructive nephrolithiasis.

 No 



                                        hydronephrosis.



                                        



                                         



                                        



                                        PERITONEUM AND RETROPERITONEUM: No free 

air. Small volume of abdominal



                                        



                                        ascites in the right subdiaphragmatic sp

ace and in the right paracolic



                                        



                                        gutter. Diffuse ill-defined mesenteric s

tranding in the upper abdomen



                                        



                                        around the mesenteric root vessels and l

arge bowel is nonspecific, but



                                        



                                        likely related to underlying liver disea

se.



                                        



                                         



                                        



                          LYMPH NODES: Mildly enlarged priscilla hepatis lymph nodes

 measuring up to 



                                        1.2



                                        



                                        cm, likely reactive to liver disease.



                                        



                                         



                                        



                                        GI TRACT: Small sliding hiatal hernia. T

he stomach is distended with gas



                                        



                                        and layering ingested fluid material. Di

lated small bowel loops in the



                                        



                          upper abdomen with loops of jejunum measuring up to 2.

4 cm with 



                                        transition



                                        



                                        point on 2:63 in the central abdomen wit

h collapsed loops of distal



                                        



                          jejunum. Fecalization of small bowel suggesting delaye

d GI transit. 



                                        Large



                                        



                                        bowel wall thickening and decreased enha

ncement with surrounding



                                        



                                        pericolonic stranding consistent present

ing portal colopathy. The



                                        



                                        descending and rectosigmoid colon are no

rmally enhancing. Status post



                                        



                                        appendectomy.



                                        



                                         



                                        



                                        PELVIS: The urinary bladder is underdist

ended. Prior hysterectomy



                                        



                                         



                                        



                          VESSELS: Dilated main portal vein measuring 1.5 cm. Ti

ny 



                                        gastroesophageal



                                        



                          collateral vessels. Conventional hepatic arterial rolando

kraig. The 



                                        mesenteric



                                        



                                        root vessels are patent.



                                        



                                         



                                        



                                        BONES AND SOFT TISSUES: No aggressive or

 suspicious osseous lesions.



                                        



                                        Diastasis recti and postsurgical changes

 in the anterior abdominal wall



                                        



                                        without competition.



                                        



                                                    









                                        Procedure Note

 

                                        [facility], Radiant Results Inft User - 2020  9:18 PM CDT



CT ABDOMEN AND PELVIS WITH CONTRAST



                                        



                                        HISTORY: Abd pain, acute, generalized, w

ith fever



                                        



                                        COMPARISON: None.



                                        



                                        TECHNIQUE: Contiguous axial imaging from

 the level of the lung bases



                                        through the pubic symphysis was performe

d after the administration of 120



                                        cc of intravenous Omnipaque contrast. Co

lizandro and sagittal reconstructions



                                        were obtained.



                                        



                                        FINDINGS:



                                        



                                        LOWER THORAX: Scattered calcified granul

omas are seen in the lung bases. No



                                        pleural effusion is present. No cardiome

jesus.



                                        



                                        LIVER: The liver is enlarged measuring n

early 20 cm in craniocaudal



                                        dimension with heterogenous parenchyma a

nd micronodular contour, consistent



                                        with cirrhosis. A 4.5 cm lobulated hypod

ense lesion in segment VI measures



                                        47 Hounsfield units, indeterminate. Scat

tered subcentimeter calcifications



                                        are seen.



                                        



                                        GALLBLADDER: Prior cholecystectomy. Mild

 central intrahepatic biliary



                                        ductal dilation and prominence of the di

stal common bile duct at 7 mm



                                        likely representing reservoir phenomenon

 from prior cholecystectomy.



                                        



                                        SPLEEN: Splenomegaly up to 16 cm in cran

iocaudal dimension.



                                        



                                        PANCREAS: No ductal dilation or masses. 

Somewhat atrophic pancreas.



                                        



                                        ADRENAL GLANDS: No adrenal nodules.



                                        



                                        KIDNEYS: No hydronephrosis, stones, or s

olid masses. 3.0 cm simple cyst at



                                        the right midpole. Subcentimeter cortica

l hypodensities bilaterally are too



                                        small to characterize. No obstructive ne

phrolithiasis. No hydronephrosis.



                                        



                                        PERITONEUM AND RETROPERITONEUM: No free 

air. Small volume of abdominal



                                        ascites in the right subdiaphragmatic sp

ace and in the right paracolic



                                        gutter. Diffuse ill-defined mesenteric s

tranding in the upper abdomen



                                        around the mesenteric root vessels and l

arge bowel is nonspecific, but



                                        likely related to underlying liver disea

se.



                                        



                                        LYMPH NODES: Mildly enlarged priscilla hepat

is lymph nodes measuring up to 1.2



                                        cm, likely reactive to liver disease.



                                        



                                        GI TRACT: Small sliding hiatal hernia. T

he stomach is distended with gas



                                        and layering ingested fluid material. Di

lated small bowel loops in the



                                        upper abdomen with loops of jejunum truong

uring up to 2.4 cm with transition



                                        point on 2:63 in the central abdomen wit

h collapsed loops of distal



                                        jejunum. Fecalization of small bowel sug

gesting delayed GI transit. Large



                                        bowel wall thickening and decreased enha

ncement with surrounding



                                        pericolonic stranding consistent present

ing portal colopathy. The



                                        descending and rectosigmoid colon are no

rmally enhancing. Status post



                                        appendectomy.



                                        



                                        PELVIS: The urinary bladder is underdist

ended. Prior hysterectomy



                                        



                                        VESSELS: Dilated main portal vein measur

ing 1.5 cm. Tiny gastroesophageal



                                        collateral vessels. Conventional hepatic

 arterial anatomy. The mesenteric



                                        root vessels are patent.



                                        



                                        BONES AND SOFT TISSUES: No aggressive or

 suspicious osseous lesions.



                                        Diastasis recti and postsurgical changes

 in the anterior abdominal wall



                                        without competition.



                                        



                                        IMPRESSION



                                        



                                        1. Dilated small bowel loops in the uppe

r abdomen with loops of jejunum



                                        measuring up to 2.4 cm representing part

ial small bowel obstruction.



                                        Transition point is seen in the central 

abdomen on 2:63 with distally



                                        collapsed loops of jejunum. No evidence 

of ischemia.



                                        



                                        2. Cirrhotic liver morphology with 4.5 c

m hypodense segment VI lesion



                                        measuring 47 Hounsfield units concerning

 for malignancy. Recommend GI



                                        consultation and correlation with prior 

imaging studies and/or follow-up



                                        triple phase abdominal CT.



                                        



                                        3. Moderate portal hypertension includin

g splenomegaly, dilated main portal



                                        vein, small gastroesophageal varices and

 portal colopathy.



                                        



                                        4. Diffuse thickening of the large bowel

 could represent infectious or



                                        inflammatory colitis



                                        



                                        Findings relayed to Dr. Abarca at time o

f dictation.



                                        



                                        Preliminary Report Dictated by Resident:

 uJvencio Savage I, Ziggy Bowers MD., have reviewed th

is study and agree with the above



                                        report.









                Performing Organization Address         City/[facility]ode Phone

 Number

 

                PACS/VR/DOSE                                    



BLOOD CULTURE SCREEN (2020  6:18 PM CDT)





             Component    Value        Ref Range    Performed At Pathologist



                                                                 Signature

 

             Blood        No organisms isolated No growth    [facility] 



             Culture-Aerobic Comment:                  HOSPITAL     



                                                    Previous preliminary verifie

d result was Culture In Progress on 2020 at 

2201 CDT                                LABORATORY          



                                                    Previous preliminary verifie

d result was No growth at 24 hours on 2020 at 

1901 CDT                                                    



                                                    Previous preliminary verifie

d result was No growth at 48 hours on 2020 at 

1901 CDT                                                    



                                                    Previous preliminary verifie

d result was No growth at 72 hours on 2020 at 

1901 CDT                                                    

 

             Blood        No organisms isolated No growth    [facility] 



             Culture-Anaerobic Comment:                  [facility]     



                                                    Previous preliminary verifie

d result was Culture In Progress on 2020 at 

2201 CDT                                LABORATORY          



                                                    Previous preliminary verifie

d result was No growth at 24 hours on 2020 at 

1901 CDT                                                    



                                                    Previous preliminary verifie

d result was No growth at 48 hours on 2020 at 

1901 CDT                                                    



                                                    Previous preliminary verifie

d result was No growth at 72 hours on 2020 at 

1901 CDT                                                    









                                        Specimen

 

                                        Blood - ARM, LEFT









                Performing Organization Address         City/State/[facility] Phone

 Number

 

                [facility] CLIA: [id number], [address], [place] [address]

15 [phone]



                LABORATORY      [address]                  



Lactic Acid Whole Blood (2020  6:17 PM CDT)





             Component    Value        Ref Range    Performed At Pathologist Sig

nature

 

             LACTIC ACID  3.95         0.30 - 2.60 mmol/L [facility] HOSPI

[facility] 



                                                    LABORATORY   









                                        Specimen

 

                                        Blood - VENOUS









                Performing Organization Address         City/[facility]/Zipcode Phone

 Number

 

                [facility] CLIA: [id number], [address],  15 [phone]



                LABORATORY      [address]                  



BLOOD CULTURE SCREEN (2020  6:16 PM CDT)





             Component    Value        Ref Range    Performed At Pathologist



                                                                 Signature

 

             Blood        No organisms isolated No growth    [facility] 



             Culture-Aerobic Comment:                  HOSPITAL     



                                                    Previous preliminary verifie

d result was Culture In Progress on 2020 at 

2201 CDT                                LABORATORY          



                                                    Previous preliminary verifie

d result was No growth at 24 hours on 2020 at 

1901 CDT                                                    



                                                    Previous preliminary verifie

d result was No growth at 48 hours on 2020 at 

1901 CDT                                                    



                                                    Previous preliminary verifie

d result was No growth at 72 hours on 2020 at 

1901 CDT                                                    

 

             Blood        No organisms isolated No growth    [facility] 



             Culture-Anaerobic Comment:                  HOSPITAL     



                                                    Previous preliminary verifie

d result was Culture In Progress on 2020 at 

2201 CDT                                LABORATORY          



                                                    Previous preliminary verifie

d result was No growth at 24 hours on 2020 at 

1901 CDT                                                    



                                                    Previous preliminary verifie

d result was No growth at 48 hours on 2020 at 

1901 CDT                                                    



                                                    Previous preliminary verifie

d result was No growth at 72 hours on 2020 at 

1901 CDT                                                    









                                        Specimen

 

                                        Blood - ARM, LEFT









                Performing Organization Address         City/[facility] Phone

 Number

 

                [facility] CLIA: [id number], [address] [address]

15 [phone]



                LABORATORY      [address]                  



CORONAVIRUS COVID-19 TESTING (2020  6:15 PM CDT)





             Component    Value        Ref Range    Performed At Pathologist Sig

nature

 

             SARS-CoV-2   Not Detected Not Detected [facility] 



                                                    LABORATORY   









                                        Specimen

 

                                        Swab - NASOPHARYNGEAL SWAB









                          Narrative                 Performed At

 

                          ID NOW COVID-19 Assay is an isothermal nucleic [facility] 

LABORATORY



                          acid amplification test intended for the 



                          qualitative detection of nucleic acid from 



                          SARS-CoV-2 viral RNA in nasopharyngeal (NP) 



                          specimens. It is used under Emergency Use 



                          Authorization (EUA) by FDA. The limit of 



                          detection (LOD) of the assay is 125 Genome 



                                        Equivalents/mL.



                                        



                                         



                                        



                          A positive result is indicative of the presence 



                          of SARS-CoV-2 RNA. Clinical correlation with 



                          patient history and other diagnostic information 



                          is necessary to determine patient infection 



                                        status.



                                        



                                         



                                        



                          A negative (Not Detected) result does not 



                          preclude SARS-CoV-2 infection. Clinical 



                          correlation with patient history and other 



                          diagnostic information should be used in patient 



                                        management decisions. 



                                        



                                         



                                        



                          Invalid: Please collect a new specimen for 



                          repeat patient testing if clinically indicated. 









                Performing Organization Address         City[facility] Phone

 Number

 

                [facility] CLIA: [id number], [address] [address]

15 [phone]



                LABORATORY      [address]                  



aPTT (2020  6:15 PM CDT)





             Component    Value        Ref Range    Performed At Pathologist Sig

nature

 

             APTT Patient 25           23 - 38 Seconds [facility]

 



                                                    LABORATORY   









                                        Specimen

 

                                        Blood - VENOUS









                          Narrative                 Performed At

 

                          [facility] patient population mean normal value [facility] 

LABORATORY



                          for aPTT is 30 seconds.   









                Performing Organization Address         City/[facility]/Zipcode Phone

 Number

 

                [facility] CLIA: [id number], [address] [address]

15 [phone]



                LABORATORY      [address]                  



PROTHROMBIN TIME / INR (2020  6:15 PM CDT)





             Component    Value        Ref Range    Performed At Pathologist



                                                                 Signature

 

             PROTIME PATIENT 14.1         12.0 - 14.7  [facility]     



                                                    LABORATORY   

 

             INR          1.1Comment: Normal              [facility] 



                          INR <1.1; Warfarin              [facility]     



                          Therapeutic range              LABORATORY   



                          2.0 to 3.0 or 2.5                           



                          to 3.5, depending                           



                          upon the                               



                          indications.                           









                                        Specimen

 

                                        Blood - VENOUS









                Performing Organization Address         City/State/[facility] Phone

 Number

 

                [facility] CLIA: [id number], [address] [address]

15 [phone]



                LABORATORY      [address]                  



Type and Screen - ONCE STAT (2020  6:15 PM CDT)





             Component    Value        Ref Range    Performed At Pathologist Sig

nature

 

             ABO & RH     A Positive                LAB          



                          Comment:                               



                          Performed at [facility]

                           



                          [address] [place], [place] [address]  

                         



                          Toll Free: [phone]                           



                          CLIA No. [id number]                           



                                                                 

 

             IAT          Negative                  LAB          



                          Comment:                               



                          Performed at [facility]

                           



                          [address] [place], [place] [address]  

                         



                          Toll Free: [phone]                           



                          CLIA No. [id number]                           



                                                                 









                                        Specimen

 

                                        Blood









                Performing Organization Address         City/[facility]/Zipcode Phone

 Number

 

                BLD                                             

 

                LAB                                             



LIPASE (2020  6:15 PM CDT)





             Component    Value        Ref Range    Performed At Pathologist Sig

nature

 

             LIPASE       120          0 - 220 U/L  [facility] 



                                                    LABORATORY   









                                        Specimen

 

                                        Blood - VENOUS









                Performing Organization Address         City/[facility]/Zipcode Phone

 Number

 

                [facility] CLIA: [id number], [address] [address]

15 [phone]



                LABORATORY      [address]                  



COMP. METABOLIC PANEL (86084) (2020  6:15 PM CDT)





             Component    Value        Ref Range    Performed At Pathologist Sig

nature

 

             NA           138          135 - 145    [facility] 



                                       mmol/L       [facility] LABORATORY 

 

             K            5.8 (H)      3.5 - 5.0    [facility] 



                                       mmol/L       [facility] LABORATORY 

 

             CL           101          98 - 108 mmol/L [facility] LABORATORY 

 

             CO2 TOTAL    28           23 - 31 mmol/L [facility] LABORATORY 

 

             AGAP         9            2 - 16       [facility] LABORATORY 

 

             BUN          27 (H)       7 - 23 mg/dL [facility] LABORATORY 

 

             GLUCOSE      358 (H)      70 - 110 mg/dL [facility] LABORATORY 

 

             CREATININE   0.92         0.50 - 1.04  [facility] 



                                       mg/dL        [facility] LABORATORY 

 

             TOTAL BILI   3.3 (H)      0.1 - 1.1 mg/dL [facility] LABORATORY 

 

             CALCIUM      9.2          8.6 - 10.6   [facility] 



                                       mg/dL        [facility] LABORATORY 

 

             T PROTEIN    8.6 (H)      6.3 - 8.2 g/dL [facility] LABORATORY 

 

             ALBUMIN      4.4          3.5 - 5.0 g/dL [facility] LABORATORY 

 

             ALK PHOS     161 (H)      34 - 122 U/L [facility] LABORATORY 

 

             ALTv         43 (H)       5 - 35 U/L   [facility] LABORATORY 

 

             AST(SGOT)    82 (H)       13 - 40 U/L  [facility] 

 

             eGFR Calculation 62.7         mL/min/1.73m2 [facility] 



             [facility] 



             American)                                           

 

             eGFR Calculation 76.0         mL/min/1.73m2 [facility] 



             (African American)                           [facility] LABORATORY 









                                        Specimen

 

                                        Blood - VENOUS









                          Narrative                 Performed At

 

                          Association of Glomerular Filtration Rate (GFR) [facility] 

LABORATORY



                                        and Staging of Kidney Disease*



                                        



                                         



                                        



                          +-----------------------+---------------------+- 



                                        ------------------------+



                                        



                          | GFR (mL/min/1.73 m2) | With Kidney Damage 



                                        | Without Kidney Damage



                                        



                          +-----------------------+---------------------+- 



                                        ------------------------+



                                        



                          | >90         | Stage one 



                              |  Normal        



                                        



                                        



                          +-----------------------+---------------------+- 



                                        ------------------------+



                                        



                          | 60-89        | Stage two 



                                            |  Decreased GFR   

  



                                        



                          +-----------------------+---------------------+- 



                                        ------------------------+



                                        



                          | 30-59        | Stage three 



                                           |  Stage three     

 



                                        



                          +-----------------------+---------------------+- 



                                        ------------------------+



                                        



                          | 15-29        | Stage four 



                                            |  Stage four     

 



                                        



                          +-----------------------+---------------------+- 



                                        ------------------------+



                                        



                          | <15 (or dialysis)  | Stage five   



                                          |  Stage five      



                                        



                          +-----------------------+---------------------+- 



                                        ------------------------+



                                        



                                         



                                        



                          *Each stage assumes the associated GFR level has 



                          been in effect for at least three months. 



                          Stages 1 to 5, with or without kidney disease, 



                                        indicate chronic kidney disease.



                                        



                                         



                                        



                          Notes: Determination of stages one and two (with 



                          eGFR >59mL/min/1.73 m2) requires estimation of 



                          kidney damage for at least three months as 



                          defined by structural or functional 



                          abnormalities of the kidney, manifested by 



                                        either:



                                        



                          Pathological abnormalities or Markers of kidney 



                          damage (including abnormalities in the 



                          composition of the blood or urine or 



                                        abnormalities in imaging tests). 



                                        



                                                    









                Performing Organization Address         City/State/Zipcode Phone

 Number

 

                [facility] CLIA: [id number], [address], 

15 [phone]



                LABORATORY      [address]                  



CBC WITH DIFFERENTIAL (2020  6:15 PM CDT)





             Component    Value        Ref Range    Performed At Pathologist



                                                                 Signature

 

             WBC          20.39 (H)    4.30 - 11.10 [facility] 



                                       10*3/L     [facility]     



                                                    LABORATORY   

 

             RBC          4.71         3.93 - 5.25  [facility] 



                                       10*6/L     [facility]     



                                                    LABORATORY   

 

             HGB          14.4         11.6 - 15.0  [facility] 



                                       g/dL         [facility]     



                                                    LABORATORY   

 

             HCT          43.2         35.7 - 45.2 % [facility]     



                                                    LABORATORY   

 

             MCV          91.7         80.6 - 95.5  [facility]     



                                                    LABORATORY   

 

             MCH          30.6         25.9 - 32.8  [facility]     



                                                    LABORATORY   

 

             MCHC         33.3         31.6 - 35.1  [facility] 



                                       g/dL         [facility]     



                                                    LABORATORY   

 

             RDW-SD       50.8 (H)     39.0 - 49.9  [facility]     



                                                    LABORATORY   

 

             RDW-CV       15.3         12.0 - 15.5 % [facility]     



                                                    LABORATORY   

 

             PLT          82 (L)       166 - 358    [facility] 



                                       10*3/L     [facility]     



                                                    LABORATORY   

 

             MPV          10.4         9.5 - 12.9 fL [facility]     



                                                    LABORATORY   

 

             IPF %        3.4Comment: Platelet 1.3 - 7.7 %  [facility] 



                          count measured by              HOSPITAL     



                          fluorescence method.              LABORATORY   

 

             NRBC/100 WBC 0.0          0.0 - 10.0   [facility] 



                                       /100 WBCs    [facility]     



                                                    LABORATORY   

 

             NRBC x10^3   <0.01        10*3/L     [facility]     



                                                    LABORATORY   

 

             GRAN MAT (NEUT) % 92.2         %            [facility]     



                                                    LABORATORY   

 

             IMM GRAN %   0.80         %            [facility]     



                                                    LABORATORY   

 

             LYMPH %      3.6          %            [facility]     



                                                    LABORATORY   

 

             MONO %       3.2          %            [facility]     



                                                    LABORATORY   

 

             EOS %        0.0          %            [facility]     



                                                    LABORATORY   

 

             BASO %       0.2          %            [facility]     



                                                    LABORATORY   

 

             GRAN MAT     18.80 (H)    1.88 - 7.09  [facility] 



             x10^3(ANC)                10*3/uL      HOSPITAL     



                                                    LABORATORY   

 

             IMM GRAN x10^3 0.16 (H)     0.00 - 0.06  [facility] 



                                       10*3/uL      [facility]     



                                                    LABORATORY   

 

             LYMPH x10^3  0.73 (L)     1.32 - 3.29  [facility] 



                                       10*3/uL      [facility]     



                                                    LABORATORY   

 

             MONO x10^3   0.65         0.33 - 0.92  [facility] 



                                       10*3/uL      [facility]     



                                                    LABORATORY   

 

             EOS x10^3    <0.03 (L)    0.03 - 0.39  [facility] 



                                       10*3/uL      [facility]     



                                                    LABORATORY   

 

             BASO x10^3   0.05         0.01 - 0.07  [address]/uL      [facility]     



                                                    LABORATORY   









                                        Specimen

 

                                        Blood - VENOUS









                Performing Organization Address         City/State/Zipcode Phone

 Number

 

                [facility] CLIA: [id number], [address], [place] [address]

15 [phone]



                LABORATORY      [address]                  



documented in this encounter



Visit Diagnoses







                                        Diagnosis

 

                                        Fever in adult - Primary

 

                                        Melena







                                        Blood in stool

 

                                        Vomiting and diarrhea







                                        Vomiting alone

 

                                        Suspected  Novel Coronavirus Infecti

on

 

                                        Hyperkalemia







                                        Hyperpotassemia

 

                                        Partial small bowel obstruction







                                        Unspecified intestinal obstruction

 

                                        Colitis







                                        Other and unspecified noninfectious tip

roenteritis and colitis

 

                                        Gastrointestinal hemorrhage with melena

 

                                        Hematochezia







                                        Blood in stool

 

                                        C. difficile colitis







                                        Intestinal infection due to clostridium 

difficile

 

                                        Hematemesis with nausea

 

                                        Hematemesis

 

                                        Obesity (BMI 30-39.9)







                                        Obesity, unspecified



documented in this encounter



Administered Medications







           Medication Order MAR Action Action Date Dose       Rate       Site

 

           carvediloL (COREG) tablet 6.25 Given      2020  9:28 AM CDT 6.2

5 mg               



                                        mg



                                                                 



           6.25 mg, Oral, BID MEALS, First                                      

       



           dose on Mon 20 at 0800,                                          

   



           Until Discontinued, Routine                                          

   









             Given        2020  7:52 PM CDT 6.25 mg                   

 

             Given        2020  8:20 AM CDT 6.25 mg                   









                                                    

 

                                        dextrose 10% (D10W) bolus infusion 250 m

L



                                        



                                        250 mL, IV Infusion, PRN - SEE INSTRUCTI

ONS, For glucose < 70 and patient unable

 to                                     



                                        swallow, Starting 20 at 1610, D

extrose 10% 250 mL bag contains: 10 gm =

                                        



                          100 mL 20 gm = 200 mL 25 gm = 250 mL (whole bag)  The 

maximum rate at which 



                          dextrose can be infused without producing glycosuria i

s 0.5 g/kg/hour.  BUD: If 



                          wrapper is open bag is good for 30 days at room temper

ature. , 

 

                                                    

 

                                        furosemide (LASIX) tablet 40 mg



                                        



                                        40 mg, Oral, DAILY, First dose on  at 1030, Until Discontinued, 

Routine                                 

 

                                                    

 

                                        glucagon (GLUCAGEN DIAGNOSTIC KIT) injec

tion 1 mg



                                        



                                        1 mg, Intramuscular, PRN, Starting  at 1608, Until Discontinued, 

ASAP,                                   



                          Blood Glucose < or = 70 mg/dL and patient is unable to

 swallow or has mental 



                          changes.                  

 

                                                    

 

                                        insulin glargine (LANTUS U-100) injectio

n 21 Units



                                        



                                        21 Units (0.25 Units/kg/day 

                                        



                                        84 kg), Subcutaneous, QHS, First dose on

 20 at 2100, Until Discontinued,

                                        



                          Routine                   

 

                                                    









                                        methadone (DOLOPHINE HCL) tablet 30 mg



             Given        2020  9:28 AM CDT 30 mg                     



           30 mg, Oral, DAILY, First dose on 20                         

                    



           at 1045, Until Discontinued, Routine                                 

            









             Given        2020 11:00 AM CDT 30 mg                     









                                                    









                                        pantoprazole (PROTONIX) EC tablet 40 mg



             Given        2020  9:28 AM CDT 40 mg                     



           40 mg, Oral, BID, First dose on Sat 5/2/20 at                        

                     



           2000, Until Discontinued, Routine                                    

         









             Given        2020  7:52 PM CDT 40 mg                     

 

             Given        2020  8:20 AM CDT 40 mg                     









                                                    









             proMETHazine (PHENERGAN) 25 mg in NaCl 0.9% Given        2020

  9:41 AM CDT 25 mg        

                                        



                                        (NS) 50 mL piggyback



                                                                 



           25 mg, IV Piggyback, Q6HPRN, Starting 20 at 0209, Until Discontinued, 50 mL                           

                  









             Given        2020  9:58 AM CDT 25 mg                     

 

             Given        2020  7:37 PM CDT 25 mg                     









                                                    









                                        QUEtiapine (SEROQUEL) tablet 300 mg



             Given        2020  7:52 PM  mg                    



           300 mg, Oral, QHS, First dose on Sun 5/3/20                          

                   



           at 2100, Until Discontinued, Routine                                 

            









             Given        2020  9:07 PM  mg                    









                                                    

 

                                        Sliding Scale Insulin - Aspart (NOVOLOG)

 + Fsbg Testing



                                        



                                        Subcutaneous, Q4H, First dose on  at 0800, Until Discontinued, Routine

                                        

 

                                                    









                vancomycin (FIRVANQ) 50 mg/mL oral solution Given           2020 12:52 PM  mg

                                                    



                                        500 mg



                                                                 



           500 mg, Oral, QID, First dose on 20                          

                   



           at 1200, Until Discontinued, Routine, Reason                         

                    



           for Anti-Infective: Documented Infection,                            

                 



           Documented Infection Site: Abdominal,                                

             



           Duration of Therapy: 7 days                                          

   









             Given        2020  9:28 AM  mg                    

 

             Given        2020 10:13 PM  mg                    









                                                    









                                        









           Medication Order MAR Action Action Date Dose       Rate       Site

 

                                        furosemide (LASIX) injection 40 mg



             Given        2020 12:15 PM CDT 40 mg                     



           40 mg, Slow IV Push, ONCE, 1 dose,                                   

          



           20 at 1130, Routine                                          

   









                                                    









                                        furosemide (LASIX) tablet 80 mg



             Given        2020  8:44 AM CDT 80 mg                     



           80 mg, Oral, DAILY, First dose on Sat 5/2/20                         

                    



           at 1200, Until Discontinued, Routine                                 

            









             Given        2020 12:41 PM CDT 80 mg                     









                                                    









           insulin glargine (LANTUS U-100) Given      2020  9:00 AM CDT 10

 Units              Abdomen



                                        injection 10 Units



                                                                 



           10 Units, Subcutaneous, DAILY,                                       

      



           First dose on 20 at 1230,                                   

          



           Until Discontinued, Routine                                          

   









             Given        2020  1:00 PM CDT 10 Units                  Left

 Upper Arm-SC









                                                    









           insulin glargine (LANTUS U-100) Given      2020 10:13 PM CDT 17

 Units              

Abdomen-SC



                                        injection 17 Units



                                                                 



           17 Units (rounded from 16.8 Units                                    

         



                                        = 0.2 Units/kg/day 

                                                                 



           84 kg), Subcutaneous, QHS, First                                     

        



           dose on 20 at 2100, Until                                    

         



           Discontinued, Routine                                             









                                                    









           iohexol (OMNIPAQUE 350 BULK-150 mL) Given      2020  7:18 PM CD

T 120 mL                



                                        injection 120 mL



                                                                 



           120 mL, Intravenous, ONCE, 1 dose, 20 at 1930, Routine                                             









                                                    









           iohexol (OMNIPAQUE 350 BULK-150 mL) Given      2020  9:54 AM CD

T 120 mL                



                                        injection 120 mL



                                                                 



           120 mL, Intravenous, ONCE, 1 dose, Thu                               

              



           20 at 1015, Routine                                             









                                                    









           iohexol (OMNIPAQUE 350 BULK-150 mL) Given      2020  9:25 AM CD

T 120 mL                



                                        injection 120 mL



                                                                 



           120 mL, Intravenous, ONCE, 1 dose, 20 at 0945, Routine                                             









                                                    









                                        KCL (KLOR-CON M20) tablet 40 mEq



             Given        2020 11:38 AM CDT 40 mEq                    



           40 mEq, Oral, ONCE NOW, 1 dose, Sun 5/3/20                           

                  



           at 1230, Routine                                             









                                                    









                KCL (POTASSIUM CHLORIDE) 40 mEq in NaCl 0.9% Given           2020 11:27 AM CDT 40 

mEq                                                 



                                        (NS) piggyback



                                                                 



           40 mEq, IV Piggyback, ONCE, 1 dose, 20 at 1000, 250 mL                                             









                                                    









           lactated ringers IV infusion New Bag    2020  5:21 PM CDT 1,000

 mL   125 mL/hr  



                                        1,000 mL



                                                                 



           at 125 mL/hr, 1,000 mL, IV                                           

  



           Infusion, CONTINUOUS, Starting                                       

      



           20 at 1730, Until 20 at 0715, Routine                                             









                                                    









                linezolid in dextrose 5% (ZYVOX) 600 mg/300 Given           2020  8:39 AM  mg

                                                    



                                        mL infusion 600 mg



                                                                 



           600 mg, IV Piggyback, Q12H, First dose on                            

                 



           20 at 0800, Until Discontinued, 300                         

                    



           mL, Reason for Anti-Infective: Empiric                               

              



           Therapy for Suspected Infection, Empiric                             

                



           Therapy Site: Abdominal, Duration of                                 

            



           therapy: 7 days, Restricted use approved by:                         

                    



           Complicated intra-abdominal infection                                

             









             Given        2020  8:11 PM  mg                    

 

             Given        2020 11:05 AM  mg                    









                                                    









                magnesium sulfate in water 2 gram/50 mL (4 %) New Bag          11:38 AM CDT 2 

g                                                   



                                        infusion 2 g



                                                                 



           2 g, IV Piggyback, ONCE, 1 dose, Sun 5/3/20                          

                   



           at 1230, Routine                                             









                                                    









                magnesium sulfate in water 4 gram/50 mL (8 %) New Bag           3:23 AM CDT 4 

g                                                   



                                        IV Piggyback 4 g



                                                                 



           4 g, IV Piggyback, ONCE, 1 dose, u 20                         

                    



           at 0330, Routine                                             









                                                    









                magnesium sulfate in water 4 gram/50 mL (8 %) New Bag          10:41 AM CDT 4 

g                                                   



                                        IV Piggyback 4 g



                                                                 



           4 g, IV Piggyback, ONCE, 1 dose, 20                          

                   



           at 1000, Routine                                             









                                                    









                meropenem (MERREM) 500 mg in NaCl 0.9% (NS) Given           2020  5:20 AM  mg

                                                    



                                        50 mL MINI-BAG



                                                                 



           500 mg, IV Piggyback, Administer over 60                             

                



           Minutes, Q6H ABX, First dose on 20                          

                   



           at 1715, Until Discontinued, ASAP,                                   

          



           Restricted use approved by: [address],                           

                  



           Reason for Anti-Infective: Empiric Therapy                           

                  



           for Suspected Infection, Empiric Therapy                             

                



           Site: Abdominal, Duration of therapy: 7 days                         

                    









             Given        2020 11:28 PM  mg                    

 

             Given        2020  5:15 PM  mg                    









                                                    









                                        metroNIDAZOLE (FLAGYL I.V.) Piggyback 50

0 mg



             Given        2020  4:27 AM  mg                    



           500 mg, IV Piggyback, Q8H ABX, First dose on                         

                    



           20 at 1230, Until Discontinued, 100                          

                   



           mL, Reason for Anti-Infective: Documented                            

                 



           Infection, Documented Infection Site:                                

             



           Abdominal, Duration of Therapy: 7 days                               

              









             Given        2020  9:07 PM  mg                    

 

             Given        2020 11:38 AM  mg                    









                                                    









                                        morpHINE injection 2 mg



             Given        2020  9:41 AM CDT 2 mg                      



           2 mg, Slow IV Push, Q4HPRN, Starting 20 at 2306, Until 20 at 1306,                          

                   



           Routine, Pain (scale 7-10)                                           

  









             Given        2020  5:12 AM CDT 2 mg                      

 

             Given        2020 11:45 PM CDT 2 mg                      









                                                    









                                        morpHINE injection 2 mg



             Given        2020 12:22 AM CDT 2 mg                      



           2 mg, Slow IV Push, Q2HPRN, Starting 20 at 1315, Until 20 at 2214,                          

                   



           Routine, Pain (scale 7-10)                                           

  









             Given        2020  8:00 PM CDT 2 mg                      

 

             Given        2020  3:43 PM CDT 2 mg                      









                                                    









                                        morpHINE injection 2 mg



             Given        2020  7:48 AM CDT 2 mg                      



           2 mg, Slow IV Push, Q6HPRN, Starting /30/20 at 0033, Until 20 at 1232,                           

                  



           Routine, Pain (scale 7-10)                                           

  









             Given        2020  2:05 AM CDT 2 mg                      

 

             Given        2020  8:11 PM CDT 2 mg                      









                                                    









                                        morpHINE injection 2 mg



             Given        2020  5:03 AM CDT 2 mg                      



           2 mg, Slow IV Push, Q8HPRN, Starting 20 at 1545, Until Mon 20 at 1047,                            

                 



           Routine, Pain (scale 7-10)                                           

  









             Given        2020  9:07 PM CDT 2 mg                      

 

             Given        2020 12:06 PM CDT 2 mg                      









                                                    









                                        morpHINE injection 4 mg



             Given        2020  6:30 PM CDT 4 mg                      



           4 mg, Slow IV Push, ONCE, 1 dose, 20                        

                     



           at 1915, STAT                                             









                                                    









             NaCl 0.9% (NS) bolus infusion New Bag      2020  6:24 PM CDT 

1,000 mL     999 mL/hr

                                        



                                        1,000 mL



                                                                 



           at 999 mL/hr, 1,000 mL, IV                                           

  



           Infusion, ONCE, 1 dose, 20 at 1815, ASAP                                             









                                                    









             NaCl 0.9% (NS) bolus infusion New Bag      2020  7:32 PM CDT 

1,000 mL     999 mL/hr

                                        



                                        1,000 mL



                                                                 



           at 999 mL/hr, 1,000 mL, IV                                           

  



           Infusion, ONCE, 1 dose, 20 at 1830, ASAP                                             









                                                    









             NaCl 0.9% (NS) IV infusion 1,000 New Bag      2020  9:43 PM C

DT 1,000 mL     125 

mL/hr                                   



                                        mL



                                                                 



           at 125 mL/hr, Intravenous, ONCE,                                     

        



           1 dose, 20 at 2145,                                         

    



           ASAP                                                   









                                                    









             NaCl 0.9% (NS) IV infusion 1,000 New Bag      2020  7:30 AM C

DT 1,000 mL     125 

mL/hr                                   



                                        mL



                                                                 



           at 125 mL/hr, IV Infusion,                                           

  



           CONTINUOUS, Starting 20                                     

        



           at 0015, Until 20 at                                        

     



           1225, Routine                                             









                                                    









                                        NaCl 0.9% (NS) IV infusion 1,000 mL



             Rate Change  2020 12:41 PM CDT              75 mL/hr     



           at 75 mL/hr, IV Infusion,                                            

 



           CONTINUOUS, Starting 20 at                                  

           



           1230, Until 20 at 0715,                                      

       



           Routine                                                









                                                    









             octreotide (SANDOSTATIN) 1,250 New Bag      2020  5:30 AM CDT

 50 mcg/hr    10 

mL/hr                                   



                                        mcg in NaCl 0.9% (NS) infusion



                                                                 



           50 mcg/hr (10 mL/hr), IV                                             



           Infusion, CONTINUOUS, Starting                                       

      



           20 at 0115, Until Sat                                       

      



           20 at 0910                                             









             New Bag      2020  4:45 AM CDT 50 mcg/hr    10 mL/hr     

 

             New Bag      2020  3:33 AM CDT 50 mcg/hr    10 mL/hr     









                                                    









           pantoprazole (PROTONIX) 40 mg in NaCl 0.9% Given      2020  6:2

7 PM CDT 40 mg                 



                                        (NS) 100 mL MINI-BAG



                                                                 



           40 mg, IV Piggyback, ONCE, 1 dose, 20 at 1915, 100 mL                                             









                                                    









             pantoprazole (PROTONIX) 80 mg in New Bag      2020  9:33 PM C

DT 8 mg/hr      50 

mL/hr                                   



                                        NaCl 0.9% (NS) 500 mL infusion



                                                                 



           8 mg/hr (50 mL/hr), IV Piggyback,                                    

         



           CONTINUOUS, Starting 20                                     

        



           at 0115, Until Sat 20 at 0932,                                   

          



           500 mL                                                 









             New Bag      2020 11:27 AM CDT 8 mg/hr      50 mL/hr     

 

             New Bag      2020  9:07 PM CDT 8 mg/hr      50 mL/hr     









                                                    









           piperacillin-tazobactam (ZOSYN) 3.375 Given      2020 11:52 AM 

CDT 3.375 g               



                                        gram/50 mL Piggyback RTU 3.375 g



                                                                 



           3.375 g, IV Piggyback, Q6H ABX, First dose                           

                  



           on 20 at 0015, Until Discontinued,                          

                   



           50 mL, Reason for Anti-Infective: Empiric                            

                 



           Therapy for Suspected Infection, Empiric                             

                



           Therapy Site: Abdominal, Duration of                                 

            



           therapy: 72 hours                                             









             Given        2020  5:38 AM CDT 3.375 g                   

 

             Given        2020 11:49 PM CDT 3.375 g                   









                                                    









             proMETHazine (PHENERGAN) 25 mg in NaCl 0.9% Given        2020

  6:24 PM CDT 25 mg        

                                        



                                        (NS) 50 mL piggyback



                                                                 



           25 mg, IV Piggyback, ONCE, 1 dose, 20 at 1915, 50 mL                                             









                                                    









             proMETHazine (PHENERGAN) 25 mg in NaCl 0.9% Given        2020

  9:08 PM CDT 25 mg        

                                        



                                        (NS) 50 mL piggyback



                                                                 



           25 mg, IV Piggyback, ONCE, 1 dose, 20 at 2215, 50 mL                                             









                                                    









                                        QUEtiapine (SEROQUEL) tablet 100 mg



             Given        2020 12:25 AM  mg                    



           100 mg, Oral, QHS, First dose on Sat 5/2/20                          

                   



           at 0015, Until Discontinued, Routine                                 

            









                                                    









                                        QUEtiapine (SEROQUEL) tablet 100 mg



             Given        2020 10:03 PM  mg                    



           100 mg, Oral, QHS, First dose on Sat 5/2/20                          

                   



           at 2100, Until Discontinued, Routine                                 

            









                                                    









                                        QUEtiapine (SEROQUEL) tablet 200 mg



             Given        2020 12:43 AM  mg                    



           200 mg, Oral, ONCE, 1 dose, Sun 5/3/20 at                            

                 



           0145, Routine                                             









                                                    









           Sliding Scale Insulin - Aspart Given      2020  4:26 AM CDT 1 U

nits               Abdomen-SC



                                        (NOVOLOG) + Fsbg Testing



                                                                 



           Subcutaneous, Q4H, First dose on                                     

        



           20 at 1630, Until                                           

  



           Discontinued, Routine                                             









             Given        2020 11:34 PM CDT 1 Units                   Abdo

men-SC

 

             Given        2020  7:58 PM CDT 2 Units                   Abdo

men-SC









                                                    









           Sliding Scale Insulin - Aspart Given      2020  2:13 PM CDT 5 U

nits               Abdomen-SC



                                        (NOVOLOG) + Fsbg Testing



                                                                 



           Subcutaneous, TID MEALS+HS, First                                    

         



           dose on Sat 5/2/20 at 1700, Until                                    

         



           Discontinued, Routine                                             









             Given        2020  8:19 AM CDT 3 Units                   Abdo

men-SC

 

             Given        2020  4:19 PM CDT 5 Units                   Abdo

men-SC









                                                    









           Sliding Scale Insulin - Lispro Given      2020 11:38 AM 4 Units

               Left Upper



                                        (HumaLOG) + Fsbg Testing



                          CDT                                    Arm-SC



           Subcutaneous, Q4H, First dose on                                     

        



           20 at 0400, Until                                           

  



           Discontinued, Routine                                             









             Given        2020  8:29 AM CDT 5 Units                   Left

 Upper Arm-SC

 

             Given        2020  5:09 AM CDT 5 Units                   Righ

t Upper Arm-SC









                                                    









           vancomycin (VANCOCIN) 500 mg in NaCl 0.9% Given      2020  6:14

 AM  mg                



                                        (NS) enema



                                                                 



           500 mg, Rectal, Q6H ABX, First dose on 20 at 1245, Until Discontinued, 100 mL,                          

                   



           Reason for Anti-Infective: Documented                                

             



           Infection, Documented Infection Site:                                

             



           Abdominal, Duration of Therapy: 7 days                               

              









             Given        2020 12:43 AM  mg                    

 

             Given        2020  5:56 PM  mg                    









                                                    









           vancomycin 1000 mg in  mL RTU IV Given      2020  9:45 PM

 CDT 1,000 mg              



                                        Piggyback 1,000 mg



                                                                 



           1,000 mg, IV Piggyback, ONCE, 1 dose, Tue                            

                 



           20 at 2245, Reason for                                          

   



           Anti-Infective: Empiric Therapy for                                  

           



           Suspected Infection, Empiric Therapy Site:                           

                  



           Abdominal, Duration of therapy: 72 hours                             

                









                                                    



documented in this encounter



Insurance







          Payer     Benefit Plan / Subscriber ID Effective Dates Phone     Addre

ss   Type



                    Group                                             

 

          MEDICARE  MEDICARE PART xxxxxxxxxxx 3/1/2007-Presen [phone] [address] 

Medicare



                      [address]                 t          2          [id number]



                                        



                                                            [place] 



                                                            [id number] 

 

          [facility]      MEDICAID OF xxxxxxxxx 2018-Prese [id number] [address]   

Medicaid



                      [place]                 [address]         [address]          [id number]



                                        



                                                            [place] 



                                                            [address] 









           Guarantor Name Account Type Relation to Date of    Phone      Billing

 Address



                                 Patient    Birth                 

 

           Deborah Alatorre Personal/Famil Self       1960 [phone]



             [place]                                      (Home)



                                        [id number]







                                                       [phone] [place], [place]



                                                       (Work)     [id number]



documented as of this encounter

## 2020-07-14 NOTE — XMS REPORT
Summary of Care

                             Created on:2020



Patient:Deborah Alatorre

Sex:Female

:1960

External Reference #:[id number]





Demographics







                          Address                   [address]



                                                    [place] [address]

 

                          Mobile Phone              [phone]

 

                          Home Phone                [phone]

 

                          Work Phone                [phone]

 

                          Preferred Language        English

 

                          Marital Status            Single

 

                          Jainism Affiliation     Unknown

 

                          Race                      White

 

                          Ethnic Group              Not  or 









Author







                          Organization              [facility]

 

                          Address                   [address] [place] [address]









Support







                Name            Relationship    Address         Phone

 

                Rekha Maciel  Unavailable      [address] [phone]

[phone]



                                                [place] [address] 









Care Team Providers







                    Name                Role                Phone

 

                    DANN Barone         Primary Care Provider [phone]









Reason for Referral

MRI/CAT Scan (STAT)





           Status     Reason     Specialty  Diagnoses / Referred By Referred To



                                            Procedures Contact    Contact

 

                New Request                     Diagnostic      Diagnoses



SOB (shortness of breath) Mahamed Bain,            



                                                Radiology       



Procedures



CT ABDOMEN PELVIS W CONTRAST            [address] 



                                                                 [address]



                                        



                                                       Phone:     



                                                                 [phone]



                                        



                                                       Fax: [phone] 





Radiology Services (STAT)





           Status     Reason     Specialty  Diagnoses / Referred By Referred To



                                            Procedures Contact    Contact

 

                New Request                     Diagnostic      Diagnoses



SOB (shortness of breath) Mahamed Bain,            



                                                Radiology       



Procedures



Chest 2 Views                           [address] 



                                                                 [address]



                                        



                                                       Phone:     



                                                                 [phone]



                                        



                                                       Fax: [phone] 









Reason for Visit







                          Reason                    Comments

 

                          Shortness of Breath       



Auth/Cert





           Status     Reason     Specialty  Diagnoses / Referred By Referred To



                                            Procedures Contact    Contact

 

                                 Emergency Medicine                       [facility]

ergency



                                                                  Dept







                                                                  [address] [place] [address]



                                                                  







                                                                  [place], [place]



                                                                  [address]







                                                                  Phone:



                                                                  [phone]







                                                                  Fax: [phone]









Encounter Details







             Date         Type         Department   Care Team    Description

 

                2020 -    Emergency       [facility]   Mahamed Bain F

NP



                                        SOB (shortness of breath) (Primary Dx);



                    2020                              Department



                                        [address] [place] [address]



                                        [place];



                                                            [address] [place] [address] [address] [place], [place]             Leg swelling



                                                            [place] [address]



                                        [id number] [phone] (Fax) 







Allergies







             Active Allergy Reactions    Severity     Noted Date   Comments

 

             Propoxyphene N-Acetaminophen Other - See comments              2018   migraine

 

             Ondansetron  Itching                   2020   

 

             Ketorolac    Rash         Medium       2020   

 

             Tramadol     Other - See comments              2018   Anxious

 

             Azithromycin Rash                      2018   



documented as of this encounter (statuses as of 2020)



Medications







          Medication Sig       Dispensed Refills   Start Date End Date  Status

 

          QUEtiapine (SEROQUEL) Take 350 mg by           0                      

       Active



          300 mg tablet mouth at                                          



                    bedtime.                                          

 

          carvediloL 6.25 mg Take 6.25 mg by           0                        

     Active



          tablet    mouth 2 (two)                                         



                    times daily                                         



                    with meals.                                         

 

          sacubitril-valsartan Take 1 tablet           0                        

     Active



          (ENTRESTO) 49-51 mg by mouth 2                                        

 



          tablet    (two) times                                         



                    daily.                                            

 

          insulin aspart inject 30 Units           0                            

 Active



          prot/insuln asp (NOVOLOG under the skin                               

          



          MIX 70-30 SC) 3 (three) times                                         



                    daily.                                            

 

          potassium chloride Take 40 mEq by           0                         

    Active



          (KCL-20 ORAL) mouth at                                          



                    bedtime.                                          

 

          LACTULOSE ORAL Take 30 mg by           0                             A

ctive



                    mouth 2 (two)                                         



                    times daily.                                         

 

          furosemide (LASIX) 40 mg Take 80 mg by           0                    

         Active



          tablet    mouth daily.                                         

 

          HYDROcodone-acetaminophe Take 1 tablet           0                    

         Active



          n  mg tablet by mouth every                                     

    



                    4 (four) hours                                         



                    as needed.                                         

 

          methadone 5 mg tablet Take 10 mg by           0                       

      Active



                    mouth 3 (three)                                         



                    times daily.                                         

 

          vancomycin 250 mg Take 2 capsules 22 capsule 0         2020     

      Active



          capsuleIndications: by mouth 4                                        

 



          Vomiting and diarrhea, (four) times                                   

      



          Partial small bowel daily.                                            



          obstruction, Colitis,                                                 

  



          Gastrointestinal                                                   



          hemorrhage with melena,                                               

    



          C. difficile colitis,                                                 

  



          Hematemesis with nausea                                               

    

 

          vancomycin 250 mg Take 2 capsules 22 capsule 0         2020     

      Active



          capsuleIndications: C. by mouth 4                                     

    



          difficile colitis (four) times                                        

 



                    daily.                                            



documented as of this encounter (statuses as of 2020)



Active Problems







                          Problem                   Noted Date

 

                          Obesity (BMI 30-39.9)     2020

 

                          Hematemesis               2020

 

                          Melena                    2020

 

                          Partial small bowel obstruction 2020

 

                          Hyperglycemia             2020

 

                          Cocaine dependence, continuous 2006



documented as of this encounter (statuses as of 2020)



Social History







             Tobacco Use  Types        Packs/Day    Years Used   Date

 

             Current Every Day Smoker                                        









                Smokeless Tobacco: Never Used                                 









                          Sex Assigned at Birth     Date Recorded

 

                          Not on file               









                    Job Start Date      Occupation          Industry

 

                    Not on file         Not on file         Not on file









                    Travel History      Travel Start        Travel End









                                        No recent travel history available.









                    COVID-19 Exposure   Response            Date Recorded

 

                    In the last month, have you been in contact with No / Unsure

         6/3/2020  7:01 PM 

CDT



                    someone who was confirmed or suspected to have              

       



                    Coronavirus / COVID-19?                     



documented as of this encounter



Last Filed Vital Signs







                Vital Sign      Reading         Time Taken      Comments

 

                Blood Pressure  147/73          2020 12:00 AM CDT 

 

                Pulse           88              2020 12:00 AM CDT 

 

                Temperature     37.6 C (99.7 F) 2020  7:14 PM CDT 

 

                Respiratory Rate 15              2020 10:45 PM CDT 

 

                Oxygen Saturation 97%             2020 12:00 AM CDT 

 

                Inhaled Oxygen Concentration -               -               

 

                Weight          80.5 kg (177 lb 8 oz) 2020  7:45 PM CDT 

 

                Height          160 cm (5' 3")  2020  7:45 PM CDT 

 

                Body Mass Index 31.44           2020  7:45 PM CDT 



documented in this encounter



Discharge Instructions

Mahamed Don FNP - 2020DIAGNOSIS

1. Leg swelling

2. [facility]



NO LIFE-THREATENING FINDINGS ON TODAY'S EXAM.



PROCEDURES IN THE ER TODAY:

Labs, IV, xray, CT



MEDICATIONS ADMINISTERED IN THE ER TODAY:

Fentanyl, IV contrast



YOUR PRESCRIPTIONS AND OVER-THE-COUNTER MEDICATION RECOMMENDATIONS:

Take your medications as prescribed



FOLLOW-UP RECOMMENDATIONS:

RECOMMEND FOLLOW-UP WITH A PRIMARY CARE PROVIDER OR SPECIALIST IN 2-5 DAYS, 
ESPECIALLY IF NO IMPROVEMENT IN SYMPTOMS.



MAY FOLLOW-UP WITH A PROVIDER OF YOUR CHOICE, SUCH AS:

1. A PHYSICIAN OF YOUR CHOICE

2. [facility] AND [facility], [phone]. LOCATIONS IN [facility]

3. [facility], [address], [place]; 
[phone]



OR, IF YOU WISH TO FOLLOW-UP WITHIN THE [facility], MAY TRY THESE 
OPTIONS (CLINIC APPOINTMENTS AVAILABLE ON CASE-BY-CASE BASIS):

1. SCHEDULE AN APPOINTMENT ONLINE AT WWW.[facility]

2. OR CALL THE [facility] ACCESS [facility] AT [phone] OR [phone]

3. OR CALL YOUR [facility] PHYSICIAN'S OFFICE DIRECTLY IF YOU ARE ALREADY AN 
ESTABLISHED [facility] PATIENT.



RETURN TO ER FOR WORSENING OF SYMPTOMS.



Return to the ER for chest pain, shortness of breath, intractable vomiting or 
fever greater than 100.4

AttachmentsThe following attachments cannot be sent through Care Everywhere.
Diuretic, Taking a (English)Lymphedema (English)documented in this encounter



Plan of Treatment







                Health Maintenance Due Date        Last Done       Comments

 

                HEPATITIS C (HCV) SCREEN 1960                      

 

                PNEUMOCOCCAL 0-64 YEARS COMBINED SERIES (1 of 3 - 10/22/1967    

                  



                PCV13)                                          

 

                DTaP,Tdap,and Td Vaccines (1 - Tdap) 10/22/1972                 

     

 

                Depression Screening 10/22/1973                      

 

                PAP SMEAR       10/22/1982                      

 

                Breast Cancer Screening (MAMMOGRAM) 10/22/2001                  

    

 

                COLONOSCOPY     10/22/2011                      

 

                Zoster Recombinant Vaccine (SHINGRIX) (1 of 2) 10/22/2011       

               

 

                LUNG CANCER SCREEN: Recommended for age 55-80 with 30 + 10/22/20

16                      



                pack year history                                 

 

                INFLUENZA VACCINE (Season Ended) 2020                     

 



documented as of this encounter



Procedures







             Procedure Name Priority     Date/Time    Associated   Comments



                                                    Diagnosis    

 

             XR CHEST 2 VW STAT         2020  8:47 SOB (shortness of Resul

ts for this



                                       PM CDT       breath)      procedure are i

n



                                                                 the results



                                                                 section.

 

             CT ABDOMEN PELVIS W STAT         2020  8:45 SOB (shortness of

 Results for this



             CONTRAST                  PM CDT       breath)      procedure are i

n



                                                                 the results



                                                                 section.

 

             COVID-19 (ID NOW STAT         2020  7:33 SOB (shortness of Re

sults for this



             RAPID TESTING)              PM CDT       breath)      procedure are

 in



                                                                 the results



                                                                 section.

 

             URINALYSIS   STAT         2020  7:33 SOB (shortness of Result

s for this



                                       PM CDT       breath)      procedure are i

n



                                                                 the results



                                                                 section.

 

             PROTHROMBIN TIME / STAT         2020  7:33 SOB (shortness of 

Results for this



             INR                       PM CDT       breath)      procedure are i

n



                                                                 the results



                                                                 section.

 

             POCT PREGNANCY TEST ASAP         2020  7:26 SOB (shortness of

 Results for this



                                       PM CDT       breath)      procedure are i

n



                                                                 the results



                                                                 section.

 

             CBC WITH DIFFERENTIAL STAT         2020  7:19 SOB (shortness 

of Results for this



                                       PM CDT       breath)      procedure are i

n



                                                                 the results



                                                                 section.

 

             N-TERMINAL PRO-BNP STAT         2020  7:19 SOB (shortness of 

Results for this



                                       PM CDT       breath)      procedure are i

n



                                                                 the results



                                                                 section.

 

             CBC WITH DIFFERENTIAL Routine      2020  7:19 SOB (shortness 

of Results for 

this



                                       PM CDT       breath)      procedure are i

n



                                                                 the results



                                                                 section.

 

             BASIC METABOLIC PANEL STAT         2020  7:19 SOB (shortness 

of Results for this



             (NA, K, CL, CO2,              PM CDT       breath)      procedure a

re in



             GLUCOSE, BUN,                                        the results



             CREATININE, CA)                                        section.

 

             HEPATIC FUNCTION STAT         2020  7:19 SOB (shortness of Re

sults for this



             PANEL ([id number])              PM CDT       breath)      procedure are 

in



             (ALB,T.PRO,BILI                                        the results



             T,BU/BC,ALT,AST,ALK                                        section.



             PHOS)                                               

 

             TROPONIN I   STAT         2020  7:19 SOB (shortness of Result

s for this



                                       PM CDT       breath)      procedure are i

n



                                                                 the results



                                                                 section.

 

             LIPASE       STAT         2020  7:19 SOB (shortness of Result

s for this



                                       PM CDT       breath)      procedure are i

n



                                                                 the results



                                                                 section.

 

             EKG-12 LEAD  STAT         2020  7:14              



                                       PM CDT                    

 

             CONSENT/REFUSAL FOR Routine      2020  7:01              



             DIAGNOSIS AND              PM CDT                    



             TREATMENT                                           



documented in this encounter



Results

Chest 2 Views (2020  8:47 PM CDT)





                                        Specimen

 

                                        









                          Impressions               Performed At

 

                                         



                                        PACS/VR/DOSE



                                        No acute cardiopulmonary process.



                                        



                                         



                                        



                                        Preliminary Report Dictated by Resident:

 Tabatha Khan I, Paul Hatfield MD., have reviewe

d this study and agree with the



                                        



                          above report.             









                          Narrative                 Performed At

 

                                        XR CHEST 2 VW



                                        PACS/VR/DOSE



                                         



                                        



                                        Comparison: 2020



                                        



                                         



                                        



                                        History: sob 



                                        



                                         



                                        



                                        Findings:



                                        



                                         



                                        



                          The lungs exhibit mildly prominent basilar reticular n

odular opacities. 



                                        No



                                        



                                        pleural effusion, focal consolidation, o

r pneumothorax is identified.



                                        



                                        Surgical staples are scattered over the 

thorax.



                                        



                                         



                                        



                                        The cardiomediastinal silhouette is with

in normal limits for size.



                                        



                                         



                                        



                                        No acute osseous abnormality is present.



                                        



                                                    









                                        Procedure Note

 

                                        [facility], Radiant Results Inft User - 2020  9:50 PM CDT



XR CHEST 2 VW



                                        



                                        Comparison: 2020



                                        



                                        History: sob



                                        



                                        Findings:



                                        



                                        The lungs exhibit mildly prominent basil

ar reticular nodular opacities. No



                                        pleural effusion, focal consolidation, o

r pneumothorax is identified.



                                        Surgical staples are scattered over the 

thorax.



                                        



                                        The cardiomediastinal silhouette is with

in normal limits for size.



                                        



                                        No acute osseous abnormality is present.



                                        



                                        IMPRESSION



                                        



                                        No acute cardiopulmonary process.



                                        



                                        Preliminary Report Dictated by Resident:

 Paul Pineda MD., have reviewed

 this study and agree with the



                                        above report.









                Performing Organization Address         City/State/Zipcode Phone

 Number

 

                PACS/VR/DOSE                                    



CT ABDOMEN PELVIS W CONTRAST (2020  8:45 PM CDT)





                                        Specimen

 

                                        









                          Impressions               Performed At

 

                                         



                                        PACS/VR/DOSE



                          1. Short segments of colonic wall thickening with en

gorgement of the 



                                        vasa



                                        



                                        recta and surrounding inflammatory stran

ding at the hepatic flexure and



                                        



                          splenic flexure. These findings are favored to represe

nt colitis which 



                                        may



                                        



                                        be infectious or inflammatory rather ton

n hepatic colopathy.



                                        



                                         



                                        



                                        2. Changes of cirrhosis with sequelae 

of portal hypertension including



                                        



                                        small volume ascites, small gastroesopha

geal varices, and splenomegaly.



                                        



                                         



                                        



                          3. Irregular hypoattenuating lesion at the inferior 

aspect of segment 



                                        VI,



                                        



                          essentially unchanged from the 2020. This findin

g likely 



                                        represents



                                        



                          posttreatment change from the residual treated segment

 VI HCC. Attention 



                                        on



                                        



                                        follow-up.



                                        



                                         



                                        



                                        4. Skin thickening noted over the left

 breast, correlation with



                                        



                                        mammography is recommended.



                                        



                                         



                                        



                                         



                                        



                                        Preliminary Report Dictated by Resident:

 Paul Mccall MD., have reviewe

d this study and agree with the



                                        



                          above report.             









                          Narrative                 Performed At

 

                                        EXAM: CT ABDOMEN AND PELVIS WITH CONTRAS

T



                                        PACS/VR/DOSE



                                         



                                        



                                        HISTORY: Abdominal distention



                                        



                                         



                                        



                                        COMPARISON: 2020.



                                        



                                         



                                        



                          TECHNIQUE AND FINDINGS: Contiguous axial imaging from 

the level of the 



                                        lung



                                        



                                        bases through the pubic symphysis was pe

rformed after the uncomplicated



                                        



                                        administration of 120 cc of intravenous 

Omnipaque contrast. Coronal and



                                        



                                        sagittal reconstructions were obtained. 

Auto mA and/or iterative



                                        



                                        reconstruction were used to reduce radia

tion dose.



                                        



                                         



                                        



                                        FINDINGS:



                                        



                                         



                                        



                                        Subsegmental atelectasis is noted in the

 lingula, right middle lobe, and



                                        



                                        lower lobes. Calcified granulomas are pr

esent within both lower lobes.



                                        



                                         



                                        



                                        The liver is enlarged measuring 20.3 cm 

in the craniocaudal dimension.



                                        



                                        Cirrhotic liver morphology. Nodular cont

our. There is an irregular,



                                        



                          wedge-shaped 5.5 x 1.8 cm hypoattenuating lesion in se

gment VI with 



                                        areas



                                        



                          of subcapsular retraction. Punctate calcifications in 

segment V and VI. 



                                        No



                                        



                                        additional focal lesions are seen. Statu

s post cholecystectomy. Mild



                                        



                          dilatation of the common bile duct to 10 mm, likely se

condary to 



                                        reservoir



                                        



                                        phenomenon.



                                        



                                         



                                        



                          The spleen is enlarged measuring 15.1 cm in the cranio

caudal dimension. 



                                        The



                                        



                                        adrenals and pancreas are unremarkable. 

2.9 cm right interpolar region



                                        



                          renal cyst. Additional cortical hypodensities are too 

small to 



                                        accurately



                                        



                                        characterize.



                                        



                                         



                                        



                                        Small sliding-type hiatal hernia. There 

is a short segment of thickened



                                        



                                        transverse colon at the level of the hep

atic flexure with engorgement of



                                        



                                        the vasa recta and mild surrounding infl

ammatory changes. A similar



                                        



                          thickened loop of bowel with surrounding inflammatory 

changes is noted 



                                        at



                                        



                                        the splenic flexure (2:63).



                                        



                                         



                                        



                          Small volume perihepatic ascites. A small amount of fl

uid tracks along 



                                        the



                                        



                          left paracolic gutter. Prominent portacaval lymph node

s measure up to 



                                        1.3



                                        



                                        cm. A lymph node at the priscilla hepatis me

asures 1.2 cm in the short axis.



                                        



                                         



                                        



                                        Moderate calcified and noncalcified plaq

ue affect the aortoiliac



                                        



                                        vasculature. Small esophageal varices marie

spected.



                                        



                                         



                                        



                          Body wall anasarca. Mild spondylosis at L5-S1. Scarrin

g along the 



                                        anterior



                                        



                          abdominal wall is likely postsurgical. There is soft t

issue density 



                                        along



                                        



                                        the medial margin of the bilateral breas

ts, more prominent on the right,



                                        



                          likely represent postsurgical change. There are adjace

nt surgical 



                                        staples



                                        



                                        bilaterally as well as in the axilla. Sk

in thickening is noted over the



                                        



                                        left breast. 



                                        



                                         



                                        



                                                    









                                        Procedure Note

 

                                        [facility], Radiant Results Inft User - 2020 11:56 PM CDT



EXAM: CT ABDOMEN AND PELVIS WITH CONTRAST



                                        



                                        HISTORY: Abdominal distention



                                        



                                        COMPARISON: 2020.



                                        



                                        TECHNIQUE AND FINDINGS: Contiguous axial

 imaging from the level of the lung



                                        bases through the pubic symphysis was pe

rformed after the uncomplicated



                                        administration of 120 cc of intravenous 

Omnipaque contrast. Coronal and



                                        sagittal reconstructions were obtained. 

 Auto mA and/or iterative



                                        reconstruction were used to reduce radia

tion dose.



                                        



                                        FINDINGS:



                                        



                                        Subsegmental atelectasis is noted in the

 lingula, right middle lobe, and



                                        lower lobes. Calcified granulomas are pr

esent within both lower lobes.



                                        



                                        The liver is enlarged measuring 20.3 cm 

in the craniocaudal dimension.



                                        Cirrhotic liver morphology. Nodular cont

our. There is an irregular,



                                        wedge-shaped 5.5 x 1.8 cm hypoattenuatin

g lesion in segment VI with areas



                                        of subcapsular retraction. Punctate calc

ifications in segment V and VI. No



                                        additional focal lesions are seen. Statu

s post cholecystectomy. Mild



                                        dilatation of the common bile duct to 10

 mm, likely secondary to reservoir



                                        phenomenon.



                                        



                                        The spleen is enlarged measuring 15.1 cm

 in the craniocaudal dimension. The



                                        adrenals and pancreas are unremarkable. 

2.9 cm right interpolar region



                                        renal cyst. Additional cortical hypodens

ities are too small to accurately



                                        characterize.



                                        



                                        Small sliding-type hiatal hernia. There 

is a short segment of thickened



                                        transverse colon at the level of the hep

atic flexure with engorgement of



                                        the vasa recta and mild surrounding infl

ammatory changes. A similar



                                        thickened loop of bowel with surrounding

 inflammatory changes is noted at



                                        the splenic flexure (2:63).



                                        



                                        Small volume perihepatic ascites. A smal

l amount of fluid tracks along the



                                        left paracolic gutter. Prominent portaca

lauren lymph nodes measure up to 1.3



                                        cm. A lymph node at the priscilla hepatis me

asures 1.2 cm in the short axis.



                                        



                                        Moderate calcified and noncalcified plaq

ue affect the aortoiliac



                                        vasculature. Small esophageal varices marie

spected.



                                        



                                        Body wall anasarca. Mild spondylosis at 

L5-S1. Scarring along the anterior



                                        abdominal wall is likely postsurgical. T

here is soft tissue density along



                                        the medial margin of the bilateral breas

ts, more prominent on the right,



                                        likely represent postsurgical change. Th

ere are adjacent surgical staples



                                        bilaterally as well as in the axilla. Sk

in thickening is noted over the



                                        left breast.



                                        



                                        



                                        IMPRESSION



                                        



                                        1.  Short segments of colonic wall thick

ening with engorgement of the vasa



                                        recta and surrounding inflammatory stran

ding at the hepatic flexure and



                                        splenic flexure. These findings are favo

red to represent colitis which may



                                        be infectious or inflammatory rather ton

n hepatic colopathy.



                                        



                                        2.  Changes of cirrhosis with sequelae o

f portal hypertension including



                                        small volume ascites, small gastroesopha

geal varices, and splenomegaly.



                                        



                                        3.  Irregular hypoattenuating lesion at 

the inferior aspect of segment VI,



                                        essentially unchanged from the 

0. This finding likely represents



                                        posttreatment change from the residual t

reated segment VI HCC. Attention on



                                        follow-up.



                                        



                                        4.  Skin thickening noted over the left 

breast, correlation with



                                        mammography is recommended.



                                        



                                        



                                        Preliminary Report Dictated by Resident:

 Harvey Harris I, Paul Hatfield MD., have reviewed

 this study and agree with the



                                        above report.









                Performing Organization Address         City/[facility]/Zipcode Phone

 Number

 

                PACS/VR/DOSE                                    



PROTHROMBIN TIME / INR (2020  7:33 PM CDT)





             Component    Value        Ref Range    Performed At Pathologist



                                                                 Signature

 

             PROTIME PATIENT 13.7         12.0 - 14.7  [facility]     



                                                    LABORATORY   

 

             INR          1.1Comment: Normal              [facility] 



                          INR <1.1; Warfarin              [facility]     



                          Therapeutic range              LABORATORY   



                          2.0 to 3.0 or 2.5                           



                          to 3.5, depending                           



                          upon the                               



                          indications.                           









                                        Specimen

 

                                        Blood - VENOUS









                Performing Organization Address         City[facility]

 Number

 

                [facility] CLIA: [id number], [address], [place] [address]

[address] [phone]



                LABORATORY      [address]                  



COVID-19 (ID NOW RAPID TESTING) (2020  7:33 PM CDT)





             Component    Value        Ref Range    Performed At Pathologist



                                                                 Signature

 

             SARS-CoV-2 Rapid ID Not Detected Not Detected [facility] LABORATORY 









                                        Specimen

 

                                        Swab - NASOPHARYNGEAL SWAB









                          Narrative                 Performed At

 

                          ID NOW COVID-19 Assay is an isothermal nucleic [facility] 

LABORATORY



                          acid amplification test intended for the 



                          qualitative detection of nucleic acid from 



                          SARS-CoV-2 viral RNA in nasopharyngeal (NP) 



                          specimens. It is used under Emergency Use 



                          Authorization (EUA) by FDA. The limit of 



                          detection (LOD) of the assay is 125 [employer] 



                                        Equivalents/mL.



                                        



                                         



                                        



                          A positive result is indicative of the presence 



                          of SARS-CoV-2 RNA. Clinical correlation with 



                          patient history and other diagnostic information 



                          is necessary to determine patient infection 



                                        status.



                                        



                                         



                                        



                          A negative (Not Detected) result does not 



                          preclude SARS-CoV-2 infection. In patients with 



                          clinical symptoms and other tests that are 



                          consistent with SARS-CoV-2 infection, negative 



                          results should be treated as presumptive 



                          negative and a new specimen should be tested 



                                        with alternative PCR molecular test.



                                        



                                         



                                        



                          Invalid: Please collect a new specimen for 



                          repeat patient testing if clinically indicated. 









                Performing Organization Address         City/[facility]/Zipcode Phone

 Number

 

                [facility] CLIA: [id number], [address], [place] [address]

[address] [phone]



                LABORATORY      [address]                  



Urinalysis (2020  7:33 PM CDT)





             Component    Value        Ref Range    Performed At Pathologist Sig

nature

 

             APPEARANCE   Hazy (A)     Clear        [facility] LABORATORY 

 

             COLOR        Yellow       Yellow       [facility] LABORATORY 

 

             PH           5.0          4.8 - 8.0    [facility] LABORATORY 

 

             SP GRAVITY   1.026        1.003 - 1.030 [facility] LABORATORY 

 

             GLU U QUAL   500 mg/dL (A) Normal       [facility] LABORATORY 

 

             BLOOD        Negative     Negative     [facility] LABORATORY 

 

             KETONES      Negative     Negative     [facility] LABORATORY 

 

             PROTEIN      30 mg/dL (A) Negative     [facility] LABORATORY 

 

             UROBILIN     2.0 mg/dL (A) Normal       [facility] LABORATORY 

 

             BILIRUBIN    Negative     Negative     [facility] LABORATORY 

 

             NITRITE      Negative     Negative     [facility] LABORATORY 

 

             LEUK MOE   Negative     Negative     [facility] LABORATORY 

 

             RBC/HPF      2            0 - 3 HPF    [facility] LABORATORY 

 

             WBC/HPF      1            0 - 5 HPF    [facility] LABORATORY 

 

             BACTERIA     Negative     Negative     [facility] LABORATORY 

 

             MUCOUS       Slight (A)   Negative LPF [facility] LABORATORY 

 

             SQ EPITH     8            HPF          [facility] LABORATORY 

 

             HYAL CAST    1            <=2 LPF      [facility] LABORATORY 









                                        Specimen

 

                                        Urine - URINE, CLEAN CATCH









                Performing Organization Address         City/State/Zipcode Phone

 Number

 

                [facility] CLIA: [id number], [address], [place] [address]

[address] [phone]



                LABORATORY      [address]                  



POCT Pregnancy Test, Urine (2020  7:26 PM CDT)





             Component    Value        Ref Range    Performed At Pathologist Sig

nature

 

             POCT PREG    negative                               

 

             On board controls acceptable present                               

 



             with C Line                                         

 

             POCT PREG LOT # [id number]                             

 

             POCT PREG TEST  DATE 2021                             









                                        Specimen

 

                                        Urine - URINE, CLEAN CATCH



CBC WITH DIFFERENTIAL (2020  7:19 PM CDT)





             Component    Value        Ref Range    Performed At Pathologist



                                                                 Signature

 

             WBC          4.04 (L)     4.30 - 11.10 [facility] 



                                       10*3/L     [facility]     



                                                    LABORATORY   

 

             RBC          3.05 (L)     3.93 - 5.25  [facility] 



                                       10*6/L     HOSPITAL     



                                                    LABORATORY   

 

             HGB          9.3 (L)      11.6 - 15.0  [facility] 



                                       g/dL         HOSPITAL     



                                                    LABORATORY   

 

             HCT          29.0 (L)     35.7 - 45.2  [facility] 



                                       %            HOSPITAL     



                                                    LABORATORY   

 

             MCV          95.1         80.6 - 95.5  [facility] 



                                       fL           [facility]     



                                                    LABORATORY   

 

             MCH          30.5         25.9 - 32.8  [facility] 



                                       pg           [facility]     



                                                    LABORATORY   

 

             MCHC         32.1         31.6 - 35.1  [facility] 



                                       g/dL         [facility]     



                                                    LABORATORY   

 

             RDW-SD       64.8 (H)     39.0 - 49.9  [facility]     



                                                    LABORATORY   

 

             RDW-CV       19.3 (H)     12.0 - 15.5  [facility] 



                                       %            [facility]     



                                                    LABORATORY   

 

             PLT          57 (L)       166 - 358    [facility] 



                                       10*3/L     [facility]     



                                                    LABORATORY   

 

             MPV          10.0         9.5 - 12.9   [facility]     



                                                    LABORATORY   

 

             IPF %        2.2Comment:  1.3 - 7.7 %  [facility] 



                          Platelet count              HOSPITAL     



                          measured by               LABORATORY   



                          fluorescence                           



                          method.                                

 

             NRBC/100 WBC 0.0          0.0 - 10.0   [facility] 



                                       /100 WBCs    [facility]     



                                                    LABORATORY   

 

             NRBC x10^3   <0.01        10*3/L     [facility]     



                                                    LABORATORY   

 

             GRAN MAT (NEUT) % 60.1         %            [facility]     



                                                    LABORATORY   

 

             IMM GRAN %   0.50         %            [facility]     



                                                    LABORATORY   

 

             LYMPH %      29.5         %            [facility]     



                                                    LABORATORY   

 

             MONO %       9.2          %            [facility]     



                                                    LABORATORY   

 

             EOS %        0.5          %            [facility]     



                                                    LABORATORY   

 

             BASO %       0.2          %            [facility]     



                                                    LABORATORY   

 

             GRAN MAT x10^3(ANC) 2.43         1.88 - 7.09  [facility] 



                                       10*3/uL      [facility]     



                                                    LABORATORY   

 

             IMM GRAN x10^3 <0.03        0.00 - 0.06  [facility] 



                                       10*3/uL      [facility]     



                                                    LABORATORY   

 

             LYMPH x10^3  1.19 (L)     1.32 - 3.29  [facility] 



                                       10*3/uL      [facility]     



                                                    LABORATORY   

 

             MONO x10^3   0.37         0.33 - 0.92  [facility] 



                                       10*3/uL      [facility]     



                                                    LABORATORY   

 

             EOS x10^3    <0.03 (L)    0.03 - 0.39  [facility] 



                                       10*3/uL      [facility]     



                                                    LABORATORY   

 

             BASO x10^3   <0.03        0.01 - 0.07  [facility] 



                                       10*3/uL      [facility]     



                                                    LABORATORY   

 

             POLYCHROMASIA 2+           2+           [facility]     



                                                    LABORATORY   

 

             ROUJAD     Present (A)  (none)       [facility]     



                                                    LABORATORY   









                                        Specimen

 

                                        Blood - VENOUS









                Performing Organization Address         City/State/Zipcode Phone

 Number

 

                [facility] CLIA: [id number], [address] [place], [place] [address]

[address] [phone]



                LABORATORY      [address]                  



N-TERMINAL PRO-BNP (2020  7:19 PM CDT)





             Component    Value        Ref Range    Performed At Pathologist Sig

[facility]

 

             NT-proBNP    407 (H)      <=125 pg/mL  [facility] 



                                                    LABORATORY   









                                        Specimen

 

                                        Blood - VENOUS









                          Narrative                 Performed At

 

                          Biotin has been reported to cause a negative [facility] 

LABORATORY



                          bias, interpret results relative to patient's 



                          use of biotin.            









                Performing Organization Address         City/[facility] Phone

 Number

 

                [facility] CLIA: [id number], [address] [place] [address]

15 [phone]



                LABORATORY      [address]                  



Troponin I (2020  7:19 PM CDT)





             Component    Value        Ref Range    Performed At [facility]

nature

 

             TROPONIN I   <0.012       <=0.034 ng/mL [facility] 



                                                    LABORATORY   









                                        Specimen

 

                                        Blood - VENOUS









                          Narrative                 Performed At

 

                                        Equal or Less than 0.034 ng/ml---Normal 





                                        [facility] LABORATORY



                          Note: Cardiac troponin begins to rise 3-4 hours 



                          after the onset of ischemia. Repeat in 4-6 hours 



                          if the sample was drawn within 3-4 hours of the 



                                        onset of the symptom and found normal. 



                                        



                                         



                                        



                          Between 0.035 and 0.120 ng/mL--- Borderline. 



                                        Questionable myocardial injury or necros

is  



                                        



                          Note: Serial measurement may be necessary to 



                          confirm or exclude the diagnosis of myocardial 



                          injury or necrosis; Clinical correlation 



                          (symptoms, EKGs, imaging studies, and others) 



                          required; Repeat in 4-6 hours if clinically 



                                        indicated.    



                                        



                                         



                                        



                          Equal or Higher than 0.121 ng/mL---Abnormal. 



                          Myocardial Injury or Necrosis Likely     



                                        



                                        



                                         



                                        



                          Biotin has been reported to cause a negative 



                          bias, interpret results relative to patient's 



                          use of biotin.            



                                                   



                                        



                                           



                                                   



                                        



                                                    









                Performing Organization Address         City/[facility] Phone

 Number

 

                [facility] CLIA: [id number], [address] [place], [place] [address]

15 [phone]



                LABORATORY      [address]                  



Lipase Serum (2020  7:19 PM CDT)





             Component    Value        Ref Range    Performed At [facility]

nature

 

             LIPASE       76           0 - 220 U/L  [facility] 



                                                    LABORATORY   









                                        Specimen

 

                                        Blood - VENOUS









                Performing Organization Address         City/[facility] Phone

 Number

 

                [facility] CLIA: [id number], [address] [place], [place] [address]

15 [phone]



                LABORATORY      [address]                  



Hepatic Function Panel (ALB, T.PRO, BILI T, BU/BC, ALT, AST, ALK PHOS) 
(2020  7:19 PM CDT)





             Component    Value        Ref Range    Performed At Pathologist Sig

[facility]

 

             TOTAL BILI   1.6 (H)      0.1 - 1.1 mg/dL [facility]

 



                                                    LABORATORY   

 

             BILI UNCON   1.2 (H)      0.1 - 1.1 mg/dL [facility]

 



                                                    LABORATORY   

 

             BILI CONJ    0.0          0.0 - 0.3 mg/dL [facility]

 



                                                    LABORATORY   

 

             T PROTEIN    6.5          6.3 - 8.2 g/dL [facility] 



                                                    LABORATORY   

 

             ALBUMIN      2.9 (L)      3.5 - 5.0 g/dL [facility] 



                                                    LABORATORY   

 

             ALK PHOS     175 (H)      34 - 122 U/L [facility] 



                                                    LABORATORY   

 

             ALTv         26           5 - 35 U/L   [facility] 



                                                    LABORATORY   

 

             AST(SGOT)    49 (H)       13 - 40 U/L  [facility] 



                                                    LABORATORY   









                                        Specimen

 

                                        Blood - VENOUS









                Performing Organization Address         City/State/Zipcode Phone

 Number

 

                [facility] CLIA: [id number], [address] [place] [address]

15 [phone]



                LABORATORY      [facility]                  



Basic Metabolic Panel (NA, K, CL, CO2, GLUCOSE, BUN, CREATININE, CA) (2020
 7:19 PM CDT)





             Component    Value        Ref Range    Performed At [facility]ologist [facility]

nature

 

             NA           133 (L)      135 - 145    [facility] 



                                       mmol/L       [facility] LABORATORY 

 

             K            4.4          3.5 - 5.0    [facility] 



                                       mmol/L       [facility] LABORATORY 

 

             CL           105          98 - 108 mmol/L [facility] LABORATORY 

 

             CO2 TOTAL    28           23 - 31 mmol/L [facility] LABORATORY 

 

             AGAP         <1 (L)       2 - 16       [facility] LABORATORY 

 

             BUN          16           7 - 23 mg/dL [facility] LABORATORY 

 

             GLUCOSE      291 (H)      70 - 110 mg/dL [facility] LABORATORY 

 

             CREATININE   0.66         0.50 - 1.04  [facility] 



                                       mg/dL        [facility] LABORATORY 

 

             CALCIUM      8.4 (L)      8.6 - 10.6   [facility] 



                                       mg/dL        [facility] LABORATORY 

 

             eGFR Calculation 92.0         mL/min/1.73m2 [facility] 



             (Non-[facility] LABORATORY 



             American)                                           

 

             eGFR Calculation 111.5        mL/min/1.73m2 [facility] 



             (African American)                           HOSPITAL LABORATORY 









                                        Specimen

 

                                        Blood - VENOUS









                          Narrative                 Performed At

 

                          Association of Glomerular Filtration Rate (GFR) KITTY

[facility] 

LABORATORY



                                        and Staging of Kidney Disease*



                                        



                                         



                                        



                          +-----------------------+---------------------+- 



                                        ------------------------+



                                        



                          | GFR (mL/min/1.73 m2) | With Kidney Damage 



                                        | Without Kidney Damage



                                        



                          +-----------------------+---------------------+- 



                                        ------------------------+



                                        



                          | >90         | Stage one 



                              |  Normal        



                                        



                                        



                          +-----------------------+---------------------+- 



                                        ------------------------+



                                        



                          | 60-89        | Stage two 



                                            |  Decreased GFR   

  



                                        



                          +-----------------------+---------------------+- 



                                        ------------------------+



                                        



                          | 30-59        | Stage three 



                                           |  Stage three     

 



                                        



                          +-----------------------+---------------------+- 



                                        ------------------------+



                                        



                          | 15-29        | Stage four 



                                            |  Stage four     

 



                                        



                          +-----------------------+---------------------+- 



                                        ------------------------+



                                        



                          | <15 (or dialysis)  | Stage five   



                                          |  Stage five      



                                        



                          +-----------------------+---------------------+- 



                                        ------------------------+



                                        



                                         



                                        



                          *Each stage assumes the associated GFR level has 



                          been in effect for at least three months. 



                          Stages 1 to 5, with or without kidney disease, 



                                        indicate chronic kidney disease.



                                        



                                         



                                        



                          Notes: Determination of stages one and two (with 



                          eGFR >59mL/min/1.73 m2) requires estimation of 



                          kidney damage for at least three months as 



                          defined by structural or functional 



                          abnormalities of the kidney, manifested by 



                                        either:



                                        



                          Pathological abnormalities or Markers of kidney 



                          damage (including abnormalities in the 



                          composition of the blood or urine or 



                                        abnormalities in imaging tests). 



                                        



                                                    









                Performing Organization Address         City/State/Zipcode Phone

 Number

 

                [facility] CLIA: [id number], [address] [place] [address]

15 [phone]



                LABORATORY      [address]                  



documented in this encounter



Visit Diagnoses







                                        Diagnosis

 

                                        SOB (shortness of breath) - Primary







                                        Shortness of breath

 

                                        Anasarca







                                        Edema

 

                                        Leg swelling







                                        Swelling of limb



documented in this encounter



Administered Medications







           Medication Order MAR Action Action Date Dose       Rate       Site

 

           FENTanyl PF (SUBLIMAZE (PF)) Given      2020 10:38 PM CDT 50 mc

g                



                                        injection 50 mcg



                                                                 



           50 mcg, Slow IV Push, ONCE, 1                                        

     



           dose, Wed 6/3/20 at 2345, STAT                                       

      









                                                    









           iohexol (OMNIPAQUE 350 BULK-150 mL) Given      2020 10:45 PM CD

T 120 mL                



                                        injection 120 mL



                                                                 



           120 mL, Intravenous, ONCE, 1 dose, Wed                               

              



           6/3/20 at 2100, Routine                                             









                                                    



documented in this encounter



Insurance







          Payer     Benefit Plan / Subscriber ID Effective Dates Phone     Addre

ss   Type



                    Group                                             

 

          MEDICARE  MEDICARE PART xxxxxxxxxxx 3/1/2007-Ben [phone] [address] 

[facility] 

Medicare



                      A & B                 t          2          [id number]



                                        



                                                            [place] 



                                                            [address] 

 

          [facility]      MEDICAID OF xxxxxxxxx 2018-Prese [id number] [address]   

Medicaid



                      [place]                 nt         0          [id number]



                                        



                                                            [place], [place] 



                                                            [address] 









           Guarantor Name Account Type Relation to Date of    Phone      Billing

 Address



                                 Patient    Birth                 

 

           Deborah Alatorre Personal/Famil Self       1960 [phone]                                      (Home)



                                        [id number]







                                                       [phone] [place], [place]



                                                       (Work)     [id number]



documented as of this encounter

## 2020-07-14 NOTE — EDPHYS
Physician Documentation                                                                           

 [facility]                                                                 

Name: Deborah Alatorre                                                                              

Age: 59 yrs                                                                                       

Sex: Female                                                                                       

: 1960                                                                                   

MRN: [id number]                                                                                   

Arrival Date: 2020                                                                          

Time: 10:23                                                                                       

Account#: [id number]                                                                            

Bed 27                                                                                            

Private MD: Sarah Barone C                                                                  

ED Physician Sukhwinder Her                                                                      

HPI:                                                                                              

                                                                                             

14:29 This 59 yrs old  Female presents to ER via Wheelchair with complaints of High  kb  

      Blood Sugar, Cough, Fever.                                                                  

14:30 The patient or guardian reports hyperglycemia, that was potentially precipitated by no  kb  

      particular event. Onset: The symptoms/episode began/occurred 2 week(s) ago. The patient     

      has not experienced similar symptoms in the past. The patient has not recently seen a       

      physician.                                                                                  

14:30 Associated signs and symptoms: Pertinent positives: cough, fever, lower ext             kb  

      swelling/redness/warmth and high blood sugar. Current symptoms: In the emergency            

      department the patient's symptoms are unchanged from the initial presentation. Pt           

      reports she went to her PCP (Abisai) for cough, fever and lower ext cellulitis that        

      has been going on for 2 weeks. Was sent here because her blood sugar was high.              

                                                                                                  

Historical:                                                                                       

- Allergies:                                                                                      

10:56 Zithromax;                                                                              dm5 

10:56 tramadol;                                                                               dm5 

10:56 Toradol;                                                                                dm5 

10:56 Erythromycin;                                                                           dm5 

10:56 Demerol;                                                                                dm5 

10:56 Darvocet-N 100;                                                                         dm5 

- Home Meds:                                                                                      

10:56 Methadone Oral [Active]; Seroquel Oral [Active]; Coreg Oral [Active]; ernestro [Active];dm5 

- PMHx:                                                                                           

10:56 Anxiety; Cancer, Breast; Chronic pain; Colitis; COPD; Depression; Diabetes - IDDM;      dm5 

      Liver cell carcinoma; MUSCLE WEAKNESS; CHF; Hypertension;                                   

- PSHx:                                                                                           

10:56 Cholecystectomy; ; Mastectomy, Left; Mastectomy, Right; Appendectomy;          dm5 

      Hysterectomy;                                                                               

                                                                                                  

- Immunization history:: Adult Immunizations up to date.                                          

- Social history:: Smoking status: Patient reports the use of cigarette tobacco                   

  products, smokes one-half pack cigarettes per day.                                              

                                                                                                  

                                                                                                  

ROS:                                                                                              

14:25 ENT: Negative for injury, pain, and discharge, Neck: Negative for injury, pain, and     kb  

      swelling, Cardiovascular: Negative for chest pain, palpitations, and edema, Abdomen/GI:     

      Negative for abdominal pain, nausea, vomiting, diarrhea, and constipation, : Negative     

      for injury, bleeding, discharge, and swelling, MS/Extremity: Negative for injury and        

      deformity, Neuro: Negative for headache, weakness, numbness, tingling, and seizure.         

14:25 Constitutional: Positive for fever.                                                         

14:25 Respiratory: Positive for cough, Negative for dyspnea on exertion, hemoptysis,              

      orthopnea, pleurisy, shortness of breath, sputum production, wheezing.                      

14:25 Skin: Positive for cellulitis, of the right leg and left leg.                               

                                                                                                  

Exam:                                                                                             

14:27 Constitutional:  This is a well developed, well nourished patient who is awake, alert,  kb  

      and in no acute distress. Head/Face:  Normocephalic, atraumatic. Chest/axilla:  Normal      

      chest wall appearance and motion.  Nontender with no deformity.  No lesions are             

      appreciated. Cardiovascular:  Regular rate and rhythm with a normal S1 and S2.  No          

      gallops, murmurs, or rubs.  Normal PMI, no JVD.  No pulse deficits. Respiratory:  Lungs     

      have equal breath sounds bilaterally, clear to auscultation and percussion.  No rales,      

      rhonchi or wheezes noted.  No increased work of breathing, no retractions or nasal          

      flaring. Abdomen/GI:  Soft, non-tender, with normal bowel sounds.  No distension or         

      tympany.  No guarding or rebound.  No evidence of tenderness throughout. Back:  No          

      spinal tenderness.  No costovertebral tenderness.  Full range of motion. MS/ Extremity:     

       Pulses equal, no cyanosis.  Neurovascular intact.  Full, normal range of motion.           

      Neuro:  Awake and alert, GCS 15, oriented to person, place, time, and situation.            

      Cranial nerves II-XII grossly intact.  Motor strength 5/5 in all extremities.  Sensory      

      grossly intact.  Cerebellar exam normal.  Normal gait.                                      

14:27 Skin: cellulitis, that is mild, on the  right lower leg, left lower leg.                    

                                                                                                  

Vital Signs:                                                                                      

10:51  / 64; Pulse 77; Resp 18; Temp 98.2; Pulse Ox 94% ; Weight 72.57 kg; Height 5 ft. dm5 

      3 in. (160.02 cm); Pain 10/10;                                                              

14:30  / 67; Pulse 71; Pulse Ox 95% on R/A;                                             vc  

10:51 Body Mass Index 28.34 (72.57 kg, 160.02 cm)                                             dm5 

                                                                                                  

MDM:                                                                                              

11:01 Patient medically screened.                                                             kb  

14:25 Data reviewed: vital signs, nurses notes. Data interpreted: Pulse oximetry: on room air kb  

      is 94 %. Interpretation: normal.                                                            

14:46 Counseling: I had a detailed discussion with the patient and/or guardian regarding: the kb  

      historical points, exam findings, and any diagnostic results supporting the                 

      discharge/admit diagnosis, lab results, radiology results, the need for outpatient          

      follow up, a family practitioner, to return to the emergency department if symptoms         

      worsen or persist or if there are any questions or concerns that arise at home.             

14:47 ED course: Pt doesn't want to stay anymore. States she can get her sugar down at home.. kb  

                                                                                                  

                                                                                             

11:00 Order name: CBC with Diff; Complete Time: 12:41                                         kb  

                                                                                             

11:00 Order name: Basic Metabolic Panel; Complete Time: 11:53                                 kb  

                                                                                             

11:00 Order name: Blood Culture Adult (2)                                                     kb  

                                                                                             

11:00 Order name: Acetone, Serum; Complete Time: 11:53                                        kb  

                                                                                             

11:00 Order name: Lactate; Complete Time: 14:41                                               kb  

                                                                                             

11:00 Order name: Procalcitonin; Complete Time: 12:16                                         kb  

                                                                                             

11:00 Order name: IV Start; Complete Time: 14:34                                              kb  

                                                                                             

11:00 Order name: Chest Single View XRAY; Complete Time: 14:00                                kb  

                                                                                             

11:00 Order name: US Extremity Venous W Compression Theron; Complete Time: 13:38                 kb  

                                                                                             

11:31 Order name: Glucose, Ancillary Testing; Complete Time: 11:33                            EDMS

                                                                                             

12:37 Order name: Manual Differential; Complete Time: 12:41                                   EDMS

                                                                                             

14:47 Order name: Blood Glucose Level; Complete Time: 14:55                                   kb  

                                                                                                  

Administered Medications:                                                                         

13:45 Drug: Insulin Regular Human 10 units {Co-Signature:  (Christie Lane RN).} Route:    ah  

      IVP; Site: right antecubital;                                                               

14:36 Not Given (Patient Refused): NS 0.9% 1000 ml IV at 1000 ml once                           

                                                                                                  

                                                                                                  

Point of Care Testing:                                                                            

      Blood Glucose:                                                                              

10:57 Blood Glucose: 493 mg/dL;                                                               dm5 

      Ranges:                                                                                     

      Critical Glucose Levels:Adult <50 mg/dl or >400 mg/dl  <40 mg/dl or >180 mg/dl       

Disposition:                                                                                      

20:34 Co-signature as Attending Physician, Sukhwinder Her MD I agree with the assessment and  mily 

      plan of care.                                                                               

                                                                                                  

Disposition:                                                                                      

20 14:46 Discharged to Home. Impression: Hyperglycemia, unspecified, Cellulitis of right    

  lower limb, Cellulitis of left lower limb.                                                      

- Condition is Stable.                                                                            

- Discharge Instructions: Cellulitis, Adult, Easy-to-Read, Hyperglycemia, Easy-to-Read.           

- Prescriptions for Keflex 500 mg Oral Capsule - take 1 capsule by ORAL route every 8             

  hours for 10 days; 30 capsule. Bactrim - 160 mg Oral Tablet - take 1 tablet by            

  ORAL route every 12 hours for 10 days; 20 tablet.                                               

- Medication Reconciliation Form, Thank You Letter, Antibiotic Education, Prescription            

  Opioid Use form.                                                                                

- Follow up: Emergency Department; When: As needed; Reason: Worsening of condition.               

  Follow up: Private Physician; When: 2 - 3 days; Reason: Recheck today's complaints,             

  Continuance of care, Re-evaluation by your physician.                                           

                                                                                                  

                                                                                                  

                                                                                                  

Signatures:                                                                                       

Dispatcher [employer]                           EDMS                                                 

Marietta Padgett, FNP-C                 FNP-Renita Trevino, RN                    RN   dm5                                                  

Sukhwinder Her MD MD cha Pisarski, Jacob                              jp3                                                  

Roselyn Grullon, BART Lane RN, vc                                                   

                                                                                                  

Corrections: (The following items were deleted from the chart)                                    

14:55 14:46 2020 14:46 Discharged to Home. Impression: Hyperglycemia, unspecified;      jp3 

      Cellulitis of right lower limb; Cellulitis of left lower limb. Condition is Stable.         

      Forms are Medication Reconciliation Form, Thank You Letter, Antibiotic Education,           

      Prescription Opioid Use. Follow up: Emergency Department; When: As needed; Reason:          

      Worsening of condition. Follow up: Private Physician; When: 2 - 3 days; Reason: Recheck     

      today's complaints, Continuance of care, Re-evaluation by your physician. kb                

                                                                                                  

**************************************************************************************************

## 2020-07-14 NOTE — RAD REPORT
EXAM DESCRIPTION:  Chanel Single View7/14/2020 1:48 pm

 

CLINICAL HISTORY:  cough

 

COMPARISON:  2019

 

FINDINGS:   The lungs appear clear of acute infiltrate. The heart is normal size

 

IMPRESSION:   No acute abnormalities displayed

## 2020-07-14 NOTE — XMS REPORT
Summary of Care

                             Created on:May 6, 2020



Patient:Deborah Alatorre

Sex:Female

:1960

External Reference #:[id number]





Demographics







                          Address                   [address]



                                                    [place] [address]

 

                          Mobile Phone              [phone]

 

                          Home Phone                [phone]

 

                          Work Phone                [phone]

 

                          Preferred Language        English

 

                          Marital Status            Single

 

                          Jain Affiliation     Unknown

 

                          Race                      White

 

                          Ethnic Group              Not  or 









Author







                          Organization              [facility]

 

                          Address                   [address] [place] [id number]









Support







                Name            Relationship    Address         Phone

 

                Rekha Maciel  Unavailable     [address] [phone]

[phone]



                                                [place] [address] 

 

                No one else per patient Unavailable     Unavailable     Unavaila

ble









Care Team Providers







                    Name                Role                Phone

 

                    DANN Barone         Primary Care Provider [phone]









Reason for Visit







                          Reason                    Comments

 

                          Transition Of Care        







Encounter Details







             Date         Type         Department   Care Team    Description

 

             2020   Transition of Care [facility] Mayra Grayson

 Transition Of 

Care



                                                [facility]



                                        



                                                    [phone] 







Allergies







             Active Allergy Reactions    Severity     Noted Date   Comments

 

             Propoxyphene N-Acetaminophen Other - See comments              2018   migraine

 

             Ondansetron  Itching                   2020   

 

             Tramadol     Other - See comments              2018   Anxious

 

             Azithromycin Rash                      2018   



documented as of this encounter (statuses as of 2020)



Medications







          Medication Sig       Dispensed Refills   Start Date End Date  Status

 

          QUEtiapine (SEROQUEL) Take 350 mg by           0                      

       Active



          300 mg tablet mouth at                                          



                    bedtime.                                          

 

          carvediloL 6.25 mg Take 6.25 mg by           0                        

     Active



          tablet    mouth 2 (two)                                         



                    times daily                                         



                    with meals.                                         

 

          sacubitril-valsartan Take 1 tablet           0                        

     Active



          (ENTRESTO) 49-51 mg by mouth 2                                        

 



          tablet    (two) times                                         



                    daily.                                            

 

          insulin aspart inject 30 Units           0                            

 Active



          prot/insuln asp (NOVOLOG under the skin                               

          



          MIX 70-30 SC) 3 (three) times                                         



                    daily.                                            

 

          potassium chloride Take 40 mEq by           0                         

    Active



          (KCL-20 ORAL) mouth at                                          



                    bedtime.                                          

 

          LACTULOSE ORAL Take 30 mg by           0                             A

ctive



                    mouth 2 (two)                                         



                    times daily.                                         

 

          furosemide (LASIX) 40 mg Take 80 mg by           0                    

         Active



          tablet    mouth daily.                                         

 

          HYDROcodone-acetaminophe Take 1 tablet           0                    

         Active



          n  mg tablet by mouth every                                     

    



                    4 (four) hours                                         



                    as needed.                                         

 

          methadone 5 mg tablet Take 10 mg by           0                       

      Active



                    mouth 3 (three)                                         



                    times daily.                                         

 

          vancomycin 250 mg Take 2 capsules 22 capsule 0         2020     

      Active



          capsuleIndications: by mouth 4                                        

 



          Vomiting and diarrhea, (four) times                                   

      



          Partial small bowel daily.                                            



          obstruction, Colitis,                                                 

  



          Gastrointestinal                                                   



          hemorrhage with melena,                                               

    



          C. difficile colitis,                                                 

  



          Hematemesis with nausea                                               

    



documented as of this encounter (statuses as of 2020)



Active Problems







                          Problem                   Noted Date

 

                          Obesity (BMI 30-39.9)     2020

 

                          Hematemesis               2020

 

                          Melena                    2020

 

                          Partial small bowel obstruction 2020

 

                          Hyperglycemia             2020

 

                          Cocaine dependence, continuous 2006



documented as of this encounter (statuses as of 2020)



Social History







             Tobacco Use  Types        Packs/Day    Years Used   Date

 

             Current Every Day Smoker                                        









                Smokeless Tobacco: Never Used                                 









                          Sex Assigned at Birth     Date Recorded

 

                          Not on file               









                    Job Start Date      Occupation          Industry

 

                    Not on file         Not on file         Not on file









                    Travel History      Travel Start        Travel End









                                        No recent travel history available.









                    COVID-19 Exposure   Response            Date Recorded

 

                    In the last month, have you been in contact with No / Unsure

         2020  6:20 PM 

CDT



                    someone who was confirmed or suspected to have              

       



                    Coronavirus / COVID-19?                     



documented as of this encounter



Last Filed Vital Signs

Not on filedocumented in this encounter



Plan of Treatment







                Health Maintenance Due Date        Last Done       Comments

 

                HEPATITIS C (HCV) SCREEN 1960                      

 

                PNEUMOCOCCAL 0-64 YEARS COMBINED SERIES (1 of  - 10/22/1967    

                  



                PPSV23)                                         

 

                DTaP,Tdap,and Td Vaccines (1 - Tdap) 10/22/1972                 

     

 

                PAP SMEAR       10/22/1982                      

 

                Breast Cancer Screening (MAMMOGRAM) 10/22/2001                  

    

 

                COLONOSCOPY     10/22/2011                      

 

                Zoster Recombinant Vaccine (SHINGRIX) (1 of 2) 10/22/2011       

               

 

                LUNG CANCER SCREEN: Recommended for age 55-80 with 30 + 10/22/20

16                      



                pack year history                                 

 

                INFLUENZA VACCINE (Season Ended) 2020                     

 



documented as of this encounter



Results

Not on filedocumented in this encounter



Insurance







          Payer     Benefit Plan / Subscriber ID Effective Dates Phone     Addre

   Type



                    Group                                             

 

          MEDICARE  MEDICARE PART xxxxxxxxxxx 3/1/2007-Ben [phone] [address] 

[facility] 

Medicare



                      [address]                 t          2          [id number]



                                        



                                                            [place] 



                                                            [id number] 

 

          [employer]      MEDICAID OF xxxxxxxxx 2018-Prese [phone] [address]   

Medicaid



                      [place]                 nt         0          [id number]



                                        



                                                            [place], [place] 



                                                            [address] 



documented as of this encounter

## 2020-07-14 NOTE — XMS REPORT
Summary of Care

                             Created on:May 19, 2020



Patient:Deborah Alatorre

Sex:Female

:1960

External Reference #:[id number]





Demographics







                          Address                   [address]



                                                    [place] [address]

 

                          Mobile Phone              [phone]

 

                          Home Phone                [phone]

 

                          Work Phone                [phone]

 

                          Preferred Language        English

 

                          Marital Status            Single

 

                          Mosque Affiliation     Unknown

 

                          Race                      White

 

                          Ethnic Group              Not  or 









Author







                          Organization              [facility]

 

                          Address                   [address] [place] [id number]









Support







                Name            Relationship    Address         Phone

 

                Rekha Maciel  Unavailable     [address] [phone]

[phone]



                                                [place] [address] 

 

                No one else per patient Unavailable     Unavailable     Unavaila

[facility]









Care Team Providers







                    Name                Role                Phone

 

                    DANN Barone         Primary Care Provider [phone]









Reason for Referral

MRI/CAT Scan (Routine)





           Status     Reason     Specialty  Diagnoses / Referred By Referred To



                                            Procedures Contact    Contact

 

                New Request                     Diagnostic      Diagnoses



Lower abdominal pain      Pk Nash,          



                                                Radiology       



Procedures



CT ABDOMEN PELVIS W CONTRAST            DO



                                        



                                                       [address] [place] [address]



                                        



                                                                 [address] [place] [address] 



                                                                 [id number]



                                        



                                                       Phone:     



                                                                 [phone]



                                        



                                                       Fax: [phone] 









Reason for Visit







                          Reason                    Comments

 

                          Abdominal Pain            

 

                          Nausea                    

 

                          BLOOD IN STOOL            



Auth/Cert





           Status     Reason     Specialty  Diagnoses / Referred By Referred To



                                            Procedures Contact    Contact

 

                                 Emergency Medicine                       [facility]

ergency



                                                                  Dept







                                                                  [address] [place] [address]



                                                                  







                                                                  [place], [place]



                                                                  [address]







                                                                  Phone:



                                                                  [phone]







                                                                  Fax: [phone]

[phone]









Encounter Details







             Date         Type         Department   Care Team    Description

 

                2020      Emergency       [facility]   Pk Nash DO



                                        Lower abdominal pain (Primary Dx);



                                                            Department



                                        [address] [place] [address]



                                        Hyperglycemia;



                                                            [address] [place] [address] 



                                        [address]



                                        [place] [address]



                                        [place], [place] [address] [phone] (Fax) 







Allergies







             Active Allergy Reactions    Severity     Noted Date   Comments

 

             Propoxyphene N-Acetaminophen Other - See comments              2018   migraine

 

             Ondansetron  Itching                   2020   

 

             Ketorolac    Rash         Medium       2020   

 

             Tramadol     Other - See comments              2018   Anxious

 

             Azithromycin Rash                      2018   



documented as of this encounter (statuses as of 2020)



Medications







          Medication Sig       Dispensed Refills   Start Date End Date  Status

 

          QUEtiapine (SEROQUEL) Take 350 mg by           0                      

       Active



          300 mg tablet mouth at                                          



                    bedtime.                                          

 

          carvediloL 6.25 mg Take 6.25 mg by           0                        

     Active



          tablet    mouth 2 (two)                                         



                    times daily                                         



                    with meals.                                         

 

          sacubitril-valsartan Take 1 tablet           0                        

     Active



          (ENTRESTO) 49-51 mg by mouth 2                                        

 



          tablet    (two) times                                         



                    daily.                                            

 

          insulin aspart inject 30 Units           0                            

 Active



          prot/insuln asp (NOVOLOG under the skin                               

          



          MIX 70-30 SC) 3 (three) times                                         



                    daily.                                            

 

          potassium chloride Take 40 mEq by           0                         

    Active



          (KCL-20 ORAL) mouth at                                          



                    bedtime.                                          

 

          LACTULOSE ORAL Take 30 mg by           0                             A

ctive



                    mouth 2 (two)                                         



                    times daily.                                         

 

          furosemide (LASIX) 40 mg Take 80 mg by           0                    

         Active



          tablet    mouth daily.                                         

 

          HYDROcodone-acetaminophe Take 1 tablet           0                    

         Active



          n  mg tablet by mouth every                                     

    



                    4 (four) hours                                         



                    as needed.                                         

 

          methadone 5 mg tablet Take 10 mg by           0                       

      Active



                    mouth 3 (three)                                         



                    times daily.                                         

 

          vancomycin 250 mg Take 2 capsules 22 capsule 0         2020     

      Active



          capsuleIndications: by mouth 4                                        

 



          Vomiting and diarrhea, (four) times                                   

      



          Partial small bowel daily.                                            



          obstruction, Colitis,                                                 

  



          Gastrointestinal                                                   



          hemorrhage with melena,                                               

    



          C. difficile colitis,                                                 

  



          Hematemesis with nausea                                               

    

 

          vancomycin 250 mg Take 2 capsules 22 capsule 0         2020     

      Active



          capsuleIndications: C. by mouth 4                                     

    



          difficile colitis (four) times                                        

 



                    daily.                                            



documented as of this encounter (statuses as of 2020)



Active Problems







                          Problem                   Noted Date

 

                          Obesity (BMI 30-39.9)     2020

 

                          Hematemesis               2020

 

                          Melena                    2020

 

                          Partial small bowel obstruction 2020

 

                          Hyperglycemia             2020

 

                          Cocaine dependence, continuous 2006



documented as of this encounter (statuses as of 2020)



Social History







             Tobacco Use  Types        Packs/Day    Years Used   Date

 

             Current Every Day Smoker                                        









                Smokeless Tobacco: Never Used                                 









                          Sex Assigned at Birth     Date Recorded

 

                          Not on file               









                    Job Start Date      Occupation          Industry

 

                    Not on file         Not on file         Not on file









                    Travel History      Travel Start        Travel End









                                        No recent travel history available.









                    COVID-19 Exposure   Response            Date Recorded

 

                    In the last month, have you been in contact with No / Unsure

         2020 12:12 PM 

CDT



                    someone who was confirmed or suspected to have              

       



                    Coronavirus / COVID-19?                     



documented as of this encounter



Last Filed Vital Signs







                Vital Sign      Reading         Time Taken      Comments

 

                Blood Pressure  164/94          2020  2:28 PM CDT 

 

                Pulse           92              2020  2:28 PM CDT 

 

                Temperature     37.1 C (98.8 F) 2020 12:18 PM CDT 

 

                Respiratory Rate 20              2020  2:28 PM CDT 

 

                Oxygen Saturation 94%             2020  2:28 PM CDT 

 

                Inhaled Oxygen Concentration -               -               

 

                Weight          73.9 kg (163 lb) 2020 12:18 PM CDT 

 

                Height          160 cm (5' 3")  2020 12:18 PM CDT 

 

                Body Mass Index 28.87           2020 12:18 PM CDT 



documented in this encounter



Discharge Instructions

AttachmentsThe following attachments cannot be sent through Care Everywhere.
Colitis, Understanding (English)Hyperglycemia (High Blood Sugar) (English)
documented in this encounter



Plan of Treatment







                Health Maintenance Due Date        Last Done       Comments

 

                HEPATITIS C (HCV) SCREEN 1960                      

 

                PNEUMOCOCCAL 0-64 YEARS COMBINED SERIES (1 of 1 - 10/22/1967    

                  



                PPSV23)                                         

 

                DTaP,Tdap,and Td Vaccines (1 - Tdap) 10/22/1972                 

     

 

                PAP SMEAR       10/22/1982                      

 

                Breast Cancer Screening (MAMMOGRAM) 10/22/2001                  

    

 

                COLONOSCOPY     10/22/2011                      

 

                Zoster Recombinant Vaccine (SHINGRIX) (1 of 2) 10/22/2011       

               

 

                LUNG CANCER SCREEN: Recommended for age 55-80 with 30 + 10/22/20

16                      



                pack year history                                 

 

                INFLUENZA VACCINE (Season Ended) 2020                     

 



documented as of this encounter



Procedures







             Procedure Name Priority     Date/Time    Associated   Comments



                                                    Diagnosis    

 

             CT ABDOMEN PELVIS W Routine      2020  2:02 Lower abdominal R

esults for this



             CONTRAST                  PM CDT       pain         procedure are i

n



                                                                 the results



                                                                 section.

 

             CBC WITH DIFFERENTIAL STAT         2020 12:40 Lower abdominal

 Results for this



                                       PM CDT       pain         procedure are i

n



                                                                 the results



                                                                 section.

 

             URINALYSIS   STAT         2020 12:40 Lower abdominal Results 

for this



                                       PM CDT       pain         procedure are i

n



                                                                 the results



                                                                 section.

 

             CBC WITH DIFFERENTIAL Routine      2020 12:40 Lower abdominal

 Results for this



                                       PM CDT       pain         procedure are i

n



                                                                 the results



                                                                 section.

 

             COMP. METABOLIC PANEL STAT         2020 12:40 Lower abdominal

 Results for this



             ([address])                   PM CDT       pain         procedure are i

n



                                                                 the results



                                                                 section.

 

             LIPASE       STAT         2020 12:40 Lower abdominal Results 

for this



                                       PM CDT       pain         procedure are i

n



                                                                 the results



                                                                 section.

 

             NOTICE OF PRIVACY Routine      2020 12:11              



             PRACTICES                 PM CDT                    

 

             ASSIGNMENT OF Routine      2020 12:11              



             BENEFITS                  PM CDT                    



documented in this encounter



Results

CT ABDOMEN PELVIS W CONTRAST (2020  2:02 PM CDT)





                                        Specimen

 

                                        









                          Narrative                 Performed At

 

                                        CT Abdomen and Pelvis with intravenous c

ontrast.



                                        PACS/VR/DOSE



                                         



                                        



                                        CLINICAL HISTORY: Acute generalized abdo

naty pain.



                                        



                                         



                                        



                          DOSE: Up-to-date CT equipment and radiation dose reduc

tion techniques 



                                        were



                                        



                                        employed. 



                                        



                                         



                                        



                                        CTDIvol: 7.09 mGy. DLP: 368 mGy-cm.



                                        



                                         



                                        



                                        TECHNIQUE : Contiguous axial imaging fro

m the level of the lung bases



                                        



                                        through the pubic symphysis were perform

ed after the uncomplicated



                                        



                                        administration of Omnipaque contrast mat

erial. Coronal and sagittal



                                        



                          reconstructions were obtained. Auto mA and/or iterativ

e reconstruction 



                                        were



                                        



                                        used to reduce radiation dose.



                                        



                                         



                                        



                          FINDINGS: Comparison is made with previous CT scans of

 2020 as well 



                                        as



                                        



                                        2020.



                                        



                                         



                                        



                                        Lower lungs: Minimal fibrosis in the rig

ht lower lung, lingula and right



                                        



                          middle lobe. Calcified granuloma in the lingula segmen

t and anterior 



                                        basal



                                        



                                        right lower lung. No pleural effusion or

 pericardial effusion. Possible



                                        



                                        short sliding hiatal hernia and small es

ophageal varices.



                                        



                                         



                                        



                          Liver, Gallbladder and Spleen: S/P cholecystectomy. Li

ana maria is enlarged, 



                                        19.4



                                        



                                        cm and showed an indeterminate low-densi

ty lesion of 5.4 x 1.8 x 3



                                        



                                        (transverse x AP x length) cm size in th

e right lobe. Undulating serosal



                                        



                          surface of the liver is consistent with chronic primar

y liver disease 



                                        with



                                        



                          portal hypertension causing splenomegaly. Spleen is at

 18 x 6 cm. 



                                        Dilated



                                        



                                        portal venous system noted. Portal vein 

is patent.



                                        



                                         



                                        



                                        Peritoneum: No free air or free fluid.

 No lymphadenopathy.



                                        



                                         



                                        



                          Pancreas and Adrenals: Unremarkable pancreas and rig

ht adrenal gland. 



                                        Mild



                                        



                                        nonspecific left adrenal gland hypertrop

hy noted.



                                        



                                         



                                        



                          Kidneys and Ureters: No visible calculi in the renal

 collecting 



                                        systems. 



                                        



                                        No hydroureter or hydronephrosis. No enh

ancing kidney lesions are seen.



                                        



                          Bosniak type I cystic lesion of 2.6 cm size in the lat

eral interpolar 



                                        right



                                        



                                        kidney noted.



                                        



                                         



                                        



                                        Vessels: Atherosclerosis. No AAA.



                                        



                                         



                                        



                                        Retroperitoneum: No abnormal fluid or ly

mphadenopathy.



                                        



                                         



                                        



                          Bowel: Pericolonic congestion surrounding cecum and de

scending colon 



                                        seen



                                        



                                        in the previous study is improved. This 

time there is minimal congestion



                                        



                                        present around the ascending colon.



                                        



                                         



                                        



                                        3.5 cm size large diverticulum noted edward

ng the medial border of the



                                        



                                        duodenal C-loop just distal to the ampul

la.



                                        



                                         



                                        



                                        Bladder and Reproductive Organs: Hystere

ctomy. No gross pathology in the



                                        



                                        unopacified urinary bladder.



                                        



                                         



                                        



                                        Bones: Moderate degenerative disc diseas

e at L5-S1 and old fracture of



                                        



                                        upper plate of L2 with loss of 40% of it

s height in the central portion.



                                        



                                         



                                        



                                        Soft tissues: Abdominal scar tissue note

d in the suprapubic region from



                                        



                          previous surgery. An irregular shaped fat containing 2

.5 cm size 



                                        umbilical



                                        



                                        hernia.



                                        



                                         



                                        



                                        CONCLUSION:



                                        



                                        1. No acute intra-abdominal or pelvic ab

normalities detected.



                                        



                                        2. Interval improvement noted in the con

gestion of pericolonic fat



                                        



                                        surrounding a sending colon compared to 

the previous study. Mucosal



                                        



                          enhancement is still present in the right cerebral lar

ge bowel. This 



                                        could



                                        



                                        be due to nonspecific colitis. Mild cons

tipation noted. No significant



                                        



                                        diverticular disease



                                        



                                        3. Hepatosplenomegaly with hepatic archi

tecture suggestive of chronic



                                        



                                        primary liver disease, possibly cirrhosi

s with portal hypertension and



                                        



                                        esophageal varices.



                                        



                          4. Wedge shaped lesion in the lower right lobe of the 

liver is difficult 



                                        to



                                        



                          accurately characterize. It is unchanged when compared

 with the recent 



                                        CT



                                        



                                        scans of 2020 and 2020.



                                        



                                        5. S/P cholecystectomy and hysterectomy.



                                        



                                         



                                        



                                         



                                        



                                         



                                        



                                         



                                        



                                                    









                                        Procedure Note

 

                                        [facility], Radiant Results Inft User - 2020  2:21 PM CDT



CT Abdomen and Pelvis with intravenous contrast.



                                        



                                        CLINICAL HISTORY: Acute generalized abdo

naty pain.



                                        



                                        DOSE: Up-to-date CT equipment and radiat

ion dose reduction techniques were



                                        employed.



                                        



                                        CTDIvol: 7.09 mGy. DLP: 368 mGy-cm.



                                        



                                        TECHNIQUE : Contiguous axial imaging fro

m the level of the lung bases



                                        through the pubic symphysis were perform

ed after the uncomplicated



                                        administration of Omnipaque contrast mat

erial. Coronal and sagittal



                                        reconstructions were obtained. Auto mA a

nd/or iterative reconstruction were



                                        used to reduce radiation dose.



                                        



                                        FINDINGS: Comparison is made with previo

us CT scans of 2020 as well as



                                        2020.



                                        



                                        Lower lungs: Minimal fibrosis in the rig

ht lower lung, lingula and right



                                        middle lobe. Calcified granuloma in the 

lingula segment and anterior basal



                                        right lower lung. No pleural effusion or

 pericardial effusion. Possible



                                        short sliding hiatal hernia and small es

ophageal varices.



                                        



                                        Liver, Gallbladder and Spleen: S/P joel

cystectomy. Liver is enlarged, 19.4



                                        cm and showed an indeterminate low-densi

ty lesion of 5.4 x 1.8 x 3



                                        (transverse x AP x length) cm size in th

e right lobe. Undulating serosal



                                        surface of the liver is consistent with 

chronic primary liver disease with



                                        portal hypertension causing splenomegaly

. Spleen is at 18 x 6 cm. Dilated



                                        portal venous system noted. Portal vein 

is patent.



                                        



                                        Peritoneum:  No free air or free fluid. 

No lymphadenopathy.



                                        



                                        Pancreas and Adrenals:  Unremarkable pan

creas and right adrenal gland. Mild



                                        nonspecific left adrenal gland hypertrop

hy noted.



                                        



                                        Kidneys and Ureters:  No visible calculi

 in the renal collecting systems.



                                        No hydroureter or hydronephrosis. No enh

ancing kidney lesions are seen.



                                        Bosniak type I cystic lesion of 2.6 cm s

ize in the lateral interpolar right



                                        kidney noted.



                                        



                                        Vessels: Atherosclerosis. No AAA.



                                        



                                        Retroperitoneum: No abnormal fluid or ly

mphadenopathy.



                                        



                                        Bowel: Pericolonic congestion surroundin

g cecum and descending colon seen



                                        in the previous study is improved. This 

time there is minimal congestion



                                        present around the ascending colon.



                                        



                                        3.5 cm size large diverticulum noted edward

ng the medial border of the



                                        duodenal C-loop just distal to the ampul

la.



                                        



                                        Bladder and Reproductive Organs: Hystere

ctomy. No gross pathology in the



                                        unopacified urinary bladder.



                                        



                                        Bones: Moderate degenerative disc diseas

e at L5-S1 and old fracture of



                                        upper plate of L2 with loss of 40% of it

s height in the central portion.



                                        



                                        Soft tissues: Abdominal scar tissue note

d in the suprapubic region from



                                        previous surgery. An irregular shaped fa

t containing 2.5 cm size umbilical



                                        hernia.



                                        



                                        CONCLUSION:



                                        1. No acute intra-abdominal or pelvic ab

normalities detected.



                                        2. Interval improvement noted in the con

gestion of pericolonic fat



                                        surrounding a sending colon compared to 

the previous study. Mucosal



                                        enhancement is still present in the righ

t cerebral large bowel. This could



                                        be due to nonspecific colitis. Mild cons

tipation noted. No significant



                                        diverticular disease



                                        3. Hepatosplenomegaly with hepatic archi

tecture suggestive of chronic



                                        primary liver disease, possibly cirrhosi

s with portal hypertension and



                                        esophageal varices.



                                        4. Wedge shaped lesion in the lower righ

t lobe of the liver is difficult to



                                        accurately characterize. It is unchanged

 when compared with the recent CT



                                        scans of 2020 and 2020.



                                        5. S/P cholecystectomy and hysterectomy.









                Performing Organization Address         City/State/Zipcode Phone

 Number

 

                PACS/VR/DOSE                                    



CBC WITH DIFFERENTIAL (2020 12:40 PM CDT)





             Component    Value        Ref Range    Performed At Pathologist



                                                                 Signature

 

             WBC          6.15         4.30 - 11.10 [facility] 



                                       10*3/L     \[facility]\""     



                                                    LABORATORY   

 

             RBC          3.77 (L)     3.93 - 5.25  [facility] 



                                       10*6/L     \[facility]\""     



                                                    LABORATORY   

 

             HGB          11.6         11.6 - 15.0  [facility] 



                                       g/dL         \[facility]\""     



                                                    LABORATORY   

 

             HCT          34.5 (L)     35.7 - 45.2 % [facility]     



                                                    LABORATORY   

 

             MCV          91.5         80.6 - 95.5  [facility]     



                                                    LABORATORY   

 

             MCH          30.8         25.9 - 32.8  [facility]     



                                                    LABORATORY   

 

             MCHC         33.6         31.6 - 35.1  [facility] 



                                       g/dL         \[facility]\""     



                                                    LABORATORY   

 

             RDW-SD       58.3 (H)     39.0 - 49.9  [facility]     



                                                    LABORATORY   

 

             RDW-CV       17.5 (H)     12.0 - 15.5 % [facility]     



                                                    LABORATORY   

 

             PLT          84 (L)       166 - 358    [facility] 



                                       10*3/L     \[facility]\""     



                                                    LABORATORY   

 

             MPV          10.0         9.5 - 12.9 fL [facility]     



                                                    LABORATORY   

 

             IPF %        3.1Comment: Platelet 1.3 - 7.7 %  [facility] 



                          count measured by              HOSPITAL     



                          fluorescence method.              LABORATORY   

 

             NRBC/100 WBC 0.0          0.0 - 10.0   [facility] 



                                       /100 WBCs    \[facility]\""     



                                                    LABORATORY   

 

             NRBC x10^3   <0.01        10*3/L     [facility]     



                                                    LABORATORY   

 

             GRAN MAT (NEUT) % 72.1         %            [facility]     



                                                    LABORATORY   

 

             IMM GRAN %   0.50         %            [facility]     



                                                    LABORATORY   

 

             LYMPH %      16.7         %            [facility]     



                                                    LABORATORY   

 

             MONO %       9.4          %            [facility]     



                                                    LABORATORY   

 

             EOS %        0.8          %            [facility]     



                                                    LABORATORY   

 

             BASO %       0.5          %            [facility]     



                                                    LABORATORY   

 

             GRAN MAT     4.43         1.88 - 7.09  [facility] 



             x10^3(ANC)                10*3/uL      HOSPITAL     



                                                    LABORATORY   

 

             IMM GRAN x10^3 0.03         0.00 - 0.06  [facility] 



                                       10*3/uL      HOSPITAL     



                                                    LABORATORY   

 

             LYMPH x10^3  1.03 (L)     1.32 - 3.29  [facility] 



                                       10*3/uL      HOSPITAL     



                                                    LABORATORY   

 

             MONO x10^3   0.58         0.33 - 0.92  [facility] 



                                       10*3/uL      HOSPITAL     



                                                    LABORATORY   

 

             EOS x10^3    0.05         0.03 - 0.39  [facility] 



                                       10*3/uL      HOSPITAL     



                                                    LABORATORY   

 

             BASO x10^3   0.03         0.01 - 0.07  [facility] 



                                       10*3/uL      \[facility]\""     



                                                    LABORATORY   









                                        Specimen

 

                                        Blood - VENOUS









                Performing Organization Address         City/[facility] Cent[facility] Phone

 Number

 

                [facility] CLIA: [id number], [address] [place] [address]

[phone]



                LABORATORY      [address]                  



Urinalysis (2020 12:40 PM CDT)





             Component    Value        Ref Range    Performed At Pathologist Sig

nature

 

             APPEARANCE   Clear        Clear        [facility] LABORATORY 

 

             COLOR        Yellow       Yellow       [facility] LABORATORY 

 

             PH           5.0          4.8 - 8.0    [facility] LABORATORY 

 

             SP GRAVITY   1.028        1.003 - 1.030 [facility] LABORATORY 

 

             GLU U QUAL   500 mg/dL (A) Normal       [facility] LABORATORY 

 

             BLOOD        Negative     Negative     [facility] LABORATORY 

 

             KETONES      Negative     Negative     [facility] LABORATORY 

 

             PROTEIN      Negative     Negative     [facility] LABORATORY 

 

             UROBILIN     Normal       Normal       [facility] LAB[facility]ATORY 

 

             BILIRUBIN    Negative     Negative     [facility] LAB[facility]AT[facility] 

 

             NITRITE      Negative     Negative     [facility] LAB[facility]AT[facility] 

 

             LEUK MOE   Negative     Negative     [facility] LABORATORY 

 

             RBC/HPF      1            0 - 3 HPF    [facility] LABORATORY 

 

             WBC/HPF      <1           0 - 5 HPF    [facility] LABORATORY 

 

             BACTERIA     Negative     Negative     [facility] LABORATORY 

 

             SQ EPITH     2            HPF          [facility] LABORATORY 









                                        Specimen

 

                                        Urine - URINE, CLEAN CATCH









                Performing Organization Address         City/[facility] Centercode Phone

 Number

 

                [facility] CLIA: [id number], [address] [place] [address]

15 [phone]



                LABORATORY      [address]                  



Lipase, Serum (2020 12:40 PM CDT)





             Component    Value        Ref Range    Performed At Pathologist Sig

nature

 

             LIPASE       69           0 - 220 U/L  [facility] 



                                                    LABORATORY   









                                        Specimen

 

                                        Blood - VENOUS









                Performing Organization Address         City/State/Zipcode Phone

 Number

 

                [facility] CLIA: [id number], [address]

15 [phone]



                LABORATORY      [address]                  



Complete Metabolic Panel (2020 12:40 PM CDT)





             Component    Value        Ref Range    Performed At Pathologist



                                                                 Signature

 

             NA           132 (L)      135 - 145    [facility] 



                                       mmol/L       \[facility]\"" LABORATORY 

 

             K            5.4 (H)      3.5 - 5.0    [facility] 



                                       mmol/L       \[facility]\"" LABORATORY 

 

             CL           99           98 - 108 mmol/L [facility] LABORATORY 

 

             CO2 TOTAL    25           23 - 31 mmol/L [facility] LABORATORY 

 

             AGAP         8            2 - 16       [facility] LABORATORY 

 

             BUN          15           7 - 23 mg/dL [facility] LABORATORY 

 

             GLUCOSE      502 (HH)     70 - 110 mg/dL [facility] LABORATORY 

 

             CREATININE   0.71         0.50 - 1.04  [facility] 



                                       mg/dL        \[facility]\"" LABORATORY 

 

             TOTAL BILI   1.4 (H)      0.1 - 1.1 mg/dL [facility] LABORATORY 

 

             CALCIUM      9.2          8.6 - 10.6   [facility] 



                                       mg/dL        \[facility]\"" LABORATORY 

 

             T PROTEIN    7.4          6.3 - 8.2 g/dL [facility] LABORATORY 

 

             ALBUMIN      3.5          3.5 - 5.0 g/dL [facility] LABORATORY 

 

             ALK PHOS     185 (H)      34 - 122 U/L [facility] LABORATORY 

 

             ALTv         31           5 - 35 U/L   [facility] LABORATORY 

 

             AST(SGOT)    42 (H)       13 - 40 U/L  [facility] LABORATORY 

 

             eGFR Calculation 84.6         mL/min/1.73m2 [facility] 



             ([facility] LABORATORY 



             American)                                           

 

             eGFR Calculation 102.5        mL/min/1.73m2 [facility] 



             (African American)                           \[facility]\"" LABORATORY 









                                        Specimen

 

                                        Blood - VENOUS









                          Narrative                 Performed At

 

                          [facility] ([facility] 

LABORATORY



                                        and Staging of Kidney Disease*



                                        



                                         



                                        



                          +-----------------------+---------------------+- 



                                        ------------------------+



                                        



                          | GFR (mL/min/1.73 m2) | With Kidney Damage 



                                        | Without Kidney Damage



                                        



                          +-----------------------+---------------------+- 



                                        ------------------------+



                                        



                          | >90         | Stage one 



                              |  Normal        



                                        



                                        



                          +-----------------------+---------------------+- 



                                        ------------------------+



                                        



                          | 60-89        | Stage two 



                                            |  Decreased GFR   

  



                                        



                          +-----------------------+---------------------+- 



                                        ------------------------+



                                        



                          | 30-59        | Stage three 



                                           |  Stage three     

 



                                        



                          +-----------------------+---------------------+- 



                                        ------------------------+



                                        



                          | 15-29        | Stage four 



                                            |  Stage four     

 



                                        



                          +-----------------------+---------------------+- 



                                        ------------------------+



                                        



                          | <15 (or dialysis)  | Stage five   



                                          |  Stage five      



                                        



                          +-----------------------+---------------------+- 



                                        ------------------------+



                                        



                                         



                                        



                          *Each stage assumes the associated GFR level has 



                          been in effect for at least three months. 



                          Stages 1 to 5, with or without kidney disease, 



                                        indicate chronic kidney disease.



                                        



                                         



                                        



                          Notes: Determination of stages one and two (with 



                          eGFR >59mL/min/1.73 m2) requires estimation of 



                          kidney damage for at least three months as 



                          defined by structural or functional 



                          abnormalities of the kidney, manifested by 



                                        either:



                                        



                          Pathological abnormalities or Markers of kidney 



                          damage (including abnormalities in the 



                          composition of the blood or urine or 



                                        abnormalities in imaging tests). 



                                        



                                                    









                Performing Organization Address         City/State/Zipcode Phone

 Number

 

                [facility] CLIA: [id number], [address], 

[phone]



                LABORATORY      [address]                  



documented in this encounter



Visit Diagnoses







                                        Diagnosis

 

                                        Lower abdominal pain - Primary







                                        Abdominal pain, other specified site

 

                                        Hyperglycemia







                                        Other abnormal glucose

 

                                        Colitis







                                        Other and unspecified noninfectious tip

roenteritis and colitis



documented in this encounter



Administered Medications







           Medication Order MAR Action Action Date Dose       Rate       Site

 

           iohexol (OMNIPAQUE 350 BULK-150 Given      2020  1:55 PM CDT 12

0 mL                



                                        mL) injection 120 mL



                                                                 



           120 mL, Intravenous, ONCE, 1                                         

    



           dose, Tue 20 at 1415,                                           

  



           Routine                                                









                                                    



documented in this encounter



Insurance







          Payer     Benefit Plan / Subscriber ID Effective Dates Phone     Addre

ss   Type



                    Group                                             

 

          MEDICARE  MEDICARE PART xxxxxxxxxxx 3/1/2007-Presen [id number] 

Medicare



                      [address]                 t          2          [id number]



                                        



                                                            [place] 



                                                            [id number] 

 

          [facility]      MEDICAID OF xxxxxxxxx 2018-Prese [id number] [address]   

Medicaid



                      [place]                 nt         0          [id number]



                                        



                                                            [place] 



                                                            [id number] 









           Guarantor Name Account Type Relation to Date of    Phone      Billing

 Address



                                 Patient    Birth                 

 

           Deborah Alatorre Personal/Famil Self       1960 [phone]                                      (Home)



                                        [id number]







                                                       [phone] [place], [place]



                                                       (Work)     [id number]



documented as of this encounter

## 2021-09-15 ENCOUNTER — HOSPITAL ENCOUNTER (EMERGENCY)
Dept: HOSPITAL 97 - ER | Age: 61
Discharge: HOME | End: 2021-09-15
Payer: COMMERCIAL

## 2021-09-15 VITALS — OXYGEN SATURATION: 95 % | DIASTOLIC BLOOD PRESSURE: 77 MMHG | SYSTOLIC BLOOD PRESSURE: 146 MMHG

## 2021-09-15 VITALS — TEMPERATURE: 98.7 F

## 2021-09-15 DIAGNOSIS — N39.0: Primary | ICD-10-CM

## 2021-09-15 DIAGNOSIS — Z88.3: ICD-10-CM

## 2021-09-15 DIAGNOSIS — Z88.1: ICD-10-CM

## 2021-09-15 DIAGNOSIS — Z85.05: ICD-10-CM

## 2021-09-15 DIAGNOSIS — Z85.3: ICD-10-CM

## 2021-09-15 DIAGNOSIS — F17.210: ICD-10-CM

## 2021-09-15 DIAGNOSIS — Z88.5: ICD-10-CM

## 2021-09-15 DIAGNOSIS — E11.9: ICD-10-CM

## 2021-09-15 DIAGNOSIS — I10: ICD-10-CM

## 2021-09-15 LAB — URINE UROTHELIAL CELLS: <5 /HPF

## 2021-09-15 PROCEDURE — 81003 URINALYSIS AUTO W/O SCOPE: CPT

## 2021-09-15 PROCEDURE — 96372 THER/PROPH/DIAG INJ SC/IM: CPT

## 2021-09-15 PROCEDURE — 81015 MICROSCOPIC EXAM OF URINE: CPT

## 2021-09-15 PROCEDURE — 99284 EMERGENCY DEPT VISIT MOD MDM: CPT

## 2021-09-15 PROCEDURE — 87186 SC STD MICRODIL/AGAR DIL: CPT

## 2021-09-15 PROCEDURE — 87088 URINE BACTERIA CULTURE: CPT

## 2021-09-15 PROCEDURE — 87086 URINE CULTURE/COLONY COUNT: CPT

## 2021-09-15 PROCEDURE — 87077 CULTURE AEROBIC IDENTIFY: CPT

## 2021-09-15 NOTE — EDPHYS
Physician Documentation                                                                           

 [facility]                                                                 

Name: Deborah Alatorre                                                                              

Age: 60 yrs                                                                                       

Sex: Female                                                                                       

: 1960                                                                                   

MRN: [id number]                                                                                   

Arrival Date: 09/15/2021                                                                          

Time: 12:12                                                                                       

Account#: [id number]                                                                            

Bed 9                                                                                             

Private MD:                                                                                       

ED Physician Manjeet Khanna                                                                         

HPI:                                                                                              

09/15                                                                                             

13:54 This 60 yrs old  Female presents to ER via Ambulatory with complaints of       rn  

      Urinary Problem.                                                                            

13:54 The patient presents with urinary symptoms, dysuria, frequency, urgency. Onset: The     rn  

      symptoms/episode began/occurred 1 week(s) ago.                                              

13:55 Modifying factors: The symptoms are alleviated by nothing, the symptoms are aggravated  rn  

      by urinating. Associated signs and symptoms: Pertinent positives: dysuria, urinary          

      frequency, Pertinent negatives: fever. Severity of symptoms: At their worst the             

      symptoms were mild, in the emergency department the symptoms are unchanged. The patient     

      has experienced similar episodes in the past. The patient has been recently seen by a       

      physician:. Storm treated 1 week ago with Flagyl for urinary infection. Does not know       

      why she was given Flagyl for UTI but completed it. Reports does not feel worse but          

      still having urinary symptoms. No fever. No vomiting. No back or kidney pain..              

                                                                                                  

Historical:                                                                                       

- Allergies:                                                                                      

12:23 Darvocet-N 100;                                                                         ld1 

12:23 Demerol;                                                                                ld1 

12:23 Erythromycin;                                                                           ld1 

12:23 Toradol;                                                                                ld1 

12:23 tramadol;                                                                               ld1 

12:23 Zithromax;                                                                              ld1 

- Home Meds:                                                                                      

12:23 ernestro [Active]; Seroquel Oral [Active]; carvedilol 6.25 mg oral tab 1 tab [Active];  ld1 

- PMHx:                                                                                           

12:23 Anxiety; CHF; Chronic pain; Colitis; COPD; Depression; Diabetes - IDDM; Hypertension;   ld1 

      MUSCLE WEAKNESS; Liver cell carcinoma; Cancer, Breast;                                      

                                                                                                  

- Immunization history:: Adult Immunizations up to date, Client reports receiving the             

  2nd dose of the Covid vaccine, Client reports receiving the 2nd dose of the Covid               

  vaccine, Date received: 2021.                                                         

- Social history:: Smoking status: Patient reports the use of cigarette tobacco                   

  products, smokes one-half pack cigarettes per day.                                              

- Family history:: not pertinent.                                                                 

- Hospitalizations: : No recent hospitalization is reported.                                      

                                                                                                  

                                                                                                  

ROS:                                                                                              

13:55 Constitutional: Negative for fever, chills, and weight loss, Eyes: Negative for injury, rn  

      pain, redness, and discharge, Neck: Negative for injury, pain, and swelling,                

      Cardiovascular: Negative for chest pain, palpitations, and edema, Respiratory: Negative     

      for shortness of breath, cough, wheezing, and pleuritic chest pain, Abdomen/GI:             

      Negative for abdominal pain, nausea, vomiting, diarrhea, and constipation, Back:            

      Negative for injury and pain, : Positive for dysuria and increased frequency              

      MS/Extremity: Negative for injury and deformity, Skin: Negative for injury, rash, and       

      discoloration, Neuro: Negative for headache, weakness, numbness, tingling, and seizure.     

                                                                                                  

Exam:                                                                                             

13:55 Constitutional:  This is a well developed, well nourished patient who is awake, alert,  rn  

      and in no acute distress. Head/Face:  Normocephalic, atraumatic. Eyes:  Periorbital         

      areas with no swelling, redness, or edema. Cardiovascular:  Regular rate and rhythm.        

      No pulse deficits. Respiratory:  No increased work of breathing, no retractions or          

      nasal flaring. Abdomen/GI:  Soft, non-tender Back:  No spinal tenderness.  No               

      costovertebral tenderness.  Full range of motion. Skin:  Warm, dry MS/ Extremity:           

      Pulses equal, no cyanosis.  Neurovascular intact.  Full, normal range of motion.  Equal     

      circumference. Neuro:  Awake and alert, GCS 15                                              

                                                                                                  

Vital Signs:                                                                                      

12:19  / 74; Pulse 72; Resp 18; Temp 98.7(TE); Pulse Ox 94% on R/A; Weight 77.11 kg;    ld1 

      Height 5 ft. 3 in. (160.02 cm); Pain 8/10;                                                  

13:25  / 77; Pulse 74; Resp 18; Pulse Ox 95% on R/A;                                    ld1 

12:19 Body Mass Index 30.11 (77.11 kg, 160.02 cm)                                             ld1 

                                                                                                  

MDM:                                                                                              

12:28 Patient medically screened.                                                             rn  

13:55 Differential diagnosis: urinary tract infection. Data reviewed: vital signs, nurses     rn  

      notes, lab test result(s), and as a result, I will discharge patient. Counseling: I had     

      a detailed discussion with the patient and/or guardian regarding: the historical            

      points, exam findings, and any diagnostic results supporting the discharge/admit            

      diagnosis, lab results, the need for outpatient follow up, to return to the emergency       

      department if symptoms worsen or persist or if there are any questions or concerns that     

      arise at home. Special discussion: I discussed with the patient/guardian in detail that     

      at this point there is no indication for admission to the hospital. It is understood,       

      however, that if the symptoms persist or worsen the patient needs to return immediately     

      for re-evaluation. ED course: Patient nontoxic, stable vitals, afebrile, not sure why       

      she was given Flagyl for UTI, but still signs of UTI clinically and on urine dip. Will      

      give Rocephin IM here and DC with antibiotics..                                             

                                                                                                  

09/15                                                                                             

12:46 Order name: Urine Culture                                                               rn  

09/15                                                                                             

12:46 Order name: Urine Microscopic Only                                                      rn  

09/15                                                                                             

12:47 Order name: Urine Culture                                                               [facility]

09/15                                                                                             

12:47 Order name: Urine Microscopic Only                                                      [facility]

09/15                                                                                             

12:58 Order name: Urine Dipstick-Ancillary; Complete Time: 13:53                              [facility]

09/15                                                                                             

12:46 Order name: Urine Dipstick-Ancillary (obtain specimen); Complete Time: 12:59            rn  

                                                                                                  

Administered Medications:                                                                         

14:00 Drug: Rocephin (cefTRIAXone) 1 grams Route: IM; Site: left gluteus;                     ld1 

14:14 Follow up: Response: No adverse reaction                                                ld1 

                                                                                                  

                                                                                                  

Disposition Summary:                                                                              

09/15/21 13:58                                                                                    

Discharge Ordered                                                                                 

      Location: Home                                                                          rn  

      Problem: an ongoing problem                                                             rn  

      Symptoms: are unchanged                                                                 rn  

      Condition: Stable                                                                       rn  

      Diagnosis                                                                                   

        - UTI/ Urinary tract infection, site not specified                                    rn  

      Followup:                                                                               rn  

        - With: Private Physician                                                                  

        - When: As needed                                                                          

        - Reason: Recheck today's complaints, Re-evaluation by your physician                      

      Discharge Instructions:                                                                     

        - Discharge Summary Sheet                                                             rn  

        - Dysuria                                                                             rn  

        - Urinary Tract Infection, Adult                                                      rn  

      Forms:                                                                                      

        - Medication Reconciliation Form                                                      rn  

        - Thank You Letter                                                                    rn  

        - Antibiotic Education                                                                rn  

        - Prescription Opioid Use                                                             rn  

      Prescriptions:                                                                              

        - cefpodoxime 100 mg Oral Tablet                                                           

            - take 2 tablets by ORAL route every 12 hours for 10 days take with food; 40      rn  

      tablet; Refills: 0, Product Selection Permitted                                             

Signatures:                                                                                       

Dispatcher [employer]                           Manjeet Hernández MD MD rn Dibbern, Lauren, RN                     RN   ld1                                                  

                                                                                                  

**************************************************************************************************

## 2021-09-15 NOTE — ER
Nurse's Notes                                                                                     

 [facility]                                                                 

Name: Deborah Alatorre                                                                              

Age: 60 yrs                                                                                       

Sex: Female                                                                                       

: 1960                                                                                   

MRN: [id number]                                                                                   

Arrival Date: 09/15/2021                                                                          

Time: 12:12                                                                                       

Account#: [id number]                                                                            

Bed 9                                                                                             

Private MD:                                                                                       

Diagnosis: UTI/ Urinary tract infection, site not specified                                       

                                                                                                  

Presentation:                                                                                     

09/15                                                                                             

12:19 Chief complaint: Patient states: "I went to my PCP last Monday \T\ I received antibiotics ld1

      (Flagyl) for a UTI. I took all of my meds but it is getting worse and it burns when I       

      pee.". Coronavirus screen: At this time, the client does not indicate any symptoms          

      associated with coronavirus-19. Ebola Screen: No symptoms or risks identified at this       

      time. Initial Sepsis Screen: Does the patient meet any 2 criteria? No. Patient's            

      initial sepsis screen is negative. Does the patient have a suspected source of              

      infection? No. Patient's initial sepsis screen is negative. Risk Assessment: Do you         

      want to hurt yourself or someone else? Patient reports no desire to harm self or            

      others. Onset of symptoms was September 15, 2021.                                           

12:19 Method Of Arrival: Ambulatory                                                           ld1 

12:19 Acuity: CASEY 3                                                                           ld1 

                                                                                                  

Triage Assessment:                                                                                

12:23 General: Appears in no apparent distress. comfortable, Behavior is calm, cooperative,   ld1 

      appropriate for age. Pain: Complains of pain in suprapubic area Pain does not radiate.      

      Pain currently is 8 out of 10 on a pain scale. Quality of pain is described as burning,     

      stabbing, Pain began 1 week ago. Is continuous. EENT: No signs and/or symptoms were         

      reported regarding the EENT system. Neuro: Level of Consciousness is awake, alert,          

      obeys commands, Oriented to person, place, time, situation, Appropriate for age.            

      Cardiovascular: Capillary refill < 3 seconds Patient's skin is warm and dry.                

      Respiratory: Airway is patent Respiratory effort is even, unlabored, Respiratory            

      pattern is regular, symmetrical. GI: Abdomen is round non-distended. : Reports            

      burning with urination, since 1 week incontinence, urinary frequency. Derm: No signs        

      and/or symptoms reported regarding the dermatologic system. Musculoskeletal: No signs       

      and/or symptoms reported regarding the musculoskeletal system.                              

                                                                                                  

Historical:                                                                                       

- Allergies:                                                                                      

12:23 Darvocet-N 100;                                                                         ld1 

12:23 Demerol;                                                                                ld1 

12:23 Erythromycin;                                                                           ld1 

12:23 Toradol;                                                                                ld1 

12:23 tramadol;                                                                               ld1 

12:23 Zithromax;                                                                              ld1 

- Home Meds:                                                                                      

12:23 ernestro [Active]; Seroquel Oral [Active]; carvedilol 6.25 mg oral tab 1 tab [Active];  ld1 

- PMHx:                                                                                           

12:23 Anxiety; CHF; Chronic pain; Colitis; COPD; Depression; Diabetes - IDDM; Hypertension;   ld1 

      MUSCLE WEAKNESS; Liver cell carcinoma; Cancer, Breast;                                      

                                                                                                  

- Immunization history:: Adult Immunizations up to date, Client reports receiving the             

  2nd dose of the Covid vaccine, Client reports receiving the 2nd dose of the Covid               

  vaccine, Date received: 2021.                                                         

- Social history:: Smoking status: Patient reports the use of cigarette tobacco                   

  products, smokes one-half pack cigarettes per day.                                              

- Family history:: not pertinent.                                                                 

- Hospitalizations: : No recent hospitalization is reported.                                      

                                                                                                  

                                                                                                  

Screenin:34 Abuse screen: Denies threats or abuse. Denies injuries from another. Nutritional        ld1 

      screening: No deficits noted. On. Tuberculosis screening: No symptoms or risk factors       

      identified. Fall Risk None identified.                                                      

                                                                                                  

Assessment:                                                                                       

12:34 Reassessment: Patient appears in no apparent distress at this time. Patient is alert,   ld1 

      oriented x 3, equal unlabored respirations, skin warm/dry/pink. See triage assessment.      

13:25 Reassessment: Patient appears in no apparent distress at this time. Patient and/or      ld1 

      family updated on plan of care and expected duration. Pain level reassessed. Patient is     

      alert, oriented x 3, equal unlabored respirations, skin warm/dry/pink.                      

                                                                                                  

Vital Signs:                                                                                      

12:19  / 74; Pulse 72; Resp 18; Temp 98.7(TE); Pulse Ox 94% on R/A; Weight 77.11 kg;    ld1 

      Height 5 ft. 3 in. (160.02 cm); Pain 8/10;                                                  

13:25  / 77; Pulse 74; Resp 18; Pulse Ox 95% on R/A;                                    ld1 

12:19 Body Mass Index 30.11 (77.11 kg, 160.02 cm)                                             ld1 

                                                                                                  

ED Course:                                                                                        

12:12 Patient arrived in ED.                                                                  mr  

12:23 Triage completed.                                                                       ld1 

12:23 Arm band placed on right wrist.                                                         ld1 

12:28 Manjeet Khanna MD is Attending Physician.                                                rn  

12:34 Patient has correct armband on for positive identification. Bed in low position. Call   ld1 

      light in reach. Side rails up X2. Pulse ox on. NIBP on. Door closed. Noise minimized.       

      Warm blanket given.                                                                         

12:34 No provider procedures requiring assistance completed.                                  ld1 

12:59 Urine Microscopic Only Sent.                                                            ld1 

12:59 Urine Culture Sent.                                                                     ld1 

14:14 Patient did not have IV access during this emergency room visit.                        ld1 

                                                                                                  

Administered Medications:                                                                         

14:00 Drug: Rocephin (cefTRIAXone) 1 grams Route: IM; Site: left gluteus;                     ld1 

14:14 Follow up: Response: No adverse reaction                                                ld1 

                                                                                                  

                                                                                                  

Outcome:                                                                                          

13:58 Discharge ordered by MD.                                                                rn  

14:14 Discharged to home ambulatory.                                                          ld1 

14:14 Condition: stable                                                                           

14:14 Discharge instructions given to patient, Instructed on discharge instructions, follow       

      up and referral plans. medication usage, Demonstrated understanding of instructions,        

      follow-up care, medications, Prescriptions given X 1.                                       

14:14 Patient left the ED.                                                                    ld1 

                                                                                                  

Signatures:                                                                                       

Rene Sarahy eaton                                                   

Manjeet Khanna MD MD rn Dibbern, Lauren, RN                     RN   ld1                                                  

                                                                                                  

Corrections: (The following items were deleted from the chart)                                    

13:26 13:25 Reassessment: Patient appears in no apparent distress at this time. Patient is    ld1 

      alert, oriented x 3, equal unlabored respirations, skin warm/dry/pink. Patient denies       

      pain at this time. ld1                                                                      

                                                                                                  

**************************************************************************************************

## 2021-09-15 NOTE — XMS REPORT
Continuity of Care Document

                          Created on:September 15, 2021



Patient:VALENTE GROVER

Sex:Female

:1960

External Reference #:[id number]





Demographics







                          Address                   [address] 



                                                    [place] [address]

 

                          Home Phone                [phone]

 

                          Work Phone                [phone]

 

                          Mobile Phone              [phone]

 

                          Email Address             DECLINE 09/15/21

 

                          Preferred Language        English

 

                          Marital Status            Unknown

 

                          Mormonism Affiliation     Unknown

 

                          Race                      Unknown

 

                          Additional Race(s)        Unavailable



                                                    White



                                                    Unavailable



                                                    Unavailable

 

                          Ethnic Group              Unknown









Author







                          Organization              [facility]

t

 

                          Address                   [address]



                                                    [place] [address]

 

                          Phone                     [phone]









Support







                Name            Relationship    Address         Phone

 

                Raheem          Mother          [address] [phone]

[phone]



                                                [place] [address] 

 

                one else per patient Unavailable     Unavailable     Unavailable

 

                Raheem          Other           Unavailable     [phone]

 

                Baggott         Relative        Unavailable     [phone]









Care Team Providers







                    Name                Role                Phone

 

                    [id number]               Primary Care Physician Unavailable

 

                    SIMA LEAVITT          Attending Clinician Unavailable

 

                    RONEY GORE       Attending Clinician Unavailable

 

                    JOSE ANGEL DIXON            Attending Clinician Unavailable

 

                    MARIA ELENA               Attending Clinician Unavailable

 

                    MEEK        Attending Clinician Unavailable

 

                    CLOVER VERDUGO       Attending Clinician Unavailable

 

                    CINTIA KHAN III       Attending Clinician Unavailable

 

                    Tevin CORRIGAN  S       Attending Clinician [phone]

 

                    DANN Barone         Attending Clinician [phone]

 

                    Minerva CORRIGAN,  H       Attending Clinician [phone]

 

                    Lab,  Fam Pob I     Attending Clinician Unavailable

 

                    Aj APPIAH,  G       Attending Clinician [phone]

 

                    Doctor Unassigned,  Name Attending Clinician Unavailable

 

                    ANG SARABIA     Attending Clinician Unavailable

 

                    LISHA Gay        Attending Clinician [phone]

 

                    WECHSLER            Attending Clinician Unavailable

 

                    Singer WILDER           Attending Clinician [phone]

 

                    Kenan ARRIAGA            Attending Clinician [phone]

 

                    NATALIE SKAGGS    Attending Clinician Unavailable

 

                    FIDELINA                Attending Clinician Unavailable

 

                    TONY MCMAHAN      Attending Clinician Unavailable

 

                    ANT               Admitting Clinician Unavailable

 

                    BERKLEY               Admitting Clinician Unavailable

 

                    RONEY GORE       Admitting Clinician Unavailable

 

                    FIDELINA                Admitting Clinician Unavailable









Payers







           Payer Name Policy Type Policy Number Effective Date Expiration Date S

kalpesh

 

           [facility] MEDICARE            [id number]  2020-10-01            



           MEDICAID  DUAL HMO                       00:00:00              

 

           [place]ICA[place]            [id number]  2017            



           TRADITIONAL STAR                       00:00:00              



           PLUS SSI                                               

 

           MEDICARE PART A            [id number]            



           AND B                            00:00:00              

 

           [employer]            [id number]  2018            [facility]

s



                                            00:00:00              - Patients



                                                                  Medical



                                                                  Center

 

           [employer]            [id number]  2017            [facility]



           [facility]                             00:00:00              - Patients



                                                                  Medical



                                                                  Center







Problems







       Condition Condition Condition Status Onset  Resolution Last   Treating Co

mments 

Source



       Name   Details Category        Date   Date   Treatment Clinician        



                                                 Date                 

 

       Cirrhosis Cirrhosis Disease Active                       Last   [facility]



                                   2-                        [facility]



                                   00:00:                      t & Plan: [facility]



                                   00                          [facility]



                                                               g of this 



                                                               note   



                                                               might be 



                                                               different 



                                                               from the 



                                                               original. 



                                                               Diagnosis 



                                                               based on 



                                                               the    



                                                               laborator 



                                                               y      



                                                               parameter 



                                                               s and  



                                                               imaging. 



                                                               Etiology 



                                                               is likely 



                                                               due to 



                                                               HBV/HCV. 



                                                               Her    



                                                               condition 



                                                               has    



                                                               decompens 



                                                               ated with 



                                                               features 



                                                               of portal 



                                                               hypertens 



                                                               ion.   



                                                               Cirrhosis 



                                                               guideline 



                                                               s      



                                                               reviewed. 

 

       Hepatocell Hepatocell Disease Active                       Last   C

[facility]



       ular   ular                                         [facility] -



       carcinoma carcinoma               00:00:                      t & Plan: M

edical



                                   00                          [facility]



                                                               g of this 



                                                               note   



                                                               might be 



                                                               different 



                                                               from the 



                                                               original. 



                                                               Diagnosis 



                                                               of HCC in 



                                                               2018 



                                                               s/p    



                                                               radiofreq 



                                                               uency  



                                                               ablation. 



                                                               She was 



                                                               referred 



                                                               for liver 



                                                               transplan 



                                                               t      



                                                               evaluatio 



                                                               n at this 



                                                               time but 



                                                               expressed 



                                                               desire 



                                                               not to 



                                                               follow 



                                                               through.T 



                                                               here is 



                                                               no     



                                                               evidence 



                                                               of     



                                                               residual 



                                                               tumor on 



                                                               recent 



                                                               imaging. 



                                                               Extensive 



                                                               discussio 



                                                               n      



                                                               provided 



                                                               that   



                                                               liver  



                                                               transplan 



                                                               t is the 



                                                               only   



                                                               definitiv 



                                                               e      



                                                               treatment 



                                                               for HCC 



                                                               and    



                                                               recurrenc 



                                                               e is   



                                                               likely. 



                                                               She    



                                                               understan 



                                                               ds and 



                                                               maintains 



                                                               her    



                                                               position. 

 

       Immunity Immunity Disease Active                       Last   [facility]

t



       status status               2[facility]                        [facility]



       testing testing               00:00:                      t & Plan: Medic

al



                                   00                          [facility]



                                                               g of this 



                                                               note   



                                                               might be 



                                                               different 



                                                               from the 



                                                               original. 



                                                               All    



                                                               patients 



                                                               with   



                                                               chronic 



                                                               liver  



                                                               disease 



                                                               should be 



                                                               immunized 



                                                               to     



                                                               prevent 



                                                               hepatitis 



                                                               A and  



                                                               hepatitis 



                                                               B.     



                                                               Previous 



                                                               serology 



                                                               indicates 



                                                               immunity 



                                                               to HAV. 



                                                               Recommend 



                                                               HBV    



                                                               booster 



                                                               which can 



                                                               be     



                                                               provided 



                                                               by     



                                                               primary 



                                                               care.  

 

       Hepatitis Hepatitis Disease Active                       Last   [facility]

 [facility]



       C      C                    2-21                        [facility] -



                                   00:00:                      t & Plan: Medical



                                   00                          [facility]



                                                               g of this 



                                                               note   



                                                               might be 



                                                               different 



                                                               from the 



                                                               original. 



                                                               HCV    



                                                               diagnosed 



                                                               in  



                                                               s/p    



                                                               partial 



                                                               treatment 



                                                               with   



                                                               Interfero 



                                                               n /    



                                                               Ribavirin 



                                                               . HCV RNA 



                                                               2018 



                                                               was    



                                                               undetecta 



                                                               ble. No 



                                                               further 



                                                               intervent 



                                                               ion    



                                                               necessary 



                                                               .      

 

       Chronic Chronic Disease Active                       Last   [facility]



       viral  viral                                        [facility] -



       hepatitis hepatitis               00:00:                      t & Plan: M

edical



       B without B without               00                          [facility]

enter



       delta  delta                                            g of this 



       agent and agent and                                           note   



       without without                                           might be 



       coma   coma                                             different 



                                                               from the 



                                                               original. 



                                                               She has 



                                                               evidence 



                                                               of past 



                                                               HBV    



                                                               infection 



                                                               without 



                                                               immunity. 



                                                               HBV DNA 



                                                               from May 



                                                               2018   



                                                               undetecta 



                                                               ble. She 



                                                               may    



                                                               benefit 



                                                               from   



                                                               vaccinati 



                                                               on which 



                                                               can be 



                                                               obtained 



                                                               by her 



                                                               primary 



                                                               care.  

 

       Hernia of Hernia of Disease Active                       [facility]

 St



       anterior anterior                                       [facility]

es -



       abdominal abdominal               00:00:                      t & Plan: M

edical



       wall   wall                 00                          [facility]



                                                               g of this 



                                                               note   



                                                               might be 



                                                               different 



                                                               from the 



                                                               original. 



                                                               She has 



                                                               an     



                                                               umbilical 



                                                               hernia 



                                                               that is 



                                                               being  



                                                               evaluated 



                                                               for    



                                                               repair. 



                                                               She has a 



                                                               34% one 



                                                               year   



                                                               mortality 



                                                               based on 



                                                               the [place] 



                                                               surgical 



                                                               risk   



                                                               score. In 



                                                               addition, 



                                                               she is 



                                                               [employer] 



                                                               h Class A 



                                                               giving 



                                                               her a 10% 



                                                               abdominal 



                                                               surgery 



                                                               barron-oper 



                                                               ative  



                                                               mortality 



                                                               risk.  

 

       Heart  Heart  Disease Active                       Last   [facility]



       murmur murmur                                       [facility]



                                   00:00:                      t & Plan: Medical



                                   00                          [facility]



                                                               g of this 



                                                               note   



                                                               might be 



                                                               different 



                                                               from the 



                                                               original. 



                                                               Physical 



                                                               examinati 



                                                               on     



                                                               revealed 



                                                               an     



                                                               audible 



                                                               fixed S2 



                                                               split on 



                                                               cardiac 



                                                               examinati 



                                                               on which 



                                                               may be 



                                                               secondary 



                                                               to a   



                                                               bundle 



                                                               branch 



                                                               block. We 



                                                               recommend 



                                                               cardiolog 



                                                               y      



                                                               consultat 



                                                               ion prior 



                                                               to     



                                                               procedure 



                                                               .      

 

       Lower  Lower  Disease Active                       [facility]



       extremity extremity                                       [facility]

[facility] -



       edema  edema                00:00:                      t & Plan: Medical



                                   00                          [facility]



                                                               g of this 



                                                               note   



                                                               might be 



                                                               different 



                                                               from the 



                                                               original. 



                                                               Assymetri 



                                                               carlos lower 



                                                               extremity 



                                                               edema on 



                                                               physical 



                                                               examinati 



                                                               on. We 



                                                               ordered a 



                                                               venous 



                                                               doppler 



                                                               of the 



                                                               lower  



                                                               extremity 



                                                               to assess 



                                                               for    



                                                               venous 



                                                               thrombosi 



                                                               s.     



                                                               Previousl 



                                                               y on   



                                                               Furosemid 



                                                               e 20 mg 



                                                               daily. We 



                                                               write for 



                                                               a refill. 

 

       Chest pain Chest pain Problem Active                                    C

[facility]



                                                                      [facility] -



                                                                      Patient



                                                                      s



                                                                      Medical



                                                                      Center

 

       No known No known Disease                                           [facility]

di



       active active                                                  st



       problems problems                                                  Hospit

a



                                                                      l







Allergies, Adverse Reactions, Alerts







       Allergy Allergy Status Severity Reaction(s) Onset  Inactive Treating Comm

ents 

Source



       Name   Type                        Date   Date   Clinician        

 

       Erythrom Propensi Active                                     [facility]



       ycin   ty to                                               [facility]



              adverse                      00:00:                      Medical



              reaction                      00                          Center



              s                                                       

 

       Ketorola Propensi Active                                     [facility]



       c      ty to                                               [facility]



              adverse                      00:00:                      Medical



              reaction                      00                          Center



              s                                                       

 

       Propoxyp Allergy Active        Migraines                       [facility]



       hene   to                                                  [facility]



              Substanc                      00:00:                      Patient



              e                           00                          s



                                                                      [facility]

 

       Azithrom Allergy Active        Rash                         [facility]



       ycin   to                                                  [facility]



              Substanc                      00:00:                      Patient



              e                           00                          s



                                                                      [facility]

 

       Tramadol Allergy Active        Insomnia for                       C

[facility]



              to                   days                           [facility]



              Substanc                      00:00:                      Patient



              e                           00                          s



                                                                      Medical



                                                                      [place]

 

       PROPOXYP DRUG   Active                                     MD PASCUAL                               5-18                        [facility]



       N-ACETAM                             00:00:                      n



       INOPHEN                                                       

 

       PROPOXYP DRUG   Active                                     MD PASCUAL                               5-18                        [facility]



       N-ACETAM                             00:00:                      n



       INOPHEN                             00                          

 

       Azithrom Propensi Active        Rash, Other                       C

[facility]



       ycin   ty to                (See                           [facility]



              adverse               Comments) 00:00:                      Medica

l



              reaction                                                Center



              s                                                       

 

       Tramadol Propensi Active        Other (See                Other  [facility]

[facility]



              ty to                Comments)                  reaction( [facility]

s -



              adverse                      00:00:               s):    Medical



              reaction                      00                   [facility]



              s                                                for    



                                                               daysUnabl 



                                                               e to   



                                                               sleep  

 

       Toradol Adverse Active        Info Not                             [facility]



              Reaction               Available                             [facility]



                                                                      [facility]



                                                                      [facility]



                                                                      [facility]



                                                                      [facility]



                                                                      Clinics

 

       Zithroma Adverse Active        Info Not                             [facility]

t



       x      Reaction               Available                             [facility]



                                                                      [facility]



                                                                      l



                                                                      [facility]



                                                                      [facility]



                                                                      [facility]

 

       Tramadol Adverse Active        Info Not                             [facility]

t



       HCl    Reaction               Available                             [facility]



                                                                      [facility]



                                                                      l



                                                                      [facility]



                                                                      [facility]



                                                                      Clinics

 

       Erythrom Adverse Active        Info Not                             [facility]

t



       ycin   Reaction               Available                             [facility]



                                                                      [facility]



                                                                      l



                                                                      [facility]



                                                                      ent



                                                                      Clinics

 

       Demerol Adverse Active        Info Not                             [facility]



              Reaction               Available                             [facility]



                                                                      [facility]



                                                                      l



                                                                      [facility]



                                                                      ent



                                                                      Clinics

 

       Darvocet Adverse Active        Info Not                             [facility]

t



       -N 100 Reaction               Available                             [facility]



                                                                      [facility]



                                                                      l



                                                                      [facility]



                                                                      ent



                                                                      Clinics

 

       Azithrom Adverse Active        Info Not                             [facility]

t



       ycin   Reaction               Available                             [facility]



                                                                      [facility]



                                                                      l



                                                                      [facility]



                                                                      ent



                                                                      Clinics







Social History







           Social Habit Start Date Stop Date  Quantity   Comments   Source

 

           History SDOH                                             [facility]



           Alcohol Std Drinks                                             Hospit

al

 

           History SDOH                                             [facility]



           Alcohol Binge                                             Hospital

 

           History SDOH                                             [facility]

 -



           Alcohol Comment                                             Medical C

enter

 

           History of tobacco                       Cigarette Smoker            

[facility]



           use                                                    [facility]

 

           Tobacco use and 2019 Never used            [facility]



           exposure   00:00:00   00:00:00                         Hospital

 

           Alcohol intake 2019 Lifetime              [facility]



                      00:00:00   00:00:00   non-drinker            Hospital



                                            (finding)             

 

           History SDOH 2019 1                     [facility]



           Alcohol Frequency 00:00:00   00:00:00                         Hospita

l

 

           Cigarettes smoked 2019                       [facility] 

[facility]



           current (pack per 00:00:00   00:00:00                         Medical

 Center



           day) - Reported                                             

 

           Cigarette  2019                       [facility]



           pack-years 00:00:00   00:00:00                         [facility]

 

           Sex Assigned At [birth year]                       [facility]



           Birth      00:00:00   00:00:00                         [facility]









                Smoking Status  Start Date      Stop Date       Source

 

                Current every day smoker 2019 00:00:00                 Met

[facility]







Medications







       Ordered Filled Start  Stop   Current Ordering Indication Dosage Frequency

 Signature

                    Comments            Components          Source



     Medication Medication Date Date Medication? Clinician                (SIG) 

          



     Name Name                                                   

 

     Lactulose Lactulose       Yes  Alfredo                1 packet       

    [facility]



               1-20           [facility]                               [facility]



               00:00:                                              [facility]



               00                                                l



                                                                 Outpati



                                                                 ent



                                                                 Clinics

 

     Valsartan Valsartan       Yes  Alfredo                1 tablet       

    [facility]



               1-20           [facility]                               [facility]



               00:00:                                              [facility]



               00                                                l



                                                                 Outpati



                                                                 ent



                                                                 [facility]

 

     insulin NPH            Yes            35U       Inject 35           C

[facility]



     100 unit/mL      2-21                               Units           [facility]



     (3 mL) InPn      13:37:                               subcutaneo           

Medical



               55                                 usly 2           Center



                                                  (two)           



                                                  times           



                                                  daily           



                                                  before           



                                                  meals           



                                                  NOVOLIN .           

 

     sertraline            Yes            100mg QD   Take 100           [facility]

[facility]



     (ZOLOFT)      2-21                               mg by           [facility]



     100 MG      13:33:                               mouth           Medical



     tablet      34                                 daily.           Center

 

     methadone            Yes            100mg QD   Take 100           [facility]



     HCl       2-21                               mg by           [facility]



     (METHADONE      13:33:                               mouth           Medica

l



     ORAL)      34                                 daily.           Center

 

     insulin            Yes            30U       Inject 30           [facility]

t



     lispro      2-21                               Units           [facility]



     (HUMALOG)      13:33:                               subcutaneo           Me

dical



     100 unit/mL      34                                 usly 3           Center



     injection                                         (three)           



                                                  times           



                                                  daily           



                                                  before           



                                                  meals.           

 

     metFORMIN            Yes            500mg      Take 500           [facility]



     (GLUCOPHAGE      2-21                               mg by           [facility]



     ) 500 MG      13:33:                               mouth 2           Medica

l



     tablet      33                                 (two)           Center



                                                  times           



                                                  daily with           



                                                  breakfast           



                                                  and            



                                                  dinner.           

 

     Quetiapine Quetiapine           Yes  Alfredo                not            

[facility]



     Fumarate Fumarate                [facility]                defined           [facility]

s -



                                                                 [facility]



                                                                 l



                                                                 [facility]



                                                                 ent



                                                                 Clinics

 

     Carvedilol Carvedilol           Yes  Alfredo                not            

[facility]



                              [facility]                defined           [facility]



                                                                 [facility]



                                                                 l



                                                                 [facility]



                                                                 ent



                                                                 Clinics

 

     Furosemide Furosemide           Yes  Alfredo                not            

[facility]



                              [facility]                defined           [facility]



                                                                 [facility]



                                                                 l



                                                                 [facility]



                                                                 ent



                                                                 Clinics

 

     NovoLog Mix NovoLog Mix           Yes  Alfredo                not          

  [facility]



     70/30 70/30                [facility]                defined           [facility]



     Flexpen Flexpen                                                   [facility]



                                                                 l



                                                                 [facility]



                                                                 ent



                                                                 Clinics

 

     Alprazolam Alprazolam           Yes            .25       Twice A           

[facility]



     (Xanax) (Xanax)                                    Day            [facility]



     0.25 Mg 0.25 Mg                                                   Patient



     Tablet Tablet                                                   s



                                                                 Medical



                                                                 Center

 

     Hydrocodone Hydrocodone           Yes            1         Every 4         

  [facility]



     Bit/Acetami Bit/Acetami                                    Hours as        

   [facility]



     nophen nophen                                    needed for           [facility]

nt



     (Norco (Norco                                    Pain           s



                                                         Medical



     Tablet) 1 Tablet) 1                                                   [facility]

r



     Each Tablet Each Tablet                                                   

 

     Insulin Insulin           Yes            35        Every 12           [facility]

t.



     Detemir Detemir                                    Hours           [facility]



     (Levemir) (Levemir)                                                   [facility]

nt



     100 Unit/1 100 Unit/1                                                   s



     Ml Vial Ml Vial                                                   Medical



                                                                 Center

 

     Quetiapine Quetiapine           Yes            50        Bedtime           

[facility]



     Fumarate Fumarate                                                   [facility]



     (Seroquel) (Seroquel)                                                   Pat

ient



     25 Mg 25 Mg                                                   s



     Tablet Tablet                                                   Medical



                                                                 Center

 

     No known                No                                      Methodi



     medications                                                        st



                                                                 Hospita



                                                                 l

 

     Insulin Insulin      2018- No                                      [facility]



     Aspart Aspart                                               [facility]



     (Novolog (Novolog      00:00                                         Patien

t



     Mix 70-30 Mix 70-30      :00                                          s



     Vial) 100 Vial) 100                                                   Medic

al



     Units/Ml Units/Ml                                                   Center



     Ml, Unknown Ml, Unknown                                                   



     Dose Dose                                                   

 

     Insulin Insulin      2018- No                                      [facility]



     Glargine Glargine                                               [facility] 

-



     (Lantus) (Lantus)      00:00                                         Patien

t



     100  100       :00                                          s



     Units/Ml Units/Ml                                                   Medical



     Ml, Unknown Ml, Unknown                                                   C

enter



     Dose Dose                                                   

 

     Trazodone Trazodone      2018- No                       Daily           [facility]



     Hcl 50 Mg Hcl 50 Mg                                               [facility]

s -



     Tablet, Tablet,      00:00                                         Patient



     Unknown Unknown      :00                                          s



     Dose  Oral Dose  Oral                                                   Med

[facility]



                                                                 Center







Vital Signs







             Vital Name   Observation Time Observation Value Comments     Source

 

             WEIGHT       2021 14:17:00 78.5 kg                   

 

             WEIGHT       2021 06:00:00 77.3 kg                   

 

             HEIGHT       2021 11:30:00 154 cm                    

 

             HEIGHT       2021 11:20:00 154 cm                    

 

             WEIGHT       2021 11:20:00 74.1 kg                   

 

             WEIGHT       2021 06:36:00 76.8 kg                   

 

             WEIGHT       2021 15:12:00 81 kg                     

 

             WEIGHT       2020-12-10 14:32:00 79.6 kg                   







Procedures







                Procedure       Date / Time Performed Performing Clinician [facility]

e

 

                Computed tomography of 2018 00:00:00 ROSE KITCHEN [facility]



                chest without contrast                                 Patients 

Medical



                                                                Center







Plan of Care







             Planned Activity Planned Date Details      Comments     Source

 

             Future Scheduled 2020   INFLUENZA VACCINE (#1)              [facility]

[facility]



             Test         00:00:00     [code = INFLUENZA              Medical Ce

nter



                                       VACCINE (#1)]              

 

             Future Scheduled 2020   DEPRESSION SCREENING              [facility]



             Test         00:00:00     (12+) [code =              Medical Center



                                       DEPRESSION SCREENING              



                                       (12+)]                    

 

             Future Scheduled 2019   HEPATITIS B VACCINE (2              [facility]

[facility]



             Test         00:00:00     of 2 - CpG risk 2-dose              Medic

al Center



                                       series) [code =              



                                       HEPATITIS B VACCINE (2              



                                       of 2 - CpG risk 2-dose              



                                       series)]                  

 

             Future Scheduled 2008   MEDICARE ANNUAL              [facility]

[facility] -



             Test         00:00:00     WELLNESS (YEAR 2 or              Medical 

Center



                                       FIRST YEAR if no IPPE)              



                                       [code = MEDICARE              



                                       ANNUAL WELLNESS (YEAR              



                                       2 or FIRST YEAR if no              



                                       IPPE)]                    

 

             Future Scheduled 2006-10-22   Lipid panel               [facility]

s -



             Test         00:00:00     (procedure) [code =              Medical 

Center



                                       21163409]                 

 

             Future Scheduled 1982-10-22   Screening for              [facility]

es -



             Test         00:00:00     malignant neoplasm of              [facility]

[facility]



                                       cervix (procedure)              



                                       [code = 920897606]              

 

             Future Scheduled 1967-10-22   PNEUMOCOCCAL VACCINE              [facility]



             Test         00:00:00     0-64 YRS (1 of 1 -              Medical C

enter



                                       PPSV23) [code =              



                                       PNEUMOCOCCAL VACCINE              



                                       0-64 YRS (1 of 1 -              



                                       PPSV23)]                  

 

             Future Scheduled 1961   Screening for              [facility]

es -



             Test         00:00:00     malignant neoplasm of              Medica

l Center



                                       breast (procedure)              



                                       [code = 270735446]              

 

             Future Scheduled 1961   Screening for              [facility]

es -



             Test         00:00:00     malignant neoplasm of              [facility]

[facility]



                                       colon (procedure)              



                                       [code = 256853763]              

 

             Future Scheduled              DIABETES: RETINAL EYE              [facility]



             Test                      EXAM [code = DIABETES:              



                                       RETINAL EYE EXAM]              

 

             Future Scheduled              DIABETIC FOOT EXAM              [facility]



             Test                      [code = DIABETIC FOOT              



                                       EXAM]                     

 

             Future Scheduled              COVID-19 VACCINE (1)              [facility]



             Test                      [code = COVID-19              



                                       VACCINE (1)]              

 

             Future Scheduled              [facility]



             Test                      malignant neoplasm of              



                                       cervix (procedure)              



                                       [code = 259985121]              

 

             Future Scheduled              BREAST CANCER              [facility]



             Test                      SCREENING [code =              



                                       BREAST CANCER              



                                       SCREENING]                

 

             Future Scheduled              COLONOSCOPY SCREENING              [facility]

[facility]



             Test                      [code = COLONOSCOPY              



                                       SCREENING]                

 

             Future Scheduled              SHINGLES VACCINES (#1)              [facility]



             Test                      [code = SHINGLES              



                                       VACCINES (#1)]              

 

             Future Scheduled              INFLUENZA VACCINE              [facility]

ist Hospital



             Test                      [code = INFLUENZA              



                                       VACCINE]                  







Encounters







        Start   End     Encounter Admission Attending Care    Care    Encounter 

Source



        Date/Time Date/Time Type    Type    Clinicians Facility Department ID   

   

 

        2021 Outpatient CARLTON JOHNSTON     [id number]

268 MD



        09:00:00 23:59:00                 JASEN mercado

 

        2021 Outpatient CARLTON JOHNSTON     [id number]

269 MD



        07:26:48 08:59:00                 JASEN mercado

 

        2021 Outpatient ER      CARLTON GORE     Emergency [id number]

[id number] MD



        08:34:00 14:46:00                 LEILANI mercado

 

        2021 Outpatient CARLTON HAMMOND     [id number]

692 MD



        15:22:07 15:22:07                 DMITRI mercado

 

        2021 Outpatient CARLTON LANDIS     [id number]

226 MD



        14:09:22 14:31:49                 TARSHA mercado

 

        2021 Inpatient ER      CARRENO-NANCY MDA     [id number]

[id number] MD



        08:31:00 17:06:00                 SALMASAMUEL

rso



                                                                        n

 

        2021 Inpatient EL      CARRENO-NNACY MDA     MDA     [id number]

[id number] MD



        15:37:00 17:05:23                 SALMA SAMUEL [facility]



                                                                        n

 

        2021 Inpatient       KARTIK, MDA     MDA     [id number]

5314 MD



        14:19:17 14:22:52                 SATYA mercado

 

        2021 Outpatient       MARIA ELENA,  MDA     MDA     [id number]

316 MD



        12:50:42 23:59:00                 TARSHA mercado

 

        2021 Outpatient ANGELICA LEAVITT, MDA     MDA     [id number]

235 MD



        13:43:09 13:43:09                 JASEN mercado

 

        2021 Outpatient ANGELICA LEAVITT, MDA     MDA     [id number]

285 MD



        07:02:33 12:49:00                 JASEN mecrado

 

        2021 Outpatient       KHAN III, MDA     MDA     [id number]

[id number] MD



        08:30:00 23:59:00                 JAGUAR mercado

 

        2021 Outpatient       KHAN III, MDA     MDA     [id number]

[id number] MD



        11:13:43 12:18:50                 JAGUAR mercado

 

        2021 Outpatient ER      [facility],  MDA     Emergency [id number]

[id number] MD



        06:37:00 12:15:00                 LEILANI mercado

 

        2021 Outpatient       MARIA ELENA,  MDA     MDA     [id number]

214 MD



        13:49:49 15:58:39                 TARSHA mercado

 

        2021 Outpatient       DAGMAR, MDA     MDA     [id number]

264 MD



        11:38:38 23:59:00                 JASEN mercado

 

        2021 Outpatient ANGELICA LEAVITT, MDA     MDA     [id number]

257 MD



        12:44:44 12:44:44                 JASEN mercado

 

        2020-12-10 2020-12-10 Outpatient       MARIA ELENA,  MDA     MDA     [id number]

361 MD



        13:55:28 15:15:41                 TARSHA mercado

 

        2020 Outpatient ANGELICA ARREDONDO,  MDA     MDA     [id number]

015 MD



        00:00:00 00:00:00                 TARSHA mercado

 

        2020 Outpatient ANGELICA ARREDONDO,  MDA     MDA     [id number]

348 MD



        10:51:07 10:51:07                 TARSHA mercado

 

        2020 Outpatient ANGELICA LEAVITT MDA     MDA     [id number]

397 MD



        06:45:00 23:59:00                 JASEN mercado

 

        2020 Outpatient ANGELICA LEAVITT MDA     MDA     [id number]

367 MD



        09:59:46 09:59:46                 JASEN mercado

 

        2020 Emergency         [facility] hospital    [id number]

1755 



        22:06:56 03:30:00                 Dominga Rich 350.1.13.10         



                                                [place] 4.2.7.2.686         



                                                [place]  [phone]         



                                                        [facility]             

 

        2020 Telephone         MICHELL Barone    [id number]

[id number] 



        00:00:00 00:00:00                 Lauren ELMORE   350.1.13.10       

  



                                                [facility] 42.7.2.686         



                                                        [phone]         



                                                        019             

 

        2020 Telephone         Minerva [facility]    [id number]

1361 



        00:00:00 00:00:00                 [facility]  350.1.13.10         



                                                [place] 4.2.7.2.686         



                                                [facility] [phone]         



                                                nal     044             



                                                Office                  



                                                Building                 



                                                One                     

 

        2020 Laboratory         Lab, [facility]    1.2.840.114 [id number]

[id number] 



        12:32:06 12:52:06 Only            [facility]  350.1.13.10         



                                                [place] 4.2.7.2.686         



                                                [facility] [phone]         



                                                nal     044             



                                                Office                  



                                                Building                 



                                                One                     

 

        2020 Emergency         [facility]    [id number]

[id number] 



        14:49:44 18:01:00                 Karli Rich 350.1.13.10         



                                                [place] 4.2.7.2.686         



                                                [place]  [phone]         



                                                        084             

 

        2020 Orders          Doctor  MICHELL    [id number] [id number]

03 



        00:00:00 00:00:00 Only            Unassigned, CATARINA   350.1.13.10       

  



                                        [place] [facility] 4.2.7.2.686         



                                                        [phone]         



                                                        009             

 

        2020 Outpatient ANGELICA ARREDONDO,  MDA     MDA     [id number]

841 MD



        00:00:00 00:00:00                 TARSHA mercado

 

        2020 Outpatient ANGELICA SARABIA, MDA     MDA     [id number]

[id number] MD



        00:00:00 00:00:00                 NORRIS mercado

 

        2020 Outpatient ANGELICA LEAVITT, MDA     MDA     [id number]

635 MD



        00:00:00 00:00:00                 JASEN mercado

 

        2020 Emergency                 MDA     MDA     [id number]

93 MD



        08:14:08 08:14:08                                                 Guillermo mercado

 

        2020 Emergency         Mahamed Bain [facility]    [id number] 

[id number] 



        19:10:30 01:03:00                 LISHA Rich 350.1.13.10         



                                                [place] 4.2.7.2.686         



                                                [place] [phone]         



                                                        4             

 

        2020 Emergency ER      WECHSLER, MDA     Emergency 1064

[id number] MD



        07:39:47 10:44:00                 ADRIAN mercado

 

        2020 Emergency         , [facility]    [id number]

0034 



        12:20:39 14:51:00                 Pk Rich 350.1.13.10         



                                                [place] 4.2.7.2.686         



                                                [place]  [phone]         



                                                        084             

 

        2020 Transition         Pilar Grayson  1.2.840.114 [id number]

[id number] 



        00:00:00 00:00:00 of Care         Mayra Hutchins   350.1.13.10         



                                                [place]   4.2.7.2.686         



                                                        [phone]         



                                                        403             

 

        2020 Outpatient                 [facility] [address]

[address] [facility]



        13:42:00 13:42:00                         t [facility]         [facility]

 -



                                                [facility]           [facility]

a



                                                Clinic [facility]         ent



                                                                        [facility]

 

        2020 Outpatient                 [facility]

[id number] [facility]



        08:30:00 08:30:00                         t Bone  [facility]         [facility]

 -



                                                [facility]           [facility]

a



                                                [facility]         ent



                                                                        Clinics

 

        2018 Outpatient EL WECHSLER, MDA MDA     [id number]

82769 MD



        00:00:00 00:00:00                 ADRIAN Li

o



                                                                        n

 

        2018 Discharged ER      ROSE KITCHEN [facility]  A00

[id number] [facility]



        01:06:00 13:30:00 Inpatient                                 07      [facility]

s -



                        (obs)                                           Patient



                                                                        [facility]

 

        [id number]

[address] [facility]



        19:37:00 01:16:00 Emergency                                 41      [facility]

s -



                        Room                                            Patient



                                                                        [facility]

 

        2017 [facility]  [id number]

[address] [facility]



        17:55:00 02:48:00 Emergency                                 74      ke

s -



                        Room                                            Patient



                                                                        [facility]

 

        2017-10-06 2017-10-06 Registered ER      MARTIR [facility]  A00

[id number] [facility]



        01:09:00 01:09:00 Emergency         SMITHA                 56      ke

s -



                        Room                                            Patient



                                                                        [facility]

 

        2017 [facility]  [id number]

[address] [facility]



        12:49:00 19:28:00 Emergency                                 03      ke

s -



                        Room                                            Patient



                                                                        [facility]







Results







           Test Description Test Time  Test Comments Results    Result Comments 

Source









                    ALPHA FETOPROTEIN (AFP), TUMOR MARKER 2019 16:30:00 









                      Test Item  Value      Reference Range Interpretation Comme

nts









             ALPHA-FETOPROTEIN (BEAKER) (test code = 1094) 10.9 ng/mL   <10.0   

     H            



HEPATIC FUNCTION [id number] 16:13:00





             Test Item    Value        Reference Range Interpretation Comments

 

             TOTAL PROTEIN (BEAKER) (test code = 7.2 gm/dL    6.0-8.3           

        



             770)                                                

 

             ALBUMIN (BEAKER) (test code = 1145) 3.5 g/dL     3.5-5.0           

        

 

             BILIRUBIN TOTAL (BEAKER) (test code 0.8 mg/dL    0.2-1.2           

        



             = 377)                                              

 

             BILIRUBIN DIRECT (BEAKER) (test 0.4 mg/dL    0.1-0.5               

    



             code = 706)                                         

 

             ALKALINE PHOSPHATASE (BEAKER) (test 107 U/L                  

        



             code = 346)                                         

 

             AST (SGOT) (BEAKER) (test code = 31 U/L       5-34                 

     



             353)                                                

 

             ALT (SGPT) (BEAKER) (test code = 15 U/L       6-55                 

     



             347)                                                



BASIC METABOLIC [id number] 16:13:00





             Test Item    Value        Reference Range Interpretation Comments

 

             SODIUM (BEAKER) 138 meq/L    136-145                   



             (test code = 381)                                        

 

             POTASSIUM (BEAKER) 3.8 meq/L    3.5-5.1                   



             (test code = 379)                                        

 

             CHLORIDE (BEAKER) 103 meq/L                        



             (test code = 382)                                        

 

             CO2 (BEAKER) (test 27 meq/L     22-29                     



             code = 355)                                         

 

             BLOOD UREA NITROGEN 7 mg/dL      7-21                      



             (BEAKER) (test code                                        



             = 354)                                              

 

             CREATININE (BEAKER) 0.78 mg/dL   0.57-1.25                 



             (test code = 358)                                        

 

             GLUCOSE RANDOM 226 mg/dL           H            



             (BEAKER) (test code                                        



             = 652)                                              

 

             CALCIUM (BEAKER) 9.2 mg/dL    8.4-10.2                  



             (test code = 697)                                        

 

             EGFR (BEAKER) (test 76 mL/min/1.73                           ESTIMA

NIKOLAI GFR IS



             code = 1092) sq m                                   NOT AS ACCURATE

 AS



                                                                 CREATININE



                                                                 CLEARANCE IN



                                                                 PREDICTING



                                                                 GLOMERULAR



                                                                 FILTRATION RATE

.



                                                                 ESTIMATED GFR I

S



                                                                 NOT APPLICABLE 

FOR



                                                                 DIALYSIS PATIEN

TS.



PROTHROMBIN TIME/[id number] 16:05:00





             Test Item    Value        Reference Range Interpretation Comments

 

             PROTIME (BEAKER) (test code = 14.9 seconds 11.7-14.7    H          

  



             759)                                                

 

             INR (BEAKER) (test code = 370) 1.2          <=5.9                  

   



RECOMMENDED COUMADIN/WARFARIN INR THERAPY RANGESSTANDARD DOSE: 2.0 - 3.0   
Includes: PROPHYLAXIS forvenous thrombosis, systemic embolization; TREATMENT for
venous thrombosis and/or pulmonary embolus.HIGH RISK: Target INR is 2.5-3.5 for 
patients with mechanical heart valves.CBC W/PLT COUNT &amp; AUTO DIFFERENTIAL
2019 15:57:00





             Test Item    Value        Reference Range Interpretation Comments

 

             WHITE BLOOD CELL COUNT (BEAKER) 3.3 K/ L     3.5-10.5     L        

    



             (test code = 775)                                        

 

             RED BLOOD CELL COUNT (BEAKER) 3.88 M/ L    3.93-5.22    L          

  



             (test code = 761)                                        

 

             HEMOGLOBIN (BEAKER) (test code = 10.4 GM/DL   11.2-15.7    L       

     



             410)                                                

 

             HEMATOCRIT (BEAKER) (test code = 34.3 %       34.1-44.9            

     



             411)                                                

 

             MEAN CORPUSCULAR VOLUME (BEAKER) 88.4 fL      79.4-94.8            

     



             (test code = 753)                                        

 

             MEAN CORPUSCULAR HEMOGLOBIN 26.8 pg      25.6-32.2                 



             (BEAKER) (test code = 751)                                        

 

             MEAN CORPUSCULAR HEMOGLOBIN CONC 30.3 GM/DL   32.2-35.5    L       

     



             (BEAKER) (test code = 752)                                        

 

             RED CELL DISTRIBUTION WIDTH 17.0 %       11.7-14.4    H            



             (BEAKER) (test code = 412)                                        

 

             PLATELET COUNT (BEAKER) (test code 42 K/CU MM   150-450      L     

       



             = 756)                                              

 

             MEAN PLATELET VOLUME (BEAKER) 10.6 fL      9.4-12.3                

  



             (test code = 754)                                        

 

             NUCLEATED RED BLOOD CELLS (BEAKER) 0 /100 WBC   0-0                

       



             (test code = 413)                                        

 

             NEUTROPHILS RELATIVE PERCENT 68 %                                  

 



             (BEAKER) (test code = 429)                                        

 

             LYMPHOCYTES RELATIVE PERCENT 25 %                                  

 



             (BEAKER) (test code = 430)                                        

 

             MONOCYTES RELATIVE PERCENT 7 %                                    



             (BEAKER) (test code = 431)                                        

 

             EOSINOPHILS RELATIVE PERCENT 1 %                                   

 



             (BEAKER) (test code = 432)                                        

 

             BASOPHILS RELATIVE PERCENT 0 %                                    



             (BEAKER) (test code = 437)                                        

 

             NEUTROPHILS ABSOLUTE COUNT 2.21 K/ L    1.56-6.13                 



             (BEAKER) (test code = 670)                                        

 

             LYMPHOCYTES ABSOLUTE COUNT 0.80 K/ L    1.18-3.74    L            



             (BEAKER) (test code = 414)                                        

 

             MONOCYTES ABSOLUTE COUNT (BEAKER) 0.22 K/ L    0.24-0.36    L      

      



             (test code = 415)                                        

 

             EOSINOPHILS ABSOLUTE COUNT 0.03 K/ L    0.04-0.36    L            



             (BEAKER) (test code = 416)                                        

 

             BASOPHILS ABSOLUTE COUNT (BEAKER) 0.01 K/ L    0.01-0.08           

      



             (test code = 417)                                        

 

             IMMATURE GRANULOCYTES-RELATIVE 0 %          0-1                    

   



             PERCENT (BEAKER) (test code =                                      

  



             2801)                                               



Bedside Pjpkryf9107-01-13 11:44:00





             Test Item    Value        Reference Range Interpretation Comments

 

             Bedside Glucose (test code = 08067-2) 310                 H  

          



Meter ID: [id number] [facility] CenterCreatine Kinase MB
2018 19:51:00





             Test Item    Value        Reference Range Interpretation Comments

 

             Creatine Kinase MB (test code = 0.80         0-5.0                 

    



             27427-6)                                            



[facility] -49-87 19:51:00





             Test Item    Value        Reference Range Interpretation Comments

 

             Troponin I (test code = MYD4579) -0.001       0-0.300              

     



[facility] CenterCreatine Smhhpo1023-79-22 19:44:00





             Test Item    Value        Reference Range Interpretation Comments

 

             Creatine Kinase (test code = 2157-6) 35                      

         



[facility] CenterVitamin B12 Dvhnw5116-57-79 10:03:00





             Test Item    Value        Reference Range Interpretation Comments

 

             Vitamin B12 Level (test code = 79176-9) 789          213-816       

            



[facility] CenterFolate2018-04-19 10:03:00





             Test Item    Value        Reference Range Interpretation Comments

 

             Folate (test code = 2284-8) 8.8          7.0-15.4                  



[facility] CenterThyroid Stimulating Hormone (TSH)
2018 09:51:00





             Test Item    Value        Reference Range Interpretation Comments

 

             Thyroid Stimulating Hormone (TSH) (test 2.053        0.350-4.940   

            



             code = 67347-2)                                        



[facility] CenterBlood Ffmhbss3892-90-73 20:02:00





             Test Item    Value        Reference Range Interpretation Comments

 

             Blood Culture (test NO GROWTH AFTER 5                           



             code = 52580207) DAYS, FINAL REPORT                           



[facility] CenterBlood Zlfnire7119-49-16 20:02:00





             Test Item    Value        Reference Range Interpretation Comments

 

             Blood Culture (test NO GROWTH AFTER 5                           



             code = 54923127) DAYS, FINAL REPORT                           



[facility]odium Ecgbg9940-96-52 00:57:00





             Test Item    Value        Reference Range Interpretation Comments

 

             Sodium Level (test code = 2951-2) 139          136-145             

      



[facility] CenterPotassium Eiezx3426-46-31 00:57:00





             Test Item    Value        Reference Range Interpretation Comments

 

             Potassium Level (test code = 2823-3) 2.9          3.5-5.1      LL  

         



Results called to [SAMMY RN/ER] at 0053 on 17 by Carlie PRATT [place].
[facility] CenterChloride Ghyaj8185-08-04 00:57:00





             Test Item    Value        Reference Range Interpretation Comments

 

             Chloride Level (test code = 2075-0) 109                 H    

        



[facility] CenterCarbon Dioxide Gmpjs9342-24-36 00:57:00





             Test Item    Value        Reference Range Interpretation Comments

 

             Carbon Dioxide Level (test code = 20           22-29        L      

      



             -9)                                             



[facility] CenterAnion Lmr5868-18-31 00:57:00





             Test Item    Value        Reference Range Interpretation Comments

 

             Anion Gap (test code = 33037-3) 12.9         8-16                  

    



[facility] CenterBlood Urea Ayhavhus2128-28-38 00:57:00





             Test Item    Value        Reference Range Interpretation Comments

 

             Blood Urea Nitrogen (test code = 6            7-26         L       

     



             3094-0)                                             



[facility] CenterCreatinine2017-11-11 00:57:00





             Test Item    Value        Reference Range Interpretation Comments

 

             Creatinine (test code = 2160-0) 0.67         0.57-1.11             

    



[facility] CenterBUN/Creatinine Dbecm4242-90-86 00:57:00





             Test Item    Value        Reference Range Interpretation Comments

 

             BUN/Creatinine Ratio (test code = 9            6-25                

      



             3097-3)                                             



[facility] CenterEstimat Glomerular Filtration Rate
2017 00:57:00





             Test Item    Value        Reference Range Interpretation Comments

 

             Estimat Glomerular Filtration Rate 60-          >60                

       



             (test code = 32435-0)                                        



Ranges were taken from the National Kidney Disease Education Program and the 
National Kid[employer] Foundation literature.Reference ranges:60 or greater: Siwooo75-
59 (for 3 consecutive months): Chronic kidneydisease 15 or less: Kidney failure
[facility] CenterGlucose Pzpez8063-58-20 00:57:00





             Test Item    Value        Reference Range Interpretation Comments

 

             Glucose Level (test code = ZSN0801) 272                 H    

        



[facility] CenterCalcium Zllqh6960-01-08 00:57:00





             Test Item    Value        Reference Range Interpretation Comments

 

             Calcium Level (test code = 27417-4) 8.1          8.4-10.2     L    

        



[facility]odium Peodi8234-44-27 00:57:00





             Test Item    Value        Reference Range Interpretation Comments

 

             Sodium Level (test code = 2951-2) 139          136-145             

      



[facility] CenterPotassium Staqw7420-15-44 00:57:00





             Test Item    Value        Reference Range Interpretation Comments

 

             Potassium Level (test code = 2823-3) 2.9          3.5-5.1      LL  

         



Results called to [SAMMY RN/ER] at 0053 on 17 by Carlie Leiva. RB [place].
[facility] CenterChloride Zmvru1187-53-44 00:57:00





             Test Item    Value        Reference Range Interpretation Comments

 

             Chloride Level (test code = 2075-0) 109                 H    

        



[facility] CenterCarbon Dioxide Ppcoz8711-43-76 00:57:00





             Test Item    Value        Reference Range Interpretation Comments

 

             Carbon Dioxide Level (test code = 20           22-29        L      

      



             8-9)                                             



[facility] CenterAnion Rvf9449-48-08 00:57:00





             Test Item    Value        Reference Range Interpretation Comments

 

             Anion Gap (test code = 33037-3) 12.9         8-16                  

    



[facility] CenterBlood Urea Pybdneoo5437-23-70 00:57:00





             Test Item    Value        Reference Range Interpretation Comments

 

             Blood Urea Nitrogen (test code = 6            7-26         L       

     



             3094-0)                                             



[facility] CenterCreatinine2017-11-11 00:57:00





             Test Item    Value        Reference Range Interpretation Comments

 

             Creatinine (test code = 2160-0) 0.67         0.57-1.11             

    



[facility] CenterBUN/Creatinine Kxhpf6264-07-20 00:57:00





             Test Item    Value        Reference Range Interpretation Comments

 

             BUN/Creatinine Ratio (test code = 9            6-25                

      



             3097-3)                                             



[facility] CenterEstimat Glomerular Filtration Rate
2017 00:57:00





             Test Item    Value        Reference Range Interpretation Comments

 

             Estimat Glomerular Filtration Rate 60-          >60                

       



             (test code = 41486-0)                                        



Ranges were taken from the National Kidney Disease Education Program and the 
National Kidney Foundation literature.Reference ranges:60 or greater: Gencqe19-
59 (for 3 consecutive months): Chronic kidneydisease 15 or less: Kidney failure
[facility] CenterGlucose Mlfht5637-65-55 00:57:00





             Test Item    Value        Reference Range Interpretation Comments

 

             Glucose Level (test code = YXV5557) 272                 H    

        



[facility] CenterCalcium Zyewd1588-23-58 00:57:00





             Test Item    Value        Reference Range Interpretation Comments

 

             Calcium Level (test code = 83191-9) 8.1          8.4-10.2     L    

        



[facility] Bilirubin2017-11-10 21:06:00





             Test Item    Value        Reference Range Interpretation Comments

 

             Total Bilirubin (test code = 1975-2) 0.6          0.2-1.2          

         



[facility] CenterAspartate Amino Transf (AST/SGOT)
2017-11-10 21:06:00





             Test Item    Value        Reference Range Interpretation Comments

 

             Aspartate Amino Transf (AST/SGOT) (test 50           5-34         H

            



             code = Aspartate Amino Transf                                      

  



             (AST/SGOT))                                         



[facility] CenterAlanine Aminotransferase (ALT/SGPT)
2017-11-10 21:06:00





             Test Item    Value        Reference Range Interpretation Comments

 

             Alanine Aminotransferase (ALT/SGPT) 46           0-55              

        



             (test code = 1742-6)                                        



[facility] CenterTotal Protein2017-11-10 21:06:00





             Test Item    Value        Reference Range Interpretation Comments

 

             Total Protein (test code = 2885-2) 7.3          6.5-8.1            

       



[facility] CenterAlbumin2017-11-10 21:06:00





             Test Item    Value        Reference Range Interpretation Comments

 

             Albumin (test code = 1751-7) 3.1          3.5-5.0      L           

 



[facility] CenterGlobulin2017-11-10 21:06:00





             Test Item    Value        Reference Range Interpretation Comments

 

             Globulin (test code = 84320-0) 4.2          2.3-3.5      H         

   



[facility] CenterAlbumin/Globulin Ratio2017-11-10 21:06:00





             Test Item    Value        Reference Range Interpretation Comments

 

             Albumin/Globulin Ratio (test code = 0.7          0.8-2.0      L    

        



             1759-0)                                             



[facility] CenterAlkaline Phosphatase2017-11-10 21:06:00





             Test Item    Value        Reference Range Interpretation Comments

 

             Alkaline Phosphatase (test code = 196                 H      

      



             6768-6)                                             



[facility] CenterTotal Bilirubin2017-11-10 21:06:00





             Test Item    Value        Reference Range Interpretation Comments

 

             Total Bilirubin (test code = 1975-2) 0.6          0.2-1.2          

         



[facility] CenterAspartate Amino Transf (AST/SGOT)
2017-11-10 21:06:00





             Test Item    Value        Reference Range Interpretation Comments

 

             Aspartate Amino Transf (AST/SGOT) (test 50           5-34         H

            



             code = Aspartate Amino Transf                                      

  



             (AST/SGOT))                                         



[facility] CenterAlanine Aminotransferase (ALT/SGPT)
2017-11-10 21:06:00





             Test Item    Value        Reference Range Interpretation Comments

 

             Alanine Aminotransferase (ALT/SGPT) 46           0-55              

        



             (test code = 1742-6)                                        



[facility] Protein2017-11-10 21:06:00





             Test Item    Value        Reference Range Interpretation Comments

 

             Total Protein (test code = 2885-2) 7.3          6.5-8.1            

       



[facility] CenterAlbumin2017-11-10 21:06:00





             Test Item    Value        Reference Range Interpretation Comments

 

             Albumin (test code = 1751-7) 3.1          3.5-5.0      L           

 



[facility] CenterGlobulin2017-11-10 21:06:00





             Test Item    Value        Reference Range Interpretation Comments

 

             Globulin (test code = 89561-0) 4.2          2.3-3.5      H         

   



[facility] CenterAlbumin/Globulin Ratio2017-11-10 21:06:00





             Test Item    Value        Reference Range Interpretation Comments

 

             Albumin/Globulin Ratio (test code = 0.7          0.8-2.0      L    

        



             1759-0)                                             



[facility] CenterAlkaline Phosphatase2017-11-10 21:06:00





             Test Item    Value        Reference Range Interpretation Comments

 

             Alkaline Phosphatase (test code = 196                 H      

      



             6768-6)                                             



[facility] CenterWhite Blood Count2017-11-10 20:53:00





             Test Item    Value        Reference Range Interpretation Comments

 

             White Blood Count (test code = 6690-2) 6.48         4.8-10.8       

           



[facility] CenterRed Blood Count2017-11-10 20:53:00





             Test Item    Value        Reference Range Interpretation Comments

 

             Red Blood Count (test code = 789-8) 4.15         3.6-5.1           

        



[facility] CenterHemoglobin2017-11-10 20:53:00





             Test Item    Value        Reference Range Interpretation Comments

 

             Hemoglobin (test code = 34943-7) 12.4         12.0-16.0            

     



[facility] CenterHematocrit2017-11-10 20:53:00





             Test Item    Value        Reference Range Interpretation Comments

 

             Hematocrit (test code = 4544-3) 36.8         34.2-44.1             

    



[facility] CenterMean Corpuscular Volume2017-11-10 
20:53:00





             Test Item    Value        Reference Range Interpretation Comments

 

             Mean Corpuscular Volume (test code = 88.7         81-99            

         



             787-2)                                              



[facility] CenterMean Corpuscular Hemoglobin2017-11-10 
20:53:00





             Test Item    Value        Reference Range Interpretation Comments

 

             Mean Corpuscular Hemoglobin (test code 29.9         28-32          

           



             = 785-6)                                            



[facility] CenterMean Corpuscular Hemoglobin Concent
2017-11-10 20:53:00





             Test Item    Value        Reference Range Interpretation Comments

 

             Mean Corpuscular Hemoglobin Concent 33.7         31-35             

        



             (test code = 786-4)                                        



[facility] CenterRed Cell Distribution Width2017-11-10 
20:53:00





             Test Item    Value        Reference Range Interpretation Comments

 

             Red Cell Distribution Width (test code 14.0         11.7-14.4      

           



             = 09658-6)                                          



[facility] CenterPlatelet Count2017-11-10 20:53:00





             Test Item    Value        Reference Range Interpretation Comments

 

             Platelet Count (test code = 777-3) 54           140-360      L     

       



[facility] CenterNeutrophils (%) (Auto)2017-11-10 20:53:00





             Test Item    Value        Reference Range Interpretation Comments

 

             Neutrophils (%) (Auto) (test code = 72.3         38.7-80.0         

        



             26498-9)                                            



[facility] CenterLymphocytes (%) (Auto)2017-11-10 20:53:00





             Test Item    Value        Reference Range Interpretation Comments

 

             Lymphocytes (%) (Auto) (test code = 21.1         18.0-39.1         

        



             736-9)                                              



[facility] CenterMonocytes (%) (Auto)2017-11-10 20:53:00





             Test Item    Value        Reference Range Interpretation Comments

 

             Monocytes (%) (Auto) (test code = 5.1          4.4-11.3            

      



             5905-5)                                             



[facility] CenterEosinophils (%) (Auto)2017-11-10 20:53:00





             Test Item    Value        Reference Range Interpretation Comments

 

             Eosinophils (%) (Auto) (test code = 0.9          0.0-6.0           

        



             713-8)                                              



[facility] CenterBasophils (%) (Auto)2017-11-10 20:53:00





             Test Item    Value        Reference Range Interpretation Comments

 

             Basophils (%) (Auto) (test code = 0.3          0.0-1.0             

      



             706-2)                                              



[facility] CenterIM GRANULOCYTES %2017-11-10 20:53:00





             Test Item    Value        Reference Range Interpretation Comments

 

             IM GRANULOCYTES % (test code = IM 0.3          0.0-1.0             

      



             GRANULOCYTES %)                                        



[facility] CenterNeutrophils # (Auto)2017-11-10 20:53:00





             Test Item    Value        Reference Range Interpretation Comments

 

             Neutrophils # (Auto) (test code = 4.7          2.1-6.9             

      



             751-8)                                              



[facility] CenterLymphocytes # (Auto)2017-11-10 20:53:00





             Test Item    Value        Reference Range Interpretation Comments

 

             Lymphocytes # (Auto) (test code = 1.4          1.0-3.2             

      



             19269-2)                                            



[facility] CenterMonocytes # (Auto)2017-11-10 20:53:00





             Test Item    Value        Reference Range Interpretation Comments

 

             Monocytes # (Auto) (test code = 742-7) 0.3          0.2-0.8        

           



[facility] CenterEosinophils # (Auto)2017-11-10 20:53:00





             Test Item    Value        Reference Range Interpretation Comments

 

             Eosinophils # (Auto) (test code = 0.1          0.0-0.4             

      



             711-2)                                              



[facility] CenterBasophils # (Auto)2017-11-10 20:53:00





             Test Item    Value        Reference Range Interpretation Comments

 

             Basophils # (Auto) (test code = 704-7) 0.0          0.0-0.1        

           



[facility] CenterAbsolute Immature Granulocyte (auto
2017-11-10 20:53:00





             Test Item    Value        Reference Range Interpretation Comments

 

             Absolute Immature Granulocyte (auto 0.02         0-0.1             

        



             (test code = Absolute Immature                                     

   



             Granulocyte (auto)                                        



[facility] CenterWhite Blood Count2017-11-10 20:53:00





             Test Item    Value        Reference Range Interpretation Comments

 

             White Blood Count (test code = 6690-2) 6.48         4.8-10.8       

           



[facility] CenterRed Blood Count2017-11-10 20:53:00





             Test Item    Value        Reference Range Interpretation Comments

 

             Red Blood Count (test code = 789-8) 4.15         3.6-5.1           

        



[facility] CenterHemoglobin2017-11-10 20:53:00





             Test Item    Value        Reference Range Interpretation Comments

 

             Hemoglobin (test code = 44514-2) 12.4         12.0-16.0            

     



[facility]Hematocrit2017[facility] 20[facility]53:00





             Test Item    Value        Reference Range Interpretation Comments

 

             Hematocrit (test code = 4544-3) 36.8         34.2-44.1             

    



[facility] CenterMean Corpuscular Volume2017-11-10 
20:53:00





             Test Item    Value        Reference Range Interpretation Comments

 

             Mean Corpuscular Volume (test code = 88.7         81-99            

         



             787-2)                                              



[facility] CenterMean [facility]uscular Hemoglobin2017-11-10 
20:53:00





             Test Item    Value        Reference Range Interpretation Comments

 

             Mean Corpuscular Hemoglobin (test code 29.9         28-32          

           



             = 785-6)                                            



[facility] CenterMean Corpuscular Hemoglobin Concent
2017-11-10 20:53:00





             Test Item    Value        Reference Range Interpretation Comments

 

             Mean Corpuscular Hemoglobin Concent 33.7         31-35             

        



             (test code = 786-4)                                        



[facility] CenterRed [facility] Width2017-11-10 
20:53:00





             Test Item    Value        Reference Range Interpretation Comments

 

             Red Cell Distribution Width (test code 14.0         11.7-14.4      

           



             = 43235-2)                                          



[facility] CenterPlatelet Count2017-11-10 20:53:00





             Test Item    Value        Reference Range Interpretation Comments

 

             Platelet Count (test code = 777-3) 54           140-360      L     

       



[facility] CenterNeutrophils (%) (Auto)2017-11-10 20:53:00





             Test Item    Value        Reference Range Interpretation Comments

 

             Neutrophils (%) (Auto) (test code = 72.3         38.7-80.0         

        



             82885-9)                                            



[facility] CenterLymphocytes (%) (Auto)2017-11-10 20:53:00





             Test Item    Value        Reference Range Interpretation Comments

 

             Lymphocytes (%) (Auto) (test code = 21.1         18.0-39.1         

        



             736-9)                                              



[facility] CenterMonocytes (%) (Auto)2017-11-10 20:53:00





             Test Item    Value        Reference Range Interpretation Comments

 

             Monocytes (%) (Auto) (test code = 5.1          4.4-11.3            

      



             5905-5)                                             



[facility] CenterEosinophils (%) (Auto)2017-11-10 20:53:00





             Test Item    Value        Reference Range Interpretation Comments

 

             Eosinophils (%) (Auto) (test code = 0.9          0.0-6.0           

        



             713-8)                                              



[facility] CenterBasophils (%) (Auto)2017-11-10 20:53:00





             Test Item    Value        Reference Range Interpretation Comments

 

             Basophils (%) (Auto) (test code = 0.3          0.0-1.0             

      



             706-2)                                              



[facility] CenterIM GRANULOCYTES %2017-11-10 20:53:00





             Test Item    Value        Reference Range Interpretation Comments

 

             IM GRANULOCYTES % (test code = IM 0.3          0.0-1.0             

      



             GRANULOCYTES %)                                        



[facility] CenterNeutrophils # (Auto)2017-11-10 20:53:00





             Test Item    Value        Reference Range Interpretation Comments

 

             Neutrophils # (Auto) (test code = 4.7          2.1-6.9             

      



             751-8)                                              



[facility] CenterLymphocytes # (Auto)2017-11-10 20:53:00





             Test Item    Value        Reference Range Interpretation Comments

 

             Lymphocytes # (Auto) (test code = 1.4          1.0-3.2             

      



             14458-7)                                            



[facility] CenterMonocytes # (Auto)2017-11-10 20:53:00





             Test Item    Value        Reference Range Interpretation Comments

 

             Monocytes # (Auto) (test code = 742-7) 0.3          0.2-0.8        

           



[facility] CenterEosinophils # (Auto)2017-11-10 20:53:00





             Test Item    Value        Reference Range Interpretation Comments

 

             Eosinophils # (Auto) (test code = 0.1          0.0-0.4             

      



             711-2)                                              



[facility] CenterBasophils # (Auto)2017-11-10 20:53:00





             Test Item    Value        Reference Range Interpretation Comments

 

             Basophils # (Auto) (test code = 704-7) 0.0          0.0-0.1        

           



[facility] CenterAbsolute Immature Granulocyte (auto
2017-11-10 20:53:00





             Test Item    Value        Reference Range Interpretation Comments

 

             Absolute Immature Granulocyte (auto 0.02         0-0.1             

        



             (test code = Absolute Immature                                     

   



             Granulocyte (auto)                                        



[facility] CenterBedside Hkstyrr8019-78-60 02:22:00





             Test Item    Value        Reference Range Interpretation Comments

 

             Bedside Glucose (test code = 52242-3) 337                 H  

          



Meter ID: [id number] Medical CenterCreatine Kinase MB
2017 20:46:00





             Test Item    Value        Reference Range Interpretation Comments

 

             Creatine Kinase MB (test code = 2.30         0.00-5.00             

    



             31373-4)                                            



[facility] CenterTroponin -82-41 20:46:00





             Test Item    Value        Reference Range Interpretation Comments

 

             Troponin I (test code = JIL0133) 0.007        0-0.300              

     



[facility] CenterMagnesium Sxcri6314-76-30 20:42:00





             Test Item    Value        Reference Range Interpretation Comments

 

             Magnesium Level (test code = 98442-9) 1.7          1.3-2.1         

          



[facility] CenterCreatine Ipjidy5975-36-15 20:42:00





             Test Item    Value        Reference Range Interpretation Comments

 

             Creatine Kinase (test code = 2157-6) 81                      

         



[facility] CenterMaesium Vqkjs0424-88-62 20:42:00





             Test Item    Value        Reference Range Interpretation Comments

 

             Magnesium Level (test code = 27277-5) 1.7          1.3-2.1         

          



[facility] CenterProthrombin Iqgo3033-51-85 20:34:00





             Test Item    Value        Reference Range Interpretation Comments

 

             Prothrombin Time (test code = 5902-2) 12.9         11.9-14.5       

          



[facility] CenterProthromb Time International Ratio
2017 20:34:00





             Test Item    Value        Reference Range Interpretation Comments

 

             Prothromb Time International Ratio 0.93                            

       



             (test code = 6301-6)                                        



Oral Anticoagulant Therapy INR Values:1. Low Intensity Therapy        1.5 - 
2.02. Moderate IntensityTherapy   2.0 - 3.03. High Intensity Therapy(1)    2.5 -
3.54. High Intensity Therapy(2)    3.0 - 4.05. Panic Value INR              &gt;
5.0[facility] CenterActivated Partial Thromboplast Time
2017 20:34:00





             Test Item    Value        Reference Range Interpretation Comments

 

             Activated Partial Thromboplast Time 27.5         23.8-35.5         

        



             (test code = 06143-4)                                        



[facility] CenterProthrombin Eylo4443-52-11 20:34:00





             Test Item    Value        Reference Range Interpretation Comments

 

             Prothrombin Time (test code = 5902-2) 12.9         11.9-14.5       

          



[facility] CenterProthromb Time International Ratio
2017 20:34:00





             Test Item    Value        Reference Range Interpretation Comments

 

             Prothromb Time International Ratio 0.93                            

       



             (test code = 6301-6)                                        



Oral Anticoagulant Therapy INR Values:1. Low Intensity Therapy        1.5 - 
2.02. Moderate IntensityTherapy   2.0 - 3.03. High Intensity Therapy(1)    2.5 -
3.54. High Intensity Therapy(2)    3.0 - 4.05. Panic Value INR              &gt;
5.0[facility] CenterActivated Partial Thromboplast Time
2017 20:34:00





             Test Item    Value        Reference Range Interpretation Comments

 

             Activated Partial Thromboplast Time 27.5         23.8-35.5         

        



             (test code = 67269-8)                                        



[facility] CenterUrine PWF4233-74-84 20:13:00





             Test Item    Value        Reference Range Interpretation Comments

 

             Urine WBC (test code = 5821-4) 0-5          0-5                    

   



[facility] CenterUrine PGV8366-06-08 20:13:00





             Test Item    Value        Reference Range Interpretation Comments

 

             Urine RBC (test code = 46454-2) NONE         0-5                   

    



[facility] CenterUrine Hjclxvxo8964-60-79 20:13:00





             Test Item    Value        Reference Range Interpretation Comments

 

             Urine Bacteria (test code = 14378-5) FEW          NONE             

         



[facility] CenterUrine Epithelial Bqipq8122-23-84 20:13:00





             Test Item    Value        Reference Range Interpretation Comments

 

             Urine Epithelial Cells (test code = FEW          NONE              

        



             42027-4)                                            



[facility] CenterUrine UGM5448-03-87 20:13:00





             Test Item    Value        Reference Range Interpretation Comments

 

             Urine WBC (test code = 5821-4) 0-5          0-5                    

   



[facility] CenterUrine VVD8023-84-86 20:13:00





             Test Item    Value        Reference Range Interpretation Comments

 

             Urine RBC (test code = 33702-0) NONE         0-5                   

    



[facility] CenterUrine Kbwhnnzg5766-75-67 20:13:00





             Test Item    Value        Reference Range Interpretation Comments

 

             Urine Bacteria (test code = 64707-0) FEW          NONE             

         



[facility] CenterUrine Epithelial Cacev2918-34-41 20:13:00





             Test Item    Value        Reference Range Interpretation Comments

 

             Urine Epithelial Cells (test code = FEW          NONE              

        



             83273-1)                                            



[facility] CenterUrine Navhu5728-20-00 19:42:00





             Test Item    Value        Reference Range Interpretation Comments

 

             Urine Color (test code = 5778-6) STRAW        YELLOW               

     



[facility] CenterUrine Dhcbpjm7803-15-77 19:42:00





             Test Item    Value        Reference Range Interpretation Comments

 

             Urine Clarity (test code = 02733-3) CLEAR        CLEAR             

        



[facility] Specific Cnxjovs9503-22-20 19:42:00





             Test Item    Value        Reference Range Interpretation Comments

 

             Urine Specific Gravity (test code = 1.005        1.010-1.025  L    

        



             5811-5)                                             



[facility] sB9610-21-01 19:42:00





             Test Item    Value        Reference Range Interpretation Comments

 

             Urine pH (test code = 71184-3) 7            5-7                    

   



[facility] Leukocyte Jkusedzt4705-27-86 
19:42:00





             Test Item    Value        Reference Range Interpretation Comments

 

             Urine Leukocyte Esterase (test code NEGATIVE     NEGATIVE          

        



             = 5799-2)                                           



[facility] Cqwcdtu4860-91-69 19:42:00





             Test Item    Value        Reference Range Interpretation Comments

 

             Urine Nitrite (test code = 18569-4) NEGATIVE     NEGATIVE          

        



[facility] Ezduyjr7491-58-64 19:42:00





             Test Item    Value        Reference Range Interpretation Comments

 

             Urine Protein (test code = 5804-0) NEGATIVE     NEGATIVE           

       



[facility] Glucose (UA)2017 19:42:00





             Test Item    Value        Reference Range Interpretation Comments

 

             Urine Glucose (UA) (test code = 2349-9) 3+           NEGATIVE     H

            



[facility] Ntqxmqw5523-16-49 19:42:00





             Test Item    Value        Reference Range Interpretation Comments

 

             Urine Ketones (test code = 07525-4) NEGATIVE     NEGATIVE          

        



[facility] Ssfodedvweza4475-23-47 19:42:00





             Test Item    Value        Reference Range Interpretation Comments

 

             Urine Urobilinogen (test code = 1            0.2-1                 

    



             03245-0)                                            



[facility] Vasrqauug8035-23-20 19:42:00





             Test Item    Value        Reference Range Interpretation Comments

 

             Urine Bilirubin (test code = 1978-6) NEGATIVE     NEGATIVE         

         



[facility] CenterUrine Uaxcr6925-02-66 19:42:00





             Test Item    Value        Reference Range Interpretation Comments

 

             Urine Blood (test code = 27984-1) NEGATIVE     NEGATIVE            

      



[facility] CenterUrine Wboil6514-23-80 19:42:00





             Test Item    Value        Reference Range Interpretation Comments

 

             Urine Color (test code = 5778-6) STRAW        YELLOW               

     



[facility] CenterUrine Fgoumaf4655-65-90 19:42:00





             Test Item    Value        Reference Range Interpretation Comments

 

             Urine Clarity (test code = 92636-0) CLEAR        CLEAR             

        



[facility] CenterU[facility] Specific Qbcddbn5866-34-82 19:42:00





             Test Item    Value        Reference Range Interpretation Comments

 

             Urine Specific Gravity (test code = 1.005        1.010-1.025  L    

        



             5811-5)                                             



[facility] CenterUrine fX3866-57-24 19:42:00





             Test Item    Value        Reference Range Interpretation Comments

 

             Urine pH (test code = 16308-1) 7            5-7                    

   



[facility] CenterU[facility] Leukocyte Frxpztgc7181-52-65 
19:42:00





             Test Item    Value        Reference Range Interpretation Comments

 

             Urine Leukocyte Esterase (test code NEGATIVE     NEGATIVE          

        



             = 5799-2)                                           



[facility] CenterUrine Gtxzprq6959-57-88 19:42:00





             Test Item    Value        Reference Range Interpretation Comments

 

             Urine Nitrite (test code = 86226-5) NEGATIVE     NEGATIVE          

        



[facility] CenterU[facility] Mhihbbs3114-43-72 19:42:00





             Test Item    Value        Reference Range Interpretation Comments

 

             Urine Protein (test code = 5804-0) NEGATIVE     NEGATIVE           

       



[facility] CenterUrine Glucose (UA)2017 19:42:00





             Test Item    Value        Reference Range Interpretation Comments

 

             Urine Glucose (UA) (test code = 2349-9) 3+           NEGATIVE     H

            



[facility] CenterUrine Meevrfe5344-21-26 19:42:00





             Test Item    Value        Reference Range Interpretation Comments

 

             Urine Ketones (test code = 75393-3) NEGATIVE     NEGATIVE          

        



[facility] CenterUrine Dysrdanewlbk7674-00-29 19:42:00





             Test Item    Value        Reference Range Interpretation Comments

 

             Urine Urobilinogen (test code = 1            0.2-1                 

    



             03384-7)                                            



[facility] CenterU[facility] Ajdjkjlkv5791-79-94 19:42:00





             Test Item    Value        Reference Range Interpretation Comments

 

             Urine Bilirubin (test code = 1978-6) NEGATIVE     NEGATIVE         

         



[facility] CenterU[facility] Jztqe2182-27-83 19:42:00





             Test Item    Value        Reference Range Interpretation Comments

 

             Urine Blood (test code = 99188-9) NEGATIVE     NEGATIVE            

      



[facility] CenterAmylase Level2017-10-06 02:32:00





             Test Item    Value        Reference Range Interpretation Comments

 

             Amylase Level (test code = 1798-8) 49                        

       



[facility] CenterLipase2017-10-06 02:32:00





             Test Item    Value        Reference Range Interpretation Comments

 

             Lipase (test code = 3040-3) 51           78                      



[facility] CenterAmylase Level2017-10-06 02:32:00





             Test Item    Value        Reference Range Interpretation Comments

 

             Amylase Level (test code = 1798-8) 49                        

       



[facility] CenterLipase2017-10-06 02:32:00





             Test Item    Value        Reference Range Interpretation Comments

 

             Lipase (test code = 3040-3) 51                                 



[facility] CenterPlatelet Wbgelmpf0560-12-15 17:00:00





             Test Item    Value        Reference Range Interpretation Comments

 

             Platelet Estimate (test SLIGHTLY DECREASED                         

  



             code = 13989-6)                                        



[facility] CenterPlatelet Morphology Wsjskpt1590-21-20 
17:00:00





             Test Item    Value        Reference Range Interpretation Comments

 

             Platelet Morphology Comment (test FEW LARGE                        

      



             code = 88337-9)                                        



No platelet clumps seen on smearUniversity Medical CenterRed Cell 
Morphology Tbtqmiy7316-10-03 17:00:00





             Test Item    Value        Reference Range Interpretation Comments

 

             Red Cell Morphology Comment (test code NORMAL                      

           



             = 6742-1)                                           



[facility] CenterPlatelet Sbrkyrof0672-33-15 17:00:00





             Test Item    Value        Reference Range Interpretation Comments

 

             Platelet Estimate (test SLIGHTLY DECREASED                         

  



             code = 60760-5)                                        



[facility] CenterPlatelet Morphology Cpdbovo9137-45-89 
17:00:00





             Test Item    Value        Reference Range Interpretation Comments

 

             Platelet Morphology Comment (test FEW LARGE                        

      



             code = 14946-5)                                        



No platelet clumps seen on smearUniversity Medical CenterRed Cell 
Morphology Jymkxoz3253-23-58 17:00:00





             Test Item    Value        Reference Range Interpretation Comments

 

             Red Cell Morphology Comment (test code NORMAL                      

           



             = 6742-1)                                           



[facility] CenterUrine Hyaline Cofaz2122-57-57 14:57:00





             Test Item    Value        Reference Range Interpretation Comments

 

             Urine Hyaline Casts (test code = 2-5          0-1          H       

     



             26795-6)                                            



[facility] CenterUrine Kxbzl6608-20-35 14:57:00





             Test Item    Value        Reference Range Interpretation Comments

 

             Urine Yeast (test code = 24561-2) FEW          NONE         H      

      



[facility] CenterUrine Hyaline Ktsod9492-28-30 14:57:00





             Test Item    Value        Reference Range Interpretation Comments

 

             Urine Hyaline Casts (test code = 2-5          0-1          H       

     



             07115-7)                                            



[facility] CenterUrine Sjkzz9637-56-87 14:57:00





             Test Item    Value        Reference Range Interpretation Comments

 

             Urine Yeast (test code = 13255-6) FEW          NONE         H      

      



[facility]   [address] [address]    Patient Name : VALENTE GROVER         MR #: [id number]   : 
1960         Age/Sex: 57/F      Acct # : [id number]           Adm 
Physician  : ROSE KITCHEN MD       Admit Date  :  18       Location : [facility]  
 Room/Bed : [address] [place] [address]          
________________________________________________________
___________________________________________     REPORT: Cardiology Report       
DATE OF STUDY:  2018       LEXISCAN MYOVIEW      The patient had 
resting perfusion images after an injection of11    millicuries of technetium-
99m Myoview.  Later, due to inability to    exercise, she was given Lexiscan 0.4
mg intravenously, and shortly    afterwards 33 millicuries of technetium-99m 
Myoview.  Perfusion images were    taken by rotational tomography.      
Comparison resting and Lexiscan stress images show no evidence of any    
perfusion defect.  Uptake is smooth and regular throughout.  No suggestion    of
any scar or any ischemia.  Additionally, gaited wall motion images were    
obtained and calculated ejection fraction normal at 54% without regional    wall
motion abnormality.        FINAL IMPRESSION    1.  Normal Lexiscan Myoview for 
perfusion.   2.  Normal left ventricular function, calculated ejection fraction 
54%.                 DD:  2018 16:58   DT:  2018 18:08   Job#:  [id number]
[id number]      cc:   ROSE KITCHEN MD           Signature                     Date
                          Dictated By: OLIVIA ESTRADA MD  Transcribed By: SMEDS 
on 18   &lt;Electronically signed by OLIVIA ESTRADA MD&gt;&lt;&lt;Signature on File&gt;&gt;18 1414    COPY TO:CT CHEST WO    
[address] [address]  Patient Name: VALENTE GROVER   MR #: [id number]    : 
1960 Age/Sex: 57/F  Acct #: [id number] Req #: [id number]  Adm Physician:
ROSE KITCHEN MD    Ordered by: ROSE KITCHEN MD  Report #: [id number]   Location: 
[facility]  Room/Bed: [place] [address]    
_________________________________________________________
__________________________________________    Procedure: [id number] CT/CT CHEST 
WO  Exam Date: 18                            Exam Time: 1115       REPORT 
STATUS: Signed    PROCEDURE: CT CHEST WITHOUT CONTRAST   CT scan of the chest 
WITHOUT intravenous contrast, using standard    protocol.       TECHNIQUE:   The
chest was scanned utilizing a multidetector helical scanner from    the apex to 
the level of the adrenal glands.  No IV contrast was    administered as per 
physician request.  Coronal and sagittal    multiplanar reformations were 
obtained.       COMPARISON: None.       INDICATIONS:   CHEST PAIN           
FINDINGS:   Lines/tubes:  None.       Lungs and Airways:  The lungs and airways 
are normal with no focal    abnormality demonstrated. Right upper lobe scarring.
Bibasilar    dependentatelectasis.       Pleura: The pleural spaces are clear.  
    Heart and mediastinum:  The thyroid gland is normal. No significant    
mediastinal, hilar or axillary lymphadenopathy is seen. The heart and    
pericardium are within normal limits.       Soft tissues: Normal.       Abdomen:
Nodular contour of the liver. The spleen is enlarged,    measuring 17 cm in AP 
length. Multiple splenic and esophageal varices    are partially visualized. 
Trace ascites is present in the right upper    quadrant. Theadrenal glands are 
normal.       Bones: The visualized bony thorax is within normal limits.        
IMPRESSION:        1. No acute abnormality of the chest.    2. Hepatic 
parenchymal appearance consistent with cirrhotic morphology.        3. 
Splenomegaly and varices consistent with portal hypertension.          Dictated 
by:  Roes Newton M.D. on 2018 at 13:18        Electronically approved by: 
Rose Newton M.D. on 2018 at 13:18                Dictated By: ROSE NEWTON MD  Electronically Signed By: ROSE NEWTON MD on 18 1318  Transcribed By: 
PILAR on 18 1318       COPY TO:   [facility] W/PA 
CXR    [place] [address]  Patient Name: VALENTE GROVER   MR #: [id number]    : 
1960 Age/Sex: 56/F  Acct #: [id number] Req #: [id number]  Adm Physician:
    Ordered by: SMITHA MCMAHAN MD  Report #: [id number] Location: ER  
Room/Bed:     
________________________________________________________________________
___________________________    Procedure: [id number] DX/ABDOMEN ACUTE SERIES W/PA
CXR  Exam Date: 10/06/17                            Exam Time: 0210       REPORT
STATUS: Signed    EXAM: ABDOMEN ACUTE SERIES W/PA CXR, supine and erect views of
the abdomen, AP   view of the chest   DATE: 10/6/2017 1:29 AM  Time stamp on 
exam: 0155 hours   INDICATION: Abdominal pain   COMPARISON: CT of the abdomen 
and pelvis 2017      FINDINGS:   LINES/TUBES: None      LUNGS: No 
consolidations or edema.       PLEURA: No effusions or pneumothorax.      HEART 
AND MEDIASTINUM: Normal size and contour.      BOWEL PATTERN: Non-obstructed 
bowel gas pattern.      BONES AND SOFT TISSUES: No acute bone findings. No abn
ormal calcifications. No   mass effect. Bilateral chest surgical clips. Surgical
clips right upperquadrant of the abdomen.      IMPRESSION:   No acute thoracic 
abnormality.      No evidence for bowel obstruction.               Signed by: 
Dr. Akiko Mehta M.D. on 10/6/2017 2:40 AM        Dictated By: AKIKO MEHTA MD  Electronically Signed By: AKIKO MEHTA MD on 10/06/17 0240  
Transcribed By: CLIFFORD on 10/06/17 0240       COPY TO:   SMITHA MCMAHAN MD

## 2021-11-20 ENCOUNTER — HOSPITAL ENCOUNTER (OUTPATIENT)
Dept: HOSPITAL 97 - ER | Age: 61
Setting detail: OBSERVATION
LOS: 1 days | Discharge: LEFT BEFORE DISCHARGE | End: 2021-11-21
Attending: HOSPITALIST | Admitting: HOSPITALIST
Payer: COMMERCIAL

## 2021-11-20 VITALS — BODY MASS INDEX: 29.7 KG/M2

## 2021-11-20 DIAGNOSIS — Z53.29: ICD-10-CM

## 2021-11-20 DIAGNOSIS — B19.20: ICD-10-CM

## 2021-11-20 DIAGNOSIS — C22.0: ICD-10-CM

## 2021-11-20 DIAGNOSIS — I50.23: ICD-10-CM

## 2021-11-20 DIAGNOSIS — I11.0: Primary | ICD-10-CM

## 2021-11-20 DIAGNOSIS — J44.9: ICD-10-CM

## 2021-11-20 DIAGNOSIS — E03.9: ICD-10-CM

## 2021-11-20 DIAGNOSIS — K74.60: ICD-10-CM

## 2021-11-20 DIAGNOSIS — D69.6: ICD-10-CM

## 2021-11-20 DIAGNOSIS — Z20.822: ICD-10-CM

## 2021-11-20 PROCEDURE — 83036 HEMOGLOBIN GLYCOSYLATED A1C: CPT

## 2021-11-20 PROCEDURE — 87088 URINE BACTERIA CULTURE: CPT

## 2021-11-20 PROCEDURE — 81015 MICROSCOPIC EXAM OF URINE: CPT

## 2021-11-20 PROCEDURE — 80048 BASIC METABOLIC PNL TOTAL CA: CPT

## 2021-11-20 PROCEDURE — 83880 ASSAY OF NATRIURETIC PEPTIDE: CPT

## 2021-11-20 PROCEDURE — 80076 HEPATIC FUNCTION PANEL: CPT

## 2021-11-20 PROCEDURE — 99283 EMERGENCY DEPT VISIT LOW MDM: CPT

## 2021-11-20 PROCEDURE — 84443 ASSAY THYROID STIM HORMONE: CPT

## 2021-11-20 PROCEDURE — 87086 URINE CULTURE/COLONY COUNT: CPT

## 2021-11-20 PROCEDURE — 93005 ELECTROCARDIOGRAM TRACING: CPT

## 2021-11-20 PROCEDURE — 83735 ASSAY OF MAGNESIUM: CPT

## 2021-11-20 PROCEDURE — 84484 ASSAY OF TROPONIN QUANT: CPT

## 2021-11-20 PROCEDURE — 81003 URINALYSIS AUTO W/O SCOPE: CPT

## 2021-11-20 PROCEDURE — 71045 X-RAY EXAM CHEST 1 VIEW: CPT

## 2021-11-20 PROCEDURE — 80061 LIPID PANEL: CPT

## 2021-11-20 PROCEDURE — 87186 SC STD MICRODIL/AGAR DIL: CPT

## 2021-11-20 PROCEDURE — 85025 COMPLETE CBC W/AUTO DIFF WBC: CPT

## 2021-11-20 PROCEDURE — 87077 CULTURE AEROBIC IDENTIFY: CPT

## 2021-11-20 PROCEDURE — 36415 COLL VENOUS BLD VENIPUNCTURE: CPT

## 2021-11-20 PROCEDURE — 82947 ASSAY GLUCOSE BLOOD QUANT: CPT

## 2021-11-20 PROCEDURE — 84439 ASSAY OF FREE THYROXINE: CPT

## 2021-11-20 PROCEDURE — 85610 PROTHROMBIN TIME: CPT

## 2021-11-20 NOTE — XMS REPORT
Continuity of Care Document

                          Created on:2021



Patient:VALENTE GROVER

Sex:Female

:1960

External Reference #:[id number]





Demographics







                          Address                   [address] 



                                                    [place] [address]

 

                          Home Phone                [phone]

 

                          Work Phone                [phone]

 

                          Mobile Phone              [phone]

 

                          Email Address             DECLINE 09/15/21

 

                          Preferred Language        English

 

                          Marital Status            Unknown

 

                          [employer] Affiliation     Unknown

 

                          Race                      Unknown

 

                          Additional Race(s)        Unavailable



                                                    White



                                                    Unavailable



                                                    Unavailable

 

                          Ethnic Group              Unknown









Author







                          Organization              [facility]

t

 

                          Address                   [address]



                                                    [place] [address]

 

                          Phone                     [phone]









Support







                Name            Relationship    Address         Phone

 

                Raheem          Other           Unavailable     [phone]

 

                Sneha         Relative        Unavailable     [phone]

 

                Raheem          Mother          [address] [phone]

[phone]



                                                [place] [address] 

 

                one else per patient Unavailable     Unavailable     Unavailable









Care Team Providers







                    Name                Role                Phone

 

                    Asked,  Pcp         Primary Care Physician Unavailable

 

                    MARIA ELENA               Attending Clinician Unavailable

 

                    CHANTEL              Attending Clinician Unavailable

 

                    Kenan ARRIAGA            Attending Clinician [phone]

 

                    DEBORAH Sahu DO     Attending Clinician [phone]

 

                    José Miguel WILDER           Attending Clinician [phone]

 

                    Gena CORRIGAN          Attending Clinician [phone]

 

                    SIMA LEAVITT          Attending Clinician Unavailable

 

                    RONEY GORE       Attending Clinician Unavailable

 

                    AMY DIXON            Attending Clinician Unavailable

 

                    MEEK        Attending Clinician Unavailable

 

                    CLOVER VERDUGO       Attending Clinician Unavailable

 

                    CINTIA KHAN III       Attending Clinician Unavailable

 

                    Archana LEACH,  S       Attending Clinician [phone]

 

                    Tevin CORRIGAN  S       Attending Clinician [phone]

 

                    DANN Barone         Attending Clinician [phone]

 

                    Minerva CORRIGAN,  H       Attending Clinician [phone]

 

                    ARIANNA               Attending Clinician Unavailable

 

                    Lab,  Fam Pob I     Attending Clinician Unavailable

 

                    Arianna FOWLER           Attending Clinician [phone]

 

                    CINTIA Rocha NP       Attending Clinician [phone]

 

                    Doctor Unassigned,  Name Attending Clinician Unavailable

 

                    SARAHY SARABIA     Attending Clinician Unavailable

 

                    LISHA Gay        Attending Clinician [phone]

 

                    NICKOLAS,  T            Attending Clinician Unavailable

 

                    WECHSLER            Attending Clinician Unavailable

 

                    Singer WILDER           Attending Clinician [phone]

 

                    SINGER              Attending Clinician Unavailable

 

                    CATARINA DE LA CRUZ      Attending Clinician Unavailable

 

                    LANE Whiting     Attending Clinician [phone]

 

                    Miguel FOWLER         Attending Clinician [phone]

 

                    Saranya CORRIGAN         Attending Clinician [phone]

 

                    Alyssa CORRIGAN  P        Attending Clinician [phone]

 

                    Catarina De La Cruz DO   Attending Clinician [phone]

 

                    SIIR Mancia MD         Attending Clinician [phone]

 

                    SIRI MANCIA            Attending Clinician Unavailable

 

                    Pob,  Lab Main      Attending Clinician Unavailable

 

                    Cris ARRIAGA            Attending Clinician Unavailable

 

                    JAGUAR              Attending Clinician Unavailable

 

                    Nathaniel FOWLER          Attending Clinician [phone]

 

                    Jaguar CORRIGAN           Attending Clinician [phone]

 

                    NATALIE SKAGGS    Attending Clinician Unavailable

 

                    FIDELINA                Attending Clinician Unavailable

 

                    TONY MCMAHAN      Attending Clinician Unavailable

 

                    GURPREET EAGLE         Admitting Clinician Unavailable

 

                    Gena CORRIGAN          Admitting Clinician [phone]

 

                    ANT               Admitting Clinician Unavailable

 

                    BERKLEY               Admitting Clinician Unavailable

 

                    RONEY GORE       Admitting Clinician Unavailable

 

                    LISHA BAIN            Admitting Clinician Unavailable

 

                    SINGER              Admitting Clinician Unavailable

 

                    SARANYA            Admitting Clinician Unavailable

 

                    Saranya CORRIGAN         Admitting Clinician [phone]

 

                    SIRI MANCIA            Admitting Clinician Unavailable

 

                    JAGUAR              Admitting Clinician Unavailable

 

                    Jaguar CORRIGAN           Admitting Clinician [phone]

 

                    FIDELINA                Admitting Clinician Unavailable









Payers







           Payer Name Policy Type Policy Number Effective Date Expiration Date S

kalpesh

 

           [facility] [place]            [id number]  2020            



           PLUS                             00:00:00              

 

           MEDICARE PART A            [id number]            



           \T\ B                            00:00:00              

 

           [place]ICA[place]            [id number]  2018            



                                            00:00:00              

 

           [facility] MEDICARE            [id number]  2020-10-01            



           MEDICAID  DUAL HMO                       00:00:00              

 

           [place]ICA[place]            [id number]  2017            



           [facility] STAR                       00:00:00              



           PLUS SSI                                               

 

           MEDICARE PART A            [id number] 2007            



           AND B                            00:00:00              

 

           [employer]            [id number]  2018            [facility]



                                            00:00:00              - Patients



                                                                  Medical



                                                                  Center

 

           [employer] [facility]            [id number]  2017            [facility]

[facility]



           [facility]                             00:00:00              - Patients



                                                                  Medical



                                                                  Center







Problems







       Condition Condition Condition Status Onset  Resolution Last   Treating Co

mments 

Source



       Name   Details Category        Date   Date   Treatment Clinician        



                                                 Date                 

 

       Acute on Acute on Disease Active                              Unive

rs



       chronic chronic                                              ity of



       systolic systolic               00:00:                             [place]



       and    and                  00                                 Medical



       diastolic diastolic                                                  [facility]

ch



       heart  heart                                                   



       failure, failure,                                                  



       NYHA class NYHA class                                                  



       3      3                                                       

 

       HFrEF  HFrEF  Disease Active                              [facility]



       (heart (heart                                              ity of



       failure failure               00:00:                             [place]



       with   with                 00                                 Medical



       reduced reduced                                                  Branch



       ejection ejection                                                  



       fraction) fraction)                                                  

 

       Nonischemi Nonischemi Disease Active                              U

nivers



       c      c                                                   ity of



       cardiomyop cardiomyop               00:00:                             [facility]

[place]



       athy   athy                 00                                 Medical



                                                                      Branch

 

       Essential Essential Disease Active                              Uni

vers



       hypertensi hypertensi                                              it

y of



       on     on                   00:00:                             [place]



                                   00                                 Medical



                                                                      Branch

 

       Pancytopen Pancytopen Disease Active                              U

nivers



       ia     ia                                                  ity of



                                   00:00:                             [place]



                                   00                                 Medical



                                                                      Branch

 

       Elevated Elevated Disease Active                              Unive

rs



       brain  brain                                               ity of



       natriureti natriureti               00:00:                             [facility]

[place]



       c peptide c peptide               00                                 Medi

carlos



       (BNP)  (BNP)                                                   Branch



       level  level                                                   

 

       Acute  Acute  Disease Active                              [facility]



       left-sided left-sided                                              it

y of



       CHF    CHF                  00:00:                             [place]



       (congestiv (congestiv               00                                 Me

dical



       e heart e heart                                                  Branch



       failure) failure)                                                  

 

       Acute  Acute  Disease Active                              [facility]



       respirator respirator               -                               it

y of



       y failure y failure               00:00:                             Sh[facility]

s



       with   with                 00                                 Medical



       hypoxia hypoxia                                                  Branch

 

       Obesity Obesity Disease Active                              [facility]



       (BMI   (BMI                 5-01                               ity of



       30-39.9) 30-39.9)               00:00:                             [place]



                                   00                                 Medical



                                                                      Branch

 

       Hematemesi Hematemesi Disease Active                              U

nivers



       s      s                    4-29                               ity of



                                   00:00:                             [place]



                                   00                                 Medical



                                                                      Branch

 

       Melena Melena Disease Active                              [facility]



                                   4-29                               ity of



                                   00:00:                             [place]



                                   00                                 Medical



                                                                      Branch

 

       Partial Partial Disease Active                              [facility]



       small  small                4-28                               ity of



       bowel  bowel                00:00:                             [place]



       obstructio obstructio               00                                 Me

dical



       n      n                                                       Branch

 

       Hyperglyce Hyperglyce Disease Active                              U

nae reed                  1-18                               ity of



                                   00:00:                             [place]



                                   00                                 Medical



                                                                      Branch

 

       Cirrhosis Cirrhosis Disease Active                       [facility]   [facility]

 [facility]



                                   2-                        [facility]



                                   00:00:                      t & Plan: Medical



                                   [address] [place] [address]



                                                               g of this 



                                                               note   



                                                               might be 



                                                               different 



                                                               from the 



                                                               original. 



                                                               Diagnosis 



                                                               based on 



                                                               the    



                                                               laborator 



                                                               y      



                                                               parameter 



                                                               s and  



                                                               imaging. 



                                                               Etiology 



                                                               is likely 



                                                               due to 



                                                               HBV/HCV. 



                                                               Her    



                                                               condition 



                                                               has    



                                                               decompens 



                                                               ated with 



                                                               features 



                                                               of portal 



                                                               hypertens 



                                                               ion.   



                                                               Cirrhosis 



                                                               guideline 



                                                               s      



                                                               reviewed. 

 

       Hepatocell Hepatocell Disease Active                       Last   C

[facility]



       ular   ular                                         [facility]



       carcinoma carcinoma               00:00:                      t & Plan: M

edical



                                   00                          [facility]



                                                               g of this 



                                                               note   



                                                               might be 



                                                               different 



                                                               from the 



                                                               original. 



                                                               Diagnosis 



                                                               of HCC in 



                                                               2018 



                                                               s/p    



                                                               radiofreq 



                                                               uency  



                                                               ablation. 



                                                               She was 



                                                               referred 



                                                               for liver 



                                                               transplan 



                                                               t      



                                                               evaluatio 



                                                               n at this 



                                                               time but 



                                                               expressed 



                                                               desire 



                                                               not to 



                                                               follow 



                                                               through.T 



                                                               here is 



                                                               no     



                                                               evidence 



                                                               of     



                                                               residual 



                                                               tumor on 



                                                               recent 



                                                               imaging. 



                                                               Extensive 



                                                               discussio 



                                                               n      



                                                               provided 



                                                               that   



                                                               liver  



                                                               transplan 



                                                               t is the 



                                                               only   



                                                               definitiv 



                                                               e      



                                                               treatment 



                                                               for HCC 



                                                               and    



                                                               recurrenc 



                                                               e is   



                                                               likely. 



                                                               She    



                                                               understan 



                                                               ds and 



                                                               maintains 



                                                               her    



                                                               position. 

 

       Immunity Immunity Disease Active                       Last   [facility] S

t



       status status                                       [facility] -



       testing testing               00:00:                      t & Plan: Medic

al



                                   00                          [facility]



                                                               g of this 



                                                               note   



                                                               might be 



                                                               different 



                                                               from the 



                                                               original. 



                                                               All    



                                                               patients 



                                                               with   



                                                               chronic 



                                                               liver  



                                                               disease 



                                                               should be 



                                                               immunized 



                                                               to     



                                                               prevent 



                                                               hepatitis 



                                                               A and  



                                                               hepatitis 



                                                               B.     



                                                               Previous 



                                                               serology 



                                                               indicates 



                                                               immunity 



                                                               to HAV. 



                                                               Recommend 



                                                               HBV    



                                                               booster 



                                                               which can 



                                                               be     



                                                               provided 



                                                               by     



                                                               primary 



                                                               care.  

 

       Hepatitis Hepatitis Disease Active                       [facility]

 St



       C      C                                            [facility]



                                   00:00:                      t & Plan: Medical



                                   [address] [place] [address]



                                                               g of this 



                                                               note   



                                                               might be 



                                                               different 



                                                               from the 



                                                               original. 



                                                               HCV    



                                                               diagnosed 



                                                               in  



                                                               s/p    



                                                               partial 



                                                               treatment 



                                                               with   



                                                               Interfero 



                                                               n /    



                                                               Ribavirin 



                                                               . HCV RNA 



                                                               2018 



                                                               was    



                                                               undetecta 



                                                               ble. No 



                                                               further 



                                                               intervent 



                                                               ion    



                                                               necessary 



                                                               .      

 

       Chronic Chronic Disease Active                       Last   [facility]



       viral  viral                2-                        [facility] -



       hepatitis hepatitis               00:00:                      t & Plan: M

edical



       B without B without               00                          [facility]

enter



       delta  delta                                            g of this 



       agent and agent and                                           note   



       without without                                           might be 



       coma   coma                                             different 



                                                               from the 



                                                               original. 



                                                               She has 



                                                               evidence 



                                                               of past 



                                                               HBV    



                                                               infection 



                                                               without 



                                                               immunity. 



                                                               HBV DNA 



                                                               from May 



                                                               2018   



                                                               undetecta 



                                                               ble. She 



                                                               may    



                                                               benefit 



                                                               from   



                                                               vaccinati 



                                                               on which 



                                                               can be 



                                                               obtained 



                                                               by her 



                                                               primary 



                                                               care.  

 

       Hernia of Hernia of Disease Active                       Last   [facility]

 [facility]



       anterior anterior               2-21                        [facility]

es -



       abdominal abdominal               00:00:                      t & Plan: M

edical



       wall   wall                 00                          [facility]



                                                               g of this 



                                                               note   



                                                               might be 



                                                               different 



                                                               from the 



                                                               original. 



                                                               She has 



                                                               an     



                                                               umbilical 



                                                               hernia 



                                                               that is 



                                                               being  



                                                               evaluated 



                                                               for    



                                                               repair. 



                                                               She has a 



                                                               34% one 



                                                               year   



                                                               mortality 



                                                               based on 



                                                               the [place] 



                                                               surgical 



                                                               risk   



                                                               score. In 



                                                               addition, 



                                                               she is 



                                                               [employer] 



                                                               h Class A 



                                                               giving 



                                                               her a 10% 



                                                               abdominal 



                                                               surgery 



                                                               barron-oper 



                                                               ative  



                                                               mortality 



                                                               risk.  

 

       Heart  Heart  Disease Active                       Last   [facility]



       murmur murmur               2-21                        [facility]



                                   00:00:                      t & Plan: Medical



                                   [address] [place] [address]



                                                               g of this 



                                                               note   



                                                               might be 



                                                               different 



                                                               from the 



                                                               original. 



                                                               Physical 



                                                               examinati 



                                                               on     



                                                               revealed 



                                                               an     



                                                               audible 



                                                               fixed S2 



                                                               split on 



                                                               cardiac 



                                                               examinati 



                                                               on which 



                                                               may be 



                                                               secondary 



                                                               to a   



                                                               bundle 



                                                               branch 



                                                               block. We 



                                                               recommend 



                                                               cardiolog 



                                                               y      



                                                               consultat 



                                                               ion prior 



                                                               to     



                                                               procedure 



                                                               .      

 

       Lower  Lower  Disease Active                       Last   [facility]



       extremity extremity               2-21                        [facility]

ukes -



       edema  edema                00:00:                      t & Plan: Medical



                                   [address] [place] [address]



                                                               g of this 



                                                               note   



                                                               might be 



                                                               different 



                                                               from the 



                                                               original. 



                                                               Assymetri 



                                                               carlos lower 



                                                               extremity 



                                                               edema on 



                                                               physical 



                                                               examinati 



                                                               on. We 



                                                               ordered a 



                                                               venous 



                                                               doppler 



                                                               of the 



                                                               lower  



                                                               extremity 



                                                               to assess 



                                                               for    



                                                               venous 



                                                               thrombosi 



                                                               s.     



                                                               Previousl 



                                                               y on   



                                                               Furosemid 



                                                               e 20 mg 



                                                               daily. We 



                                                               write for 



                                                               a refill. 

 

       Cocaine Cocaine Disease Active                              [facility]



       dependence dependence               3-23                               it

y of



       ,      ,                    00:00:                             [place]



       continuous continuous               00                                 Me

dical



                                                                      Branch

 

       No known No known Disease                                           [facility]

di



       active active                                                  st



       problems problems                                                  Hospit

a



                                                                      l

 

       Chest pain Chest pain Problem Active                                    C

HI [facility].



                                                                      [facility] -



                                                                      Patient



                                                                      [facility]







Allergies, Adverse Reactions, Alerts







       Allergy Allergy Status Severity Reaction(s) Onset  Inactive Treating Comm

ents 

Source



       Name   Type                        Date   Date   Clinician        

 

       KETOROLA DRUG   Active Med    Rash   2020-0                      [facility]



       C      INGREDI                      5-19                        ity of



                                          00:00:                      [place]



                                          00                          Medical



                                                                      Branch

 

       Ketorola Propensi Active        Rash   2020-0                      Univer

s



       c      ty to                       5-19                        ity of



              adverse                      00:00:                      [place]



              reaction                      00                          Medical



              s                                                       Branch

 

       ONDANSET DRUG   Active        ITCHING                       [facility]



       MIGUEL    INGREDI                      4-28                        ity of



                                          00:00:                      [place]



                                          00                          Medical



                                                                      Branch

 

       Ondanset Propensi Active        Itching                       Unive

rs



       miguel    ty to                       4-28                        ity of



              adverse                      00:00:                      [place]



              reaction                      00                          Medical



              s                                                       Branch

 

       Erythrom Propensi Active                                     [facility]



       ycin   ty to                                               [facility]



              adverse                      00:00:                      Medical



              reaction                      00                          [place]



              s                                                       

 

       Ketorola Propensi Active                                     [facility]



       c      ty to                       2                        [facility]



              adverse                      00:00:                      Medical



              reaction                      00                          [place]



              s                                                       

 

       PROPOXYP DRUG   Active        Other-Cmnt                       Univ

ers



       HENE                               808                        ity of



       N-ACETAM                             00:00:                      [place]



       INOPHEN                             00                          Medical



                                                                      Branch

 

       TRAMADOL DRUG   Active        Other-Cmnt                       Univ

ers



              INGREDI                      8                        ity of



                                          00:00:                      [place]



                                          00                          Medical



                                                                      Branch

 

       AZITHROM DRUG   Active        Rash                         [facility]



       YCIN   INGREDI                                              ity of



                                          00:00:                      [place]



                                                                    Medical



                                                                      Branch

 

       Propoxyp Propensi Active        Other - See                migraine

 Univers



       hene   ty to                comments                         ity of



       N-Acetam adverse                      00:00:                      [place]



       inophen reaction                                                Medical



              s                                                       Branch

 

       Tramadol Propensi Active        Other - See                Anxious 

Univers



              ty to                comments                         ity of



              adverse                      00:00:                      [place]



              reaction                      00                          Medical



              s                                                       Branch

 

       Azithrom Propensi Active        Rash                         Univer

s



       ycin   ty to                       8                        ity of



              adverse                      00:00:                      [place]



              reaction                      00                          Medical



              [facility]

 

       Propoxyp Allergy Active        Migraines                       [facility]



       hene   to                                                  [facility]



              Substanc                      00:00:                      Patient



              e                           00                          [facility]

 

       Azithrom Allergy Active        Rash                         [facility]



       ycin   to                                                  [facility]



              Substanc                      00:00:                      Patient



              e                           00                          [facility]

 

       Tramadol Allergy Active        Insomnia for                       C

HI [facility].



              to                   days                           [facility]



              Substanc                      00:00:                      Patient



              e                           00                          [facility]

 

       PROPOXYP DRUG   Active               0                      MD PASCUAL                               5-18                        [facility]



       N-ACETAM                             00:00:                      n



       INOPHEN                             00                          

 

       PROPOXYP DRUG   Active               0                      MD PASCUAL                               5-18                        [facility]



       N-ACETAM                             00:00:                      n



       INOPHEN                             00                          

 

       Azithrom Propensi Active        Rash, Other                       C

HI St



       ycin   ty to                (See                           [facility]



              adverse               Comments) 00:00:                      Medica

l



              reaction                      00                          [place]



              s                                                       

 

       Tramadol Propensi Active        Other (See 2016-0               Other  CH

I St



              ty to                Comments)                  reaction( [facility]

s -



              adverse                      00:00:               s):    Medical



              reaction                      00                   [facility]



              s                                                for    



                                                               daysUnabl 



                                                               e to   



                                                               sleep  

 

       Demerol Adverse Active        Info Not                             [facility]



              Reaction               Available                             [facility]

 -



                                                                      [facility]



                                                                      l



                                                                      Outpati



                                                                      ent



                                                                      [facility]

 

       Darvocet Adverse Active        Info Not                             [facility]

t



       -N 100 Reaction               Available                             [facility]

 -



                                                                      [facility]



                                                                      l



                                                                      Outpati



                                                                      ent



                                                                      [facility]

 

       Azithrom Adverse Active        Info Not                             [facility]

t



       ycin   Reaction               Available                             [facility]

 -



                                                                      [facility]

 

       Toradol Adverse Active        Info Not                             [facility]



              Reaction               Available                             [facility]

 -



                                                                      [facility]



                                                                      l



                                                                      Outpati



                                                                      ent



                                                                      [facility]

 

       Zithroma Adverse Active        Info Not                             [facility]

t



       x      Reaction               Available                             [facility]

 -



                                                                      [facility]

 

       Tramadol Adverse Active        Info Not                             [facility]

t



       HCl    Reaction               Available                             [facility]

 

       Erythrom Adverse Active        Info Not                             [facility]

t



       ycin   Reaction               Available                             [facility]

 -



                                                                      [facility]



                                                                      l



                                                                      Outpati



                                                                      ent



                                                                      [facility]







Social History







           Social Habit Start Date Stop Date  Quantity   Comments   Source

 

           Exposure to                       Yes                   [facility]



           SARS-CoV-2 (event)                                             [facility]

 

           History SDOH                                             [facility]



           Alcohol Std Drinks                                             Hospit

al

 

           History SDOH                                             [facility]



           Alcohol Binge                                             Hospital

 

           History SDOH                                             [facility]

 -



           Alcohol Comment                                             [facility]

enter

 

           History of tobacco                       Cigarette Smoker            

[facility] -



           use                                                    [facility]

 

           Alcohol intake 2021 Ex-drinker            [facility]



                      00:00:00   00:00:00   (finding)             [facility]

 

           Tobacco use and 2020 Never used            [facility]

[facility]



           exposure   00:00:00   00:00:00                         [facility]

 

           History SDOH 2019 1                     [facility]



           Alcohol Frequency 00:00:00   00:00:00                         [facility]osp[facility]

l

 

           Cigarettes smoked 2019                       [facility]



           [facility] ([facility] per 00:00:00   00:00:00                         [facility]

 Center



           day) - Reported                                             

 

           Cigarette  2019                       [facility] -



           pack-years 00:00:00   00:00:00                         [facility]

 

           Sex Assigned At [birth year]                       [facility]

[facility] of



           Birth      00:00:00   00:00:00                         [facility]









                Smoking Status  Start Date      Stop Date       Source

 

                Current every day smoker 2019 00:00:00                 Met

[facility]







Medications







       Ordered Filled Start  Stop   Current Ordering Indication Dosage Frequency

 Signature

                    Comments            Components          Source



     Medication Medication Date Date Medication? Clinician                (SIG) 

          



     Name Name                                                   

 

     jamar      2021- Yes       [id number] 17g       Take 1         

  [facility]



     e glycol      9-28 10-29                          Packet by           ity o

f



     3350 17      00:00: 04:59                          mouth           [place]



     gram powder      00   :00                           daily for           Med

ical



                                                  30 days.           Branch

 

     spironolact      2021- Yes       [id number] 25mg      Take 1         

  [facility]



     one 25 mg      9-28 10-29                          tablet by           ity 

of



     tablet      00:00: 04:59                          mouth           [place]



               00   :00                           daily for           Medical



                                                  30 days.           Branch

 

     polyethylen      2021- Yes       [id number] 17g       Take 1         

  [facility]



     e glycol      9-28 10-29                          Packet by           ity o

f



     3350 17      00:00: 04:59                          mouth           [place]



     gram powder      00   :00                           daily for           Med

ical



                                                  30 days.           Branch

 

     spironolact      2021- Yes       [id number] 25mg      Take 1         

  [facility]



     one 25 mg      9-28 10-29                          tablet by           ity 

of



     tablet      00:00: 04:59                          mouth           [place]



               00   :00                           daily for           Medical



                                                  30 days.           Branch

 

     QUEtiapine      2021- No             350mg      Take 350           U

nivers



     (SEROQUEL)      -27                          mg by           ity of



     300 mg      14:59: 00:00                          mouth at           [place]



     tablet      37   :00                           bedtime.           Medical



                                                                 Branch

 

     carvediloL      2021- No             6.25mg      Take 6.25          

 [facility]



     6.25 mg       09-27                          mg by           ity of



     tablet      14:59: 00:00                          mouth 2           [place]



               37   :00                           (two)           Medical



                                                  times           Branch



                                                  daily with           



                                                  meals.           

 

     sacubitril-      2021- No             1{tbl}      Take 1           U

nivers



     valsartan      -27                          tablet by           ity 

of



     (ENTRESTO)      14:59: 00:00                          mouth 2           [facility]

as



     49-51 mg      37   :00                           (two)           Medical



     tablet                                         times           Branch



                                                  daily.           

 

     insulin      2021- No             30U       inject 30           Univ

ers



     aspart      -27                          Units           ity of



     prot/insuln      14:59: 00:00                          under the           

[place]



     asp       37   :00                           skin 3           Medical



     (NOVOLOG                                         (three)           Branch



     MIX 70-30                                         times           



     SC)                                          daily.           

 

     potassium       No             40meq      Take 40           Uni

vers



     chloride      -                          mEq by           ity of



     (KCL-20      14:59: 00:00                          mouth at           [place]



     ORAL)      37   :00                           bedtime.           Medical



                                                                 Branch

 

     furosemide      2021- No             80mg      Take 80 mg           

[facility]



     (LASIX) 40                                by mouth           ity 

of



     mg tablet      14:59: 00:00                          daily.           [place]



               37   :00                                          Medical



                                                                 Branch

 

     KCL       2021- No             40meq      40 mEq,           [facility]



     (KLOR-CON                                Oral,           ity of



     M20) tablet      14:45: 14:18                          ONCE, 1           [facility]

[place]



     40 mEq      00   :00                           dose, On           Medical



                                                  Mon            Branch



                                                  21 at           



                                                  0945,           



                                                  Routine           

 

     methadone 5      2021- No             140mg      Take 140           

[facility]



     mg tablet                                mg by           ity of



               14:08: 00:00                          mouth           [place]



               21   :00                           daily.           Medical



                                                                 Branch

 

     albuterol            Yes       [id number] 2{puff}      Inhale 2       

    [facility]



     90        9-27                               Puffs           ity of



     mcg/actuati      00:00:                               every 4           [facility]

as



     on inhaler      00                                 (four)           Medical



                                                  hours as           Branch



                                                  needed for           



                                                  Wheezing           



                                                  or             



                                                  Shortness           



                                                  of Breath.           

 

     Insulin Asp            Yes                      inject           Univ

ers



     Prt-Insulin      9-27                               under the           ity

 of



     Aspart      00:00:                               skin.           [place]



     (NOVOLOG      00                                                Medical



     MIX 70-30)                                                        Branch



     100 unit/mL                                                        



     (70-30)                                                        



     injection                                                        

 

     albuterol            Yes       [id number] 2{puff}      Inhale 2       

    [facility]



     90        9-27                               Puffs           ity of



     mcg/actuati      00:00:                               every 4           [place]

as



     on inhaler      00                                 (four)           Medical



                                                  hours as           Branch



                                                  needed for           



                                                  Wheezing           



                                                  or             



                                                  Shortness           



                                                  of Breath.           

 

     Insulin Asp            Yes                      inject           Univ

ers



     Prt-Insulin      9-27                               under the           ity

 of



     Aspart      00:00:                               skin.           [place]



     (NOVOLOG      00                                                Medical



     MIX 70-30)                                                        Branch



     100 unit/mL                                                        



     (70-30)                                                        



     injection                                                        

 

     methadone      2021- Yes       2745 140mg      Take 14           Uni

vers



     10 mg       10-28                          tablets by           ity of



     tablet      00:00: 04:59                          mouth           [place]



               00   :00                           daily for           Medical



                                                  30 days.           Branch



                                                  Indication           



                                                  s: chronic           



                                                  pain           

 

     carvediloL      2021- Yes       [id number] 12.5mg      Take 1        

   [facility]



     12.5 mg      9-27 10-28                          tablet by           ity of



     tablet      00:00: 04:59                          mouth 2           [place]



               00   :00                           (two)           Medical



                                                  times           Branch



                                                  daily with           



                                                  meals for           



                                                  30 days.           

 

     divalproex      2021- Yes       [id number] 250mg      Take 1         

  [facility]



      mg      9-27 10-28                          tablet by           ity 

of



     24 hr      00:00: 04:59                          mouth at           [place]



     tablet      00   :00                           bedtime           Medical



                                                  for 30           Branch



                                                  days.           

 

     docusate      2021- Yes       [id number] 100mg      Take 1           

[facility]



     100 mg      9-27 10-28                          capsule by           ity of



     capsule      00:00: 04:59                          mouth 2           [place]



               00   :00                           (two)           Medical



                                                  times           Branch



                                                  daily for           



                                                  30 days.           

 

     furosemide      2021- Yes       [id number] 80mg      Take 2          

 [facility]



     (LASIX) 40      9-27 10-28                          tablets by           it

y of



     mg tablet      00:00: 04:59                          mouth           [place]



               00   :00                           daily for           Medical



                                                  30 days.           Branch

 

     KCL 20 mEq      2021- Yes       [id number] 40meq      Take 2         

  [facility]



     tablet      9-27 10-28                          tablets by           ity of



               00:00: 04:59                          mouth           [place]



               00   :00                           daily for           Medical



                                                  30 days.           Branch

 

     QUEtiapine      2021- Yes       [id number] 300mg      Take 1         

  [facility]



     300 mg      9-27 10-28                          tablet by           ity of



     tablet      00:00: 04:59                          mouth at           [place]



               00   :00                           bedtime           Medical



                                                  for 30           Branch



                                                  days.           

 

     rifAXIMin      2021- Yes       [id number] 550mg      Take 1          

 [facility]



     550 mg      9-27 10-28                          tablet by           ity of



     tablet      00:00: 04:59                          mouth 2           [place]



               00   :00                           (two)           Medical



                                                  times           Branch



                                                  daily for           



                                                  30 days.           

 

     sacubitriL-      2021- Yes       [id number] 2{tbl}      Take 2       

    [facility]



     valsartan      9-27 10-28                          tablets by           ity

 of



     24-26 mg      00:00: 04:59                          mouth 2           [place]



     tablet      00   :00                           (two)           Medical



                                                  times           Branch



                                                  daily for           



                                                  30 days.           

 

     sennosides      2021- Yes       [id number] 8.6mg      Take 1         

  [facility]



     8.6 mg      9-27 10-28                          tablet by           ity of



     tablet      00:00: 04:59                          mouth 2           [place]



               00   :00                           (two)           Medical



                                                  times           Branch



                                                  daily for           



                                                  30 days.           

 

     methadone      2021- Yes       2745 140mg      Take 14           Uni

vers



     10 mg      9-27 10-28                          tablets by           ity of



     tablet      00:00: 04:59                          mouth           [place]



               00   :00                           daily for           Medical



                                                  30 days.           Branch



                                                  Indication           



                                                  s: chronic           



                                                  pain           

 

     carvediloL      2021- Yes       [id number] 12.5mg      Take 1        

   [facility]



     12.5 mg      9-27 10-28                          tablet by           ity of



     tablet      00:00: 04:59                          mouth 2           [place]



               00   :00                           (two)           Medical



                                                  times           Branch



                                                  daily with           



                                                  meals for           



                                                  30 days.           

 

     divalproex      2021- Yes       [id number] 250mg      Take 1         

  [facility]



      mg      9-27 10-28                          tablet by           ity 

of



     24 hr      00:00: 04:59                          mouth at           [place]



     tablet      00   :00                           bedtime           Medical



                                                  for 30           Branch



                                                  days.           

 

     docusate      2021- Yes       [id number] 100mg      Take 1           

[facility]



     100 mg      9-27 10-28                          capsule by           ity of



     capsule      00:00: 04:59                          mouth 2           [place]



               00   :00                           (two)           Medical



                                                  times           Branch



                                                  daily for           



                                                  30 days.           

 

     furosemide      2021- Yes       [id number] 80mg      Take 2          

 [facility]



     (LASIX) 40      9-27 10-28                          tablets by           it

y of



     mg tablet      00:00: 04:59                          mouth           [place]



               00   :00                           daily for           Medical



                                                  30 days.           Branch

 

     KCL 20 mEq      2021- Yes       [id number] 40meq      Take 2         

  [facility]



     tablet      9-27 10-28                          tablets by           ity of



               00:00: 04:59                          mouth           [place]



               00   :00                           daily for           Medical



                                                  30 days.           Branch

 

     QUEtiapine      2021- Yes       [id number] 300mg      Take 1         

  [facility]



     300 mg      9-27 10-28                          tablet by           ity of



     tablet      00:00: 04:59                          mouth at           [place]



               00   :00                           bedtime           Medical



                                                  for 30           Branch



                                                  days.           

 

     rifAXIMin      2021- Yes       [id number] 550mg      Take 1          

 [facility]



     550 mg      9-27 10-28                          tablet by           ity of



     tablet      00:00: 04:59                          mouth 2           [place]



               00   :00                           (two)           Medical



                                                  times           Branch



                                                  daily for           



                                                  30 days.           

 

     sacubitriL-      2021- Yes       [id number] 2{tbl}      Take 2       

    [facility]



     valsartan      9-27 10-28                          tablets by           ity

 of



     24-26 mg      00:00: 04:59                          mouth 2           [place]



     tablet      00   :00                           (two)           Medical



                                                  times           Branch



                                                  daily for           



                                                  30 days.           

 

     sennosides      2021- Yes       [id number] 8.6mg      Take 1         

  [facility]



     8.6 mg      9-27 10-28                          tablet by           ity of



     tablet      00:00: 04:59                          mouth 2           [place]



               00   :00                           (two)           Medical



                                                  times           Branch



                                                  daily for           



                                                  30 days.           

 

     HYDROcodone      2021- Yes       [id number] 1{tbl}      Take 1           U

nivers



     -acetaminop      9-27 10-05                          tablet by           it

y of



     hen       00:00: 04:59                          mouth           [place]



     mg tablet      00   :00                           every 6           Medical



                                                  (six)           Branch



                                                  hours as           



                                                  needed for           



                                                  Pain           



                                                  (scale           



                                                  7-10) for           



                                                  up to 7           



                                                  days.           



                                                  Indication           



                                                  s: acute           



                                                  pain           

 

     HYDROcodone      2021- Yes       [id number] 1{tbl}      Take 1           U

nivers



     -acetaminop      9-27 10-05                          tablet by           it

y of



     hen       00:00: 04:59                          mouth           [place]



     mg tablet      00   :00                           every 6           Medical



                                                  (six)           Branch



                                                  hours as           



                                                  needed for           



                                                  Pain           



                                                  (scale           



                                                  7-10) for           



                                                  up to 7           



                                                  days.           



                                                  Indication           



                                                  s: acute           



                                                  pain           

 

     methadone            Yes            140mg      140 mg,           Univ

ers



     (DOLOPHINE      9-25                               Oral,           ity of



     HCL) tablet      14:00:                               DAILY,           [place]

[place]



     140 mg      00                                 First dose           Medical



                                                  on Sat           [facility]



                                                  21 at           



                                                  0900,           



                                                  Until           



                                                  Discontinu           



                                                  ed,            



                                                  Routine           

 

     divalproex            Yes            250mg      250 mg,           Uni

vers



     ER        9-25                               Oral, QHS,           ity of



     (DEPAKOTE      02:00:                               First dose           [place]

[place]



     ER) 24 hr      00                                 on Fri           Medical



     tablet 250                                         21 at           [facility]

[facility]



     mg                                           2100,           



                                                  Until           



                                                  Discontinu           



                                                  ed,            



                                                  Routine           

 

     methadone      2021- No             20mg      20 mg,           Unive

rs



     (DOLOPHINE      -24                          Oral,           ity of



     HCL) tablet      15:30: 16:56                          ONCE, 1           [place]

[place]



     20 mg      00   :00                           dose, On           Medical



                                                  Fri            [facility]



                                                  21 at           



                                                  1030,           



                                                  Routine           

 

     methadone      2021- No             120mg      120 mg,           Uni

vers



     (DOLOPHINE                                Oral,           ity of



     HCL) tablet      22:00: 15:25                          DAILY,           [place]

as



     120 mg      00   :02                           First dose           Medical



                                                  on Thu           Branch



                                                  21 at           



                                                  1700,           



                                                  Until           



                                                  Discontinu           



                                                  ed,            



                                                  Routine           

 

     polyethylen            Yes            17g       17 g,           Unive

rs



     e glycol                                     Oral,           ity of



     3350 powder      21:45:                               DAILY,           [place]

[place]



     17 g      00                                 First dose           Medical



                                                  on Thu           Branch



                                                  21 at           



                                                  1645,           



                                                  Until           



                                                  Discontinu           



                                                  ed,            



                                                  Routine           

 

     polyethylen      2021- No             17g       17 g,           Univ

ers



     e glycol                                Oral,           ity of



     3350 powder      21:45: 22:43                          DAILY, 3           T

[place]



     17 g      00   :31                           doses,           Medical



                                                  First dose           Branch



                                                  (after           



                                                  last           



                                                  reorder)           



                                                  on Thu           



                                                  21 at           



                                                  1645, Last           



                                                  dose on           



                                                  Sat            



                                                  21 at           



                                                  0900,           



                                                  Routine           

 

     LACTULOSE      2021- No             30mg      Take 30 mg           U

nivers



     ORAL                                by mouth 2           ity of



               16:44: 00:00                          (two)           [place]



               19   :00                           times           Medical



                                                  daily.           Branch

 

     HYDROcodone      2021- No             1{tbl}      Take 1           U

nivers



     -acetaminop                                tablet by           it

y of



     hen       16:44: 00:00                          mouth           [place]



     mg tablet      19   :00                           every 4           Medical



                                                  (four)           Branch



                                                  hours as           



                                                  needed.           

 

     methadone 5      2021- No             10mg      Take 10 mg          

 [facility]



     mg tablet                                by mouth 3           ity

 of



               16:44: 00:00                          (three)           [place]



               19   :00                           times           Medical



                                                  daily.           Branch

 

     magnesium      2021- No             4g        4 g, IV           Univ

ers



     sulfate in                                Piggyback,           it

y of



     water 4      14:30: 14:04                          ONCE, 1           [place]



     gram/50 mL      00   :00                           dose, On           Medic

al



     (8 %) IV                                         Thu            Branch



     Piggyback 4                                         21 at           



     g                                            0930,           



                                                  Routine           

 

     KCL       2021- No             40meq      40 mEq,           [facility]



     (KLOR-CON                                Oral,           ity of



     M20) tablet      14:30: 14:03                          ONCE, 1           [facility]

[place]



     40 mEq      00   :00                           dose, On           Medical



                                                  Thu            Branch



                                                  21 at           



                                                  0930,           



                                                  Routine           

 

     sacubitriL-            Yes            2{tbl}      2 tablet,          

 [facility]



     valsartan                                     Oral, BID,           ity 

of



     (ENTRESTO)      14:45:                               First dose           T

[place]



     24-26 mg      00                                 on Wed           Medical



     tablet 2                                         21 at           [facility]

h



     tablet                                         0945,           



                                                  Until           



                                                  Discontinu           



                                                  ed,            



                                                  Routine<br           



                                                  >Faculty           



                                                  member           



                                                  approving           



                                                  Restricted           



                                                  medication           



                                                  : [facility]           

 

     cefTRIAXone      2021- No             1000mg      1,000 mg,         

  [facility]



     (ROCEPHIN)                                IV             ity of



     1,000 mg in      04:45: 05:44                          Piggyback,          

 [place]



     NaCl 0.9%      00   :00                           Q24H ABX,           Medic

al



     (NS) 50 mL                                         4 doses,           Branc

h



     MINI-BAG                                         First dose           



                                                  on 21 at           



                                                  2345, Last           



                                                  dose on           



                                                  21 at           



                                                  2345,           



                                                  Administer           



                                                  over 30           



                                                  Minutes,           



                                                  50             



                                                  mL<br>Reas           



                                                  on for           



                                                  Anti-Infec           



                                                  tive:           



                                                  Empiric           



                                                  Therapy           



                                                  for            



                                                  Suspected           



                                                  Infection<           



                                                  br>Empiric           



                                                  Therapy           



                                                  Site:           



                                                  Urine<br>D           



                                                  uration of           



                                                  therapy: 7           



                                                  days           

 

     carvediloL            Yes            12.5mg      12.5 mg,           U

nivers



     (COREG)                                     Oral, BID           ity of



     tablet 12.5      04:00:                               MEALS,           [place]

[place]



     mg        00                                 First dose           Medical



                                                  on [facility]



                                                  21 at           



                                                  2300,           



                                                  Until           



                                                  Discontinu           



                                                  ed,            



                                                  Routine           

 

     spironolact            Yes            25mg      25 mg,           Univ

ers



     one                                      Oral,           ity of



     (ALDACTONE)      04:00:                               DAILY,           [place]

[place]



     tablet 25      00                                 First dose           Medi

carlos



     mg                                           on [facility]



                                                  21 at           



                                                  2300,           



                                                  Until           



                                                  Discontinu           



                                                  ed,            



                                                  Routine           

 

     QUEtiapine            Yes            300mg      300 mg,           Uni

vers



     (SEROQUEL)                                     Oral, QHS,           ity

 of



     tablet 300      04:00:                               First dose           T

[place]



     mg        00                                 on [facility]



                                                  21 at           Branch



                                                  2300,           



                                                  Until           



                                                  Discontinu           



                                                  ed,            



                                                  Routine           

 

     divalproex       No             250mg      250 mg,           Un

[place]



     ER                                  Oral, QHS,           ity of



     (DEPAKOTE      04:00: 22:03                          First dose           T

[place]



     ER) 24 hr      00   :12                           on Tue           Medical



     tablet 250                                         21 at           [facility]



     mg                                           2300,           



                                                  Until           



                                                  Discontinu           



                                                  ed,            



                                                  Routine           

 

     insulin      0 - No             30U       30 Units,           Univ

ers



     glargine                                Subcutaneo           ity 

of



     (LANTUS      04:00: 13:20                          us, BID,           [place]



     U-100)      00   :25                           First dose           Medical



     injection                                         on Tue           Branch



     30 Units                                         21 at           



                                                  2300,           



                                                  Until           



                                                  Discontinu           



                                                  ed,            



                                                  Routine           

 

     rifAXIMin            Yes            550mg      550 mg,           Univ

ers



     (XIFAXAN)                                     Oral, BID,           ity 

of



     tablet 550      03:45:                               First dose           T

[place]



     mg        00                                 on Tue           Medical



                                                  9/21/21 at           [facility]anch



                                                  2245,           



                                                  Until           



                                                  Discontinu           



                                                  ed,            



                                                  Routine<br           



                                                  >Reason           



                                                  for            



                                                  Anti-Infec           



                                                  tive:           



                                                  Empiric           



                                                  Therapy           



                                                  for            



                                                  Suspected           



                                                  Infection<           



                                                  br>Empiric           



                                                  Therapy           



                                                  Site:           



                                                  Abdominal<           



                                                  br>Duratio           



                                                  n of           



                                                  therapy: 7           



                                                  days           

 

     sennosides            Yes            8.6mg      8.6 mg,           Uni

vers



     (SENOKOT)                                     Oral, BID,           ity 

of



     tablet 8.6      03:45:                               First dose           T

[place]



     mg        00                                 on Tue           Medical



                                                  9/21/21 at           [facility]



                                                  2245,           



                                                  Until           



                                                  Discontinu           



                                                  ed,            



                                                  Routine           

 

     docusate      0      Yes            100mg      100 mg,           Unive

rs



     (COLACE)                                     Oral, BID,           ity o

f



     capsule 100      03:45:                               First dose           

[place]



     mg        00                                 on            Medical



                                                  9/21/21 at           [facility]anch



                                                  2245,           



                                                  Until           



                                                  Discontinu           



                                                  ed,            



                                                  Routine           

 

     furosemide      0      Yes            40mg      40 mg, IV           Un

[place]



     (LASIX)                                     Push,           ity of



     injection      03:45:                               Q12H,           [place]



     40 mg      00                                 First dose           Medical



                                                  (after           Branch



                                                  last           



                                                  reorder)           



                                                  on 21 at           



                                                  2245,           



                                                  Until           



                                                  Discontinu           



                                                  ed,            



                                                  Routine           

 

     polyethylen      0 - No             17g       17 g,           Univ

ers



     e glycol                                Oral,           ity of



     3350 powder      03:45: 16:59                          QNOON, 3           T

[place]



     17 g      00   :00                           doses,           Medical



                                                  First dose           [facility]



                                                  on Tue           



                                                  21 at           



                                                  2245, Last           



                                                  dose on           



                                                  Thu            



                                                  21 at           



                                                  1200,           



                                                  Routine           

 

     lactulose      2021- No             30mL      30 mL,           Unive

rs



     (CEPHULAC)                                Oral, BID,           it

y of



     solution 30      03:45: 21:35                          First dose          

 [place]



     mL        00   :16                           on e           Medical



                                                  21 at           [facility]anch



                                                  2245,           



                                                  Until           



                                                  Discontinu           



                                                  ed,            



                                                  Routine           

 

     furosemide      2021- No             40mg      40 mg, IV           U

nivers



     (LASIX)                                Push,           ity of



     injection      22:30: 22:07                          ONCE, 1           [place]

[place]



     40 mg      00   :00                           dose, On           Medical



                                                  Tu            Branch



                                                  21 at           



                                                  1730, ASAP           

 

     insulin      -0 2020- No             5U        5 Units,           Unive

rs



     regular                                Subcutaneo           ity o

f



     human      09:15: 08:06                          us, ONCE,           [place]



     (HUMULIN R)      00   :00                           1 dose,           Medic

al



     injection 5                                         Tue            Branch



     Units                                         20 at           



                                                  0415,           



                                                  Routine           

 

     insulin      -0 2020- No             15U       15 Units,           Univ

ers



     regular                                Slow IV           ity of



     human      07:15: 06:17                          Push,           [place]



     (HUMULIN R)      00   :00                           ONCE, 1           Medic

al



     injection                                         dose, Tue           B[facility]

h



     15 Units                                         20 at           



                                                  0215,           



                                                  Routine           

 

     codeine-gua      -0 2020- No             10mL      10 mL,           Uni

vers



     ifenesin                                Oral,           ity of



     (ROBITUSSIN      05:30: 04:33                          ONCE, 1           Te

[place]



     AC)       00   :00                           dose, Tu           Medi

[place]



     mg/5 mL                                         20 at           [facility]



     solution 10                                         0030, ASAP           



     mL                                                          

 

     dextrometho      2020-0      Yes       [id number] 10mL      Take 10 mL      

     [facility]



     han-guaif                                     by mouth           ity 

of



     enesin      00:00:                               every 4           [place]



      mg/5      00                                 (four)           [facility]

l



     mL syrup                                         hours as           [facility]



                                                  needed for           



                                                  Cough.           

 

     dextrometho      2020-0      Yes       [id number] 10mL      Take 10 mL    

       [facility]



     rphan-guaif                      [id number]           by mouth           i

ty of



     enesin      00:00:                               every 4           [place]



      mg/5      00                                 (four)           [facility]

l



     mL syrup                                         hours as           [facility]



                                                  needed for           



                                                  Cough.           

 

     dextrometho      -2021- No        [id number] 10mL      Take 10 mL   

        [facility]



     razia                 [id number]           by mouth           

[facility]



     enesin      00:00: 00:00                          every 4           [place]



      mg/5      00   :00                           (four)           [facility]

l



     mL syrup                                         hours as           [facility]



                                                  needed for           



                                                  Cough.           

 

     insulin      2020- No             10U       10 Units,           [facility]

ers



     regular                                Subcutaneo           ity o

f



     human      23:30: 22:30                          us, ONCE,           [place]



     (HUMULIN R)      00   :00                           1 dose,           [facility]

al



     injection                                         Sun            [facility]



     10 Units                                         20 at           



                                                  1830, STAT           

 

     insulin      2020-0      Yes            30U       inject 30           [facility]

rs



     aspart      7-19                               Units           [facility]



     prot/insuln      21:44:                               under the           [facility]

[facility]



     asp       41                                 skin 3           [facility]



     (NOVOLOG                                         (three)           [facility]



     MIX 70-30                                         times           



     [facility])                                          daily.           

 

     HYDROcodone      2020-0      Yes            1{tbl}      Take 1           [facility]

[place]



     -acetaminop      7-19                               tablet by           [facility]

 of



     hen       21:44:                               mouth           [place]



     mg tablet      41                                 every 4           [facility]



                                                  (four)           [facility]



                                                  hours as           



                                                  needed.           

 

     methadone 5      2020-0      Yes            10mg      Take 10 mg           

[facility]



     mg tablet      7-19                               by mouth 3           [facility] 

of



               21:44:                               (three)           [place]



               41                                 times           [facility]



                                                  daily.           [facility]

 

     insulin      2020-0      Yes            30U       inject 30           [facility]

rs



     aspart      7-19                               Units           [facility]



     prot/insuln      21:44:                               under the           [facility]

[facility]



     asp       41                                 skin 3           [facility]



     (NOVOLOG                                         (three)           [facility]



     MIX 70-30                                         times           



     [facility])                                          daily.           

 

     HYDROcodone      2020-0      Yes            1{tbl}      Take 1           [facility]

[place]



     -acetaminop      7-19                               tablet by           [facility]

 of



     hen       21:44:                               mouth           [place]



     mg tablet      41                                 every 4           [facility]



                                                  (four)           [facility]



                                                  hours as           



                                                  needed.           

 

     methadone 5      2020-0      Yes            10mg      Take 10 mg           

[facility]



     mg tablet      7-19                               by mouth 3           [facility] 

of



               21:44:                               (three)           [place]



               41                                 times           [facility]



                                                  daily.           [facility]

 

     insulin      2020-0      Yes            30U       inject 30           [facility]

rs



     aspart      7-19                               Units           [facility]



     prot/insuln      21:44:                               under the           [facility]

[facility]



     asp       41                                 skin 3           [facility]



     (NOVOLOG                                         (three)           [facility]



     MIX 70-30                                         times           



     [facility])                                          daily.           

 

     HYDROcodone      2020-0      Yes            1{tbl}      Take 1           Un

[place]



     -acetaminop      7-19                               tablet by           ity

 of



     hen       21:44:                               mouth           [place]



     mg tablet      41                                 every 4           [facility]



                                                  (four)           [facility]



                                                  hours as           



                                                  needed.           

 

     methadone 5      2020-0      Yes            10mg      Take 10 mg           

[facility]



     mg tablet      7-19                               by mouth 3           [facility] 

of



               21:44:                               (three)           [place]



               41                                 times           [facility]



                                                  daily.           [facility]

 

     insulin      2020-0      Yes            30U       inject 30           [facility]

rs



     aspart      7-19                               Units           [facility]



     prot/insuln      21:44:                               under the           [facility]

[facility]



     asp       41                                 skin 3           [facility]



     (NOVOLOG                                         (three)           [facility]



     MIX 70-30                                         times           



     [facility])                                          daily.           

 

     HYDROcodone      2020-0      Yes            1{tbl}      Take 1           Un

[place]



     -acetaminop      7-19                               tablet by           [facility]

 of



     hen       21:44:                               mouth           [place]



     mg tablet      41                                 every 4           [facility]



                                                  (four)           [facility]



                                                  hours as           



                                                  needed.           

 

     methadone 5      2020-0      Yes            10mg      Take 10 mg           

[facility]



     mg tablet      7-19                               by mouth 3           [facility] 

of



               21:44:                               (three)           [place]



               41                                 times           [facility]



                                                  daily.           [facility]

 

     insulin      2020-0      Yes            30U       inject 30           [facility]

rs



     aspart      7-19                               Units           [facility]



     prot/insuln      21:44:                               under the           [facility]

[facility]



     asp       41                                 skin 3           [facility]



     (NOVOLOG                                         (three)           [facility]



     MIX 70-30                                         times           



     [facility])                                          daily.           

 

     HYDROcodone      2020-0      Yes            1{tbl}      Take 1           Un

[place]



     -acetaminop      7-19                               tablet by           [facility]

 of



     hen       21:44:                               mouth           [place]



     mg tablet      41                                 every 4           [facility]



                                                  (four)           [facility]



                                                  hours as           



                                                  needed.           

 

     methadone 5      2020-0      Yes            10mg      Take 10 mg           

[facility]



     mg tablet      7-19                               by mouth 3           [facility] 

of



               21:44:                               (three)           [place]



               41                                 times           [facility]



                                                  daily.           [facility]

 

     insulin      2020-0      Yes            30U       inject 30           [facility]

rs



     aspart      7-19                               Units           [facility]



     prot/insuln      21:44:                               under the           [facility]

[facility]



     asp       41                                 skin 3           [facility]



     (NOVOLOG                                         (three)           [facility]



     MIX 70-30                                         times           



     [facility])                                          daily.           

 

     HYDROcodone      2020-0      Yes            1{tbl}      Take 1           Un

[place]



     -acetaminop      7-19                               tablet by           [facility]

 of



     hen       21:44:                               mouth           [place]



     mg tablet      41                                 every 4           [facility]



                                                  (four)           [facility]



                                                  hours as           



                                                  needed.           

 

     methadone 5      2020-0      Yes            10mg      Take 10 mg           

[facility]



     mg tablet      7-19                               by mouth 3           [facility] 

of



               21:44:                               (three)           [place]



               41                                 times           [facility]



                                                  daily.           [facility]

 

     albuterol      2020-0      Yes       [id number] 2{puff}      Inhale 2       

    [facility]



     90        7-19                               Puffs           [facility]



     mcg/actuati      00:00:                               every 4           [place]

as



     on inhaler      00                                 (four)           [facility]



                                                  hours as           [facility]



                                                  needed for           



                                                  Wheezing           



                                                  or             



                                                  Shortness           



                                                  of Breath.           

 

     Zinc 50 mg      2020-0      Yes       [id number] 100mg      Take 100        

   [facility]



     Tab       7-19                               mg by           [facility]



               00:00:                               mouth at           [place]



               00                                 bedtime.           [facility]



                                                                 [facility]

 

     benzonatate      2020-0      Yes       [id number] 100mg      Take 1         

  [facility]



     100 mg      7-19                               capsule by           [facility]



     capsule      00:00:                               mouth 3           [place]



               00                                 (three)           [facility]



                                                  times           [facility]



                                                  daily as           



                                                  needed for           



                                                  Cough.           

 

     albuterol      2020-0      Yes       [id number] 2{puff}      Inhale 2       

    [facility]



     90        7-19                               Puffs           [facility]



     mcg/actuati      00:00:                               every 4           [place]

as



     on inhaler      00                                 (four)           [facility]



                                                  hours as           [facility]



                                                  needed for           



                                                  Wheezing           



                                                  or             



                                                  Shortness           



                                                  of Breath.           

 

     Zinc 50 mg      2020-0      Yes       [id number] 100mg      Take 100        

   [facility]



     Tab       7-19                               mg by           [facility]



               00:00:                               mouth at           [place]



               00                                 bedtime.           [facility]



                                                                 [facility]

 

     benzonatate      2020-0      Yes       [id number] 100mg      Take 1         

  [facility]



     100 mg      7-19                               capsule by           [facility]



     capsule      00:00:                               mouth 3           [place]



               00                                 (three)           [facility]



                                                  times           [facility]



                                                  daily as           



                                                  needed for           



                                                  Cough.           

 

     albuterol      2020-0      Yes       [id number] 2{puff}      Inhale 2       

    [facility]



     90        7-19                               Puffs           [facility]



     mcg/actuati      00:00:                               every 4           [place]

as



     on inhaler      00                                 (four)           [facility]



                                                  hours as           [facility]



                                                  needed for           



                                                  Wheezing           



                                                  or             



                                                  Shortness           



                                                  of Breath.           

 

     Zinc 50 mg      2020-0      Yes       [id number] 100mg      Take 100        

   [facility]



     Tab       7-19                               mg by           [facility]



               00:00:                               mouth at           [place]



               00                                 bedtime.           [facility]



                                                                 [facility]

 

     benzonatate      2020-0      Yes       [id number] 100mg      Take 1         

  [facility]



     100 mg      7-19                               capsule by           [facility]



     capsule      00:00:                               mouth 3           [place]



               00                                 (three)           [facility]



                                                  times           [facility]



                                                  daily as           



                                                  needed for           



                                                  Cough.           

 

     albuterol      2020-0      Yes       [id number] 2{puff}      Inhale 2       

    [facility]



     90        7-19                               Puffs           [facility]



     mcg/actuati      00:00:                               every 4           [place]

as



     on inhaler      00                                 (four)           [facility]



                                                  hours as           [facility]



                                                  needed for           



                                                  Wheezing           



                                                  or             



                                                  Shortness           



                                                  of Breath.           

 

     Zinc 50 mg      2020-0      Yes       [id number] 100mg      Take 100        

   [facility]



     Tab       7-19                               mg by           [facility] of



               00:00:                               mouth at           [place]



               00                                 bedtime.           [facility]



                                                                 [facility]

 

     benzonatate      2020-0      Yes       [id number] 100mg      Take 1         

  [facility]



     100 mg      7-19                               capsule by           [facility] of



     capsule      00:00:                               mouth 3           [place]



               00                                 (three)           [facility]



                                                  times           [facility]



                                                  daily as           



                                                  needed for           



                                                  Cough.           

 

     albuterol      2020-0      Yes       [id number] 2{puff}      Inhale 2       

    [facility]



     90        7-19                               Puffs           [facility]



     mcg/actuati      00:00:                               every 4           [place]

as



     on inhaler      00                                 (four)           [facility]



                                                  hours as           [facility]



                                                  needed for           



                                                  Wheezing           



                                                  or             



                                                  Shortness           



                                                  of Breath.           

 

     Zinc 50 mg      2020-0      Yes       [id number] 100mg      Take 100        

   [facility]



     Tab       7-19                               mg by           [facility] of



               00:00:                               mouth at           [place]



               00                                 bedtime.           [facility]



                                                                 [facility]

 

     benzonatate      2020-0      Yes       [id number] 100mg      Take 1         

  [facility]



     100 mg      7-19                               capsule by           [facility] of



     capsule      00:00:                               mouth 3           [place]



                                                (three)           [facility]



                                                  times           [facility]



                                                  daily as           



                                                  needed for           



                                                  Cough.           

 

     albuterol      2020-0      Yes       [id number] 2{puff}      Inhale 2       

    [facility]



     90        7-19                               Puffs           [facility]



     mcg/actuati      00:00:                               every 4           [place]

as



     on inhaler      00                                 (four)           [facility]



                                                  hours as           [facility]



                                                  needed for           



                                                  Wheezing           



                                                  or             



                                                  Shortness           



                                                  of Breath.           

 

     Zinc 50 mg      2020-0      Yes       [id number] 100mg      Take 100        

   [facility]



     Tab       7-19                               mg by           [facility] of



               00:00:                               mouth at           [place]



               00                                 bedtime.           [facility]



                                                                 [facility]

 

     benzonatate      2020-0      Yes       [id number] 100mg      Take 1         

  [facility]



     100 mg      7-19                               capsule by           [facility] of



     capsule      00:00:                               mouth 3           [place]



                                                (three)           [facility]



                                                  times           [facility]



                                                  daily as           



                                                  needed for           



                                                  Cough.           

 

     albuterol      2020-0 - No        [id number] 2{puff}      Inhale 2      

     [facility]



     90        7-19 09-27                          Puffs           [facility]



     mcg/actuati      00:00: 00:00                          every 4           [place]



     on inhaler      00   :00                           (four)           [facility]



                                                  hours as           [facility]



                                                  needed for           



                                                  Wheezing           



                                                  or             



                                                  Shortness           



                                                  of Breath.           

 

     Zinc 50 mg      2020-0 - No        [id number] 100mg      Take 100       

    [facility]



     Tab       7-19 09-23                          mg by           ity of



               00:00: 00:00                          mouth at           [place]



               00   :00                           bedtime.           [facility]



                                                                 [facility]

 

     benzonatate      2020-0 - No        [id number] 100mg      Take 1        

   [facility]



     100 mg                                capsule by           [facility] of



     capsule      00:00: 00:00                          mouth 3           [place]



               00   :00                           (three)           [facility]



                                                  times           [facility]



                                                  daily as           



                                                  needed for           



                                                  Cough.           

 

     vitamin C      -0 - No        [id number] 1000mg      Take 1         

  [facility]



     with dc      30                          tablet by           [facility] 

of



     hips      00:00: 04:59                          mouth 3           [place]



     (VITAMIN C)      00   :00                           (three)           [facility]

al



     1,000 mg                                         times           [facility]



     tablet                                         daily for           



                                                  10 days.           

 

     Cholecalcif      -0 - No        [id number] 2000U      Take 1        

   [facility]



     juvenal,      -30                          tablet by           [facility] of



     Vitamin D3,      00:00: 04:59                          mouth           [place]

[facility]



     (VITAMIN      00   :00                           daily for           [facility]

l



     D3) 2,000                                         10 days.           [facility]



     unit tablet                                                        

 

     foLIC acid      -2020- No        [id number] 1mg       Take 1          

 [facility]



     1 mg tablet      30                          tablet by           it

y of



               00:00: 04:59                          mouth           [place]



               00   :00                           daily for           [facility]



                                                  10 days.           [facility]

 

     aspirin 81      -0 2020- No        [id number] 81mg      Take 1          

 [facility]



     mg chewable      -30                          tablet by           it

y of



     tablet      00:00: 04:59                          mouth           [place]



               00   :00                           daily for           [facility]



                                                  10 days.           [facility]

 

     vitamin C      -0 - No        [id number] 1000mg      Take 1         

  [facility]



     with dc                                tablet by           ity 

of



     hips      00:00: 04:59                          mouth 3           [place]



     (VITAMIN C)      00   :00                           (three)           [facility]

al



     1,000 mg                                         times           [facility]



     tablet                                         daily for           



                                                  10 days.           

 

     Cholecalcif      2020-0 2020- No        [id number] 2000U      Take 1        

   [facility]



     juvenal,      -30                          tablet by           [facility] of



     Vitamin D3,      00:00: 04:59                          mouth           [place]

[facility]



     (VITAMIN      00   :00                           daily for           [facility]

l



     D3) 2,000                                         10 days.           [facility]



     unit tablet                                                        

 

     foLIC acid      2020-0 - No        [id number] 1mg       Take 1          

 [facility]



     1 mg tablet      -30                          tablet by           it

y of



               00:00: 04:59                          mouth           [place]



               00   :00                           daily for           Medical



                                                  10 days.           [facility]

 

     aspirin 81      2020-0 2020- No        [id number] 81mg      Take 1          

 [facility]



     mg chewable      7--30                          tablet by           it

y of



     tablet      00:00: 04:59                          mouth           [place]



               00   :00                           daily for           Medical



                                                  10 days.           [place]

 

     vitamin C      2020-0 2020- No        [id number] 1000mg      Take 1         

  [facility]



     with dc      7--30                          tablet by           ity 

of



     hips      00:00: 04:59                          mouth 3           [place]



     (VITAMIN C)      00   :00                           (three)           [facility]

al



     1,000 mg                                         times           [facility]



     tablet                                         daily for           



                                                  10 days.           

 

     Cholecalcif      2020-0 2020- No        [id number] 2000U      Take 1        

   [facility]



     juvenal,      7--30                          tablet by           ity of



     Vitamin D3,      00:00: 04:59                          mouth           [place]

[facility]



     (VITAMIN      00   :00                           daily for           [facility]

l



     D3) 2,000                                         10 days.           [facility]



     unit tablet                                                        

 

     foLIC acid      - 2020- No        [id number] 1mg       Take 1          

 [facility]



     1 mg tablet      -30                          tablet by           it

y of



               00:00: 04:59                          mouth           [place]



               00   :00                           daily for           Medical



                                                  10 days.           [place]

 

     aspirin 81      - 2020- No        [id number] 81mg      Take 1          

 [facility]



     mg chewable      7--30                          tablet by           it

y of



     tablet      00:00: 04:59                          mouth           [place]



               00   :00                           daily for           Medical



                                                  10 days.           [place]

 

     vitamin C      2020-0 2020- No        [id number] 1000mg      Take 1         

  [facility]



     with dc      --30                          tablet by           ity 

of



     hips      00:00: 04:59                          mouth 3           [place]



     (VITAMIN C)      00   :00                           (three)           [facility]

al



     1,000 mg                                         times           [facility]



     tablet                                         daily for           



                                                  10 days.           

 

     Cholecalcif      -0 2020- No        [id number] 2000U      Take 1        

   [facility]



     juvenal,      7-30                          tablet by           [facility] of



     Vitamin D3,      00:00: 04:59                          mouth           [place]

[facility]



     (VITAMIN      00   :00                           daily for           [facility]

l



     D3) 2,000                                         10 days.           [facility]



     unit tablet                                                        

 

     foLIC acid      2020-2020- No        [id number] 1mg       Take 1          

 [facility]



     1 mg tablet      7--30                          tablet by           it

y of



               00:00: 04:59                          mouth           [place]



               00   :00                           daily for           Medical



                                                  10 days.           [place]

 

     aspirin 81      2020-0 2020- No        [id number] 81mg      Take 1          

 [facility]



     mg chewable      7--30                          tablet by           it

y of



     tablet      00:00: 04:59                          mouth           [place]



               00   :00                           daily for           Medical



                                                  10 days.           [place]

 

     vitamin C      -0 2020- No        [id number] 1000mg      Take 1         

  [facility]



     with dc                                tablet by           ity 

of



     hips      00:00: 04:59                          mouth 3           [place]



     (VITAMIN C)      00   :00                           (three)           [facility]

al



     1,000 mg                                         times           [facility]



     tablet                                         daily for           



                                                  10 days.           

 

     Cholecalcif      -0 2020- No        [id number] 2000U      Take 1        

   [facility]



     juvenal,                                tablet by           [facility]



     Vitamin D3,      00:00: 04:59                          mouth           [place]

[facility]



     (VITAMIN      00   :00                           daily for           [facility]

l



     D3) 2,000                                         10 days.           [place]



     unit tablet                                                        

 

     foLIC acid      - 2020- No        [id number] 1mg       Take 1          

 [facility]



     1 mg tablet                                tablet by           it

y of



               00:00: 04:59                          mouth           [place]



               00   :00                           daily for           Medical



                                                  10 days.           [place]

 

     aspirin 81      -0 2020- No        [id number] 81mg      Take 1          

 [facility]



     mg chewable                                tablet by           it

y of



     tablet      00:00: 04:59                          mouth           [place]



               00   :00                           daily for           Medical



                                                  10 days.           [place]

 

     FENTanyl PF      -0 2020- No             50ug      50 mcg,           [facility]

[place]



     (SUBLIMAZE                                Slow IV           ity o

f



     (PF))      04:45: 03:38                          Push,           [place]



     injection      00   :00                           ONCE, 1           Medical



     50 mcg                                         dose, [facility]



                                                  6/3/20 at           



                                                  2345, STAT           

 

     iohexol      -0 2020- No             120mL      120 mL,           [facility]

rs



     (OMNIPAQUE                                Intravenou           it

y of



     350       02:00: 03:45                          s, ONCE, 1           [place]



     BULK-150      00   :00                           dose, Wed           [facility]

l



     mL)                                          6/3/20 at           [place]



     injection                                         2100,           



     120 mL                                         Routine           

 

     iohexol      -0 2020- No             120mL      120 mL,           [facility]

rs



     (OMNIPAQUE      -19                          Intravenou           it

y of



     350       18:55: 18:55                          s, ONCE, 1           [place]



     BULK-150      00   :00                           dose, Tue           [facility]

l



     mL)                                          20 at           [place]



     injection                                         1415,           



     120 mL                                         Routine           

 

     vancomycin      -0      Yes       [id number] 500mg      Take 2          

 [facility]



     250 mg      5-07                               capsules           [facility] of



     capsule      00:00:                               by mouth 4           [place]

[facility]



               00                                 (four)           [facility]



                                                  times           [facility]



                                                  daily.           

 

     vancomycin      2020-0      Yes       [id number] 500mg      Take 2          

 [facility]



     250 mg      5-07                               capsules           [facility]



     capsule      00:00:                               by mouth 4           [place]

[facility]



               00                                 (four)           [facility]



                                                  times           [facility]



                                                  daily.           

 

     vancomycin      2020-0      Yes       [id number] 500mg      Take 2          

 [facility]



     250 mg      5-07                               capsules           ity of



     capsule      00:00:                               by mouth 4           [place]

[facility]



               00                                 (four)           [facility]



                                                  times           [facility]



                                                  daily.           

 

     vancomycin      2020-0      Yes       [id number] 500mg      Take 2          

 [facility]



     250 mg      5-07                               capsules           ity of



     capsule      00:00:                               by mouth 4           [place]

[facility]



               00                                 (four)           [facility]



                                                  times           [facility]



                                                  daily.           

 

     vancomycin      2020-0      Yes       [id number] 500mg      Take 2          

 [facility]



     250 mg      5-07                               capsules           ity of



     capsule      00:00:                               by mouth 4           [place]

[facility]



               00                                 (four)           Medical



                                                  times           [facility]



                                                  daily.           

 

     vancomycin      2020-0      Yes       [id number] 500mg      Take 2          

 [facility]



     250 mg      5-07                               capsules           ity of



     capsule      00:00:                               by mouth 4           [place]

[facility]



               00                                 (four)           Medical



                                                  times           [facility]



                                                  daily.           

 

     vancomycin      2020-0      Yes       [id number] 500mg      Take 2          

 [facility]



     250 mg      5-07                               capsules           ity of



     capsule      00:00:                               by mouth 4           [place]

[facility]



               00                                 (four)           Medical



                                                  times           [facility]



                                                  daily.           

 

     vancomycin      2020-0      Yes       [id number] 500mg      Take 2          

 [facility]



     250 mg      5-07                               capsules           ity of



     capsule      00:00:                               by mouth 4           [place]

[facility]



               00                                 (four)           Medical



                                                  times           [facility]



                                                  daily.           

 

     vancomycin      2020-0      Yes       [id number] 500mg      Take 2          

 [facility]



     250 mg      5-07                               capsules           ity of



     capsule      00:00:                               by mouth 4           [place]

[facility]



               00                                 (four)           Medical



                                                  times           [facility]



                                                  daily.           

 

     vancomycin      2020-0 202- No        [id number] 500mg      Take 2         

  [facility]



     250 mg      5-07 09-23                          capsules           ity of



     capsule      00:00: 00:00                          by mouth 4           [place]

as



               00   :00                           (four)           Medical



                                                  times           [facility]



                                                  daily.           

 

     insulin      2020-0      Yes            .25U/kg      21 Units           Uni

vers



     glargine      5-06                     /d        (0.25           ity of



     (LANTUS      02:00:                               Units/kg/d           [place]

s



     U-100)      00                                 ay ?84           Medical



     injection                                         kg),           [facility]



     21 Units                                         Subcutaneo           



                                                  us, QHS,           



                                                  First dose           



                                                  on 20 at           



                                                  2100,           



                                                  Until           



                                                  Discontinu           



                                                  ed,            



                                                  Routine           

 

     QUEtiapine      2020-0      Yes            350mg      Take 350           [facility]

[place]



     (SEROQUEL)      5-05                               mg by           ity of



     300 mg      19:19:                               mouth at           [place]



     tablet      34                                 bedtime.           Medical



                                                                 [facility]

 

     carvediloL      2020-0      Yes            6.25mg      Take 6.25           

[facility]



     6.25 mg      5-05                               mg by           ity of



     tablet      19:19:                               mouth 2           [place]



               34                                 (two)           Medical



                                                  times           [facility]



                                                  daily with           



                                                  meals.           

 

     sacubitril-      2020-0      Yes            1{tbl}      Take 1           [facility]

[place]



     valsartan      5-05                               tablet by           ity o

f



     (ENTRESTO)      19:19:                               mouth 2           [place]

[facility]



     49-51 mg      34                                 (two)           Medical



     tablet                                         times           [facility]



                                                  daily.           

 

     insulin      2020-0      Yes            30U       inject 30           [facility]

rs



     aspart      5-05                               Units           ity of



     prot/insuln      19:19:                               under the           [facility]

[facility]



     asp       34                                 skin 3           Medical



     (NOVOLOG                                         (three)           [facility]



     MIX 70-30                                         times           



     SC)                                          daily.           

 

     potassium      2020-0      Yes            40meq      Take 40           Un[facility]

ers



     chloride      5-05                               mEq by           ity of



     (KCL-20      19:19:                               mouth at           [place]



     ORAL)      34                                 bedtime.           Medical



                                                                 [facility]

 

     LACTULOSE      2020-0      Yes            30mg      Take 30 mg           [facility]

[place]



     ORAL      5-05                               by mouth 2           ity of



               19:19:                               (two)           [place]



               34                                 times           Medical



                                                  daily.           [facility]

 

     furosemide      2020-0      Yes            80mg      Take 80 mg           U

nivers



     (LASIX) 40      5-05                               by mouth           ity o

f



     mg tablet      19:19:                               daily.           [place]



               34                                                Medical



                                                                 [facility]

 

     HYDROcodone      2020-0      Yes            1{tbl}      Take 1           Un

[place]



     -acetaminop      5-05                               tablet by           ity

 of



     hen       19:19:                               mouth           [place]



     mg tablet      34                                 every 4           Medical



                                                  (four)           [facility]



                                                  hours as           



                                                  needed.           

 

     methadone 5      2020-0      Yes            10mg      Take 10 mg           

[facility]



     mg tablet      5-05                               by mouth 3           ity 

of



               19:19:                               (three)           [place]



               34                                 times           Medical



                                                  daily.           [facility]

 

     QUEtiapine      2020-0      Yes            350mg      Take 350           [facility]

[place]



     (SEROQUEL)      5-05                               mg by           ity of



     300 mg      19:19:                               mouth at           [place]



     tablet      34                                 bedtime.           Medical



                                                                 [facility]

 

     carvediloL      2020-0      Yes            6.25mg      Take 6.25           

[facility]



     6.25 mg      5-05                               mg by           ity of



     tablet      19:19:                               mouth 2           [place]



               34                                 (two)           Medical



                                                  times           [facility]



                                                  daily with           



                                                  meals.           

 

     sacubitril-      2020-0      Yes            1{tbl}      Take 1           Un

[place]



     valsartan      5-05                               tablet by           ity o

f



     (ENTRESTO)      19:19:                               mouth 2           [place]

[facility]



     49-51 mg      34                                 (two)           Medical



     tablet                                         times           [facility]



                                                  daily.           

 

     insulin      2020-0      Yes            30U       inject 30           Un[facility]

rs



     aspart      5-05                               Units           ity of



     prot/insuln      19:19:                               under the           [facility]

[facility]



     asp       34                                 skin 3           Medical



     (NOVOLOG                                         (three)           [facility]



     MIX 70-30                                         times           



     SC)                                          daily.           

 

     potassium      2020-0      Yes            40meq      Take 40           Univ

ers



     chloride      5-05                               mEq by           ity of



     (KCL-20      19:19:                               mouth at           [place]



     ORAL)      34                                 bedtime.           Medical



                                                                 [facility]

 

     LACTULOSE      2020-0      Yes            30mg      Take 30 mg           [facility]

[place]



     ORAL      5-05                               by mouth 2           ity of



               19:19:                               (two)           [place]



               34                                 times           Medical



                                                  daily.           [facility]

 

     furosemide      2020-0      Yes            80mg      Take 80 mg           U

nivers



     (LASIX) 40      5-05                               by mouth           ity o

f



     mg tablet      19:19:                               daily.           [place]



               34                                                Medical



                                                                 [facility]

 

     HYDROcodone      2020-0      Yes            1{tbl}      Take 1           Un

[place]



     -acetaminop      5-05                               tablet by           [facility]

 of



     hen       19:19:                               mouth           [place]



     mg tablet      34                                 every 4           Medical



                                                  (four)           [facility]



                                                  hours as           



                                                  needed.           

 

     methadone 5      2020-0      Yes            10mg      Take 10 mg           

[facility]



     mg tablet      5-05                               by mouth 3           [facility] 

of



               19:19:                               (three)           [place]



               34                                 times           Medical



                                                  daily.           Branch

 

     QUEtiapine      2020-0      Yes            350mg      Take 350           [facility]

[facility]



     (SEROQUEL)      5-05                               mg by           [facility] of



     300 mg      19:19:                               mouth at           [place]



     tablet      34                                 bedtime.           Medical



                                                                 Branch

 

     carvediloL      2020-0      Yes            6.25mg      Take 6.25           

[facility]



     6.25 mg      5-05                               mg by           [facility] of



     tablet      19:19:                               mouth 2           [place]



               34                                 (two)           Medical



                                                  times           [facility]



                                                  daily with           



                                                  meals.           

 

     sacubitril-      2020-0      Yes            1{tbl}      Take 1           [facility]

[place]



     valsartan      5-05                               tablet by           [facility] o

f



     (ENTRESTO)      19:19:                               mouth 2           [place]

[facility]



     49-51 mg      34                                 (two)           Medical



     tablet                                         times           [facility]



                                                  daily.           

 

     insulin      2020-0      Yes            30U       inject 30           [facility]

rs



     aspart      5-05                               Units           [facility]



     prot/insuln      19:19:                               under the           [facility]

[facility]



     asp       34                                 skin 3           [facility]



     (NOVOLOG                                         (three)           Branch



     MIX 70-30                                         times           



     SC)                                          daily.           

 

     potassium      2020-0      Yes            40meq      Take 40           [facility]

[facility]



     chloride      5-05                               mEq by           [facility]



     (KCL-20      19:19:                               mouth at           [place]



     ORAL)      34                                 bedtime.           Medical



                                                                 Branch

 

     LACTULOSE      2020-0      Yes            30mg      Take 30 mg           [facility]

[place]



     ORAL      5-05                               by mouth 2           [facility] of



               19:19:                               (two)           [place]



               34                                 times           Medical



                                                  daily.           Branch

 

     furosemide      2020-0      Yes            80mg      Take 80 mg           [facility]

[facility]



     (LASIX) 40      5-05                               by mouth           ity o

f



     mg tablet      19:19:                               daily.           [place]



               34                                                Medical



                                                                 [facility]

 

     HYDROcodone      2020-0      Yes            1{tbl}      Take 1           [facility]

[place]



     -acetaminop      5-05                               tablet by           [facility]

 of



     hen       19:19:                               mouth           [place]



     mg tablet      34                                 every 4           Medical



                                                  (four)           [facility]



                                                  hours as           



                                                  needed.           

 

     methadone 5      2020-0      Yes            10mg      Take 10 mg           

[facility]



     mg tablet      5-05                               by mouth 3           [facility] 

of



               19:19:                               (three)           [place]



               34                                 times           Medical



                                                  daily.           Branch

 

     QUEtiapine      2020-0      Yes            350mg      Take 350           [facility]

[place]



     (SEROQUEL)      5-05                               mg by           [facility] of



     300 mg      19:19:                               mouth at           [place]



     tablet      34                                 bedtime.           Medical



                                                                 Branch

 

     carvediloL      2020-0      Yes            6.25mg      Take 6.25           

[facility]



     6.25 mg      5-05                               mg by           [facility] of



     tablet      19:19:                               mouth 2           [place]



               34                                 (two)           Medical



                                                  times           [facility]



                                                  daily with           



                                                  meals.           

 

     sacubitril-      2020-0      Yes            1{tbl}      Take 1           [facility]

[place]



     valsartan      5-05                               tablet by           [facility] o

f



     (ENTRESTO)      19:19:                               mouth 2           [place]

[facility]



     49-51 mg      34                                 (two)           Medical



     tablet                                         times           [facility]



                                                  daily.           

 

     insulin      2020-0      Yes            30U       inject 30           [facility]

rs



     aspart      5-05                               Units           [facility]



     prot/insuln      19:19:                               under the           [facility]

[facility]



     asp       34                                 skin 3           [facility]



     (NOVOLOG                                         (three)           [facility]anch



     MIX 70-30                                         times           



     SC)                                          daily.           

 

     potassium      2020-0      Yes            40meq      Take 40           [facility]

ers



     chloride      5-05                               mEq by           [facility]



     (KCL-20      19:19:                               mouth at           [place]



     ORAL)      34                                 bedtime.           Medical



                                                                 Branch

 

     LACTULOSE      2020-0      Yes            30mg      Take 30 mg           [facility]

[place]



     ORAL      5-05                               by mouth 2           [facility] of



               19:19:                               (two)           [place]



               34                                 times           Medical



                                                  daily.           Branch

 

     furosemide      2020-0      Yes            80mg      Take 80 mg           U

[facility]



     (LASIX) 40      5-05                               by mouth           ity o

f



     mg tablet      19:19:                               daily.           [place]



               34                                                Medical



                                                                 [facility]

 

     HYDROcodone      2020-0      Yes            1{tbl}      Take 1           [facility]

[place]



     -acetaminop      5-05                               tablet by           [facility]

 of



     hen       19:19:                               mouth           [place]



     mg tablet      34                                 every 4           Medical



                                                  (four)           [facility]



                                                  hours as           



                                                  needed.           

 

     methadone 5      2020-0      Yes            10mg      Take 10 mg           

[facility]



     mg tablet      5-05                               by mouth 3           [facility] 

of



               19:19:                               (three)           [place]



               34                                 times           Medical



                                                  daily.           [facility]anch

 

     QUEtiapine      2020-0      Yes            350mg      Take 350           [facility]

[place]



     (SEROQUEL)      5-05                               mg by           [facility] of



     300 mg      19:19:                               mouth at           [place]



     tablet      34                                 bedtime.           Medical



                                                                 Branch

 

     carvediloL      2020-0      Yes            6.25mg      Take 6.25           

[facility]



     6.25 mg      5-05                               mg by           [facility] of



     tablet      19:19:                               mouth 2           [place]



               34                                 (two)           Medical



                                                  times           [facility]



                                                  daily with           



                                                  meals.           

 

     sacubitril-      2020-0      Yes            1{tbl}      Take 1           [facility]

[place]



     valsartan      5-05                               tablet by           [facility] o

f



     (ENTRESTO)      19:19:                               mouth 2           [place]

[facility]



     49-51 mg      34                                 (two)           Medical



     tablet                                         times           [facility]



                                                  daily.           

 

     insulin      2020-0      Yes            30U       inject 30           [facility]

rs



     aspart      5-05                               Units           [facility]



     prot/insuln      19:19:                               under the           [facility]

[facility]



     asp       34                                 skin 3           [facility]



     (NOVOLOG                                         (three)           [facility]



     MIX 70-30                                         times           



     [facility])                                          daily.           

 

     potassium      2020-0      Yes            40meq      Take 40           [facility]

[facility]



     chloride      5-05                               mEq by           [facility]



     (KCL-20      19:19:                               mouth at           [place]



     ORAL)      34                                 bedtime.           Medical



                                                                 Branch

 

     LACTULOSE      2020-0      Yes            30mg      Take 30 mg           [facility]

[place]



     ORAL      5-05                               by mouth 2           [facility] of



               19:19:                               (two)           [place]



               34                                 times           Medical



                                                  daily.           Branch

 

     furosemide      2020-0      Yes            80mg      Take 80 mg           U

[facility]



     (LASIX) 40      5-05                               by mouth           ity o

f



     mg tablet      19:19:                               daily.           [place]



               34                                                Medical



                                                                 [facility]

 

     HYDROcodone      2020-0      Yes            1{tbl}      Take 1           [facility]

[place]



     -acetaminop      5-05                               tablet by           [facility]

 of



     hen       19:19:                               mouth           [place]



     mg tablet      34                                 every 4           [facility]



                                                  (four)           [facility]



                                                  hours as           



                                                  needed.           

 

     methadone 5      2020-0      Yes            10mg      Take 10 mg           

[facility]



     mg tablet      5-05                               by mouth 3           [facility] 

of



               19:19:                               (three)           [place]



               34                                 times           Medical



                                                  daily.           [facility]anch

 

     QUEtiapine      2020-0      Yes            350mg      Take 350           [facility]

[place]



     (SEROQUEL)      5-05                               mg by           [facility]



     300 mg      19:19:                               mouth at           [place]



     tablet      34                                 bedtime.           Medical



                                                                 [facility]

 

     carvediloL      2020-0      Yes            6.25mg      Take 6.25           

[facility]



     6.25 mg      5-05                               mg by           [facility] of



     tablet      19:19:                               mouth 2           [place]



               34                                 (two)           Medical



                                                  times           [facility]



                                                  daily with           



                                                  meals.           

 

     sacubitril-      2020-0      Yes            1{tbl}      Take 1           [facility]

[place]



     valsartan      5-05                               tablet by           [facility] o

f



     (ENTRESTO)      19:19:                               mouth 2           [place]

[facility]



     49-51 mg      34                                 (two)           Medical



     tablet                                         times           [facility]



                                                  daily.           

 

     insulin      2020-0      Yes            30U       inject 30           [facility]

rs



     aspart      5-05                               Units           [facility]



     prot/insuln      19:19:                               under the           [facility]

[facility]



     asp       34                                 skin 3           [facility]



     (NOVOLOG                                         (three)           [facility]



     MIX 70-30                                         times           



     [facility])                                          daily.           

 

     potassium      2020-0      Yes            40meq      Take 40           [facility]

[facility]



     chloride      5-05                               mEq by           [facility]



     (KCL-20      19:19:                               mouth at           [place]



     ORAL)      34                                 bedtime.           Medical



                                                                 [facility]

 

     LACTULOSE      2020-0      Yes            30mg      Take 30 mg           [facility]

[place]



     ORAL      5-05                               by mouth 2           [facility] of



               19:19:                               (two)           [place]



               34                                 times           Medical



                                                  daily.           Branch

 

     furosemide      2020-0      Yes            80mg      Take 80 mg           [facility]

[facility]



     (LASIX) 40      5-05                               by mouth           ity o

f



     mg tablet      19:19:                               daily.           [place]



               34                                                Medical



                                                                 [facility]

 

     HYDROcodone      2020-0      Yes            1{tbl}      Take 1           [facility]

[place]



     -acetaminop      5-05                               tablet by           [facility]

 of



     hen       19:19:                               mouth           [place]



     mg tablet      34                                 every 4           [facility]



                                                  (four)           [facility]



                                                  hours as           



                                                  needed.           

 

     methadone 5      2020-0      Yes            10mg      Take 10 mg           

[facility]



     mg tablet      5-05                               by mouth 3           [facility] 

of



               19:19:                               (three)           [place]



               34                                 times           Medical



                                                  daily.           [facility]

 

     QUEtiapine      2020-0      Yes            350mg      Take 350           [facility]

[place]



     (SEROQUEL)      5-05                               mg by           [facility] of



     300 mg      19:19:                               mouth at           [place]



     tablet      34                                 bedtime.           Medical



                                                                 [facility]

 

     carvediloL      2020-0      Yes            6.25mg      Take 6.25           

[facility]



     6.25 mg      5-05                               mg by           [facility] of



     tablet      19:19:                               mouth 2           [place]



               34                                 (two)           Medical



                                                  times           [facility]



                                                  daily with           



                                                  meals.           

 

     sacubitril-      2020-0      Yes            1{tbl}      Take 1           [facility]

[place]



     valsartan      5-05                               tablet by           ity o

f



     (ENTRESTO)      19:19:                               mouth 2           [place]

[facility]



     49-51 mg      34                                 (two)           Medical



     tablet                                         times           [facility]



                                                  daily.           

 

     potassium      2020-0      Yes            40meq      Take 40           [facility]

ers



     chloride      5-05                               mEq by           [facility] of



     (KCL-20      19:19:                               mouth at           [place]



     ORAL)      34                                 bedtime.           Medical



                                                                 [facility]

 

     LACTULOSE      2020-0      Yes            30mg      Take 30 mg           [facility]

[place]



     ORAL      5-05                               by mouth 2           [facility] of



               19:19:                               (two)           [place]



               34                                 times           Medical



                                                  daily.           Branch

 

     furosemide      2020-0      Yes            80mg      Take 80 mg           U

[facility]



     (LASIX) 40      5-05                               by mouth           ity o

f



     mg tablet      19:19:                               daily.           [place]



               34                                                Medical



                                                                 [facility]

 

     QUEtiapine      2020-0      Yes            350mg      Take 350           [facility]

[place]



     (SEROQUEL)      5-05                               mg by           ity of



     300 mg      19:19:                               mouth at           [place]



     tablet      34                                 bedtime.           Medical



                                                                 Br[facility]

 

     carvediloL      2020-0      Yes            6.25mg      Take 6.25           

[facility]



     6.25 mg      5-05                               mg by           [facility] of



     tablet      19:19:                               mouth 2           [place]



               34                                 (two)           Medical



                                                  times           [facility]



                                                  daily with           



                                                  meals.           

 

     sacubitril-      2020-0      Yes            1{tbl}      Take 1           [facility]

[place]



     valsartan      5-05                               tablet by           ity o

f



     (ENTRESTO)      19:19:                               mouth 2           [place]

[facility]



     49-51 mg      34                                 (two)           Medical



     tablet                                         times           [facility]



                                                  daily.           

 

     potassium      2020-0      Yes            40meq      Take 40           [facility]

ers



     chloride      5-05                               mEq by           [facility] of



     (KCL-20      19:19:                               mouth at           [place]



     ORAL)      34                                 bedtime.           Medical



                                                                 [facility]

 

     LACTULOSE      2020-0      Yes            30mg      Take 30 mg           [facility]

[place]



     ORAL      5-05                               by mouth 2           [facility] of



               19:19:                               (two)           [place]



               34                                 times           Medical



                                                  daily.           Branch

 

     furosemide      2020-0      Yes            80mg      Take 80 mg           U

[facility]



     (LASIX) 40      5-05                               by mouth           ity o

f



     mg tablet      19:19:                               daily.           [place]



               34                                                [facility]



                                                                 [facility]

 

     QUEtiapine      2020-0      Yes            350mg      Take 350           [facility]

[place]



     (SEROQUEL)      5-05                               mg by           ity of



     300 mg      19:19:                               mouth at           [place]



     tablet      34                                 bedtime.           Medical



                                                                 [facility]

 

     carvediloL      2020-0      Yes            6.25mg      Take 6.25           

[facility]



     6.25 mg      5-05                               mg by           [facility] of



     tablet      19:19:                               mouth 2           [place]



               34                                 (two)           Medical



                                                  times           [facility]



                                                  daily with           



                                                  meals.           

 

     sacubitril-      2020-0      Yes            1{tbl}      Take 1           [facility]

[place]



     valsartan      5-05                               tablet by           ity o

f



     (ENTRESTO)      19:19:                               mouth 2           [place]

[facility]



     49-51 mg      34                                 (two)           Medical



     tablet                                         times           [facility]



                                                  daily.           

 

     potassium      2020-0      Yes            40meq      Take 40           [facility]

ers



     chloride      5-05                               mEq by           [facility]



     (KCL-20      19:19:                               mouth at           [place]



     ORAL)      34                                 bedtime.           Medical



                                                                 [facility]

 

     LACTULOSE      2020-0      Yes            30mg      Take 30 mg           [facility]

[place]



     ORAL      5-05                               by mouth 2           ity of



               19:19:                               (two)           [place]



               34                                 times           Medical



                                                  daily.           [facility]

 

     furosemide      2020-0      Yes            80mg      Take 80 mg           U

[facility]



     (LASIX) 40      5-05                               by mouth           ity o

f



     mg tablet      19:19:                               daily.           [place]



               34                                                Medical



                                                                 [facility]

 

     QUEtiapine      2020-0      Yes            350mg      Take 350           [facility]

[place]



     (SEROQUEL)      5-05                               mg by           ity of



     300 mg      19:19:                               mouth at           [place]



     tablet      34                                 bedtime.           Medical



                                                                 [facility]

 

     carvediloL      2020-0      Yes            6.25mg      Take 6.25           

[facility]



     6.25 mg      5-05                               mg by           [facility]



     tablet      19:19:                               mouth 2           [place]



               34                                 (two)           Medical



                                                  times           [facility]



                                                  daily with           



                                                  meals.           

 

     sacubitril-      2020-0      Yes            1{tbl}      Take 1           [facility]

[place]



     valsartan      5-05                               tablet by           ity o

f



     (ENTRESTO)      19:19:                               mouth 2           [place]

[facility]



     49-51 mg      34                                 (two)           Medical



     tablet                                         times           [facility]



                                                  daily.           

 

     potassium      2020-0      Yes            40meq      Take 40           [facility]

ers



     chloride      5-05                               mEq by           [facility]



     (KCL-20      19:19:                               mouth at           [place]



     ORAL)      34                                 bedtime.           Medical



                                                                 [facility]

 

     LACTULOSE      2020-0      Yes            30mg      Take 30 mg           [facility]

[place]



     ORAL      5-05                               by mouth 2           [facility] of



               19:19:                               (two)           [place]



               34                                 times           Medical



                                                  daily.           [facility]

 

     furosemide      2020-0      Yes            80mg      Take 80 mg           U

[facility]



     (LASIX) 40      5-05                               by mouth           ity o

f



     mg tablet      19:19:                               daily.           [place]



               34                                                Medical



                                                                 [facility]

 

     QUEtiapine      2020-0      Yes            350mg      Take 350           [facility]

[place]



     (SEROQUEL)      5-05                               mg by           ity of



     300 mg      19:19:                               mouth at           [place]



     tablet      34                                 bedtime.           Medical



                                                                 [facility]

 

     carvediloL      2020-0      Yes            6.25mg      Take 6.25           

[facility]



     6.25 mg      5-05                               mg by           ity [facility]



     tablet      19:19:                               mouth 2           [place]



               34                                 (two)           Medical



                                                  times           [facility]



                                                  daily with           



                                                  meals.           

 

     sacubitril-      2020-0      Yes            1{tbl}      Take 1           [facility]

[place]



     valsartan      5-05                               tablet by           ity o

f



     (ENTRESTO)      19:19:                               mouth 2           [place]

[facility]



     49-51 mg      34                                 (two)           Medical



     tablet                                         times           [facility]



                                                  daily.           

 

     potassium      2020-0      Yes            40meq      Take 40           [facility]

ers



     chloride      5-05                               mEq by           ity of



     (KCL-20      19:19:                               mouth at           [place]



     ORAL)      34                                 bedtime.           Medical



                                                                 [facility]

 

     LACTULOSE      2020-0      Yes            30mg      Take 30 mg           [facility]

[place]



     ORAL      5-05                               by mouth 2           [facility] of



               19:19:                               (two)           [place]



               34                                 times           Medical



                                                  daily.           [facility]

 

     furosemide      2020-0      Yes            80mg      Take 80 mg           U

[facility]



     (LASIX) 40      5-05                               by mouth           ity o

f



     mg tablet      19:19:                               daily.           [place]



               34                                                Medical



                                                                 [facility]

 

     QUEtiapine      2020-0      Yes            350mg      Take 350           [facility]

[place]



     (SEROQUEL)      5-05                               mg by           ity of



     300 mg      19:19:                               mouth at           [place]



     tablet      34                                 bedtime.           Medical



                                                                 [facility]

 

     carvediloL      2020-0      Yes            6.25mg      Take 6.25           

[facility]



     6.25 mg      5-05                               mg by           ity of



     tablet      19:19:                               mouth 2           [place]



               34                                 (two)           Medical



                                                  times           [facility]



                                                  daily with           



                                                  meals.           

 

     sacubitril-      2020-0      Yes            1{tbl}      Take 1           [facility]

[place]



     valsartan      5-05                               tablet by           ity o

f



     (ENTRESTO)      19:19:                               mouth 2           [place]

[facility]



     49-51 mg      34                                 (two)           Medical



     tablet                                         times           [facility]



                                                  daily.           

 

     potassium      2020-0      Yes            40meq      Take 40           [facility]

ers



     chloride      5-05                               mEq by           [facility]



     (KCL-20      19:19:                               mouth at           [place]



     ORAL)      34                                 bedtime.           Medical



                                                                 [facility]

 

     LACTULOSE      2020-0      Yes            30mg      Take 30 mg           [facility]

[place]



     ORAL      5-05                               by mouth 2           ity of



               19:19:                               (two)           [place]



               34                                 times           Medical



                                                  daily.           [facility]

 

     furosemide      2020-0      Yes            80mg      Take 80 mg           U

nivers



     (LASIX) 40      5-05                               by mouth           ity o

f



     mg tablet      19:19:                               daily.           [address]



                                                                 [facility]

 

     furosemide      2020-0      Yes            40mg      40 mg,           [facility]

rs



     (LASIX)      5-05                               Oral,           ity of



     tablet 40      15:30:                               DAILY,           [place]



     mg        00                                 First dose           Medical



                                                  on Tue           [facility]



                                                  20 at           



                                                  1030,           



                                                  Until           



                                                  Discontinu           



                                                  ed,            



                                                  Routine           

 

     Sliding      2020-0      Yes                      Subcutaneo           Un[facility]

ers



     Scale      5-05                               us, Q4H,           [facility]



     Insulin -      13:00:                               First dose           [facility]

[place]



     Aspart      00                                 on Tue           Medical



     (NOVOLOG) +                                         20 at           [facility]



     Fsbg                                         0800,           



     Testing                                         Until           



                                                  Discontinu           



                                                  ed,            



                                                  Routine           

 

     insulin      -0 2020- No             .2U/kg/      17 Units           [facility]

[place]



     glargine      5-05 05-05                d         (rounded           [facility] of



     (LANTUS      02:00: 12:56                          from 16.8           [place]

[facility]



     U-100)      00   :51                           Units =           Medical



     injection                                         0.2            [facility]



     17 Units                                         Units/kg/d           



                                                  ay ?84           



                                                  kg),           



                                                  Subcutaneo           



                                                  us, QHS,           



                                                  First dose           



                                                  on 20 at           



                                                  2100,           



                                                  Until           



                                                  Discontinu           



                                                  ed,            



                                                  Routine           

 

     vancomycin      2020-0      Yes       [id number] 500mg      Take 2           U

n[facility]



     250 mg      5-05                               capsules           [facility]



     capsule      00:00:                               by mouth 4           [place]

[facility]



               00                                 (four)           Medical



                                                  times           [facility]



                                                  daily.           

 

     vancomycin      2020-0      Yes       [id number] 500mg      Take 2           U

n[facility]



     250 mg      5-05                               capsules           [facility]



     capsule      00:00:                               by mouth 4           [place]

[facility]



               00                                 (four)           Medical



                                                  times           [facility]



                                                  daily.           

 

     vancomycin      2020-0      Yes       [id number] 500mg      Take 2           U

n[facility]



     250 mg      5-05                               capsules           ity of



     capsule      00:00:                               by mouth 4           [place]

[facility]



               00                                 (four)           Medical



                                                  times           [facility]



                                                  daily.           

 

     vancomycin      2020-0      Yes       [id number] 500mg      Take 2           U

n[facility]



     250 mg      5-05                               capsules           ity of



     capsule      00:00:                               by mouth 4           [place]

[facility]



               00                                 (four)           Medical



                                                  times           [facility]



                                                  daily.           

 

     vancomycin      2020-0      Yes       [id number] 500mg      Take 2           U

n[facility]



     250 mg      5-05                               capsules           ity of



     capsule      00:00:                               by mouth 4           [place]

[facility]



               00                                 (four)           Medical



                                                  times           [facility]



                                                  daily.           

 

     vancomycin      2020-0      Yes       [id number] 500mg      Take 2           U

n[facility]



     250 mg      5-05                               capsules           ity of



     capsule      00:00:                               by mouth 4           [place]

[facility]



               00                                 (four)           Medical



                                                  times           [facility]



                                                  daily.           

 

     vancomycin      2020-0      Yes       [id number] 500mg      Take 2           U

nivers



     250 mg      5-05                               capsules           ity of



     capsule      00:00:                               by mouth 4           [place]

[facility]



               00                                 (four)           Medical



                                                  times           [facility]



                                                  daily.           

 

     vancomycin      2020-0      Yes       [id number] 500mg      Take 2           U

n[facility]



     250 mg      5-05                               capsules           ity of



     capsule      00:00:                               by mouth 4           [place]

[facility]



               00                                 (four)           Medical



                                                  times           [facility]



                                                  daily.           

 

     vancomycin      2020-0      Yes       [id number] 500mg      Take 2           U

n[facility]



     250 mg      5-05                               capsules           ity of



     capsule      00:00:                               by mouth 4           [place]

[facility]



               00                                 (four)           Medical



                                                  times           [facility]



                                                  daily.           

 

     vancomycin      2020-0      Yes       [id number] 500mg      Take 2           U

nivers



     250 mg      5-05                               capsules           ity of



     capsule      00:00:                               by mouth 4           [place]

[facility]



               00                                 (four)           Medical



                                                  times           [facility]



                                                  daily.           

 

     vancomycin      2020-0      Yes       [id number] 500mg      Take 2           U

n[facility]



     250 mg      5-05                               capsules           ity of



     capsule      00:00:                               by mouth 4           [place]

[facility]



               00                                 (four)           Medical



                                                  times           [facility]



                                                  daily.           

 

     vancomycin      2020-0      Yes       [id number] 500mg      Take 2           U

n[facility]



     250 mg      5-05                               capsules           ity of



     capsule      00:00:                               by mouth 4           [place]

[facility]



               00                                 (four)           Medical



                                                  times           [facility]



                                                  daily.           

 

     vancomycin      2020-0 - No        [id number] 500mg      Take 2           

[facility]



     250 mg      5-05 09-23                          capsules           ity of



     capsule      00:00: 00:00                          by mouth 4           [place]

[facility]



               00   :00                           (four)           Medical



                                                  times           [facility]



                                                  daily.           

 

     methadone      2020-0      Yes            30mg      30 mg,           Un[facility]

s



     (DOLOPHINE      5-04                               Oral,           ity of



     HCL) tablet      15:45:                               DAILY,           [place]

[facility]



     30 mg      00                                 First dose           Medical



                                                  on Mon           [facility]



                                                  20 at           



                                                  1045,           



                                                  Until           



                                                  Discontinu           



                                                  ed,            



                                                  Routine           

 

     carvediloL      2020-0      Yes            6.25mg      6.25 mg,           U

nivers



     (COREG)      5-04                               Oral, BID           ity of



     tablet 6.25      13:00:                               MEALS,           [place]

[facility]



     mg        00                                 First dose           Medical



                                                  on Mon           [facility]



                                                  20 at           



                                                  0800,           



                                                  Until           



                                                  Discontinu           



                                                  ed,            



                                                  Routine           

 

     QUEtiapine      2020-0      Yes            300mg      300 mg,           Uni

vers



     (SEROQUEL)      04                               Oral, QHS,           ity

 of



     tablet 300      02:00:                               First dose           [facility]

[facility]



     mg        00                                 on [facility]



                                                  5/3/20 at           [facility]



                                                  2100,           



                                                  Until           



                                                  Discontinu           



                                                  ed,            



                                                  Routine           

 

     magnesium      -0 2020- No             2g        2 g, IV           Univ

ers



     sulfate in                                Piggyback,           it

y of



     water 2      17:30: 16:38                          ONCE, 1           [place]



     gram/50 mL      00   :00                           dose, [facility]

car[facility]



     (4 %)                                         5/3/20 at           [facility]



     infusion 2                                         1230,           



     g                                            Routine           

 

     KCL       -0 2020- No             40meq      40 mEq,           [facility]



     (KLOR-CON                                Oral, ONCE           ity

 of



     M20) tablet      17:30: 16:38                          NOW, 1           [place]

as



     40 mEq      00   :00                           dose, [facility]



                                                  5/3/20 at           [facility]



                                                  1230,           



                                                  Routine           

 

     QUEtiapine      -0 2020- No             200mg      200 mg,           [facility]

[place]



     (SEROQUEL)                                Oral,           ity of



     tablet 200      06:45: 05:43                          ONCE, 1           [place]

as



     mg        00   :00                           dose, [facility]



                                                  5/3/20 at           [facility]



                                                  0145,           



                                                  Routine           

 

     QUEtiapine      -0 2020- No             100mg      100 mg,           [facility]

[place]



     (SEROQUEL)                                Oral, QHS,           it

y of



     tablet 100      02:00: 05:32                          First dose           

[place]



     mg        00   :57                           on [facility]



                                                  20 at           [facility]



                                                  2100,           



                                                  Until           



                                                  Discontinu           



                                                  ed,            



                                                  Routine           

 

     pantoprazol      -0      Yes            40mg      40 mg,           Univ

ers



     e                                        Oral, BID,           ity of



     (PROTONIX)      01:00:                               First dose           [facility]

[facility]



     EC tablet      00                                 on [facility]



     40 mg                                         20 at           [facility]



                                                  2000,           



                                                  Until           



                                                  Discontinu           



                                                  ed,            



                                                  Routine           

 

     Sliding      2020-0 2020- No                       Subcutaneo           Uni

vers



     Scale                                us, TID           ity of



     Insulin -      22:00: 12:57                          MEALS+HS,           [place]

[place]



     Aspart      00   :38                           First dose           Medical



     (NOVOLOG) +                                         on [facility]



     Fsbg                                         20 at           



     Testing                                         1700,           



                                                  Until           



                                                  Discontinu           



                                                  ed,            



                                                  Routine           

 

     furosemide      2020- No             80mg      80 mg,           Univ

ers



     (LASIX)                                Oral,           ity of



     tablet 80      17:00: 04:57                          DAILY,           [place]



     mg        00   :48                           First dose           Medical



                                                  on Sat           Branch



                                                  20 at           



                                                  1200,           



                                                  Until           



                                                  Discontinu           



                                                  ed,            



                                                  Routine           

 

     QUEtiapine      2020- No             100mg      100 mg,           [place]

[place]



     (SEROQUEL)                                Oral, QHS,           it

y of



     tablet 100      05:15: 11:12                          First dose           

[place]



     mg        00   :52                           on Sat           Medical



                                                  20 at           [facility]



                                                  0015,           



                                                  Until           



                                                  Discontinu           



                                                  ed,            



                                                  Routine           

 

     morpHINE      2020- No             2mg       2 mg, Slow           Un

[place]



     injection 2                                IV Push,           ity

 of



     mg        20:45: 15:47                          Q8HPRN,           [place]



               52   :59                           Starting           Medical



                                                  20           Branch



                                                  at 1545,           



                                                  Until Mon           



                                                  20 at           



                                                  1047,           



                                                  Routine,           



                                                  Pain           



                                                  (scale           



                                                  7-10)           

 

     vancomycin      2020- No             500mg      500 mg,           Un

[place]



     (VANCOCIN)      03                          Rectal,           ity o

f



     500 mg in      17:45: 19:17                          Q6H ABX,           [place]

as



     NaCl 0.9%      00   :43                           First dose           Medi

[place]



     (NS) enema                                         on Fri           Branch



                                                  20 at           



                                                  1245,           



                                                  Until           



                                                  Discontinu           



                                                  ed, 100           



                                                  mL<br>Reas           



                                                  on for           



                                                  Anti-Infec           



                                                  tive:           



                                                  Documented           



                                                  Infection<           



                                                  br>Documen           



                                                  eduard            



                                                  Infection           



                                                  Site:           



                                                  Abdominal<           



                                                  br>Duratio           



                                                  n of           



                                                  Therapy: 7           



                                                  days           

 

     metroNIDAZO       No             500mg      500 mg, IV         

  [facility]



     LE (FLAGYL                                Piggyback,           it

y of



     I.V.)      17:30: 15:50                          Q8H ABX,           [place]



     Piggyback      00   :59                           First dose           Medi

carlos



     500 mg                                         on Fri           Branch



                                                  20 at           



                                                  1230,           



                                                  Until           



                                                  Discontinu           



                                                  ed, 100           



                                                  mL<br>Reas           



                                                  on for           



                                                  Anti-Infec           



                                                  tive:           



                                                  Documented           



                                                  Infection<           



                                                  br>Documen           



                                                  eduard            



                                                  Infection           



                                                  Site:           



                                                  Abdominal<           



                                                  br&gt;Dura           



                                                  tion of           



                                                  Therapy: 7           



                                                  days           

 

     vancomycin            Yes            500mg      500 mg,           Uni

vers



     (FIRVANQ)                                     Oral, QID,           ity 

of



     50 mg/mL      17:00:                               First dose           [place]

as



     oral      00                                 on Fri           Medical



     solution                                         20 at           [place]



     500 mg                                         1200,           



                                                  Until           



                                                  Discontinu           



                                                  ed,            



                                                  Routine<br           



                                                  >Reason           



                                                  for            



                                                  Anti-Infec           



                                                  tive:           



                                                  Documented           



                                                  Infection<           



                                                  br>Documen           



                                                  eduard            



                                                  Infection           



                                                  Site:           



                                                  Abdominal<           



                                                  br>Duratio           



                                                  n of           



                                                  Therapy: 7           



                                                  days           

 

     furosemide       No             40mg      40 mg,           Univ

ers



     (LASIX)                                Slow IV           ity of



     injection      16:23: 17:15                          Push,           [place]



     40 mg      00   :00                           ONCE, 1           Medical



                                                  dose, Fri           Branch



                                                  20 at           



                                                  1130,           



                                                  Routine           

 

     magnesium       No             4g        4 g, IV           Univ

ers



     sulfate in                                Piggyback,           it

y of



     water 4      15:00: 22:13                          ONCE, 1           [place]



     gram/50 mL      00   :00                           dose, Fri           Medi

[place]



     (8 %) IV                                         20 at           [place]



     Piggyback 4                                         1000,           



     g                                            Routine           

 

     KCL        No             40meq      40 mEq, IV           Unive

rs



     (POTASSIUM                                Piggyback,           it

y of



     CHLORIDE)      15:00: 16:27                          ONCE, 1           [place]

[place]



     40 mEq in      00   :00                           dose, Fri           Medic

al



     NaCl 0.9%                                         20 at           [facility]

h



     (NS)                                         1000, 250           



     piggyback                                         mL             

 

     iohexol       No             120mL      120 mL,           Unive

rs



     (OMNIPAQUE                                Intravenou           it

y of



     350       14:25: 14:25                          s, ONCE, 1           [place]



     BULK-150      00   :00                           dose, Fri           Medica

l



     mL)                                          20 at           [place]



     injection                                         0945,           



     120 mL                                         Routine           

 

     iohexol      2020- No             120mL      120 mL,           Unive

rs



     (OMNIPAQUE      30                          Intravenou           it

y of



     350       14:54: 14:54                          s, ONCE, 1           [place]



     BULK-150      00   :00                           dose, Thu           Medica

l



     mL)                                          20 at           [facility]



     injection                                         1015,           



     120 mL                                         Routine           

 

     linezolid       No             600mg      600 mg, IV           

[facility]



     in dextrose                                Piggyback,           i

ty of



     5% (ZYVOX)      13:00: 14:48                          Q12H,           [place]



     600 mg/300      00   :15                           First dose           Med

ical



     mL infusion                                         on Thu           Branch



     600 mg                                         20 at           



                                                  0800,           



                                                  Until           



                                                  Discontinu           



                                                  ed, 300           



                                                  mL<br>Reas           



                                                  on for           



                                                  Anti-Infec           



                                                  tive:           



                                                  Empiric           



                                                  Therapy           



                                                  for            



                                                  Suspected           



                                                  Infection<           



                                                  br>Empiric           



                                                  Therapy           



                                                  Site:           



                                                  Abdominal<           



                                                  br>Duratio           



                                                  n of           



                                                  therapy: 7           



                                                  days<br>Re           



                                                  stricted           



                                                  use            



                                                  approved           



                                                  by:            



                                                  Complicate           



                                                  d              



                                                  intra-abdo           



                                                  naty           



                                                  infection           

 

     magnesium      2020- No             4g        4 g, IV           Univ

ers



     sulfate in                                Piggyback,           it

y of



     water 4      08:30: 10:46                          ONCE, 1           [place]



     gram/50 mL      00   :00                           dose, Thu           Medi

carlos



     (8 %) IV                                         20 at           [facility]

h



     Piggyback 4                                         0330,           



     g                                            Routine           

 

     morpHINE      2020- No             2mg       2 mg, Slow           Un

[place]



     injection 2                                IV Push,           ity

 of



     mg        05:33: 17:32                          Q6HPRN,           [place]



               31   :45                           Starting           Medical



                                                  Thu            Branch



                                                  20 at           



                                                  0033,           



                                                  Until 20 at           



                                                  1232,           



                                                  Routine,           



                                                  Pain           



                                                  (scale           



                                                  7-10)           

 

     lactated      2020- No             1000mL      at 125           Univ

ers



     ringers IV                                mL/hr,           ity of



     infusion      22:30: 12:15                          1,000 mL,           [place]

as



     1,000 mL      00   :17                           IV             Medical



                                                  Infusion,           [facility]



                                                  CONTINUOUS           



                                                  , Starting           



                                                  20 at           



                                                  1730,           



                                                  Until 20 at           



                                                  0715,           



                                                  Routine           

 

     meropenem       No             500mg      500 mg, IV           

[facility]



     (MERREM)                                Piggyback,           ity 

of



     500 mg in      22:15: 14:48                          Administer           [place]

[place]



     NaCl 0.9%      00   :15                           over 60           Medical



     (NS) 50 mL                                         Minutes,           [facility]



     MINI-BAG                                         Q6H ABX,           



                                                  First dose           



                                                  on 20 at           



                                                  1715,           



                                                  Until           



                                                  Discontinu           



                                                  ed,            



                                                  ASAP<br>Re           



                                                  stricted           



                                                  use            



                                                  approved           



                                                  by: HOLLIE           



                                                  8TH            



                                                  FLOOR<br>R           



                                                  caty for           



                                                  Anti-Infec           



                                                  tive:           



                                                  Empiric           



                                                  Therapy           



                                                  for            



                                                  Suspected           



                                                  Infection<           



                                                  br>Empiric           



                                                  Therapy           



                                                  Site:           



                                                  Abdominal<           



                                                  br>Duratio           



                                                  n of           



                                                  therapy: 7           



                                                  days           

 

     Sliding      2020- No                       Subcutaneo           Uni

vers



     Scale      4-29 05-02                          us, Q4H,           ity of



     Insulin -      21:30: 21:00                          First dose           [place]

[place]



     Aspart      00   :05                           on Wed           Medical



     (NOVOLOG) +                                         20 at           Br

anch



     Fsbg                                         1630,           



     Testing                                         Until           



                                                  Discontinu           



                                                  ed,            



                                                  Routine           

 

     dextrose      2020-0      Yes            250mL      250 mL, IV           Un

[place]



     10% (D10W)                                     Infusion,           ity 

of



     bolus      21:10:                               PRN - SEE           [place]



     infusion      07                                 INSTRUCTIO           Medic

al



     250 mL                                         NS, For           [facility]



                                                  glucose <           



                                                  70 and           



                                                  patient           



                                                  unable to           



                                                  swallow,           



                                                  Starting           



                                                  Wed            



                                                  20 at           



                                                  1610<br>De           



                                                  xtrose 10%           



                                                  250 mL bag           



                                                  contains:&           



                                                  nbsp;10 gm           



                                                  = 100           



                                                  mL&nbsp;20           



                                                  gm = 200           



                                                  mL&nbsp;25           



                                                  gm = 250           



                                                  mL (whole           



                                                  bag)&nbsp;           



                                                  &nbsp;The           



                                                  maximum           



                                                  rate at           



                                                  which           



                                                  dextrose           



                                                  can be           



                                                  infused           



                                                  without           



                                                  producing           



                                                  glycosuria           



                                                  is 0.5           



                                                  g/kg/hour.           



                                                  &nbsp;&nbs           



                                                  p;BUD: If           



                                                  wrapper is           



                                                  open bag           



                                                  is good           



                                                  for 30           



                                                  days at           



                                                  room           



                                                  temperatur           



                                                  e.&nbsp;<b           



                                                  r>             

 

     glucagon      2020-0      Yes            1mg       1 mg,           [facility]



     (GLUCAGEN                                     Intramuscu           ity 

of



     DIAGNOSTIC      21:08:                               lar, PRN,           [place]

[place]



     KIT)      33                                 Starting           Medical



     injection 1                                         Wed            [facility]



     mg                                           20 at           



                                                  1608,           



                                                  Until           



                                                  Discontinu           



                                                  ed, ASAP,           



                                                  Blood           



                                                  Glucose           



                                                  &lt; or =           



                                                  70 mg/dL           



                                                  and            



                                                  patient is           



                                                  unable to           



                                                  swallow or           



                                                  has mental           



                                                  changes.           

 

     morpHINE      -0 2020- No             2mg       2 mg, Slow           [facility]

[place]



     injection 2      30                          IV Push,           ity

 of



     mg        18:15: 03:14                          Q2HPRN,           [place]



               00   :00                           Starting           Medical



                                                  Wed            Branch



                                                  20 at           



                                                  1315,           



                                                  Until Wed           



                                                  20 at           



                                                  2214,           



                                                  Routine,           



                                                  Pain           



                                                  (scale           



                                                  7-10)           

 

     NaCl 0.9%      2020-0 2020- No             1000mL      at 75           Univ

ers



     (NS) IV       05-01                          mL/hr, IV           ity of



     infusion      17:30: 12:15                          Infusion,           [place]

as



     1,000 mL      00   :17                           CONTINUOUS           Medic

al



                                                  , Starting           [facility]



                                                  Wed            



                                                  20 at           



                                                  1230,           



                                                  Until 20 at           



                                                  0715,           



                                                  Routine           

 

     insulin      2020-0 2020- No             10U       10 Units,           Univ

ers



     glargine                                Subcutaneo           ity 

of



     (LANTUS      17:30: 22:00                          us, DAILY,           [place]

as



     U-100)      00   :34                           First dose           Medical



     injection                                         on Wed           Branch



     10 Units                                         20 at           



                                                  1230,           



                                                  Until           



                                                  Discontinu           



                                                  ed,            



                                                  Routine           

 

     Sliding      -0 2020- No                       Subcutaneo           Uni

vers



     Scale                                us, Q4H,           ity of



     Insulin -      09:00: 21:18                          First dose           [facility]

[facility]



     Lispro      00   :49                           on Wed           Medical



     (HumaLOG) +                                         20 at           Br

anch



     Fsbg                                         0400,           



     Testing                                         Until           



                                                  Discontinu           



                                                  ed,            



                                                  Routine           

 

     proMETHazin      -0      Yes            25mg      25 mg, IV           U

nivers



     e                                        Piggyback,           ity of



     (PHENERGAN)      07:09:                               Q6HPRN,           [place]

as



     25 mg in      37                                 Starting           Medical



     NaCl 0.9%                                         Wed            Branch



     (NS) 50 mL                                         20 at           



     piggyback                                         0209,           



                                                  Until           



                                                  Discontinu           



                                                  ed, 50 mL           

 

     octreotide      2020- No             50ug/h      50 mcg/hr          

 [facility]



     (SANDOSTATI                                (10            ity of



     N) 1,250      06:15: 14:10                          mL/hr), IV           [facility]

[facility]



     mcg in NaCl      00   :40                           Infusion,           Med

ical



     0.9% (NS)                                         CONTINUOUS           [facility]

ch



     infusion                                         , Starting           



                                                  Wed            



                                                  20 at           



                                                  0115,           



                                                  Until Sat           



                                                  20 at           



                                                  0910           

 

     pantoprazol      2020- No             8mg/h      8 mg/hr           U

nivers



     e         02                          (50            ity of



     (PROTONIX)      06:15: 14:32                          mL/hr), IV           

[place]



     80 mg in      00   :18                           Piggyback,           Medic

al



     NaCl 0.9%                                         CONTINUOUS           [facility]

ch



     (NS) 500 mL                                         , Starting           



     infusion                                         Wed            



                                                  20 at           



                                                  0115,           



                                                  Until Sat           



                                                  20 at           



                                                  0932, 500           



                                                  mL             

 

     NaCl 0.9%      0 2020- No             1000mL      at 125           Uni

vers



     (NS) IV                                mL/hr, IV           ity of



     infusion      05:15: 17:25                          Infusion,           [place]

as



     1,000 mL      00   :53                           CONTINUOUS           Medic

al



                                                  , Starting           [facility]



                                                  Wed            



                                                  20 at           



                                                  0015,           



                                                  Until Wed           



                                                  20 at           



                                                  1225,           



                                                  Routine           

 

     piperacilli       2020- No             3.375g      3.375 g,          

 [facility]



     n-tazobacta                                IV             ity of



     m (ZOSYN)      05:15: 21:05                          Piggyback,           [facility]

[facility]



     3.375      00   :50                           Q6H ABX,           Medical



     gram/50 mL                                         First dose           Bra

nch



     Piggyback                                         on Wed           



     RTU 3.375 g                                         20 at           



                                                  0015,           



                                                  Until           



                                                  Discontinu           



                                                  ed, 50           



                                                  mL<br&gt;R           



                                                  caty for           



                                                  Anti-Infec           



                                                  tive:           



                                                  Empiric           



                                                  Therapy           



                                                  for            



                                                  Suspected           



                                                  Infection<           



                                                  br>Empiric           



                                                  Therapy           



                                                  Site:           



                                                  Abdominal<           



                                                  br>Duratio           



                                                  n of           



                                                  therapy:           



                                                  72 hours           

 

     morpHINE      -2020- No             2mg       2 mg, Slow           [facility]

[place]



     injection 2                                IV Push,           ity

 of



     mg        04:06: 18:06                          Q4HPRN,           [place]



               20   :32                           Starting           Medical



                                                  Tue            [facility]



                                                  20 at           



                                                  2306,           



                                                  Until Wed           



                                                  20 at           



                                                  1306,           



                                                  Routine,           



                                                  Pain           



                                                  (scale           



                                                  7-10)           

 

     vancomycin      2020- No             1000mg      1,000 mg,          

 [facility]



     1000 mg in                                IV             ity of



      mL      03:45: 03:45                          Piggyback,           [facility]

[facility]



     RTU IV      00   :00                           ONCE, 1           Medical



     Piggyback                                         dose, [facility]

h



     1,000 mg                                         20 at           



                                                  2245<br>Re           



                                                  ason for           



                                                  Anti-Infec           



                                                  tive:           



                                                  Empiric           



                                                  Therapy           



                                                  for            



                                                  Suspected           



                                                  Infection<           



                                                  br>Empiric           



                                                  Therapy           



                                                  Site:           



                                                  Abdominal<           



                                                  br>Duratio           



                                                  n of           



                                                  therapy:           



                                                  72 hours           

 

     proMETHazin       2020- No             25mg      25 mg, IV           

[facility]



     e                                   Piggyback,           ity of



     (PHENERGAN)      03:15: 02:08                          ONCE, 1           Te

[facility]



     25 mg in      00   :00                           dose, Tue           Medica

l



     NaCl 0.9%                                         20 at           [facility]

ch



     (NS) 50 mL                                         2215, 50           



     piggyback                                         mL             

 

     NaCl 0.9%       2020- No             1000mL      at 125           Uni

vers



     (NS) IV       04-30                          mL/hr,           ity of



     infusion      02:31: 00:24                          Intravenou           Te

[facility]



     1,000 mL      00   :00                           s, ONCE, 1           Medic

al



                                                  dose, Tue           Branch



                                                  20 at           



                                                  2145, ASAP           

 

     iohexol      2020-0 2020- No             120mL      120 mL,           Unive

rs



     (OMNIPAQUE                                Intravenou           it

y of



     350       00:30: 00:18                          s, ONCE, 1           [place]



     BULK-150      00   :00                           dose, Tue           Medica

l



     mL)                                          20 at           [place]



     injection                                         1930,           



     120 mL                                         Routine           

 

     proMETHazin      -0 2020- No             25mg      25 mg, IV           

[facility]



     e                                   Piggyback,           ity of



     (PHENERGAN)      00:15: 23:24                          ONCE, 1           Te

[facility]



     25 mg in      00   :00                           dose, Tue           Medica

l



     NaCl 0.9%                                         20 at           [facility]



     (NS) 50 mL                                         , 50           



     piggyback                                         mL             

 

     morpHINE      -0 2020- No             4mg       4 mg, Slow           Un

[place]



     injection 4                                IV Push,           ity

 of



     mg        00:15: 23:30                          ONCE, 1           [place]



               00   :00                           dose, Tue           Medical



                                                  20 at           [place]



                                                  191, STAT           

 

     pantoprazol      -0 2020- No             40mg      40 mg, IV           

[facility]



     e                                   Piggyback,           ity of



     (PROTONIX)      00:15: 23:42                          ONCE, 1           [place]

as



     40 mg in      00   :00                           dose, Tue           Medica

l



     NaCl 0.9%                                         20 at           [facility]



     (NS) 100 mL                                         , 100           



     MINI-BAG                                         mL             

 

     NaCl 0.9%      2020-0 2020- No             1000mL      at 999           Uni

vers



     (NS) bolus      -29                          mL/hr,           ity of



     infusion      23:30: 02:08                          1,000 mL,           [place]

as



     1,000 mL      00   :00                           IV             Medical



                                                  Infusion,           [place]



                                                  ONCE, 1           



                                                  dose, Tue           



                                                  20 at           



                                                  1830, ASAP           

 

     NaCl 0.9%      2020-0 2020- No             1000mL      at 999           Uni

vers



     (NS) bolus      -29                          mL/hr,           ity of



     infusion      23:15: 00:31                          1,000 mL,           [place]

as



     1,000 mL      00   :00                           IV             Medical



                                                  Infusion,           [place]



                                                  ONCE, 1           



                                                  dose, Tue           



                                                  20 at           



                                                  1815, ASAP           

 

     Lactulose Lactulose 2020-0      Yes  [facility]                1 packet       

    [facility]



               [facility]



               [facility]                                              [facility]



               [facility]                                                [facility]



                                                                 [facility]

 

     Valsartan Valsartan 2020-0      Yes  Alfredo                1 tablet       

    [facility]



               [facility]



               [facility]                                              [facility]



               [facility]                                                [facility]



                                                                 [facility]

 

     QUEtiapine      2020-0      Yes            300mg      Take 300           Un

[place]



     (SEROQUEL)      1-19                               mg by           ity of



     300 mg      22:47:                               mouth at           [place]



     tablet      17                                 bedtime.           Medical



                                                                 Branch

 

     carvediloL      2020-0      Yes            6.25mg      Take 6.25           

[facility]



     6.25 mg      1-19                               mg by           ity of



     tablet      22:47:                               mouth 2           [place]



               17                                 (two)           Medical



                                                  times           Branch



                                                  daily with           



                                                  meals.           

 

     sacubitril-      2020-0      Yes            1{tbl}      Take 1           Un

[place]



     valsartan      1-19                               tablet by           ity o

f



     (ENTRESTO)      22:47:                               mouth 2           [place]

[facility]



     49-51 mg      17                                 (two)           Medical



     tablet                                         times           Branch



                                                  daily.           

 

     insulin      2020-0      Yes            35U       inject 35           Unive

rs



     aspart      1-19                               Units           ity of



     prot/insuln      22:47:                               under the           T

[facility]



     asp       17                                 skin 2           Medical



     (NOVOLOG                                         (two)           Branch



     MIX 70-30                                         times           



     SC)                                          daily.           

 

     potassium      2020-0      Yes            20meq      Take 20           Univ

ers



     chloride      1-19                               mEq by           ity of



     (KCL-20      22:47:                               mouth 2           [place]



     ORAL)      17                                 (two)           Medical



                                                  times           Branch



                                                  daily.           

 

     LACTULOSE      2020-0      Yes            30mg      Take 30 mg           Un

[place]



     ORAL      1-19                               by mouth 2           ity of



               22:47:                               (two)           [place]



               17                                 times           Medical



                                                  daily.           Branch

 

     furosemide      2020-0      Yes            40mg      Take 40 mg           U

nivers



     (LASIX) 40      1-19                               by mouth           ity o

f



     mg tablet      22:47:                               every           [place]



                                                morning           Medical



                                                  and            Branch



                                                  evening.           

 

     HYDROcodone      2020-0      Yes            1{tbl}      Take 1           Un

[place]



     -acetaminop      1-19                               tablet by           ity

 of



     hen       22:47:                               mouth           [place]



     mg tablet      17                                 every 4           Medical



                                                  (four)           Branch



                                                  hours as           



                                                  needed.           

 

     methadone 5      2020-0      Yes            30mg      Take 30 mg           

[facility]



     mg tablet      1-19                               by mouth           ity of



               22:47:                               at             [place]



               17                                 bedtime.           Medical



                                                                 Branch

 

     QUEtiapine      2020-0      Yes            300mg      Take 300           Un

[place]



     (SEROQUEL)      1-19                               mg by           ity of



     300 mg      22:47:                               mouth at           [place]



     tablet      17                                 bedtime.           Medical



                                                                 Branch

 

     carvediloL      2020-0      Yes            6.25mg      Take 6.25           

[facility]



     6.25 mg      1-19                               mg by           ity of



     tablet      22:47:                               mouth 2           [place]



               17                                 (two)           Medical



                                                  times           Branch



                                                  daily with           



                                                  meals.           

 

     sacubitril-      2020-0      Yes            1{tbl}      Take 1           Un

[place]



     valsartan      1-19                               tablet by           ity o

f



     (ENTRESTO)      22:47:                               mouth 2           [place]

[place]



     49-51 mg      17                                 (two)           Medical



     tablet                                         times           [facility]



                                                  daily.           

 

     insulin      2020-0      Yes            35U       inject 35           [facility]

rs



     aspart      1-19                               Units           [facility]



     prot/insuln      22:47:                               under the           [facility]

[facility]



     asp       17                                 skin 2           [facility]



     (NOVOLOG                                         (two)           [facility]



     MIX 70-30                                         times           



     SC)                                          daily.           

 

     potassium      2020-0      Yes            20meq      Take 20           Un[facility]

ers



     chloride      1-19                               mEq by           [facility] of



     (KCL-20      22:47:                               mouth 2           [place]



     ORAL)      17                                 (two)           [facility]



                                                  times           [facility]



                                                  daily.           

 

     LACTULOSE      2020-0      Yes            30mg      Take 30 mg           [facility]

[place]



     ORAL      1-19                               by mouth 2           ity of



               22:47:                               (two)           [place]



               17                                 times           Medical



                                                  daily.           [facility]

 

     furosemide      2020-0      Yes            40mg      Take 40 mg           U

n[facility]



     (LASIX) 40      1-19                               by mouth           ity o

f



     mg tablet      22:47:                               every           [place]



               17                                 morning           Medical



                                                  and            [facility]



                                                  evening.           

 

     HYDROcodone      2020-0      Yes            1{tbl}      Take 1           [facility]

[place]



     -acetaminop      1-19                               tablet by           ity

 of



     hen       22:47:                               mouth           [place]



     mg tablet      17                                 every 4           [facility]



                                                  (four)           [facility]



                                                  hours as           



                                                  needed.           

 

     methadone 5      2020-0      Yes            30mg      Take 30 mg           

[facility]



     mg tablet      1-19                               by mouth           [facility]



               22:47:                               at             [place]



               17                                 bedtime.           Medical



                                                                 [facility]

 

     QUEtiapine      2020-0      Yes            300mg      Take 300           [facility]

[place]



     (SEROQUEL)      1-19                               mg by           ity of



     300 mg      22:47:                               mouth at           [place]



     tablet      17                                 bedtime.           [facility]



                                                                 [facility]

 

     carvediloL      2020-0      Yes            6.25mg      Take 6.25           

[facility]



     6.25 mg      1-19                               mg by           ity of



     tablet      22:47:                               mouth 2           [place]



               17                                 (two)           [facility]



                                                  times           [facility]



                                                  daily with           



                                                  meals.           

 

     sacubitril-      2020-0      Yes            1{tbl}      Take 1           [facility]

[place]



     valsartan      1-19                               tablet by           ity o

f



     (ENTRESTO)      22:47:                               mouth 2           [place]

[facility]



     49-51 mg      17                                 (two)           [facility]



     tablet                                         times           [facility]



                                                  daily.           

 

     insulin      2020-0      Yes            35U       inject 35           [facility]

rs



     aspart      1-19                               Units           [facility]



     prot/insuln      22:47:                               under the           [facility]

[facility]



     asp       17                                 skin 2           [facility]



     (NOVOLOG                                         (two)           [facility]



     MIX 70-30                                         times           



     [facility])                                          daily.           

 

     potassium      2020-0      Yes            20meq      Take 20           [facility]

ers



     chloride      1-19                               mEq by           [facility]



     (KCL-20      22:47:                               mouth 2           [place]



     ORAL)      17                                 (two)           Medical



                                                  times           [facility]



                                                  daily.           

 

     LACTULOSE      2020-0      Yes            30mg      Take 30 mg           [facility]

[place]



     ORAL      1-19                               by mouth 2           ity of



               22:47:                               (two)           [place]



               17                                 times           Medical



                                                  daily.           [facility]

 

     furosemide      2020-0      Yes            40mg      Take 40 mg           U

n[facility]



     (LASIX) 40      1-19                               by mouth           ity o

f



     mg tablet      22:47:                               every           [place]



                                                morning           [facility]



                                                  and            [facility]



                                                  evening.           

 

     HYDROcodone      2020-0      Yes            1{tbl}      Take 1           [facility]

[place]



     -acetaminop      1-19                               tablet by           ity

 of



     hen       22:47:                               mouth           [place]



     mg tablet      17                                 every 4           Medical



                                                  (four)           [facility]



                                                  hours as           



                                                  needed.           

 

     methadone 5      2020-0      Yes            30mg      Take 30 mg           

[facility]



     mg tablet      1-19                               by mouth           [facility]



               22:47:                               at             [place]



               17                                 bedtime.           Medical



                                                                 [facility]

 

     QUEtiapine      2020-0      Yes            300mg      Take 300           [facility]

[place]



     (SEROQUEL)      1-19                               mg by           ity of



     300 mg      22:47:                               mouth at           [place]



     tablet      17                                 bedtime.           Medical



                                                                 [facility]

 

     carvediloL      2020-0      Yes            6.25mg      Take 6.25           

[facility]



     6.25 mg      1-19                               mg by           ity of



     tablet      22:47:                               mouth 2           [place]



               17                                 (two)           Medical



                                                  times           [facility]



                                                  daily with           



                                                  meals.           

 

     sacubitril-      2020-0      Yes            1{tbl}      Take 1           [facility]

[place]



     valsartan      1-19                               tablet by           ity o

f



     (ENTRESTO)      22:47:                               mouth 2           [place]

[facility]



     49-51 mg      17                                 (two)           [facility]



     tablet                                         times           [facility]



                                                  daily.           

 

     insulin      2020-0      Yes            35U       inject 35           [facility]

rs



     aspart      1-19                               Units           [facility]



     prot/insuln      22:47:                               under the           [facility]

[facility]



     asp       17                                 skin 2           Medical



     (NOVOLOG                                         (two)           [facility]



     MIX 70-30                                         times           



     [facility])                                          daily.           

 

     potassium      2020-0      Yes            20meq      Take 20           [facility]

ers



     chloride      1-19                               mEq by           [facility]



     (KCL-20      22:47:                               mouth 2           [place]



     ORAL)      17                                 (two)           Medical



                                                  times           [facility]



                                                  daily.           

 

     LACTULOSE      2020-0      Yes            30mg      Take 30 mg           [facility]

[place]



     ORAL      1-19                               by mouth 2           ity of



               22:47:                               (two)           [place]



               17                                 times           Medical



                                                  daily.           [facility]

 

     furosemide      2020-0      Yes            40mg      Take 40 mg           U

n[facility]



     (LASIX) 40      1-19                               by mouth           ity o

f



     mg tablet      22:47:                               every           [place]



               17                                 morning           [facility]



                                                  and            [facility]



                                                  evening.           

 

     HYDROcodone      2020-0      Yes            1{tbl}      Take 1           [facility]

[place]



     -acetaminop      1-19                               tablet by           ity

 of



     hen       22:47:                               mouth           [place]



     mg tablet      17                                 every 4           Medical



                                                  (four)           [facility]



                                                  hours as           



                                                  needed.           

 

     methadone 5      2020-0      Yes            30mg      Take 30 mg           

[facility]



     mg tablet      1-19                               by mouth           [facility]



               22:47:                               at             [place]



               17                                 bedtime.           Medical



                                                                 [facility]

 

     QUEtiapine      2020-0      Yes            300mg      Take 300           [facility]

[place]



     (SEROQUEL)      1-19                               mg by           ity of



     300 mg      22:47:                               mouth at           [place]



     tablet      17                                 bedtime.           Medical



                                                                 [facility]

 

     carvediloL      2020-0      Yes            6.25mg      Take 6.25           

[facility]



     6.25 mg      1-19                               mg by           ity of



     tablet      22:47:                               mouth 2           [place]



               17                                 (two)           Medical



                                                  times           [facility]



                                                  daily with           



                                                  meals.           

 

     sacubitril-      2020-0      Yes            1{tbl}      Take 1           [facility]

[place]



     valsartan      1-19                               tablet by           ity o

f



     (ENTRESTO)      22:47:                               mouth 2           [place]

[facility]



     49-51 mg      17                                 (two)           [facility]



     tablet                                         times           [facility]



                                                  daily.           

 

     insulin      2020-0      Yes            35U       inject 35           [facility]

rs



     aspart      1-19                               Units           [facility]



     prot/insuln      22:47:                               under the           [facility]

[facility]



     asp       17                                 skin 2           [facility]



     (NOVOLOG                                         (two)           [facility]



     MIX 70-30                                         times           



     [facility])                                          daily.           

 

     potassium      2020-0      Yes            20meq      Take 20           Un[facility]

ers



     chloride      1-19                               mEq by           ity of



     (KCL-20      22:47:                               mouth 2           [place]



     ORAL)      17                                 (two)           Medical



                                                  times           [facility]



                                                  daily.           

 

     LACTULOSE      2020-0      Yes            30mg      Take 30 mg           [facility]

[place]



     ORAL      1-19                               by mouth 2           ity of



               22:47:                               (two)           [place]



               17                                 times           Medical



                                                  daily.           [facility]

 

     furosemide      2020-0      Yes            40mg      Take 40 mg           U

n[facility]



     (LASIX) 40      -19                               by mouth           ity o

f



     mg tablet      22:47:                               every           [place]



               17                                 morning           [facility]



                                                  and            [facility]



                                                  evening.           

 

     HYDROcodone      2020-0      Yes            1{tbl}      Take 1           Un

[place]



     -acetaminop      -19                               tablet by           ity

 of



     hen       22:47:                               mouth           [place]



     mg tablet      17                                 every 4           Medical



                                                  (four)           [facility]



                                                  hours as           



                                                  needed.           

 

     methadone 5      2020-0      Yes            30mg      Take 30 mg           

[facility]



     mg tablet      -19                               by mouth           [facility]



               22:47:                               at             [place]



               17                                 bedtime.           Medical



                                                                 [facility]

 

     Sliding      2020-0      Yes                      Subcutaneo           Univ

ers



     Scale      1-19                               us, Q4H,           [facility]



     Insulin -      22:00:                               First dose           [facility]

[place]



     Aspart      00                                 on [place]           [facility]



     (NOVOLOG) +                                         20 at           

anch



     Fsbg                                         1600,           



     Testing                                         Until           



                                                  Discontinu           



                                                  ed,            



                                                  Routine           

 

     insulin      2020-0      Yes            3U        3 Units,           Un[facility]

s



     aspart      -                               Subcutaneo           ity of



     RAPID      19:15:                               us, TID           [place]



     (NOVOLOG      00                                 MEALS,           Medical



     U-100                                         First dose           [facility]



     INSULIN                                         on Sun           



     ASPART)                                         20 at           



     injection 3                                         1315,           



     Units                                         Until           



                                                  Discontinu           



                                                  ed,            



                                                  Routine           

 

     sacubitril-      2020-0      Yes            1{tbl}      1 tablet,          

 [facility]



     valsartan                                     Oral, BID,           ity 

of



     (ENTRESTO)      14:00:                               First dose           [facility]

[facility]



     49-51 mg      00                                 on Sun           Medical



     tablet 1                                         20 at           [facility]

h



     tablet                                         0800,           



                                                  Until           



                                                  Discontinu           



                                                  ed,            



                                                  Routine<br           



                                                  >Faculty           



                                                  member           



                                                  approving           



                                                  Restricted           



                                                  medication           



                                                  : MEG[facility]           

 

     carvediloL      2020-0      Yes            6.25mg      6.25 mg,           U

nivers



     (COREG)                                     Oral, BID           ity of



     tablet 6.25      14:00:                               MEALS,           [place]

[facility]



     mg        00                                 First dose           Medical



                                                  on Sun           [facility]



                                                  20 at           



                                                  0800,           



                                                  Until           



                                                  Discontinu           



                                                  ed,            



                                                  Routine           

 

     rifAXIMin      2020-0      Yes            550mg      550 mg,           Univ

ers



     (XIFAXAN)      -                               Oral, BID,           ity 

of



     tablet 550      14:00:                               First dose           [facility]

[facility]



     mg        00                                 on [place]           Medical



                                                  20 at           [facility]



                                                  0800,           



                                                  Until           



                                                  Discontinu           



                                                  ed,            



                                                  Routine<br           



                                                  >Reason           



                                                  for            



                                                  Anti-Infec           



                                                  tive:           



                                                  Empiric           



                                                  Therapy           



                                                  for            



                                                  Suspected           



                                                  Infection<           



                                                  br>Empiric           



                                                  Therapy           



                                                  Site:           



                                                  Abdominal<           



                                                  br>Duratio           



                                                  n of           



                                                  therapy: 7           



                                                  days           

 

     lactulose      -0      Yes            30mL      30 mL,           Univer

s



     (CEPHULAC)                                     Oral, BID,           ity

 of



     solution 30      14:00:                               First dose           

[place]



     mL        00                                 on [facility]



                                                  20 at           [facility]



                                                  0800,           



                                                  Until           



                                                  Discontinu           



                                                  ed,            



                                                  Routine           

 

     insulin NPH      -0      Yes            35U       35 Units,           U

nivers



     and regular                                     Subcutaneo           it

y of



     human 70-30      13:30:                               us, BIDAC,           

[place]



     (HUMULIN      00                                 First dose           Medic

al



     70-30 U-100                                         [facility]



     INSULIN)                                         20 at           



     100 unit/mL                                         0730,           



     (70-30)                                         Until           



     injection                                         Discontinu           



     35 Units                                         ed,            



                                                  Routine           

 

     Sliding      -0 2020- No                       Subcutaneo           Uni

vers



     Scale                                us, AC+HS,           ity of



     Insulin -      13:30: 19:00                          First dose           [facility]

[place]



     Aspart      00   :16                           on [facility]



     (NOVOLOG) +                                         20 at           

anch



     Fsbg                                         0730,           



     Testing                                         Until           



                                                  Discontinu           



                                                  ed,            



                                                  Routine           

 

     NaCl 0.9%      -0 2020- No             1000mL      at 100           Uni

vers



     (NS) IV                                mL/hr, IV           ity of



     infusion      11:15: 21:17                          Infusion,           [place]

as



     1,000 mL      00   :34                           CONTINUOUS           Medic

al



                                                  , Starting           [facility]            



                                                  20 at           



                                                  0515,           



                                                  Until Sun           



                                                  20 at           



                                                  1517,           



                                                  Routine           

 

     NaCl 0.9%      -0 2020- No             500mL      at 999           Univ

ers



     (NS) bolus                                mL/hr, 500           it

y of



     infusion      09:30: 08:35                          mL, IV           [place]



     500 mL      00   :00                           Infusion,           Medical



                                                  ONCE, 1           Branch



                                                  dose, [place]           



                                                  20 at           



                                                  0330, STAT           

 

     albuterol      -0      Yes            2{puff}      2 Puff,           Un

[place]



     (VENTOLIN)                                     Inhalation           ity

 of



     inhaler 2      06:21:                               , Q4HPRN,           [place]

as



     Puff      25                                 Starting           [facility]



                                                  20 at           



                                                  0021,           



                                                  Until           



                                                  Discontinu           



                                                  ed,            



                                                  Routine,           



                                                  Wheezing,           



                                                  Shortness           



                                                  of Breath           

 

     QUEtiapine      -0      Yes            300mg      300 mg,           Uni

vers



     (SEROQUEL)                                     Oral, QHS,           ity

 of



     tablet 300      05:30:                               First dose           [facility]

[place]



     mg        00                                 on Sat           Medical



                                                  20 at           Branch



                                                  2330,           



                                                  Until           



                                                  Discontinu           



                                                  ed,            



                                                  Routine           

 

     methadone      2020-0      Yes            30mg      30 mg,           Univer

s



     (DOLOPHINE      -19                               Oral, QHS,           ity

 of



     HCL) tablet      05:15:                               First dose           

[place]



     30 mg      00                                 on Sat           Medical



                                                  20 at           Branch



                                                  2315,           



                                                  Until           



                                                  Discontinu           



                                                  ed,            



                                                  Routine           

 

     FENTanyl PF      -0 2020- No             25ug      25 mcg,           Un

[place]



     (SUBLIMAZE                                Slow IV           ity o

f



     (PF))      04:45: 04:04                          Push,           [place]



     injection      00   :00                           ONCE, 1           Medical



     25 mcg                                         dose, Sat           Branch



                                                  20 at           



                                                  2245, STAT           

 

     insulin      -0 2020- No             10U       10 Units,           Univ

ers



     regular                                IV Push,           ity of



     human      04:15: 03:22                          ONCE, 1           [place]



     (HUMULIN R)      00   :00                           dose, Sat           Med

ical



     injection                                         20 at           [facility]

ch



     10 Units                                         2215,           



                                                  Routine           

 

     HYDROcodone      -0 2020- No             1{tbl}      1 tablet,         

  [facility]



     -acetaminop                                Oral,           ity of



     hen (NORCO)      04:13: 08:54                          Q6HPRN,           Te

[place]



      mg      02   :57                           Starting           Medica

l



     tablet 1                                         Sat            Branch



     tablet                                         20 at           



                                                  2213,           



                                                  Until Sun           



                                                  20 at           



                                                  0254,           



                                                  Routine,           



                                                  Pain           



                                                  (scale           



                                                  7-10)           

 

     glucagon      -0      Yes            1mg       1 mg,           [facility]



     (GLUCAGEN                                     Intramuscu           ity 

of



     DIAGNOSTIC      03:44:                               lar, PRN,           Te

[place]



     KIT)      34                                 Starting           Medical



     injection 1                                         Sat            Branch



     mg                                           20 at           



                                                  2144,           



                                                  Until           



                                                  Discontinu           



                                                  ed, ASAP,           



                                                  Blood           



                                                  Glucose           



                                                  &lt; or =           



                                                  70 mg/dL           



                                                  and            



                                                  patient is           



                                                  unable to           



                                                  swallow or           



                                                  has mental           



                                                  changes.           

 

     insulin      2020-0 2020- No             .1U/kg/      0.1            Univer

s



     regular                      h         Units/kg/h           ity o

f



     human      03:30: 21:16                          r ?69.9 kg           [place]



     (HUMULIN R)      25   :49                           (6.99           Medical



     100 Units                                         mL/hr), IV           Bran

ch



     in NaCl                                         Infusion,           



     0.9% (NS)                                         TITRATE,           



     100 mL                                         Starting           



     infusion                                         Sat            



                                                  20 at           



                                                  2130,           



                                                  Until Sun           



                                                  20 at           



                                                  1516           

 

     NaCl 0.9%      -0 - No             1000mL      at 999           Uni

vers



     (NS) bolus      - 01-19                          mL/hr,           ity of



     infusion      03:00: 02:09                          1,000 mL,           [place]

as



     1,000 mL      00   :00                           IV             Medical



                                                  Infusion,           [facility]



                                                  ONCE, 1           



                                                  dose, Sat           



                                                  20 at           



                                                  2100, STAT           

 

     insulin NPH            Yes            35U       Inject 35           [facility]

[facility]



     100 unit/mL      2-21                               Units           [facility]



     (3 mL) InPn      13:37:                               subcutaneo           

Medical



               55                                 usly 2           Center



                                                  (two)           



                                                  times           



                                                  daily           



                                                  before           



                                                  meals           



                                                  NOVOLIN .           

 

     sertraline            Yes            100mg QD   Take 100           [facility]

[facility]



     (ZOLOFT)      2-21                               mg by           [facility]



     100 MG      13:33:                               mouth           Medical



     tablet      34                                 daily.           Center

 

     methadone            Yes            100mg QD   Take 100           [facility]



     HCl       2-21                               mg by           [facility]



     (METHADONE      13:33:                               mouth           Medica

l



     ORAL)      34                                 daily.           Center

 

     insulin            Yes            30U       Inject 30           [facility]

t



     lispro      2-21                               Units           [facility]



     (HUMALOG)      13:33:                               subcutaneo           Me

dical



     100 unit/mL      34                                 usly 3           Center



     injection                                         (three)           



                                                  times           



                                                  daily           



                                                  before           



                                                  meals.           

 

     metFORMIN            Yes            500mg      Take 500           [facility]



     (GLUCOPHAGE      2-21                               mg by           [facility]



     ) 500 MG      13:33:                               mouth 2           Medica

l



     tablet      33                                 (two)           Center



                                                  times           



                                                  daily with           



                                                  breakfast           



                                                  and            



                                                  dinner.           

 

     No known                No                                      Methodi



     medications                                                        st



                                                                 [facility]osp[facility]



                                                                 l

 

     Quetiapine Quetiapine           Yes  [facility]                not            

[facility]



     Fumarate Fumarate                [facility]                defined           [facility]

s -



                                                                 [facility]



                                                                 l



                                                                 Outpati



                                                                 ent



                                                                 Clinics

 

     Carvedilol Carvedilol           Yes  [facility]                not            

[facility]



                              [facility]                defined           [facility]



                                                                 [facility]



                                                                 l



                                                                 Outpati



                                                                 ent



                                                                 Clinics

 

     Furosemide Furosemide           Yes  [facility]                not            

[facility]



                              [facility]                defined           [facility]



                                                                 [facility]



                                                                 l



                                                                 Outpati



                                                                 ent



                                                                 Clinics

 

     NovoLog Mix NovoLog Mix           Yes  [facility]                not          

  [facility]



     70/30 70/30                [facility]                defined           [facility]



     Flexpen Flexpen                                                   [facility]



                                                                 l



                                                                 Outpati



                                                                 ent



                                                                 Clinics

 

     Alprazolam Alprazolam           Yes            .25       Twice A           

[facility]



     (Xanax) (Xanax)                                    Day            [facility]



     0.25 Mg 0.25 Mg                                                   Patient



     Tablet Tablet                                                   s



                                                                 Medical



                                                                 Center

 

     Hydrocodone Hydrocodone           Yes            1         Every 4         

  [facility]



     Bit/Acetami Bit/Acetami                                    Hours as        

   [facility]



     nophen nophen                                    needed for           Patie

nt



     (Norco (Norco                                    Pain           s



                                                         Medical



     Tablet) 1 Tablet) 1                                                   [facility]

r



     Each Tablet Each Tablet                                                   

 

     Insulin Insulin           Yes            35        Every 12           [facility]

[facility].



     Detemir Detemir                                    Hours           [facility]



     (Levemir) (Levemir)                                                   Patie

nt



     100 Unit/1 100 Unit/1                                                   s



     Ml Vial Ml Vial                                                   Medical



                                                                 Center

 

     Quetiapine Quetiapine           Yes            50        Bedtime           

[facility]



     Fumarate Fumarate                                                   [facility]



     (Seroquel) (Seroquel)                                                   Pat

ient



     25 Mg 25 Mg                                                   s



     Tablet Tablet                                                   Medical



                                                                 Center

 

     Insulin Insulin      2018- No                                      [facility]



     Aspart Aspart                                               [facility]



     (Novolog (Novolog      00:00                                         Patien

t



     Mix 70-30 Mix 70-30      :00                                          s



     Vial) 100 Vial) 100                                                   Medic

al



     Units/Ml Units/Ml                                                   Center



     Ml, Unknown Ml, Unknown                                                   



     Dose Dose                                                   

 

     Insulin Insulin      2018- No                                      [facility]



     Glargine Glargine                                               [facility] 

-



     (Lantus) (Lantus)      00:00                                         Patien

t



     100  100       :00                                          s



     Units/Ml Units/Ml                                                   Medical



     Ml, Unknown Ml, Unknown                                                   C

enter



     Dose Dose                                                   

 

     Trazodone Trazodone      2018- No                       Daily           [facility]



     Hcl 50 Mg Hcl 50 Mg                                               [facility]

s -



     Tablet, Tablet,      00:00                                         Patient



     Unknown Unknown      :00                                          s



     Dose  Oral Dose  Oral                                                   Med

[facility]







Vital Signs







             Vital Name   Observation Time Observation Value Comments     Source

 

             Systolic blood 2021 16:57:00 131 mm[Hg]                Univer

sity [facility]

 

             Diastolic blood 2021 16:57:00 70 mm[Hg]                 [facility]

 

             Heart rate   2021 16:57:00 60 /min                   [facility]

 

             Body temperature 2021 16:57:00 36.22 [place]                 [facility]

 

             Respiratory rate 2021 16:57:00 16 /min                   [facility]

 

             Oxygen saturation in 2021 16:57:00 94 /min                   

[facility] blood by                                        [place] [facility]

carlos



             Pulse oximetry                                        [facility]

 

             Body weight  2021 08:51:00 72.848 kg                 [facility]

 

             BMI          2021 08:51:00 28.45 kg/m2               Univers[facility]

ty of



                                                                 [place] [facility]



                                                                 [facility]

 

             WEIGHT       2021 14:17:00 78.5 kg                   

 

             WEIGHT       2021 06:00:00 77.3 kg                   

 

             HEIGHT       2021 11:30:00 154 cm                    

 

             HEIGHT       2021 11:20:00 154 cm                    

 

             WEIGHT       2021 11:20:00 74.1 kg                   

 

             WEIGHT       2021 06:36:00 76.8 kg                   

 

             Systolic blood 2021 01:00:00 175 mm[Hg]                Univer

sity of



             pressure                                            [facility]



                                                                 Branch

 

             Diastolic blood 2021 01:00:00 77 mm[Hg]                 Unive

rsity of



             pressure                                            [facility]

 

             Heart rate   2021 01:00:00 68 /min                   Univers[facility]

ty of



                                                                 [place] [facility]



                                                                 [facility]

 

             Respiratory rate 2021 01:00:00 17 /min                   Univ

ersity of



                                                                 [facility]

 

             Oxygen saturation in 2021 01:00:00 100 /min                  

[facility]



             Arterial blood by                                        [place] [facility]

carlos



             Pulse oximetry                                        [facility]

 

             Body temperature 2021 00:32:00 36.83 [place]                 Univ

ersity of



                                                                 [place] [facility]



                                                                 [facility]

 

             Body height  2021 00:32:00 160 cm                    Univers[facility]

ty of



                                                                 [place] [facility]



                                                                 [facility]

 

             Body weight  2021 00:32:00 76.658 kg                 Univers[facility]

ty of



                                                                 [place] [facility]



                                                                 [facility]

 

             BMI          2021 00:32:00 29.94 kg/m2               Univers[facility]

ty of



                                                                 [place] [facility]



                                                                 [facility]

 

             WEIGHT       2021 15:12:00 81 kg                     

 

             WEIGHT       2020-12-10 14:32:00 79.6 kg                   

 

             Systolic blood 2020 07:31:00 138 mm[Hg]                Univer

sity of



             [facility]



                                                                 Branch

 

             Diastolic blood 2020 07:31:00 86 mm[Hg]                 Unive

rsity of



             pressure                                            [place] [facility]



                                                                 Br[facility]

 

             Heart rate   2020 07:31:00 80 /min                   Universi

ty of



                                                                 [place] [facility]



                                                                 Br[facility]

 

             Oxygen saturation in 2020 07:31:00 98 /min                   

[facility]



             Arterial blood by                                        [place] [facility]

carlos



             Pulse oximetry                                        [facility]

 

             Respiratory rate 2020 06:00:00 12 /min                   Univ

ersity of



                                                                 [facility]

 

             Body temperature 2020 03:16:00 36.56 [place]                 Univ

ersity of



                                                                 [place] [facility]



                                                                 Branch

 

             Body weight  2020 03:16:00 72.576 kg                 Univers[facility]

ty of



                                                                 [place] [facility]



                                                                 [facility]

 

             BMI          2020 03:16:00 28.34 kg/m2               Univers[facility]

ty of



                                                                 [place] [facility]



                                                                 [facility]

 

             Systolic blood 2020 07:31:00 138 mm[Hg]                Univer

sity of



             pressure                                            [place] [facility]



                                                                 Branch

 

             Diastolic blood 2020 07:31:00 86 mm[Hg]                 [facility]

[facility]



             pressure                                            [facility]



                                                                 Branch

 

             Heart rate   2020 07:31:00 80 /min                   [facility]

[facility]



                                                                 [facility]



                                                                 Branch

 

             Oxygen saturation in 2020 07:31:00 98 /min                   

[facility]



             Arterial blood by                                        [facility]

[facility]



             Pulse oximetry                                        Branch

 

             Respiratory rate 2020 06:00:00 12 /min                   [facility]

[facility]



                                                                 [facility]



                                                                 Branch

 

             Body temperature 2020 03:16:00 36.56 [place]                 [facility]

[facility]



                                                                 [facility]



                                                                 Branch

 

             Body weight  2020 03:16:00 72.576 kg                 [facility]

ty of



                                                                 [facility]



                                                                 Branch

 

             BMI          2020 03:16:00 28.34 kg/m2               [facility]

ty of



                                                                 [facility]



                                                                 Branch

 

             Systolic blood 2020 21:00:00 115 mm[Hg]                [facility]

[facility]



             pressure                                            [facility]



                                                                 Branch

 

             Diastolic blood 2020 21:00:00 66 mm[Hg]                 [facility]

[facility]



             pressure                                            [facility]



                                                                 Branch

 

             Heart rate   2020 21:00:00 73 /min                   [facility]

[facility] of



                                                                 [facility]



                                                                 [facility]anch

 

             Respiratory rate 2020 21:00:00 16 /min                   [facility]

[facility]



                                                                 [facility]



                                                                 [facility]anch

 

             Oxygen saturation in 2020 21:00:00 93 /min                   

[facility]



             Arterial blood by                                        [facility]

[facility]



             Pulse oximetry                                        Branch

 

             Body temperature 2020 19:35:00 37.61 [place]                 [facility]

[facility]



                                                                 [facility]



                                                                 Branch

 

             Body height  2020 19:35:00 160 cm                    [facility]

ty of



                                                                 [facility]



                                                                 Branch

 

             Body weight  2020 19:35:00 72.576 kg                 [facility]

ty of



                                                                 [facility]



                                                                 Branch

 

             BMI          2020 19:35:00 28.34 kg/m2               [facility]

ty of



                                                                 [facility]



                                                                 Branch

 

             Systolic blood 2020 21:00:00 115 mm[Hg]                [facility]

[facility] of



             pressure                                            [facility]



                                                                 Branch

 

             Diastolic blood 2020 21:00:00 66 mm[Hg]                 [facility]

[facility]



             pressure                                            [facility]



                                                                 Branch

 

             Heart rate   2020 21:00:00 73 /min                   [facility]

ty of



                                                                 [facility]



                                                                 [facility]

 

             Respiratory rate 2020 21:00:00 16 /min                   [facility]

[facility]



                                                                 [facility]



                                                                 Branch

 

             Oxygen saturation in 2020 21:00:00 93 /min                   

[facility]



             Arterial blood by                                        [facility]

[facility]



             Pulse oximetry                                        Branch

 

             Body temperature 2020 19:35:00 37.61 [place]                 [facility]

[facility]



                                                                 [facility]



                                                                 Branch

 

             Body height  2020 19:35:00 160 cm                    [facility]

ty of



                                                                 [facility]



                                                                 Branch

 

             Body weight  2020 19:35:00 72.576 kg                 [facility]

ty of



                                                                 [facility]



                                                                 Branch

 

             BMI          2020 19:35:00 28.34 kg/m2               [facility]

ty of



                                                                 [facility]



                                                                 Branch

 

             Systolic blood 2020 05:00:00 147 mm[Hg]                [facility]

[facility] of



             pressure                                            [facility]



                                                                 Branch

 

             Diastolic blood 2020 05:00:00 73 mm[Hg]                 [facility]

[facility]



             pressure                                            [facility]



                                                                 Branch

 

             Heart rate   2020 05:00:00 88 /min                   [facility]

ty of



                                                                 [facility]



                                                                 [facility]anch

 

             Oxygen saturation in 2020 05:00:00 97 /min                   

[facility]



             Arterial blood by                                        [facility]

[facility]



             Pulse oximetry                                        Branch

 

             Respiratory rate 2020 03:45:00 15 /min                   [facility]

[facility] of



                                                                 [facility]



                                                                 Branch

 

             Body height  2020 00:45:54 160 cm                    [facility]

ty of



                                                                 [facility]



                                                                 Branch

 

             Body weight  2020 00:45:54 80.513 kg                 [facility]

ty of



                                                                 [facility]



                                                                 [facility]anch

 

             BMI          2020 00:45:54 31.44 kg/m2               [facility]

ty of



                                                                 [facility]



                                                                 Branch

 

             Body temperature 2020 00:14:00 37.61 [place]                 [facility]

ers[facility]



                                                                 [facility]



                                                                 Branch

 

             Systolic blood 2020 05:00:00 147 mm[Hg]                [facility]

[facility] of



             pressure                                            [facility]



                                                                 Branch

 

             Diastolic blood 2020 05:00:00 73 mm[Hg]                 [facility]

[facility]



             pressure                                            [facility]



                                                                 Branch

 

             Heart rate   2020 05:00:00 88 /min                   [facility]

ty of



                                                                 [facility]



                                                                 [facility]anch

 

             Oxygen saturation in 2020 05:00:00 97 /min                   

[facility]



             Arterial blood by                                        [facility]

[facility]



             Pulse oximetry                                        Branch

 

             Respiratory rate 2020 03:45:00 15 /min                   [facility]

[facility] of



                                                                 [facility]



                                                                 [facility]anch

 

             Body height  2020 00:45:54 160 cm                    [facility]

ty of



                                                                 [facility]



                                                                 [facility]anch

 

             Body weight  2020 00:45:54 80.513 kg                 [facility]

ty of



                                                                 [facility]



                                                                 [facility]anch

 

             BMI          2020 00:45:54 31.44 kg/m2               [facility]

ty of



                                                                 [facility]



                                                                 [facility]anch

 

             Body temperature 2020 00:14:00 37.61 [place]                 [facility]

ers[facility] of



                                                                 [facility]



                                                                 Branch

 

             Systolic blood 2020 19:28:00 164 mm[Hg]                [facility]

[facility]ity of



             pressure                                            [facility]



                                                                 Branch

 

             Diastolic blood 2020 19:28:00 94 mm[Hg]                 [facility]

[facility]



             pressure                                            [facility]



                                                                 Branch

 

             Heart rate   2020 19:28:00 92 /min                   [facility]

ty of



                                                                 [facility]



                                                                 [facility]

 

             Respiratory rate 2020 19:28:00 20 /min                   [facility]

ers[facility] of



                                                                 [facility]



                                                                 [facility]anch

 

             Oxygen saturation in 2020 19:28:00 94 /min                   

[facility]



             Arterial blood by                                        [facility]

[facility]



             Pulse oximetry                                        Branch

 

             Body temperature 2020 17:18:00 37.11 [place]                 Un[facility]

ers[facility] of



                                                                 [facility]



                                                                 [facility]anch

 

             Body height  2020 17:18:00 160 cm                    [facility]

ty of



                                                                 [facility]



                                                                 [facility]anch

 

             Body weight  2020 17:18:00 73.936 kg                 [facility]

ty of



                                                                 [facility]



                                                                 [facility]anch

 

             BMI          2020 17:18:00 28.87 kg/m2               [facility]

ty of



                                                                 [facility]



                                                                 [facility]anch

 

             Systolic blood 2020 19:28:00 164 mm[Hg]                [facility]

[facility]



             pressure                                            [facility]



                                                                 Branch

 

             Diastolic blood 2020 19:28:00 94 mm[Hg]                 [facility]

[facility]



             pressure                                            [facility]



                                                                 [facility]anch

 

             Heart rate   2020 19:28:00 92 /min                   [facility]

ty of



                                                                 [facility]



                                                                 [facility]

 

             Respiratory rate 2020 19:28:00 20 /min                   [facility]

[facility] of



                                                                 [facility]



                                                                 [facility]anch

 

             Oxygen saturation in 2020 19:28:00 94 /min                   

[facility]



             Arterial blood by                                        [facility]

[facility]



             Pulse oximetry                                        Branch

 

             Body temperature 2020 17:18:00 37.11 [place]                 Univ

ers[facility] of



                                                                 [facility]



                                                                 [facility]anch

 

             Body height  2020 17:18:00 160 cm                    [facility]

ty of



                                                                 [facility]



                                                                 [facility]anch

 

             Body weight  2020 17:18:00 73.936 kg                 [facility]

ty of



                                                                 [facility]



                                                                 [facility]anch

 

             BMI          2020 17:18:00 28.87 kg/m2               [facility]

ty of



                                                                 [facility]



                                                                 Branch

 

             Systolic blood 2020 12:41:00 144 mm[Hg]                [facility]

[facility] of



             pressure                                            [facility]



                                                                 [facility]anch

 

             Diastolic blood 2020 12:41:00 76 mm[Hg]                 [facility]

[facility] of



             pressure                                            [facility]



                                                                 Branch

 

             Heart rate   2020 12:41:00 74 /min                   [facility]

ty of



                                                                 [facility]



                                                                 Branch

 

             Body temperature 2020 12:41:00 36.89 [place]                 Univ

ers[facility] of



                                                                 [facility]



                                                                 Branch

 

             Respiratory rate 2020 12:41:00 18 /min                   [facility]

ers[facility] of



                                                                 [facility]



                                                                 Branch

 

             Oxygen saturation in 2020 12:41:00 96 /min                   

[facility]



             Arterial blood by                                        [facility]

[facility]



             Pulse oximetry                                        Branch

 

             Body weight  2020 13:00:00 84 kg                     [facility]

ty of



                                                                 [facility]



                                                                 [facility]anch

 

             BMI          2020 13:00:00 32.80 kg/m2               [facility]

ty of



                                                                 [facility]



                                                                 [facility]anch

 

             Body height  2020 03:58:00 160 cm                    [facility]

ty of



                                                                 [facility]



                                                                 [facility]anch

 

             Systolic blood 2020 22:00:00 138 mm[Hg]                [facility]

[facility]ity of



             pressure                                            [facility]



                                                                 Branch

 

             Diastolic blood 2020 22:00:00 75 mm[Hg]                 [facility]

[facility] of



             pressure                                            [facility]



                                                                 Branch

 

             Heart rate   2020 22:00:00 68 /min                   [facility]

ty of



                                                                 [facility]



                                                                 Branch

 

             Body temperature 2020 22:00:00 36.61 [place]                 Univ

ers[facility] of



                                                                 [facility]



                                                                 Branch

 

             Respiratory rate 2020 22:00:00 16 /min                   Univ

ers[facility] of



                                                                 [facility]



                                                                 Branch

 

             Oxygen saturation in 2020 22:00:00 97 /min                   

[facility]



             Arterial blood by                                        [facility]

[facility]



             Pulse oximetry                                        Branch

 

             Body height  2020 04:25:00 161.3 cm                  [facility]

ty of



                                                                 [facility]



                                                                 Branch

 

             Body weight  2020 04:25:00 72.485 kg                 Univers[facility]

ty of



                                                                 [facility]



                                                                 Branch

 

             BMI          2020 04:25:00 27.86 kg/m2               Universi

ty of



                                                                 [facility]



                                                                 Branch







Procedures







                Procedure       Date / Time     Performing Clinician Source



                                Performed                       

 

                COVID-19 (ID NOW RAPID 2021 17:04:00 Olrin Greene [facility]

[facility] [place]



                TESTING)                                        [facility] [place]

 

                XR CHEST 1 VW   2021 15:32:27 Shamar Beaver [facility]

 

                POCT GLUCOSE (AUTOMATED) 2021 12:28:00 Abisai Avery  [facility]

 

                CBC WITH DIFF   [facility] 07[facility]55[facility]

 

                BASIC METABOLIC PANEL 2021 07:53:00 Orlin Greene [facility]



                (NA, K, CL, CO2, GLUCOSE,                                 Medica

l Branch



                BUN, CREATININE, CA)                                 

 

                POCT GLUCOSE (AUTOMATED) 2021 21:36:00 Abisai Avery  [facility]

 

                POCT GLUCOSE (AUTOMATED) 2021 16:59:00 Abisai Avery  [facility]

 

                POCT GLUCOSE (AUTOMATED) 2021 12:34:00 Abisai Avery  [facility]

 

                AMMONIA, PLASMA 2021 09:10:00 Audelia Lisa [facility]

 

                CBC WITH DIFF   2021 09:10:00 Jaguar Audelia Mariela [facility]

 

                BASIC METABOLIC PANEL 2021 09:02:00 [facility] 

[place]



                (NA, K, CL, CO2, GLUCOSE,                                 Medica

l Branch



                BUN, CREATININE, CA)                                 

 

                CBC WITH DIFF   2021 08:57:00 Audelia Lisa [facility]

 

                BASIC METABOLIC PANEL 2021 08:50:00 Audelia Lisa [facility] 

[place]



                (NA, K, CL, CO2, GLUCOSE,                                 Medica

l Branch



                BUN, CREATININE, CA)                                 

 

                MAGNESIUM       2021 10:05:00 Abisai Avery  [facility]

 

                COMP. METABOLIC PANEL 2021 10:05:00 Abisai Avery  [facility]



                ([id number])                                         [facility]

 

                N-TERMINAL PRO-BNP 2021 10:05:00 Abisai Avery  [facility]

 

                POCT GLUCOSE (AUTOMATED) 2021 21:38:00 Abisai Avery  [facility]

 

                POCT GLUCOSE (AUTOMATED) 2021 16:37:00 Abisai Avery  [facility]

 

                POCT GLUCOSE (AUTOMATED) 2021 12:16:00 Abisai Avery  [facility]

 

                PHOSPHORUS      2021 10:01:00 Gena marvin  [facility]

 

                MAGNESIUM       2021 10:01:00 Gena marvin  [facility]

 

                FERRITIN SERUM  2021 10:01:00 Saranya [facility]

 

                VITAMIN B12, LEVEL 2021 10:01:00 Shamar Beaver [facility]

 

                FOLATE          2021 10:01:00 Shamar Beaver [facility]

 

                COMP. METABOLIC PANEL 2021 10:01:00 Abisai Avery  [facility]



                ([id number])                                         [facility]

 

                IRON PANEL      2021 10:01:00 Saranya [facility]

 

                CBC WITH DIFF   2021 10:01:00 [facility]

 

                N-TERMINAL PRO-BNP 2021 10:01:00 Gena marvin  [facility]

 

                VITAMIN D, 25-OH 2021 10:01:00 Shamar Beaver [facility]

 

                POCT GLUCOSE (AUTOMATED) 2021 01:34:00 Abisai Avery  [facility]

 

                POCT GLUCOSE (AUTOMATED) 2021 22:16:00 Abisai Avery  [facility]

 

                VALPROIC ACID, FREE 2021 19:45:00 Abisai Avery  [facility]

 

                PROCALCITONIN   2021 19:45:00 Gena marvin  [facility]

 

                TRANSTHORACIC ECHO (TTE) 2021 19:03:47 Abisai Avery  [facility]

 

                POCT GLUCOSE (AUTOMATED) 2021 16:51:00 Abisai Avery  [facility]

 

                POCT GLUCOSE (AUTOMATED) 2021 12:48:00 Abisai Avery  [facility]

 

                ACUTE CARE VENOUS BLOOD 2021 10:17:00 Abisai Avery  [facility]

[facility] [place]



                GAS                                             [facility]

 

                PHOSPHORUS      2021 10:01:00 Abisai Avery  [facility]

 

                MAGNESIUM       2021 10:01:00 Julio Valdez   [facility]

 

                FERRITIN SERUM  2021 10:01:00 Shamar Beaver [facility]

 

                AMMONIA, PLASMA 2021 10:01:00 [facility]

 

                TROPONIN I      2021 10:01:00 Gena marvin  [facility]

 

                COMP. METABOLIC PANEL 2021 10:01:00 Abisai Avery  [facility]



                ([id number])                                         [facility]

 

                CBC WITH DIFF   2021 10:01:00 Gena marvin  [facility]

 

                PROTHROMBIN TIME / INR 2021 10:01:00 Abisai Avery  [facility]

 

                N-TERMINAL PRO-BNP 2021 10:01:00 Abisai Avery  [facility]

 

                AMMONIA, PLASMA 2021 04:59:00 Abisai Avery  [facility]

 

                TROPONIN I      2021 04:59:00 Abisai Avery  [facility]

 

                VALPROIC ACID, TOTAL 2021 04:59:00 Abisai Avery  [facility]

 

                OSMOLALITY URINE 2021 04:52:00 Gena marvin  [facility]

 

                URINE DRUG (IMMUNOASSAY) 2021 04:52:00 Abisai Avery  [facility]



                SCREEN                                          

 

                URINE CULTURE   2021 04:52:00 Gena marvin  [facility]

 

                PROTEIN CREAT RATIO URINE 2021 04:52:00 Abisai Avery  [facility]

 

                UREA NITROGEN, URINE 2021 04:52:00 Abisai Aevry  [facility]

 

                SODIUM, URINE RANDOM 2021 04:52:00 Gena marvin  [facility]

 

                URINE DRUG (LCMSMS) - 2021 04:52:00 Gena marvin  [facility]



                SYNTHETIC OPIATES PANEL                                 [facility] 

Branch

 

                POCT GLUCOSE (AUTOMATED) 2021 04:12:00 Abisai Avery  [facility]

 

                URINALYSIS      2021 01:06:00 Alexandria Sahu [facility]

 

                PHOSPHORUS      2021 23:48:00 [facility]

 

                URIC ACID       2021 23:48:00 [facility]

 

                MAGNESIUM       2021 23:48:00 [facility]

 

                THYROID STIMULATING 2021 23:48:00 Gena marvin  [facility]



                HORMONE                                         [facility] Branch

 

                BASIC METABOLIC PANEL 2021 23:48:00 Julio Valdez   [facility]



                (NA, K, CL, CO2, GLUCOSE,                                 Medica

l Branch



                BUN, CREATININE, CA)                                 

 

                LIPID PANEL ([id number])(TOTAL 2021 23:48:00 Abisai Avery  [facility]



                CHOLESTEROL,                                    [facility]



                TRIGLYCERIDES, HDL)                                 

 

                HB ECG ROUTINE & RHYTHM 2021 20:43:17 Alexandria Sahu U

[facility]



                STRIP                                           [facility]

 

                AMMONIA, PLASMA 2021 20:04:00 Alexandria Sahu [facility]

 

                TROPONIN I      2021 20:04:00 Alexandria Sahu [facility]

 

                HEPATIC FUNCTION PANEL 2021 20:04:00 Alexandria Sahu [facility]



                ([id number]) (ALB,T.PRO,BILI                                 Medical 

Branch



                T,BU/BC,ALT,AST,ALK PHOS)                                 

 

                BASIC METABOLIC PANEL 2021 20:04:00 Alexandria Sahu [facility]



                (NA, K, CL, CO2, GLUCOSE,                                 Medica

l Branch



                BUN, CREATININE, CA)                                 

 

                DIFF CONSULT    2021 20:04:00 Abisai Avery  [facility]

 

                CBC WITH DIFF   2021 20:04:00 Alexandria Sahu [facility]

 

                GLYCOSYLATED HEMOGLOBIN 2021 20:04:00 Abisai Avery  [facility] [place]



                (A1C)                                           [facility]

 

                N-TERMINAL PRO-BNP 2021 20:04:00 Alexandria Sahu [facility]

 

                COVID-19 (ID NOW RAPID 2021 20:04:00 Alexandria Sahu [facility]



                TESTING)                                        Medical Branch

 

                LAB ONLY COVID  2021 20:04:00 Alexandria Sahu [facility]

 

                LACTIC ACID WHOLE BLOOD 2021 20:03:00 Alexandria Sahu U

[facility]

 

                XR CHEST 1 VW   2021 20:00:10 Alexandria Sahu [facility]

 

                EMERGENCY DEPARTMENT 2021 05:01:00 Doctor Unassigned, [facility]



                DOCUMENTS                       [place]         [facility]

 

                PROTHROMBIN TIME / INR 2021 01:55:00 Audelia Magaña  [facility]

 

                ACTIVATED PARTIAL 2021 01:55:00 Audelia Magaña  [facility]



                THRFormerly [facility]

 

                URINALYSIS      2021 01:55:00 Audelia Magaña  [facility]

 

                AMMONIA, PLASMA 2021 00:59:00 Audelia Magaña  [facility]

 

                POCT GLUCOSE (AUTOMATED) 2020 07:28:00 Dominga Abarca [facility]

 

                XR CHEST 1 VW   2020 06:15:50 Dominga Abarca [facility]

 

                URINALYSIS      2020 03:30:00 Dominga Abarca [facility]

 

                TROPONIN I      2020 03:29:00 Dominga Abarca [facility]

 

                COMP. METABOLIC PANEL 2020 03:29:00 Dominga Abarca [facility]



                ([id number])                                         Medical Branch

 

                CBC WITH DIFF   2020 03:29:00 Dominga Abarca [facility]

 

                N-TERMINAL PRO-BNP 2020 03:29:00 Dominga Abarca [facility]

 

                EKG-12 LEAD     2020 03:25:41 Dominga Abacra [facility]

 

                POCT GLUCOSE(AGE 2020 03:21:00 Dominga Abarca [facility]



                0-30DAYS)                                       [facility]

 

                XR CHEST 1 VW COVID 2020 22:00:27 Karli Rocha [facility]

 

                COVID-19 (ID NOW RAPID 2020 21:06:00 Karli Rocha [facility]

[facility] [place]



                TESTING)                                        [facility]

 

                ACUTE CARE VENOUS BLOOD 2020 20:44:00 Karli Rocha [facility]

[facility] [place]



                GAS                                             [facility]

 

                LIPASE          2020 20:41:00 Karli Rocha [facility]

 

                TROPONIN I      2020 20:41:00 Karli Rocha [facility]

 

                HEPATIC FUNCTION PANEL 2020 20:41:00 Karli Rocha [facility]

[facility] [place]



                ([id number]) (ALB,T.PRO,BILI                                 [facility] 

Branch



                T,BU/BC,ALT,AST,ALK PHOS)                                 

 

                BASIC METABOLIC PANEL 2020 20:41:00 Karli Rocha [facility]

[facility] [place]



                (NA, K, CL, CO2, GLUCOSE,                                 Medica

l Branch



                BUN, CREATININE, CA)                                 

 

                CBC WITH DIFF   2020 20:41:00 Karli Rocha [facility]

 

                URINALYSIS      2020 20:41:00 Karli Rocha [facility]

 

                N-TERMINAL PRO-BNP 2020 20:41:00 Karli Rocha [facility]

 

                ADC / LCC - DRUG SCREEN 2020 20:41:00 Karli Rocha Uni

[facility] [place]



                TRIAGE                                          [facility]

 

                EKG-12 LEAD     2020 20:21:42 Karli Rocha [facility]

 

                CONSENT/REFUSAL FOR 2020 19:20:03 Doctor Freedom Saeed

[facility]



                DIAGNOSIS AND TREATMENT                 [place]         Medical 

Branch

 

                XR CHEST 2 VW   2020 01:47:16 Mahamed Bain   [facility]

 

                CT ABDOMEN PELVIS W 2020 01:45:39 Mahamed Bain   [facility] [place]



                CONTRAST                                        [facility]

 

                PROTHROMBIN TIME / INR 2020 00:33:00 Mahamed Bain   [facility] [place] [facility]

[facility]

 

                URINALYSIS      2020 00:33:00 Mahamed Bain   [facility]

 

                COVID-19 (ID NOW RAPID 2020 00:33:00 Mahamed Bain   [facility] [place] [facility]

[facility]



                TESTING)                                        Medical Branch

 

                POCT PREGNANCY TEST 2020 00:26:00 Mahamed Bain   [facility]

 

                LIPASE          2020 00:19:00 Mahamed Bain   [facility]

 

                TROPONIN I      2020 00:19:00 Mahamed Bain   [facility]

 

                HEPATIC FUNCTION PANEL 2020 00:19:00 Mahamed Bain

[facility]



                ([id number]) (ALB,T.PRO,BILI                                 Medical 

Branch



                T,BU/BC,ALT,AST,ALK PHOS)                                 

 

                BASIC METABOLIC PANEL 2020 00:19:00 Mahamed Bain   [facility]



                (NA, K, CL, CO2, GLUCOSE,                                 Medica

l Branch



                BUN, CREATININE, CA)                                 

 

                CBC WITH DIFFERENTIAL 2020 00:19:00 Mahamed Bain   [facility]

 

                N-TERMINAL PRO-BNP 2020 00:19:00 Mahamed Bain   [facility]

 

                EKG-12 LEAD     2020 00:14:24 Mahamed Bain   [facility]

 

                CONSENT/REFUSAL FOR 2020 00:01:47 Doctor Unaalla [facility]



                DIAGNOSIS AND TREATMENT                 [place]         Medical 

[place]

 

                CT ABDOMEN PELVIS W 2020 19:02:08 Singer, [facility]

[facility] [place]



                CONTRAST                                        Medical Branch

 

                LIPASE          2020 17:40:00 Singer, [facility]

 

                COMP. METABOLIC PANEL 2020 17:40:00 Pk Nash [facility]

[facility] [place]



                ([id number])                                         [facility] [place]

 

                CBC WITH DIFFERENTIAL 2020 17:40:00 Singer, [facility]

 

                URINALYSIS      2020 17:40:00 Singer, [facility]

 

                NOTICE OF PRIVACY 2020 17:11:40 Doctor Unassigned, [facility]



                PRACTICES                       [place]         [facility]

 

                ASSIGNMENT OF BENEFITS 2020 17:11:15 Doctor Unassigned, [facility] Name         [facility] [place]

 

                POCT GLUCOSE (AUTOMATED) 2020 11:02:00 Lynsey De La Cruz Catarina U

[facility]

 

                POCT GLUCOSE (AUTOMATED) 2020 03:12:00 Lynsey De La Cruz U

[facility]

 

                EXTRA TUBE LAV  2020 01:40:00 Mays, [facility]

 

                EXTRA TUBE LT. BLUE 2020 01:40:00 Tabatha Griselda  [facility]

 

                EXTRA TUBE RED  2020 01:40:00 Mays, [facility]

 

                CREATINE KINASE 2020 01:38:00 Saw Chew [facility]

 

                MAGNESIUM       2020 01:38:00 Saw Chew [facility]

 

                PROLACTIN       2020 01:38:00 Jeevan [facility]

 

                TROPONIN I      2020 01:38:00 Jeevan Saw [facility]

 

                BASIC METABOLIC PANEL 2020 01:38:00 Saw Chew Uni

[facility] [place]



                (NA, K, CL, CO2, GLUCOSE,                 [facility]



                BUN, CREATININE, CA)                                 

 

                POCT GLUCOSE (AUTOMATED) 2020 18:50:00 De La Cruz, Lynsey [facility]

[facility]

 

                [facility] KUB          2020 [facility]40[facility]

 

                POCT GLUCOSE (AUTOMATED) 2020 13:01:00 De La Cruz, Lynsey Catarina U

[facility]

 

                BASIC METABOLIC PANEL 2020 09:36:00 Hans Rangel   [facility]

[facility] [place]



                (NA, K, CL, CO2, GLUCOSE,                                 Medica

l Branch



                BUN, CREATININE, CA)                                 

 

                [facility]ENTIAL 2020 [facility]

 

                [facility] WITH DIFFERENTIAL 2020 [facility]:36[facility]

 

                POCT GLUCOSE (AUTOMATED) 2020 02:59:00 De La Cruz, Lynsey Catarina U

[facility]

 

                POCT GLUCOSE (AUTOMATED) 2020 21:18:00 De La Cruz, Lynsey Catarina U

[facility]

 

                XR KUB          2020 [facility]:04[facility]00 [facility]

 

                POCT GLUCOSE (AUTOMATED) 2020 16:44:00 De La Cruz, Lynsey Catarina U

[facility]

 

                MAGNESIUM       2020 10:03:00 Hans Rangel   [facility]

 

                HEPATIC FUNCTION PANEL 2020 10:03:00 Hans Rangel   [facility]



                ([id number]) (ALB,T.PRO,BILI                                 Medical 

Branch



                T,BU/BC,ALT,AST,ALK PHOS)                                 

 

                BASIC METABOLIC PANEL 2020 10:03:00 Hans Rangel   [facility]

[facility] [place]



                (NA, K, CL, CO2, GLUCOSE,                                 Medica

l Branch



                BUN, CREATININE, CA)                                 

 

                CBC WITH DIFFERENTIAL 2020 10:03:00 Rachael [facility]

 

                PROTHROMBIN TIME / INR 2020 10:03:00 Hans Rangel   [facility]

 

                POCT GLUCOSE (AUTOMATED) 2020 03:05:00 Lynsey De La Cruz [facility]

 

                CBC WITH DIFFERENTIAL 2020 23:03:00 Rachael [facility]

 

                POCT GLUCOSE (AUTOMATED) 2020 22:57:00 Lynsey De La Cruz [facility]

 

                XR KUB          2020 [facility]:01[facility]

 

                POCT GLUCOSE (AUTOMATED) 2020 16:52:00 Lynsey De La Cruz [facility]

 

                EKG-12 LEAD     2020 16:46:33 Lynsey De La Cruz [facility]

 

                PROFILE / HEMOGRAM 2020 15:06:00 Juan Carlos [facility]

 

                POCT GLUCOSE (AUTOMATED) 2020 12:54:00 Shamar Beaver

[facility]

 

                MAGNESIUM       2020 09:25:00 Juan Carlos [facility]

 

                FERRITIN SERUM  2020 09:25:00 Saw Chew [facility]

 

                HEPATIC FUNCTION PANEL 2020 09:25:00 [facility]



                ([id number]) (ALB,T.PRO,[facility]



                T,BU/BC,ALT,AST,ALK PHOS)                                 

 

                BASIC METABOLIC PANEL 2020 09:25:00 Hans Rangel   [facility]

[facility] [place]



                (NA, K, CL, CO2, GLUCOSE,                                 Medica

l Branch



                BUN, CREATININE, CA)                                 

 

                PROFILE / HEMOGRAM 2020 09:25:00 Juan Carlos [facility]

 

                PROTHROMBIN TIME / INR 2020 09:25:00 Juan Carlos [facility]

 

                POCT GLUCOSE (AUTOMATED) 2020 04:32:00 Shamar Beaver [facility]

 

                PROFILE / HEMOGRAM 2020 03:14:00 [facility]

 

                POCT GLUCOSE (AUTOMATED) 2020 00:42:00 Shamar Beaver

[facility]

 

                POCT GLUCOSE (AUTOMATED) 2020 21:27:00 Shamar Beaver

[facility]

 

                MAGNESIUM       2020 21:15:00 [facility]

 

                BASIC METABOLIC PANEL 2020 21:15:00 Hans Rangel   [facility]

[facility] [place]



                (NA, K, CL, CO2, GLUCOSE,                                 Medica

l Branch



                BUN, CREATININE, CA)                                 

 

                PROFILE / HEMOGRAM 2020 21:15:00 Juan Carlos [facility]

 

                TRANSFUSE PLATELETS 2020 17:56:15 Tim [facility]

 

                POCT GLUCOSE (AUTOMATED) 2020 16:53:00 Shamar Beaver

[facility]

 

                PROFILE / HEMOGRAM 2020 15:58:00 [facility]

 

                PREPARE PLATELETS 2020 15:46:33 Tim [facility]

 

                TRANSFUSE PACKED RBC 2020 15:36:45 Mingo Diana  [facility]

 

                CT ANGIOGRAM    2020 14:33:11 Tim [facility]



                ABDOMEN/PELVIS                                  Medical Branch

 

                PREPARE PACKED RBC 2020 [facility]:34:31 [facility]

 

                POCT GLUCOSE (AUTOMATED) 2020 12:38:00 Shamar Beaver

[facility]

 

                TRANSFUSE PACKED RBC 2020 12:36:06 Diana Aguila  [facility]

 

                POCT GLUCOSE (AUTOMATED) 2020 10:06:00 Shamar Beaver

[facility]

 

                POCT GLUCOSE (AUTOMATED) 2020 10:04:00 Shamar Bevaer

[facility]

 

                MAGNESIUM       2020 09:59:00 Juan Carlos [facility]

 

                HEPATIC FUNCTION PANEL 2020 09:59:00 Hans Rangel   [facility]



                ([id number]) (ALB,T.PRO,BILI                                 Medical 

Branch



                T,BU/BC,ALT,AST,ALK PHOS)                                 

 

                BASIC METABOLIC PANEL 2020 09:59:00 Hans Rangel   [facility]

[facility] [place]



                (NA, K, CL, CO2, GLUCOSE,                                 Medica

l Branch



                BUN, CREATININE, CA)                                 

 

                CBC WITH DIFFERENTIAL 2020 09:59:00 Hans Rangel   [facility]

 

                PROTHROMBIN TIME / INR 2020 09:59:00 Hans Rangel   [facility]

 

                POCT GLUCOSE (AUTOMATED) 2020 04:35:00 Shamar Beaver [facility]

 

                CBC WITH DIFFERENTIAL 2020 04:28:00 Hans Rangel   [facility]

 

                POCT GLUCOSE (AUTOMATED) 2020 01:14:00 Shamar Beaver [facility]

 

                CBC WITH DIFFERENTIAL 2020 21:54:00 [facility]

 

                POCT GLUCOSE (AUTOMATED) 2020 21:49:00 Shamar Beaver [facility]

 

                TRANSFUSE PACKED RBC 2020 21:25:36 Hans Rangel   [facility]

 

                CLOSTRIDIUM DIFFICILE 2020 20:50:00 Shamar Beaver [facility]

 

                PREPARE PACKED RBC 2020 18:52:37 Hans Rangel   [facility]

 

                HB ABO GROUPING 2020 17:58:00 Hans Rangel   [facility]

 

                POCT GLUCOSE (AUTOMATED) 2020 17:28:00 Shamar Beaver [facility]

 

                POCT GLUCOSE (AUTOMATED) 2020 16:24:00 Shamar Beaver [facility]

 

                CT ABDOMEN PELVIS W 2020 15:05:30 Judson Garner [facility]



                CONTRAST                        [facility]

 

                CBC WITH DIFFERENTIAL 2020 14:12:00 Hans Rangel   [facility]

 

                FIBRINOGEN      2020 14:12:00 Amrani, [facility]

 

                POCT GLUCOSE (AUTOMATED) 2020 12:45:00 Shamar Beaver

[facility]

 

                PHOSPHORUS      2020 10:42:00 [facility]a [facility]

 

                COMP. METABOLIC PANEL 2020 10:42:00 Gena [facility]



                ([id number])                                         Medical [place]

 

                N-TERMINAL PRO-BNP 2020 10:42:00 Gena [facility]

 

                CBC WITH DIFFERENTIAL 2020 09:32:00 Gena [facility]

 

                POCT GLUCOSE (AUTOMATED) 2020 09:29:00 Shamar Beaver [facility]

 

                MAGNESIUM       2020 05:28:00 Juan Carlos [facility]

 

                HEPATIC FUNCTION PANEL 2020 05:28:00 Juan Carlos Hans   [facility]



                ([id number]) (ALB,T.PRO,BILI                                 Medical 

Branch



                T,BU/BC,ALT,AST,ALK PHOS)                                 

 

                BASIC METABOLIC PANEL 2020 05:28:00 Juan Carlos Hans   [facility]

[facility] [place]



                (NA, K, CL, CO2, GLUCOSE,                                 Medica

l Branch



                BUN, CREATININE, CA)                                 

 

                PROTHROMBIN TIME / INR 2020 05:28:00 Hans Rangel   [facility]

 

                CBC WITH DIFFERENTIAL 2020 05:27:00 Hans Rangel   [facility]

 

                POCT GLUCOSE (AUTOMATED) 2020 05:02:00 Shamar Beaver

[facility]

 

                POCT GLUCOSE (AUTOMATED) 2020 02:27:00 Shamar Beaver [facility]

 

                XR ABDOMEN 1 VW 2020 02:15:21 Juan Carlos [facility]

 

                MRSA / MSSA SCREEN BY 2020 01:23:00 Hans Rangel   [facility]

[facility] [place]



                PCR, NARES                                      [facility]

 

                ALPHA FETOPROTEIN 2020 23:31:00 Ken Lacy [facility]



                                                C               [facility]

 

                IRON PANEL      2020 23:31:00 Saw Chew [facility]

 

                CBC WITH DIFFERENTIAL 2020 23:31:00 Hans Rangel   [facility]

 

                LACTIC ACID WHOLE BLOOD 2020 23:31:00 [facility]   [facility]

 

                POCT GLUCOSE (AUTOMATED) 2020 21:30:00 Shamar Beaver [facility]

 

                POCT GLUCOSE (AUTOMATED) 2020 16:10:00 Shamar Beaver [facility]

 

                POCT GLUCOSE (AUTOMATED) 2020 12:52:00 Shamar Beaver [facility]

 

                LACTIC ACID WHOLE BLOOD 2020 10:28:00 Shamar [facility]

 

                POCT GLUCOSE (AUTOMATED) 2020 09:17:00 Shamar Beaver [facility]

 

                HEPATIC FUNCTION PANEL 2020 05:57:00 Shamar Beaver [facility]



                ([id number]) (ALB,T.PRO,BILI                                 Medical 

Branch



                T,BU/BC,ALT,AST,ALK PHOS)                                 

 

                BASIC METABOLIC PANEL 2020 05:57:00 Shamar Beaver [facility]



                (NA, K, CL, CO2, GLUCOSE,                                 Medica

l Branch



                BUN, CREATININE, CA)                                 

 

                CBC WITH DIFFERENTIAL 2020 05:57:00 Shamar Beaver [facility]

 

                GLYCOSYLATED HEMOGLOBIN 2020 05:57:00 Shamar Beavre [facility]



                (A1C)                                           [facility]

 

                N-TERMINAL PRO-BNP 2020 05:57:00 Abisai Avery  [facility]

 

                LACTIC ACID WHOLE BLOOD 2020 05:56:00 Shamar Beaver [facility]

 

                POCT GLUCOSE (AUTOMATED) 2020 04:07:00 Shamar Beaver [facility]

 

                XR CHEST 1 VW   2020 02:46:27 Miguel [facility]

 

                URINE CULTURE   2020 02:31:00 Vincent, [facility]

 

                BASIC METABOLIC PANEL 2020 02:02:00 Alberto Rivera [facility]



                (NA, K, CL, CO2, GLUCOSE,                                 Medica

l Branch



                BUN, CREATININE, CA)                                 

 

                LACTIC ACID WHOLE BLOOD 2020 02:02:00 Tata Mcneil [facility]

 

                URINALYSIS      2020 00:31:00 Tata Mcneil [facility]

 

                EKG-12 LEAD     2020 00:30:02 Dominga Abarca [facility]

 

                CT ABDOMEN PELVIS W 2020 00:23:50 [facility] Mcneil [facility] [place]



                CONTRAST                                        [facility]

 

                EKG-12 LEAD     2020 23:53:36 Tata Mcneil [facility]

 

                BLOOD CULTURE SCREEN 2020 23:18:00 Tata Mcneil [facility]

 

                LACTIC ACID WHOLE BLOOD 2020 23:17:00 Tata Mc[facility]eli [facility]

 

                BLOOD CULTURE SCREEN 2020 23:16:00 Tata Mcneil [facility]

 

                LIPASE          2020 23:15:00 Tata Mcneil [facility]

 

                COMP. METABOLIC PANEL 2020 23:15:00 Tata Mcneil [facility]

[facility] [place]



                ([id number])                                         [facility]

 

                PROTHROMBIN TIME / INR 2020 23:15:00 Tata Mcneil [facility]

 

                ACTIVATED PARTIAL 2020 23:15:00 Tata Mcneil [facility]



                THRMPLAS DENISE                                    [facility]

 

                HB ABO GROUPING 2020 23:15:00 Tata Mcneil [facility]

 

                CBC WITH DIFFERENTIAL 2020 23:15:00 Tata Mcneil [facility]

 

                CORONAVIRUS COVID-19 2020 23:15:00 Tata Mcneil [facility]

 

                EMERGENCY DEPARTMENT 2020 05:01:00 Doctor Unassigned, [facility]



                DOCUMENTS                       [place]

 

                HOSPITAL ADMISSION 2020 05:01:00 Doctor Unassigned, [facility]

[facility] [place]



                                                [place]         [facility]

 

                [facility]00 Doctor Unassigned,

 [facility]



                MEDICARE PATIENTS RIGHTS                  Name         Medical

 [place]



                IMPORTANT MESSAGE                                 

 

                TOTAL IRON BINDING 2020 19:40:00 Viry Larkin   [facility]

 

                HEPATIC FUNCTION PANEL 2020 19:40:00 Viry Larkin   [facility]



                ([id number]) (ALB,T.PRO,BILI                                 Medical 

Branch



                T,BU/BC,ALT,AST,ALK PHOS)                                 

 

                BASIC METABOLIC PANEL 2020 19:40:00 Viry Larkin   [facility]

[facility] [place]



                (NA, K, CL, CO2, GLUCOSE,                                 Medica

l Branch



                BUN, CREATININE, CA)                                 

 

                ALPHA FETOPROTEIN 2020 19:40:00 Viry Larkin   [facility]

 

                CBC WITH DIFFERENTIAL 2020 19:40:00 Viry Larkin   [facility]

 

                PROTHROMBIN TIME / INR 2020 19:40:00 Viry Larkin   [facility]

 

                ACTIVATED PARTIAL 2020 19:40:00 Viry Larkin   [facility]



                THRMPLAS [facility]

 

                XR ABDOMEN 2 VW 2020 19:33:21 Yesy Mancia   [facility]

 

                US ABDOMEN COMPLETE 2020 19:23:36 Yesy Mancia   [facility]

 

                CONSENT/REFUSAL FOR 2020 18:46:28 Doctor Unassrita, [facility]



                DIAGNOSIS AND TREATMENT                 [facility]

 

                ASSIGNMENT OF BENEFITS 2020 18:46:09 Doctor Unassrita, 

[facility]

 

                POCT GLUCOSE (AUTOMATED) 2020 13:51:00 Mookie Lisa

[facility]

 

                POCT GLUCOSE (AUTOMATED) 2020 11:39:00 Mookie Lisa  [facility]

 

                POCT GLUCOSE (AUTOMATED) 2020 10:30:00 Mookie iLsa

[facility]

 

                CORTISOL AM     2020 09:45:00 [facility]

 

                BASIC METABOLIC PANEL 2020 09:45:00 Jaguar [facility]



                (NA, K, CL, CO2, GLUCOSE,                                 Medica

l Branch



                BUN, CREATININE, CA)                                 

 

                PROCALCITONIN   2020 09:45:00 Jaguar [facility]

 

                POCT GLUCOSE (AUTOMATED) 2020 09:20:00 [facility]

 

                POCT GLUCOSE (AUTOMATED) 2020 08:28:00 [facility]

 

                POCT GLUCOSE (AUTOMATED) 2020 07:30:00 Jaguar [facility]

 

                POCT GLUCOSE (AUTOMATED) 2020 06:24:00 [facility]

 

                OSMOLALITY SERUM 2020 05:50:00 Nathaniel [facility]

 

                BETA HYDROXY-BUTYRATE 2020 05:50:00 Nathaniel Rylie [facility]

 

                BASIC METABOLIC PANEL 2020 05:50:00 Jaguar [facility]



                (NA, K, CL, CO2, GLUCOSE,                                 Medica

l Branch



                BUN, CREATININE, CA)                                 

 

                ADC,CLC OR LCC ONLY - 2020 05:50:00 Jaguar [facility]



                INFLUENZA A & B DIRECT                                 Medical B

ranch



                ANTIGEN                                         

 

                AMMONIA, PLASMA 2020 05:49:00 Jaguar [facility]

 

                POCT GLUCOSE (AUTOMATED) 2020 05:29:00 Mookie Lisa  [facility]

 

                POCT GLUCOSE (AUTOMATED) 2020 04:33:00 [facility]

 

                POCT GLUCOSE (AUTOMATED) 2020 03:53:00 Rylie Armstrong [facility]

 

                POCT GLUCOSE (AUTOMATED) 2020 03:02:00 Rylie Armstrong [facility]

 

                XR CHEST 1 VW   2020 02:44:56 Nathaniel [facility]

 

                PHOSPHORUS      2020 02:06:00 Nathaniel [facility]

 

                MAGNESIUM       2020 02:06:00 [facility]

 

                THYROID STIMULATING 2020 02:06:00 [facility]



                HORMONE                                         [facility]

 

                COMP. METABOLIC PANEL 2020 02:06:00 [facility]



                ([id number])                                         [facility]

 

                CBC WITH DIFFERENTIAL 2020 02:06:00 [facility]

 

                GLYCOSYLATED HEMOGLOBIN 2020 02:06:00 [facility]



                (A1C)                                           [facility]

 

                ACUTE CARE VENOUS BLOOD 2020 02:05:00 [facility]



                GAS                                             [facility]

 

                URINALYSIS      2020 01:58:00 [facility]

 

                Computed tomography of 2018 00:00:00 [facility]EN [facility]



                chest without contrast                                 Patients 

Medical



                                                                Center







Plan of Care







             Planned Activity Planned Date Details      Comments     Source

 

             Future Scheduled 2020   INFLUENZA VACCINE (#1)              C

[facility]



             Test         00:00:00     [code = INFLUENZA              Medical Ce

nter



                                       VACCINE (#1)]              

 

             Future Scheduled 2020   DEPRESSION SCREENING              [facility]



             Test         00:00:00     (12+) [code =              Medical Center



                                       DEPRESSION SCREENING              



                                       (12+)]                    

 

             Future Scheduled 2019   HEPATITIS B VACCINE (2              C

[facility]



             Test         00:00:00     of 2 - CpG risk 2-dose              Medic

al Center



                                       series) [code =              



                                       HEPATITIS B VACCINE (2              



                                       of 2 - CpG risk 2-dose              



                                       series)]                  

 

             Future Scheduled 2008   MEDICARE ANNUAL              [facility]

[facility] -



             Test         00:00:00     WELLNESS (YEAR 2 or              Medical 

Center



                                       FIRST YEAR if no IPPE)              



                                       [code = MEDICARE              



                                       ANNUAL WELLNESS (YEAR              



                                       2 or FIRST YEAR if no              



                                       IPPE)]                    

 

             Future Scheduled 2006-10-22   Lipid panel               [facility]

[facility] -



             Test         00:00:00     (procedure) [code =              Medical 

Center



                                       32231027]                 

 

             Future Scheduled 1982-10-22   Screening for              [facility]

[facility] -



             Test         00:00:00     [facility]align[facility]              [facility]



                                       cervix (procedure)              



                                       [code = 089350952]              

 

             Future Scheduled 1967-10-22   PNEUMOCOCCAL VACCINE              [facility]



             Test         00:00:00     0-64 YRS (1 of 1 -              Medical C

enter



                                       PPSV23) [code =              



                                       PNEUMOCOCCAL VACCINE              



                                       0-64 YRS (1 of 1 -              



                                       PPSV23)]                  

 

             Future Scheduled 1961   Screening for              [facility]

es -



             Test         00:00:00     malignant neoplasm of              [facility]

[facility]



                                       breast (procedure)              



                                       [code = 416565406]              

 

             Future Scheduled 1961   Screening for              [facility]

es -



             Test         00:00:00     malignant neoplasm of              [facility]



                                       colon (procedure)              



                                       [code = 894377196]              

 

             Future Scheduled              DIABETES: RETINAL EYE              [facility]



             Test                      EXAM [code = DIABETES:              



                                       RETINAL EYE EXAM]              

 

             Future Scheduled              DIABETIC FOOT EXAM              [facility]



             Test                      [code = DIABETIC FOOT              



                                       EXAM]                     

 

             Future Scheduled              COVID-19 VACCINE (1)              [facility]

[facility]



             Test                      [code = COVID-19              



                                       VACCINE (1)]              

 

             Future Scheduled              Screening for              [facility]



             Test                      malignant neoplasm of              



                                       cervix (procedure)              



                                       [code = 895140994]              

 

             Future Scheduled              BREAST CANCER              [facility]



             Test                      SCREENING [code =              



                                       BREAST CANCER              



                                       SCREENING]                

 

             Future Scheduled              COLONOSCOPY SCREENING              [facility]

[facility]



             Test                      [code = COLONOSCOPY              



                                       SCREENING]                

 

             Future Scheduled              SHINGLES VACCINES (#1)              [facility]



             Test                      [code = SHINGLES              



                                       VACCINES (#1)]              

 

             Future Scheduled              INFLUENZA VACCINE              [facility]

[facility] Hospital



             Test                      [code = INFLUENZA              



                                       VACCINE]                  







Encounters







        Start   End     Encounter Admission Attending Care    Care    Encounter 

Source



        Date/Time Date/Time Type    Type    Clinicians Facility Department ID   

   

 

        2021         Emergency                 [facility]    [id number] 

Univers



        00:19:12                                                         ity [facility]

 

        2021-10-31         Emergency                 [facility]    [id number] 

Univers



        01:31:24                                                         ity [facility]

 

        2021-10-29         Emergency                 [facility]    [id number] 

Univers



        09:09:35                                                         ity [facility]

 

        2021-10-29         Emergency                 [facility]    [id number] 

Univers



        07:32:28                                                         ity [facility]

 

        2021-10-27 2021-10-27 Outpatient CARLTON LANDIS     [id number]

[id number] MD



        15:16:02 15:16:02                 TARSHA mercado

 

        2021-10-22 2021-10-24 Inpatient UR      CHANTEL, CYDNEY VINCENT     [facility] [id number]

[id number] MD



        11:48:00 11:13:00                                                 Guillermo mercado

 

        2021-10-23 2021-10-23 Inpatient EL              CARLTON VINCENT     [id number]

57 MD



        16:30:38 16:44:50                                                 Guillermo mercado

 

        2021-10-23 2021-10-23 Inpatient EL              MDA     MDA     [id number]

45 MD



        13:15:32 13:44:54                                                 Guillermo mercado

 

        2021-10-22 2021-10-22 Outpatient ANGELICA ARREDONDO  MDA     MDA     [id number]

153 MD



        10:00:00 11:47:00                 TARSHA mercado

 

        2021 Transition         Pilar Grayson  [id number]

[id number] Univers



        00:00:00 00:00:00 of Care         Mayra Guzmány   [id number]         it

y of



                                                [place]   [id number]         [place]

[place]



                                                        [phone]         Medi

carlos



                                                        [address]             Branch

 

        2021 [facility]         Luis Alberto Alexandria DEBORAH [facility]    [id number]

[id number] 

Univers



        14:38:00 19:07:00 Encounter         Julio Valdez [id number]  

       ity of



                                        Abisai Avery [id number]      

   [facility]  [phone]         Medi

carlos



                                                        081             Branch

 

        2021 Outpatient ANGELICA LEAVITT, MDA     MDA     [id number]

268 MD



        09:00:00 23:59:00                 JASEN mercado

 

        2021 Outpatient ANGELICA LEAVITT, MDA     MDA     [id number]

269 MD



        07:26:48 08:59:00                 JASEN mercado

 

        2021 Outpatient ER      [facility]     Emergency [id number]

[id number] MD



        08:34:00 14:46:00                 LEILANI mercado

 

        2021 Outpatient ANGELICA DIXON   MDA     MDA     [id number]

692 MD



        15:22:07 15:22:07                 DMITRI mercado

 

        2021 Outpatient ANGELICA ARREDONDO,  MDA     MDA     [id number]

226 MD



        14:09:22 14:31:49                 TARSHA mercado

 

        2021 Inpatient ER      [facility]     Hosp Med [id number]

[id number] MD



        08:31:00 17:06:00                 SAMUEL MERCADO

 

        2021 Inpatient ANGELICA ANDERSON MDA     MDA     [id number]

[id number] MD



        15:37:00 17:05:23                 SALMA SAMUEL Urbinaliliya moody



                                                                        n

 

        2021 Inpatient ANGELICA VERDUGO, MDA     MDA     [id number]

[id number] MD



        14:19:17 14:22:52                 ORLIN mercado

 

        2021 Outpatient ANGELICA ARREDONDO,  MDA     MDA     [id number]

316 MD



        12:50:42 23:59:00                 TARSHA mercado

 

        2021 Outpatient ANGELICA LEAVITT, MDA     MDA     [id number]

235 MD



        13:43:09 13:43:09                 JASEN mercado

 

        2021 Outpatient ANGELICA LEAVITT, MDA     MDA     [id number]

285 MD



        07:02:33 12:49:00                 JASEN mercado

 

        2021 Outpatient ANGELICA KHAN III, MDA     MDA     [id number]

[id number] MD



        08:30:00 23:59:00                 JAGUAR mercado

 

        2021 Outpatient ANGELICA KHAN III, MDA     MDA     [id number]

[id number] MD



        11:13:43 12:18:50                 JAGUAR mercado

 

        2021 Outpatient ER      BAO,  MDA     Emergency [id number]

[id number] MD



        06:37:00 12:15:00                 LEILANI mercado

 

        2021 Emergency         [facility]    [id number]

[id number] [facility]



        18:32:00 20:31:00                 Audelia Rich 350.1.13.10         [facility] [id number]         [facility]  [phone]         Medi

carlos



                                                        084             Branch

 

        2021 Outpatient ANGELICA ARREDONDO,  MDA     MDA     [id number]

214 MD



        13:49:49 15:58:39                 TARSHA mercado

 

        2021 Outpatient ANGELICA LEAVITT, MDA     MDA     [id number]

264 MD



        11:38:38 23:59:00                 JASEN mercado

 

        2021 Outpatient ANGELICA LEAVITT MDA     MDA     [id number]

257 MD



        12:44:44 12:44:44                 JASEN                         Guillermorachel mercado

 

        2020-12-10 2020-12-10 Outpatient ANGELICA ARREDONDO  MDA     MDA     [id number]

361 MD



        13:55:28 15:15:41                 TARSHA                           Guillermorachel mercado

 

        2020 Outpatient ANGELICA ARREDONDO  MDA     MDA     [id number]

015 MD



        00:00:00 00:00:00                 TARSHA mercado

 

        2020 Outpatient ANGELICA ARREDONDO  MDA     MDA     [id number]

348 MD



        10:51:07 10:51:07                 TARSHA mercado

 

        2020 Outpatient CARLTON JOHNSTON     MDA     [id number]

397 MD



        06:45:00 23:59:00                 JASEN mercado

 

        2020 Outpatient CARLTON JOHNSTON     MDA     [id number]

367 MD



        09:59:46 09:59:46                 JASEN mercado

 

        2020 Emergency         [facility]    [id number]

[id number] 



        22:06:56 03:30:00                 Dominga Rich 350.1.13.10         



                                                [place] [address] [place] [address]  [phone]         



                                                                     

 

        2020 Emergency         [facility]    [id number]

1755 [facility]



        22:06:56 03:30:00                 Dominga Rich 350.1.13.10         

ity [facility] [id number]         [facility]  [phone]         Medi

carlos



                                                        084             Branch

 

        2020 Telephone         MICHELL Barone    [id number]

[id number] 



        00:00:00 00:00:00                 Lauren ELMORE   350.1.13.10       

  



                                                [facility] [id number]         



                                                        [phone]         



                                                        019             

 

        2020 Telephone         [facility]    [id number]

1361 



        00:00:00 00:00:00                 [facility]  350.1.13.10         



                                                [place] [id number]         



                                                [employer] [phone]         



                                                nal     044             



                                                Office                  



                                                Building                 



                                                One                     

 

        2020 Telephone         MICHELL Barone    [id number]

[id number] Univers



        00:00:00 00:00:00                 Lauren ELMORE   350.1.13.10       

  ity of



                                                [facility] [id number]         [place]

as



                                                        [phone]         [address]

 

        2020 Telephone         [facility]    [id number]

1361 Univers



        00:00:00 00:00:00                 [facility]  350.1.13.10         i

ty of



                                                [place] [id number]         [place]

as



                                                [employer] [phone]         Me

[address]



                                                Office                  



                                                Building                 



                                                One                     

 

        2020 Outpatient R       ARIANNA  [facility]    [id number]

691 Univers



        16:20:00 16:20:00                 CAROLEE                         ity [facility]

 

        2020 Laboratory         Lab, [facility]    1.2.840.114 [id number]

[id number] 



        12:32:06 12:52:06 Only            Fam [facility]  350.1.13.10         



                                                [place] 4.2.7.2.686         



                                                Professio [phone]         



                                                [place] [address]             



                                                Office                  



                                                Building                 



                                                One                     

 

        2020 Laboratory         Lab, [facility] Fam Pob I [facility]    1.2.

840.114 [id number] 

Univers



        12:32:06 12:52:06 Only            [facility]  350.1.13.10    

     ity of



                                                [place] 4.2.7.2.686         [place]

as



                                                Profess[place] [phone]         Me

dical



                                                [address]



                                                Office                  



                                                Building                 



                                                One                     

 

        2020 Outpatient R               [facility]    [id number]

-20 Univers



        12:00:00 12:00:00                                         [id number]  ity [facility]

 

        2020 Outpatient R               [facility]    [id number]

300 Univers



        12:00:00 12:00:00                                                 ity [facility]

 

        2020 Emergency         [facility]    1.2.860.284 8611

6606 



        14:49:44 18:01:00                 Karli CHAMBERLAIN Hilario 350.1.13.10         



                                                [place] 4.2.7.2.686         



                                                [place]  [phone]         



                                                        084             

 

        2020 Emergency         Aj [facility]    1.2.049.427 0646

6606 [facility]



        14:49:44 18:01:00                 Karli CHAMBERLAIN Hilario 350.1.13.10         

ity of



                                                [place] 4.2.7.2.686         [facility]  [phone]         Medi

carlos



                                                        084             [place]

 

        2020 Orders          Doctor GALARZA    1.2.840.114 [id number]

03 



        00:00:00 00:00:00 Only            Unassigned, TATYANA   350.1.13.10       

  



                                        [facility] 4.2.7.2.68         



                                                        [phone]         



                                                        009             

 

        2020 Orders          Doctor GALARZA    1.2.840.114 [id number]

03 Univers



        00:00:00 00:00:00 Only            Unassigned, TATYANA   350.1.13.10       

  ity of



                                        [facility] 4.2.7.2.686         [place]

as



                                                        [phone]         Medi

carlos



                                                        009             [place]

 

        2020 Outpatient ANGELICA      MARIA ELENA,  MDA     MDA     [id number]

841 MD



        00:00:00 00:00:00                 TARSHA mercado

 

        2020 Outpatient ANGELICA SARABIA, MDA     MDA     [id number]

18996 MD



        00:00:00 00:00:00                 NORRIS mercado

 

        2020 Outpatient ANGELICA LEAVITT, MDA     MDA     [id number]

635 MD



        00:00:00 00:00:00                 JASEN mercado

 

        2020 Emergency                 MDA     MDA     [id number]

93 MD



        08:14:08 08:14:08                                                 Guillermo mercado

 

        2020 Emergency         Mahamed Bain [facility]    1.2.840.114 

[id number] 



        19:10:30 01:03:00                 T       Hilario 350.1.13.10         



                                                [place] 4.2.7.2.686         



                                                [place]  [phone]         



                                                        4             

 

        2020 Emergency X       MAHAMED BAIN [facility]    ERT     1027

[id number] Univers



        19:10:30 01:03:00                                                 itamy [facility]

 

        2020 Emergency         Mahamed Bain [facility]    1.2.840.114 

[id number] Univers



        19:10:30 01:03:00                 LISHA Rich 350.1.13.10         i

ty of



                                                [place] 4.2.7.2.686         [facility]  [phone]         [address]

 

        2020 Emergency ER      WECHSLER, [facility]     Emergency 1064

[id number] MD



        07:39:47 10:44:00                 ADRIAN                         Guillermorachel mercado

 

        2020 Emergency         [facility]    1.2.007.134 4423

0034 



        12:20:39 14:51:00                 Pk Rich 350.1.13.10         



                                                [place] 4.2.7.2.686         



                                                [place]  [phone]         



                                                        [facility]             

 

        2020 [facility]       [facility]     [id number]

08 Univers



        12:20:39 14:51:00                 PK                         judahamy [facility]

 

        2020 Emergency         [facility]    1.2.926.639 1127

0034 Univers



        12:20:39 14:51:00                 Pk Hilario 350.1.13.10         i

ty of



                                                [place] 4.2.7.2.686         [place]

[place]



                                                [place]  [phone]         [address]

 

        2020 Transition         Pilar Grayson  1.2.840.114 755

[address]264 



        00:00:00 00:00:00 of Care         Mayra   [place]utchins   350.1.13.10         



                                                [place]   4.2.7.2.686         



                                                        [phone]         



                                                        403             

 

        2020 Transition         Pilar Grayson  1.2.840.114 755

86188 Univers



        00:00:00 00:00:00 of Care         Mayra   [place]   350.1.13.10         it

y of



                                                [place]   4.2.7.2.686         [place]

[place]



                                                        [phone]         Medi

carlos



                                                        403             [place]

 

        2020 [facility]       [facility]     1026

[id number] Univers



        17:51:10 13:56:00                                                 ity of



                                                                        [facility]

 

        2020 Hospital         Tata Mcneil  1.2.840.1

14 [id number] 

Univers



        17:51:10 13:56:00 Encounter         CristinoAlberto hirsch Tatyana   350.1.13.10 

        ity of



                                        [facility] 4.2.7.2.686    

     [place]



                                        Julio Shah         [phone]      

   Medical



                                        [facility] Cat[facility]ina         093           

  [place]

 

        2020 [facility]    1.2.840.114 [id number]

[id number] Univers



        13:40:00 23:59:00 Encounter         SIRI       [place] 350.1.13.10        

 ity of



                                                [place] 4.2.7.2.686         [facility]  [phone]         [address]

 

        2020 Outpatient         [facility]    572

196P-[id number] Univers



        14:30:00 14:30:00                                         [id number]  ity of



                                                                        [facility]

 

        2020 Technician         Anai, [facility]    1.2.8

40.114 [id number] 

Univers



        13:48:12 14:03:12 Visit           [facility] [place] 350.1.13.10    

     ity of



                                                [place] 4.2.7.2.686         [place]

[place]



                                                [place] [phone]         Me

dical



                                                nal     353             [facility]                 

 

        2020 Outpatient R       [facility]    102

[id number] Univers



        13:39:15 13:39:00                                                 ity of



                                                                        [facility]

 

        2020 [facility]    1.2.840.114 [id number]

[id number] Univers



        13:39:00 13:39:00 Encounter         SIRI Rich 350.1.13.10        

 ity of



                                                [place] 4.2.7.2.686         [facility]  [phone]         Medi

carlos



                                                        806             [place]

 

        2020 Outpatient                 [facility] [address]

[address] [facility]



        13:42:00 13:42:00                         t Bone  B[facility]         [facility]

 -



                                                [facility]           [facility]

a



                                                [facility]         ent



                                                                        [facility]

 

        2020 Transition         Pilar Lynch  1.2.840.114 [id number]

[id number] Univers



        00:00:00 00:00:00 of Pinky Hutchins   350.1.13.10         it

y of



                                                [place]   4.2.7.2.686         [place]

[place]



                                                        [phone]         Medi

carlos



                                                        403             [place]

 

        2020 Outpatient                 [facility] [address]

[address] [facility]



        08:30:00 08:30:00                         t Bone  Bone and         [facility]

 -



                                                and Joint Joint           Memori

a



                                                [facility]         ent



                                                                        [facility]

 

        2020 Inpatient X       JAGUAR [facility]     [id number]

10 Univers



        19:42:06 16:46:00                 SHEBEY                          antonietta [facility]

 

        2020 [facility]         Sarahy Armstrongann [facility]    1.2.840.1

14 [id number] 

Univers



        19:42:06 16:46:00 Encounter         Mookie Lisa 350.1.13.10 

        ity 



                                                [place] 4.2.7.2.686         [facility]  [phone]         Medi

carlos



                                                        080             [place]

 

        2018 Outpatient EL WECHSLER, MDA     [facility]     [id number]

[id number] MD



        00:00:00 00:00:00                 ADRIAN mercado

 

        2018 Discharged ER      ROSE KITCHEN [facility]  A00

[id number] [facility]



        01:06:00 13:30:00 Inpatient                                 07      Luke

s -



                        (obs)                                           Patient



                                                                        s



                                                                        [facility]

 

        [id number]

[address] [facility]



        19:37:00 01:16:00 Emergency                                 41      Luke

s -



                        Room                                            Patient



                                                                        s



                                                                        [facility]

 

        [id number]

[address] [facility]



        17:55:00 02:48:00 Emergency                                 74      Luke

s -



                        Room                                            Patient



                                                                        s



                                                                        [facility]

 

        2017-10-06 2017-10-06 Registered ER      MARTIR [facility]  A00

[id number] [facility]



        01:09:00 01:09:00 Emergency         SMITHA                 56      [facility]                                            Patient



                                                                        [facility]

 

        2017 Departed                 [facility]  [id number]

[address] [facility]



        12:49:00 19:28:00 Emergency                                 03      [facility]







Results







           Test Description Test Time  Test Comments Results    Result Comments 

Source









                    POCT GLUCOSE (AUTOMATED) 2021 12:49:00 









                      Test Item  Value      Reference Range Interpretation Comme

nts









             POCT GLU (test code = 6653171688) 191 mg/dL           H      

      

 

             Lab Interpretation (test code = 58221-9) Abnormal                  

             



[facility] METABOLIC PANEL (NA, K, CL, CO2, 
GLUCOSE, BUN, CREATININE, CA)2021 10:39:29





             Test Item    Value        Reference Range Interpretation Comments

 

             NA (test code = 135 mmol/L   135-145                   



             7477676406)                                         

 

             K (test code = 3.7 mmol/L   3.5-5.0                   



             1104982831)                                         

 

             CL (test code = 95 mmol/L           L            



             5926451812)                                         

 

             CO2 TOTAL (test code = 34 mmol/L    23-31        H            



             4345843221)                                         

 

             AGAP (test code =              2-16                      



             6740587299)                                         

 

             BUN (test code = 35 mg/dL     7-23         H            



             2939205451)                                         

 

             GLUCOSE (test code = 260 mg/dL           H            



             3578592400)                                         

 

             CREATININE (test code = 1.22 mg/dL   0.50-1.04    H            



             1830567168)                                         

 

             CALCIUM (test code = 8.4 mg/dL    8.6-10.6     L            



             6023723209)                                         

 

             eGFR (test code =              mL/min/1.73m2              



             3545658102)                                         

 

             RICK (test code = RICK) Association of                           



                          Glomerular Filtration                           



                          Rate (GFR) and Staging                           



                          of Kidney Disease*                           



                          +---------------------                           



                          --+-------------------                           



                          --+-------------------                           



                          ------+| GFR                           



                          (mL/min/1.73 m2) ?|                           



                          With Kidney Damage ?|                           



                          ?Without Kidney                           



                          Damage+---------------                           



                          --------+-------------                           



                          --------+-------------                           



                          ------------+| ?>90 ?                           



                          ? ? ? ? ? ? ? ?|                           



                          ?Stage one ? ? ? ? ?|                           



                          ? Normal ? ? ? ? ? ? ?                           



                          ?+--------------------                           



                          ---+------------------                           



                          ---+------------------                           



                          -------+| ?60-89 ? ? ?                           



                          ? ? ? ? ?| ?Stage two                           



                          ? ? ? ? ?| ? Decreased                           



                          GFR ? ? ? ?                            



                          +---------------------                           



                          --+-------------------                           



                          --+-------------------                           



                          ------+| ?30-59 ? ? ?                           



                          ? ? ? ? ?| ?Stage                           



                          three ? ? ? ?| ? Stage                           



                          three ? ? ? ? ?                           



                          +---------------------                           



                          --+-------------------                           



                          --+-------------------                           



                          ------+| ?15-29 ? ? ?                           



                          ? ? ? ? ?| ?Stage four                           



                          ? ? ? ? | ? Stage four                           



                          ? ? ? ? ?                              



                          ?+--------------------                           



                          ---+------------------                           



                          ---+------------------                           



                          -------+| ?<15 (or                           



                          dialysis) ? ?| ?Stage                           



                          five ? ? ? ? | ? Stage                           



                          five ? ? ? ? ?                           



                          ?+--------------------                           



                          ---+------------------                           



                          ---+------------------                           



                          -------+ *Each stage                           



                          assumes the associated                           



                          GFR level has been in                           



                          effect for at least                           



                          three months. ?Stages                           



                          1 to 5, with or                           



                          without kidney                           



                          disease, indicate                           



                          chronic kidney                           



                          disease. Notes:                           



                          Determination of                           



                          stages one and two                           



                          (with eGFR                             



                          >59mL/min/1.73 m2)                           



                          requires estimation of                           



                          kidney damage for at                           



                          least three months as                           



                          defined by structural                           



                          or functional                           



                          abnormalities of the                           



                          kidney, manifested by                           



                          either:Pathological                           



                          abnormalities or                           



                          Markers of kidney                           



                          damage (including                           



                          abnormalities in the                           



                          composition of the                           



                          blood or urine or                           



                          abnormalities in                           



                          imaging tests).                           

 

             Lab Interpretation Abnormal                               



             (test code = 49086-0)                                        



[facility] WITH [id number] 10:35:25





             Test Item    Value        Reference Range Interpretation Comments

 

             WBC (test code =              See_Comment  L             [Automated



             6690-2)                                             message] The sy

stem



                                                                 which generated



                                                                 this result



                                                                 transmitted



                                                                 reference range

:



                                                                 4.30 - 11.10



                                                                 10*3/?L. The



                                                                 reference range

 was



                                                                 not used to



                                                                 interpret this



                                                                 result as



                                                                 normal/abnormal

.

 

             RBC (test code =              See_Comment  L             [Automated



             789-8)                                              message] The sy

stem



                                                                 which generated



                                                                 this result



                                                                 transmitted



                                                                 reference range

:



                                                                 3.93 - 5.25



                                                                 10*6/?L. The



                                                                 reference range

 was



                                                                 not used to



                                                                 interpret this



                                                                 result as



                                                                 normal/abnormal

.

 

             HGB (test code = 10.6 g/dL    11.6-15.0    L            



             718-7)                                              

 

             HCT (test code = 33.1 %       35.7-45.2    L            



             4544-3)                                             

 

             MCV (test code = 94.8 fL      80.6-95.5                 



             787-2)                                              

 

             MCH (test code = 30.4 pg      25.9-32.8                 



             785-6)                                              

 

             MCHC (test code = 32.0 g/dL    31.6-35.1                 



             786-4)                                              

 

             RDW-SD (test code = 55.9 fL      39.0-49.9    H            



             60379-1)                                            

 

             RDW-CV (test code = 16.0 %       12.0-15.5    H            



             788-0)                                              

 

             PLT (test code =              See_Comment  LL            [Automated



             777-3)                                              message] The sy

stem



                                                                 which generated



                                                                 this result



                                                                 transmitted



                                                                 reference range

:



                                                                 166 - 358 10*3/

?L.



                                                                 The reference r

olman



                                                                 was not used to



                                                                 interpret this



                                                                 result as



                                                                 normal/abnormal

.

 

             MPV (test code = 10.3 fL      9.5-12.9                  



             11227-5)                                            

 

             NRBC/100 WBC (test              See_Comment                [Automat

ed



             code = 9223649171)                                        message] 

The system



                                                                 which generated



                                                                 this result



                                                                 transmitted



                                                                 reference range

:



                                                                 0.0 - 10.0 /100



                                                                 WBCs. The refer

ence



                                                                 range was not u

sed



                                                                 to interpret th

is



                                                                 result as



                                                                 normal/abnormal

.

 

             NRBC x10^3 (test code <0.01        See_Comment                [Auto

mated



             = 6591950255)                                        message] The s

ystem



                                                                 which generated



                                                                 this result



                                                                 transmitted



                                                                 reference range

:



                                                                 10*3/?L. The



                                                                 reference range

 was



                                                                 not used to



                                                                 interpret this



                                                                 result as



                                                                 normal/abnormal

.

 

             GRAN MAT (NEUT) % 54.8 %                                 



             (test code = 770-8)                                        

 

             IMM GRAN % (test code 0.30 %                                 



             = 3877546553)                                        

 

             LYMPH % (test code = 38.1 %                                 



             736-9)                                              

 

             MONO % (test code = 5.1 %                                  



             5905-5)                                             

 

             EOS % (test code = 1.1 %                                  



             713-8)                                              

 

             BASO % (test code = 0.6 %                                  



             706-2)                                              

 

             GRAN MAT x10^3(ANC) 1.94 10*3/uL 1.88-7.09                 



             (test code =                                        



             3948098805)                                         

 

             IMM GRAN x10^3 (test <0.03        0.00-0.06                 



             code = 5401279116)                                        

 

             LYMPH x10^3 (test code 1.35 10*3/uL 1.32-3.29                 



             = 731-0)                                            

 

             MONO x10^3 (test code 0.18 10*3/uL 0.33-0.92    L            



             = 742-7)                                            

 

             EOS x10^3 (test code = 0.04 10*3/uL 0.03-0.39                 



             711-2)                                              

 

             BASO x10^3 (test code <0.03        0.01-0.07                 



             = 704-7)                                            

 

             Lab Interpretation Abnormal                               



             (test code = 91960-9)                                        



[facility] GLUCOSE (AUTOMATED)2021 21:40:17





             Test Item    Value        Reference Range Interpretation Comments

 

             POCT GLU (test code = 8480802838) 323 mg/dL           H      

      

 

             Lab Interpretation (test code = Abnormal                           

    



             53580-3)                                            



[facility] GLUCOSE (AUTOMATED)2021 17:09:48





             Test Item    Value        Reference Range Interpretation Comments

 

             POCT GLU (test code = 5255049562) 406 mg/dL           H      

      

 

             Lab Interpretation (test code = Abnormal                           

    



             83565-7)                                            



[facility] GLUCOSE (AUTOMATED)2021 12:42:19





             Test Item    Value        Reference Range Interpretation Comments

 

             POCT GLU (test code = 1304739205) 148 mg/dL           H      

      

 

             Lab Interpretation (test code = Abnormal                           

    



             43732-9)                                            



[facility] WITH [id number] 11:06:27





             Test Item    Value        Reference Range Interpretation Comments

 

             WBC (test code =              See_Comment  L             [Automated



             6690-2)                                             message] The sy

stem



                                                                 which generated



                                                                 this result



                                                                 transmitted



                                                                 reference range

:



                                                                 4.30 - 11.10



                                                                 10*3/?L. The



                                                                 reference range

 was



                                                                 not used to



                                                                 interpret this



                                                                 result as



                                                                 normal/abnormal

.

 

             RBC (test code =              See_Comment  L             [Automated



             789-8)                                              message] The sy

stem



                                                                 which generated



                                                                 this result



                                                                 transmitted



                                                                 reference range

:



                                                                 3.93 - 5.25



                                                                 10*6/?L. The



                                                                 reference range

 was



                                                                 not used to



                                                                 interpret this



                                                                 result as



                                                                 normal/abnormal

.

 

             HGB (test code = 10.1 g/dL    11.6-15.0    L            



             718-7)                                              

 

             HCT (test code = 31.6 %       35.7-45.2    L            



             4544-3)                                             

 

             MCV (test code = 94.0 fL      80.6-95.5                 



             787-2)                                              

 

             MCH (test code = 30.1 pg      25.9-32.8                 



             785-6)                                              

 

             MCHC (test code = 32.0 g/dL    31.6-35.1                 



             786-4)                                              

 

             RDW-SD (test code = 56.1 fL      39.0-49.9    H            



             00674-1)                                            

 

             RDW-CV (test code = 16.1 %       12.0-15.5    H            



             788-0)                                              

 

             PLT (test code =              See_Comment  LL            [Automated



             777-3)                                              message] The sy

stem



                                                                 which generated



                                                                 this result



                                                                 transmitted



                                                                 reference range

:



                                                                 166 - 358 10*3/

?L.



                                                                 The reference r

olman



                                                                 was not used to



                                                                 interpret this



                                                                 result as



                                                                 normal/abnormal

.

 

             MPV (test code = 10.4 fL      9.5-12.9                  



             35976-3)                                            

 

             NRBC/100 WBC (test              See_Comment                [Automat

ed



             code = 3483743823)                                        message] 

The system



                                                                 which generated



                                                                 this result



                                                                 transmitted



                                                                 reference range

:



                                                                 0.0 - 10.0 /100



                                                                 WBCs. The refer

ence



                                                                 range was not u

sed



                                                                 to interpret th

is



                                                                 result as



                                                                 normal/abnormal

.

 

             NRBC x10^3 (test code <0.01        See_Comment                [Auto

mated



             = 9431541539)                                        message] The s

ystem



                                                                 which generated



                                                                 this result



                                                                 transmitted



                                                                 reference range

:



                                                                 10*3/?L. The



                                                                 reference range

 was



                                                                 not used to



                                                                 interpret this



                                                                 result as



                                                                 normal/abnormal

.

 

             GRAN MAT (NEUT) % 54.4 %                                 



             (test code = 770-8)                                        

 

             IMM GRAN % (test code 0.00 %                                 



             = 0450615255)                                        

 

             LYMPH % (test code = 37.3 %                                 



             736-9)                                              

 

             MONO % (test code = 6.8 %                                  



             5905-5)                                             

 

             EOS % (test code = 1.2 %                                  



             713-8)                                              

 

             BASO % (test code = 0.3 %                                  



             706-2)                                              

 

             GRAN MAT x10^3(ANC) 1.75 10*3/uL 1.88-7.09    L            



             (test code =                                        



             1079659886)                                         

 

             IMM GRAN x10^3 (test <0.03        0.00-0.06                 



             code = 5461737706)                                        

 

             LYMPH x10^3 (test code 1.20 10*3/uL 1.32-3.29    L            



             = 731-0)                                            

 

             MONO x10^3 (test code 0.22 10*3/uL 0.33-0.92    L            



             = 742-7)                                            

 

             EOS x10^3 (test code = 0.04 10*3/uL 0.03-0.39                 



             711-2)                                              

 

             BASO x10^3 (test code <0.03        0.01-0.07                 



             = 704-7)                                            

 

             Lab Interpretation Abnormal                               



             (test code = 08209-9)                                        



[facility] METABOLIC PANEL (NA, K, CL, CO2, 
GLUCOSE, BUN, CREATININE, CA)2021 10:21:40





             Test Item    Value        Reference Range Interpretation Comments

 

             NA (test code = 136 mmol/L   135-145                   



             9046860625)                                         

 

             K (test code = 4.0 mmol/L   3.5-5.0                   



             9384282926)                                         

 

             CL (test code = 98 mmol/L                        



             8298764250)                                         

 

             CO2 TOTAL (test code = 36 mmol/L    23-31        H            



             1472149321)                                         

 

             AGAP (test code =              2-16                      



             3216912082)                                         

 

             BUN (test code = 34 mg/dL     7-23         H            



             1374546702)                                         

 

             GLUCOSE (test code = 193 mg/dL           H            



             2957016090)                                         

 

             CREATININE (test code = 1.21 mg/dL   0.50-1.04    H            



             8621025510)                                         

 

             CALCIUM (test code = 8.2 mg/dL    8.6-10.6     L            



             4615154153)                                         

 

             eGFR (test code =              mL/min/1.73m2              



             7548081102)                                         

 

             RICK (test code = RICK) Association of                           



                          Glomerular Filtration                           



                          Rate (GFR) and Staging                           



                          of Kidney Disease*                           



                          +---------------------                           



                          --+-------------------                           



                          --+-------------------                           



                          ------+| GFR                           



                          (mL/min/1.73 m2) ?|                           



                          With Kidney Damage ?|                           



                          ?Without Kidney                           



                          Damage+---------------                           



                          --------+-------------                           



                          --------+-------------                           



                          ------------+| ?>90 ?                           



                          ? ? ? ? ? ? ? ?|                           



                          ?Stage one ? ? ? ? ?|                           



                          ? Normal ? ? ? ? ? ? ?                           



                          ?+--------------------                           



                          ---+------------------                           



                          ---+------------------                           



                          -------+| ?60-89 ? ? ?                           



                          ? ? ? ? ?| ?Stage two                           



                          ? ? ? ? ?| ? Decreased                           



                          GFR ? ? ? ?                            



                          +---------------------                           



                          --+-------------------                           



                          --+-------------------                           



                          ------+| ?30-59 ? ? ?                           



                          ? ? ? ? ?| ?Stage                           



                          three ? ? ? ?| ? Stage                           



                          three ? ? ? ? ?                           



                          +---------------------                           



                          --+-------------------                           



                          --+-------------------                           



                          ------+| ?15-29 ? ? ?                           



                          ? ? ? ? ?| ?Stage four                           



                          ? ? ? ? | ? Stage four                           



                          ? ? ? ? ?                              



                          ?+--------------------                           



                          ---+------------------                           



                          ---+------------------                           



                          -------+| ?<15 (or                           



                          dialysis) ? ?| ?Stage                           



                          five ? ? ? ? | ? Stage                           



                          five ? ? ? ? ?                           



                          ?+--------------------                           



                          ---+------------------                           



                          ---+------------------                           



                          -------+ *Each stage                           



                          assumes the associated                           



                          GFR level has been in                           



                          effect for at least                           



                          three months. ?Stages                           



                          1 to 5, with or                           



                          without kidney                           



                          disease, indicate                           



                          chronic kidney                           



                          disease. Notes:                           



                          Determination of                           



                          stages one and two                           



                          (with eGFR                             



                          >59mL/min/1.73 m2)                           



                          requires estimation of                           



                          kidney damage for at                           



                          least three months as                           



                          defined by structural                           



                          or functional                           



                          abnormalities of the                           



                          kidney, manifested by                           



                          either:Pathological                           



                          abnormalities or                           



                          Markers of kidney                           



                          damage (including                           



                          abnormalities in the                           



                          composition of the                           



                          blood or urine or                           



                          abnormalities in                           



                          imaging tests).                           

 

             Lab Interpretation Abnormal                               



             (test code = 42932-0)                                        



[id number]





             Test Item    Value        Reference Range Interpretation Comments

 

             AMMONIA (test code = 4308641603) 25 umol/L    9-33                 

     

 

             Lab Interpretation (test code = Normal                             

    



             01557-7)                                            



[facility] WITH [id number] 13:08:43





             Test Item    Value        Reference Range Interpretation Comments

 

             WBC (test code =              See_Comment  L             [Automated



             6690-2)                                             message] The



                                                                 system which



                                                                 generated this



                                                                 result



                                                                 transmitted



                                                                 reference range

:



                                                                 4.30 - 11.10



                                                                 10*3/?L. The



                                                                 reference range



                                                                 was not used to



                                                                 interpret this



                                                                 result as



                                                                 normal/abnormal

.

 

             RBC (test code =              See_Comment  L             [Automated



             789-8)                                              message] The



                                                                 system which



                                                                 generated this



                                                                 result



                                                                 transmitted



                                                                 reference range

:



                                                                 3.93 - 5.25



                                                                 10*6/?L. The



                                                                 reference range



                                                                 was not used to



                                                                 interpret this



                                                                 result as



                                                                 normal/abnormal

.

 

             HGB (test code = 9.8 g/dL     11.6-15.0    L            



             718-7)                                              

 

             HCT (test code = 30.1 %       35.7-45.2    L            



             4544-3)                                             

 

             MCV (test code = 93.8 fL      80.6-95.5                 



             787-2)                                              

 

             MCH (test code = 30.5 pg      25.9-32.8                 



             785-6)                                              

 

             MCHC (test code = 32.6 g/dL    31.6-35.1                 



             786-4)                                              

 

             RDW-SD (test code = 55.4 fL      39.0-49.9    H            



             76760-3)                                            

 

             RDW-CV (test code = 16.2 %       12.0-15.5    H            



             788-0)                                              

 

             PLT (test code =              See_Comment  LL            [Automated



             777-3)                                              message] The



                                                                 system which



                                                                 generated this



                                                                 result



                                                                 transmitted



                                                                 reference range

:



                                                                 166 - 358



                                                                 10*3/?L. The



                                                                 reference range



                                                                 was not used to



                                                                 interpret this



                                                                 result as



                                                                 normal/abnormal

.

 

             MPV (test code = 11.8 fL      9.5-12.9                  



             91144-0)                                            

 

             NRBC/100 WBC (test              See_Comment                [Automat

ed



             code = 0348903064)                                        message] 

The



                                                                 system which



                                                                 generated this



                                                                 result



                                                                 transmitted



                                                                 reference range

:



                                                                 0.0 - 10.0 /100



                                                                 WBCs. The



                                                                 reference range



                                                                 was not used to



                                                                 interpret this



                                                                 result as



                                                                 normal/abnormal

.

 

             NRBC x10^3 (test code <0.01        See_Comment                [Auto

mated



             = 8187331714)                                        message] The



                                                                 system which



                                                                 generated this



                                                                 result



                                                                 transmitted



                                                                 reference range

:



                                                                 10*3/?L. The



                                                                 reference range



                                                                 was not used to



                                                                 interpret this



                                                                 result as



                                                                 normal/abnormal

.

 

             GRAN MAT (NEUT) % 50.4 %                                 



             (test code = 770-8)                                        

 

             IMM GRAN % (test code 0.30 %                                 



             = 6455347848)                                        

 

             LYMPH % (test code = 41.8 %                                 



             736-9)                                              

 

             MONO % (test code = 6.3 %                                  



             5905-5)                                             

 

             EOS % (test code = 0.9 %                                  



             713-8)                                              

 

             BASO % (test code = 0.3 %                                  



             706-2)                                              

 

             GRAN MAT x10^3(ANC) 1.69 10*3/uL 1.88-7.09    L            



             (test code =                                        



             0858550099)                                         

 

             IMM GRAN x10^3 (test <0.03        0.00-0.06                 



             code = 3330692261)                                        

 

             LYMPH x10^3 (test 1.40 10*3/uL 1.32-3.29                 



             code = 731-0)                                        

 

             MONO x10^3 (test code 0.21 10*3/uL 0.33-0.92    L            



             = 742-7)                                            

 

             EOS x10^3 (test code 0.03 10*3/uL 0.03-0.39                 



             = 711-2)                                            

 

             BASO x10^3 (test code <0.03        0.01-0.07                 



             = 704-7)                                            

 

             PLT ESTIMATE (test Critically   Normal       AA           



             code = 9317-9) Decreased                              

 

             Lab Interpretation Abnormal                               



             (test code = 34711-7)                                        



[facility] METABOLIC PANEL (NA, K, CL, CO2, 
GLUCOSE, BUN, CREATININE, CA)2021 11:05:41





             Test Item    Value        Reference Range Interpretation Comments

 

             NA (test code = 134 mmol/L   135-145      L            



             5041590487)                                         

 

             K (test code = 4.4 mmol/L   3.5-5.0                   



             4459554850)                                         

 

             CL (test code = 95 mmol/L           L            



             5345086541)                                         

 

             CO2 TOTAL (test code = 38 mmol/L    23-31        H            



             8315013234)                                         

 

             AGAP (test code =              2-16         L            



             5592558506)                                         

 

             BUN (test code = 27 mg/dL     7-23         H            



             4624589343)                                         

 

             GLUCOSE (test code = 72 mg/dL                         



             5327250508)                                         

 

             CREATININE (test code = 0.99 mg/dL   0.50-1.04                 



             3066666965)                                         

 

             CALCIUM (test code = 8.1 mg/dL    8.6-10.6     L            



             3590702271)                                         

 

             eGFR (test code =              mL/min/1.73m2              



             8725545716)                                         

 

             RICK (test code = RICK) Association of                           



                          Glomerular Filtration                           



                          Rate (GFR) and Staging                           



                          of Kidney Disease*                           



                          +---------------------                           



                          --+-------------------                           



                          --+-------------------                           



                          ------+| GFR                           



                          (mL/min/1.73 m2) ?|                           



                          With Kidney Damage ?|                           



                          ?Without Kidney                           



                          Damage+---------------                           



                          --------+-------------                           



                          --------+-------------                           



                          ------------+| ?>90 ?                           



                          ? ? ? ? ? ? ? ?|                           



                          ?Stage one ? ? ? ? ?|                           



                          ? Normal ? ? ? ? ? ? ?                           



                          ?+--------------------                           



                          ---+------------------                           



                          ---+------------------                           



                          -------+| ?60-89 ? ? ?                           



                          ? ? ? ? ?| ?Stage two                           



                          ? ? ? ? ?| ? Decreased                           



                          GFR ? ? ? ?                            



                          +---------------------                           



                          --+-------------------                           



                          --+-------------------                           



                          ------+| ?30-59 ? ? ?                           



                          ? ? ? ? ?| ?Stage                           



                          three ? ? ? ?| ? Stage                           



                          three ? ? ? ? ?                           



                          +---------------------                           



                          --+-------------------                           



                          --+-------------------                           



                          ------+| ?15-29 ? ? ?                           



                          ? ? ? ? ?| ?Stage four                           



                          ? ? ? ? | ? Stage four                           



                          ? ? ? ? ?                              



                          ?+--------------------                           



                          ---+------------------                           



                          ---+------------------                           



                          -------+| ?<15 (or                           



                          dialysis) ? ?| ?Stage                           



                          five ? ? ? ? | ? Stage                           



                          five ? ? ? ? ?                           



                          ?+--------------------                           



                          ---+------------------                           



                          ---+------------------                           



                          -------+ *Each stage                           



                          assumes the associated                           



                          GFR level has been in                           



                          effect for at least                           



                          three months. ?Stages                           



                          1 to 5, with or                           



                          without kidney                           



                          disease, indicate                           



                          chronic kidney                           



                          disease. Notes:                           



                          Determination of                           



                          stages one and two                           



                          (with eGFR                             



                          >59mL/min/1.73 m2)                           



                          requires estimation of                           



                          kidney damage for at                           



                          least three months as                           



                          defined by structural                           



                          or functional                           



                          abnormalities of the                           



                          kidney, manifested by                           



                          either:Pathological                           



                          abnormalities or                           



                          Markers of kidney                           



                          damage (including                           



                          abnormalities in the                           



                          composition of the                           



                          blood or urine or                           



                          abnormalities in                           



                          imaging tests).                           

 

             Lab Interpretation Abnormal                               



             (test code = 72460-9)                                        



[facility]





             Test Item    Value        Reference Range Interpretation Comments

 

             POCT GLU (test code = 0115265942) 114 mg/dL           H      

      

 

             Lab Interpretation (test code = Abnormal                           

    



             23570-3)                                            



[id number]38





             Test Item    Value        Reference Range Interpretation Comments

 

             NT-proBNP (test code 753 pg/mL    See_Comment  H             [Autom

ated



             = 3412031225)                                        message] The



                                                                 system which



                                                                 generated this



                                                                 result



                                                                 transmitted



                                                                 reference range

:



                                                                 <=125. The



                                                                 reference range



                                                                 was not used to



                                                                 interpret this



                                                                 result as



                                                                 normal/abnormal

.

 

             RICK (test code = RICK) Biotin has been                           



                          reported to                            



                          cause a negative                           



                          bias, interpret                           



                          results relative                           



                          to patient's use                           



                          of biotin.                             

 

             Lab Interpretation Abnormal                               



             (test code = 90230-3)                                        



[facility]. METABOLIC PANEL ([id number])2021 
11:03:39





             Test Item    Value        Reference Range Interpretation Comments

 

             NA (test code = 138 mmol/L   135-145                   



             7218973592)                                         

 

             K (test code = 3.7 mmol/L   3.5-5.0                   



             4384973781)                                         

 

             CL (test code = 95 mmol/L           L            



             2592204904)                                         

 

             CO2 TOTAL (test code = 37 mmol/L    23-31        H            



             4982387801)                                         

 

             AGAP (test code =              2-16                      



             9317612918)                                         

 

             BUN (test code = 23 mg/dL     7-23                      



             0913906543)                                         

 

             GLUCOSE (test code = 137 mg/dL           H            



             4523304897)                                         

 

             CREATININE (test code = 1.04 mg/dL   0.50-1.04                 



             6535932028)                                         

 

             TOTAL BILI (test code = 0.6 mg/dL    0.1-1.1                   



             1993586194)                                         

 

             CALCIUM (test code = 8.3 mg/dL    8.6-10.6     L            



             1577499910)                                         

 

             T PROTEIN (test code = 6.7 g/dL     6.3-8.2                   



             1271604489)                                         

 

             ALBUMIN (test code = 3.0 g/dL     3.5-5.0      L            



             5745411678)                                         

 

             ALK PHOS (test code = 112 U/L                          



             5736050546)                                         

 

             ALTv (test code = 22 U/L       5-35                      



             1742-6)                                             

 

             AST(SGOT) (test code = 44 U/L       13-40        H            



             1014858198)                                         

 

             eGFR (test code =              mL/min/1.73m2              



             4412956427)                                         

 

             RICK (test code = RICK) Association of                           



                          Glomerular Filtration                           



                          Rate (GFR) and Staging                           



                          of Kidney Disease*                           



                          +---------------------                           



                          --+-------------------                           



                          --+-------------------                           



                          ------+| GFR                           



                          (mL/min/1.73 m2) ?|                           



                          With Kidney Damage ?|                           



                          ?Without Kidney                           



                          Damage+---------------                           



                          --------+-------------                           



                          --------+-------------                           



                          ------------+| ?>90 ?                           



                          ? ? ? ? ? ? ? ?|                           



                          ?Stage one ? ? ? ? ?|                           



                          ? Normal ? ? ? ? ? ? ?                           



                          ?+--------------------                           



                          ---+------------------                           



                          ---+------------------                           



                          -------+| ?60-89 ? ? ?                           



                          ? ? ? ? ?| ?Stage two                           



                          ? ? ? ? ?| ? Decreased                           



                          GFR ? ? ? ?                            



                          +---------------------                           



                          --+-------------------                           



                          --+-------------------                           



                          ------+| ?30-59 ? ? ?                           



                          ? ? ? ? ?| ?Stage                           



                          three ? ? ? ?| ? Stage                           



                          three ? ? ? ? ?                           



                          +---------------------                           



                          --+-------------------                           



                          --+-------------------                           



                          ------+| ?15-29 ? ? ?                           



                          ? ? ? ? ?| ?Stage four                           



                          ? ? ? ? | ? Stage four                           



                          ? ? ? ? ?                              



                          ?+--------------------                           



                          ---+------------------                           



                          ---+------------------                           



                          -------+| ?<15 (or                           



                          dialysis) ? ?| ?Stage                           



                          five ? ? ? ? | ? Stage                           



                          five ? ? ? ? ?                           



                          ?+--------------------                           



                          ---+------------------                           



                          ---+------------------                           



                          -------+ *Each stage                           



                          assumes the associated                           



                          GFR level has been in                           



                          effect for at least                           



                          three months. ?Stages                           



                          1 to 5, with or                           



                          without kidney                           



                          disease, indicate                           



                          chronic kidney                           



                          disease. Notes:                           



                          Determination of                           



                          stages one and two                           



                          (with eGFR                             



                          >59mL/min/1.73 m2)                           



                          requires estimation of                           



                          kidney damage for at                           



                          least three months as                           



                          defined by structural                           



                          or functional                           



                          abnormalities of the                           



                          kidney, manifested by                           



                          either:Pathological                           



                          abnormalities or                           



                          Markers of kidney                           



                          damage (including                           



                          abnormalities in the                           



                          composition of the                           



                          blood or urine or                           



                          abnormalities in                           



                          imaging tests).                           

 

             Lab Interpretation Abnormal                               



             (test code = 29965-6)                                        



[facility]





             Test Item    Value        Reference Range Interpretation Comments

 

             MAGNESIUM (test code = 5716294029) 2.1 mg/dL    1.7-2.4            

       

 

             Lab Interpretation (test code = Normal                             

    



             97441-5)                                            



[facility]42





             Test Item    Value        Reference Range Interpretation Comments

 

             POCT GLU (test code = 1224097686) 234 mg/dL           H      

      

 

             Lab Interpretation (test code = Abnormal                           

    



             58363-1)                                            



[id number] 16:58:06





             Test Item    Value        Reference Range Interpretation Comments

 

             VIT D 25OH (test code = <13          25-80        L            



             53348-6)                                            

 

             RICK (test code = RICK) Deficiency: <20                           



                          ng/mLInsufficiency:                           



                          20-24 ng/mLOptimal:                           



                          25-80 ng/mL                            

 

             Lab Interpretation (test Abnormal                               



             code = 62025-0)                                        



[facility] [id number] 16:30:39





             Test Item    Value        Reference Range Interpretation Comments

 

             VIT B12 (test code = 760 pg/mL    240-930                   



             5182877329)                                         

 

             RICK (test code = RICK) Biotin has been                           



                          reported to cause a                           



                          positive bias,                           



                          interpret results                           



                          relative to                            



                          patient's use of                           



                          biotin.                                

 

             Lab Interpretation (test Normal                                 



             code = 42039-8)                                        



[facility]





             Test Item    Value        Reference Range Interpretation Comments

 

             FOLATE SER (test code = 9452523700) 8.3 ng/mL    3.0-20.0          

        

 

             Lab Interpretation (test code = Normal                             

    



             41257-4)                                            



[id number] 12:52:39





             Test Item    Value        Reference Range Interpretation Comments

 

             FERRITIN (test code = 81.8 ng/mL   11.0-264.0                



             1924643825)                                         

 

             RICK (test code = RICK) Biotin has been                           



                          reported to cause a                           



                          negative bias,                           



                          interpret results                           



                          relative to                            



                          patient's use of                           



                          biotin.                                

 

             Lab Interpretation (test Normal                                 



             code = 61020-2)                                        



[facility] [id number] 12:51:15





             Test Item    Value        Reference Range Interpretation Comments

 

             FERRITIN (test code = 82.3 ng/mL   11.0-264.0                



             2683707274)                                         

 

             RICK (test code = RICK) Biotin has been                           



                          reported to cause a                           



                          negative bias,                           



                          interpret results                           



                          relative to                            



                          patient's use of                           



                          biotin.                                

 

             Lab Interpretation (test Normal                                 



             code = 18358-5)                                        



[facility] 12:46:25





             Test Item    Value        Reference Range Interpretation Comments

 

             POCT GLU (test code = 9260667938) 58 mg/dL            L      

      

 

             Lab Interpretation (test code = Abnormal                           

    



             23975-1)                                            



[facility] [id number] 12:36:55





             Test Item    Value        Reference Range Interpretation Comments

 

             IRON (test code = 4382563792) 72 ug/dL                       

  

 

             TIBC (test code = 2719053105) 354 ug/dL    250-410                 

  

 

             % FE SAT (test code = 4520648585) 20 %         20-50               

      

 

             Lab Interpretation (test code = Normal                             

    



             87989-5)                                            



[facility] 12[facility]27:58





             Test Item    Value        Reference Range Interpretation Comments

 

             MAGNESIUM (test code = 7661193528) 1.4 mg/dL    1.7-2.4      L     

       

 

             Lab Interpretation (test code = Abnormal                           

    



             55299-6)                                            



[facility]. METABOLIC PANEL (99601)2021 
12:27:53





             Test Item    Value        Reference Range Interpretation Comments

 

             NA (test code = 138 mmol/L   135-145                   



             7974674828)                                         

 

             K (test code = 3.3 mmol/L   3.5-5.0      L            



             7147889934)                                         

 

             CL (test code = 98 mmol/L                        



             7027319535)                                         

 

             CO2 TOTAL (test code = 39 mmol/L    23-31        H            



             8848663060)                                         

 

             AGAP (test code =              2-16         L            



             7939752071)                                         

 

             BUN (test code = 23 mg/dL     7-23                      



             8856748612)                                         

 

             GLUCOSE (test code = 58 mg/dL            L            



             9423619228)                                         

 

             CREATININE (test code = 1.00 mg/dL   0.50-1.04                 



             6748942003)                                         

 

             TOTAL BILI (test code = 0.7 mg/dL    0.1-1.1                   



             8609584555)                                         

 

             CALCIUM (test code = 8.5 mg/dL    8.6-10.6     L            



             5815603123)                                         

 

             T PROTEIN (test code = 6.7 g/dL     6.3-8.2                   



             3076889432)                                         

 

             ALBUMIN (test code = 3.0 g/dL     3.5-5.0      L            



             3026164971)                                         

 

             ALK PHOS (test code = 88 U/L                           



             8854052584)                                         

 

             ALTv (test code = 20 U/L       5-35                      



             1742-6)                                             

 

             AST(SGOT) (test code = 43 U/L       13-40        H            



             7516790862)                                         

 

             eGFR (test code =              mL/min/1.73m2              



             0554770187)                                         

 

             RICK (test code = RICK) Association of                           



                          Glomerular Filtration                           



                          Rate (GFR) and Staging                           



                          of Kidney Disease*                           



                          +---------------------                           



                          --+-------------------                           



                          --+-------------------                           



                          ------+| GFR                           



                          (mL/min/1.73 m2) ?|                           



                          With Kidney Damage ?|                           



                          ?Without Kidney                           



                          Damage+---------------                           



                          --------+-------------                           



                          --------+-------------                           



                          ------------+| ?>90 ?                           



                          ? ? ? ? ? ? ? ?|                           



                          ?Stage one ? ? ? ? ?|                           



                          ? Normal ? ? ? ? ? ? ?                           



                          ?+--------------------                           



                          ---+------------------                           



                          ---+------------------                           



                          -------+| ?60-89 ? ? ?                           



                          ? ? ? ? ?| ?Stage two                           



                          ? ? ? ? ?| ? Decreased                           



                          GFR ? ? ? ?                            



                          +---------------------                           



                          --+-------------------                           



                          --+-------------------                           



                          ------+| ?30-59 ? ? ?                           



                          ? ? ? ? ?| ?Stage                           



                          three ? ? ? ?| ? Stage                           



                          three ? ? ? ? ?                           



                          +---------------------                           



                          --+-------------------                           



                          --+-------------------                           



                          ------+| ?15-29 ? ? ?                           



                          ? ? ? ? ?| ?Stage four                           



                          ? ? ? ? | ? Stage four                           



                          ? ? ? ? ?                              



                          ?+--------------------                           



                          ---+------------------                           



                          ---+------------------                           



                          -------+| ?<15 (or                           



                          dialysis) ? ?| ?Stage                           



                          five ? ? ? ? | ? Stage                           



                          five ? ? ? ? ?                           



                          ?+--------------------                           



                          ---+------------------                           



                          ---+------------------                           



                          -------+ *Each stage                           



                          assumes the associated                           



                          GFR level has been in                           



                          effect for at least                           



                          three months. ?Stages                           



                          1 to 5, with or                           



                          without kidney                           



                          disease, indicate                           



                          chronic kidney                           



                          disease. Notes:                           



                          Determination of                           



                          stages one and two                           



                          (with eGFR                             



                          >59mL/min/1.73 m2)                           



                          requires estimation of                           



                          kidney damage for at                           



                          least three months as                           



                          defined by structural                           



                          or functional                           



                          abnormalities of the                           



                          kidney, manifested by                           



                          either:Pathological                           



                          abnormalities or                           



                          Markers of kidney                           



                          damage (including                           



                          abnormalities in the                           



                          composition of the                           



                          blood or urine or                           



                          abnormalities in                           



                          imaging tests).                           

 

             Lab Interpretation Abnormal                               



             (test code = 46928-5)                                        



[facility]33





             Test Item    Value        Reference Range Interpretation Comments

 

             PHOSPHORUS (test code = 4191156825) 4.1 mg/dL    2.5-5.0           

        

 

             Lab Interpretation (test code = Normal                             

    



             91442-4)                                            



[id number] 12:26:37





             Test Item    Value        Reference Range Interpretation Comments

 

             NT-proBNP (test code 1770 pg/mL   See_Comment  H             [Autom

ated



             = 3093698300)                                        message] The



                                                                 system which



                                                                 generated this



                                                                 result



                                                                 transmitted



                                                                 reference range

:



                                                                 <=125. The



                                                                 reference range



                                                                 was not used to



                                                                 interpret this



                                                                 result as



                                                                 normal/abnormal

.

 

             RICK (test code = RICK) Biotin has been                           



                          reported to                            



                          cause a negative                           



                          bias, interpret                           



                          results relative                           



                          to patient's use                           



                          of biotin.                             

 

             Lab Interpretation Abnormal                               



             (test code = 54211-3)                                        



[facility] WITH [id number] 11:52:16





             Test Item    Value        Reference Range Interpretation Comments

 

             WBC (test code =              See_Comment  L             [Automated



             9790-2)                                             message] The



                                                                 system which



                                                                 generated this



                                                                 result transmit

eduard



                                                                 reference range

:



                                                                 4.30 - 11.10



                                                                 10*3/?L. The



                                                                 reference range



                                                                 was not used to



                                                                 interpret this



                                                                 result as



                                                                 normal/abnormal

.

 

             RBC (test code =              See_Comment  L             [Automated



             789-8)                                              message] The



                                                                 system which



                                                                 generated this



                                                                 result transmit

eduard



                                                                 reference range

:



                                                                 3.93 - 5.25



                                                                 10*6/?L. The



                                                                 reference range



                                                                 was not used to



                                                                 interpret this



                                                                 result as



                                                                 normal/abnormal

.

 

             HGB (test code = 9.0 g/dL     11.6-15.0    L            



             718-7)                                              

 

             HCT (test code = 28.1 %       35.7-45.2    L            



             4544-3)                                             

 

             MCV (test code = 95.3 fL      80.6-95.5                 



             787-2)                                              

 

             MCH (test code = 30.5 pg      25.9-32.8                 



             785-6)                                              

 

             MCHC (test code = 32.0 g/dL    31.6-35.1                 



             786-4)                                              

 

             RDW-SD (test code = 56.6 fL      39.0-49.9    H            



             70482-1)                                            

 

             RDW-CV (test code = 16.2 %       12.0-15.5    H            



             788-0)                                              

 

             PLT (test code =              See_Comment  LL            [Automated



             777-3)                                              message] The



                                                                 system which



                                                                 generated this



                                                                 result transmit

eduard



                                                                 reference range

:



                                                                 166 - 358 10*3/

?L.



                                                                 The reference



                                                                 range was not u

sed



                                                                 to interpret th

is



                                                                 result as



                                                                 normal/abnormal

.

 

             MPV (test code = 9.6 fL       9.5-12.9                  



             23715-6)                                            

 

             IPF % (test code = 1.7 %        1.3-7.7                   Platelet 

count



             [id number])                                         measured by



                                                                 fluorescence



                                                                 method.

 

             NRBC/100 WBC (test              See_Comment                [Automat

ed



             code = 4671929225)                                        message] 

The



                                                                 system which



                                                                 generated this



                                                                 result transmit

eduard



                                                                 reference range

:



                                                                 0.0 - 10.0 /100



                                                                 WBCs. The



                                                                 reference range



                                                                 was not used to



                                                                 interpret this



                                                                 result as



                                                                 normal/abnormal

.

 

             NRBC x10^3 (test code <0.01        See_Comment                [Auto

mated



             = [id number])                                        message] The



                                                                 system which



                                                                 generated this



                                                                 result transmit

eduard



                                                                 reference range

:



                                                                 10*3/?L. The



                                                                 reference range



                                                                 was not used to



                                                                 interpret this



                                                                 result as



                                                                 normal/abnormal

.

 

             GRAN MAT (NEUT) % 53.0 %                                 



             (test code = 770-8)                                        

 

             IMM GRAN % (test code 0.40 %                                 



             = [id number])                                        

 

             LYMPH % (test code = 37.6 %                                 



             736-9)                                              

 

             MONO % (test code = 7.0 %                                  



             5905-5)                                             

 

             EOS % (test code = 1.2 %                                  



             713-8)                                              

 

             BASO % (test code = 0.8 %                                  



             706-2)                                              

 

             GRAN MAT x10^3(ANC) 1.28 10*3/uL 1.88-7.09    L            



             (test code =                                        



             [id number])                                         

 

             IMM GRAN x10^3 (test <0.03        0.00-0.06                 



             code = 2289270112)                                        

 

             LYMPH x10^3 (test 0.91 10*3/uL 1.32-3.29    L            



             code = 731-0)                                        

 

             MONO x10^3 (test code 0.17 10*3/uL 0.33-0.92    L            



             = 742-7)                                            

 

             EOS x10^3 (test code 0.03 10*3/uL 0.03-0.39                 



             = 711-2)                                            

 

             BASO x10^3 (test code <0.03        0.01-0.07                 



             = 704-7)                                            

 

             PLT ESTIMATE (test Critically   Normal       AA           



             code = 9317-9) Decreased                              

 

             Lab Interpretation Abnormal                               



             (test code = 92956-5)                                        



[facility]





             Test Item    Value        Reference Range Interpretation Comments

 

             Procalcitonin (test 0.05 ng/mL   <0.07                     



             code = 0528053097)                                        

 

             RICK (test code = RICK) INTERPRETATION OF                           



                          PROCALCITONIN RESULTS IN                           



                          ADULTS >= 18 YEARS OF                           



                          AGE Initiation and                           



                          discontinuation of                           



                          antibiotics on patients                           



                          with suspected or                           



                          confirmed Lower                           



                          Respiratory Tract                           



                          Infection in Adults >=                           



                          18 years of age.                           



                          +--------------+--------                           



                          --------+---------------                           



                          +-----------------------                           



                          -----+|Procalcitonin                           



                          |Interpretation                           



                          ?|Antibiotic ? ?                           



                          |Considerations ? ? ? ?                           



                          ? ? ? |ng/mL ? ? ? ? | ?                           



                          ? ? ? ? ? ?                            



                          ?|recommendation | ? ? ?                           



                          ? ? ? ? ? ? ? ? ? ? ?                           



                          +--------------+--------                           



                          --------+---------------                           



                          +-----------------------                           



                          -----+| <0.1 ? ? ? ? |                           



                          Bacterial ? ? ?|                           



                          Strongly ? ? ?| ? ? ? ?                           



                          ? ? ? ? ? ? ? ? ? ? | ?                           



                          ? ? ? ? ? ?| infection                           



                          very | discouraged ? |                           



                          Overruling: ? ? ? ? ? ?                           



                          ? ? | ? ? ? ? ? ? ?|                           



                          unlikely ? ? ? | ? ? ? ?                           



                          ? ? ? | ? Clinically                           



                          unstable ? ? ?                           



                          +--------------+--------                           



                          --------+---------------                           



                          + ? High risk for                           



                          adverse ? ? | <0.25 ? ?                           



                          ? ?| Bacterial ? ? ?|                           



                          Discouraged ? | ?                           



                          outcome ? ? ? ? ? ? ? ?                           



                          ? | ? ? ? ? ? ? ?|                           



                          infection ? ? ?| ? ? ? ?                           



                          ? ? ? | ? SEE IMPORTANT                           



                          NOTE ? ? ? ?| ? ? ? ? ?                           



                          ? ?| unlikely ? ? ? | ?                           



                          ? ? ? ? ? ? | ? ? ? ? ?                           



                          ? ? ? ? ? ? ? ? ?                           



                          +--------------+--------                           



                          --------+---------------                           



                          +-----------------------                           



                          -----+| >=0.25 ? ? ? |                           



                          Bacterial ? ? ?|                           



                          Encouraged ? ?| ? ? ? ?                           



                          ? ? ? ? ? ? ? ? ? ? | ?                           



                          ? ? ? ? ? ?| infection ?                           



                          ? ?| ? ? ? ? ? ? ? | ? ?                           



                          ? ? ? ? ? ? ? ? ? ? ? ?                           



                          | ? ? ? ? ? ? ?| likely                           



                          ? ? ? ? | ? ? ? ? ? ? ?                           



                          | Consider treatment                           



                          failure                                



                          ?+--------------+-------                           



                          ---------+--------------                           



                          -+ if levels does not                           



                          decrease | >0.5 ? ? ? ?                           



                          | Bacterial ? ? ?|                           



                          Strongly ? ? ?|                           



                          appropriately ? ? ? ? ?                           



                          ? ? | ? ? ? ? ? ? ?|                           



                          infection very |                           



                          encouraged ? ?| ? ? ? ?                           



                          ? ? ? ? ? ? ? ? ? ? | ?                           



                          ? ? ? ? ? ?| likely ? ?                           



                          ? ? | ? ? ? ? ? ? ? | ?                           



                          ? ? ? ? ? ? ? ? ? ? ? ?                           



                          ?                                      



                          +--------------+--------                           



                          --------+---------------                           



                          +-----------------------                           



                          -----+ Discontinuation                           



                          of antibiotics in                           



                          high-acuity patients                           



                          with suspected or                           



                          confirmed sepsis in                           



                          Adults >= 18 years of                           



                          age.                                   



                          +--------------+--------                           



                          --------+---------------                           



                          +-----------------------                           



                          -----+|Procalcitonin                           



                          |Interpretation                           



                          ?|Antibiotic ? ?                           



                          |Considerations ? ? ? ?                           



                          ? ? ? |ng/mL ? ? ? ? | ?                           



                          ? ? ? ? ? ?                            



                          ?|recommendation | ? ? ?                           



                          ? ? ? ? ? ? ? ? ? ? ?                           



                          +--------------+--------                           



                          --------+---------------                           



                          +-----------------------                           



                          -----+| <0.25 ? ? ? ?|                           



                          Bacterial ? ? ?|                           



                          Strongly ? ? ?| ? ? ? ?                           



                          ? ? ? ? ? ? ? ? ? ? | ?                           



                          ? ? ? ? ? ?| infection                           



                          very | discouraged ? |                           



                          Overruling: ? ? ? ? ? ?                           



                          ? ? | ? ? ? ? ? ? ?|                           



                          unlikely ? ? ? | ? ? ? ?                           



                          ? ? ? | ? Clinically                           



                          unstable ? ? ?                           



                          +--------------+--------                           



                          --------+---------------                           



                          + ? High risk for                           



                          adverse ? ? | <0.5 or                           



                          drop | Bacterial ? ? ?|                           



                          Discouraged ? | ?                           



                          outcome ? ? ? ? ? ? ? ?                           



                          ? | >80% from ? ?|                           



                          infection ? ? ?| ? ? ? ?                           



                          ? ? ? | ? SEE IMPORTANT                           



                          NOTE ? ? ? ?| highest                           



                          PCT ?| unlikely ? ? ? |                           



                          ? ? ? ? ? ? ? | ? ? ? ?                           



                          ? ? ? ? ? ? ? ? ? ? |                           



                          level ? ? ? ?| ? ? ? ? ?                           



                          ? ? ?| ? ? ? ? ? ? ? | ?                           



                          ? ? ? ? ? ? ? ? ? ? ? ?                           



                          ?                                      



                          +--------------+--------                           



                          --------+---------------                           



                          +-----------------------                           



                          -----+| >=0.5 ? ? ? ?|                           



                          Bacterial ? ? ?|                           



                          Encouraged ? ?| ? ? ? ?                           



                          ? ? ? ? ? ? ? ? ? ? | ?                           



                          ? ? ? ? ? ?| infection ?                           



                          ? ?| ? ? ? ? ? ? ? | ? ?                           



                          ? ? ? ? ? ? ? ? ? ? ? ?                           



                          | ? ? ? ? ? ? ?| likely                           



                          ? ? ? ? | ? ? ? ? ? ? ?                           



                          | Consider treatment                           



                          failure                                



                          ?+--------------+-------                           



                          ---------+--------------                           



                          -+ if levels does not                           



                          decrease | >1.0 ? ? ? ?                           



                          | Bacterial ? ? ?|                           



                          Strongly ? ? ?|                           



                          appropriately ? ? ? ? ?                           



                          ? ? | ? ? ? ? ? ? ?|                           



                          infection very |                           



                          encouraged ? ?| ? ? ? ?                           



                          ? ? ? ? ? ? ? ? ? ? | ?                           



                          ? ? ? ? ? ?| likely ? ?                           



                          ? ? | ? ? ? ? ? ? ? | ?                           



                          ? ? ? ? ? ? ? ? ? ? ? ?                           



                          ?                                      



                          +--------------+--------                           



                          --------+---------------                           



                          +-----------------------                           



                          -----+ Percentage of                           



                          drop of Procalcitonin                           



                          calculation for                           



                          Discontinuation of                           



                          antibiotics in                           



                          high-acuity patients                           



                          with suspected or                           



                          confirmed sepsis in                           



                          Adults >= 18 years of                           



                          age.  ? ? ? ? ? ? ? ? ?                           



                          ? ? Procalcitonin                           



                          highest{}-Procalcitonin                           



                          current{}Delta                           



                          Procalcitonin =                           



                          ________________________                           



                          _______________________                           



                          x100%  ? ? ? ? ? ? ? ? ?                           



                          ? ? ? ? ? ? ?                           



                          Procalcitonin current {}                           



                          IMPORTANT NOTE:                           



                          Procalcitonin may be                           



                          elevated without                           



                          bacterial infection by                           



                          physiologic stress                           



                          related to trauma,                           



                          burns, chronic dialysis,                           



                          metastatic cancer,                           



                          surgery in the past                           



                          seven days, malaria,                           



                          some fungal infections,                           



                          and some forms of                           



                          vasculitis. The                           



                          interpretation algorithm                           



                          may not apply to                           



                          patients with                           



                          immunosuppression                           



                          (equivalent of >10 mg of                           



                          prednisone daily), HIV                           



                          with CD4 cell count <                           



                          350 cells/mm3, active                           



                          malignancy on systemic                           



                          chemotherapy, solid                           



                          organ transplant or                           



                          hematopoietic stem cell                           



                          transplantation, or                           



                          hospital acquired                           



                          pneumonia. Additionally,                           



                          some clinical trials of                           



                          procalcitonin have                           



                          excluded patients with                           



                          shock requiring                           



                          vasopressor use, acute                           



                          respiratory failure                           



                          requiring mechanical                           



                          ventilation, or those                           



                          with known lung                           



                          abscess/empyema. For                           



                          further information                           



                          please refer                           



                          to:[email or web address]                           



                          [email or web address]                           

 

             Lab Interpretation Normal                                 



             (test code = 82476-4)                                        



[facility] [id number] [facility]31:01





             Test Item    Value        Reference Range Interpretation Comments

 

             Valproic Acid, Free 6.1 ug/mL    4.0-15.0                  



             (test code =                                        



             8366990120)                                         

 

             RICK (test code = RICK) Toxic Range: ? ? ? ?                         

  



                          Greater than 15 ug/mL                           



                          Test developed and                           



                          characteristics                           



                          determined by [facility]                           



                          Laboratory Services.                           

 

             Lab Interpretation Normal                                 



             (test code = 67304-9)                                        



[facility] GLUCOSE (AUTOMATED)2021 01:36:59





             Test Item    Value        Reference Range Interpretation Comments

 

             POCT GLU (test code = 7642088560) 218 mg/dL           H      

      

 

             Lab Interpretation (test code = Abnormal                           

    



             58876-0)                                            



[facility] [id number] 00:12:31





             Test Item    Value        Reference Range Interpretation Comments

 

             FERRITIN (test code = 87.2 ng/mL   11.0-264.0                



             [id number])                                         

 

             RICK (test code = RICK) Biotin has been                           



                          reported to cause a                           



                          negative bias,                           



                          interpret results                           



                          relative to                            



                          patient's use of                           



                          biotin.                                

 

             Lab Interpretation (test Normal                                 



             code = 05843-4)                                        



[facility] [id number] 23:16:01
LEUKOCYTOPENIA WITH ABSOLUTE LYMPHOCYTOPENIA, MONOCYTOPENIA, AND EOSINOPENIA. 
NORMOCYTIC HYPOCHROMICANEMIA.  SEVERE THROMBOCYTOPENIA.[facility] GLUCOSE (AUTOMATED)2021 22:23:28





             Test Item    Value        Reference Range Interpretation Comments

 

             POCT GLU (test code = 7799816263) 194 mg/dL           H      

      

 

             Lab Interpretation (test code = Abnormal                           

    



             95304-0)                                            



[facility] GLUCOSE (AUTOMATED)2021 16:56:37





             Test Item    Value        Reference Range Interpretation Comments

 

             POCT GLU (test code = 1462707122) 161 mg/dL           H      

      

 

             Lab Interpretation (test code = Abnormal                           

    



             26742-2)                                            



[facility] GLUCOSE (AUTOMATED)2021 12:59:40





             Test Item    Value        Reference Range Interpretation Comments

 

             POCT GLU (test code = 5527239358) 116 mg/dL           H      

      

 

             Lab Interpretation (test code = Abnormal                           

    



             97390-8)                                            



[id number]





             Test Item    Value        Reference    Interpretation Comments



                                       Range                     

 

             TROPONIN I (test 0.011 ng/mL  See_Comment                [Automated



             code = 8652032984)                                        message] 

The



                                                                 system which



                                                                 generated this



                                                                 result



                                                                 transmitted



                                                                 reference range

:



                                                                 <=0.034. The



                                                                 reference range



                                                                 was not used to



                                                                 interpret this



                                                                 result as



                                                                 normal/abnormal

.

 

             RICK (test code = Reference (Normal)                           



             RICK)         Range (defined by                           



                          the 99th percentile                           



                          reference limit): <=                           



                          0.034 ng/mL Note:                           



                          Cardiac troponin                           



                          begins to rise 3-4                           



                          hours after the                           



                          onset of ischemia.                           



                          Repeat in 4-6 hours                           



                          if the sample was                           



                          drawn within 3-4                           



                          hours of the onset                           



                          of the symptom and                           



                          found normal.                           



                          Diagnosis of                           



                          myocardial injury is                           



                          made with acute                           



                          changes in cTn                           



                          concentrations with                           



                          at least one serial                           



                          sample above the                           



                          99th percentile                           



                          upper reference                           



                          limit (URL), taken                           



                          together with the                           



                          patient's clinical                           



                          presentation.                           



                          Biotin has been                           



                          reported to cause a                           



                          negative bias,                           



                          interpret results                           



                          relative to                            



                          patient's use of                           



                          biotin.                                

 

             Lab Interpretation Normal                                 



             (test code =                                        



             72849-1)                                            



[id number] 12:24:30





             Test Item    Value        Reference Range Interpretation Comments

 

             NT-proBNP (test code 3470 pg/mL   See_Comment  H             [Autom

ated



             = [id number])                                        message] The



                                                                 system which



                                                                 generated this



                                                                 result



                                                                 transmitted



                                                                 reference range

:



                                                                 <=125. The



                                                                 reference range



                                                                 was not used to



                                                                 interpret this



                                                                 result as



                                                                 normal/abnormal

.

 

             RICK (test code = RICK) Biotin has been                           



                          reported to                            



                          cause a negative                           



                          bias, interpret                           



                          results relative                           



                          to patient's use                           



                          of biotin.                             

 

             Lab Interpretation Abnormal                               



             (test code = 76981-9)                                        



[facility] CenterMagn[facility] [id number] 12:20:57





             Test Item    Value        Reference Range Interpretation Comments

 

             MAGNESIUM (test code = 0349845614) 1.5 mg/dL    1.7-2.4      L     

       

 

             Lab Interpretation (test code = Abnormal                           

    



             12126-6)                                            



[facility] Center[facility] (61778)2021 
12:20:52





             Test Item    Value        Reference Range Interpretation Comments

 

             NA (test code = 139 mmol/L   135-145                   



             6112160620)                                         

 

             K (test code = 3.5 mmol/L   3.5-5.0                   



             1898567346)                                         

 

             CL (test code = 101 mmol/L                       



             8393477969)                                         

 

             CO2 TOTAL (test code = 33 mmol/L    23-31        H            



             0636762630)                                         

 

             AGAP (test code =              2-16                      



             8443583544)                                         

 

             BUN (test code = 23 mg/dL     7-23                      



             1004327025)                                         

 

             GLUCOSE (test code = 150 mg/dL           H            



             9904071467)                                         

 

             CREATININE (test code = 0.89 mg/dL   0.50-1.04                 



             1497825662)                                         

 

             TOTAL BILI (test code = 0.8 mg/dL    0.1-1.1                   



             3608939372)                                         

 

             CALCIUM (test code = 8.4 mg/dL    8.6-10.6     L            



             7145713275)                                         

 

             T PROTEIN (test code = 6.5 g/dL     6.3-8.2                   



             0744964979)                                         

 

             ALBUMIN (test code = 2.9 g/dL     3.5-5.0      L            



             7940595899)                                         

 

             ALK PHOS (test code = 95 U/L                           



             1761755766)                                         

 

             ALTv (test code = 20 U/L       5-35                      



             1742-6)                                             

 

             AST(SGOT) (test code = 38 U/L       13-40                     



             8859002604)                                         

 

             eGFR (test code =              mL/min/1.73m2              



             9693608992)                                         

 

             RICK (test code = RICK) Association of                           



                          Glomerular Filtration                           



                          Rate (GFR) and Staging                           



                          of Kidney Disease*                           



                          +---------------------                           



                          --+-------------------                           



                          --+-------------------                           



                          ------+| GFR                           



                          (mL/min/1.73 m2) ?|                           



                          With Kidney Damage ?|                           



                          ?Without Kidney                           



                          Damage+---------------                           



                          --------+-------------                           



                          --------+-------------                           



                          ------------+| ?>90 ?                           



                          ? ? ? ? ? ? ? ?|                           



                          ?Stage one ? ? ? ? ?|                           



                          ? Normal ? ? ? ? ? ? ?                           



                          ?+--------------------                           



                          ---+------------------                           



                          ---+------------------                           



                          -------+| ?60-89 ? ? ?                           



                          ? ? ? ? ?| ?Stage two                           



                          ? ? ? ? ?| ? Decreased                           



                          GFR ? ? ? ?                            



                          +---------------------                           



                          --+-------------------                           



                          --+-------------------                           



                          ------+| ?30-59 ? ? ?                           



                          ? ? ? ? ?| ?Stage                           



                          three ? ? ? ?| ? Stage                           



                          three ? ? ? ? ?                           



                          +---------------------                           



                          --+-------------------                           



                          --+-------------------                           



                          ------+| ?15-29 ? ? ?                           



                          ? ? ? ? ?| ?Stage four                           



                          ? ? ? ? | ? Stage four                           



                          ? ? ? ? ?                              



                          ?+--------------------                           



                          ---+------------------                           



                          ---+------------------                           



                          -------+| ?<15 (or                           



                          dialysis) ? ?| ?Stage                           



                          five ? ? ? ? | ? Stage                           



                          five ? ? ? ? ?                           



                          ?+--------------------                           



                          ---+------------------                           



                          ---+------------------                           



                          -------+ *Each stage                           



                          assumes the associated                           



                          GFR level has been in                           



                          effect for at least                           



                          three months. ?Stages                           



                          1 to 5, with or                           



                          without kidney                           



                          disease, indicate                           



                          chronic kidney                           



                          disease. Notes:                           



                          Determination of                           



                          stages one and two                           



                          (with eGFR                             



                          >59mL/min/1.73 m2)                           



                          requires estimation of                           



                          kidney damage for at                           



                          least three months as                           



                          defined by structural                           



                          or functional                           



                          abnormalities of the                           



                          kidney, manifested by                           



                          either:Pathological                           



                          abnormalities or                           



                          Markers of kidney                           



                          damage (including                           



                          abnormalities in the                           



                          composition of the                           



                          blood or urine or                           



                          abnormalities in                           



                          imaging tests).                           

 

             Lab Interpretation Abnormal                               



             (test code = 78730-6)                                        



[facility]32





             Test Item    Value        Reference Range Interpretation Comments

 

             PHOSPHORUS (test code = 4897273116) 4.3 mg/dL    2.5-5.0           

        

 

             Lab Interpretation (test code = Normal                             

    



             69245-0)                                            



[facility] CenterAMMONIA, [id number] 11:44:26





             Test Item    Value        Reference Range Interpretation Comments

 

             AMMONIA (test code = 3135051596) 35 umol/L    9-33         H       

     

 

             Lab Interpretation (test code = Abnormal                           

    



             78183-3)                                            



[facility] WITH [id number] 11:44:21





             Test Item    Value        Reference Range Interpretation Comments

 

             WBC (test code =              See_Comment  L             [Automated



             6690-2)                                             message] The



                                                                 system which



                                                                 generated this



                                                                 result transmit

eduard



                                                                 reference range

:



                                                                 4.30 - 11.10



                                                                 10*3/?L. The



                                                                 reference range



                                                                 was not used to



                                                                 interpret this



                                                                 result as



                                                                 normal/abnormal

.

 

             RBC (test code =              See_Comment  L             [Automated



             789-8)                                              message] The



                                                                 system which



                                                                 generated this



                                                                 result transmit

eduard



                                                                 reference range

:



                                                                 3.93 - 5.25



                                                                 10*6/?L. The



                                                                 reference range



                                                                 was not used to



                                                                 interpret this



                                                                 result as



                                                                 normal/abnormal

.

 

             HGB (test code = 8.9 g/dL     11.6-15.0    L            



             718-7)                                              

 

             HCT (test code = 27.9 %       35.7-45.2    L            



             4544-3)                                             

 

             MCV (test code = 95.9 fL      80.6-95.5    H            



             787-2)                                              

 

             MCH (test code = 30.6 pg      25.9-32.8                 



             785-6)                                              

 

             MCHC (test code = 31.9 g/dL    31.6-35.1                 



             786-4)                                              

 

             RDW-SD (test code = 58.4 fL      39.0-49.9    H            



             91874-1)                                            

 

             RDW-CV (test code = 16.7 %       12.0-15.5    H            



             788-0)                                              

 

             PLT (test code =              See_Comment  LL            [Automated



             777-3)                                              message] The



                                                                 system which



                                                                 generated this



                                                                 result transmit

eduard



                                                                 reference range

:



                                                                 166 - 358 10*3/

?L.



                                                                 The reference



                                                                 range was not u

sed



                                                                 to interpret th

is



                                                                 result as



                                                                 normal/abnormal

.

 

             MPV (test code = 9.1 fL       9.5-12.9     L            



             09891-6)                                            

 

             IPF % (test code = 1.6 %        1.3-7.7                   Platelet 

count



             [id number])                                         measured by



                                                                 fluorescence



                                                                 method.

 

             NRBC/100 WBC (test              See_Comment                [Automat

ed



             code = 2059790544)                                        message] 

The



                                                                 system which



                                                                 generated this



                                                                 result transmit

eduard



                                                                 reference range

:



                                                                 0.0 - 10.0 /100



                                                                 WBCs. The



                                                                 reference range



                                                                 was not used to



                                                                 interpret this



                                                                 result as



                                                                 normal/abnormal

.

 

             NRBC x10^3 (test code              See_Comment                [Auto

mated



             = 3184524874)                                        message] The



                                                                 system which



                                                                 generated this



                                                                 result transmit

eduard



                                                                 reference range

:



                                                                 10*3/?L. The



                                                                 reference range



                                                                 was not used to



                                                                 interpret this



                                                                 result as



                                                                 normal/abnormal

.

 

             GRAN MAT (NEUT) % 52.1 %                                 



             (test code = 770-8)                                        

 

             IMM GRAN % (test code 0.40 %                                 



             = [id number])                                        

 

             LYMPH % (test code = 38.8 %                                 



             736-9)                                              

 

             MONO % (test code = 6.5 %                                  



             5905-5)                                             

 

             EOS % (test code = 1.3 %                                  



             713-8)                                              

 

             BASO % (test code = 0.9 %                                  



             706-2)                                              

 

             GRAN MAT x10^3(ANC) 1.21 10*3/uL 1.88-7.09    L            



             (test code =                                        



             8705817837)                                         

 

             IMM GRAN x10^3 (test <0.03        0.00-0.06                 



             code = 0328167776)                                        

 

             LYMPH x10^3 (test 0.90 10*3/uL 1.32-3.29    L            



             code = 731-0)                                        

 

             MONO x10^3 (test code 0.15 10*3/uL 0.33-0.92    L            



             = 742-7)                                            

 

             EOS x10^3 (test code 0.03 10*3/uL 0.03-0.39                 



             = 711-2)                                            

 

             BASO x10^3 (test code <0.03        0.01-0.07                 



             = 704-7)                                            

 

             PLT ESTIMATE (test Critically   Normal       AA           



             code = 9317-9) Decreased                              

 

             Lab Interpretation Abnormal                               



             (test code = 69911-6)                                        



[id number]22:04





             Test Item    Value        Reference Range Interpretation Comments

 

             PROTIME PATIENT (test              See_Comment                [Auto

mated message]



             code = 5964-2)                                        The system 

ich



                                                                 generated this 

result



                                                                 transmitted ref

erence



                                                                 range: 12.0 - 1

4.7



                                                                 Seconds. The re

ference



                                                                 range was not u

sed to



                                                                 interpret this 

result



                                                                 as normal/abnor

mal.

 

             INR (test code = 6301-6)                                        Nor

mal INR <1.1;



                                                                 Warfarin Therap

eutic



                                                                 range 2.0 to 3.

0 or



                                                                 2.5 to 3.5, dep

ending



                                                                 upon the indica

tions.

 

             Lab Interpretation (test Normal                                 



             code = 28530-1)                                        



[facility]





             Test Item    Value        Reference    Interpretation Comments



                                       Range                     

 

             TROPONIN I (test 0.010 ng/mL  See_Comment                [Automated



             code = 4271313213)                                        message] 

The



                                                                 system which



                                                                 generated this



                                                                 result



                                                                 transmitted



                                                                 reference range

:



                                                                 <=0.034. The



                                                                 reference range



                                                                 was not used to



                                                                 interpret this



                                                                 result as



                                                                 normal/abnormal

.

 

             RICK (test code = Reference (Normal)                           



             RICK)         Range (defined by                           



                          the 99th percentile                           



                          reference limit): <=                           



                          0.034 ng/mL Note:                           



                          Cardiac troponin                           



                          begins to rise 3-4                           



                          hours after the                           



                          onset of ischemia.                           



                          Repeat in 4-6 hours                           



                          if the sample was                           



                          drawn within 3-4                           



                          hours of the onset                           



                          of the symptom and                           



                          found normal.                           



                          Diagnosis of                           



                          myocardial injury is                           



                          made with acute                           



                          changes in cTn                           



                          concentrations with                           



                          at least one serial                           



                          sample above the                           



                          99th percentile                           



                          upper reference                           



                          limit (URL), taken                           



                          together with the                           



                          patient's clinical                           



                          presentation.                           



                          Biotin has been                           



                          reported to cause a                           



                          negative bias,                           



                          interpret results                           



                          relative to                            



                          patient's use of                           



                          biotin.                                

 

             Lab Interpretation Normal                                 



             (test code =                                        



             01929-8)                                            



[facility] [id number] 06:44:39





             Test Item    Value        Reference Range Interpretation Comments

 

             VALPROIC A (test code = 33 ug/mL            L            



             0267432218)                                         

 

             RICK (test code = RICK) Toxic Range: ? ? ?                           



                          ? ? ?Greater than                           



                          100 ug/mL                              

 

             Lab Interpretation (test Abnormal                               



             code = 39908-6)                                        



[facility] CenterAM[facility], [id number] 05:32:10





             Test Item    Value        Reference Range Interpretation Comments

 

             AMMONIA (test code = 5182660362) 49 umol/L    9-33         H       

     

 

             Lab Interpretation (test code = Abnormal                           

    



             54922-7)                                            



[facility] [id number] 04:42:00





             Test Item    Value        Reference Range Interpretation Comments

 

             TSH (test code =              See_Comment                [Automated

 message]



             9917283834)                                         The system [employer]



                                                                 generated this 

result



                                                                 transmitted ref

erence



                                                                 range: 0.45 - 4

.70



                                                                 mIU/L. The refe

rence



                                                                 range was not u

sed to



                                                                 interpret this 

result



                                                                 as normal/abnor

mal.

 

             Lab Interpretation (test Normal                                 



             code = 01771-6)                                        



[facility] 04:14:58





             Test Item    Value        Reference Range Interpretation Comments

 

             POCT GLU (test code = 1288561404) 204 mg/dL           H      

      

 

             Lab Interpretation (test code = Abnormal                           

    



             18500-9)                                            



[facility] CenterL[facility]13506)(TOTAL CHOLESTEROL, 
TRIGLYCERIDES, HDL)2021 04:11:59





             Test Item    Value        Reference Range Interpretation Comments

 

             CHOL (test code = 173 mg/dL    120-200                   



             3413195810)                                         

 

             HDL (test code = 30 mg/dL     >50          L            



             6783802545)                                         

 

             HDLC RATIO (test code =              See_Comment  H             [Au

tomated message]



             6031603059)                                         The system [employer]



                                                                 generated this



                                                                 result transmit

eduard



                                                                 reference range

:



                                                                 <=4.5. The refe

rence



                                                                 range was not u

sed



                                                                 to interpret th

is



                                                                 result as



                                                                 normal/abnormal

.

 

             TRIG (test code = 168 mg/dL                        



             3777713276)                                         

 

             LDL CHOL (test code = 109 mg/dL    See_Comment                [Auto

mated message]



             64994-1)                                            The system [employer]



                                                                 generated this



                                                                 result transmit

eduard



                                                                 reference range

:



                                                                 <=160. The refe

rence



                                                                 range was not u

sed



                                                                 to interpret th

is



                                                                 result as



                                                                 normal/abnormal

.

 

             VLDL (test code = 34 mg/dL     5-60                      



             8495738838)                                         

 

             Lab Interpretation (test Abnormal                               



             code = 09535-5)                                        



[facility] 04[facility]11:39





             Test Item    Value        Reference Range Interpretation Comments

 

             MAGNESIUM (test code = 7648836357) 1.6 mg/dL    1.7-2.4      L     

       

 

             Lab Interpretation (test code = Abnormal                           

    



             93941-1)                                            



[facility] 04:11:23





             Test Item    Value        Reference Range Interpretation Comments

 

             PHOSPHORUS (test code = 3781711371) 3.9 mg/dL    2.5-5.0           

        

 

             Lab Interpretation (test code = Normal                             

    



             12820-0)                                            



[facility] CenterURIC [id number] 04:10:57





             Test Item    Value        Reference Range Interpretation Comments

 

             URIC ACID (test code = 1667646235) 6.6 mg/dL    2.9-6.0      H     

       

 

             Lab Interpretation (test code = Abnormal                           

    



             50505-6)                                            



[facility] CenterGLYCOSYLATED HEMOGLOBIN (A1C)2021 
04:10:52





             Test Item    Value        Reference Range Interpretation Comments

 

             HGB A1C (test code = 7.6 %        4.0-5.7      H            



             4548-4)                                             

 

             RICK (test code = RICK) Reference                              



                          RangesNormal:                           



                          <5.7%Prediabetes:                           



                          5.7 - 6.4%Diabetes:                           



                          > 6.5%                                 

 

             Lab Interpretation (test Abnormal                               



             code = 66472-5)                                        



[facility]NA, K, CL, CO2, 
GLUCOSE, BUN, CREATININE, CA)2021 00:06:53





             Test Item    Value        Reference Range Interpretation Comments

 

             NA (test code = 138 mmol/L   135-145                   



             5695753465)                                         

 

             K (test code = 4.1 mmol/L   3.5-5.0                   



             0781760833)                                         

 

             CL (test code = 99 mmol/L                        



             5291232198)                                         

 

             CO2 TOTAL (test code = 35 mmol/L    23-31        H            



             6555029254)                                         

 

             AGAP (test code =              2-16                      



             7933086461)                                         

 

             BUN (test code = 21 mg/dL     7-23                      



             6800489682)                                         

 

             GLUCOSE (test code = 253 mg/dL           H            



             9774608857)                                         

 

             CREATININE (test code = 0.91 mg/dL   0.50-1.04                 



             8724342305)                                         

 

             CALCIUM (test code = 8.6 mg/dL    8.6-10.6                  



             2791922618)                                         

 

             eGFR (test code =              mL/min/1.73m2              



             3474583194)                                         

 

             IRCK (test code = RICK) Association of                           



                          Glomerular Filtration                           



                          Rate (GFR) and Staging                           



                          of Kidney Disease*                           



                          +---------------------                           



                          --+-------------------                           



                          --+-------------------                           



                          ------+| GFR                           



                          (mL/min/1.73 m2) ?|                           



                          With Kidney Damage ?|                           



                          ?Without Kidney                           



                          Damage+---------------                           



                          --------+-------------                           



                          --------+-------------                           



                          ------------+| ?>90 ?                           



                          ? ? ? ? ? ? ? ?|                           



                          ?Stage one ? ? ? ? ?|                           



                          ? Normal ? ? ? ? ? ? ?                           



                          ?+--------------------                           



                          ---+------------------                           



                          ---+------------------                           



                          -------+| ?60-89 ? ? ?                           



                          ? ? ? ? ?| ?Stage two                           



                          ? ? ? ? ?| ? Decreased                           



                          GFR ? ? ? ?                            



                          +---------------------                           



                          --+-------------------                           



                          --+-------------------                           



                          ------+| ?30-59 ? ? ?                           



                          ? ? ? ? ?| ?Stage                           



                          three ? ? ? ?| ? Stage                           



                          three ? ? ? ? ?                           



                          +---------------------                           



                          --+-------------------                           



                          --+-------------------                           



                          ------+| ?15-29 ? ? ?                           



                          ? ? ? ? ?| ?Stage four                           



                          ? ? ? ? | ? Stage four                           



                          ? ? ? ? ?                              



                          ?+--------------------                           



                          ---+------------------                           



                          ---+------------------                           



                          -------+| ?<15 (or                           



                          dialysis) ? ?| ?Stage                           



                          five ? ? ? ? | ? Stage                           



                          five ? ? ? ? ?                           



                          ?+--------------------                           



                          ---+------------------                           



                          ---+------------------                           



                          -------+ *Each stage                           



                          assumes the associated                           



                          GFR level has been in                           



                          effect for at least                           



                          three months. ?Stages                           



                          1 to 5, with or                           



                          without kidney                           



                          disease, indicate                           



                          chronic kidney                           



                          disease. Notes:                           



                          Determination of                           



                          stages one and two                           



                          (with eGFR                             



                          >59mL/min/1.73 m2)                           



                          requires estimation of                           



                          kidney damage for at                           



                          least three months as                           



                          defined by structural                           



                          or functional                           



                          abnormalities of the                           



                          kidney, manifested by                           



                          either:Pathological                           



                          abnormalities or                           



                          Markers of kidney                           



                          damage (including                           



                          abnormalities in the                           



                          composition of the                           



                          blood or urine or                           



                          abnormalities in                           



                          imaging tests).                           

 

             Lab Interpretation Abnormal                               



             (test code = 15971-2)                                        



[facility] WITH [id number] 21:16:41





             Test Item    Value        Reference Range Interpretation Comments

 

             WBC (test code =              See_Comment  L             [Automated



             6690-2)                                             message] The



                                                                 system which



                                                                 generated this



                                                                 result transmit

eduard



                                                                 reference range

:



                                                                 4.30 - 11.10



                                                                 10*3/?L. The



                                                                 reference range



                                                                 was not used to



                                                                 interpret this



                                                                 result as



                                                                 normal/abnormal

.

 

             RBC (test code =              See_Comment  L             [Automated



             789-8)                                              message] The



                                                                 system which



                                                                 generated this



                                                                 result transmit

eduard



                                                                 reference range

:



                                                                 3.93 - 5.25



                                                                 10*6/?L. The



                                                                 reference range



                                                                 was not used to



                                                                 interpret this



                                                                 result as



                                                                 normal/abnormal

.

 

             HGB (test code = 9.3 g/dL     11.6-15.0    L            



             718-7)                                              

 

             HCT (test code = 29.5 %       35.7-45.2    L            



             4544-3)                                             

 

             MCV (test code = 95.5 fL      80.6-95.5                 



             787-2)                                              

 

             MCH (test code = 30.1 pg      25.9-32.8                 



             785-6)                                              

 

             MCHC (test code = 31.5 g/dL    31.6-35.1    L            



             786-4)                                              

 

             RDW-SD (test code = 57.5 fL      39.0-49.9    H            



             67366-5)                                            

 

             RDW-CV (test code = 16.8 %       12.0-15.5    H            



             788-0)                                              

 

             PLT (test code =              See_Comment  LL            [Automated



             777-3)                                              message] The



                                                                 system which



                                                                 generated this



                                                                 result transmit

eduard



                                                                 reference range

:



                                                                 166 - 358 10*3/

?L.



                                                                 The reference



                                                                 range was not u

sed



                                                                 to interpret th

is



                                                                 result as



                                                                 normal/abnormal

.

 

             MPV (test code = 10.5 fL      9.5-12.9                  



             78310-6)                                            

 

             IPF % (test code = 1.4 %        1.3-7.7                   Platelet 

count



             [id number])                                         measured by



                                                                 fluorescence



                                                                 method.

 

             NRBC/100 WBC (test              See_Comment                [Automat

ed



             code = 5793024925)                                        message] 

The



                                                                 system which



                                                                 generated this



                                                                 result transmit

eduard



                                                                 reference range

:



                                                                 0.0 - 10.0 /100



                                                                 WBCs. The



                                                                 reference range



                                                                 was not used to



                                                                 interpret this



                                                                 result as



                                                                 normal/abnormal

.

 

             NRBC x10^3 (test code <0.01        See_Comment                [Auto

mated



             = 1297281627)                                        message] The



                                                                 system which



                                                                 generated this



                                                                 result transmit

eduard



                                                                 reference range

:



                                                                 10*3/?L. The



                                                                 reference range



                                                                 was not used to



                                                                 interpret this



                                                                 result as



                                                                 normal/abnormal

.

 

             GRAN MAT (NEUT) % 69.0 %                                 



             (test code = 770-8)                                        

 

             IMM GRAN % (test code 0.40 %                                 



             = 6379293269)                                        

 

             LYMPH % (test code = 23.9 %                                 



             736-9)                                              

 

             MONO % (test code = 5.6 %                                  



             5905-5)                                             

 

             EOS % (test code = 0.7 %                                  



             713-8)                                              

 

             BASO % (test code = 0.4 %                                  



             706-2)                                              

 

             GRAN MAT x10^3(ANC) 1.97 10*3/uL 1.88-7.09                 



             (test code =                                        



             5319215049)                                         

 

             IMM GRAN x10^3 (test <0.03        0.00-0.06                 



             code = 3421803630)                                        

 

             LYMPH x10^3 (test 0.68 10*3/uL 1.32-3.29    L            



             code = 731-0)                                        

 

             MONO x10^3 (test code 0.16 10*3/uL 0.33-0.92    L            



             = 742-7)                                            

 

             EOS x10^3 (test code <0.03        0.03-0.39    L            



             = 711-2)                                            

 

             BASO x10^3 (test code <0.03        0.01-0.07                 



             = 704-7)                                            

 

             TARGET CELLS (test 2+           See_Comment  A             [Automat

ed



             code = 63855-8)                                        message] The



                                                                 system which



                                                                 generated this



                                                                 result transmit

eduard



                                                                 reference range

:



                                                                 (none). The



                                                                 reference range



                                                                 was not used to



                                                                 interpret this



                                                                 result as



                                                                 normal/abnormal

.

 

             PLT ESTIMATE (test Critically   Normal       AA           



             code = 9317-9) Decreased                              

 

             Lab Interpretation Abnormal                               



             (test code = 54232-6)                                        



[facility] METABOLIC PANEL (NA, K, CL, CO2, 
GLUCOSE, BUN, CREATININE, CA)2021 20:48:54





             Test Item    Value        Reference Range Interpretation Comments

 

             NA (test code = 137 mmol/L   135-145                   



             2931645496)                                         

 

             K (test code = 4.5 mmol/L   3.5-5.0                   



             1673729723)                                         

 

             CL (test code = 101 mmol/L                       



             9976443266)                                         

 

             CO2 TOTAL (test code = 32 mmol/L    23-31        H            



             1711942392)                                         

 

             AGAP (test code =              2-16                      



             1314863621)                                         

 

             BUN (test code = 21 mg/dL     7-23                      



             8448619672)                                         

 

             GLUCOSE (test code = 321 mg/dL           H            



             6272054816)                                         

 

             CREATININE (test code = 0.86 mg/dL   0.50-1.04                 



             0625930377)                                         

 

             CALCIUM (test code = 8.4 mg/dL    8.6-10.6     L            



             4563690931)                                         

 

             eGFR (test code =              mL/min/1.73m2              



             7296121217)                                         

 

             RICK (test code = RICK) Association of                           



                          Glomerular Filtration                           



                          Rate (GFR) and Staging                           



                          of Kidney Disease*                           



                          +---------------------                           



                          --+-------------------                           



                          --+-------------------                           



                          ------+| GFR                           



                          (mL/min/1.73 m2) ?|                           



                          With Kidney Damage ?|                           



                          ?Without Kidney                           



                          Damage+---------------                           



                          --------+-------------                           



                          --------+-------------                           



                          ------------+| ?>90 ?                           



                          ? ? ? ? ? ? ? ?|                           



                          ?Stage one ? ? ? ? ?|                           



                          ? Normal ? ? ? ? ? ? ?                           



                          ?+--------------------                           



                          ---+------------------                           



                          ---+------------------                           



                          -------+| ?60-89 ? ? ?                           



                          ? ? ? ? ?| ?Stage two                           



                          ? ? ? ? ?| ? Decreased                           



                          GFR ? ? ? ?                            



                          +---------------------                           



                          --+-------------------                           



                          --+-------------------                           



                          ------+| ?30-59 ? ? ?                           



                          ? ? ? ? ?| ?Stage                           



                          three ? ? ? ?| ? Stage                           



                          three ? ? ? ? ?                           



                          +---------------------                           



                          --+-------------------                           



                          --+-------------------                           



                          ------+| ?15-29 ? ? ?                           



                          ? ? ? ? ?| ?Stage four                           



                          ? ? ? ? | ? Stage four                           



                          ? ? ? ? ?                              



                          ?+--------------------                           



                          ---+------------------                           



                          ---+------------------                           



                          -------+| ?<15 (or                           



                          dialysis) ? ?| ?Stage                           



                          five ? ? ? ? | ? Stage                           



                          five ? ? ? ? ?                           



                          ?+--------------------                           



                          ---+------------------                           



                          ---+------------------                           



                          -------+ *Each stage                           



                          assumes the associated                           



                          GFR level has been in                           



                          effect for at least                           



                          three months. ?Stages                           



                          1 to 5, with or                           



                          without kidney                           



                          disease, indicate                           



                          chronic kidney                           



                          disease. Notes:                           



                          Determination of                           



                          stages one and two                           



                          (with eGFR                             



                          >59mL/min/1.73 m2)                           



                          requires estimation of                           



                          kidney damage for at                           



                          least three months as                           



                          defined by structural                           



                          or functional                           



                          abnormalities of the                           



                          kidney, manifested by                           



                          either:Pathological                           



                          abnormalities or                           



                          Markers of kidney                           



                          damage (including                           



                          abnormalities in the                           



                          composition of the                           



                          blood or urine or                           



                          abnormalities in                           



                          imaging tests).                           

 

             Lab Interpretation Abnormal                               



             (test code = 43822-0)                                        



[facility]ALB,T.PRO,BILI
T,BU/BC,ALT,AST,ALK PHOS)2021 20:48:54





             Test Item    Value        Reference Range Interpretation Comments

 

             TOTAL BILI (test code = 9531335781) 1.3 mg/dL    0.1-1.1      H    

        

 

             BILI UNCON (test code = 0340892128) 0.9 mg/dL    0.1-1.1           

        

 

             BILI CONJ (test code = 2735976582) 0.0 mg/dL    0.0-0.3            

       

 

             T PROTEIN (test code = 3738830425) 7.1 g/dL     6.3-8.2            

       

 

             ALBUMIN (test code = 1313585674) 3.1 g/dL     3.5-5.0      L       

     

 

             ALK PHOS (test code = 9875053592) 106 U/L                    

      

 

             ALTv (test code = 1742-6) 22 U/L       5-35                      

 

             AST(SGOT) (test code = 2362636824) 48 U/L       13-40        H     

       

 

             Lab Interpretation (test code = Abnormal                           

    



             48259-2)                                            



[facility]





             Test Item    Value        Reference    Interpretation Comments



                                       Range                     

 

             TROPONIN I (test 0.010 ng/mL  See_Comment                [Automated



             code = 6113501184)                                        message] 

The



                                                                 system which



                                                                 generated this



                                                                 result



                                                                 transmitted



                                                                 reference range

:



                                                                 <=0.034. The



                                                                 reference range



                                                                 was not used to



                                                                 interpret this



                                                                 result as



                                                                 normal/abnormal

.

 

             RICK (test code = Reference (Normal)                           



             RICK)         Range (defined by                           



                          the 99th percentile                           



                          reference limit): <=                           



                          0.034 ng/mL Note:                           



                          Cardiac troponin                           



                          begins to rise 3-4                           



                          hours after the                           



                          onset of ischemia.                           



                          Repeat in 4-6 hours                           



                          if the sample was                           



                          drawn within 3-4                           



                          hours of the onset                           



                          of the symptom and                           



                          found normal.                           



                          Diagnosis of                           



                          myocardial injury is                           



                          made with acute                           



                          changes in cTn                           



                          concentrations with                           



                          at least one serial                           



                          sample above the                           



                          99th percentile                           



                          upper reference                           



                          limit (URL), taken                           



                          together with the                           



                          patient's clinical                           



                          presentation.                           



                          Biotin has been                           



                          reported to cause a                           



                          negative bias,                           



                          interpret results                           



                          relative to                            



                          patient's use of                           



                          biotin.                                

 

             Lab Interpretation Normal                                 



             (test code =                                        



             77930-8)                                            



[id number]:54





             Test Item    Value        Reference Range Interpretation Comments

 

             NT-proBNP (test code 3360 pg/mL   See_Comment  H             [Autom

ated



             = 3174693109)                                        message] The



                                                                 system which



                                                                 generated this



                                                                 result



                                                                 transmitted



                                                                 reference range

:



                                                                 <=125. The



                                                                 reference range



                                                                 was not used to



                                                                 interpret this



                                                                 result as



                                                                 normal/abnormal

.

 

             RICK (test code = RICK) Biotin has been                           



                          reported to                            



                          cause a negative                           



                          bias, interpret                           



                          results relative                           



                          to patient's use                           



                          of biotin.                             

 

             Lab Interpretation Abnormal                               



             (test code = 41332-8)                                        



[id number]:36:31





             Test Item    Value        Reference Range Interpretation Comments

 

             AMMONIA (test code = 19 umol/L    9-33                      Slight 

hemolysis



             8450309457)                                         

 

             Lab Interpretation (test Normal                                 



             code = 00650-0)                                        



[facility]





             Test Item    Value        Reference Range Interpretation Comments

 

             APTT Patient (test              See_Comment                [Automat

ed



             code = 3173-2)                                        message] The



                                                                 system which



                                                                 generated this



                                                                 result



                                                                 transmitted



                                                                 reference range

:



                                                                 23 - 38 Seconds

.



                                                                 The reference



                                                                 range was not



                                                                 used to interpr

et



                                                                 this result as



                                                                 normal/abnormal

.

 

             RICK (test code = RICK) The [facility] patient                           



                          population mean                           



                          normal value for                           



                          aPTT is 30                             



                          seconds.                               

 

             Lab Interpretation Normal                                 



             (test code = 63152-7)                                        



[facility]





             Test Item    Value        Reference Range Interpretation Comments

 

             APPEARANCE (test code = Clear        Clear                     



             0254961529)                                         

 

             COLOR (test code = Yellow       Yellow                    



             2185054350)                                         

 

             PH (test code =              4.8-8.0                   



             7333062273)                                         

 

             SP GRAVITY (test code =              1.003-1.030               



             4159541636)                                         

 

             GLU U QUAL (test code = 150 mg/dL    Normal       A            



             6497180010)                                         

 

             BLOOD (test code = 2+           Negative     A            



             7912994712)                                         

 

             KETONES (test code = Negative     Negative                  



             3785504585)                                         

 

             PROTEIN (test code = 100 mg/dL    Negative     A            



             2887-8)                                             

 

             UROBILIN (test code = 2.0 mg/dL    Normal       A            



             7034158219)                                         

 

             BILIRUBIN (test code = Negative     Negative                  



             7351911100)                                         

 

             NITRITE (test code = Negative     Negative                  



             7219587871)                                         

 

             LEUK MOE (test code = 25/uL        Negative     A            



             6902402136)                                         

 

             RBC/HPF (test code =              See_Comment  H             [Autom

ated message]



             8117850733)                                         The system [employer]

h



                                                                 generated this



                                                                 result transmit

eduard



                                                                 reference range

: 0 -



                                                                 3 HPF. The refe

rence



                                                                 range was not u

sed



                                                                 to interpret th

is



                                                                 result as



                                                                 normal/abnormal

.

 

             WBC/HPF (test code =              See_Comment                [Autom

ated message]



             5957040535)                                         The system [employer]



                                                                 generated this



                                                                 result transmit

eduard



                                                                 reference range

: 0 -



                                                                 5 HPF. The refe

rence



                                                                 range was not u

sed



                                                                 to interpret th

is



                                                                 result as



                                                                 normal/abnormal

.

 

             BACTERIA (test code = Negative     Negative                  



             3223856203)                                         

 

             MUCOUS (test code = Slight       Negative LPF A            



             8112081409)                                         

 

             SQ EPITH (test code =              HPF                       



             9905426883)                                         

 

             Lab Interpretation (test Abnormal                               



             code = 04301-0)                                        



[id number]:00





             Test Item    Value        Reference Range Interpretation Comments

 

             PROTIME PATIENT (test              See_Comment                [Auto

mated message]



             code = 5964-2)                                        The system [email or web address]



                                                                 generated this 

result



                                                                 transmitted ref

erence



                                                                 range: 12.0 - 1

4.7



                                                                 Seconds. The re

ference



                                                                 range was not u

sed to



                                                                 interpret this 

result



                                                                 as normal/abnor

mal.

 

             INR (test code = 6301-6)                                        Nor

mal INR <1.1;



                                                                 Warfarin Therap

eutic



                                                                 range 2.0 to 3.

0 or



                                                                 2.5 to 3.5, dep

ending



                                                                 upon the indica

tions.

 

             Lab Interpretation (test Normal                                 



             code = 42895-3)                                        



[id number] 02:22:00





             Test Item    Value        Reference Range Interpretation Comments

 

             AMMONIA (test code = 2980842319) 15 umol/L    9-33                 

     

 

             Lab Interpretation (test code = Normal                             

    



             76236-1)                                            



[facility]





             Test Item    Value        Reference Range Interpretation Comments

 

             POCT GLU (test code = 3122986700) 434 mg/dL           H      

      

 

             Lab Interpretation (test code = Abnormal                           

    



             36936-9)                                            



[facility] 0-[facility] 07:28:00





             Test Item    Value        Reference Range Interpretation Comments

 

             POCT Glu (age 0-30days) (test code 434 mg/dl           A     

       



             = 3343)                                             

 

             Lab Interpretation (test code = Abnormal                           

    



             04831-3)                                            



[facility] with [id number] 04:38:00





             Test Item    Value        Reference Range Interpretation Comments

 

             WBC (test code =              See_Comment                [Automated



             6690-2)                                             message] The sy

stem



                                                                 which generated



                                                                 this result



                                                                 transmitted



                                                                 reference range

:



                                                                 4.30 - 11.10



                                                                 10*3/?L. The



                                                                 reference range

 was



                                                                 not used to



                                                                 interpret this



                                                                 result as



                                                                 normal/abnormal

.

 

             RBC (test code =              See_Comment  L             [Automated



             789-8)                                              message] The sy

stem



                                                                 which generated



                                                                 this result



                                                                 transmitted



                                                                 reference range

:



                                                                 3.93 - 5.25



                                                                 10*6/?L. The



                                                                 reference range

 was



                                                                 not used to



                                                                 interpret this



                                                                 result as



                                                                 normal/abnormal

.

 

             HGB (test code = 11.0 g/dL    11.6-15      L            



             718-7)                                              

 

             HCT (test code = 34.7 %       35.7-45.2    L            



             4544-3)                                             

 

             MCV (test code = 94.6 fL      80.6-95.5                 



             787-2)                                              

 

             MCH (test code = 30.0 pg      25.9-32.8                 



             785-6)                                              

 

             MCHC (test code = 31.7 g/dL    31.6-35.1                 



             786-4)                                              

 

             RDW-SD (test code = 52.5 fL      39-49.9      H            



             14476-3)                                            

 

             RDW-CV (test code = 15.2 %       12-15.5                   



             788-0)                                              

 

             PLT (test code =              See_Comment  L             [Automated



             777-3)                                              message] The sy

stem



                                                                 which generated



                                                                 this result



                                                                 transmitted



                                                                 reference range

:



                                                                 166 - 358 10*3/

?L.



                                                                 The reference r

olman



                                                                 was not used to



                                                                 interpret this



                                                                 result as



                                                                 normal/abnormal

.

 

             MPV (test code = 9.5 fL       9.5-12.9                  



             34996-4)                                            

 

             IPF % (test code = 2.5 %        1.3-7.7                   Platelet 

count



             6285813808)                                         measured by



                                                                 fluorescence



                                                                 method.

 

             NRBC/100 WBC (test              See_Comment                [Automat

ed



             code = 8064295538)                                        message] 

The system



                                                                 which generated



                                                                 this result



                                                                 transmitted



                                                                 reference range

:



                                                                 0.0 - 10.0 /100



                                                                 WBCs. The refer

ence



                                                                 range was not u

sed



                                                                 to interpret th

is



                                                                 result as



                                                                 normal/abnormal

.

 

             NRBC x10^3 (test code <0.01        See_Comment                [Auto

mated



             = 2232942937)                                        message] The s

ystem



                                                                 which generated



                                                                 this result



                                                                 transmitted



                                                                 reference range

:



                                                                 10*3/?L. The



                                                                 reference range

 was



                                                                 not used to



                                                                 interpret this



                                                                 result as



                                                                 normal/abnormal

.

 

             GRAN MAT (NEUT) % 84.8 %                                 



             (test code = 770-8)                                        

 

             IMM GRAN % (test code 0.50 %                                 



             = 4074191923)                                        

 

             LYMPH % (test code = 10.4 %                                 



             736-9)                                              

 

             MONO % (test code = 4.1 %                                  



             5905-5)                                             

 

             EOS % (test code = 0.0 %                                  



             713-8)                                              

 

             BASO % (test code = 0.2 %                                  



             706-2)                                              

 

             GRAN MAT x10^3(ANC) 4.81 10*3/uL 1.88-7.09                 



             (test code =                                        



             2464337472)                                         

 

             IMM GRAN x10^3 (test 0.03 10*3/uL 0-0.06                    



             code = 6454000299)                                        

 

             LYMPH x10^3 (test code 0.59 10*3/uL 1.32-3.29    L            



             = 731-0)                                            

 

             MONO x10^3 (test code 0.23 10*3/uL 0.33-0.92    L            



             = 742-7)                                            

 

             EOS x10^3 (test code = <0.03        0.03-0.39    L            



             711-2)                                              

 

             BASO x10^3 (test code <0.03        0.01-0.07                 



             = 704-7)                                            

 

             PLT ESTIMATE (test Decreased    Normal       A            



             code = 9317-9)                                        

 

             Lab Interpretation Abnormal                               



             (test code = 52970-2)                                        



[facility]





             Test Item    Value        Reference Range Interpretation Comments

 

             APPEARANCE (test code = Clear        Clear                     



             8130071216)                                         

 

             COLOR (test code = Yellow       Yellow                    



             6555654435)                                         

 

             PH (test code =              4.8-8.0                   



             6628488870)                                         

 

             SP GRAVITY (test code =              1.003-1.030               



             8060181425)                                         

 

             GLU U QUAL (test code = 500 mg/dL    Normal       A            



             1173418054)                                         

 

             BLOOD (test code = Negative     Negative                  



             1849449430)                                         

 

             KETONES (test code = Negative     Negative                  



             9185524453)                                         

 

             PROTEIN (test code = Negative     Negative                  



             2887-8)                                             

 

             UROBILIN (test code = Normal       Normal                    



             9548492221)                                         

 

             BILIRUBIN (test code = Negative     Negative                  



             0465566933)                                         

 

             NITRITE (test code = Negative     Negative                  



             2586072354)                                         

 

             LEUK MOE (test code = Negative     Negative                  



             5953737600)                                         

 

             RBC/HPF (test code = <1           See_Comment                [Autom

ated message]



             3592123789)                                         The system [employer]



                                                                 generated this



                                                                 result transmit

deuard



                                                                 reference range

: 0 -



                                                                 3 HPF. The refe

rence



                                                                 range was not u

sed



                                                                 to interpret th

is



                                                                 result as



                                                                 normal/abnormal

.

 

             WBC/HPF (test code = <1           See_Comment                [Autom

ated message]



             2844468246)                                         The system [employer]



                                                                 generated this



                                                                 result transmit

eduard



                                                                 reference range

: 0 -



                                                                 5 HPF. The refe

rence



                                                                 range was not u

sed



                                                                 to interpret th

is



                                                                 result as



                                                                 normal/abnormal

.

 

             BACTERIA (test code = Few          Negative     A            



             7177773041)                                         

 

             SQ EPITH (test code =              HPF                       



             5867078802)                                         

 

             Lab Interpretation (test Abnormal                               



             code = 98976-2)                                        



[facility]. METABOLIC PANEL (17173)2020 
04:18:00





             Test Item    Value        Reference Range Interpretation Comments

 

             NA (test code = 129 mmol/L   135-145      L            



             2553811461)                                         

 

             K (test code = 4.8 mmol/L   3.5-5                     



             9120720906)                                         

 

             CL (test code = 98 mmol/L                        



             9904416431)                                         

 

             CO2 TOTAL (test code = 22 mmol/L    23-31        L            



             7864283193)                                         

 

             AGAP (test code =              2-16                      



             4949321947)                                         

 

             BUN (test code = 38 mg/dL     7-23         H            



             4455414869)                                         

 

             GLUCOSE (test code = 745 mg/dL           HH           



             5299957074)                                         

 

             CREATININE (test code = 0.96 mg/dL   0.5-1.04                  



             4756305214)                                         

 

             TOTAL BILI (test code = 0.7 mg/dL    0.1-1.1                   



             1698900059)                                         

 

             CALCIUM (test code = 9.1 mg/dL    8.6-10.6                  



             3426061085)                                         

 

             T PROTEIN (test code = 7.8 g/dL     6.3-8.2                   



             1350426033)                                         

 

             ALBUMIN (test code = 3.5 g/dL     3.5-5                     



             8900812959)                                         

 

             ALK PHOS (test code = 194 U/L             H            



             3043347393)                                         

 

             ALTv (test code = 31 U/L       5-35                      



             1742-6)                                             

 

             AST(SGOT) (test code = 42 U/L       13-40        H            



             5667588627)                                         

 

             eGFR Calculation              mL/min/1.73m2              



             (Non-)                                        



             (test code =                                        



             8115888507)                                         

 

             eGFR Calculation              mL/min/1.73m2              



             (African American)                                        



             (test code =                                        



             5143836165)                                         

 

             RICK (test code = RICK) Association of                           



                          Glomerular Filtration                           



                          Rate (GFR) and Staging                           



                          of Kidney Disease*                           



                          +---------------------                           



                          --+-------------------                           



                          --+-------------------                           



                          ------+| GFR                           



                          (mL/min/1.73 m2) ?|                           



                          With Kidney Damage ?|                           



                          ?Without Kidney                           



                          Damage+---------------                           



                          --------+-------------                           



                          --------+-------------                           



                          ------------+| ?>90 ?                           



                          ? ? ? ? ? ? ? ?|                           



                          ?Stage one ? ? ? ? ?|                           



                          ? Normal ? ? ? ? ? ? ?                           



                          ?+--------------------                           



                          ---+------------------                           



                          ---+------------------                           



                          -------+| ?60-89 ? ? ?                           



                          ? ? ? ? ?| ?Stage two                           



                          ? ? ? ? ?| ? Decreased                           



                          GFR ? ? ? ?                            



                          +---------------------                           



                          --+-------------------                           



                          --+-------------------                           



                          ------+| ?30-59 ? ? ?                           



                          ? ? ? ? ?| ?Stage                           



                          three ? ? ? ?| ? Stage                           



                          three ? ? ? ? ?                           



                          +---------------------                           



                          --+-------------------                           



                          --+-------------------                           



                          ------+| ?15-29 ? ? ?                           



                          ? ? ? ? ?| ?Stage four                           



                          ? ? ? ? | ? Stage four                           



                          ? ? ? ? ?                              



                          ?+--------------------                           



                          ---+------------------                           



                          ---+------------------                           



                          -------+| ?<15 (or                           



                          dialysis) ? ?| ?Stage                           



                          five ? ? ? ? | ? Stage                           



                          five ? ? ? ? ?                           



                          ?+--------------------                           



                          ---+------------------                           



                          ---+------------------                           



                          -------+ *Each stage                           



                          assumes the associated                           



                          GFR level has been in                           



                          effect for at least                           



                          three months. ?Stages                           



                          1 to 5, with or                           



                          without kidney                           



                          disease, indicate                           



                          chronic kidney                           



                          disease. Notes:                           



                          Determination of                           



                          stages one and two                           



                          (with eGFR                             



                          >59mL/min/1.73 m2)                           



                          requires estimation of                           



                          kidney damage for at                           



                          least three months as                           



                          defined by structural                           



                          or functional                           



                          abnormalities of the                           



                          kidney, manifested by                           



                          either:Pathological                           



                          abnormalities or                           



                          Markers of kidney                           



                          damage (including                           



                          abnormalities in the                           



                          composition of the                           



                          blood or urine or                           



                          abnormalities in                           



                          imaging tests).                           

 

             Lab Interpretation Abnormal                               



             (test code = 46084-1)                                        



[id number]





             Test Item    Value        Reference Range Interpretation Comments

 

             TROPONIN I (test <0.012       See_Comment                [Automated



             code = [id number])                                        message] 

The



                                                                 system which



                                                                 generated this



                                                                 result



                                                                 transmitted



                                                                 reference range

:



                                                                 <=0.034 ng/mL.



                                                                 The reference



                                                                 range was not



                                                                 used to interpr

et



                                                                 this result as



                                                                 normal/abnormal

.

 

             RICK (test code = Equal or Less than                           



             RICK)         0.034                                  



                          ng/ml---Normal                           



                          ?Note: Cardiac                           



                          troponin begins to                           



                          rise 3-4 hours                           



                          after the onset of                           



                          ischemia. Repeat                           



                          in 4-6 hours if                           



                          the sample was                           



                          drawn within 3-4                           



                          hours of the onset                           



                          of the symptom and                           



                          found normal.                           



                          Between 0.035 and                           



                          0.120 ng/mL---                           



                          Borderline.                            



                          Questionable                           



                          myocardial injury                           



                          or necrosis ?                           



                          ?Note: Serial                           



                          measurement may be                           



                          necessary to                           



                          confirm or exclude                           



                          the diagnosis of                           



                          myocardial injury                           



                          or necrosis;                           



                          Clinical                               



                          correlation                            



                          (symptoms, EKGs,                           



                          imaging studies,                           



                          and others)                            



                          required; Repeat                           



                          in 4-6 hours if                           



                          clinically                             



                          indicated. ? ? ? ?                           



                          Equal or Higher                           



                          than 0.121                             



                          ng/mL---Abnormal.                           



                          Myocardial Injury                           



                          or Necrosis Likely                           



                          ? ? ? ?  Biotin                           



                          has been reported                           



                          to cause a                             



                          negative bias,                           



                          interpret results                           



                          relative to                            



                          patient's use of                           



                          biotin. ? ? ? ? ?                           



                          ? ? ? ? ? ? ? ? ?                           



                          ? ? ? ? ? ? ? ?  ?                           



                          ? ? ? ? ? ? ? ? ?                           



                          ? ? ? ? ? ? ? ? ?                           



                          ? ? ? ? ? ? ? ? ?                           

 

             Lab Interpretation Normal                                 



             (test code =                                        



             74501-6)                                            



[id number]





             Test Item    Value        Reference Range Interpretation Comments

 

             NT-proBNP (test code 317 pg/mL    See_Comment  H             [Autom

ated



             = [id number])                                        message] The



                                                                 system which



                                                                 generated this



                                                                 result



                                                                 transmitted



                                                                 reference range

:



                                                                 <=125. The



                                                                 reference range



                                                                 was not used to



                                                                 interpret this



                                                                 result as



                                                                 normal/abnormal

.

 

             RICK (test code = RICK) Biotin has been                           



                          reported to                            



                          cause a negative                           



                          bias, interpret                           



                          results relative                           



                          to patient's use                           



                          of biotin.                             

 

             Lab Interpretation Abnormal                               



             (test code = 73509-1)                                        



[facility]





             Test Item    Value        Reference Range Interpretation Comments

 

             POCT Glu (age 0-30days) (test code = high                    

         



             3343)                                               

 

             Lab Interpretation (test code = Normal                             

    



             28488-0)                                            



[facility] VW [id number] 22:27:15 1. No 
acute intrathoracic abnormality, specifically no detectableradiographic findings
to suggest COVID-19 pneumonia. Disclaimer: Generally, the findings on chest 
imaging in COVID-19 are notspecific, and overlap with other infections, 
including influenza, H1N1,SARS and MERS.According to the Centers for Disease 
Control (CDC) and the American Collegeof Radiology, viral testing remains the 
only specific method of diagnosiseven if CXR or CT findings are suggestive of 
COVID-19. PROCEDURE: CHEST XRAY oneview portable,  CLINICAL INDICATION: cough  
COMPARISON: 6/3/2020 FINDINGS: Lungs: Lungs are clear. No suspicious abnormality
is noted. Pleura: No pleural effusion or pneumothorax is seen. The heart is n
ormal insize. No acute bony abnormality. Multiple surgical clips are seen 
superimposed over the right axilla andright breast as well as adjacent to the 
aortic knob and left upper lung. [facility], [facility] Results Inft User - 2020  
5:28 PM CDTPROCEDURE: CHEST XRAY one view portable, CLINICAL INDICATION: cough 
COMPARISON: 6/3/2020FINDINGS:Lungs: Lungs are clear. No suspicious abnormality 
is noted.Pleura: No pleural effusion or pneumothorax is seen. The heart is 
normal insize.No acute bony abnormality.Multiple surgical clips are seen 
superimposed over the right axilla andright breast as well as adjacent to the 
aortic knob and left upper lung.IMPRESSION1. No acute intrathoracic abnormality,
specifically no detectableradiographic findings to suggest COVID-19 
pneumonia.Disclaimer: Generally, the findings on chest imaging in COVID-19 are 
notspecific, and overlap with other infections, including influenza, H1N1,SARS 
and MERS.According to the Centers for Disease Control (CDC) and the American 
Collegeof Radiology, viral testing remains the only specific method of 
diagnosiseven if CXR or CT findings are suggestive of COVID-19.[facility] - DR[facility] S[facility] [id number] 22:22:00





             Test Item    Value        Reference Range Interpretation Comments

 

             BENZO U (test code = Presumptive  Negative     A            



             [id number])  Positive                               

 

             JONATHAN U (test code = Negative     Negative                  



             8304586523)                                         

 

             AMPHET (test code = Negative     Negative                  



             6321678511)                                         

 

             THC (test code = Negative     Negative                  



             3819007579)                                         

 

             METHADONE (test code Presumptive  Negative     A            



             = 2400179980) Positive                               

 

             Meth U (test code = Negative     Negative                  



             6486907070)                                         

 

             OPIATES (test code = Negative     Negative                  



             6568722720)                                         

 

             Cocaine Metabolite Negative     Negative                  



             (test code =                                        



             0672228389)                                         

 

             PROPOXY (test code = Negative     Negative                  



             6298355325)                                         

 

             Tric U (test code = Presumptive  Negative     A            Confirma

tion of



             5384877652)  Positive                               Presumptive



                                                                 Positive TCA



                                                                 result requires



                                                                 physician order



                                                                 and this will b

e



                                                                 sent to referen

ce



                                                                 lab.

 

             PCP (test code = Negative     Negative                  



             3060871465)                                         

 

             OXYCOD (test code = Negative     Negative                  



             9889009637)                                         

 

             RICK (test code = Urine Drug Cutoff                           



             RICK)         Ranges                                 



                          Benzodiazepines:                           



                          ? ? 150                                



                          ng/mLBarbiturates                           



                          : ? ? ? ?200                           



                          ng/mLAmphetamine:                           



                          ? ? ? ? 500                            



                          ng/mLCannabinoids                           



                          : ? ? ? ?50                            



                          ?ng/mLMethadone:                           



                          ? ? ? ? ? 200                           



                          ng/mLMethamphetam                           



                          ine: ? ? 500                           



                          ng/mL Opiates: ?                           



                          ? ? ? ? ? 100                           



                          ng/mL or 2000                           



                          ng/mLCocaine: ? ?                           



                          ? ? ? ? 150                            



                          ng/mLPropoxyphene                           



                          : ? ? ? ?300                           



                          ng/mLTricyclics:                           



                          ? ? ? ? ?300                           



                          ng/mLOxycodone: ?                           



                          ? ? ? ? 100                            



                          ng/mLPCP: ? ? ? ?                           



                          ? ? ? ? 25 ?ng/mL                           



                           The results are                           



                          to be used only                           



                          for medical                            



                          (i.e., treatment)                           



                          purposes.                              



                          Unconfirmed                            



                          screening results                           



                          must not be used                           



                          for non-medical                           



                          purposes (e.g.,                           



                          employment                             



                          testing, legal                           



                          testing).                              

 

             Lab Interpretation Abnormal                               



             (test code =                                        



             50875-5)                                            



[facility]





             Test Item    Value        Reference Range Interpretation Comments

 

             APPEARANCE (test code = Clear        Clear                     



             7538885167)                                         

 

             COLOR (test code = Yellow       Yellow                    



             9304419468)                                         

 

             PH (test code =              4.8-8.0                   



             1949395150)                                         

 

             SP GRAVITY (test code =              1.003-1.030               



             0439034992)                                         

 

             GLU U QUAL (test code = 500 mg/dL    Normal       A            



             0510802822)                                         

 

             BLOOD (test code = Negative     Negative                  



             5241364939)                                         

 

             KETONES (test code = Negative     Negative                  



             8871127524)                                         

 

             PROTEIN (test code = Negative     Negative                  



             2887-8)                                             

 

             UROBILIN (test code = 4.0 mg/dL    Normal       A            



             2331255502)                                         

 

             BILIRUBIN (test code = Negative     Negative                  



             3706481740)                                         

 

             NITRITE (test code = Negative     Negative                  



             5214207896)                                         

 

             LEUK MOE (test code = Negative     Negative                  



             1482726708)                                         

 

             RBC/HPF (test code =              See_Comment                [Autom

ated message]



             9344617499)                                         The system [employer]



                                                                 generated this



                                                                 result transmit

eduard



                                                                 reference range

: 0 -



                                                                 3 HPF. The refe

rence



                                                                 range was not u

sed



                                                                 to interpret th

is



                                                                 result as



                                                                 normal/abnormal

.

 

             WBC/HPF (test code = <1           See_Comment                [Autom

ated message]



             6443005939)                                         The system [employer]



                                                                 generated this



                                                                 result transmit

eduard



                                                                 reference range

: 0 -



                                                                 5 HPF. The refe

rence



                                                                 range was not u

sed



                                                                 to interpret th

is



                                                                 result as



                                                                 normal/abnormal

.

 

             BACTERIA (test code = Few          Negative     A            



             2623157795)                                         

 

             SQ EPITH (test code =              HPF                       



             7920084446)                                         

 

             Lab Interpretation (test Abnormal                               



             code = 63409-7)                                        



[facility] -[id number] 21:59:00





             Test Item    Value        Reference Range Interpretation Comments

 

             TROPONIN I (test <0.012       See_Comment                [Automated



             code = 8626869969)                                        message] 

The



                                                                 system which



                                                                 generated this



                                                                 result



                                                                 transmitted



                                                                 reference range

:



                                                                 <=0.034 ng/mL.



                                                                 The reference



                                                                 range was not



                                                                 used to interpr

et



                                                                 this result as



                                                                 normal/abnormal

.

 

             RICK (test code = Equal or Less than                           



             RICK)         0.034                                  



                          ng/ml---Normal                           



                          ?Note: Cardiac                           



                          troponin begins to                           



                          rise 3-4 hours                           



                          after the onset of                           



                          ischemia. Repeat                           



                          in 4-6 hours if                           



                          the sample was                           



                          drawn within 3-4                           



                          hours of the onset                           



                          of the symptom and                           



                          found normal.                           



                          Between 0.035 and                           



                          0.120 ng/mL---                           



                          Borderline.                            



                          Questionable                           



                          myocardial injury                           



                          or necrosis ?                           



                          ?Note: Serial                           



                          measurement may be                           



                          necessary to                           



                          confirm or exclude                           



                          the diagnosis of                           



                          myocardial injury                           



                          or necrosis;                           



                          Clinical                               



                          correlation                            



                          (symptoms, EKGs,                           



                          imaging studies,                           



                          and others)                            



                          required; Repeat                           



                          in 4-6 hours if                           



                          clinically                             



                          indicated. ? ? ? ?                           



                          Equal or Higher                           



                          than 0.121                             



                          ng/mL---Abnormal.                           



                          Myocardial Injury                           



                          or Necrosis Likely                           



                          ? ? ? ?  Biotin                           



                          has been reported                           



                          to cause a                             



                          negative bias,                           



                          interpret results                           



                          relative to                            



                          patient's use of                           



                          biotin. ? ? ? ? ?                           



                          ? ? ? ? ? ? ? ? ?                           



                          ? ? ? ? ? ? ? ?  ?                           



                          ? ? ? ? ? ? ? ? ?                           



                          ? ? ? ? ? ? ? ? ?                           



                          ? ? ? ? ? ? ? ? ?                           

 

             Lab Interpretation Normal                                 



             (test code =                                        



             04166-6)                                            



[id number]





             Test Item    Value        Reference Range Interpretation Comments

 

             NT-proBNP (test code 149 pg/mL    See_Comment  H             [Autom

ated



             = [id number])                                        message] The



                                                                 system which



                                                                 generated this



                                                                 result



                                                                 transmitted



                                                                 reference range

:



                                                                 <=125. The



                                                                 reference range



                                                                 was not used to



                                                                 interpret this



                                                                 result as



                                                                 normal/abnormal

.

 

             RICK (test code = RICK) Biotin has been                           



                          reported to                            



                          cause a negative                           



                          bias, interpret                           



                          results relative                           



                          to patient's use                           



                          of biotin.                             

 

             Lab Interpretation Abnormal                               



             (test code = 39737-3)                                        



[facility], K, CL, CO2, 
GLUCOSE, BUN, CREATININE, CA)2020 21:52:00





             Test Item    Value        Reference Range Interpretation Comments

 

             NA (test code = 132 mmol/L   135-145      L            



             7059118760)                                         

 

             K (test code = 5.1 mmol/L   3.5-5        H            



             3198327024)                                         

 

             CL (test code = 99 mmol/L                        



             2220958482)                                         

 

             CO2 TOTAL (test code = 27 mmol/L    23-31                     



             1217396379)                                         

 

             AGAP (test code =              2-16                      



             4405685773)                                         

 

             BUN (test code = 22 mg/dL     7-23                      



             8158485005)                                         

 

             GLUCOSE (test code = 563 mg/dL           HH           



             6970181058)                                         

 

             CREATININE (test code = 0.75 mg/dL   0.5-1.04                  



             6112110591)                                         

 

             CALCIUM (test code = 8.7 mg/dL    8.6-10.6                  



             6825381883)                                         

 

             eGFR Calculation              mL/min/1.73m2              



             (Non-)                                        



             (test code =                                        



             1324344945)                                         

 

             eGFR Calculation              mL/min/1.73m2              



             (African American)                                        



             (test code =                                        



             6003993296)                                         

 

             RICK (test code = RICK) Association of                           



                          Glomerular Filtration                           



                          Rate (GFR) and Staging                           



                          of Kidney Disease*                           



                          +---------------------                           



                          --+-------------------                           



                          --+-------------------                           



                          ------+| GFR                           



                          (mL/min/1.73 m2) ?|                           



                          With Kidney Damage ?|                           



                          ?Without Kidney                           



                          Damage+---------------                           



                          --------+-------------                           



                          --------+-------------                           



                          ------------+| ?>90 ?                           



                          ? ? ? ? ? ? ? ?|                           



                          ?Stage one ? ? ? ? ?|                           



                          ? Normal ? ? ? ? ? ? ?                           



                          ?+--------------------                           



                          ---+------------------                           



                          ---+------------------                           



                          -------+| ?60-89 ? ? ?                           



                          ? ? ? ? ?| ?Stage two                           



                          ? ? ? ? ?| ? Decreased                           



                          GFR ? ? ? ?                            



                          +---------------------                           



                          --+-------------------                           



                          --+-------------------                           



                          ------+| ?30-59 ? ? ?                           



                          ? ? ? ? ?| ?Stage                           



                          three ? ? ? ?| ? Stage                           



                          three ? ? ? ? ?                           



                          +---------------------                           



                          --+-------------------                           



                          --+-------------------                           



                          ------+| ?15-29 ? ? ?                           



                          ? ? ? ? ?| ?Stage four                           



                          ? ? ? ? | ? Stage four                           



                          ? ? ? ? ?                              



                          ?+--------------------                           



                          ---+------------------                           



                          ---+------------------                           



                          -------+| ?<15 (or                           



                          dialysis) ? ?| ?Stage                           



                          five ? ? ? ? | ? Stage                           



                          five ? ? ? ? ?                           



                          ?+--------------------                           



                          ---+------------------                           



                          ---+------------------                           



                          -------+ *Each stage                           



                          assumes the associated                           



                          GFR level has been in                           



                          effect for at least                           



                          three months. ?Stages                           



                          1 to 5, with or                           



                          without kidney                           



                          disease, indicate                           



                          chronic kidney                           



                          disease. Notes:                           



                          Determination of                           



                          stages one and two                           



                          (with eGFR                             



                          >59mL/min/1.73 m2)                           



                          requires estimation of                           



                          kidney damage for at                           



                          least three months as                           



                          defined by structural                           



                          or functional                           



                          abnormalities of the                           



                          kidney, manifested by                           



                          either:Pathological                           



                          abnormalities or                           



                          Markers of kidney                           



                          damage (including                           



                          abnormalities in the                           



                          composition of the                           



                          blood or urine or                           



                          abnormalities in                           



                          imaging tests).                           

 

             Lab Interpretation Abnormal                               



             (test code = 46105-9)                                        



[facility]ALB, T.PRO, BILI T, 
BU/BC, ALT, AST, ALK PHOS)2020 21:49:00





             Test Item    Value        Reference Range Interpretation Comments

 

             TOTAL BILI (test code = 2343195480) 0.9 mg/dL    0.1-1.1           

        

 

             BILI UNCON (test code = 7967658659) 0.8 mg/dL    0.1-1.1           

        

 

             BILI CONJ (test code = 9305838860) 0.0 mg/dL    0-0.3              

       

 

             T PROTEIN (test code = 1713610739) 7.4 g/dL     6.3-8.2            

       

 

             ALBUMIN (test code = 9125535984) 3.3 g/dL     3.5-5        L       

     

 

             ALK PHOS (test code = 2907098453) 148 U/L             H      

      

 

             ALTv (test code = 1742-6) 22 U/L       5-35                      

 

             AST(SGOT) (test code = 2165605745) 42 U/L       13-40        H     

       

 

             Lab Interpretation (test code = Abnormal                           

    



             10055-8)                                            



[id number]





             Test Item    Value        Reference Range Interpretation Comments

 

             LIPASE (test code = 2355173508) 86 U/L       0-220                 

    

 

             Lab Interpretation (test code = Normal                             

    



             31130-0)                                            



[facility] 
21:44:00





             Test Item    Value        Reference Range Interpretation Comments

 

             SARS-CoV-2 Rapid ID NOW Positive     Not Detected A            



             (test code = 07127-0)                                        

 

             RICK (test code = RICK) ID NOW COVID-19 Assay                        

   



                          is an isothermal                           



                          nucleic acid                           



                          amplification test                           



                          intended for the                           



                          qualitative detection                           



                          of nucleic acid from                           



                          SARS-CoV-2 viral RNA                           



                          in nasopharyngeal (NP)                           



                          specimens. It is used                           



                          under Emergency Use                           



                          Authorization (EUA) by                           



                          FDA. The limit of                           



                          detection (LOD) of the                           



                          assay is 125 Genome                           



                          Equivalents/mL. A                           



                          positive result is                           



                          indicative of the                           



                          presence of SARS-CoV-2                           



                          RNA. ?Clinical                           



                          correlation with                           



                          patient history and                           



                          other diagnostic                           



                          information is                           



                          necessary to determine                           



                          patient infection                           



                          status. A negative                           



                          (Not Detected) result                           



                          does not preclude                           



                          SARS-CoV-2 infection.                           



                          In patients with                           



                          clinical symptoms and                           



                          other tests that are                           



                          consistent with                           



                          SARS-CoV-2 infection,                           



                          negative results                           



                          should be treated as                           



                          presumptive negative                           



                          and a new specimen                           



                          should be tested with                           



                          alternative PCR                           



                          molecular test.                           



                          Invalid: Please                           



                          collect a new specimen                           



                          for repeat patient                           



                          testing if clinically                           



                          indicated.                             

 

             Lab Interpretation Abnormal                               



             (test code = 13650-3)                                        



[id number] 21:40:00





             Test Item    Value        Reference Range Interpretation Comments

 

             WBC (test code =              See_Comment  L             [Automated



             4790-2)                                             message] The sy

stem



                                                                 which generated



                                                                 this result



                                                                 transmitted



                                                                 reference range

:



                                                                 4.30 - 11.10



                                                                 10*3/?L. The



                                                                 reference range

 was



                                                                 not used to



                                                                 interpret this



                                                                 result as



                                                                 normal/abnormal

.

 

             RBC (test code =              See_Comment  L             [Automated



             589-8)                                              message] The sy

stem



                                                                 which generated



                                                                 this result



                                                                 transmitted



                                                                 reference range

:



                                                                 3.93 - 5.25



                                                                 10*6/?L. The



                                                                 reference range

 was



                                                                 not used to



                                                                 interpret this



                                                                 result as



                                                                 normal/abnormal

.

 

             HGB (test code = 10.5 g/dL    11.6-15      L            



             718-7)                                              

 

             HCT (test code = 32.6 %       35.7-45.2    L            



             4544-3)                                             

 

             MCV (test code = 94.5 fL      80.6-95.5                 



             787-2)                                              

 

             MCH (test code = 30.4 pg      25.9-32.8                 



             785-6)                                              

 

             MCHC (test code = 32.2 g/dL    31.6-35.1                 



             786-4)                                              

 

             RDW-SD (test code = 52.6 fL      39-49.9      H            



             79857-4)                                            

 

             RDW-CV (test code = 15.6 %       12-15.5      H            



             788-0)                                              

 

             PLT (test code =              See_Comment  LL            [Automated



             777-3)                                              message] The sy

stem



                                                                 which generated



                                                                 this result



                                                                 transmitted



                                                                 reference range

:



                                                                 166 - 358 10*3/

?L.



                                                                 The reference r

olman



                                                                 was not used to



                                                                 interpret this



                                                                 result as



                                                                 normal/abnormal

.

 

             MPV (test code = 11.4 fL      9.5-12.9                  



             12663-5)                                            

 

             IPF % (test code = 4.6 %        1.3-7.7                   Platelet 

count



             [id number])                                         measured by



                                                                 fluorescence



                                                                 method.

 

             NRBC/100 WBC (test              See_Comment                [Automat

ed



             code = 8721737946)                                        message] 

The system



                                                                 which generated



                                                                 this result



                                                                 transmitted



                                                                 reference range

:



                                                                 0.0 - 10.0 /100



                                                                 WBCs. The refer

ence



                                                                 range was not u

sed



                                                                 to interpret th

is



                                                                 result as



                                                                 normal/abnormal

.

 

             NRBC x10^3 (test code <0.01        See_Comment                [Auto

mated



             = [id number])                                        message] The s

ystem



                                                                 which generated



                                                                 this result



                                                                 transmitted



                                                                 reference range

:



                                                                 10*3/?L. The



                                                                 reference range

 was



                                                                 not used to



                                                                 interpret this



                                                                 result as



                                                                 normal/abnormal

.

 

             GRAN MAT (NEUT) % 62.3 %                                 



             (test code = 770-8)                                        

 

             IMM GRAN % (test code 0.30 %                                 



             = [id number])                                        

 

             LYMPH % (test code = 30.3 %                                 



             736-9)                                              

 

             MONO % (test code = 5.8 %                                  



             5905-5)                                             

 

             EOS % (test code = 0.8 %                                  



             713-8)                                              

 

             BASO % (test code = 0.5 %                                  



             706-2)                                              

 

             GRAN MAT x10^3(ANC) 2.47 10*3/uL 1.88-7.09                 



             (test code =                                        



             4037550163)                                         

 

             IMM GRAN x10^3 (test <0.03        0-0.06                    



             code = 0114485023)                                        

 

             LYMPH x10^3 (test code 1.20 10*3/uL 1.32-3.29    L            



             = 731-0)                                            

 

             MONO x10^3 (test code 0.23 10*3/uL 0.33-0.92    L            



             = 742-7)                                            

 

             EOS x10^3 (test code = 0.03 10*3/uL 0.03-0.39                 



             711-2)                                              

 

             BASO x10^3 (test code <0.03        0.01-0.07                 



             = 704-7)                                            

 

             Lab Interpretation Abnormal                               



             (test code = 60011-1)                                        



[id number]50:00





             Test Item    Value        Reference Range Interpretation Comments

 

             PH (test code =              7.32-7.42    H            



             5523212600)                                         

 

             PCO2 CHRISTIANO (test code =              See_Comment  L             [Auto

mated message]



             2469922169)                                         The system [employer]



                                                                 generated this 

result



                                                                 transmitted ref

erence



                                                                 range: 41 - 51 

mmHg.



                                                                 The reference r

olman



                                                                 was not used to



                                                                 interpret this 

result



                                                                 as normal/abnor

mal.

 

             PO2 CHRISTIANO (test code =              See_Comment  HH            [Autom

ated message]



             8132732526)                                         The system [employer]



                                                                 generated this 

result



                                                                 transmitted ref

erence



                                                                 range: 25 - 40 

mmHg.



                                                                 The reference r

olman



                                                                 was not used to



                                                                 interpret this 

result



                                                                 as normal/abnor

mal.

 

             HCO3 CHRISTIANO (test code =              See_Comment                [Auto

mated message]



             [id number])                                         The system [employer]



                                                                 generated this 

result



                                                                 transmitted ref

erence



                                                                 range: 24 - 28 

mEq/L.



                                                                 The reference r

olman



                                                                 was not used to



                                                                 interpret this 

result



                                                                 as normal/abnor

mal.

 

             AC VBE(BEAKER) (test              mEq/L                     



             code = 4566984217)                                        

 

             Lab Interpretation (test Abnormal                               



             code = 57791-8)                                        



[facility] [id number] 
04:55:51 1. ?Short segments of colonic wall thickening with engorgement of the 
vasarecta and surrounding inflammatory stranding at the hepatic flexure 
andsplenic flexure. These findings are favored to represent colitis which maybe 
infectious or inflammatory rather than hepatic colopathy. 2. ?Changes of cirrhos
is with sequelae of portal hypertension includingsmall volume ascites, small 
gastroesophageal varices, and splenomegaly. 3. ?Irregular hypoattenuating lesion
at the inferior aspect of segment VI,essentially unchanged from the 2020. 
This finding likely representsposttreatment change from the residual treated 
segment VI HCC. Attention onfollow-up. 4. ?Skin thickening noted over the left 
breast, correlation withmammography is recommended.  Preliminary Report Dictated
by Resident: Paul Mccall ?MD Liu., have reviewed this study 
and agree with theabove report.EXAM: CT ABDOMEN AND PELVIS WITH CONTRAST 
HISTORY: Abdominal distention COMPARISON: 2020. TECHNIQUE AND FINDINGS: 
Contiguous axial imaging from the level of the lungbases through the pubic 
symphysis was performed afterthe uncomplicatedadministration of 120 cc of 
intravenous Omnipaque contrast. Coronal andsagittal reconstructions were 
obtained. ?Auto mA and/or iterativereconstruction were used to reduce radiation 
dose. FINDINGS: Subsegmental atelectasis is noted in the lingula, right middle 
lobe, andlower lobes. Calcified granulomas are present within both lower lobes. 
The liver is enlarged measuring 20.3 cm in thecraniocaudal dimension.Cirrhotic 
liver morphology. Nodular contour. There is an irregular,wedge-shaped 5.5 x 1.8 
cm hypoattenuating lesion in segment VI with areasof subcapsular retraction. 
Punctate calcifications in segment V and VI. Noadditional focal lesions are 
seen. Status post cholecystectomy. Milddilatation of the common bile duct to 10 
mm, likely secondary to reservoirphenomenon. The spleen is enlarged measuring 
15.1 cm in the craniocaudal dimension. Theadrenals and pancreas are 
unremarkable. 2.9 cm right interpolar regionrenal cyst. Additional cortical 
hypodensities are too small to accuratelycharacterize. Small sliding-type hiatal
hernia. There is a short segment of thickenedtransverse colon at the level of 
the hepatic flexure with engorgement ofthe vasa recta and mild surrounding 
inflammatory changes. A similarthickened loop of bowel with surrounding 
inflammatory changes is noted atthe splenic flexure (2:63). Small volume 
perihepatic ascites. A small amount of fluid tracks along theleft paracolic 
gutter. Prominent portacaval lymph nodes measure up to 1.3cm. A lymph node at 
the portahepatis measures 1.2 cm in the short axis. Moderate calcified and 
noncalcified plaque affect the aortoiliacvasculature. Small esophageal varices 
suspected. Body wall anasarca. Mild spondylosis at L5-S1. Scarring along the 
anteriorabdominal wall is likely postsurgical. There is soft tissue density 
alongthe medial margin of the bilateral breasts, more prominent on the 
right,likely represent postsurgical change. There are adjacent surgical 
staplesbilaterally as well as in the axilla. Skin thickening isnoted over 
theleft breast.   [facility], Radiant Results Inft User - 2020 11:56 PM CDTEXAM:
CT ABDOMEN AND PELVIS WITH CONTRASTHISTORY: Abdominal distentionCOMPARISON: 
2020.TECHNIQUE AND FINDINGS:Contiguous axial imaging from the level of the 
lungbases through the pubic symphysis was performed after the 
uncomplicatedadministration of 120 cc of intravenous Omnipaque contrast. Coronal
andsagittalreconstructions were obtained.  Auto mA and/or 
iterativereconstruction were used to reduce radiationdose.FINDINGS:Subsegmental 
atelectasis is noted in the lingula, right middle lobe, andlower lobes. Ca
lcified granulomas are present within both lower lobes.The liver is enlarged 
measuring 20.3 cm in the craniocaudal dimension.Cirrhotic liver morphology. 
Nodular contour. There is an irregular,wedge-shaped 5.5 x 1.8 cm hypoattenuating
lesion in segment VI with areasof subcapsular retraction. Punctate ca
lcifications in segment V and VI. Noadditional focal lesions are seen. Status 
post cholecystectomy. Milddilatation of the common bile duct to 10 mm, likely 
secondary to reservoirphenomenon.The spleen is enlarged measuring 15.1 cm in the
craniocaudal dimension. Theadrenals and pancreas are unremarkable. 2.9 cm right 
interpolar regionrenal cyst. Additional cortical hypodensities are too small to 
accuratelycharacterize.Small sliding-type hiatal hernia. There is a short 
segment of thickenedtransverse colon at the level of the hepatic flexure with 
engorgement ofthe vasa recta and mild surrounding inflammatory changes. A 
similarthickened loop of bowel with surrounding inflammatory changes is noted 
atthesplenic flexure (2:63).Small volume perihepatic ascites. A small amount of 
fluid tracks along theleft paracolic gutter. Prominent portacaval lymph nodes 
measure up to 1.3cm. A lymph node at the priscilla hepatis measures 1.2 cm in the 
short axis.Moderate calcified and noncalcified plaque affect the aortoil
iacvasculature. Small esophageal varices suspected.Body wall anasarca. Mild 
spondylosis at L5-S1. Scarring along the anteriorabdominal wall is likely 
postsurgical. There is soft tissue density alongthemedial margin of the 
bilateral breasts, more prominent on the right,likely represent postsurgical mily
nge. There are adjacent surgical staplesbilaterally as well as in the axilla. 
Skin thickening is noted over theleft breast. IMPRESSION1.  Short segments of 
colonic wall thickening with engorgement of the vasarecta and surrounding 
inflammatory stranding at the hepatic flexure andsplenic flexure. These findings
are favored to represent colitis which maybe infectious or inflammatory rather 
than hepatic colopathy.2.  Changes of cirrhosis with sequelae of portal 
hypertension includingsmall volume ascites, small gastroesophageal varices, and 
splenomegaly.3.  Irregular hypoattenuating lesion at the inferior aspect of 
segment VI,essentially unchanged from the 2020. This finding likely 
representsposttreatment change from the residual treated segment VI HCC. 
Attention onfollow-up.4.  Skin thickening noted over the left breast, 
correlation withmammography is recommended.Preliminary Report Dictated by
Resident: Paul Oh MD., have reviewed this study and 
agree with theabove report.[facility] 2 Views
2020 02:49:00 No acute cardiopulmonary process. Preliminary Report 
Dictated by Resident: Paul Pineda MD., have 
reviewed this study and agree with theabove report.XR CHEST 2 VW Comparison: 
2020 History: sob  Findings: The lungs exhibit mildly prominent basilar 
reticular nodular opacities. Nopleural effusion, focal consolidation, or 
pneumothorax is identified.Surgical staples are scattered over the thorax. The 
cardiomediastinal silhouette is within normal limits for size. No acute osseous 
abnormality is present. [facility], Radiant Results Inft User - 2020  9:50 PM 
CDTXR CHEST2 VWComparison: 2020History: sob Findings:The lungs exhibit 
mildly prominent basilar reticular nodular opacities. Nopleural effusion, focal 
consolidation, or pneumothorax is identified.Surgical staples are scattered over
the thorax.The cardiomediastinal silhouette is within normal limits for size.No 
acute osseous abnormality is present.IMPRESSIONNo acute cardiopulmonary 
process.Preliminary Report Dictated by Resident: Paul Stephens MD., have reviewed this study and agree with theabove report.
[facility] WITH [id number] 01:39:00





             Test Item    Value        Reference Range Interpretation Comments

 

             WBC (test code =              See_Comment  L             [Automated



             6690-2)                                             message] The sy

stem



                                                                 which generated



                                                                 this result



                                                                 transmitted



                                                                 reference range

:



                                                                 4.30 - 11.10



                                                                 10*3/?L. The



                                                                 reference range

 was



                                                                 not used to



                                                                 interpret this



                                                                 result as



                                                                 normal/abnormal

.

 

             RBC (test code =              See_Comment  L             [Automated



             789-8)                                              message] The sy

stem



                                                                 which generated



                                                                 this result



                                                                 transmitted



                                                                 reference range

:



                                                                 3.93 - 5.25



                                                                 10*6/?L. The



                                                                 reference range

 was



                                                                 not used to



                                                                 interpret this



                                                                 result as



                                                                 normal/abnormal

.

 

             HGB (test code = 9.3 g/dL     11.6-15      L            



             718-7)                                              

 

             HCT (test code = 29.0 %       35.7-45.2    L            



             4544-3)                                             

 

             MCV (test code = 95.1 fL      80.6-95.5                 



             787-2)                                              

 

             MCH (test code = 30.5 pg      25.9-32.8                 



             785-6)                                              

 

             MCHC (test code = 32.1 g/dL    31.6-35.1                 



             786-4)                                              

 

             RDW-SD (test code = 64.8 fL      39-49.9      H            



             64527-7)                                            

 

             RDW-CV (test code = 19.3 %       12-15.5      H            



             788-0)                                              

 

             PLT (test code =              See_Comment  L             [Automated



             777-3)                                              message] The sy

stem



                                                                 which generated



                                                                 this result



                                                                 transmitted



                                                                 reference range

:



                                                                 166 - 358 10*3/

?L.



                                                                 The reference r

olman



                                                                 was not used to



                                                                 interpret this



                                                                 result as



                                                                 normal/abnormal

.

 

             MPV (test code = 10.0 fL      9.5-12.9                  



             11379-9)                                            

 

             IPF % (test code = 2.2 %        1.3-7.7                   Platelet 

count



             [id number])                                         measured by



                                                                 fluorescence



                                                                 method.

 

             NRBC/100 WBC (test              See_Comment                [Automat

ed



             code = 8482188916)                                        message] 

The system



                                                                 which generated



                                                                 this result



                                                                 transmitted



                                                                 reference range

:



                                                                 0.0 - 10.0 /100



                                                                 WBCs. The refer

ence



                                                                 range was not u

sed



                                                                 to interpret th

is



                                                                 result as



                                                                 normal/abnormal

.

 

             NRBC x10^3 (test code <0.01        See_Comment                [Auto

mated



             = [id number])                                        message] The s

ystem



                                                                 which generated



                                                                 this result



                                                                 transmitted



                                                                 reference range

:



                                                                 10*3/?L. The



                                                                 reference range

 was



                                                                 not used to



                                                                 interpret this



                                                                 result as



                                                                 normal/abnormal

.

 

             GRAN MAT (NEUT) % 60.1 %                                 



             (test code = 770-8)                                        

 

             IMM GRAN % (test code 0.50 %                                 



             = [id number])                                        

 

             LYMPH % (test code = 29.5 %                                 



             736-9)                                              

 

             MONO % (test code = 9.2 %                                  



             5905-5)                                             

 

             EOS % (test code = 0.5 %                                  



             713-8)                                              

 

             BASO % (test code = 0.2 %                                  



             706-2)                                              

 

             GRAN MAT x10^3(ANC) 2.43 10*3/uL 1.88-7.09                 



             (test code =                                        



             9373175784)                                         

 

             IMM GRAN x10^3 (test <0.03        0-0.06                    



             code = 2722341706)                                        

 

             LYMPH x10^3 (test code 1.19 10*3/uL 1.32-3.29    L            



             = 731-0)                                            

 

             MONO x10^3 (test code 0.37 10*3/uL 0.33-0.92                 



             = 742-7)                                            

 

             EOS x10^3 (test code = <0.03        0.03-0.39    L            



             711-2)                                              

 

             BASO x10^3 (test code <0.03        0.01-0.07                 



             = 704-7)                                            

 

             POLYCHROMASIA (test 2+           See_Comment                [Automa

eduard



             code = 85977-6)                                        message] The

 system



                                                                 which generated



                                                                 this result



                                                                 transmitted



                                                                 reference range

:



                                                                 2+. The referen

ce



                                                                 range was not u

sed



                                                                 to interpret th

is



                                                                 result as



                                                                 normal/abnormal

.

 

             ZENONAUX (test code = Present      See_Comment  A             [Auto

mated



             7797-4)                                             message] The sy

stem



                                                                 which generated



                                                                 this result



                                                                 transmitted



                                                                 reference range

:



                                                                 (none). The



                                                                 reference range

 was



                                                                 not used to



                                                                 interpret this



                                                                 result as



                                                                 normal/abnormal

.

 

             Lab Interpretation Abnormal                               



             (test code = 85692-5)                                        



[facility] Metabolic Panel (NA, K, CL, CO2, 
GLUCOSE, BUN, CREATININE, CA)2020 01:20:00





             Test Item    Value        Reference Range Interpretation Comments

 

             NA (test code = 133 mmol/L   135-145      L            



             8888737402)                                         

 

             K (test code = 4.4 mmol/L   3.5-5                     



             8526364527)                                         

 

             CL (test code = 105 mmol/L                       



             2696174409)                                         

 

             CO2 TOTAL (test code = 28 mmol/L    23-31                     



             8710790516)                                         

 

             AGAP (test code = <1           2-16         L            



             1365769801)                                         

 

             BUN (test code = 16 mg/dL     7-23                      



             6350322334)                                         

 

             GLUCOSE (test code = 291 mg/dL           H            



             6725133888)                                         

 

             CREATININE (test code = 0.66 mg/dL   0.5-1.04                  



             6255845735)                                         

 

             CALCIUM (test code = 8.4 mg/dL    8.6-10.6     L            



             4140608688)                                         

 

             eGFR Calculation              mL/min/1.73m2              



             (Non-)                                        



             (test code =                                        



             1489793479)                                         

 

             eGFR Calculation              mL/min/1.73m2              



             (African American)                                        



             (test code =                                        



             2448136712)                                         

 

             RICK (test code = RICK) Association of                           



                          Glomerular Filtration                           



                          Rate (GFR) and Staging                           



                          of Kidney Disease*                           



                          +---------------------                           



                          --+-------------------                           



                          --+-------------------                           



                          ------+| GFR                           



                          (mL/min/1.73 m2) ?|                           



                          With Kidney Damage ?|                           



                          ?Without Kidney                           



                          Damage+---------------                           



                          --------+-------------                           



                          --------+-------------                           



                          ------------+| ?>90 ?                           



                          ? ? ? ? ? ? ? ?|                           



                          ?Stage one ? ? ? ? ?|                           



                          ? Normal ? ? ? ? ? ? ?                           



                          ?+--------------------                           



                          ---+------------------                           



                          ---+------------------                           



                          -------+| ?60-89 ? ? ?                           



                          ? ? ? ? ?| ?Stage two                           



                          ? ? ? ? ?| ? Decreased                           



                          GFR ? ? ? ?                            



                          +---------------------                           



                          --+-------------------                           



                          --+-------------------                           



                          ------+| ?30-59 ? ? ?                           



                          ? ? ? ? ?| ?Stage                           



                          three ? ? ? ?| ? Stage                           



                          three ? ? ? ? ?                           



                          +---------------------                           



                          --+-------------------                           



                          --+-------------------                           



                          ------+| ?15-29 ? ? ?                           



                          ? ? ? ? ?| ?Stage four                           



                          ? ? ? ? | ? Stage four                           



                          ? ? ? ? ?                              



                          ?+--------------------                           



                          ---+------------------                           



                          ---+------------------                           



                          -------+| ?<15 (or                           



                          dialysis) ? ?| ?Stage                           



                          five ? ? ? ? | ? Stage                           



                          five ? ? ? ? ?                           



                          ?+--------------------                           



                          ---+------------------                           



                          ---+------------------                           



                          -------+ *Each stage                           



                          assumes the associated                           



                          GFR level has been in                           



                          effect for at least                           



                          three months. ?Stages                           



                          1 to 5, with or                           



                          without kidney                           



                          disease, indicate                           



                          chronic kidney                           



                          disease. Notes:                           



                          Determination of                           



                          stages one and two                           



                          (with eGFR                             



                          >59mL/min/1.73 m2)                           



                          requires estimation of                           



                          kidney damage for at                           



                          least three months as                           



                          defined by structural                           



                          or functional                           



                          abnormalities of the                           



                          kidney, manifested by                           



                          either:Pathological                           



                          abnormalities or                           



                          Markers of kidney                           



                          damage (including                           



                          abnormalities in the                           



                          composition of the                           



                          blood or urine or                           



                          abnormalities in                           



                          imaging tests).                           

 

             Lab Interpretation Abnormal                               



             (test code = 60734-9)                                        



[facility]





             Test Item    Value        Reference Range Interpretation Comments

 

             TROPONIN I (test <0.012       See_Comment                [Automated



             code = 0595045357)                                        message] 

The



                                                                 system which



                                                                 generated this



                                                                 result



                                                                 transmitted



                                                                 reference range

:



                                                                 <=0.034 ng/mL.



                                                                 The reference



                                                                 range was not



                                                                 used to interpr

et



                                                                 this result as



                                                                 normal/abnormal

.

 

             RICK (test code = Equal or Less than                           



             RICK)         0.034                                  



                          ng/ml---Normal                           



                          ?Note: Cardiac                           



                          troponin begins to                           



                          rise 3-4 hours                           



                          after the onset of                           



                          ischemia. Repeat                           



                          in 4-6 hours if                           



                          the sample was                           



                          drawn within 3-4                           



                          hours of the onset                           



                          of the symptom and                           



                          found normal.                           



                          Between 0.035 and                           



                          0.120 ng/mL---                           



                          Borderline.                            



                          Questionable                           



                          myocardial injury                           



                          or necrosis ?                           



                          ?Note: Serial                           



                          measurement may be                           



                          necessary to                           



                          confirm or exclude                           



                          the diagnosis of                           



                          myocardial injury                           



                          or necrosis;                           



                          Clinical                               



                          correlation                            



                          (symptoms, EKGs,                           



                          imaging studies,                           



                          and others)                            



                          required; Repeat                           



                          in 4-6 hours if                           



                          clinically                             



                          indicated. ? ? ? ?                           



                          Equal or Higher                           



                          than 0.121                             



                          ng/mL---Abnormal.                           



                          Myocardial Injury                           



                          or Necrosis Likely                           



                          ? ? ? ?  Biotin                           



                          has been reported                           



                          to cause a                             



                          negative bias,                           



                          interpret results                           



                          relative to                            



                          patient's use of                           



                          biotin. ? ? ? ? ?                           



                          ? ? ? ? ? ? ? ? ?                           



                          ? ? ? ? ? ? ? ?  ?                           



                          ? ? ? ? ? ? ? ? ?                           



                          ? ? ? ? ? ? ? ? ?                           



                          ? ? ? ? ? ? ? ? ?                           

 

             Lab Interpretation Normal                                 



             (test code =                                        



             49099-1)                                            



[id number] [facility]:00





             Test Item    Value        Reference Range Interpretation Comments

 

             NT-proBNP (test code 407 pg/mL    See_Comment  H             [Autom

ated



             = 8174839533)                                        message] The



                                                                 system which



                                                                 generated this



                                                                 result



                                                                 transmitted



                                                                 reference range

:



                                                                 <=125. The



                                                                 reference range



                                                                 was not used to



                                                                 interpret this



                                                                 result as



                                                                 normal/abnormal

.

 

             RICK (test code = RICK) Biotin has been                           



                          reported to                            



                          cause a negative                           



                          bias, interpret                           



                          results relative                           



                          to patient's use                           



                          of biotin.                             

 

             Lab Interpretation Abnormal                               



             (test code = 72689-3)                                        



[facility] 
01:07:00





             Test Item    Value        Reference Range Interpretation Comments

 

             SARS-CoV-2 Rapid ID NOW Not Detected Not Detected              



             (test code = 76120-7)                                        

 

             RICK (test code = RICK) ID NOW COVID-19 Assay                        

   



                          is an isothermal                           



                          nucleic acid                           



                          amplification test                           



                          intended for the                           



                          qualitative detection                           



                          of nucleic acid from                           



                          SARS-CoV-2 viral RNA                           



                          in nasopharyngeal (NP)                           



                          specimens. It is used                           



                          under Emergency Use                           



                          Authorization (EUA) by                           



                          FDA. The limit of                           



                          detection (LOD) of the                           



                          assay is 125 Genome                           



                          Equivalents/mL. A                           



                          positive result is                           



                          indicative of the                           



                          presence of SARS-CoV-2                           



                          RNA. ?Clinical                           



                          correlation with                           



                          patient history and                           



                          other diagnostic                           



                          information is                           



                          necessary to determine                           



                          patient infection                           



                          status. A negative                           



                          (Not Detected) result                           



                          does not preclude                           



                          SARS-CoV-2 infection.                           



                          In patients with                           



                          clinical symptoms and                           



                          other tests that are                           



                          consistent with                           



                          SARS-CoV-2 infection,                           



                          negative results                           



                          should be treated as                           



                          presumptive negative                           



                          and a new specimen                           



                          should be tested with                           



                          alternative PCR                           



                          molecular test.                           



                          Invalid: Please                           



                          collect a new specimen                           



                          for repeat patient                           



                          testing if clinically                           



                          indicated.                             

 

             Lab Interpretation Normal                                 



             (test code = 55406-7)                                        



[facility] [id number] [facility]:05:00





             Test Item    Value        Reference Range Interpretation Comments

 

             PROTIME PATIENT (test              See_Comment                [Auto

mated message]



             code = 5964-2)                                        The system [email or web address]



                                                                 generated this 

result



                                                                 transmitted ref

erence



                                                                 range: 12.0 - 1

4.7



                                                                 Seconds. The re

ference



                                                                 range was not u

sed to



                                                                 interpret this 

result



                                                                 as normal/abnor

mal.

 

             INR (test code = 6301-6)                                        Nor

mal INR <1.1;



                                                                 Warfarin Therap

eutic



                                                                 range 2.0 to 3.

0 or



                                                                 2.5 to 3.5, dep

ending



                                                                 upon the indica

tions.

 

             Lab Interpretation (test Normal                                 



             code = 17054-2)                                        



[facility] ([facility]B, T.PRO, BILI T, 
BU/BC, ALT, AST, ALK PHOS)2020 01:04:00





             Test Item    Value        Reference Range Interpretation Comments

 

             TOTAL BILI (test code = 8154649729) 1.6 mg/dL    0.1-1.1      H    

        

 

             BILI UNCON (test code = 1710926481) 1.2 mg/dL    0.1-1.1      H    

        

 

             BILI CONJ (test code = 6680525067) 0.0 mg/dL    0-0.3              

       

 

             T PROTEIN (test code = 9813340492) 6.5 g/dL     6.3-8.2            

       

 

             ALBUMIN (test code = 2553839854) 2.9 g/dL     3.5-5        L       

     

 

             ALK PHOS (test code = 6736773335) 175 U/L             H      

      

 

             ALTv (test code = 1742-6) 26 U/L       5-35                      

 

             AST(SGOT) (test code = 6787471191) 49 U/L       13-40        H     

       

 

             Lab Interpretation (test code = Abnormal                           

    



             67796-9)                                            



[id number]





             Test Item    Value        Reference Range Interpretation Comments

 

             LIPASE (test code = 9957499316) 76 U/L       0-220                 

    

 

             Lab Interpretation (test code = Normal                             

    



             21134-5)                                            



[facility]





             Test Item    Value        Reference Range Interpretation Comments

 

             APPEARANCE (test code = Hazy         Clear        A            



             2027349933)                                         

 

             COLOR (test code = Yellow       Yellow                    



             5805194807)                                         

 

             PH (test code =              4.8-8.0                   



             5146268662)                                         

 

             SP GRAVITY (test code =              1.003-1.030               



             7095358603)                                         

 

             GLU U QUAL (test code = 500 mg/dL    Normal       A            



             8648977126)                                         

 

             BLOOD (test code = Negative     Negative                  



             5071152578)                                         

 

             KETONES (test code = Negative     Negative                  



             9184093304)                                         

 

             PROTEIN (test code = 30 mg/dL     Negative     A            



             2887-8)                                             

 

             UROBILIN (test code = 2.0 mg/dL    Normal       A            



             0507077983)                                         

 

             BILIRUBIN (test code = Negative     Negative                  



             9152153981)                                         

 

             NITRITE (test code = Negative     Negative                  



             1862085112)                                         

 

             LEUK MOE (test code = Negative     Negative                  



             9006960405)                                         

 

             RBC/HPF (test code =              See_Comment                [Autom

ated message]



             2705552023)                                         The system [employer]



                                                                 generated this



                                                                 result transmit

eduard



                                                                 reference range

: 0 -



                                                                 3 HPF. The refe

rence



                                                                 range was not u

sed



                                                                 to interpret th

is



                                                                 result as



                                                                 normal/abnormal

.

 

             WBC/HPF (test code =              See_Comment                [Autom

ated message]



             0695711538)                                         The system [employer]



                                                                 generated this



                                                                 result transmit

eduard



                                                                 reference range

: 0 -



                                                                 5 HPF. The refe

rence



                                                                 range was not u

sed



                                                                 to interpret th

is



                                                                 result as



                                                                 normal/abnormal

.

 

             BACTERIA (test code = Negative     Negative                  



             4508533804)                                         

 

             MUCOUS (test code = Slight       Negative LPF A            



             6759394249)                                         

 

             SQ EPITH (test code =              HPF                       



             8692752222)                                         

 

             HYAL CAST (test code =              See_Comment                [Aut

omated message]



             6708236431)                                         The system [employer]



                                                                 generated this



                                                                 result transmit

eduard



                                                                 reference range

: <=2



                                                                 LPF. The refere

nce



                                                                 range was not u

sed



                                                                 to interpret th

is



                                                                 result as



                                                                 normal/abnormal

.

 

             Lab Interpretation (test Abnormal                               



             code = 60156-2)                                        



[facility], [id number] 00:26:00





             Test Item    Value        Reference Range Interpretation Comments

 

             POCT PREG (test code = 1605) negative                              

 

 

             On board controls acceptable with present                          

      



             C Line (test code = 3574)                                        

 

             POCT PREG LOT # (test code = 3575) [id number]                      

       

 

             POCT PREG TEST  DATE (test 2021                        

     



             code = 3576)                                        

 

             Lab Interpretation (test code = Normal                             

    



             68712-2)                                            



[facility] ABDOMEN PELVIS W [id number] 
19:20:42CT Abdomen and Pelvis with intravenous contrast. CLINICAL HISTORY: Acute
generalized abdominal pain.DOSE: Up-to-date CT equipment and radiation dose 
reduction techniques wereemployed.  CTDIvol: 7.09 mGy. DLP: 368 mGy-cm. 
TECHNIQUE : Contiguous axial imaging from the level of the lung basesthrough the
pubic symphysis were performed after the uncomplicatedadministration of 
Omnipaque contrast material.Coronal and sagittalreconstructions were obtained. 
Auto mA and/or iterative reconstruction wereused to reduce radiation dose. 
FINDINGS: Comparison is made with previous CT scans of 2020 as well as. Lower lungs: Minimal fibrosis in the right lower lung, lingula and 
rightmiddle lobe. Calcified granuloma in the lingula segment and anterior 
basalright lower lung. No pleural effusion or pericardial effusion. 
Possibleshort sliding hiatal hernia and small esophageal varices. Liver, 
Gallbladder and Spleen: S/P cholecystectomy. Liver is enlarged, 19.4cm and 
showed an indeterminate low-density lesion of 5.4 x 1.8 x 3(transverse x AP x 
length) cm size in the right lobe. Undulating serosalsurface of the liver is 
consistent with chronic primary liver disease withportal hypertension causing 
splenomegaly. Spleen is at 18 x 6 cm. Dilatedportal venous system noted. Portal 
vein is patent. Peritoneum: ?No free air or free fluid. No lymphadenopathy. 
Pancreas and Adrenals: ?Unremarkable pancreas and right adrenal gland. 
Mildnonspecific left adrenal gland hypertrophy noted. Kidneys and Ureters: ?No 
visible calculi in the renal collecting systems. No hydroureter or 
hydronephrosis. No enhancing kidney lesions are seen.Bosniak type I cystic 
lesion of 2.6 cm size in the lateral interpolar rightkidney noted. Vessels: 
Atherosclerosis. No AAA. Retroperitoneum: No abnormal fluid or lymphadenopathy. 
Bowel: Pericolonic congestion surrounding cecum and descending colon seenin the 
previous study is improved. This time there is minimal congestionpresent around 
the ascending colon. 3.5 cm size large diverticulum noted along the medial 
border of theduodenal C-loop just distal to the ampulla. Bladder and Reprodu
ctive Organs: Hysterectomy. No gross pathology in theunopacified urinary 
bladder. Bones: Moderate degenerative disc disease at L5-S1 and old fracture 
ofupper plate of L2 with loss of 40% of its height in the central portion. Soft 
tissues: Abdominal scar tissue noted in the suprapubic region fromprevious 
surgery. An irregular shaped fat containing 2.5 cm size umbilicalhernia. 
CONCLUSION:1. No acute intra-abdominal or pelvic abnormalities detected.2. 
Interval improvement noted in the congestion of pericolonic fatsurrounding a 
sending colon compared to the previous study. Mucosalenhancement is still pr
esent in the right cerebral large bowel. This couldbe due to nonspecific 
colitis. Mild constipation noted. No significantdiverticular disease3. 
Hepatosplenomegaly with hepatic architecture suggestive of chronicprimary liver 
disease, possibly cirrhosis with portal hypertension andesophageal varices.4. W
edge shaped lesion in the lower right lobe of the liver is difficult 
toaccurately characterize. It is unchanged when compared with the recent CTscans
of 2020 and 2020.5. S/P cholecystectomy and hysterectomy.     [facility], 
Radiant Results [facility] User - 2020  2:21 PM CDTCT Abdomen and Pelvis with 
intravenous contrast.CLINICAL HISTORY: Acute generalized abdominal pain.DOSE: 
Up-to-date CT equipment and radiation dose reduction techniques wereemployed. 
CTDIvol: 7.09 mGy. DLP: 368 mGy-cm.TECHNIQUE : Contiguous axial imaging from the
level of the lung basesthrough the pubic symphysis were performed after the 
uncomplicatedadministration of Omnipaque contrast material. Coronal and 
sagittalreconstructions were obtained. Auto mA and/or iterative reconstruction 
wereused to reduce radiation dose.FINDINGS: Comparison is made with previous CT 
scans of 2020 as well as2020.Lower lungs: Minimal fibrosis in the 
right lower lung, lingula and rightmiddle lobe. Calcified granuloma in the 
lingula segment and anterior basalright lower lung. No pleural effusion or 
pericardial effusion. Possibleshort sliding hiatal hernia and small esophageal 
varices.Liver, Gallbladder and Spleen: S/P cholecystectomy. Liver is enlarged, 
19.4cm and showed an indeterminate low-density lesion of 5.4 x 1.8 x 3(transver
se x AP x length) cm size in the right lobe. Undulating serosalsurface of the 
liver is consistent with chronic primary liver disease withportal hypertension 
causing splenomegaly. Spleen is at 18 x 6 cm. Dilatedportal venous system noted.
Portal vein is patent.Peritoneum:  No free air or free fluid. No
lymphadenopathy.Pancreas and Adrenals:  Unremarkable pancreas and right adrenal 
gland. Mildnonspecific left adrenal gland hypertrophy noted.Kidneys and Ureters:
 No visible calculi in the renal collecting systems. No hydroureter or 
hydronephrosis. No enhancing kidney lesions are seen.Bosniak type I cystic 
lesion of 2.6 cm size in the lateral interpolar rightkidney noted. Vessels: 
Atherosclerosis. No AAA.Retroperitoneum: No abnormal fluid or 
lymphadenopathy.Bowel: Pericolonic congestion surrounding cecum and descending 
colon seenin the previous study is improved. This time there is minimal 
congestionpresent around the ascending colon.3.5 cm size large diverticulum 
noted along the medial border of theduodenal C-loop just distal to the 
ampulla.Bladder and Reproductive Organs: Hysterectomy. No gross pathology in 
theunopacified urinary bladder.Bones: Moderate degenerative disc disease at L5-
S1 and old fracture ofupper plate of L2 with loss of 40% of its height in the 
central portion.Soft tissues: Abdominal scar tissue noted in the suprapubic 
region fromprevious surgery. An irregular shaped fat containing 2.5 cm size 
umbilicalhernia.CONCLUSION:1. No acute intra-abdominal or pelvic abnormalities 
detected.2. Interval improvement noted in the congestion of pericolonic 
fatsurrounding a sending colon compared to the previous study. 
Mucosalenhancement is still present in the right cerebral large bowel.This 
couldbe due to nonspecific colitis. Mild constipation noted. No 
significantdiverticular disease3. Hepatosplenomegaly with hepatic architecture 
suggestive of chronicprimary liver disease, possibly cirrhosis with portal 
hypertension andesophageal varices.4. Wedge shaped lesion in the lower right lob
e of the liver is difficult toaccurately characterize. It is unchanged when 
compared with the recentCTscans of 2020 and 2020.5. S/P 
cholecystectomy and hysterectomy.[facility]abolic [id number] 19:15:00





             Test Item    Value        Reference Range Interpretation Comments

 

             NA (test code = 132 mmol/L   135-145      L            



             5188613836)                                         

 

             K (test code = 5.4 mmol/L   3.5-5        H            



             8653967748)                                         

 

             CL (test code = 99 mmol/L                        



             9617281436)                                         

 

             CO2 TOTAL (test code = 25 mmol/L    23-31                     



             1550720850)                                         

 

             AGAP (test code =              2-16                      



             8402671567)                                         

 

             BUN (test code = 15 mg/dL     7-23                      



             0143221602)                                         

 

             GLUCOSE (test code = 502 mg/dL           HH           



             7990928385)                                         

 

             CREATININE (test code = 0.71 mg/dL   0.5-1.04                  



             5865237469)                                         

 

             TOTAL BILI (test code = 1.4 mg/dL    0.1-1.1      H            



             0138627304)                                         

 

             CALCIUM (test code = 9.2 mg/dL    8.6-10.6                  



             2856843457)                                         

 

             T PROTEIN (test code = 7.4 g/dL     6.3-8.2                   



             9804272623)                                         

 

             ALBUMIN (test code = 3.5 g/dL     3.5-5                     



             8355155138)                                         

 

             ALK PHOS (test code = 185 U/L             H            



             6523976596)                                         

 

             ALTv (test code = 31 U/L       5-35                      



             1742-6)                                             

 

             AST(SGOT) (test code = 42 U/L       13-40        H            



             6111769494)                                         

 

             eGFR Calculation              mL/min/1.73m2              



             (Non-)                                        



             (test code =                                        



             3743686157)                                         

 

             eGFR Calculation              mL/min/1.73m2              



             (African American)                                        



             (test code =                                        



             4366133372)                                         

 

             RICK (test code = RICK) Association of                           



                          Glomerular Filtration                           



                          Rate (GFR) and Staging                           



                          of Kidney Disease*                           



                          +---------------------                           



                          --+-------------------                           



                          --+-------------------                           



                          ------+| GFR                           



                          (mL/min/1.73 m2) ?|                           



                          With Kidney Damage ?|                           



                          ?Without Kidney                           



                          Damage+---------------                           



                          --------+-------------                           



                          --------+-------------                           



                          ------------+| ?>90 ?                           



                          ? ? ? ? ? ? ? ?|                           



                          ?Stage one ? ? ? ? ?|                           



                          ? Normal ? ? ? ? ? ? ?                           



                          ?+--------------------                           



                          ---+------------------                           



                          ---+------------------                           



                          -------+| ?60-89 ? ? ?                           



                          ? ? ? ? ?| ?Stage two                           



                          ? ? ? ? ?| ? Decreased                           



                          GFR ? ? ? ?                            



                          +---------------------                           



                          --+-------------------                           



                          --+-------------------                           



                          ------+| ?30-59 ? ? ?                           



                          ? ? ? ? ?| ?Stage                           



                          three ? ? ? ?| ? Stage                           



                          three ? ? ? ? ?                           



                          +---------------------                           



                          --+-------------------                           



                          --+-------------------                           



                          ------+| ?15-29 ? ? ?                           



                          ? ? ? ? ?| ?Stage four                           



                          ? ? ? ? | ? Stage four                           



                          ? ? ? ? ?                              



                          ?+--------------------                           



                          ---+------------------                           



                          ---+------------------                           



                          -------+| ?<15 (or                           



                          dialysis) ? ?| ?Stage                           



                          five ? ? ? ? | ? Stage                           



                          five ? ? ? ? ?                           



                          ?+--------------------                           



                          ---+------------------                           



                          ---+------------------                           



                          -------+ *Each stage                           



                          assumes the associated                           



                          GFR level has been in                           



                          effect for at least                           



                          three months. ?Stages                           



                          1 to 5, with or                           



                          without kidney                           



                          disease, indicate                           



                          chronic kidney                           



                          disease. Notes:                           



                          Determination of                           



                          stages one and two                           



                          (with eGFR                             



                          >59mL/min/1.73 m2)                           



                          requires estimation of                           



                          kidney damage for at                           



                          least three months as                           



                          defined by structural                           



                          or functional                           



                          abnormalities of the                           



                          kidney, manifested by                           



                          either:Pathological                           



                          abnormalities or                           



                          Markers of kidney                           



                          damage (including                           



                          abnormalities in the                           



                          composition of the                           



                          blood or urine or                           



                          abnormalities in                           



                          imaging tests).                           

 

             Lab Interpretation Abnormal                               



             (test code = 45448-1)                                        



[facility] WITH [id number] 19:05:00





             Test Item    Value        Reference Range Interpretation Comments

 

             WBC (test code =              See_Comment                [Automated



             9535-2)                                             message] The sy

stem



                                                                 which generated



                                                                 this result



                                                                 transmitted



                                                                 reference range

:



                                                                 4.30 - 11.10



                                                                 10*3/?L. The



                                                                 reference range

 was



                                                                 not used to



                                                                 interpret this



                                                                 result as



                                                                 normal/abnormal

.

 

             RBC (test code =              See_Comment  L             [Automated



             219-8)                                              message] The sy

stem



                                                                 which generated



                                                                 this result



                                                                 transmitted



                                                                 reference range

:



                                                                 3.93 - 5.25



                                                                 10*6/?L. The



                                                                 reference range

 was



                                                                 not used to



                                                                 interpret this



                                                                 result as



                                                                 normal/abnormal

.

 

             HGB (test code = 11.6 g/dL    11.6-15                   



             718-7)                                              

 

             HCT (test code = 34.5 %       35.7-45.2    L            



             4544-3)                                             

 

             MCV (test code = 91.5 fL      80.6-95.5                 



             787-2)                                              

 

             MCH (test code = 30.8 pg      25.9-32.8                 



             785-6)                                              

 

             MCHC (test code = 33.6 g/dL    31.6-35.1                 



             786-4)                                              

 

             RDW-SD (test code = 58.3 fL      39-49.9      H            



             63920-5)                                            

 

             RDW-CV (test code = 17.5 %       12-15.5      H            



             788-0)                                              

 

             PLT (test code =              See_Comment  L             [Automated



             777-3)                                              message] The sy

stem



                                                                 which generated



                                                                 this result



                                                                 transmitted



                                                                 reference range

:



                                                                 166 - 358 10*3/

?L.



                                                                 The reference r

olman



                                                                 was not used to



                                                                 interpret this



                                                                 result as



                                                                 normal/abnormal

.

 

             MPV (test code = 10.0 fL      9.5-12.9                  



             26272-5)                                            

 

             IPF % (test code = 3.1 %        1.3-7.7                   Platelet 

count



             6927902807)                                         measured by



                                                                 fluorescence



                                                                 method.

 

             NRBC/100 WBC (test              See_Comment                [Automat

ed



             code = 2768198003)                                        message] 

The system



                                                                 which generated



                                                                 this result



                                                                 transmitted



                                                                 reference range

:



                                                                 0.0 - 10.0 /100



                                                                 WBCs. The refer

ence



                                                                 range was not u

sed



                                                                 to interpret th

is



                                                                 result as



                                                                 normal/abnormal

.

 

             NRBC x10^3 (test code <0.01        See_Comment                [Auto

mated



             = 3487668733)                                        message] The s

ystem



                                                                 which generated



                                                                 this result



                                                                 transmitted



                                                                 reference range

:



                                                                 10*3/?L. The



                                                                 reference range

 was



                                                                 not used to



                                                                 interpret this



                                                                 result as



                                                                 normal/abnormal

.

 

             GRAN MAT (NEUT) % 72.1 %                                 



             (test code = 770-8)                                        

 

             IMM GRAN % (test code 0.50 %                                 



             = 9215700641)                                        

 

             LYMPH % (test code = 16.7 %                                 



             736-9)                                              

 

             MONO % (test code = 9.4 %                                  



             5905-5)                                             

 

             EOS % (test code = 0.8 %                                  



             713-8)                                              

 

             BASO % (test code = 0.5 %                                  



             706-2)                                              

 

             GRAN MAT x10^3(ANC) 4.43 10*3/uL 1.88-7.09                 



             (test code =                                        



             7784881851)                                         

 

             IMM GRAN x10^3 (test 0.03 10*3/uL 0-0.06                    



             code = 7427100912)                                        

 

             LYMPH x10^3 (test code 1.03 10*3/uL 1.32-3.29    L            



             = 731-0)                                            

 

             MONO x10^3 (test code 0.58 10*3/uL 0.33-0.92                 



             = 742-7)                                            

 

             EOS x10^3 (test code = 0.05 10*3/uL 0.03-0.39                 



             711-2)                                              

 

             BASO x10^3 (test code 0.03 10*3/uL 0.01-0.07                 



             = 704-7)                                            

 

             Lab Interpretation Abnormal                               



             (test code = 22647-7)                                        



[id number]





             Test Item    Value        Reference Range Interpretation Comments

 

             LIPASE (test code = 8070557885) 69 U/L       0-220                 

    

 

             Lab Interpretation (test code = Normal                             

    



             75792-7)                                            



[facility]





             Test Item    Value        Reference Range Interpretation Comments

 

             APPEARANCE (test code = Clear        Clear                     



             7992937868)                                         

 

             COLOR (test code = Yellow       Yellow                    



             8483735090)                                         

 

             PH (test code =              4.8-8.0                   



             1736304937)                                         

 

             SP GRAVITY (test code =              1.003-1.030               



             9023831099)                                         

 

             GLU U QUAL (test code = 500 mg/dL    Normal       A            



             2242914975)                                         

 

             BLOOD (test code = Negative     Negative                  



             1613803460)                                         

 

             KETONES (test code = Negative     Negative                  



             3804597484)                                         

 

             PROTEIN (test code = Negative     Negative                  



             2887-8)                                             

 

             UROBILIN (test code = Normal       Normal                    



             9805736914)                                         

 

             BILIRUBIN (test code = Negative     Negative                  



             1879127383)                                         

 

             NITRITE (test code = Negative     Negative                  



             0848519576)                                         

 

             LEUK MOE (test code = Negative     Negative                  



             4218922883)                                         

 

             RBC/HPF (test code =              See_Comment                [Autom

ated message]



             6662892875)                                         The system [employer]



                                                                 generated this



                                                                 result transmit

eduard



                                                                 reference range

: 0 -



                                                                 3 HPF. The refe

rence



                                                                 range was not u

sed



                                                                 to interpret th

is



                                                                 result as



                                                                 normal/abnormal

.

 

             WBC/HPF (test code = <1           See_Comment                [Autom

ated message]



             4924577135)                                         The system [employer]



                                                                 generated this



                                                                 result transmit

eduard



                                                                 reference range

: 0 -



                                                                 5 HPF. The refe

rence



                                                                 range was not u

sed



                                                                 to interpret th

is



                                                                 result as



                                                                 normal/abnormal

.

 

             BACTERIA (test code = Negative     Negative                  



             8070289074)                                         

 

             SQ EPITH (test code =              HPF                       



             8454591804)                                         

 

             Lab Interpretation (test Abnormal                               



             code = 94167-3)                                        



[facility]





             Test Item    Value        Reference Range Interpretation Comments

 

             POCT GLU (test code = 7762592296) 272 mg/dL           H      

      

 

             Lab Interpretation (test code = Abnormal                           

    



             94384-8)                                            



[facility]





             Test Item    Value        Reference Range Interpretation Comments

 

             PROLACTIN (test code = 6790665854) 8.1 ng/mL    2.7-19.6           

       

 

             Lab Interpretation (test code = Normal                             

    



             67157-5)                                            



[facility] GLUCOSE (AUTOMATED)2020 03:13:00





             Test Item    Value        Reference Range Interpretation Comments

 

             POCT GLU (test code = 1960084871) 295 mg/dL           H      

      

 

             Lab Interpretation (test code = Abnormal                           

    



             88236-6)                                            



[facility]





             Test Item    Value        Reference Range Interpretation Comments

 

             TROPONIN I (test 0.003 ng/mL  See_Comment                [Automated



             code = 8917818454)                                        message] 

The



                                                                 system which



                                                                 generated this



                                                                 result



                                                                 transmitted



                                                                 reference range

:



                                                                 <=0.034. The



                                                                 reference range



                                                                 was not used to



                                                                 interpret this



                                                                 result as



                                                                 normal/abnormal

.

 

             RICK (test code = Equal or Less than                           



             RICK)         0.034                                  



                          ng/ml---Normal                           



                          ?Note: Cardiac                           



                          troponin begins to                           



                          rise 3-4 hours                           



                          after the onset of                           



                          ischemia. Repeat                           



                          in 4-6 hours if                           



                          the sample was                           



                          drawn within 3-4                           



                          hours of the onset                           



                          of the symptom and                           



                          found normal.                           



                          Between 0.035 and                           



                          0.120 ng/mL---                           



                          Borderline.                            



                          Questionable                           



                          myocardial injury                           



                          or necrosis ?                           



                          ?Note: Serial                           



                          measurement may be                           



                          necessary to                           



                          confirm or exclude                           



                          the diagnosis of                           



                          myocardial injury                           



                          or necrosis;                           



                          Clinical                               



                          correlation                            



                          (symptoms, EKGs,                           



                          imaging studies,                           



                          and others)                            



                          required; Repeat                           



                          in 4-6 hours if                           



                          clinically                             



                          indicated. ? ? ? ?                           



                          Equal or Higher                           



                          than 0.121                             



                          ng/mL---Abnormal.                           



                          Myocardial Injury                           



                          or Necrosis Likely                           



                          ? ? ? ?  Biotin                           



                          has been reported                           



                          to cause a                             



                          negative bias,                           



                          interpret results                           



                          relative to                            



                          patient's use of                           



                          biotin. ? ? ? ? ?                           



                          ? ? ? ? ? ? ? ? ?                           



                          ? ? ? ? ? ? ? ?  ?                           



                          ? ? ? ? ? ? ? ? ?                           



                          ? ? ? ? ? ? ? ? ?                           



                          ? ? ? ? ? ? ? ? ?                           

 

             Lab Interpretation Normal                                 



             (test code =                                        



             32913-7)                                            



[facility] METABOLIC PANEL (NA, K, CL, CO2, 
GLUCOSE, BUN, CREATININE, CA)2020 02:07:00





             Test Item    Value        Reference Range Interpretation Comments

 

             NA (test code = 137 mmol/L   135-145                   



             1032858354)                                         

 

             K (test code = 3.6 mmol/L   3.5-5                     



             8933383410)                                         

 

             CL (test code = 104 mmol/L                       



             6784261917)                                         

 

             CO2 TOTAL (test code = 24 mmol/L    23-31                     



             7497398775)                                         

 

             AGAP (test code =              2-16                      



             4161011685)                                         

 

             BUN (test code = 11 mg/dL     7-23                      



             1802214058)                                         

 

             GLUCOSE (test code = 321 mg/dL           H            



             1236348755)                                         

 

             CREATININE (test code = 0.65 mg/dL   0.5-1.04                  



             3507060995)                                         

 

             CALCIUM (test code = 8.2 mg/dL    8.6-10.6     L            



             9781487305)                                         

 

             eGFR Calculation              mL/min/1.73m2              



             (Non-)                                        



             (test code =                                        



             7192276990)                                         

 

             eGFR Calculation              mL/min/1.73m2              



             (African American)                                        



             (test code =                                        



             1504414583)                                         

 

             RICK (test code = RICK) Association of                           



                          Glomerular Filtration                           



                          Rate (GFR) and Staging                           



                          of Kidney Disease*                           



                          +---------------------                           



                          --+-------------------                           



                          --+-------------------                           



                          ------+| GFR                           



                          (mL/min/1.73 m2) ?|                           



                          With Kidney Damage ?|                           



                          ?Without Kidney                           



                          Damage+---------------                           



                          --------+-------------                           



                          --------+-------------                           



                          ------------+| ?>90 ?                           



                          ? ? ? ? ? ? ? ?|                           



                          ?Stage one ? ? ? ? ?|                           



                          ? Normal ? ? ? ? ? ? ?                           



                          ?+--------------------                           



                          ---+------------------                           



                          ---+------------------                           



                          -------+| ?60-89 ? ? ?                           



                          ? ? ? ? ?| ?Stage two                           



                          ? ? ? ? ?| ? Decreased                           



                          GFR ? ? ? ?                            



                          +---------------------                           



                          --+-------------------                           



                          --+-------------------                           



                          ------+| ?30-59 ? ? ?                           



                          ? ? ? ? ?| ?Stage                           



                          three ? ? ? ?| ? Stage                           



                          three ? ? ? ? ?                           



                          +---------------------                           



                          --+-------------------                           



                          --+-------------------                           



                          ------+| ?15-29 ? ? ?                           



                          ? ? ? ? ?| ?Stage four                           



                          ? ? ? ? | ? Stage four                           



                          ? ? ? ? ?                              



                          ?+--------------------                           



                          ---+------------------                           



                          ---+------------------                           



                          -------+| ?<15 (or                           



                          dialysis) ? ?| ?Stage                           



                          five ? ? ? ? | ? Stage                           



                          five ? ? ? ? ?                           



                          ?+--------------------                           



                          ---+------------------                           



                          ---+------------------                           



                          -------+ *Each stage                           



                          assumes the associated                           



                          GFR level has been in                           



                          effect for at least                           



                          three months. ?Stages                           



                          1 to 5, with or                           



                          without kidney                           



                          disease, indicate                           



                          chronic kidney                           



                          disease. Notes:                           



                          Determination of                           



                          stages one and two                           



                          (with eGFR                             



                          >59mL/min/1.73 m2)                           



                          requires estimation of                           



                          kidney damage for at                           



                          least three months as                           



                          defined by structural                           



                          or functional                           



                          abnormalities of the                           



                          kidney, manifested by                           



                          either:Pathological                           



                          abnormalities or                           



                          Markers of kidney                           



                          damage (including                           



                          abnormalities in the                           



                          composition of the                           



                          blood or urine or                           



                          abnormalities in                           



                          imaging tests).                           

 

             Lab Interpretation Abnormal                               



             (test code = 06039-7)                                        



[facility]





             Test Item    Value        Reference Range Interpretation Comments

 

             MAGNESIUM (test code = 3535392301) 1.8 mg/dL    1.7-2.4            

       

 

             Lab Interpretation (test code = Normal                             

    



             45709-3)                                            



[id number] 02[facility]07:00





             Test Item    Value        Reference Range Interpretation Comments

 

             CK (test code = 1771818038) 35 U/L                           

 

             Lab Interpretation (test code = Normal                             

    



             87764-5)                                            



[facility]00





             Test Item    Value        Reference Range Interpretation Comments

 

             POCT GLU (test code = 9702264000) 310 mg/dL           H      

      

 

             Lab Interpretation (test code = Abnormal                           

    



             45277-6)                                            



[id number] 17:26:50EXAM: XR KUB HISTORY:
serial progression r/o toxic megacolon, ?,sbo r/o  COMPARISON: None. FINDINGS:
Gas is present throughout nondilated loops of small and large intestine,and 
obstruction is not suspected. Neither is toxic megacolon demonstrated.The bowel 
gas pattern is normal and no opaque stones ormasses are found.[facility], [facility] 
Results Inft User - 2020 12:27 PM CDTEXAM: XR KUBHISTORY: serial 
progression r/o toxic megacolon,  ,sbo r/o COMPARISON: None.FINDINGS:Gas is 
present throughout nondilated loops of small and large intestine,and obstruction
is not suspected. Neither is toxic megacolon demonstrated.The bowel gas pattern 
is normal and no opaque stones or masses are found.[facility] GLUCOSE (AUTOMATED)2020 13:04:00





             Test Item    Value        Reference Range Interpretation Comments

 

             POCT GLU (test code = 5519335379) 270 mg/dL           H      

      

 

             Lab Interpretation (test code = Abnormal                           

    



             17295-1)                                            



[facility] WITH [id number] 11:01:00





             Test Item    Value        Reference Range Interpretation Comments

 

             WBC (test code =              See_Comment                [Automated



             6690-2)                                             message] The sy

stem



                                                                 which generated



                                                                 this result



                                                                 transmitted



                                                                 reference range

:



                                                                 4.30 - 11.10



                                                                 10*3/?L. The



                                                                 reference range

 was



                                                                 not used to



                                                                 interpret this



                                                                 result as



                                                                 normal/abnormal

.

 

             RBC (test code =              See_Comment  L             [Automated



             789-8)                                              message] The sy

stem



                                                                 which generated



                                                                 this result



                                                                 transmitted



                                                                 reference range

:



                                                                 3.93 - 5.25



                                                                 10*6/?L. The



                                                                 reference range

 was



                                                                 not used to



                                                                 interpret this



                                                                 result as



                                                                 normal/abnormal

.

 

             HGB (test code = 10.9 g/dL    11.6-15      L            



             718-7)                                              

 

             HCT (test code = 32.8 %       35.7-45.2    L            



             4544-3)                                             

 

             MCV (test code = 90.1 fL      80.6-95.5                 



             787-2)                                              

 

             MCH (test code = 29.9 pg      25.9-32.8                 



             785-6)                                              

 

             MCHC (test code = 33.2 g/dL    31.6-35.1                 



             786-4)                                              

 

             RDW-SD (test code = 54.9 fL      39-49.9      H            



             37457-4)                                            

 

             RDW-CV (test code = 17.2 %       12-15.5      H            



             788-0)                                              

 

             PLT (test code =              See_Comment  LL            [Automated



             777-3)                                              message] The sy

stem



                                                                 which generated



                                                                 this result



                                                                 transmitted



                                                                 reference range

:



                                                                 166 - 358 10*3/

?L.



                                                                 The reference r

olman



                                                                 was not used to



                                                                 interpret this



                                                                 result as



                                                                 normal/abnormal

.

 

             MPV (test code = 9.9 fL       9.5-12.9                  



             20462-4)                                            

 

             IPF % (test code = 4.4 %        1.3-7.7                   Platelet 

count



             6912736351)                                         measured by



                                                                 fluorescence



                                                                 method.

 

             NRBC/100 WBC (test              See_Comment                [Automat

ed



             code = 2266947379)                                        message] 

The system



                                                                 which generated



                                                                 this result



                                                                 transmitted



                                                                 reference range

:



                                                                 0.0 - 10.0 /100



                                                                 WBCs. The refer

ence



                                                                 range was not u

sed



                                                                 to interpret th

is



                                                                 result as



                                                                 normal/abnormal

.

 

             NRBC x10^3 (test code <0.01        See_Comment                [Auto

mated



             = 6345189047)                                        message] The s

ystem



                                                                 which generated



                                                                 this result



                                                                 transmitted



                                                                 reference range

:



                                                                 10*3/?L. The



                                                                 reference range

 was



                                                                 not used to



                                                                 interpret this



                                                                 result as



                                                                 normal/abnormal

.

 

             GRAN MAT (NEUT) % 75.2 %                                 



             (test code = 770-8)                                        

 

             IMM GRAN % (test code 1.40 %                                 



             = 6954986163)                                        

 

             LYMPH % (test code = 15.7 %                                 



             736-9)                                              

 

             MONO % (test code = 6.8 %                                  



             5905-5)                                             

 

             EOS % (test code = 0.7 %                                  



             713-8)                                              

 

             BASO % (test code = 0.2 %                                  



             706-2)                                              

 

             GRAN MAT x10^3(ANC) 4.23 10*3/uL 1.88-7.09                 



             (test code =                                        



             1940050201)                                         

 

             IMM GRAN x10^3 (test 0.08 10*3/uL 0-0.06       H            



             code = 2596383255)                                        

 

             LYMPH x10^3 (test code 0.88 10*3/uL 1.32-3.29    L            



             = 731-0)                                            

 

             MONO x10^3 (test code 0.38 10*3/uL 0.33-0.92                 



             = 742-7)                                            

 

             EOS x10^3 (test code = 0.04 10*3/uL 0.03-0.39                 



             711-2)                                              

 

             BASO x10^3 (test code <0.03        0.01-0.07                 



             = 704-7)                                            

 

             Lab Interpretation Abnormal                               



             (test code = 88499-7)                                        



[facility] Metabolic Panel (NA, K, CL, CO2, 
Glucose, BUN, Creatinine, CA)2020 10:45:00





             Test Item    Value        Reference Range Interpretation Comments

 

             NA (test code = 137 mmol/L   135-145                   



             4621814642)                                         

 

             K (test code = 3.7 mmol/L   3.5-5                     Slight



             5553548029)                                         hemolysis

 

             CL (test code = 105 mmol/L                       



             4866306422)                                         

 

             CO2 TOTAL (test code 25 mmol/L    23-31                     



             = 9744897440)                                        

 

             AGAP (test code =              2-16                      



             4844362265)                                         

 

             BUN (test code = 10 mg/dL     7-23                      Slight



             7090494645)                                         hemolysis

 

             GLUCOSE (test code = 307 mg/dL           H            



             3230111767)                                         

 

             CREATININE (test code 0.56 mg/dL   0.5-1.04                  



             = 7286128120)                                        

 

             CALCIUM (test code = 7.4 mg/dL    8.6-10.6     L            



             1513337685)                                         

 

             eGFR Calculation              mL/min/1.73m2              



             (Non-                                        



             American) (test code                                        



             = 0960757883)                                        

 

             eGFR Calculation              mL/min/1.73m2              



             (African American)                                        



             (test code =                                        



             2909141001)                                         

 

             RICK (test code = RICK) Association of                           



                          Glomerular                             



                          Filtration Rate                           



                          (GFR) and Staging                           



                          of Kidney Disease*                           



                          +------------------                           



                          -----+-------------                           



                          --------+----------                           



                          ---------------+|                           



                          GFR (mL/min/1.73                           



                          m2) ?| With Kidney                           



                          Damage ?| ?Without                           



                          Kidney                                 



                          Damage+------------                           



                          -----------+-------                           



                          --------------+----                           



                          -------------------                           



                          --+| ?>90 ? ? ? ? ?                           



                          ? ? ? ?| ?Stage one                           



                          ? ? ? ? ?| ? Normal                           



                          ? ? ? ? ? ? ?                           



                          ?+-----------------                           



                          ------+------------                           



                          ---------+---------                           



                          ----------------+|                           



                          ?60-89 ? ? ? ? ? ?                           



                          ? ?| ?Stage two ? ?                           



                          ? ? ?| ? Decreased                           



                          GFR ? ? ? ?                            



                          +------------------                           



                          -----+-------------                           



                          --------+----------                           



                          ---------------+|                           



                          ?30-59 ? ? ? ? ? ?                           



                          ? ?| ?Stage three ?                           



                          ? ? ?| ? Stage                           



                          three ? ? ? ? ?                           



                          +------------------                           



                          -----+-------------                           



                          --------+----------                           



                          ---------------+|                           



                          ?15-29 ? ? ? ? ? ?                           



                          ? ?| ?Stage four ?                           



                          ? ? ? | ? Stage                           



                          four ? ? ? ? ?                           



                          ?+-----------------                           



                          ------+------------                           



                          ---------+---------                           



                          ----------------+|                           



                          ?<15 (or dialysis)                           



                          ? ?| ?Stage five ?                           



                          ? ? ? | ? Stage                           



                          five ? ? ? ? ?                           



                          ?+-----------------                           



                          ------+------------                           



                          ---------+---------                           



                          ----------------+                           



                          *Each stage assumes                           



                          the associated GFR                           



                          level has been in                           



                          effect for at least                           



                          three months.                           



                          ?Stages 1 to 5,                           



                          with or without                           



                          kidney disease,                           



                          indicate chronic                           



                          kidney disease.                           



                          Notes:                                 



                          Determination of                           



                          stages one and two                           



                          (with eGFR                             



                          >59mL/min/1.73 m2)                           



                          requires estimation                           



                          of kidney damage                           



                          for at least three                           



                          months as defined                           



                          by structural or                           



                          functional                             



                          abnormalities of                           



                          the kidney,                            



                          manifested by                           



                          either:Pathological                           



                          abnormalities or                           



                          Markers of kidney                           



                          damage (including                           



                          abnormalities in                           



                          the composition of                           



                          the blood or urine                           



                          or abnormalities in                           



                          imaging tests).                           

 

             Lab Interpretation Abnormal                               



             (test code = 23829-5)                                        



[facility] GLUCOSE (AUTOMATED)2020 03:01:00





             Test Item    Value        Reference Range Interpretation Comments

 

             POCT GLU (test code = 1428120181) 285 mg/dL           H      

      

 

             Lab Interpretation (test code = Abnormal                           

    



             48878-0)                                            



[facility] CULTURE [id number] 00:01:00





             Test Item    Value        Reference Range Interpretation Comments

 

             Blood Culture-Aerobic No organisms No growth                 Previo

us



             (test code = 17928-3) isolated                               prelim

inary



                                                                 verified result



                                                                 was Culture In



                                                                 Progress on



                                                                 2020 at 22

01



                                                                 CDTPrevious



                                                                 preliminary



                                                                 verified result



                                                                 was No growth a

t



                                                                 24 hours on



                                                                 2020 at 19

01



                                                                 CDTPrevious



                                                                 preliminary



                                                                 verified result



                                                                 was No growth a

t



                                                                 48 hours on



                                                                 2020 at 19

01



                                                                 CDTPrevious



                                                                 preliminary



                                                                 verified result



                                                                 was No growth a

t



                                                                 72 hours on



                                                                 2020 at 190

1



                                                                 CDT

 

             Blood        No organisms No growth                 Previous



             Culture-Anaerobic isolated                               preliminar

y



             (test code = 85905-0)                                        verifi

ed result



                                                                 was Culture In



                                                                 Progress on



                                                                 2020 at 22

01



                                                                 CDTPrevious



                                                                 preliminary



                                                                 verified result



                                                                 was No growth a

t



                                                                 24 hours on



                                                                 2020 at 19

01



                                                                 CDTPrevious



                                                                 preliminary



                                                                 verified result



                                                                 was No growth a

t



                                                                 48 hours on



                                                                 2020 at 19

01



                                                                 CDTPrevious



                                                                 preliminary



                                                                 verified result



                                                                 was No growth a

t



                                                                 72 hours on



                                                                 2020 at 190

1



                                                                 CDT

 

             Lab Interpretation Normal                                 



             (test code = 12542-4)                                        



[facility] CULTURE [id number] 00:01:00





             Test Item    Value        Reference Range Interpretation Comments

 

             Blood Culture-Aerobic No organisms No growth                 Previo

us



             (test code = 17928-3) isolated                               prelim

inary



                                                                 verified result



                                                                 was Culture In



                                                                 Progress on



                                                                 2020 at 22

01



                                                                 CDTPrevious



                                                                 preliminary



                                                                 verified result



                                                                 was No growth a

t



                                                                 24 hours on



                                                                 2020 at 19

01



                                                                 CDTPrevious



                                                                 preliminary



                                                                 verified result



                                                                 was No growth a

t



                                                                 48 hours on



                                                                 2020 at 19

01



                                                                 CDTPrevious



                                                                 preliminary



                                                                 verified result



                                                                 was No growth a

t



                                                                 72 hours on



                                                                 2020 at 190

1



                                                                 CDT

 

             Blood        No organisms No growth                 Previous



             Culture-Anaerobic isolated                               preliminar

y



             (test code = 64685-4)                                        verifi

ed result



                                                                 was Culture In



                                                                 Progress on



                                                                 2020 at 22

01



                                                                 CDTPrevious



                                                                 preliminary



                                                                 verified result



                                                                 was No growth a

t



                                                                 24 hours on



                                                                 2020 at 19

01



                                                                 CDTPrevious



                                                                 preliminary



                                                                 verified result



                                                                 was No growth a

t



                                                                 48 hours on



                                                                 2020 at 19

01



                                                                 CDTPrevious



                                                                 preliminary



                                                                 verified result



                                                                 was No growth a

t



                                                                 72 hours on



                                                                 2020 at 190

1



                                                                 CDT

 

             Lab Interpretation Normal                                 



             (test code = 04926-1)                                        



[facility] GLUCOSE (AUTOMATED)2020 21:19:00





             Test Item    Value        Reference Range Interpretation Comments

 

             POCT GLU (test code = 6641598584) 337 mg/dL           H      

      

 

             Lab Interpretation (test code = Abnormal                           

    



             89660-4)                                            



[id number] No radiographic 
findings to suggest with toxic megacolon.EXAM: XR KUB COMPARISON: 2020. 
HISTORY: monitor for toxic megacolon  FINDINGS: The colon appears normally 
dilated with preservation of the haustralmarkings and no evidence of mucosal 
edema. Air is noted throughout thecolon to the level of the rectum. The bowel 
gas pattern is overallnonobstructive. No intra-abdominal free air seen. 
Cholecystectomy clips are present. The bony structures are grosslyunremarkable. 
[facility], Radiant Results [facility] User - 2020 12:44 PM CDTEXAM: XR KUBCOMPARISON:
2020.HISTORY: monitor for toxic megacolon FINDINGS:The colon appears 
normally dilated with preservation of the haustralmarkings and no evidence of
mucosal edema. Air is noted throughout thecolon to the level of the rectum. The 
bowel gas pattern isoverallnonobstructive.No intra-abdominal free air 
seen.Cholecystectomy clips are present. The bony structures are 
grosslyunremarkable.IMPRESSIONNo radiographic findings to suggest with toxic 
megacolon.[facility] GLUCOSE (AUTOMATED)2020 
16:45:00





             Test Item    Value        Reference Range Interpretation Comments

 

             POCT GLU (test code = 319 mg/dL           H            Notifi

ed Provider



             [id number])                                         

 

             Lab Interpretation (test Abnormal                               



             code = 97481-0)                                        



[id number] 11:35:00





             Test Item    Value        Reference Range Interpretation Comments

 

             WBC (test code =              See_Comment                [Automated



             6690-2)                                             message] The sy

stem



                                                                 which generated



                                                                 this result



                                                                 transmitted



                                                                 reference range

:



                                                                 4.30 - 11.10



                                                                 10*3/?L. The



                                                                 reference range

 was



                                                                 not used to



                                                                 interpret this



                                                                 result as



                                                                 normal/abnormal

.

 

             RBC (test code =              See_Comment  L             [Automated



             789-8)                                              message] The sy

stem



                                                                 which generated



                                                                 this result



                                                                 transmitted



                                                                 reference range

:



                                                                 3.93 - 5.25



                                                                 10*6/?L. The



                                                                 reference range

 was



                                                                 not used to



                                                                 interpret this



                                                                 result as



                                                                 normal/abnormal

.

 

             HGB (test code = 10.9 g/dL    11.6-15      L            



             718-7)                                              

 

             HCT (test code = 32.8 %       35.7-45.2    L            



             4544-3)                                             

 

             MCV (test code = 88.9 fL      80.6-95.5                 



             787-2)                                              

 

             MCH (test code = 29.5 pg      25.9-32.8                 



             785-6)                                              

 

             MCHC (test code = 33.2 g/dL    31.6-35.1                 



             786-4)                                              

 

             RDW-SD (test code = 53.9 fL      39-49.9      H            



             20974-4)                                            

 

             RDW-CV (test code = 17.2 %       12-15.5      H            



             788-0)                                              

 

             PLT (test code =              See_Comment  LL            [Automated



             777-3)                                              message] The sy

stem



                                                                 which generated



                                                                 this result



                                                                 transmitted



                                                                 reference range

:



                                                                 166 - 358 10*3/

?L.



                                                                 The reference r

olman



                                                                 was not used to



                                                                 interpret this



                                                                 result as



                                                                 normal/abnormal

.

 

             MPV (test code = 10.0 fL      9.5-12.9                  



             14016-8)                                            

 

             IPF % (test code = 3.2 %        1.3-7.7                   Platelet 

count



             [id number])                                         measured by



                                                                 fluorescence



                                                                 method.

 

             NRBC/100 WBC (test              See_Comment                [Automat

ed



             code = 5921382082)                                        message] 

The system



                                                                 which generated



                                                                 this result



                                                                 transmitted



                                                                 reference range

:



                                                                 0.0 - 10.0 /100



                                                                 WBCs. The refer

ence



                                                                 range was not u

sed



                                                                 to interpret th

is



                                                                 result as



                                                                 normal/abnormal

.

 

             NRBC x10^3 (test code <0.01        See_Comment                [Auto

mated



             = 0884673867)                                        message] The s

ystem



                                                                 which generated



                                                                 this result



                                                                 transmitted



                                                                 reference range

:



                                                                 10*3/?L. The



                                                                 reference range

 was



                                                                 not used to



                                                                 interpret this



                                                                 result as



                                                                 normal/abnormal

.

 

             GRAN MAT (NEUT) % 73.9 %                                 



             (test code = 770-8)                                        

 

             IMM GRAN % (test code 1.60 %                                 



             = 6075912473)                                        

 

             LYMPH % (test code = 16.1 %                                 



             736-9)                                              

 

             MONO % (test code = 6.6 %                                  



             5905-5)                                             

 

             EOS % (test code = 1.4 %                                  



             713-8)                                              

 

             BASO % (test code = 0.4 %                                  



             706-2)                                              

 

             GRAN MAT x10^3(ANC) 4.14 10*3/uL 1.88-7.09                 



             (test code =                                        



             5615547164)                                         

 

             IMM GRAN x10^3 (test 0.09 10*3/uL 0-0.06       H            



             code = 1141981971)                                        

 

             LYMPH x10^3 (test code 0.90 10*3/uL 1.32-3.29    L            



             = 731-0)                                            

 

             MONO x10^3 (test code 0.37 10*3/uL 0.33-0.92                 



             = 742-7)                                            

 

             EOS x10^3 (test code = 0.08 10*3/uL 0.03-0.39                 



             711-2)                                              

 

             BASO x10^3 (test code <0.03        0.01-0.07                 



             = 704-7)                                            

 

             BANDS (test code = Increased                 A            



             8987914387)                                         

 

             Lab Interpretation Abnormal                               



             (test code = 74267-1)                                        



[facility] Metabolic Panel (NA, K, CL, CO2, 
Glucose, BUN, Creatinine, CA)2020 11:07:00





             Test Item    Value        Reference Range Interpretation Comments

 

             NA (test code = 136 mmol/L   135-145                   



             2301860140)                                         

 

             K (test code = 3.3 mmol/L   3.5-5        L            Slight



             7608838356)                                         hemolysis

 

             CL (test code = 105 mmol/L                       



             5004109229)                                         

 

             CO2 TOTAL (test code 25 mmol/L    23-31                     



             = 9779283759)                                        

 

             AGAP (test code =              2-16                      



             1694284865)                                         

 

             BUN (test code = 12 mg/dL     7-23                      Slight



             6609631222)                                         hemolysis

 

             GLUCOSE (test code = 275 mg/dL           H            



             2940710553)                                         

 

             CREATININE (test code 0.60 mg/dL   0.5-1.04                  



             = 9126022732)                                        

 

             CALCIUM (test code = 7.4 mg/dL    8.6-10.6     L            



             8281349954)                                         

 

             eGFR Calculation              mL/min/1.73m2              



             (Non-                                        



             American) (test code                                        



             = 4968973398)                                        

 

             eGFR Calculation              mL/min/1.73m2              



             (African American)                                        



             (test code =                                        



             6631942305)                                         

 

             RICK (test code = RICK) Association of                           



                          Glomerular                             



                          Filtration Rate                           



                          (GFR) and Staging                           



                          of Kidney Disease*                           



                          +------------------                           



                          -----+-------------                           



                          --------+----------                           



                          ---------------+|                           



                          GFR (mL/min/1.73                           



                          m2) ?| With Kidney                           



                          Damage ?| ?Without                           



                          Kidney                                 



                          Damage+------------                           



                          -----------+-------                           



                          --------------+----                           



                          -------------------                           



                          --+| ?>90 ? ? ? ? ?                           



                          ? ? ? ?| ?Stage one                           



                          ? ? ? ? ?| ? Normal                           



                          ? ? ? ? ? ? ?                           



                          ?+-----------------                           



                          ------+------------                           



                          ---------+---------                           



                          ----------------+|                           



                          ?60-89 ? ? ? ? ? ?                           



                          ? ?| ?Stage two ? ?                           



                          ? ? ?| ? Decreased                           



                          GFR ? ? ? ?                            



                          +------------------                           



                          -----+-------------                           



                          --------+----------                           



                          ---------------+|                           



                          ?30-59 ? ? ? ? ? ?                           



                          ? ?| ?Stage three ?                           



                          ? ? ?| ? Stage                           



                          three ? ? ? ? ?                           



                          +------------------                           



                          -----+-------------                           



                          --------+----------                           



                          ---------------+|                           



                          ?15-29 ? ? ? ? ? ?                           



                          ? ?| ?Stage four ?                           



                          ? ? ? | ? Stage                           



                          four ? ? ? ? ?                           



                          ?+-----------------                           



                          ------+------------                           



                          ---------+---------                           



                          ----------------+|                           



                          ?<15 (or dialysis)                           



                          ? ?| ?Stage five ?                           



                          ? ? ? | ? Stage                           



                          five ? ? ? ? ?                           



                          ?+-----------------                           



                          ------+------------                           



                          ---------+---------                           



                          ----------------+                           



                          *Each stage assumes                           



                          the associated GFR                           



                          level has been in                           



                          effect for at least                           



                          three months.                           



                          ?Stages 1 to 5,                           



                          with or without                           



                          kidney disease,                           



                          indicate chronic                           



                          kidney disease.                           



                          Notes:                                 



                          Determination of                           



                          stages one and two                           



                          (with eGFR                             



                          >59mL/min/1.73 m2)                           



                          requires estimation                           



                          of kidney damage                           



                          for at least three                           



                          months as defined                           



                          by structural or                           



                          functional                             



                          abnormalities of                           



                          the kidney,                            



                          manifested by                           



                          either:Pathological                           



                          abnormalities or                           



                          Markers of kidney                           



                          damage (including                           



                          abnormalities in                           



                          the composition of                           



                          the blood or urine                           



                          or abnormalities in                           



                          imaging tests).                           

 

             Lab Interpretation Abnormal                               



             (test code = 49309-5)                                        



[id number] 11[facility]07[facility]00





             Test Item    Value        Reference Range Interpretation Comments

 

             MAGNESIUM (test code = 4130569742) 1.5 mg/dL    1.7-2.4      L     

       

 

             Lab Interpretation (test code = Abnormal                           

    



             12871-2)                                            



[facility] (ALB, T.PRO, BILI T, 
BU/BC, ALT, AST, ALK, PHOS)2020 11:07:00





             Test Item    Value        Reference Range Interpretation Comments

 

             TOTAL BILI (test code = 2957198830) 1.2 mg/dL    0.1-1.1      H    

        

 

             BILI UNCON (test code = 2141679672) 0.7 mg/dL    0.1-1.1           

        

 

             BILI CONJ (test code = 0230453640) 0.0 mg/dL    0-0.3              

       

 

             T PROTEIN (test code = 3497706802) 6.1 g/dL     6.3-8.2      L     

       

 

             ALBUMIN (test code = 4600398711) 2.7 g/dL     3.5-5        L       

     

 

             ALK PHOS (test code = 8963579758) 92 U/L                     

      

 

             ALTv (test code = 1742-6) 20 U/L       5-35                      

 

             AST(SGOT) (test code = 6171904433) 37 U/L       13-40              

       

 

             Lab Interpretation (test code = Abnormal                           

    



             42717-7)                                            



[facility]  / [id number] 10:59:00





             Test Item    Value        Reference Range Interpretation Comments

 

             PROTIME PATIENT (test              See_Comment  H             [Auto

mated message]



             code = 5964-2)                                        The system [email or web address]



                                                                 generated this 

result



                                                                 transmitted ref

erence



                                                                 range: 10.1 - 1

2.6



                                                                 Seconds. The



                                                                 reference range

 was



                                                                 not used to int

erpret



                                                                 this result as



                                                                 normal/abnormal

.

 

             INR (test code = 6301-6)                                        Nor

mal INR <1.1;



                                                                 Warfarin Therap

eutic



                                                                 range 2.0 to 3.

0 or



                                                                 2.5 to 3.5, dep

ending



                                                                 upon the indica

tions.

 

             Lab Interpretation (test Abnormal                               



             code = 69751-0)                                        



[facility] Center[facility]ERR[facility]IN [id number] 03:41:00





             Test Item    Value        Reference Range Interpretation Comments

 

             FERRITIN (test code = 382.0 ng/mL         H            



             5069049551)                                         

 

             RICK (test code = RICK) Biotin has been                           



                          reported to cause a                           



                          negative bias,                           



                          interpret results                           



                          relative to                            



                          patient's use of                           



                          biotin.                                

 

             Lab Interpretation (test Abnormal                               



             code = 77383-2)                                        



[facility] 03:06:00





             Test Item    Value        Reference Range Interpretation Comments

 

             POCT GLU (test code = 8818535780) 274 mg/dL           H      

      

 

             Lab Interpretation (test code = Abnormal                           

    



             30504-7)                                            



[facility] Center[facility] [id number] 02:56:00





             Test Item    Value        Reference Range Interpretation Comments

 

             IRON (test code = 9351577864) 171 ug/dL           H          

  

 

             TIBC (test code = 8897742763) 319 ug/dL    250-410                 

  

 

             % FE SAT (test code = 3436157019) 54 %         20-50        H      

      

 

             Lab Interpretation (test code = Abnormal                           

    



             59182-6)                                            



[facility] WITH [id number] 23:39:00





             Test Item    Value        Reference Range Interpretation Comments

 

             WBC (test code =              See_Comment                [Automated



             6690-2)                                             message] The sy

stem



                                                                 which generated



                                                                 this result



                                                                 transmitted



                                                                 reference range

:



                                                                 4.30 - 11.10



                                                                 10*3/?L. The



                                                                 reference range

 was



                                                                 not used to



                                                                 interpret this



                                                                 result as



                                                                 normal/abnormal

.

 

             RBC (test code =              See_Comment  L             [Automated



             789-8)                                              message] The sy

stem



                                                                 which generated



                                                                 this result



                                                                 transmitted



                                                                 reference range

:



                                                                 3.93 - 5.25



                                                                 10*6/?L. The



                                                                 reference range

 was



                                                                 not used to



                                                                 interpret this



                                                                 result as



                                                                 normal/abnormal

.

 

             HGB (test code = 11.5 g/dL    11.6-15      L            



             718-7)                                              

 

             HCT (test code = 35.1 %       35.7-45.2    L            



             4544-3)                                             

 

             MCV (test code = 89.8 fL      80.6-95.5                 



             787-2)                                              

 

             MCH (test code = 29.4 pg      25.9-32.8                 



             785-6)                                              

 

             MCHC (test code = 32.8 g/dL    31.6-35.1                 



             786-4)                                              

 

             RDW-SD (test code = 55.3 fL      39-49.9      H            



             79833-2)                                            

 

             RDW-CV (test code = 17.3 %       12-15.5      H            



             788-0)                                              

 

             PLT (test code =              See_Comment  L             [Automated



             777-3)                                              message] The sy

stem



                                                                 which generated



                                                                 this result



                                                                 transmitted



                                                                 reference range

:



                                                                 166 - 358 10*3/

?L.



                                                                 The reference r

olman



                                                                 was not used to



                                                                 interpret this



                                                                 result as



                                                                 normal/abnormal

.

 

             MPV (test code = 10.2 fL      9.5-12.9                  



             17834-8)                                            

 

             IPF % (test code = 2.0 %        1.3-7.7                   Platelet 

count



             [id number])                                         measured by



                                                                 fluorescence



                                                                 method.

 

             NRBC/100 WBC (test              See_Comment                [Automat

ed



             code = 6321137673)                                        message] 

The system



                                                                 which generated



                                                                 this result



                                                                 transmitted



                                                                 reference range

:



                                                                 0.0 - 10.0 /100



                                                                 WBCs. The refer

ence



                                                                 range was not u

sed



                                                                 to interpret th

is



                                                                 result as



                                                                 normal/abnormal

.

 

             NRBC x10^3 (test code <0.01        See_Comment                [Auto

mated



             = 5428416681)                                        message] The s

ystem



                                                                 which generated



                                                                 this result



                                                                 transmitted



                                                                 reference range

:



                                                                 10*3/?L. The



                                                                 reference range

 was



                                                                 not used to



                                                                 interpret this



                                                                 result as



                                                                 normal/abnormal

.

 

             GRAN MAT (NEUT) % 78.7 %                                 



             (test code = 770-8)                                        

 

             IMM GRAN % (test code 1.30 %                                 



             = 1565637321)                                        

 

             LYMPH % (test code = 12.3 %                                 



             736-9)                                              

 

             MONO % (test code = 6.4 %                                  



             5905-5)                                             

 

             EOS % (test code = 1.2 %                                  



             713-8)                                              

 

             BASO % (test code = 0.1 %                                  



             706-2)                                              

 

             GRAN MAT x10^3(ANC) 6.48 10*3/uL 1.88-7.09                 



             (test code =                                        



             6941933792)                                         

 

             IMM GRAN x10^3 (test 0.11 10*3/uL 0-0.06       H            



             code = 3678188906)                                        

 

             LYMPH x10^3 (test code 1.01 10*3/uL 1.32-3.29    L            



             = 731-0)                                            

 

             MONO x10^3 (test code 0.53 10*3/uL 0.33-0.92                 



             = 742-7)                                            

 

             EOS x10^3 (test code = 0.10 10*3/uL 0.03-0.39                 



             711-2)                                              

 

             BASO x10^3 (test code <0.03        0.01-0.07                 



             = 704-7)                                            

 

             Lab Interpretation Abnormal                               



             (test code = 34125-3)                                        



[facility] 22:58:00





             Test Item    Value        Reference Range Interpretation Comments

 

             POCT GLU (test code = 3284389442) 181 mg/dL           H      

      

 

             Lab Interpretation (test code = Abnormal                           

    



             49188-7)                                            



[facility] CenterXR [id number] 19:22:12 Nonspecific, mild 
gaseous distention of large and small bowel with noevidence of mechanical 
obstruction. Right-sided pleural effusion.EXAM: XR KUB COMPARISON: CT dated 
2020. HISTORY: partial SBO, monitor progression  FINDINGS: Nonspecific loops
of large and small bowel demonstrate gaseous distentionwith no localizing 
findings to suggest obstruction. There is air notedthroughout the colon into the
distal colon. No intra-abdominal free air is seen. Cholecystectomy changes are 
noted. Incidental note of a right-sided pleural effusion. No acute or aggressive
bony lesions. [facility], [facility] Results [facility] - 2020  2:23 PM CDTEXAM: XR 
KUBCOMPARISON: CT dated 2020.HISTORY: partial SBO, monitor progression 
FINDINGS:Nonspecific loops of large and small bowel demonstrate gaseous 
distentionwith no localizing findings to suggest obstruction. There is air 
notedthroughout the colon into the distalcolon.No intra-abdominal free air is 
seen.Cholecystectomy changes are noted.Incidental note of a right-sided pleural 
effusion.No acute or aggressive bony lesions.IMPRESSIONNonspecific, mild gaseous
distention of large and small bowel with noevidence of mechanical 
obstruction.Right-sided pleural effusion.[facility] 17:04:00





             Test Item    Value        Reference Range Interpretation Comments

 

             POCT GLU (test code = 1122875455) 181 mg/dL           H      

      

 

             Lab Interpretation (test code = Abnormal                           

    



             39302-7)                                            



[facility] / [id number] 15:26:00





             Test Item    Value        Reference Range Interpretation Comments

 

             WBC (test code =              See_Comment                [Automated

 message]



             6690-2)                                             The system [employer]



                                                                 generated this 

result



                                                                 transmitted ref

erence



                                                                 range: 4.30 - 1

1.10



                                                                 10*3/?L. The



                                                                 reference range

 was



                                                                 not used to int

erpret



                                                                 this result as



                                                                 normal/abnormal

.

 

             RBC (test code = 789-8)              See_Comment  L             [Au

tomated message]



                                                                 The system [employer]



                                                                 generated this 

result



                                                                 transmitted ref

erence



                                                                 range: 3.93 - 5

.25



                                                                 10*6/?L. The



                                                                 reference range

 was



                                                                 not used to int

erpret



                                                                 this result as



                                                                 normal/abnormal

.

 

             HGB (test code = 718-7) 10.2 g/dL    11.6-15      L            

 

             HCT (test code = 30.5 %       35.7-45.2    L            



             4544-3)                                             

 

             MCH (test code = 785-6) 29.8 pg      25.9-32.8                 

 

             MCV (test code = 787-2) 89.2 fL      80.6-95.5                 

 

             MCHC (test code = 33.4 g/dL    31.6-35.1                 



             786-4)                                              

 

             PLT (test code = 777-3)              See_Comment  LL            [Au

tomated message]



                                                                 The system [employer]



                                                                 generated this 

result



                                                                 transmitted ref

erence



                                                                 range: 166 - 35

8



                                                                 10*3/?L. The



                                                                 reference range

 was



                                                                 not used to int

erpret



                                                                 this result as



                                                                 normal/abnormal

.

 

             MPV (test code = 9.0 fL       9.5-12.9     L            



             64147-1)                                            

 

             RDW-CV (test code = 17.5 %       12-15.5      H            



             788-0)                                              

 

             RDW-SD (test code = 55.7 fL      39-49.9      H            



             55918-4)                                            

 

             NRBC x10^3 (test code =              See_Comment                [Au

tomated message]



             2917501613)                                         The system [employer]



                                                                 generated this 

result



                                                                 transmitted ref

erence



                                                                 range: 10*3/?L.

 The



                                                                 reference range

 was



                                                                 not used to int

erpret



                                                                 this result as



                                                                 normal/abnormal

.

 

             NRBC/100 WBC (test code              See_Comment                [Au

tomated message]



             = 7352648552)                                        The system [employer]



                                                                 generated this 

result



                                                                 transmitted ref

erence



                                                                 range: 0.0 - 10

.0



                                                                 /100 WBCs. The



                                                                 reference range

 was



                                                                 not used to int

erpret



                                                                 this result as



                                                                 normal/abnormal

.

 

             IPF % (test code = 3.2 %        1.3-7.7                   Platelet 

count



             [id number])                                         measured by



                                                                 fluorescence me

thod.

 

             Lab Interpretation Abnormal                               



             (test code = 94300-1)                                        



[facility]





             Test Item    Value        Reference Range Interpretation Comments

 

             POCT GLU (test code = 9220379946) 151 mg/dL           H      

      

 

             Lab Interpretation (test code = Abnormal                           

    



             14024-3)                                            



[facility] [id number] 10:23:00





             Test Item    Value        Reference Range Interpretation Comments

 

             WBC (test code =              See_Comment                [Automated

 message]



             6690-2)                                             The system [employer]



                                                                 generated this 

result



                                                                 transmitted ref

erence



                                                                 range: 4.30 - 1

1.10



                                                                 10*3/?L. The



                                                                 reference range

 was



                                                                 not used to int

erpret



                                                                 this result as



                                                                 normal/abnormal

.

 

             RBC (test code = 789-8)              See_Comment  L             [Au

tomated message]



                                                                 The system [employer]



                                                                 generated this 

result



                                                                 transmitted ref

erence



                                                                 range: 3.93 - 5

.25



                                                                 10*6/?L. The



                                                                 reference range

 was



                                                                 not used to int

erpret



                                                                 this result as



                                                                 normal/abnormal

.

 

             HGB (test code = 718-7) 9.9 g/dL     11.6-15      L            

 

             HCT (test code = 30.1 %       35.7-45.2    L            



             4544-3)                                             

 

             MCH (test code = 785-6) 29.3 pg      25.9-32.8                 

 

             MCV (test code = 787-2) 89.1 fL      80.6-95.5                 

 

             MCHC (test code = 32.9 g/dL    31.6-35.1                 



             786-4)                                              

 

             PLT (test code = 777-3)              See_Comment  LL            [Au

tomated message]



                                                                 The system [employer]





                                                                 generated this 

result



                                                                 transmitted ref

erence



                                                                 range: 166 - 35

8



                                                                 10*3/?L. The



                                                                 reference range

 was



                                                                 not used to int

erpret



                                                                 this result as



                                                                 normal/abnormal

.

 

             MPV (test code = 8.4 fL       9.5-12.9     L            



             47002-4)                                            

 

             RDW-CV (test code = 17.5 %       12-15.5      H            



             788-0)                                              

 

             RDW-SD (test code = 56.2 fL      39-49.9      H            



             50841-4)                                            

 

             NRBC x10^3 (test code =              See_Comment                [Au

tomated message]



             3204582299)                                         The system [employer]



                                                                 generated this 

result



                                                                 transmitted ref

erence



                                                                 range: 10*3/?L.

 The



                                                                 reference range

 was



                                                                 not used to int

erpret



                                                                 this result as



                                                                 normal/abnormal

.

 

             NRBC/100 WBC (test code              See_Comment                [Au

tomated message]



             = [id number])                                        The system [employer]





                                                                 generated this 

result



                                                                 transmitted ref

erence



                                                                 range: 0.0 - 10

.0



                                                                 /100 WBCs. The



                                                                 reference range

 was



                                                                 not used to int

erpret



                                                                 this result as



                                                                 normal/abnormal

.

 

             IPF % (test code = 2.0 %        1.3-7.7                   Platelet 

count



             [id number])                                         measured by



                                                                 fluorescence me

thod.

 

             Lab Interpretation Abnormal                               



             (test code = 57338-9)                                        



[facility] Metabolic Panel (NA, K, CL, CO2, 
Glucose, BUN, Creatinine, CA)2020 10:09:00





             Test Item    Value        Reference Range Interpretation Comments

 

             NA (test code = 136 mmol/L   135-145                   



             3182813963)                                         

 

             K (test code = 3.7 mmol/L   3.5-5                     



             3485276768)                                         

 

             CL (test code = 109 mmol/L          H            



             9450695420)                                         

 

             CO2 TOTAL (test code = 21 mmol/L    23-31        L            



             3713165115)                                         

 

             AGAP (test code =              2-16                      



             8720300120)                                         

 

             BUN (test code = 16 mg/dL     7-23                      



             6094096216)                                         

 

             GLUCOSE (test code = 151 mg/dL           H            



             0787929071)                                         

 

             CREATININE (test code = 0.59 mg/dL   0.5-1.04                  



             7365277987)                                         

 

             CALCIUM (test code = 7.1 mg/dL    8.6-10.6     L            



             4791651513)                                         

 

             eGFR Calculation              mL/min/1.73m2              



             (Non-)                                        



             (test code =                                        



             1349551621)                                         

 

             eGFR Calculation              mL/min/1.73m2              



             (African American)                                        



             (test code =                                        



             2751271627)                                         

 

             RICK (test code = RICK) Association of                           



                          Glomerular Filtration                           



                          Rate (GFR) and Staging                           



                          of Kidney Disease*                           



                          +---------------------                           



                          --+-------------------                           



                          --+-------------------                           



                          ------+| GFR                           



                          (mL/min/1.73 m2) ?|                           



                          With Kidney Damage ?|                           



                          ?Without Kidney                           



                          Damage+---------------                           



                          --------+-------------                           



                          --------+-------------                           



                          ------------+| ?>90 ?                           



                          ? ? ? ? ? ? ? ?|                           



                          ?Stage one ? ? ? ? ?|                           



                          ? Normal ? ? ? ? ? ? ?                           



                          ?+--------------------                           



                          ---+------------------                           



                          ---+------------------                           



                          -------+| ?60-89 ? ? ?                           



                          ? ? ? ? ?| ?Stage two                           



                          ? ? ? ? ?| ? Decreased                           



                          GFR ? ? ? ?                            



                          +---------------------                           



                          --+-------------------                           



                          --+-------------------                           



                          ------+| ?30-59 ? ? ?                           



                          ? ? ? ? ?| ?Stage                           



                          three ? ? ? ?| ? Stage                           



                          three ? ? ? ? ?                           



                          +---------------------                           



                          --+-------------------                           



                          --+-------------------                           



                          ------+| ?15-29 ? ? ?                           



                          ? ? ? ? ?| ?Stage four                           



                          ? ? ? ? | ? Stage four                           



                          ? ? ? ? ?                              



                          ?+--------------------                           



                          ---+------------------                           



                          ---+------------------                           



                          -------+| ?<15 (or                           



                          dialysis) ? ?| ?Stage                           



                          five ? ? ? ? | ? Stage                           



                          five ? ? ? ? ?                           



                          ?+--------------------                           



                          ---+------------------                           



                          ---+------------------                           



                          -------+ *Each stage                           



                          assumes the associated                           



                          GFR level has been in                           



                          effect for at least                           



                          three months. ?Stages                           



                          1 to 5, with or                           



                          without kidney                           



                          disease, indicate                           



                          chronic kidney                           



                          disease. Notes:                           



                          Determination of                           



                          stages one and two                           



                          (with eGFR                             



                          >59mL/min/1.73 m2)                           



                          requires estimation of                           



                          kidney damage for at                           



                          least three months as                           



                          defined by structural                           



                          or functional                           



                          abnormalities of the                           



                          kidney, manifested by                           



                          either:Pathological                           



                          abnormalities or                           



                          Markers of kidney                           



                          damage (including                           



                          abnormalities in the                           



                          composition of the                           



                          blood or urine or                           



                          abnormalities in                           



                          imaging tests).                           

 

             Lab Interpretation Abnormal                               



             (test code = 52383-4)                                        



[facility] [id number] 10:09:00





             Test Item    Value        Reference Range Interpretation Comments

 

             MAGNESIUM (test code = 1139163941) 2.3 mg/dL    1.7-2.4            

       

 

             Lab Interpretation (test code = Normal                             

    



             88768-0)                                            



[facility] (ALB, T.PRO, BILI T, 
BU/BC, ALT, AST, ALK, PHOS)2020 10:09:00





             Test Item    Value        Reference Range Interpretation Comments

 

             TOTAL BILI (test code = 5155419099) 1.7 mg/dL    0.1-1.1      H    

        

 

             BILI UNCON (test code = 8674379226) 1.3 mg/dL    0.1-1.1      H    

        

 

             BILI CONJ (test code = 0570398045) 0.0 mg/dL    0-0.3              

       

 

             T PROTEIN (test code = 9342150195) 5.7 g/dL     6.3-8.2      L     

       

 

             ALBUMIN (test code = 2111147038) 2.5 g/dL     3.5-5        L       

     

 

             ALK PHOS (test code = 6691492467) 77 U/L                     

      

 

             ALTv (test code = 1742-6) 20 U/L       5-35                      

 

             AST(SGOT) (test code = 5721455935) 33 U/L       13-40              

       

 

             Lab Interpretation (test code = Abnormal                           

    



             17736-1)                                            



[facility]  / [id number] 09:53:00





             Test Item    Value        Reference Range Interpretation Comments

 

             PROTIME PATIENT (test              See_Comment  H             [Auto

mated message]



             code = 5964-2)                                        The system 

[employer]



                                                                 generated this 

result



                                                                 transmitted ref

erence



                                                                 range: 10.1 - 1

2.6



                                                                 Seconds. The



                                                                 reference range

 was



                                                                 not used to int

erpret



                                                                 this result as



                                                                 normal/abnormal

.

 

             INR (test code = 6301-6)                                        Nor

mal INR <1.1;



                                                                 Warfarin Therap

eutic



                                                                 range 2.0 to 3.

0 or



                                                                 2.5 to 3.5, dep

ending



                                                                 upon the indica

tions.

 

             Lab Interpretation (test Abnormal                               



             code = 68440-3)                                        



[facility] 04:43:00





             Test Item    Value        Reference Range Interpretation Comments

 

             POCT GLU (test code = 4057614126) 164 mg/dL           H      

      

 

             Lab Interpretation (test code = Abnormal                           

    



             21893-7)                                            



[facility] / [id number] 03:24:00





             Test Item    Value        Reference Range Interpretation Comments

 

             WBC (test code =              See_Comment                [Automated

 message]



             6690-2)                                             The system [employer]



                                                                 generated this 

result



                                                                 transmitted ref

erence



                                                                 range: 4.30 - 1

1.10



                                                                 10*3/?L. The



                                                                 reference range

 was



                                                                 not used to int

erpret



                                                                 this result as



                                                                 normal/abnormal

.

 

             RBC (test code = 789-8)              See_Comment  L             [Au

tomated message]



                                                                 The system [employer]



                                                                 generated this 

result



                                                                 transmitted ref

erence



                                                                 range: 3.93 - 5

.25



                                                                 10*6/?L. The



                                                                 reference range

 was



                                                                 not used to int

erpret



                                                                 this result as



                                                                 normal/abnormal

.

 

             HGB (test code = 718-7) 10.4 g/dL    11.6-15      L            

 

             HCT (test code = 31.4 %       35.7-45.2    L            



             4544-3)                                             

 

             MCH (test code = 785-6) 29.3 pg      25.9-32.8                 

 

             MCV (test code = 787-2) 88.5 fL      80.6-95.5                 

 

             MCHC (test code = 33.1 g/dL    31.6-35.1                 



             786-4)                                              

 

             PLT (test code = 777-3)              See_Comment  LL            [Au

tomated message]



                                                                 The system [employer]



                                                                 generated this 

result



                                                                 transmitted ref

erence



                                                                 range: 166 - 35

8



                                                                 10*3/?L. The



                                                                 reference range

 was



                                                                 not used to int

erpret



                                                                 this result as



                                                                 normal/abnormal

.

 

             MPV (test code = 9.1 fL       9.5-12.9     L            



             59060-8)                                            

 

             RDW-CV (test code = 17.6 %       12-15.5      H            



             788-0)                                              

 

             RDW-SD (test code = 56.1 fL      39-49.9      H            



             10362-0)                                            

 

             NRBC x10^3 (test code =              See_Comment                [Au

tomated message]



             7651686622)                                         The system [employer]



                                                                 generated this 

result



                                                                 transmitted ref

erence



                                                                 range: 10*3/?L.

 The



                                                                 reference range

 was



                                                                 not used to int

erpret



                                                                 this result as



                                                                 normal/abnormal

.

 

             NRBC/100 WBC (test code              See_Comment                [Au

tomated message]



             = 7459354748)                                        The system [employer]



                                                                 generated this 

result



                                                                 transmitted ref

erence



                                                                 range: 0.0 - 10

.0



                                                                 /100 WBCs. The



                                                                 reference range

 was



                                                                 not used to int

erpret



                                                                 this result as



                                                                 normal/abnormal

.

 

             IPF % (test code = 1.9 %        1.3-7.7                   Platelet 

count



             [id number])                                         measured by



                                                                 fluorescence me

thod.

 

             Lab Interpretation Abnormal                               



             (test code = 31991-2)                                        



[facility]:53:00





             Test Item    Value        Reference Range Interpretation Comments

 

             POCT GLU (test code = [id number]) 201 mg/dL           H      

      

 

             Lab Interpretation (test code = Abnormal                           

    



             62136-2)                                            



[facility] METABOLIC PANEL (NA, K, CL, CO2, 
GLUCOSE, BUN, CREATININE, CA)2020 22:06:00





             Test Item    Value        Reference Range Interpretation Comments

 

             NA (test code = 136 mmol/L   135-145                   



             [id number])                                         

 

             K (test code = 4.1 mmol/L   3.5-5                     



             9045796313)                                         

 

             CL (test code = 107 mmol/L                       



             8315553353)                                         

 

             CO2 TOTAL (test code = 24 mmol/L    23-31                     



             9775833500)                                         

 

             AGAP (test code =              2-16                      



             8161574921)                                         

 

             BUN (test code = 18 mg/dL     7-23                      



             9045223863)                                         

 

             GLUCOSE (test code = 133 mg/dL           H            



             6081518122)                                         

 

             CREATININE (test code = 0.60 mg/dL   0.5-1.04                  



             6386400958)                                         

 

             CALCIUM (test code = 7.4 mg/dL    8.6-10.6     L            



             5654054707)                                         

 

             eGFR Calculation              mL/min/1.73m2              



             (Non-)                                        



             (test code =                                        



             1340911121)                                         

 

             eGFR Calculation              mL/min/1.73m2              



             (African American)                                        



             (test code =                                        



             8448876233)                                         

 

             RICK (test code = RICK) Association of                           



                          Glomerular Filtration                           



                          Rate (GFR) and Staging                           



                          of Kidney Disease*                           



                          +---------------------                           



                          --+-------------------                           



                          --+-------------------                           



                          ------+| GFR                           



                          (mL/min/1.73 m2) ?|                           



                          With Kidney Damage ?|                           



                          ?Without Kidney                           



                          Damage+---------------                           



                          --------+-------------                           



                          --------+-------------                           



                          ------------+| ?>90 ?                           



                          ? ? ? ? ? ? ? ?|                           



                          ?Stage one ? ? ? ? ?|                           



                          ? Normal ? ? ? ? ? ? ?                           



                          ?+--------------------                           



                          ---+------------------                           



                          ---+------------------                           



                          -------+| ?60-89 ? ? ?                           



                          ? ? ? ? ?| ?Stage two                           



                          ? ? ? ? ?| ? Decreased                           



                          GFR ? ? ? ?                            



                          +---------------------                           



                          --+-------------------                           



                          --+-------------------                           



                          ------+| ?30-59 ? ? ?                           



                          ? ? ? ? ?| ?Stage                           



                          three ? ? ? ?| ? Stage                           



                          three ? ? ? ? ?                           



                          +---------------------                           



                          --+-------------------                           



                          --+-------------------                           



                          ------+| ?15-29 ? ? ?                           



                          ? ? ? ? ?| ?Stage four                           



                          ? ? ? ? | ? Stage four                           



                          ? ? ? ? ?                              



                          ?+--------------------                           



                          ---+------------------                           



                          ---+------------------                           



                          -------+| ?<15 (or                           



                          dialysis) ? ?| ?Stage                           



                          five ? ? ? ? | ? Stage                           



                          five ? ? ? ? ?                           



                          ?+--------------------                           



                          ---+------------------                           



                          ---+------------------                           



                          -------+ *Each stage                           



                          assumes the associated                           



                          GFR level has been in                           



                          effect for at least                           



                          three months. ?Stages                           



                          1 to 5, with or                           



                          without kidney                           



                          disease, indicate                           



                          chronic kidney                           



                          disease. Notes:                           



                          Determination of                           



                          stages one and two                           



                          (with eGFR                             



                          >59mL/min/1.73 m2)                           



                          requires estimation of                           



                          kidney damage for at                           



                          least three months as                           



                          defined by structural                           



                          or functional                           



                          abnormalities of the                           



                          kidney, manifested by                           



                          either:Pathological                           



                          abnormalities or                           



                          Markers of kidney                           



                          damage (including                           



                          abnormalities in the                           



                          composition of the                           



                          blood or urine or                           



                          abnormalities in                           



                          imaging tests).                           

 

             Lab Interpretation Abnormal                               



             (test code = 71410-2)                                        



[facility] [id number] 21:54:00





             Test Item    Value        Reference Range Interpretation Comments

 

             WBC (test code =              See_Comment                [Automated

 message]



             6690-2)                                             The system [employer]



                                                                 generated this 

result



                                                                 transmitted ref

erence



                                                                 range: 4.30 - 1

1.10



                                                                 10*3/?L. The



                                                                 reference range

 was



                                                                 not used to int

erpret



                                                                 this result as



                                                                 normal/abnormal

.

 

             RBC (test code = 789-8)              See_Comment                [Au

tomated message]



                                                                 The system [employer]



                                                                 generated this 

result



                                                                 transmitted ref

erence



                                                                 range: 3.93 - 5

.25



                                                                 10*6/?L. The



                                                                 reference range

 was



                                                                 not used to int

erpret



                                                                 this result as



                                                                 normal/abnormal

.

 

             HGB (test code = 718-7) 11.4 g/dL    11.6-15      L            

 

             HCT (test code = 35.5 %       35.7-45.2    L            



             4544-3)                                             

 

             MCH (test code = 785-6) 28.7 pg      25.9-32.8                 

 

             MCV (test code = 787-2) 89.4 fL      80.6-95.5                 

 

             MCHC (test code = 32.1 g/dL    31.6-35.1                 



             786-4)                                              

 

             PLT (test code = 777-3)              See_Comment  L             [Au

tomated message]



                                                                 The system [employer]



                                                                 generated this 

result



                                                                 transmitted ref

erence



                                                                 range: 166 - 35

8



                                                                 10*3/?L. The



                                                                 reference range

 was



                                                                 not used to int

erpret



                                                                 this result as



                                                                 normal/abnormal

.

 

             MPV (test code = 9.0 fL       9.5-12.9     L            



             72567-3)                                            

 

             RDW-CV (test code = 17.5 %       12-15.5      H            



             788-0)                                              

 

             RDW-SD (test code = 56.3 fL      39-49.9      H            



             06182-8)                                            

 

             NRBC x10^3 (test code =              See_Comment                [Au

tomated message]



             [id number])                                         The system [employer]



                                                                 generated this 

result



                                                                 transmitted ref

erence



                                                                 range: 10*3/?L.

 The



                                                                 reference range

 was



                                                                 not used to int

erpret



                                                                 this result as



                                                                 normal/abnormal

.

 

             NRBC/100 WBC (test code              See_Comment                [Au

tomated message]



             = [id number])                                        The system [employer]





                                                                 generated this 

result



                                                                 transmitted ref

erence



                                                                 range: 0.0 - 10

.0



                                                                 /100 WBCs. The



                                                                 reference range

 was



                                                                 not used to int

erpret



                                                                 this result as



                                                                 normal/abnormal

.

 

             IPF % (test code = 2.3 %        1.3-7.7                   Platelet 

count



             [id number])                                         measured by



                                                                 fluorescence me

thod.

 

             Lab Interpretation Abnormal                               



             (test code = 95012-1)                                        



[facility]





             Test Item    Value        Reference Range Interpretation Comments

 

             MAGNESIUM (test code = 0900183344) 2.7 mg/dL    1.7-2.4      H     

       

 

             Lab Interpretation (test code = Abnormal                           

    



             20365-6)                                            



[facility] 21[facility]29[facility]00





             Test Item    Value        Reference Range Interpretation Comments

 

             POCT GLU (test code = 7192990206) 135 mg/dL           H      

      

 

             Lab Interpretation (test code = Abnormal                           

    



             48459-4)                                            



[facility] ANGIOGRAM ABDOMEN/[id number] 20:21:03
Study is limited secondary to presence of intraluminal contrast on 
thenoncontrast study from the prior CT scan. No active extravasation ofcontrast 
seen in the area where there was no contrast on the noncontrast CTfrom today. no
arterial secondary signs ?aneurysm, early draining vein (AVM ) seen Changes 
consistent ?right and transverse colon with mild dilatation oftransverse colon 
with air fluid levels . Consider infectious colitis. ?Nochanges of ischemia or 
vessel occlusion. Small sliding hiatal hernia. Trace perihepatic and pericolonic
ascites, likely reactive. Cirrhosis without focal hepatic lesion with sequela of
portal hypertensionincluding splenomegaly and gastroesophageal varices. 
Preliminary Report Dictated by Resident: David Boone MD., have reviewed this studyand agree with theabove report.EXAM: CT ABDOMEN AND
PELVIS WITH AND WITHOUT CONTRAST HISTORY: GI bleed With and without contrast 
COMPARISON: 2020 CT abdomen and pelvis with contrast. DOSE: 3760.82 
mGy-cm TECHNIQUE AND FINDINGS: Contiguous axial imaging from the level of the 
lungbases throughthe pubic symphysis was performed before and after 
theuncomplicated administration of 120 mL of intravenous Omnipaque 
contrast.Precontrast, arterial, venous and 90 seconds delayed phase images 
wereobtained according to the GI bleed protocol. Coronal and 
sagittalreconstructions were obtained. ?Auto mA and/or iterative 
reconstructionwere used to reduce radiation dose. FINDINGS: Suboptimal study 
secondary to presence of contrast from the previous CTscan from 2020 within
the bowel lumen at various sites on theprecontrast study.. LOWER THORAX: 
Bibasilar atelectasis. Scattered interstitial septalthickening, bronchiectasis 
and bronchial wall thickening. No cardiomegaly. LIVER: Cirrhotic liver morphol
ogy with nodular contours and hypertrophy ofthe segment 4, 1 2 and 3. A 
nonenhancing 3.7 cm segment 5 oblong hypodensestructure measures simple cyst 
attenuation. Mild periportal edema ispresent. GALLBLADDER AND BILIARY TREE: 
Prior cholecystectomy withpostcholecystectomy reservoir phenomenon. SPLEEN: Sp
lenomegaly at 19.5 cm. PANCREAS: No ductal dilation or masses. ADRENAL GLANDS: 
No adrenal nodules. KIDNEYS: No hydronephrosis, stones, or masses. 2.4 cm right 
interpolarsimple cyst. PERITONEUM AND RETROPERITONEUM: No free air. Trace volume
perihepatic andpericolonic simple ascites. LYMPH NODES: No lymphadenopathy. GI 
TRACT: Small sliding hiatal hernia. Again noted is extensive prominentascending 
colon, and transverse colon ?mucosal enhancement and subjacentfatty stranding, 
with relatively sparing ofleft colon. Dilatation oftransverse colon and air 
fluid levels. Appendix is nonvisualized. PELVIS/BLADDER: Urinary bladder is 
underdistended with concern wallthickening secondary to underdistention. VES
SELS: Mild scattered aortoiliac atherosclerotic plaquing andcalcification 
results in no significant stenosis of the branch vesselostia. Small 
gastroesophageal varices.. BONES AND SOFT TISSUES: No suspicious lytic or 
sclerotic bony lesions. [facility], Radiant Results Inft User - 2020  3:22 PM 
CDTEXAM:CT ABDOMEN AND PELVIS WITH AND WITHOUT CONTRASTHISTORY: GI bleed With 
and without contrastCOMPARISON: 2020 CT abdomen and pelvis with 
contrast.DOSE: 3760.82 mGy-cmTECHNIQUE AND FINDINGS: Contiguous axial imaging 
from the level of the lungbases through the pubic symphysis was performed before
and after theuncomplicated administration of 120 mL of intravenous Omnipaque 
contrast.Precontrast, arterial, venous and 90 seconds delayed phase images 
wereobtained according to the GI bleed protocol. Coronal and 
sagittalreconstructions were obtained.  Auto mA and/or iterative 
reconstructionwere used to reduce radiation dose.FINDINGS:Suboptimal study 
secondary to presence of contrast from the previous CTscan from 2020 within
the bowel lumen at various sites on theprecontrast study..LOWER THORAX: 
Bibasilar atelectasis. Scattered interstitial septalthickening, bronchiectasis 
and bronchial wall thickening. No cardiomegaly.LIVER: Cirrhotic liver morphology
with nodular contours and hypertrophy ofthe segment 4, 1 2 and 3. A nonenhancing
3.7 cm segment 5 oblong hypodensestructure measures simple cyst attenuation. 
Mild periportal edema ispresent.GALLBLADDER AND BILIARY TREE: Prior 
cholecystectomy withpostcholecystectomy reservoir phenomenon.SPLEEN: 
Splenomegaly at 19.5 cm.PANCREAS: No ductal dilation or masses.ADRENAL GLANDS: 
No adrenal nodules.KIDNEYS: No hydronephrosis, stones, or masses. 2.4 cm right 
interpolarsimple cyst.PERITONEUM AND RETROPERITONEUM: No free air. Trace volume 
perihepatic andpericolonic simple ascites.LYMPH NODES: No lymphadenopathy.GI 
TRACT: Small sliding hiatal hernia. Again noted is extensive prominentascending 
colon, and transverse colon  mucosal enhancement and subjacentfatty stranding, 
with relatively sparing of left colon. Dilatation oftransverse colon and air flu
id levels. Appendix is nonvisualized.PELVIS/BLADDER: Urinary bladder is 
underdistended with concern wallthickening secondary to underdistention.VESSELS:
Mild scattered aortoiliac atherosclerotic plaquing andcalcification results in 
no significant stenosis of the branch vesselostia.Small gastroesophageal 
varices..BONES AND SOFT TISSUES: No suspicious lytic or sclerotic bony 
lesions.IMPRESSIONStudy is limited secondary to presence of intraluminal 
contrast on thenoncontrast study from the prior CT scan. No active extravasation
ofcontrast seen in the area where there was no contrast on the noncontrast C
Tfrom today. no arterial secondary signs  aneurysm, early draining vein (AVM ) 
seenChanges consistent  right and transverse colon with mild dilatation 
oftransverse colon with air fluid levels . Consider infectious colitis.  
Nochanges of ischemia or vessel occlusion.Small sliding hiatal hernia.Trace per
ihepatic and pericolonic ascites, likely reactive.Cirrhosis without focal 
hepatic lesion with sequela of portal hypertensionincluding splenomegaly and 
gastroesophageal varices.Preliminary Report Dictated by Resident: David Proctor MD., have reviewed this study and agree with diane celaya report.[facility] - 30 minutes 
after transfusion of each [id number] 17:03:00





             Test Item    Value        Reference Range Interpretation Comments

 

             WBC (test code =              See_Comment                [Automated

 message]



             6690-2)                                             The system [employer]



                                                                 generated this 

result



                                                                 transmitted ref

erence



                                                                 range: 4.30 - 1

1.10



                                                                 10*3/?L. The



                                                                 reference range

 was



                                                                 not used to int

erpret



                                                                 this result as



                                                                 normal/abnormal

.

 

             RBC (test code = 789-8)              See_Comment  L             [Au

tomated message]



                                                                 The system [employer]



                                                                 generated this 

result



                                                                 transmitted ref

erence



                                                                 range: 3.93 - 5

.25



                                                                 10*6/?L. The



                                                                 reference range

 was



                                                                 not used to int

erpret



                                                                 this result as



                                                                 normal/abnormal

.

 

             HGB (test code = 718-7) 11.3 g/dL    11.6-15      L            

 

             HCT (test code = 35.1 %       35.7-45.2    L            



             4544-3)                                             

 

             MCH (test code = 785-6) 29.0 pg      25.9-32.8                 

 

             MCV (test code = 787-2) 90.0 fL      80.6-95.5                 

 

             MCHC (test code = 32.2 g/dL    31.6-35.1                 



             786-4)                                              

 

             PLT (test code = 777-3)              See_Comment  LL            [Au

tomated message]



                                                                 The system [employer]



                                                                 generated this 

result



                                                                 transmitted ref

erence



                                                                 range: 166 - 35

8



                                                                 10*3/?L. The



                                                                 reference range

 was



                                                                 not used to int

erpret



                                                                 this result as



                                                                 normal/abnormal

.

 

             MPV (test code = 9.1 fL       9.5-12.9     L            



             83345-8)                                            

 

             RDW-CV (test code = 17.2 %       12-15.5      H            



             788-0)                                              

 

             RDW-SD (test code = 57.1 fL      39-49.9      H            



             02195-8)                                            

 

             NRBC x10^3 (test code =              See_Comment                [Au

tomated message]



             9045934162)                                         The system [employer]



                                                                 generated this 

result



                                                                 transmitted ref

erence



                                                                 range: 10*3/?L.

 The



                                                                 reference range

 was



                                                                 not used to int

erpret



                                                                 this result as



                                                                 normal/abnormal

.

 

             NRBC/100 WBC (test code              See_Comment                [Au

tomated message]



             = 1235284874)                                        The system [employer]





                                                                 generated this 

result



                                                                 transmitted ref

erence



                                                                 range: 0.0 - 10

.0



                                                                 /100 WBCs. The



                                                                 reference range

 was



                                                                 not used to int

erpret



                                                                 this result as



                                                                 normal/abnormal

.

 

             IPF % (test code = 2.7 %        1.3-7.7                   Platelet 

count



             2139431372)                                         measured by



                                                                 fluorescence me

thod.

 

             Lab Interpretation Abnormal                               



             (test code = 84803-8)                                        



[facility]





             Test Item    Value        Reference Range Interpretation Comments

 

             POCT GLU (test code = 6504049511) 127 mg/dL           H      

      

 

             Lab Interpretation (test code = Abnormal                           

    



             59683-2)                                            



[id number] 16:24:00





             Test Item    Value        Reference Range Interpretation Comments

 

             Clostridioides (Clostridium) Positive     Negative     A           

 



             difficile (test code = 32321-5)                                    

    

 

             Lab Interpretation (test code = Abnormal                           

    



             07347-9)                                            



[facility] Platelets (in units): 1 
Units~Indication: 2) Platelets &lt; 50,000 with active hemorrhage orpotential to
bleed from invasive [id number] 15:46:33





             Test Item    Value        Reference Range Interpretation Comments

 

             Unit Blood Type (test A Pos                                  



             code = 4410)                                        

 

             ISBT Blood Type Code                                        



             (test code = 783847)                                        

 

             Unit Number (test code [id number]                           



             = 4411)                                             

 

             Blood Expiration Date                                        



             & Time (test code =                                        



             133736)                                             

 

             Status Information Issued                                 



             (test code = 4412)                                        

 

             Product Identification Platelets                              



             (test code = 4413)                                        

 

             Product Code (test E1638P71                               Performed

 at [facility]



             code = 4414)                                        Laboratory



                                                                 Services - GAL



                                                                 Blood [address]

s



                                                                 [id number]:



                                                                 [phone]

A



                                                                 No. [id number]



[facility] Packed RBC (in units), 2 Units
2020 13:34:31





             Test Item    Value        Reference Range Interpretation Comments

 

             Cross Match Result Compatible                             



             (test code = 4409)                                        

 

             ISBT Blood Type Code                                        



             (test code = 980221)                                        

 

             Unit Blood Type (test A Pos                                  



             code = 4410)                                        

 

             Unit Number (test [id number]                           



             code = 4411)                                        

 

             Blood Expiration Date                                        



             & Time (test code =                                        



             476299)                                             

 

             Status Information Issued                                 



             (test code = 4412)                                        

 

             Product      Red Blood Cells                           



             Identification (test                                        



             code = 4413)                                        

 

             Product Code (test E4101F94                               Performed

 at [facility]



             code = 4414)                                        Laboratory



                                                                 Services - GAL



                                                                 Blood [address]

s



                                                                 [id number] Free:



                                                                 [phone]

A



                                                                 No. [id number]



[facility] 12[facility]





             Test Item    Value        Reference Range Interpretation Comments

 

             POCT GLU (test code = 0075788553) 109 mg/dL                  

      

 

             Lab Interpretation (test code = Normal                             

    



             69870-2)                                            



[facility]NA, K, CL, CO2, 
Glucose, BUN, Creatinine, CA)2020 11:04:00





             Test Item    Value        Reference Range Interpretation Comments

 

             NA (test code = 133 mmol/L   135-145      L            



             5432163911)                                         

 

             K (test code = 3.2 mmol/L   3.5-5        L            



             3013278842)                                         

 

             CL (test code = 112 mmol/L          H            



             1309669059)                                         

 

             CO2 TOTAL (test code = 12 mmol/L    23-31        L            



             4500889626)                                         

 

             AGAP (test code =              2-16                      



             8767909132)                                         

 

             BUN (test code = 9 mg/dL      7-23                      



             6179882923)                                         

 

             GLUCOSE (test code = 46 mg/dL            LL           



             5371361207)                                         

 

             CREATININE (test code = 0.22 mg/dL   0.5-1.04     L            



             9625963547)                                         

 

             CALCIUM (test code = 5.5 mg/dL    8.6-10.6     LL           



             5274001933)                                         

 

             eGFR Calculation              mL/min/1.73m2              



             (Non-)                                        



             (test code =                                        



             5352024233)                                         

 

             eGFR Calculation              mL/min/1.73m2              



             (African American)                                        



             (test code =                                        



             1363680831)                                         

 

             RICK (test code = RICK) Association of                           



                          Glomerular Filtration                           



                          Rate (GFR) and Staging                           



                          of Kidney Disease*                           



                          +---------------------                           



                          --+-------------------                           



                          --+-------------------                           



                          ------+| GFR                           



                          (mL/min/1.73 m2) ?|                           



                          With Kidney Damage ?|                           



                          ?Without Kidney                           



                          Damage+---------------                           



                          --------+-------------                           



                          --------+-------------                           



                          ------------+| ?>90 ?                           



                          ? ? ? ? ? ? ? ?|                           



                          ?Stage one ? ? ? ? ?|                           



                          ? Normal ? ? ? ? ? ? ?                           



                          ?+--------------------                           



                          ---+------------------                           



                          ---+------------------                           



                          -------+| ?60-89 ? ? ?                           



                          ? ? ? ? ?| ?Stage two                           



                          ? ? ? ? ?| ? Decreased                           



                          GFR ? ? ? ?                            



                          +---------------------                           



                          --+-------------------                           



                          --+-------------------                           



                          ------+| ?30-59 ? ? ?                           



                          ? ? ? ? ?| ?Stage                           



                          three ? ? ? ?| ? Stage                           



                          three ? ? ? ? ?                           



                          +---------------------                           



                          --+-------------------                           



                          --+-------------------                           



                          ------+| ?15-29 ? ? ?                           



                          ? ? ? ? ?| ?Stage four                           



                          ? ? ? ? | ? Stage four                           



                          ? ? ? ? ?                              



                          ?+--------------------                           



                          ---+------------------                           



                          ---+------------------                           



                          -------+| ?<15 (or                           



                          dialysis) ? ?| ?Stage                           



                          five ? ? ? ? | ? Stage                           



                          five ? ? ? ? ?                           



                          ?+--------------------                           



                          ---+------------------                           



                          ---+------------------                           



                          -------+ *Each stage                           



                          assumes the associated                           



                          GFR level has been in                           



                          effect for at least                           



                          three months. ?Stages                           



                          1 to 5, with or                           



                          without kidney                           



                          disease, indicate                           



                          chronic kidney                           



                          disease. Notes:                           



                          Determination of                           



                          stages one and two                           



                          (with eGFR                             



                          >59mL/min/1.73 m2)                           



                          requires estimation of                           



                          kidney damage for at                           



                          least three months as                           



                          defined by structural                           



                          or functional                           



                          abnormalities of the                           



                          kidney, manifested by                           



                          either:Pathological                           



                          abnormalities or                           



                          Markers of kidney                           



                          damage (including                           



                          abnormalities in the                           



                          composition of the                           



                          blood or urine or                           



                          abnormalities in                           



                          imaging tests).                           

 

             Lab Interpretation Abnormal                               



             (test code = 75911-6)                                        



[facility] [id number] 10:55:00





             Test Item    Value        Reference Range Interpretation Comments

 

             MAGNESIUM (test code = 8117190484) 1.2 mg/dL    1.7-2.4      L     

       

 

             Lab Interpretation (test code = Abnormal                           

    



             79740-3)                                            



[facility] (ALB, T.PRO, BILI T, 
BU/BC, ALT, AST, ALK, PHOS)2020 10:55:00





             Test Item    Value        Reference Range Interpretation Comments

 

             TOTAL BILI (test code = 3321140571) 0.7 mg/dL    0.1-1.1           

        

 

             BILI UNCON (test code = 1053976752) 0.5 mg/dL    0.1-1.1           

        

 

             BILI CONJ (test code = 2848437183) 0.0 mg/dL    0-0.3              

       

 

             T PROTEIN (test code = 0967294015) 3.0 g/dL     6.3-8.2      L     

       

 

             ALBUMIN (test code = 0234199469) 1.2 g/dL     3.5-5        L       

     

 

             ALK PHOS (test code = 7315780576) 39 U/L                     

      

 

             ALTv (test code = 1742-6) 10 U/L       5-35                      

 

             AST(SGOT) (test code = 2775098176) 18 U/L       13-40              

       

 

             Lab Interpretation (test code = Abnormal                           

    



             49505-8)                                            



[facility] Center[facility] [id number] 10:54:00





             Test Item    Value        Reference Range Interpretation Comments

 

             WBC (test code =              See_Comment                [Automated



             6552-2)                                             message] The sy

stem



                                                                 which generated



                                                                 this result



                                                                 transmitted



                                                                 reference range

:



                                                                 4.30 - 11.10



                                                                 10*3/?L. The



                                                                 reference range

 was



                                                                 not used to



                                                                 interpret this



                                                                 result as



                                                                 normal/abnormal

.

 

             RBC (test code =              See_Comment  L             [Automated



             979-8)                                              message] The sy

stem



                                                                 which generated



                                                                 this result



                                                                 transmitted



                                                                 reference range

:



                                                                 3.93 - 5.25



                                                                 10*6/?L. The



                                                                 reference range

 was



                                                                 not used to



                                                                 interpret this



                                                                 result as



                                                                 normal/abnormal

.

 

             HGB (test code = 5.5 g/dL     11.6-15      L            



             718-7)                                              

 

             HCT (test code = 17.1 %       35.7-45.2    L            



             4544-3)                                             

 

             MCV (test code = 97.2 fL      80.6-95.5    H            



             787-2)                                              

 

             MCH (test code = 31.3 pg      25.9-32.8                 



             785-6)                                              

 

             MCHC (test code = 32.2 g/dL    31.6-35.1                 



             786-4)                                              

 

             RDW-SD (test code = 53.6 fL      39-49.9      H            



             73673-9)                                            

 

             RDW-CV (test code = 15.3 %       12-15.5                   



             788-0)                                              

 

             PLT (test code =              See_Comment  LL            [Automated



             777-3)                                              message] The sy

stem



                                                                 which generated



                                                                 this result



                                                                 transmitted



                                                                 reference range

:



                                                                 166 - 358 10*3/

?L.



                                                                 The reference r

olman



                                                                 was not used to



                                                                 interpret this



                                                                 result as



                                                                 normal/abnormal

.

 

             MPV (test code = 9.6 fL       9.5-12.9                  



             67261-2)                                            

 

             IPF % (test code = 1.9 %        1.3-7.7                   Platelet 

count



             3682304744)                                         measured by



                                                                 fluorescence



                                                                 method.

 

             NRBC/100 WBC (test              See_Comment                [Automat

ed



             code = 7999928054)                                        message] 

The system



                                                                 which generated



                                                                 this result



                                                                 transmitted



                                                                 reference range

:



                                                                 0.0 - 10.0 /100



                                                                 WBCs. The refer

ence



                                                                 range was not u

sed



                                                                 to interpret th

is



                                                                 result as



                                                                 normal/abnormal

.

 

             NRBC x10^3 (test code <0.01        See_Comment                [Auto

mated



             = 9880479268)                                        message] The s

ystem



                                                                 which generated



                                                                 this result



                                                                 transmitted



                                                                 reference range

:



                                                                 10*3/?L. The



                                                                 reference range

 was



                                                                 not used to



                                                                 interpret this



                                                                 result as



                                                                 normal/abnormal

.

 

             GRAN MAT (NEUT) % 81.5 %                                 



             (test code = 770-8)                                        

 

             IMM GRAN % (test code 1.10 %                                 



             = 0472733721)                                        

 

             LYMPH % (test code = 11.7 %                                 



             736-9)                                              

 

             MONO % (test code = 4.6 %                                  



             5905-5)                                             

 

             EOS % (test code = 0.9 %                                  



             713-8)                                              

 

             BASO % (test code = 0.2 %                                  



             706-2)                                              

 

             GRAN MAT x10^3(ANC) 4.45 10*3/uL 1.88-7.09                 



             (test code =                                        



             9003832853)                                         

 

             IMM GRAN x10^3 (test 0.06 10*3/uL 0-0.06                    



             code = 3944659955)                                        

 

             LYMPH x10^3 (test code 0.64 10*3/uL 1.32-3.29    L            



             = 731-0)                                            

 

             MONO x10^3 (test code 0.25 10*3/uL 0.33-0.92    L            



             = 742-7)                                            

 

             EOS x10^3 (test code = 0.05 10*3/uL 0.03-0.39                 



             711-2)                                              

 

             BASO x10^3 (test code <0.03        0.01-0.07                 



             = 704-7)                                            

 

             Lab Interpretation Abnormal                               



             (test code = 30709-8)                                        



[id number]30:00





             Test Item    Value        Reference Range Interpretation Comments

 

             PROTIME PATIENT (test              See_Comment  H             [Auto

mated message]



             code = 5964-2)                                        The system [employer]

ich



                                                                 generated this 

result



                                                                 transmitted ref

erence



                                                                 range: 10.1 - 1

2.6



                                                                 Seconds. The



                                                                 reference range

 was



                                                                 not used to int

erpret



                                                                 this result as



                                                                 normal/abnormal

.

 

             INR (test code = 6301-6)                                        Nor

mal INR <1.1;



                                                                 Warfarin Therap

eutic



                                                                 range 2.0 to 3.

0 or



                                                                 2.5 to 3.5, dep

ending



                                                                 upon the indica

tions.

 

             Lab Interpretation (test Abnormal                               



             code = 32906-0)                                        



[facility] GLUCOSE (AUTOMATED)2020 10:07:00





             Test Item    Value        Reference Range Interpretation Comments

 

             POCT GLU (test code = 8074067309) 45 mg/dL            LL     

      

 

             Lab Interpretation (test code = Abnormal                           

    



             07707-9)                                            



[facility] GLUCOSE (AUTOMATED)2020 10:07:00





             Test Item    Value        Reference Range Interpretation Comments

 

             POCT GLU (test code = 6941031084) 101 mg/dL                  

      

 

             Lab Interpretation (test code = Normal                             

    



             85781-2)                                            



[facility] WITH [id number] 05:39:00





             Test Item    Value        Reference Range Interpretation Comments

 

             WBC (test code =              See_Comment                [Automated



             6690-2)                                             message] The sy

stem



                                                                 which generated



                                                                 this result



                                                                 transmitted



                                                                 reference range

:



                                                                 4.30 - 11.10



                                                                 10*3/?L. The



                                                                 reference range

 was



                                                                 not used to



                                                                 interpret this



                                                                 result as



                                                                 normal/abnormal

.

 

             RBC (test code =              See_Comment  L             [Automated



             789-8)                                              message] The sy

stem



                                                                 which generated



                                                                 this result



                                                                 transmitted



                                                                 reference range

:



                                                                 3.93 - 5.25



                                                                 10*6/?L. The



                                                                 reference range

 was



                                                                 not used to



                                                                 interpret this



                                                                 result as



                                                                 normal/abnormal

.

 

             HGB (test code = 7.8 g/dL     11.6-15      L            



             718-7)                                              

 

             HCT (test code = 23.8 %       35.7-45.2    L            



             4544-3)                                             

 

             MCV (test code = 94.4 fL      80.6-95.5                 



             787-2)                                              

 

             MCH (test code = 31.0 pg      25.9-32.8                 



             785-6)                                              

 

             MCHC (test code = 32.8 g/dL    31.6-35.1                 



             786-4)                                              

 

             RDW-SD (test code = 52.9 fL      39-49.9      H            



             72105-5)                                            

 

             RDW-CV (test code = 15.4 %       12-15.5                   



             788-0)                                              

 

             PLT (test code =              See_Comment  LL            [Automated



             777-3)                                              message] The sy

stem



                                                                 which generated



                                                                 this result



                                                                 transmitted



                                                                 reference range

:



                                                                 166 - 358 10*3/

?L.



                                                                 The reference r

olman



                                                                 was not used to



                                                                 interpret this



                                                                 result as



                                                                 normal/abnormal

.

 

             MPV (test code = 10.1 fL      9.5-12.9                  



             35532-2)                                            

 

             IPF % (test code = 2.1 %        1.3-7.7                   Platelet 

count



             [id number])                                         measured by



                                                                 fluorescence



                                                                 method.

 

             NRBC/100 WBC (test              See_Comment                [Automat

ed



             code = 7201396540)                                        message] 

The system



                                                                 which generated



                                                                 this result



                                                                 transmitted



                                                                 reference range

:



                                                                 0.0 - 10.0 /100



                                                                 WBCs. The refer

ence



                                                                 range was not u

sed



                                                                 to interpret th

is



                                                                 result as



                                                                 normal/abnormal

.

 

             NRBC x10^3 (test code <0.01        See_Comment                [Auto

mated



             = 3878449781)                                        message] The s

ystem



                                                                 which generated



                                                                 this result



                                                                 transmitted



                                                                 reference range

:



                                                                 10*3/?L. The



                                                                 reference range

 was



                                                                 not used to



                                                                 interpret this



                                                                 result as



                                                                 normal/abnormal

.

 

             GRAN MAT (NEUT) % 78.1 %                                 



             (test code = 770-8)                                        

 

             IMM GRAN % (test code 3.10 %                                 



             = 2767940410)                                        

 

             LYMPH % (test code = 13.3 %                                 



             736-9)                                              

 

             MONO % (test code = 4.4 %                                  



             5905-5)                                             

 

             EOS % (test code = 0.9 %                                  



             713-8)                                              

 

             BASO % (test code = 0.2 %                                  



             706-2)                                              

 

             GRAN MAT x10^3(ANC) 5.09 10*3/uL 1.88-7.09                 



             (test code =                                        



             2137525316)                                         

 

             IMM GRAN x10^3 (test 0.20 10*3/uL 0-0.06       H            



             code = 0886561497)                                        

 

             LYMPH x10^3 (test code 0.87 10*3/uL 1.32-3.29    L            



             = 731-0)                                            

 

             MONO x10^3 (test code 0.29 10*3/uL 0.33-0.92    L            



             = 742-7)                                            

 

             EOS x10^3 (test code = 0.06 10*3/uL 0.03-0.39                 



             711-2)                                              

 

             BASO x10^3 (test code <0.03        0.01-0.07                 



             = 704-7)                                            

 

             BANDS (test code = Increased                 A            



             8274765690)                                         

 

             TOXIC CHANGES (test Present                   A            



             code = 803-7)                                        

 

             Lab Interpretation Abnormal                               



             (test code = 83540-7)                                        



[facility] GLUCOSE (AUTOMATED)2020 04:36:00





             Test Item    Value        Reference Range Interpretation Comments

 

             POCT GLU (test code = 9257157325) 113 mg/dL           H      

      

 

             Lab Interpretation (test code = Abnormal                           

    



             54903-3)                                            



[facility] GLUCOSE (AUTOMATED)2020 01:15:00





             Test Item    Value        Reference Range Interpretation Comments

 

             POCT GLU (test code = 3306146680) 103 mg/dL                  

      

 

             Lab Interpretation (test code = Normal                             

    



             09173-6)                                            



[facility] WITH [id number] 22:32:00





             Test Item    Value        Reference Range Interpretation Comments

 

             WBC (test code =              See_Comment                [Automated



             6690-2)                                             message] The sy

stem



                                                                 which generated



                                                                 this result



                                                                 transmitted



                                                                 reference range

:



                                                                 4.30 - 11.10



                                                                 10*3/?L. The



                                                                 reference range

 was



                                                                 not used to



                                                                 interpret this



                                                                 result as



                                                                 normal/abnormal

.

 

             RBC (test code =              See_Comment  L             [Automated



             769-8)                                              message] The sy

stem



                                                                 which generated



                                                                 this result



                                                                 transmitted



                                                                 reference range

:



                                                                 3.93 - 5.25



                                                                 10*6/?L. The



                                                                 reference range

 was



                                                                 not used to



                                                                 interpret this



                                                                 result as



                                                                 normal/abnormal

.

 

             HGB (test code = 7.7 g/dL     11.6-15      L            



             718-7)                                              

 

             HCT (test code = 24.0 %       35.7-45.2    L            



             4544-3)                                             

 

             MCV (test code = 96.4 fL      80.6-95.5    H            



             787-2)                                              

 

             MCH (test code = 30.9 pg      25.9-32.8                 



             785-6)                                              

 

             MCHC (test code = 32.1 g/dL    31.6-35.1                 



             786-4)                                              

 

             RDW-SD (test code = 53.6 fL      39-49.9      H            



             52767-5)                                            

 

             RDW-CV (test code = 15.3 %       12-15.5                   



             788-0)                                              

 

             PLT (test code =              See_Comment  LL            [Automated



             777-3)                                              message] The sy

stem



                                                                 which generated



                                                                 this result



                                                                 transmitted



                                                                 reference range

:



                                                                 166 - 358 10*3/

?L.



                                                                 The reference r

olman



                                                                 was not used to



                                                                 interpret this



                                                                 result as



                                                                 normal/abnormal

.

 

             MPV (test code = 10.5 fL      9.5-12.9                  



             06854-5)                                            

 

             IPF % (test code = 3.0 %        1.3-7.7                   Platelet 

count



             [id number])                                         measured by



                                                                 fluorescence



                                                                 method.

 

             NRBC/100 WBC (test              See_Comment                [Automat

ed



             code = 1197459567)                                        message] 

The system



                                                                 which generated



                                                                 this result



                                                                 transmitted



                                                                 reference range

:



                                                                 0.0 - 10.0 /100



                                                                 WBCs. The refer

ence



                                                                 range was not u

sed



                                                                 to interpret th

is



                                                                 result as



                                                                 normal/abnormal

.

 

             NRBC x10^3 (test code <0.01        See_Comment                [Auto

mated



             = 1608077110)                                        message] The s

ystem



                                                                 which generated



                                                                 this result



                                                                 transmitted



                                                                 reference range

:



                                                                 10*3/?L. The



                                                                 reference range

 was



                                                                 not used to



                                                                 interpret this



                                                                 result as



                                                                 normal/abnormal

.

 

             GRAN MAT (NEUT) % 82.8 %                                 



             (test code = 770-8)                                        

 

             IMM GRAN % (test code 1.50 %                                 



             = 2581457160)                                        

 

             LYMPH % (test code = 10.9 %                                 



             736-9)                                              

 

             MONO % (test code = 4.2 %                                  



             5905-5)                                             

 

             EOS % (test code = 0.5 %                                  



             713-8)                                              

 

             BASO % (test code = 0.1 %                                  



             706-2)                                              

 

             GRAN MAT x10^3(ANC) 6.44 10*3/uL 1.88-7.09                 



             (test code =                                        



             4954473868)                                         

 

             IMM GRAN x10^3 (test 0.12 10*3/uL 0-0.06       H            



             code = 2911220874)                                        

 

             LYMPH x10^3 (test code 0.85 10*3/uL 1.32-3.29    L            



             = 731-0)                                            

 

             MONO x10^3 (test code 0.33 10*3/uL 0.33-0.92                 



             = 742-7)                                            

 

             EOS x10^3 (test code = 0.04 10*3/uL 0.03-0.39                 



             711-2)                                              

 

             BASO x10^3 (test code <0.03        0.01-0.07                 



             = 704-7)                                            

 

             BANDS (test code = Increased                 A            



             9961052127)                                         

 

             Lab Interpretation Abnormal                               



             (test code = 09814-7)                                        



[facility]





             Test Item    Value        Reference Range Interpretation Comments

 

             POCT GLU (test code = 6041564244) 105 mg/dL                  

      

 

             Lab Interpretation (test code = Normal                             

    



             28854-4)                                            



[facility] Units
2020 18:52:37





             Test Item    Value        Reference Range Interpretation Comments

 

             Cross Match Result Compatible                             



             (test code = 4409)                                        

 

             ISBT Blood Type Code                                        



             (test code = 118909)                                        

 

             Unit Blood Type (test A Pos                                  



             code = 4410)                                        

 

             Unit Number (test [id number]                           



             code = 4411)                                        

 

             Blood Expiration Date                                        



             & Time (test code =                                        



             913177)                                             

 

             Status Information Issued                                 



             (test code = 4412)                                        

 

             Product      Red Blood Cells                           



             Identification (test                                        



             code = 4413)                                        

 

             Product Code (test [id number]                               Performed

 at [facility]



             code = 4414)                                        Laboratory



                                                                 Services - GAL



                                                                 Blood Awfq514



                                                                 [facility]

s



                                                                 [phone] Free:



                                                                 [phone]

A



                                                                 No. [id number]



[facility] Center[facility] - ONCE [id number] 
18:38:13





             Test Item    Value        Reference Range Interpretation Comments

 

             ABO & RH (test code A POSITIVE                             Performe

d at [facility]



             = 20)                                               Laboratory Serv

ices -



                                                                 [facility]

01



                                                                 [facility]

s



                                                                 [phone] Free:



                                                                 [phone]

A No.



                                                                 [id number]

 

             IAT (test code = Negative                               Performed a

t [facility]



             1185)                                               Laboratory Serv

ices -



                                                                 GAL Blood Bank3

[address] [place] [address]

[place]

s



                                                                 [phone] Free:



                                                                 [phone]

A No.



                                                                 [id number]



[facility] GLUCOSE (AUTOMATED)2020 17:29:00





             Test Item    Value        Reference Range Interpretation Comments

 

             POCT GLU (test code = 9535399108) 233 mg/dL           H      

      

 

             Lab Interpretation (test code = Abnormal                           

    



             04676-1)                                            



[facility] CenterMRSA / MSSA Screen by Misael SIERRALdssi5740-84-73 
16:55:00





             Test Item    Value        Reference Range Interpretation Comments

 

             MSSA Screen by Misael SIERRA (test code Negative     Negative         

         



             = 24198-3)                                          

 

             MRSA/MSSA Positive? (test code = No           No                   

     



             [id number])                                         

 

             Lab Interpretation (test code = Normal                             

    



             43645-7)                                            



[facility] GLUCOSE (AUTOMATED)2020 16:29:00





             Test Item    Value        Reference Range Interpretation Comments

 

             POCT GLU (test code = 7457572577) 211 mg/dL           H      

      

 

             Lab Interpretation (test code = Abnormal                           

    



             45881-3)                                            



[facility] ABDOMEN PELVIS W [id number] 
15:28:52 1. ?No evidence of bowel obstruction. Prominent mucosal enhancement in 
thedistal stomach suggests gastritis. Mural thickening and mucosal 
enhancementseen involving the ascending and transverse colon may represent 
congestivechanges versus colitis. There is surrounding free fluid and fat 
stranding. 2. ?Cirrhosis with portal hypertension. A hypodensity is seen in the 
rightlobe of the liver (segment VI)measuring approximately 2.6 cm,indeterminate 
on this single phase study. Recommend further evaluation withMRI. EXAM: CT 
ABDOMEN AND PELVIS WITH CONTRAST HISTORY: 59-year-old female with bowel 
obstruction. COMPARISON: 2020 DOSE: Total exam  mGy-cm 
TECHNIQUE AND FINDINGS: Contiguous axial imaging was performed after 
theuncomplicated administration of 120 cc of intravenous Omnipaque cont
rast.Coronal and sagittal reconstructions were obtained. FINDINGS:LOWER THORAX: 
Bibasilar atelectasis is seen but no evidence of pleural orpericardial effusion.
 LIVER: Cirrhosis with portal hypertension. A hypodensity is seen in theright 
lobe of the liver (segment VI) measuring approximately 3.6 cm,indeterminate on 
this single phase study. Patent portal vein and hepaticvenous branches. Mild 
periportal edema is unchanged. GALLBLADDER AND BILIARY TREE: Prominent 
extrahepatic CBD likely secondaryto prior cholecystectomy. SPLEEN: Enlarged 
spleen but no focal lesions. PANCREAS: No ductal dilation or focal lesions. 
ADRENAL GLANDS: No adrenal nodules. KIDNEYS: No hydronephrosis, stones, or solid
lesions. Stable cyst is seenin the right kidney. PERITONEUM AND RETROPERITONEUM:
Minimal free fluid is seen in the upperabdomen, slightly worse when compared to 
the prior study. LYMPH NODES: No lymphadenopathyis seen. GI TRACT: No evidence 
of dilated bowel loops. Prominent mucosal enhancementis seen in the distal 
stomach suggesting gastritis (301:41). The ?ascendingand transverse colon show 
mural thickeningand mucosal enhancement withsome surrounding fat stranding which
may represent colitis versuscongestive changes. No evidence of appendicitis or 
diverticulitis. PELVIS/BLADDER: The urinary bladder appears normal. No adnexal 
masses areseen. VESSELS: Scattered atherosclerotic calcifications. An enlarged m
ain portalvein. BONES AND SOFT TISSUES: No suspicious lytic or sclerotic bone 
lesions.  [facility], Radiant Results Inft User - 2020 10:29 AM CDTEXAM: CT 
ABDOMEN AND PELVIS WITH CONTRASTHISTORY: 59-year-old female with bowel 
obstruction.COMPARISON: 2020DOSE: Total exam  mGy-cmTECHNIQUE 
AND FINDINGS: Contiguous axial imaging was performed after theuncomplicated 
administration of 120 cc of intravenous Omnipaque contrast.Coronal and sagittal 
reconstructions were obtained.FINDINGS:LOWERTHORAX: Bibasilar atelectasis is 
seen but no evidence of pleural orpericardial effusion. LIVER: Cirrhosis with 
portal hypertension. A hypodensity is seen in theright lobe of the liver 
(segment VI) measuring approximately 3.6 cm,indeterminate on this single phase 
study. Patent portal vein and hepaticvenous branches. Mild periportal edema is 
unchanged.GALLBLADDER AND BILIARY TREE: Prominent extrahepatic CBD likely 
secondaryto prior cholecystectomy.SPLEEN: Enlarged spleen but no focal 
lesions.PANCREAS:No ductal dilation or focal lesions.ADRENAL GLANDS: No adrenal 
nodules.KIDNEYS: No hydronephrosis, stones, or solid lesions. Stable cyst is 
seenin the right kidney.PERITONEUM AND RETROPERITONEUM: Minimal free fluid is 
seen in the upperabdomen, slightly worse when compared to the prior study.LYMPH 
NODES: No lymphadenopathy is seen.GI TRACT: No evidence of dilated bowel loops. 
Prominent mucosal enhancementis seen in the distal stomach suggesting gastritis 
(301:41). The  ascendingand transverse colon show mural thickening and mucosal 
enhancement withsome surrounding fat stranding which may represent colitis 
versuscongestive changes. No evidence of appendicitis or 
diverticulitis.PELVIS/BLADDER: The urinary bladder appears normal. No adnexal 
masses areseen.VESSELS: Scattered atherosclerotic calcifications. An enlarged 
main portalvein.BONES AND SOFT TISSUES: No suspicious lytic or sclerotic bone 
lesions.IMPRESSION1.  No evidence of bowel obstruction. Prominent mucosal 
enhancement in thedistal stomachsuggests gastritis. Mural thickening and mucosal
enhancementseen involving the ascending and transverse colon may represent 
congestivechanges versus colitis. There is surrounding free fluid and fat stra
nding.2.  Cirrhosis with portal hypertension. A hypodensity is seen in the 
rightlobe of the liver (segment VI) measuring approximately 2.6 cm,indeterminate
on this single phase study. Recommend furtherevaluation withMRI.[facility] CenterCB WITH [id number] 15:24:00





             Test Item    Value        Reference Range Interpretation Comments

 

             WBC (test code =              See_Comment                [Automated



             8990-2)                                             message] The



                                                                 system which



                                                                 generated this



                                                                 result transmit

eduard



                                                                 reference range

:



                                                                 4.30 - 11.10



                                                                 10*3/?L. The



                                                                 reference range



                                                                 was not used to



                                                                 interpret this



                                                                 result as



                                                                 normal/abnormal

.

 

             RBC (test code =              See_Comment  L             [Automated



             719-8)                                              message] The



                                                                 system which



                                                                 generated this



                                                                 result transmit

eduard



                                                                 reference range

:



                                                                 3.93 - 5.25



                                                                 10*6/?L. The



                                                                 reference range



                                                                 was not used to



                                                                 interpret this



                                                                 result as



                                                                 normal/abnormal

.

 

             HGB (test code = 6.9 g/dL     11.6-15      L            



             718-7)                                              

 

             HCT (test code = 22.9 %       35.7-45.2    L            



             4544-3)                                             

 

             MCV (test code = 98.7 fL      80.6-95.5    H            



             787-2)                                              

 

             MCH (test code = 29.7 pg      25.9-32.8                 



             785-6)                                              

 

             MCHC (test code = 30.1 g/dL    31.6-35.1    L            



             786-4)                                              

 

             RDW-SD (test code = 56.2 fL      39-49.9      H            



             76174-0)                                            

 

             RDW-CV (test code = 15.9 %       12-15.5      H            



             788-0)                                              

 

             PLT (test code =              See_Comment  LL            [Automated



             777-3)                                              message] The



                                                                 system which



                                                                 generated this



                                                                 result transmit

eduard



                                                                 reference range

:



                                                                 166 - 358 10*3/

?L.



                                                                 The reference



                                                                 range was not u

sed



                                                                 to interpret th

is



                                                                 result as



                                                                 normal/abnormal

.

 

             MPV (test code = 10.7 fL      9.5-12.9                  



             75516-2)                                            

 

             IPF % (test code = 3.6 %        1.3-7.7                   Platelet 

count



             [id number])                                         measured by



                                                                 fluorescence



                                                                 method.

 

             NRBC/100 WBC (test              See_Comment                [Automat

ed



             code = 0682640753)                                        message] 

The



                                                                 system which



                                                                 generated this



                                                                 result transmit

eduard



                                                                 reference range

:



                                                                 0.0 - 10.0 /100



                                                                 WBCs. The



                                                                 reference range



                                                                 was not used to



                                                                 interpret this



                                                                 result as



                                                                 normal/abnormal

.

 

             NRBC x10^3 (test code <0.01        See_Comment                [Auto

mated



             = 0363833559)                                        message] The



                                                                 system which



                                                                 generated this



                                                                 result transmit

eduard



                                                                 reference range

:



                                                                 10*3/?L. The



                                                                 reference range



                                                                 was not used to



                                                                 interpret this



                                                                 result as



                                                                 normal/abnormal

.

 

             GRAN MAT (NEUT) % 83.1 %                                 



             (test code = 770-8)                                        

 

             IMM GRAN % (test code 1.80 %                                 



             = 5713381880)                                        

 

             LYMPH % (test code = 10.5 %                                 



             736-9)                                              

 

             MONO % (test code = 4.4 %                                  



             5905-5)                                             

 

             EOS % (test code = 0.1 %                                  



             713-8)                                              

 

             BASO % (test code = 0.1 %                                  



             706-2)                                              

 

             GRAN MAT x10^3(ANC) 7.11 10*3/uL 1.88-7.09    H            



             (test code =                                        



             6908711387)                                         

 

             IMM GRAN x10^3 (test 0.15 10*3/uL 0-0.06       H            



             code = 5271771854)                                        

 

             LYMPH x10^3 (test 0.90 10*3/uL 1.32-3.29    L            



             code = 731-0)                                        

 

             MONO x10^3 (test code 0.38 10*3/uL 0.33-0.92                 



             = 742-7)                                            

 

             EOS x10^3 (test code <0.03        0.03-0.39    L            



             = 711-2)                                            

 

             BASO x10^3 (test code <0.03        0.01-0.07                 



             = 704-7)                                            

 

             BANDS (test code = MARKED INCREASED              A            



             4113327162)                                         

 

             DOHLE BODIES (test Present                   A            



             code = 7792-5)                                        

 

             REACT LYMPHS (test Rare                                   



             code = 1917130681)                                        

 

             TOXIC CHANGES (test Present                   A            



             code = 803-7)                                        

 

             Lab Interpretation Abnormal                               



             (test code = 10473-9)                                        



[facility] CenterFIBRINOGEN2020-[facility]-30 14:47:00





             Test Item    Value        Reference Range Interpretation Comments

 

             Fibrinogen (test code = 8871650874) 356 mg/dL    167-453           

        

 

             Lab Interpretation (test code = Normal                             

    



             47689-9)                                            



[facility] CenterURINE [id number] 14:04:00





             Test Item    Value        Reference Range Interpretation Comments

 

             URINE CULTURE (test 10,000 - 100,000 CFU/mL                        

   



             code = 630-4) mixed aerobic organisms -                           



                          suggests endogenous                           



                          microbial contamination                           



[facility] CenterAbdominal [facility] - To confirm nasogastric tube
placement.2020 14:03:13 Nonspecific gaseous dilatation of transverse 
colon, which can be seen inthe setting of partial distal large bowel obstruction
or adynamic ileus. No esophagogastric tube, visualized in the field-of-view. 
Preliminary Report Dictated by Resident: Paul Sanchez MD., have reviewed this study and agree with theabove report.EXAM: XR ABDOMEN 1 
VW HISTORY: 59 years-old Female; NG placement  TECHNIQUE: Frontal radiograph of 
the abdomen and pelvis was obtained. COMPARISON: [facility] 2020.  FINDINGS: No 
esophagogastric tube was visualized in the field-of-view. Nonspecific gaseous 
dilatation of transverse colon is noted, measuring upto 9 cm, new compared to 
the prior. Gas in the descending and sigmoid colonis noted. No abnormal 
calcifications or radiopaque stones are identified.  Cholecystectomy clips are 
present in the right upper quadrant. Scatteredsurgical clips are present in the 
thorax. No acute bony abnormalities are noted. [facility], Radiant Results Inft User -
2020  9:04 AM CDTEXAM: XR ABDOMEN 1 VWHISTORY: 59 years-old Female; NG 
placement TECHNIQUE: Frontal radiograph of the abdomen and pelvis was 
obtained.COMPARISON: [facility] 2020. FINDINGS:No esophagogastric tube was visu
alized in the field-of-view.Nonspecific gaseous dilatation of transverse colon 
is noted, measuring upto 9 cm, new compared to the prior. Gas in the descending 
and sigmoid colonis noted.No abnormal calcifications or radiopaque stones are 
identified. Cholecystectomy clips are present in the right upper quadrant. 
Scatteredsurgical clips are present in the thorax.No acute bony abnormalities 
are noted.IMPRESSIONNonspecific gaseous dilatation of transverse colon, which 
can be seen inthe setting of partialdistal large bowel obstruction or adynamic 
ileus.No esophagogastric tube, visualized in the field-of-view.Preliminary 
Report Dictated by Resident: Paul Florez MD., have 
reviewedthis study and agree with theabove report.[facility] GLUCOSE (AUTOMATED)2020 12:47:00





             Test Item    Value        Reference Range Interpretation Comments

 

             POCT GLU (test code = 0501693951) 228 mg/dL           H      

      

 

             Lab Interpretation (test code = Abnormal                           

    



             69206-9)                                            



[facility] CenterN-TERMINAL PRO-[id number] 11:44:00





             Test Item    Value        Reference Range Interpretation Comments

 

             NT-proBNP (test code 400 pg/mL    See_Comment  H             [Autom

ated



             = 1335422268)                                        message] The



                                                                 system which



                                                                 generated this



                                                                 result



                                                                 transmitted



                                                                 reference range

:



                                                                 <=125. The



                                                                 reference range



                                                                 was not used to



                                                                 interpret this



                                                                 result as



                                                                 normal/abnormal

.

 

             RICK (test code = RICK) Biotin has been                           



                          reported to                            



                          cause a negative                           



                          bias, interpret                           



                          results relative                           



                          to patient's use                           



                          of biotin.                             

 

             Lab Interpretation Abnormal                               



             (test code = 82139-5)                                        



[facility]. METABOLIC PANEL (45539)2020 
11:32:00





             Test Item    Value        Reference Range Interpretation Comments

 

             NA (test code = 136 mmol/L   135-145                   



             9583163452)                                         

 

             K (test code = 3.6 mmol/L   3.5-5                     



             8190585903)                                         

 

             CL (test code = 110 mmol/L          H            



             9577736606)                                         

 

             CO2 TOTAL (test code = 20 mmol/L    23-31        L            



             0010769693)                                         

 

             AGAP (test code =              2-16                      



             0835215261)                                         

 

             BUN (test code = 17 mg/dL     7-23                      



             8567784842)                                         

 

             GLUCOSE (test code = 153 mg/dL           H            



             5915569423)                                         

 

             CREATININE (test code = 0.47 mg/dL   0.5-1.04     L            



             8555125040)                                         

 

             TOTAL BILI (test code = 2.0 mg/dL    0.1-1.1      H            



             9009790170)                                         

 

             CALCIUM (test code = 6.6 mg/dL    8.6-10.6     L            



             1697422703)                                         

 

             T PROTEIN (test code = 5.0 g/dL     6.3-8.2      L            



             7210439569)                                         

 

             ALBUMIN (test code = 2.1 g/dL     3.5-5        L            



             5036799232)                                         

 

             ALK PHOS (test code = 79 U/L                           



             4218079190)                                         

 

             ALTv (test code = 21 U/L       5-35                      



             1742-6)                                             

 

             AST(SGOT) (test code = 27 U/L       13-40                     



             9055731745)                                         

 

             eGFR Calculation              mL/min/1.73m2              



             (Non-)                                        



             (test code =                                        



             8797557576)                                         

 

             eGFR Calculation              mL/min/1.73m2              



             (African American)                                        



             (test code =                                        



             3821544838)                                         

 

             RICK (test code = RICK) Association of                           



                          Glomerular Filtration                           



                          Rate (GFR) and Staging                           



                          of Kidney Disease*                           



                          +---------------------                           



                          --+-------------------                           



                          --+-------------------                           



                          ------+| GFR                           



                          (mL/min/1.73 m2) ?|                           



                          With Kidney Damage ?|                           



                          ?Without Kidney                           



                          Damage+---------------                           



                          --------+-------------                           



                          --------+-------------                           



                          ------------+| ?>90 ?                           



                          ? ? ? ? ? ? ? ?|                           



                          ?Stage one ? ? ? ? ?|                           



                          ? Normal ? ? ? ? ? ? ?                           



                          ?+--------------------                           



                          ---+------------------                           



                          ---+------------------                           



                          -------+| ?60-89 ? ? ?                           



                          ? ? ? ? ?| ?Stage two                           



                          ? ? ? ? ?| ? Decreased                           



                          GFR ? ? ? ?                            



                          +---------------------                           



                          --+-------------------                           



                          --+-------------------                           



                          ------+| ?30-59 ? ? ?                           



                          ? ? ? ? ?| ?Stage                           



                          three ? ? ? ?| ? Stage                           



                          three ? ? ? ? ?                           



                          +---------------------                           



                          --+-------------------                           



                          --+-------------------                           



                          ------+| ?15-29 ? ? ?                           



                          ? ? ? ? ?| ?Stage four                           



                          ? ? ? ? | ? Stage four                           



                          ? ? ? ? ?                              



                          ?+--------------------                           



                          ---+------------------                           



                          ---+------------------                           



                          -------+| ?<15 (or                           



                          dialysis) ? ?| ?Stage                           



                          five ? ? ? ? | ? Stage                           



                          five ? ? ? ? ?                           



                          ?+--------------------                           



                          ---+------------------                           



                          ---+------------------                           



                          -------+ *Each stage                           



                          assumes the associated                           



                          GFR level has been in                           



                          effect for at least                           



                          three months. ?Stages                           



                          1 to 5, with or                           



                          without kidney                           



                          disease, indicate                           



                          chronic kidney                           



                          disease. Notes:                           



                          Determination of                           



                          stages one and two                           



                          (with eGFR                             



                          >59mL/min/1.73 m2)                           



                          requires estimation of                           



                          kidney damage for at                           



                          least three months as                           



                          defined by structural                           



                          or functional                           



                          abnormalities of the                           



                          kidney, manifested by                           



                          either:Pathological                           



                          abnormalities or                           



                          Markers of kidney                           



                          damage (including                           



                          abnormalities in the                           



                          composition of the                           



                          blood or urine or                           



                          abnormalities in                           



                          imaging tests).                           

 

             Lab Interpretation Abnormal                               



             (test code = 49462-9)                                        



[facility]





             Test Item    Value        Reference Range Interpretation Comments

 

             PHOSPHORUS (test code = 3310103290) 1.4 mg/dL    2.5-5        L    

        

 

             Lab Interpretation (test code = Abnormal                           

    



             27298-1)                                            



[id number]41:00





             Test Item    Value        Reference Range Interpretation Comments

 

             WBC (test code =              See_Comment                [Automated



             6690-2)                                             message] The sy

stem



                                                                 which generated



                                                                 this result



                                                                 transmitted



                                                                 reference range

:



                                                                 4.30 - 11.10



                                                                 10*3/?L. The



                                                                 reference range

 was



                                                                 not used to



                                                                 interpret this



                                                                 result as



                                                                 normal/abnormal

.

 

             RBC (test code =              See_Comment  L             [Automated



             789-8)                                              message] The sy

stem



                                                                 which generated



                                                                 this result



                                                                 transmitted



                                                                 reference range

:



                                                                 3.93 - 5.25



                                                                 10*6/?L. The



                                                                 reference range

 was



                                                                 not used to



                                                                 interpret this



                                                                 result as



                                                                 normal/abnormal

.

 

             HGB (test code = 7.3 g/dL     11.6-15      L            



             718-7)                                              

 

             HCT (test code = 23.6 %       35.7-45.2    L            



             4544-3)                                             

 

             MCV (test code = 98.3 fL      80.6-95.5    H            



             787-2)                                              

 

             MCH (test code = 30.4 pg      25.9-32.8                 



             785-6)                                              

 

             MCHC (test code = 30.9 g/dL    31.6-35.1    L            



             786-4)                                              

 

             RDW-SD (test code = 57.1 fL      39-49.9      H            



             20135-8)                                            

 

             RDW-CV (test code = 15.8 %       12-15.5      H            



             788-0)                                              

 

             PLT (test code =              See_Comment  LL            [Automated



             777-3)                                              message] The sy

stem



                                                                 which generated



                                                                 this result



                                                                 transmitted



                                                                 reference range

:



                                                                 166 - 358 10*3/

?L.



                                                                 The reference r

olman



                                                                 was not used to



                                                                 interpret this



                                                                 result as



                                                                 normal/abnormal

.

 

             MPV (test code = 11.7 fL      9.5-12.9                  



             50465-2)                                            

 

             IPF % (test code = 4.7 %        1.3-7.7                   Platelet 

count



             [id number])                                         measured by



                                                                 fluorescence



                                                                 method.

 

             NRBC/100 WBC (test              See_Comment                [Automat

ed



             code = 6118484536)                                        message] 

The system



                                                                 which generated



                                                                 this result



                                                                 transmitted



                                                                 reference range

:



                                                                 0.0 - 10.0 /100



                                                                 WBCs. The refer

ence



                                                                 range was not u

sed



                                                                 to interpret th

is



                                                                 result as



                                                                 normal/abnormal

.

 

             NRBC x10^3 (test code              See_Comment                [Auto

mated



             = 1453701109)                                        message] The s

ystem



                                                                 which generated



                                                                 this result



                                                                 transmitted



                                                                 reference range

:



                                                                 10*3/?L. The



                                                                 reference range

 was



                                                                 not used to



                                                                 interpret this



                                                                 result as



                                                                 normal/abnormal

.

 

             GRAN MAT (NEUT) % 78.4 %                                 



             (test code = 770-8)                                        

 

             IMM GRAN % (test code 5.40 %                                 



             = 3208563577)                                        

 

             LYMPH % (test code = 11.3 %                                 



             736-9)                                              

 

             MONO % (test code = 4.7 %                                  



             5905-5)                                             

 

             EOS % (test code = 0.1 %                                  



             713-8)                                              

 

             BASO % (test code = 0.1 %                                  



             706-2)                                              

 

             GRAN MAT x10^3(ANC) 6.12 10*3/uL 1.88-7.09                 



             (test code =                                        



             9961435909)                                         

 

             IMM GRAN x10^3 (test 0.42 10*3/uL 0-0.06       H            



             code = 0772248500)                                        

 

             LYMPH x10^3 (test code 0.88 10*3/uL 1.32-3.29    L            



             = 731-0)                                            

 

             MONO x10^3 (test code 0.37 10*3/uL 0.33-0.92                 



             = 742-7)                                            

 

             EOS x10^3 (test code = <0.03        0.03-0.39    L            



             711-2)                                              

 

             BASO x10^3 (test code <0.03        0.01-0.07                 



             = 704-7)                                            

 

             BASO STIPPLING (test Present                   A            



             code = 703-9)                                        

 

             Lab Interpretation Abnormal                               



             (test code = 04988-8)                                        



[facility]





             Test Item    Value        Reference Range Interpretation Comments

 

             POCT GLU (test code = 9217510257) 168 mg/dL           H      

      

 

             Lab Interpretation (test code = Abnormal                           

    



             12156-2)                                            



[facility] WITH [id number] 06:13:00





             Test Item    Value        Reference Range Interpretation Comments

 

             WBC (test code =              See_Comment                [Automated



             6690-2)                                             message] The



                                                                 system which



                                                                 generated this



                                                                 result transmit

eduard



                                                                 reference range

:



                                                                 4.30 - 11.10



                                                                 10*3/?L. The



                                                                 reference range



                                                                 was not used to



                                                                 interpret this



                                                                 result as



                                                                 normal/abnormal

.

 

             RBC (test code =              See_Comment  L             [Automated



             789-8)                                              message] The



                                                                 system which



                                                                 generated this



                                                                 result transmit

eduard



                                                                 reference range

:



                                                                 3.93 - 5.25



                                                                 10*6/?L. The



                                                                 reference range



                                                                 was not used to



                                                                 interpret this



                                                                 result as



                                                                 normal/abnormal

.

 

             HGB (test code = 7.0 g/dL     11.6-15      L            



             718-7)                                              

 

             HCT (test code = 21.9 %       35.7-45.2    L            



             4544-3)                                             

 

             MCV (test code = 96.5 fL      80.6-95.5    H            



             787-2)                                              

 

             MCH (test code = 30.8 pg      25.9-32.8                 



             785-6)                                              

 

             MCHC (test code = 32.0 g/dL    31.6-35.1                 



             786-4)                                              

 

             RDW-SD (test code = 56.0 fL      39-49.9      H            



             39362-5)                                            

 

             RDW-CV (test code = 16.0 %       12-15.5      H            



             788-0)                                              

 

             PLT (test code =              See_Comment  LL            [Automated



             777-3)                                              message] The



                                                                 system which



                                                                 generated this



                                                                 result transmit

eduard



                                                                 reference range

:



                                                                 166 - 358 10*3/

?L.



                                                                 The reference



                                                                 range was not u

sed



                                                                 to interpret th

is



                                                                 result as



                                                                 normal/abnormal

.

 

             MPV (test code = 10.2 fL      9.5-12.9                  



             09296-2)                                            

 

             IPF % (test code = 2.8 %        1.3-7.7                   Platelet 

count



             [id number])                                         measured by



                                                                 fluorescence



                                                                 method.

 

             NRBC/100 WBC (test              See_Comment                [Automat

ed



             code = 3149020941)                                        message] 

The



                                                                 system which



                                                                 generated this



                                                                 result transmit

eduard



                                                                 reference range

:



                                                                 0.0 - 10.0 /100



                                                                 WBCs. The



                                                                 reference range



                                                                 was not used to



                                                                 interpret this



                                                                 result as



                                                                 normal/abnormal

.

 

             NRBC x10^3 (test code <0.01        See_Comment                [Auto

mated



             = [id number])                                        message] The



                                                                 system which



                                                                 generated this



                                                                 result transmit

eduard



                                                                 reference range

:



                                                                 10*3/?L. The



                                                                 reference range



                                                                 was not used to



                                                                 interpret this



                                                                 result as



                                                                 normal/abnormal

.

 

             GRAN MAT (NEUT) % 85.0 %                                 



             (test code = 770-8)                                        

 

             IMM GRAN % (test code 0.90 %                                 



             = 7402032113)                                        

 

             LYMPH % (test code = 10.2 %                                 



             736-9)                                              

 

             MONO % (test code = 3.8 %                                  



             5905-5)                                             

 

             EOS % (test code = 0.1 %                                  



             713-8)                                              

 

             BASO % (test code = 0.0 %                                  



             706-2)                                              

 

             GRAN MAT x10^3(ANC) 6.63 10*3/uL 1.88-7.09                 



             (test code =                                        



             2739404191)                                         

 

             IMM GRAN x10^3 (test 0.07 10*3/uL 0-0.06       H            



             code = 6000037690)                                        

 

             LYMPH x10^3 (test 0.80 10*3/uL 1.32-3.29    L            



             code = 731-0)                                        

 

             MONO x10^3 (test code 0.30 10*3/uL 0.33-0.92    L            



             = 742-7)                                            

 

             EOS x10^3 (test code <0.03        0.03-0.39    L            



             = 711-2)                                            

 

             BASO x10^3 (test code <0.03        0.01-0.07                 



             = 704-7)                                            

 

             BANDS (test code = MARKED INCREASED              A            



             1012737544)                                         

 

             Lab Interpretation Abnormal                               



             (test code = 14010-9)                                        



[facility] Metabolic Panel (NA, K, CL, CO2, 
Glucose, BUN, Creatinine, CA)2020 06:00:00





             Test Item    Value        Reference Range Interpretation Comments

 

             NA (test code = 138 mmol/L   135-145                   



             0019329805)                                         

 

             K (test code = 3.6 mmol/L   3.5-5                     



             9804495975)                                         

 

             CL (test code = 111 mmol/L          H            



             1688216219)                                         

 

             CO2 TOTAL (test code = 20 mmol/L    23-31        L            



             8795235676)                                         

 

             AGAP (test code =              2-16                      



             5214404796)                                         

 

             BUN (test code = 17 mg/dL     7-23                      



             0285789132)                                         

 

             GLUCOSE (test code = 136 mg/dL           H            



             5287332653)                                         

 

             CREATININE (test code = 0.46 mg/dL   0.5-1.04     L            



             2565467725)                                         

 

             CALCIUM (test code = 6.5 mg/dL    8.6-10.6     L            



             9059728380)                                         

 

             eGFR Calculation              mL/min/1.73m2              



             (Non-)                                        



             (test code =                                        



             9644923945)                                         

 

             eGFR Calculation              mL/min/1.73m2              



             (African American)                                        



             (test code =                                        



             3235251705)                                         

 

             RICK (test code = RICK) Association of                           



                          Glomerular Filtration                           



                          Rate (GFR) and Staging                           



                          of Kidney Disease*                           



                          +---------------------                           



                          --+-------------------                           



                          --+-------------------                           



                          ------+| GFR                           



                          (mL/min/1.73 m2) ?|                           



                          With Kidney Damage ?|                           



                          ?Without Kidney                           



                          Damage+---------------                           



                          --------+-------------                           



                          --------+-------------                           



                          ------------+| ?>90 ?                           



                          ? ? ? ? ? ? ? ?|                           



                          ?Stage one ? ? ? ? ?|                           



                          ? Normal ? ? ? ? ? ? ?                           



                          ?+--------------------                           



                          ---+------------------                           



                          ---+------------------                           



                          -------+| ?60-89 ? ? ?                           



                          ? ? ? ? ?| ?Stage two                           



                          ? ? ? ? ?| ? Decreased                           



                          GFR ? ? ? ?                            



                          +---------------------                           



                          --+-------------------                           



                          --+-------------------                           



                          ------+| ?30-59 ? ? ?                           



                          ? ? ? ? ?| ?Stage                           



                          three ? ? ? ?| ? Stage                           



                          three ? ? ? ? ?                           



                          +---------------------                           



                          --+-------------------                           



                          --+-------------------                           



                          ------+| ?15-29 ? ? ?                           



                          ? ? ? ? ?| ?Stage four                           



                          ? ? ? ? | ? Stage four                           



                          ? ? ? ? ?                              



                          ?+--------------------                           



                          ---+------------------                           



                          ---+------------------                           



                          -------+| ?<15 (or                           



                          dialysis) ? ?| ?Stage                           



                          five ? ? ? ? | ? Stage                           



                          five ? ? ? ? ?                           



                          ?+--------------------                           



                          ---+------------------                           



                          ---+------------------                           



                          -------+ *Each stage                           



                          assumes the associated                           



                          GFR level has been in                           



                          effect for at least                           



                          three months. ?Stages                           



                          1 to 5, with or                           



                          without kidney                           



                          disease, indicate                           



                          chronic kidney                           



                          disease. Notes:                           



                          Determination of                           



                          stages one and two                           



                          (with eGFR                             



                          >59mL/min/1.73 m2)                           



                          requires estimation of                           



                          kidney damage for at                           



                          least three months as                           



                          defined by structural                           



                          or functional                           



                          abnormalities of the                           



                          kidney, manifested by                           



                          either:Pathological                           



                          abnormalities or                           



                          Markers of kidney                           



                          damage (including                           



                          abnormalities in the                           



                          composition of the                           



                          blood or urine or                           



                          abnormalities in                           



                          imaging tests).                           

 

             Lab Interpretation Abnormal                               



             (test code = 10956-2)                                        



[id number]





             Test Item    Value        Reference Range Interpretation Comments

 

             MAGNESIUM (test code = 3312510120) 1.3 mg/dL    1.7-2.4      L     

       

 

             Lab Interpretation (test code = Abnormal                           

    



             37493-6)                                            



[facility]B, T.PRO, BILI T, 
BU/BC, ALT, AST, ALK, PHOS)2020 06:00:00





             Test Item    Value        Reference Range Interpretation Comments

 

             TOTAL BILI (test code = 0005328497) 2.1 mg/dL    0.1-1.1      H    

        

 

             BILI UNCON (test code = 9038094929) 1.6 mg/dL    0.1-1.1      H    

        

 

             BILI CONJ (test code = 8865525633) 0.0 mg/dL    0-0.3              

       

 

             T PROTEIN (test code = 7438049801) 4.8 g/dL     6.3-8.2      L     

       

 

             ALBUMIN (test code = 6638163133) 2.0 g/dL     3.5-5        L       

     

 

             ALK PHOS (test code = 1055966876) 59 U/L                     

      

 

             ALTv (test code = 1742-6) 19 U/L       5-35                      

 

             AST(SGOT) (test code = 6649097025) 27 U/L       13-40              

       

 

             Lab Interpretation (test code = Abnormal                           

    



             46795-9)                                            



[facility] [id number] 05:43:00





             Test Item    Value        Reference Range Interpretation Comments

 

             PROTIME PATIENT (test              See_Comment  H             [Auto

mated message]



             code = 5964-2)                                        The system [email or web address]



                                                                 generated this 

result



                                                                 transmitted ref

erence



                                                                 range: 10.1 - 1

2.6



                                                                 Seconds. The



                                                                 reference range

 was



                                                                 not used to int

erpret



                                                                 this result as



                                                                 normal/abnormal

.

 

             INR (test code = 6301-6)                                        Nor

mal INR <1.1;



                                                                 Warfarin Therap

eutic



                                                                 range 2.0 to 3.

0 or



                                                                 2.5 to 3.5, dep

ending



                                                                 upon the indica

tions.

 

             Lab Interpretation (test Abnormal                               



             code = 62564-9)                                        



[facility] GLUCOSE (AUTOMATED)2020 05:18:00





             Test Item    Value        Reference Range Interpretation Comments

 

             POCT GLU (test code = 0793210996) 191 mg/dL           H      

      

 

             Lab Interpretation (test code = Abnormal                           

    



             85413-0)                                            



[facility] GLUCOSE (AUTOMATED)2020 02:28:00





             Test Item    Value        Reference Range Interpretation Comments

 

             POCT GLU (test code = 4257529118) 202 mg/dL           H      

      

 

             Lab Interpretation (test code = Abnormal                           

    



             64639-1)                                            



[id number]





             Test Item    Value        Reference Range Interpretation Comments

 

             AFP (test code = 8.4 ng/mL    See_Comment  H             [Automated



             4296713612)                                         message] The



                                                                 system which



                                                                 generated this



                                                                 result



                                                                 transmitted



                                                                 reference range

:



                                                                 <=7.5. The



                                                                 reference range



                                                                 was not used to



                                                                 interpret this



                                                                 result as



                                                                 normal/abnormal

.

 

             RICK (test code = RICK) Biotin has been                           



                          reported to                            



                          cause a negative                           



                          bias, interpret                           



                          results relative                           



                          to patient's use                           



                          of biotin.                             

 

             Lab Interpretation Abnormal                               



             (test code = 46696-7)                                        



[facility] WITH [id number] 00:30:00





             Test Item    Value        Reference Range Interpretation Comments

 

             WBC (test code =              See_Comment                [Automated



             6690-2)                                             message] The



                                                                 system which



                                                                 generated this



                                                                 result transmit

eduard



                                                                 reference range

:



                                                                 4.30 - 11.10



                                                                 10*3/?L. The



                                                                 reference range



                                                                 was not used to



                                                                 interpret this



                                                                 result as



                                                                 normal/abnormal

.

 

             RBC (test code =              See_Comment  L             [Automated



             789-8)                                              message] The



                                                                 system which



                                                                 generated this



                                                                 result transmit

eduard



                                                                 reference range

:



                                                                 3.93 - 5.25



                                                                 10*6/?L. The



                                                                 reference range



                                                                 was not used to



                                                                 interpret this



                                                                 result as



                                                                 normal/abnormal

.

 

             HGB (test code = 8.8 g/dL     11.6-15      L            



             718-7)                                              

 

             HCT (test code = 27.7 %       35.7-45.2    L            



             4544-3)                                             

 

             MCV (test code = 95.2 fL      80.6-95.5                 



             787-2)                                              

 

             MCH (test code = 30.2 pg      25.9-32.8                 



             785-6)                                              

 

             MCHC (test code = 31.8 g/dL    31.6-35.1                 



             786-4)                                              

 

             RDW-SD (test code = 53.6 fL      39-49.9      H            



             95093-4)                                            

 

             RDW-CV (test code = 15.7 %       12-15.5      H            



             788-0)                                              

 

             PLT (test code =              See_Comment  LL            [Automated



             777-3)                                              message] The



                                                                 system which



                                                                 generated this



                                                                 result transmit

eduard



                                                                 reference range

:



                                                                 166 - 358 10*3/

?L.



                                                                 The reference



                                                                 range was not u

sed



                                                                 to interpret th

is



                                                                 result as



                                                                 normal/abnormal

.

 

             MPV (test code = 10.5 fL      9.5-12.9                  



             80296-5)                                            

 

             IPF % (test code = 4.0 %        1.3-7.7                   Platelet 

count



             [id number])                                         measured by



                                                                 fluorescence



                                                                 method.

 

             NRBC/100 WBC (test              See_Comment                [Automat

ed



             code = 1146168157)                                        message] 

The



                                                                 system which



                                                                 generated this



                                                                 result transmit

eduard



                                                                 reference range

:



                                                                 0.0 - 10.0 /100



                                                                 WBCs. The



                                                                 reference range



                                                                 was not used to



                                                                 interpret this



                                                                 result as



                                                                 normal/abnormal

.

 

             NRBC x10^3 (test code <0.01        See_Comment                [Auto

mated



             = 7531688313)                                        message] The



                                                                 system which



                                                                 generated this



                                                                 result transmit

eduard



                                                                 reference range

:



                                                                 10*3/?L. The



                                                                 reference range



                                                                 was not used to



                                                                 interpret this



                                                                 result as



                                                                 normal/abnormal

.

 

             GRAN MAT (NEUT) % 85.3 %                                 



             (test code = 770-8)                                        

 

             IMM GRAN % (test code 2.50 %                                 



             = 1495011596)                                        

 

             LYMPH % (test code = 7.7 %                                  



             736-9)                                              

 

             MONO % (test code = 4.4 %                                  



             5905-5)                                             

 

             EOS % (test code = 0.0 %                                  



             713-8)                                              

 

             BASO % (test code = 0.1 %                                  



             706-2)                                              

 

             GRAN MAT x10^3(ANC) 8.57 10*3/uL 1.88-7.09    H            



             (test code =                                        



             3790171858)                                         

 

             IMM GRAN x10^3 (test 0.25 10*3/uL 0-0.06       H            



             code = 6614287109)                                        

 

             LYMPH x10^3 (test 0.77 10*3/uL 1.32-3.29    L            



             code = 731-0)                                        

 

             MONO x10^3 (test code 0.44 10*3/uL 0.33-0.92                 



             = 742-7)                                            

 

             EOS x10^3 (test code <0.03        0.03-0.39    L            



             = 711-2)                                            

 

             BASO x10^3 (test code <0.03        0.01-0.07                 



             = 704-7)                                            

 

             BASO STIPPLING (test Present                   A            



             code = 703-9)                                        

 

             KALA CELLS (test code 2+           See_Comment  A             [Auto

mated



             = 7742-9)                                           message] The



                                                                 system which



                                                                 generated this



                                                                 result transmit

eduard



                                                                 reference range

:



                                                                 (none). The



                                                                 reference range



                                                                 was not used to



                                                                 interpret this



                                                                 result as



                                                                 normal/abnormal

.

 

             BANDS (test code = MARKED INCREASED              A            



             0475258223)                                         

 

             Lab Interpretation Abnormal                               



             (test code = 97221-8)                                        



[facility] Acid Whole [id number] 00:08:00





             Test Item    Value        Reference Range Interpretation Comments

 

             LACTIC ACID (test code = 2.62 mmol/L  0.5-2.2      H            



             1602522303)                                         

 

             Lab Interpretation (test code = Abnormal                           

    



             28517-4)                                            



[facility] GLUCOSE (AUTOMATED)2020 21:36:00





             Test Item    Value        Reference Range Interpretation Comments

 

             POCT GLU (test code = 3665874341) 260 mg/dL           H      

      

 

             Lab Interpretation (test code = Abnormal                           

    



             31928-8)                                            



[id number]





             Test Item    Value        Reference Range Interpretation Comments

 

             NT-proBNP (test code 209 pg/mL    See_Comment  H             [Autom

ated



             = 8426316573)                                        message] The



                                                                 system which



                                                                 generated this



                                                                 result



                                                                 transmitted



                                                                 reference range

:



                                                                 <=125. The



                                                                 reference range



                                                                 was not used to



                                                                 interpret this



                                                                 result as



                                                                 normal/abnormal

.

 

             RICK (test code = RICK) Biotin has been                           



                          reported to                            



                          cause a negative                           



                          bias, interpret                           



                          results relative                           



                          to patient's use                           



                          of biotin.                             

 

             Lab Interpretation Abnormal                               



             (test code = 00424-0)                                        



[facility] GLUCOSE (AUTOMATED)2020 16:33:00





             Test Item    Value        Reference Range Interpretation Comments

 

             POCT GLU (test code = 1226515893) 299 mg/dL           H      

      

 

             Lab Interpretation (test code = Abnormal                           

    



             99311-6)                                            



[facility] GLUCOSE (AUTOMATED)2020 13:00:00





             Test Item    Value        Reference Range Interpretation Comments

 

             POCT GLU (test code = 3378460903) 351 mg/dL           H      

      

 

             Lab Interpretation (test code = Abnormal                           

    



             60249-5)                                            



[facility]ALB,T.PRO,BILI
T,BU/BC,ALT,AST,ALK PHOS)2020 11:25:00





             Test Item    Value        Reference Range Interpretation Comments

 

             TOTAL BILI (test code = 3556896261) 3.8 mg/dL    0.1-1.1      H    

        

 

             BILI UNCON (test code = 8522594901) 2.9 mg/dL    0.1-1.1      H    

        

 

             BILI CONJ (test code = 6978714293) 0.2 mg/dL    0-0.3              

       

 

             T PROTEIN (test code = 7392649844) 7.5 g/dL     6.3-8.2            

       

 

             ALBUMIN (test code = 4322166644) 3.7 g/dL     3.5-5                

     

 

             ALK PHOS (test code = 0908414697) 128 U/L             H      

      

 

             ALTv (test code = 1742-6) 36 U/L       5-35         H            

 

             AST(SGOT) (test code = 7980254856) 61 U/L       13-40        H     

       

 

             Lab Interpretation (test code = Abnormal                           

    



             68670-3)                                            



[id number]41:00





             Test Item    Value        Reference Range Interpretation Comments

 

             LACTIC ACID (test code = 2.41 mmol/L  0.5-2.2      H            



             9075883408)                                         

 

             Lab Interpretation (test code = Abnormal                           

    



             48410-7)                                            



[facility] 09[facility]26:00





             Test Item    Value        Reference Range Interpretation Comments

 

             POCT GLU (test code = 3179078520) 371 mg/dL           H      

      

 

             Lab Interpretation (test code = Abnormal                           

    



             45126-8)                                            



[facility] 
08:42:00





             Test Item    Value        Reference    Interpretation Comments



                                       Range                     

 

             HGB A1C (test code =              See_Comment  H             [Autom

ated



             4548-4)                                             message] The



                                                                 system which



                                                                 generated this



                                                                 result



                                                                 transmitted



                                                                 reference range

:



                                                                 4.0 - 6.0 %



                                                                 NGSP. The



                                                                 reference range



                                                                 was not used to



                                                                 interpret this



                                                                 result as



                                                                 normal/abnormal

.

 

             RICK (test code = %A1C (NGSP)                            



             RICK)         Interpretation                           



                          (ADA)4.8-5.6 ? ?                           



                          Normal or                              



                          (Non-Diabetic                           



                          Range)5.7-6.4 ? ?                           



                          Increased Risk                           



                          (Pre-Diabetic)>6.5 ?                           



                          ? ? ?Diabetes                           



                          Indicated                              

 

             Lab Interpretation Abnormal                               



             (test code =                                        



             03280-0)                                            



[facility] WITH [id number] 07:08:00





             Test Item    Value        Reference Range Interpretation Comments

 

             WBC (test code =              See_Comment  H             [Automated



             9890-2)                                             message] The sy

stem



                                                                 which generated



                                                                 this result



                                                                 transmitted



                                                                 reference range

:



                                                                 4.30 - 11.10



                                                                 10*3/?L. The



                                                                 reference range

 was



                                                                 not used to



                                                                 interpret this



                                                                 result as



                                                                 normal/abnormal

.

 

             RBC (test code =              See_Comment                [Automated



             149-8)                                              message] The sy

stem



                                                                 which generated



                                                                 this result



                                                                 transmitted



                                                                 reference range

:



                                                                 3.93 - 5.25



                                                                 10*6/?L. The



                                                                 reference range

 was



                                                                 not used to



                                                                 interpret this



                                                                 result as



                                                                 normal/abnormal

.

 

             HGB (test code = 12.6 g/dL    11.6-15                   



             718-7)                                              

 

             HCT (test code = 38.9 %       35.7-45.2                 



             4544-3)                                             

 

             MCV (test code = 93.7 fL      80.6-95.5                 



             787-2)                                              

 

             MCH (test code = 30.4 pg      25.9-32.8                 



             785-6)                                              

 

             MCHC (test code = 32.4 g/dL    31.6-35.1                 



             786-4)                                              

 

             RDW-SD (test code = 53.3 fL      39-49.9      H            



             82369-3)                                            

 

             RDW-CV (test code = 15.7 %       12-15.5      H            



             788-0)                                              

 

             PLT (test code =              See_Comment  L             [Automated



             777-3)                                              message] The sy

stem



                                                                 which generated



                                                                 this result



                                                                 transmitted



                                                                 reference range

:



                                                                 166 - 358 10*3/

?L.



                                                                 The reference r

olman



                                                                 was not used to



                                                                 interpret this



                                                                 result as



                                                                 normal/abnormal

.

 

             MPV (test code = 9.7 fL       9.5-12.9                  



             02736-7)                                            

 

             IPF % (test code = 3.2 %        1.3-7.7                   Platelet 

count



             6704359663)                                         measured by



                                                                 fluorescence



                                                                 method.

 

             NRBC/100 WBC (test              See_Comment                [Automat

ed



             code = 9127536179)                                        message] 

The system



                                                                 which generated



                                                                 this result



                                                                 transmitted



                                                                 reference range

:



                                                                 0.0 - 10.0 /100



                                                                 WBCs. The refer

ence



                                                                 range was not u

sed



                                                                 to interpret th

is



                                                                 result as



                                                                 normal/abnormal

.

 

             NRBC x10^3 (test code <0.01        See_Comment                [Auto

mated



             = 9676414873)                                        message] The s

ystem



                                                                 which generated



                                                                 this result



                                                                 transmitted



                                                                 reference range

:



                                                                 10*3/?L. The



                                                                 reference range

 was



                                                                 not used to



                                                                 interpret this



                                                                 result as



                                                                 normal/abnormal

.

 

             GRAN MAT (NEUT) % 89.8 %                                 



             (test code = 770-8)                                        

 

             IMM GRAN % (test code 0.60 %                                 



             = 2532458832)                                        

 

             LYMPH % (test code = 6.1 %                                  



             736-9)                                              

 

             MONO % (test code = 3.3 %                                  



             5905-5)                                             

 

             EOS % (test code = 0.0 %                                  



             713-8)                                              

 

             BASO % (test code = 0.2 %                                  



             706-2)                                              

 

             GRAN MAT x10^3(ANC) 12.10 10*3/uL 1.88-7.09    H            



             (test code =                                        



             9016117832)                                         

 

             IMM GRAN x10^3 (test 0.08 10*3/uL 0-0.06       H            



             code = 9401956196)                                        

 

             LYMPH x10^3 (test 0.82 10*3/uL 1.32-3.29    L            



             code = 731-0)                                        

 

             MONO x10^3 (test code 0.44 10*3/uL 0.33-0.92                 



             = 742-7)                                            

 

             EOS x10^3 (test code <0.03        0.03-0.39    L            



             = 711-2)                                            

 

             BASO x10^3 (test code 0.03 10*3/uL 0.01-0.07                 



             = 704-7)                                            

 

             PLT ESTIMATE (test Decreased    Normal       A            



             code = 9317-9)                                        

 

             Lab Interpretation Abnormal                               



             (test code = 92426-0)                                        



[facility] Metabolic Panel (NA, K, CL, CO2, 
GLUCOSE, BUN, CREATININE, CA)2020 06:43:00





             Test Item    Value        Reference Range Interpretation Comments

 

             NA (test code = 139 mmol/L   135-145                   



             1806959488)                                         

 

             K (test code = 4.7 mmol/L   3.5-5                     



             5204755651)                                         

 

             CL (test code = 107 mmol/L                       



             3724810625)                                         

 

             CO2 TOTAL (test code = 19 mmol/L    23-31        L            



             0844938879)                                         

 

             AGAP (test code =              2-16                      



             0206405684)                                         

 

             BUN (test code = 22 mg/dL     7-23                      



             5619098911)                                         

 

             GLUCOSE (test code = 344 mg/dL           H            



             5934885356)                                         

 

             CREATININE (test code = 0.79 mg/dL   0.5-1.04                  



             8721336443)                                         

 

             CALCIUM (test code = 8.3 mg/dL    8.6-10.6     L            



             3204245215)                                         

 

             eGFR Calculation              mL/min/1.73m2              



             (Non-)                                        



             (test code =                                        



             8163956744)                                         

 

             eGFR Calculation              mL/min/1.73m2              



             (African American)                                        



             (test code =                                        



             3946409110)                                         

 

             RICK (test code = RICK) Association of                           



                          Glomerular Filtration                           



                          Rate (GFR) and Staging                           



                          of Kidney Disease*                           



                          +---------------------                           



                          --+-------------------                           



                          --+-------------------                           



                          ------+| GFR                           



                          (mL/min/1.73 m2) ?|                           



                          With Kidney Damage ?|                           



                          ?Without Kidney                           



                          Damage+---------------                           



                          --------+-------------                           



                          --------+-------------                           



                          ------------+| ?>90 ?                           



                          ? ? ? ? ? ? ? ?|                           



                          ?Stage one ? ? ? ? ?|                           



                          ? Normal ? ? ? ? ? ? ?                           



                          ?+--------------------                           



                          ---+------------------                           



                          ---+------------------                           



                          -------+| ?60-89 ? ? ?                           



                          ? ? ? ? ?| ?Stage two                           



                          ? ? ? ? ?| ? Decreased                           



                          GFR ? ? ? ?                            



                          +---------------------                           



                          --+-------------------                           



                          --+-------------------                           



                          ------+| ?30-59 ? ? ?                           



                          ? ? ? ? ?| ?Stage                           



                          three ? ? ? ?| ? Stage                           



                          three ? ? ? ? ?                           



                          +---------------------                           



                          --+-------------------                           



                          --+-------------------                           



                          ------+| ?15-29 ? ? ?                           



                          ? ? ? ? ?| ?Stage four                           



                          ? ? ? ? | ? Stage four                           



                          ? ? ? ? ?                              



                          ?+--------------------                           



                          ---+------------------                           



                          ---+------------------                           



                          -------+| ?<15 (or                           



                          dialysis) ? ?| ?Stage                           



                          five ? ? ? ? | ? Stage                           



                          five ? ? ? ? ?                           



                          ?+--------------------                           



                          ---+------------------                           



                          ---+------------------                           



                          -------+ *Each stage                           



                          assumes the associated                           



                          GFR level has been in                           



                          effect for at least                           



                          three months. ?Stages                           



                          1 to 5, with or                           



                          without kidney                           



                          disease, indicate                           



                          chronic kidney                           



                          disease. Notes:                           



                          Determination of                           



                          stages one and two                           



                          (with eGFR                             



                          >59mL/min/1.73 m2)                           



                          requires estimation of                           



                          kidney damage for at                           



                          least three months as                           



                          defined by structural                           



                          or functional                           



                          abnormalities of the                           



                          kidney, manifested by                           



                          either:Pathological                           



                          abnormalities or                           



                          Markers of kidney                           



                          damage (including                           



                          abnormalities in the                           



                          composition of the                           



                          blood or urine or                           



                          abnormalities in                           



                          imaging tests).                           

 

             Lab Interpretation Abnormal                               



             (test code = 65700-6)                                        



[id number] 06[facility]17[facility]00





             Test Item    Value        Reference Range Interpretation Comments

 

             LACTIC ACID (test code = 3.22 mmol/L  0.5-2.2      H            



             [id number])                                         

 

             Lab Interpretation (test code = Abnormal                           

    



             43869-0)                                            



[facility] 04[facility]13:00





             Test Item    Value        Reference Range Interpretation Comments

 

             POCT GLU (test code = 1265256745) 350 mg/dL           H      

      

 

             Lab Interpretation (test code = Abnormal                           

    



             89265-2)                                            



[facility] [id number] 03:01:54 No acute 
intrathoracic abnormality. Preliminary Report Dictated by Resident: Ziggy Longoria MD., have reviewed this study and agree with the 
abovereport.XR CHEST 1 VW Comparison: Chest x-ray 2020 History: fever  
Findings: The lungs are clear. No focal consolidation. No pleural effusion 
orpneumothorax is identified. The heart is normal in size. No acute osseous 
abnormality. Multiple surgical staples overlie the thorax. [facility], Radiant Results
[facility] User - 2020 10:02 PM CDTXRCHEST 1 VWComparison: Chest x-ray 
2020History: fever Findings:The lungs are clear. No focal consolidation. No
pleural effusion orpneumothorax is identified.The heart is normal in size.No 
acute osseous abnormality. Multiple surgical staples overlie the 
thorax.IMPRESSIONNo acute intrathoracic abnormality.Preliminary Report Dictated 
by Resident: Ziggy Woodward MD., have reviewed this study and 
agree with the abovereport.[facility] (NA, K, CL, CO2, GLUCOSE, BUN, CREATININE, CA)2020 02:22:00





             Test Item    Value        Reference Range Interpretation Comments

 

             NA (test code = 137 mmol/L   135-145                   



             [id number])                                         

 

             K (test code = 5.3 mmol/L   3.5-5        H            



             1528243178)                                         

 

             CL (test code = 105 mmol/L                       



             7520934102)                                         

 

             CO2 TOTAL (test code = 22 mmol/L    23-31        L            



             6009461201)                                         

 

             AGAP (test code =              2-16                      



             9090617568)                                         

 

             BUN (test code = 24 mg/dL     7-23         H            



             3657086295)                                         

 

             GLUCOSE (test code = 339 mg/dL           H            



             3770200797)                                         

 

             CREATININE (test code = 0.79 mg/dL   0.5-1.04                  



             8786106585)                                         

 

             CALCIUM (test code = 8.1 mg/dL    8.6-10.6     L            



             5262323366)                                         

 

             eGFR Calculation              mL/min/1.73m2              



             (Non-)                                        



             (test code =                                        



             4848319296)                                         

 

             eGFR Calculation              mL/min/1.73m2              



             (African American)                                        



             (test code =                                        



             9909508062)                                         

 

             RICK (test code = RICK) Association of                           



                          Glomerular Filtration                           



                          Rate (GFR) and Staging                           



                          of Kidney Disease*                           



                          +---------------------                           



                          --+-------------------                           



                          --+-------------------                           



                          ------+| GFR                           



                          (mL/min/1.73 m2) ?|                           



                          With Kidney Damage ?|                           



                          ?Without Kidney                           



                          Damage+---------------                           



                          --------+-------------                           



                          --------+-------------                           



                          ------------+| ?>90 ?                           



                          ? ? ? ? ? ? ? ?|                           



                          ?Stage one ? ? ? ? ?|                           



                          ? Normal ? ? ? ? ? ? ?                           



                          ?+--------------------                           



                          ---+------------------                           



                          ---+------------------                           



                          -------+| ?60-89 ? ? ?                           



                          ? ? ? ? ?| ?Stage two                           



                          ? ? ? ? ?| ? Decreased                           



                          GFR ? ? ? ?                            



                          +---------------------                           



                          --+-------------------                           



                          --+-------------------                           



                          ------+| ?30-59 ? ? ?                           



                          ? ? ? ? ?| ?Stage                           



                          three ? ? ? ?| ? Stage                           



                          three ? ? ? ? ?                           



                          +---------------------                           



                          --+-------------------                           



                          --+-------------------                           



                          ------+| ?15-29 ? ? ?                           



                          ? ? ? ? ?| ?Stage four                           



                          ? ? ? ? | ? Stage four                           



                          ? ? ? ? ?                              



                          ?+--------------------                           



                          ---+------------------                           



                          ---+------------------                           



                          -------+| ?<15 (or                           



                          dialysis) ? ?| ?Stage                           



                          five ? ? ? ? | ? Stage                           



                          five ? ? ? ? ?                           



                          ?+--------------------                           



                          ---+------------------                           



                          ---+------------------                           



                          -------+ *Each stage                           



                          assumes the associated                           



                          GFR level has been in                           



                          effect for at least                           



                          three months. ?Stages                           



                          1 to 5, with or                           



                          without kidney                           



                          disease, indicate                           



                          chronic kidney                           



                          disease. Notes:                           



                          Determination of                           



                          stages one and two                           



                          (with eGFR                             



                          >59mL/min/1.73 m2)                           



                          requires estimation of                           



                          kidney damage for at                           



                          least three months as                           



                          defined by structural                           



                          or functional                           



                          abnormalities of the                           



                          kidney, manifested by                           



                          either:Pathological                           



                          abnormalities or                           



                          Markers of kidney                           



                          damage (including                           



                          abnormalities in the                           



                          composition of the                           



                          blood or urine or                           



                          abnormalities in                           



                          imaging tests).                           

 

             Lab Interpretation Abnormal                               



             (test code = 58472-8)                                        



[facility] Center[facility] [id number] 
02:16:57 1. Dilated small bowel loops in the upper abdomen with loops of 
jejunummeasuring up to 2.4 cm representing partial small bowel 
obstruction.Transition point is seen in the central abdomen on 2:63 with d
istallycollapsed loops of jejunum. No evidence of ischemia. 2. Cirrhotic liver 
morphology with 4.5 cm hypodense segment VI lesionmeasuring 47 Hounsfield units 
concerning for malignancy. Recommend GIconsultation and correlation with prior 
imaging studies and/or follow-uptriple phase abdominal CT.  3. Moderate portal 
hypertension including splenomegaly, dilated main portalvein, small 
gastroesophageal varices and portal colopathy. 4. Diffuse thickening of the 
large bowel could represent infectious orinflammatory colitis Findings relayed 
to Dr. Abarca at time of dictation. Preliminary Report Dictated by Resident: 
Ziggy Gamboa MD., have reviewed this study and agree with 
the abovereport.CT ABDOMEN AND PELVIS WITH CONTRAST HISTORY: Abd pain, acute, 
generalized, with fever  COMPARISON: None. TECHNIQUE: Contiguous axial imaging 
from the level of the lung basesthrough the pubic symphysis was performed after 
the administration of 120cc of intravenous Omnipaque contrast. Coronal and sa
gittal reconstructionswere obtained. FINDINGS: LOWER THORAX: Scattered calcified
granulomas are seenin the lung bases. Nopleural effusion is present. No 
cardiomegaly. LIVER: The liver is enlarged measuring nearly 20 cm in 
craniocaudaldimension with heterogenous parenchyma and micronodular contour, con
sistentwith cirrhosis. A 4.5 cm lobulated hypodense lesion in segment VI 
[id number] Hounsfield units, indeterminate. Scattered subcentimeter 
calcificationsare seen. GALLBLADDER: Prior cholecystectomy. Mild central 
intrahepatic biliaryductal dilation and prominence of the distal common bile 
duct at 7 mmlikely representing reservoir phenomenon from prior cholecystectomy.
SPLEEN: Splenomegaly up to 16 cm in craniocaudal dimension. PANCREAS: No ductal 
dilation or masses. Somewhat atrophic pancreas. ADRENAL GLANDS: No adrenal 
nodules. KIDNEYS: No hydronephrosis, stones, or solid masses. 3.0 cm simple cyst
atthe right midpole. Subcentimeter cortical hypodensities bilaterally are 
toosmall to characterize. No obstructive nephrolithiasis. No hydronephrosis. 
PERITONEUM AND RETROPERITONEUM: No free air. Small volume of abdominalascites in
the right subdiaphragmatic space and in the right paracolicgutter. Diffuse ill-
defined mesenteric stranding in the upper abdomenaround the mesenteric root 
vessels and large bowel is nonspecific, butlikely related to underlying liver 
disease. LYMPH NODES: Mildly enlargedporta hepatis lymph nodes measuring up to 
1.2cm, likely reactive to liver disease. GI TRACT: Small sliding hiatal hernia. 
The stomach is distended with gasand layering ingested fluid material. Dilated s
mall bowel loops in theupper abdomen with loops of jejunum measuring up to 2.4 
cm with transitionpoint on 2:63 in the central abdomen with collapsed loops of 
distaljejunum. Fecalization of small bowel suggesting delayed GI transit. 
Largebowel wall thickening and decreased enhancement with surroundingpericolonic
stranding consistent presenting portal colopathy. Thedescending and rectosigmoid
colon arenormally enhancing. Status postappendectomy. PELVIS: The urinary 
bladder is underdistended. Prior hysterectomy VESSELS: Dilated main portal vein 
measuring 1.5 cm. Tiny gastroesophagealcollateral vessels. Conventional hepatic 
arterial anatomy. The mesentericroot vessels are patent. BONES AND SOFT TISSUES:
No aggressive or suspicious osseous lesions.Diastasis recti and postsurgical 
changes in the anterior abdominal wallwithout competition. [facility], Radiant Results
[facility] User - 2020  9:18 PM CDTCT ABDOMEN AND PELVIS WITH CONTRASTHISTORY: 
Abd pain, acute, generalized, with fever COMPARISON: None.TECHNIQUE: Contiguous 
axial imaging from the level of the lung basesthrough the pubic symphysis was 
performed after the administration of 120cc of intravenous Omnipaque contrast. 
Coronal and sagittal reconstructionswere obtained.FINDINGS:LOWER THORAX: 
Scattered calcified granulomas are seen in the lung bases. Nopleural effusion is
present. No cardiomegaly.LIVER: The liver is enlarged measuring nearly 20 cm in 
craniocaudaldimension with heterogenous parenchyma and micronodular contour, 
consistentwith cirrhosis. A 4.5 cm lobulated hypodense lesion in segment VI 
[id number] Hounsfield units, indeterminate. Scattered subcentimeter 
calcificationsare seen.GALLBLADDER: Prior cholecystectomy. Mild central intrah
epatic biliaryductal dilation and prominence of the distal common bile duct at 7
mmlikely representing reservoir phenomenon from prior cholecystectomy.SPLEEN: 
Splenomegaly up to 16 cm in craniocaudal dimension.PANCREAS: No ductal dilation 
or masses. Somewhat atrophic pancreas.ADRENAL GLANDS: No adrenal 
nodules.KIDNEYS: No hydronephrosis, stones, or solid masses. 3.0 cm simple cyst 
atthe right midpole. Subcentimeter cortical hypodensities bilaterally are 
toosmall to characterize. No obstructive nephrolithiasis. No 
hydronephrosis.PERITONEUM AND RETROPERITONEUM: No free air. Small volume of 
abdominalascites in the right subdiaphragmatic space and in the right 
paracolicgutter. Diffuse ill-defined mesenteric stranding in the upper 
abdomenaround the mesenteric root vessels and large bowel is nonspecific, 
butlikely related to underlying liver disease.LYMPH NODES: Mildly enlarged priscilla
hepatis lymph nodes measuring up to 1.2cm, likely reactive to liver disease.GI 
TRACT: Small sliding hiatal hernia. Thestomach is distended with gasand layering
ingested fluid material. Dilated small bowel loops in theupper abdomen with 
loops of jejunum measuring up to 2.4 cm with transitionpoint on 2:63 in the 
centralabdomen with collapsed loops of distaljejunum. Fecalization of small 
bowel suggesting delayed GI transit. Largebowel wall thickening and decreased 
enhancement with surroundingpericolonic stranding consistent presenting portal 
colopathy. Thedescending and rectosigmoid colon are normally enhancing. Status 
postappendectomy.PELVIS: The urinary bladder is underdistended. Prior 
hysterectomyVESSELS: Dilatedmain portal vein measuring 1.5 cm. Tiny 
gastroesophagealcollateral vessels. Conventional hepatic arterial anatomy. The 
mesentericroot vessels are patent.BONES AND SOFT TISSUES: No aggressive or 
suspicious osseous lesions.Diastasis recti and postsurgical changes in the 
anterior abdominal wallwithout competition.IMPRESSION1. Dilated small bowel 
loops in the upper abdomen with loops of jejunummeasuring up to 2.4 cm 
representing partial small bowel obstruction.Transition point is seen in the 
central abdomen on 2:63 with distallycollapsed loops of jejunum. No evidence of 
ischemia.2. Cirrhotic liver morphology with 4.5 cm hypodense segment VI 
lesionmeasuring 47 Hounsfield units concerning for malignancy. Recommend 
GIconsultation and correlation with prior imaging studies and/or follow-uptriple
phase abdominal CT. 3. Moderate portal hypertension including splenomegaly, 
dilated main portalvein, small gastroesophageal varices and portal colopathy.4. 
Diffuse thickening of the large bowel could represent infectious orinflammatory 
colitisFindings relayed to Dr. Abarca at time of dictation.Preliminary Report 
Dictated by Resident: Ziggy Lamb MD., have reviewed this 
study and agree with the abovereport.[facility] Center[facility] Whole [id number] 02:10:00





             Test Item    Value        Reference Range Interpretation Comments

 

             LACTIC ACID (test code = 4.56 mmol/L  0.3-2.6                   



             [id number])                                         



[facility]2020[facility] 01:05:00





             Test Item    Value        Reference Range Interpretation Comments

 

             APPEARANCE (test code = Clear        Clear                     



             [id number])                                         

 

             COLOR (test code = Fiorella        Yellow       A            



             0549158176)                                         

 

             PH (test code =              4.8-8.0                   



             6651558865)                                         

 

             SP GRAVITY (test code =              1.003-1.030               



             1506186831)                                         

 

             GLU U QUAL (test code = 500 mg/dL    Normal       A            



             6975880657)                                         

 

             BLOOD (test code = Negative     Negative                  



             1146749460)                                         

 

             KETONES (test code = 5 mg/dL      Negative     A            



             9396346845)                                         

 

             PROTEIN (test code = Negative     Negative                  



             2887-8)                                             

 

             UROBILIN (test code = 4.0 mg/dL    Normal       A            



             5610401771)                                         

 

             BILIRUBIN (test code = Negative     Negative                  



             6786597477)                                         

 

             NITRITE (test code = Negative     Negative                  



             8870612153)                                         

 

             LEUK MOE (test code = Negative     Negative                  



             9970328840)                                         

 

             RBC/HPF (test code =              See_Comment                [Autom

ated message]



             [id number])                                         The system [employer]



                                                                 generated this



                                                                 result transmit

eduard



                                                                 reference range

: 0 -



                                                                 3 HPF. The refe

rence



                                                                 range was not u

sed



                                                                 to interpret th

is



                                                                 result as



                                                                 normal/abnormal

.

 

             WBC/HPF (test code = <1           See_Comment                [Autom

ated message]



             [id number])                                         The system [employer]



                                                                 generated this



                                                                 result transmit

eduard



                                                                 reference range

: 0 -



                                                                 5 HPF. The refe

rence



                                                                 range was not u

sed



                                                                 to interpret th

is



                                                                 result as



                                                                 normal/abnormal

.

 

             BACTERIA (test code = Moderate     Negative     A            



             6912400119)                                         

 

             MUCOUS (test code = Slight       Negative LPF A            



             9611901735)                                         

 

             SQ EPITH (test code =              HPF                       



             6900625304)                                         

 

             HYAL CAST (test code =              See_Comment  H             [Aut

omated message]



             3834623410)                                         The system [employer]



                                                                 generated this



                                                                 result transmit

eduard



                                                                 reference range

: <=2



                                                                 LPF. The refere

nce



                                                                 range was not u

sed



                                                                 to interpret th

is



                                                                 result as



                                                                 normal/abnormal

.

 

             Lab Interpretation (test Abnormal                               



             code = 80519-5)                                        



[facility] [id number] 01:03:27





             Test Item    Value        Reference Range Interpretation Comments

 

             ABO & RH (test code A Positive                             Performe

d at [facility]



             = 20)                                               Laboratory Serv

[facility]

[address] [place] [address] 

Free:



                                                                 [phone]

A No.



                                                                 [id number]

 

             IAT (test code = Negative                               Performed a

t [facility]



             1185)                                               Laboratory [facility]

[address] [place] [address]Toll 

Free:



                                                                 [phone]

A No.



                                                                 [id number]



[facility]





             Test Item    Value        Reference Range Interpretation Comments

 

             APTT Patient (test              See_Comment                [Automat

ed



             code = 3173-2)                                        message] The



                                                                 system which



                                                                 generated this



                                                                 result



                                                                 transmitted



                                                                 reference range

:



                                                                 23 - 38 Seconds

.



                                                                 The reference



                                                                 range was not



                                                                 used to interpr

et



                                                                 this result as



                                                                 normal/abnormal

.

 

             RICK (test code = RICK) The [facility] patient                           



                          population mean                           



                          normal value for                           



                          aPTT is 30                             



                          seconds.                               

 

             Lab Interpretation Normal                                 



             (test code = 93522-0)                                        



[facility] / [id number] 00:24:00





             Test Item    Value        Reference Range Interpretation Comments

 

             PROTIME PATIENT (test              See_Comment                [Auto

mated message]



             code = 5964-2)                                        The system wh

ich



                                                                 generated this 

result



                                                                 transmitted ref

erence



                                                                 range: 12.0 - 1

4.7



                                                                 Seconds. The re

ference



                                                                 range was not u

sed to



                                                                 interpret this 

result



                                                                 as normal/abnor

mal.

 

             INR (test code = 6301-6)                                        Nor

mal INR <1.1;



                                                                 Warfarin Therap

eutic



                                                                 range 2.0 to 3.

0 or



                                                                 2.5 to 3.5, dep

ending



                                                                 upon the indica

tions.

 

             Lab Interpretation (test Normal                                 



             code = 62387-3)                                        



[facility] [id number] 
00:15:00





             Test Item    Value        Reference Range Interpretation Comments

 

             SARS-CoV-2 (test code = Not Detected Not Detected              



             26823-2)                                            

 

             RICK (test code = RICK) ID NOW COVID-19 Assay                        

   



                          is an isothermal                           



                          nucleic acid                           



                          amplification test                           



                          intended for the                           



                          qualitative detection                           



                          of nucleic acid from                           



                          SARS-CoV-2 viral RNA                           



                          in nasopharyngeal (NP)                           



                          specimens. It is used                           



                          under Emergency Use                           



                          Authorization (EUA) by                           



                          FDA. The limit of                           



                          detection (LOD) of the                           



                          assay is 125 Genome                           



                          Equivalents/mL. A                           



                          positive result is                           



                          indicative of the                           



                          presence of SARS-CoV-2                           



                          RNA. ?Clinical                           



                          correlation with                           



                          patient history and                           



                          other diagnostic                           



                          information is                           



                          necessary to determine                           



                          patient infection                           



                          status. A negative                           



                          (Not Detected) result                           



                          does not preclude                           



                          SARS-CoV-2 infection.                           



                          Clinical correlation                           



                          with patient history                           



                          and other diagnostic                           



                          information should be                           



                          used in patient                           



                          management decisions.                           



                          Invalid: Please                           



                          collect a new specimen                           



                          for repeat patient                           



                          testing if clinically                           



                          indicated.                             

 

             Lab Interpretation Normal                                 



             (test code = 01177-6)                                        



[facility] CenterCBC WITH [id number] 23:58:00





             Test Item    Value        Reference Range Interpretation Comments

 

             WBC (test code =              See_Comment  H             [Automated



             6690-2)                                             message] The sy

stem



                                                                 which generated



                                                                 this result



                                                                 transmitted



                                                                 reference range

:



                                                                 4.30 - 11.10



                                                                 10*3/?L. The



                                                                 reference range

 was



                                                                 not used to



                                                                 interpret this



                                                                 result as



                                                                 normal/abnormal

.

 

             RBC (test code =              See_Comment                [Automated



             789-8)                                              message] The sy

stem



                                                                 which generated



                                                                 this result



                                                                 transmitted



                                                                 reference range

:



                                                                 3.93 - 5.25



                                                                 10*6/?L. The



                                                                 reference range

 was



                                                                 not used to



                                                                 interpret this



                                                                 result as



                                                                 normal/abnormal

.

 

             HGB (test code = 14.4 g/dL    11.6-15                   



             718-7)                                              

 

             HCT (test code = 43.2 %       35.7-45.2                 



             4544-3)                                             

 

             MCV (test code = 91.7 fL      80.6-95.5                 



             787-2)                                              

 

             MCH (test code = 30.6 pg      25.9-32.8                 



             785-6)                                              

 

             MCHC (test code = 33.3 g/dL    31.6-35.1                 



             786-4)                                              

 

             RDW-SD (test code = 50.8 fL      39-49.9      H            



             69182-1)                                            

 

             RDW-CV (test code = 15.3 %       12-15.5                   



             788-0)                                              

 

             PLT (test code =              See_Comment  L             [Automated



             777-3)                                              message] The sy

stem



                                                                 which generated



                                                                 this result



                                                                 transmitted



                                                                 reference range

:



                                                                 166 - 358 10*3/

?L.



                                                                 The reference r

olman



                                                                 was not used to



                                                                 interpret this



                                                                 result as



                                                                 normal/abnormal

.

 

             MPV (test code = 10.4 fL      9.5-12.9                  



             23075-7)                                            

 

             IPF % (test code = 3.4 %        1.3-7.7                   Platelet 

count



             8326224136)                                         measured by



                                                                 fluorescence



                                                                 method.

 

             NRBC/100 WBC (test              See_Comment                [Automat

ed



             code = 2792218149)                                        message] 

The system



                                                                 which generated



                                                                 this result



                                                                 transmitted



                                                                 reference range

:



                                                                 0.0 - 10.0 /100



                                                                 WBCs. The refer

ence



                                                                 range was not u

sed



                                                                 to interpret th

is



                                                                 result as



                                                                 normal/abnormal

.

 

             NRBC x10^3 (test code <0.01        See_Comment                [Auto

mated



             = 3730518383)                                        message] The s

ystem



                                                                 which generated



                                                                 this result



                                                                 transmitted



                                                                 reference range

:



                                                                 10*3/?L. The



                                                                 reference range

 was



                                                                 not used to



                                                                 interpret this



                                                                 result as



                                                                 normal/abnormal

.

 

             GRAN MAT (NEUT) % 92.2 %                                 



             (test code = 770-8)                                        

 

             IMM GRAN % (test code 0.80 %                                 



             = 0370164078)                                        

 

             LYMPH % (test code = 3.6 %                                  



             736-9)                                              

 

             MONO % (test code = 3.2 %                                  



             5905-5)                                             

 

             EOS % (test code = 0.0 %                                  



             713-8)                                              

 

             BASO % (test code = 0.2 %                                  



             706-2)                                              

 

             GRAN MAT x10^3(ANC) 18.80 10*3/uL 1.88-7.09    H            



             (test code =                                        



             5444962545)                                         

 

             IMM GRAN x10^3 (test 0.16 10*3/uL 0-0.06       H            



             code = 7611604188)                                        

 

             LYMPH x10^3 (test 0.73 10*3/uL 1.32-3.29    L            



             code = 731-0)                                        

 

             MONO x10^3 (test code 0.65 10*3/uL 0.33-0.92                 



             = 742-7)                                            

 

             EOS x10^3 (test code <0.03        0.03-0.39    L            



             = 711-2)                                            

 

             BASO x10^3 (test code 0.05 10*3/uL 0.01-0.07                 



             = 704-7)                                            

 

             Lab Interpretation Abnormal                               



             (test code = 19539-8)                                        



[facility]. METABOLIC PANEL ([id number] 
23:47:00





             Test Item    Value        Reference Range Interpretation Comments

 

             NA (test code = 138 mmol/L   135-145                   



             9901162541)                                         

 

             K (test code = 5.8 mmol/L   3.5-5        H            



             1830157105)                                         

 

             CL (test code = 101 mmol/L                       



             4369316071)                                         

 

             CO2 TOTAL (test code = 28 mmol/L    23-31                     



             3139611579)                                         

 

             AGAP (test code =              2-16                      



             9400642595)                                         

 

             BUN (test code = 27 mg/dL     7-23         H            



             4086137082)                                         

 

             GLUCOSE (test code = 358 mg/dL           H            



             2454470676)                                         

 

             CREATININE (test code = 0.92 mg/dL   0.5-1.04                  



             9728370297)                                         

 

             TOTAL BILI (test code = 3.3 mg/dL    0.1-1.1      H            



             3494127630)                                         

 

             CALCIUM (test code = 9.2 mg/dL    8.6-10.6                  



             8952650308)                                         

 

             T PROTEIN (test code = 8.6 g/dL     6.3-8.2      H            



             5835803663)                                         

 

             ALBUMIN (test code = 4.4 g/dL     3.5-5                     



             0029501374)                                         

 

             ALK PHOS (test code = 161 U/L             H            



             1814211882)                                         

 

             ALTv (test code = 43 U/L       5-35         H            



             1742-6)                                             

 

             AST(SGOT) (test code = 82 U/L       13-40        H            



             2606798020)                                         

 

             eGFR Calculation              mL/min/1.73m2              



             (Non-)                                        



             (test code =                                        



             8404685617)                                         

 

             eGFR Calculation              mL/min/1.73m2              



             (African American)                                        



             (test code =                                        



             1736802604)                                         

 

             RICK (test code = RICK) Association of                           



                          Glomerular Filtration                           



                          Rate (GFR) and Staging                           



                          of Kidney Disease*                           



                          +---------------------                           



                          --+-------------------                           



                          --+-------------------                           



                          ------+| GFR                           



                          (mL/min/1.73 m2) ?|                           



                          With Kidney Damage ?|                           



                          ?Without Kidney                           



                          Damage+---------------                           



                          --------+-------------                           



                          --------+-------------                           



                          ------------+| ?>90 ?                           



                          ? ? ? ? ? ? ? ?|                           



                          ?Stage one ? ? ? ? ?|                           



                          ? Normal ? ? ? ? ? ? ?                           



                          ?+--------------------                           



                          ---+------------------                           



                          ---+------------------                           



                          -------+| ?60-89 ? ? ?                           



                          ? ? ? ? ?| ?Stage two                           



                          ? ? ? ? ?| ? Decreased                           



                          GFR ? ? ? ?                            



                          +---------------------                           



                          --+-------------------                           



                          --+-------------------                           



                          ------+| ?30-59 ? ? ?                           



                          ? ? ? ? ?| ?Stage                           



                          three ? ? ? ?| ? Stage                           



                          three ? ? ? ? ?                           



                          +---------------------                           



                          --+-------------------                           



                          --+-------------------                           



                          ------+| ?15-29 ? ? ?                           



                          ? ? ? ? ?| ?Stage four                           



                          ? ? ? ? | ? Stage four                           



                          ? ? ? ? ?                              



                          ?+--------------------                           



                          ---+------------------                           



                          ---+------------------                           



                          -------+| ?<15 (or                           



                          dialysis) ? ?| ?Stage                           



                          five ? ? ? ? | ? Stage                           



                          five ? ? ? ? ?                           



                          ?+--------------------                           



                          ---+------------------                           



                          ---+------------------                           



                          -------+ *Each stage                           



                          assumes the associated                           



                          GFR level has been in                           



                          effect for at least                           



                          three months. ?Stages                           



                          1 to 5, with or                           



                          without kidney                           



                          disease, indicate                           



                          chronic kidney                           



                          disease. Notes:                           



                          Determination of                           



                          stages one and two                           



                          (with eGFR                             



                          >59mL/min/1.73 m2)                           



                          requires estimation of                           



                          kidney damage for at                           



                          least three months as                           



                          defined by structural                           



                          or functional                           



                          abnormalities of the                           



                          kidney, manifested by                           



                          either:Pathological                           



                          abnormalities or                           



                          Markers of kidney                           



                          damage (including                           



                          abnormalities in the                           



                          composition of the                           



                          blood or urine or                           



                          abnormalities in                           



                          imaging tests).                           

 

             Lab Interpretation Abnormal                               



             (test code = 43937-7)                                        



[facility]





             Test Item    Value        Reference Range Interpretation Comments

 

             LIPASE (test code = 6579994110) 120 U/L      0-220                 

    

 

             Lab Interpretation (test code = Normal                             

    



             68941-1)                                            



[facility] [id number] 23:24:00





             Test Item    Value        Reference Range Interpretation Comments

 

             LACTIC ACID (test code = 3.95 mmol/L  0.3-2.6                   



             7102326918)                                         



[id number] 22:56:00





             Test Item    Value        Reference Range Interpretation Comments

 

             AFP (test code = 12.3 ng/mL   See_Comment  H             [Automated



             0295138902)                                         message] The



                                                                 system which



                                                                 generated this



                                                                 result



                                                                 transmitted



                                                                 reference range

:



                                                                 <=7.5. The



                                                                 reference range



                                                                 was not used to



                                                                 interpret this



                                                                 result as



                                                                 normal/abnormal

.

 

             RICK (test code = RICK) Biotin has been                           



                          reported to                            



                          cause a negative                           



                          bias, interpret                           



                          results relative                           



                          to patient's use                           



                          of biotin.                             

 

             Lab Interpretation Abnormal                               



             (test code = 50228-1)                                        



[facility] [id number] 21:33:00





             Test Item    Value        Reference Range Interpretation Comments

 

             TIBC (test code = 8694763992) 390 ug/dL    250-410                 

  

 

             Lab Interpretation (test code = Normal                             

    



             20661-7)                                            



[facility] (ALB,T.PRO,BILI
T,BU/BC,ALT,AST,ALK PHOS)2020 21:24:00





             Test Item    Value        Reference Range Interpretation Comments

 

             TOTAL BILI (test code = 0388508058) 1.0 mg/dL    0.1-1.1           

        

 

             BILI UNCON (test code = 2466237025) 0.9 mg/dL    0.1-1.1           

        

 

             BILI CONJ (test code = 2890514341) 0.0 mg/dL    0-0.3              

       

 

             T PROTEIN (test code = 3790341127) 7.6 g/dL     6.3-8.2            

       

 

             ALBUMIN (test code = 5304719065) 3.9 g/dL     3.5-5                

     

 

             ALK PHOS (test code = 1565476150) 141 U/L             H      

      

 

             ALTv (test code = 1742-6) 41 U/L       5-35         H            

 

             AST(SGOT) (test code = 3986032344) 56 U/L       13-40        H     

       

 

             Lab Interpretation (test code = Abnormal                           

    



             14086-2)                                            



[facility] (NA, K, CL, CO2, 
GLUCOSE, BUN, CREATININE, CA)2020 21:24:00





             Test Item    Value        Reference Range Interpretation Comments

 

             NA (test code = 136 mmol/L   135-145                   



             7729341578)                                         

 

             K (test code = 4.3 mmol/L   3.5-5                     



             5194456542)                                         

 

             CL (test code = 100 mmol/L                       



             9369227897)                                         

 

             CO2 TOTAL (test code = 26 mmol/L    23-31                     



             0863273451)                                         

 

             AGAP (test code =              2-16                      



             7310724757)                                         

 

             BUN (test code = 19 mg/dL     7-23                      



             0356037958)                                         

 

             GLUCOSE (test code = 239 mg/dL           H            



             9538365709)                                         

 

             CREATININE (test code = 1.09 mg/dL   0.5-1.04     H            



             3006176237)                                         

 

             CALCIUM (test code = 9.6 mg/dL    8.6-10.6                  



             3636191870)                                         

 

             eGFR Calculation              mL/min/1.73m2              



             (Non-)                                        



             (test code =                                        



             3346826837)                                         

 

             eGFR Calculation              mL/min/1.73m2              



             (African American)                                        



             (test code =                                        



             5184891812)                                         

 

             RICK (test code = RICK) Association of                           



                          Glomerular Filtration                           



                          Rate (GFR) and Staging                           



                          of Kidney Disease*                           



                          +---------------------                           



                          --+-------------------                           



                          --+-------------------                           



                          ------+| GFR                           



                          (mL/min/1.73 m2) ?|                           



                          With Kidney Damage ?|                           



                          ?Without Kidney                           



                          Damage+---------------                           



                          --------+-------------                           



                          --------+-------------                           



                          ------------+| ?>90 ?                           



                          ? ? ? ? ? ? ? ?|                           



                          ?Stage one ? ? ? ? ?|                           



                          ? Normal ? ? ? ? ? ? ?                           



                          ?+--------------------                           



                          ---+------------------                           



                          ---+------------------                           



                          -------+| ?60-89 ? ? ?                           



                          ? ? ? ? ?| ?Stage two                           



                          ? ? ? ? ?| ? Decreased                           



                          GFR ? ? ? ?                            



                          +---------------------                           



                          --+-------------------                           



                          --+-------------------                           



                          ------+| ?30-59 ? ? ?                           



                          ? ? ? ? ?| ?Stage                           



                          three ? ? ? ?| ? Stage                           



                          three ? ? ? ? ?                           



                          +---------------------                           



                          --+-------------------                           



                          --+-------------------                           



                          ------+| ?15-29 ? ? ?                           



                          ? ? ? ? ?| ?Stage four                           



                          ? ? ? ? | ? Stage four                           



                          ? ? ? ? ?                              



                          ?+--------------------                           



                          ---+------------------                           



                          ---+------------------                           



                          -------+| ?<15 (or                           



                          dialysis) ? ?| ?Stage                           



                          five ? ? ? ? | ? Stage                           



                          five ? ? ? ? ?                           



                          ?+--------------------                           



                          ---+------------------                           



                          ---+------------------                           



                          -------+ *Each stage                           



                          assumes the associated                           



                          GFR level has been in                           



                          effect for at least                           



                          three months. ?Stages                           



                          1 to 5, with or                           



                          without kidney                           



                          disease, indicate                           



                          chronic kidney                           



                          disease. Notes:                           



                          Determination of                           



                          stages one and two                           



                          (with eGFR                             



                          >59mL/min/1.73 m2)                           



                          requires estimation of                           



                          kidney damage for at                           



                          least three months as                           



                          defined by structural                           



                          or functional                           



                          abnormalities of the                           



                          kidney, manifested by                           



                          either:Pathological                           



                          abnormalities or                           



                          Markers of kidney                           



                          damage (including                           



                          abnormalities in the                           



                          composition of the                           



                          blood or urine or                           



                          abnormalities in                           



                          imaging tests).                           

 

             Lab Interpretation Abnormal                               



             (test code = 04790-7)                                        



[facility] WITH [id number] 20:18:00





             Test Item    Value        Reference Range Interpretation Comments

 

             WBC (test code =              See_Comment                [Automated



             3390-2)                                             message] The sy

stem



                                                                 which generated



                                                                 this result



                                                                 transmitted



                                                                 reference range

:



                                                                 4.30 - 11.10



                                                                 10*3/?L. The



                                                                 reference range

 was



                                                                 not used to



                                                                 interpret this



                                                                 result as



                                                                 normal/abnormal

.

 

             RBC (test code =              See_Comment                [Automated



             789-8)                                              message] The sy

stem



                                                                 which generated



                                                                 this result



                                                                 transmitted



                                                                 reference range

:



                                                                 3.93 - 5.25



                                                                 10*6/?L. The



                                                                 reference range

 was



                                                                 not used to



                                                                 interpret this



                                                                 result as



                                                                 normal/abnormal

.

 

             HGB (test code = 12.8 g/dL    11.6-15                   



             718-7)                                              

 

             HCT (test code = 38.0 %       35.7-45.2                 



             4544-3)                                             

 

             MCV (test code = 89.8 fL      80.6-95.5                 



             787-2)                                              

 

             MCH (test code = 30.3 pg      25.9-32.8                 



             785-6)                                              

 

             MCHC (test code = 33.7 g/dL    31.6-35.1                 



             786-4)                                              

 

             RDW-SD (test code = 48.0 fL      39-49.9                   



             90364-1)                                            

 

             RDW-CV (test code = 14.8 %       12-15.5                   



             788-0)                                              

 

             PLT (test code =              See_Comment  L             [Automated



             777-3)                                              message] The sy

stem



                                                                 which generated



                                                                 this result



                                                                 transmitted



                                                                 reference range

:



                                                                 166 - 358 10*3/

?L.



                                                                 The reference r

olman



                                                                 was not used to



                                                                 interpret this



                                                                 result as



                                                                 normal/abnormal

.

 

             MPV (test code = 10.2 fL      9.5-12.9                  



             87016-8)                                            

 

             IPF % (test code = 2.9 %        1.3-7.7                   Platelet 

count



             [id number])                                         measured by



                                                                 fluorescence



                                                                 method.

 

             NRBC/100 WBC (test              See_Comment                [Automat

ed



             code = 9113015522)                                        message] 

The system



                                                                 which generated



                                                                 this result



                                                                 transmitted



                                                                 reference range

:



                                                                 0.0 - 10.0 /100



                                                                 WBCs. The refer

ence



                                                                 range was not u

sed



                                                                 to interpret th

is



                                                                 result as



                                                                 normal/abnormal

.

 

             NRBC x10^3 (test code <0.01        See_Comment                [Auto

mated



             = 2265282465)                                        message] The s

ystem



                                                                 which generated



                                                                 this result



                                                                 transmitted



                                                                 reference range

:



                                                                 10*3/?L. The



                                                                 reference range

 was



                                                                 not used to



                                                                 interpret this



                                                                 result as



                                                                 normal/abnormal

.

 

             GRAN MAT (NEUT) % 73.3 %                                 



             (test code = 770-8)                                        

 

             IMM GRAN % (test code 0.50 %                                 



             = 6978357365)                                        

 

             LYMPH % (test code = 18.6 %                                 



             736-9)                                              

 

             MONO % (test code = 5.8 %                                  



             5905-5)                                             

 

             EOS % (test code = 1.4 %                                  



             713-8)                                              

 

             BASO % (test code = 0.4 %                                  



             706-2)                                              

 

             GRAN MAT x10^3(ANC) 4.14 10*3/uL 1.88-7.09                 



             (test code =                                        



             1749182199)                                         

 

             IMM GRAN x10^3 (test 0.03 10*3/uL 0-0.06                    



             code = 2518012721)                                        

 

             LYMPH x10^3 (test code 1.05 10*3/uL 1.32-3.29    L            



             = 731-0)                                            

 

             MONO x10^3 (test code 0.33 10*3/uL 0.33-0.92                 



             = 742-7)                                            

 

             EOS x10^3 (test code = 0.08 10*3/uL 0.03-0.39                 



             711-2)                                              

 

             BASO x10^3 (test code <0.03        0.01-0.07                 



             = 704-7)                                            

 

             Lab Interpretation Abnormal                               



             (test code = 16776-6)                                        



[facility] [id number] 20:07:00





             Test Item    Value        Reference Range Interpretation Comments

 

             PROTIME PATIENT (test              See_Comment                [Auto

mated message]



             code = 5964-2)                                        The system 

ich



                                                                 generated this 

result



                                                                 transmitted ref

erence



                                                                 range: 12.0 - 1

4.7



                                                                 Seconds. The re

ference



                                                                 range was not u

sed to



                                                                 interpret this 

result



                                                                 as normal/abnor

mal.

 

             INR (test code = 6301-6)                                        Nor

mal INR <1.1;



                                                                 Warfarin Therap

eutic



                                                                 range 2.0 to 3.

0 or



                                                                 2.5 to 3.5, dep

ending



                                                                 upon the indica

tions.

 

             Lab Interpretation (test Normal                                 



             code = 56404-2)                                        



[facility]





             Test Item    Value        Reference Range Interpretation Comments

 

             APTT Patient (test              See_Comment                [Automat

ed



             code = 3173-2)                                        message] The



                                                                 system which



                                                                 generated this



                                                                 result



                                                                 transmitted



                                                                 reference range

:



                                                                 23 - 38 Seconds

.



                                                                 The reference



                                                                 range was not



                                                                 used to interpr

et



                                                                 this result as



                                                                 normal/abnormal

.

 

             RICK (test code = RICK) The [facility] patient                           



                          population mean                           



                          normal value for                           



                          aPTT is 30                             



                          seconds.                               

 

             Lab Interpretation Normal                                 



             (test code = 79485-8)                                        



[facility] [id number] 19:35:11HISTORY: 
Generalized abdominal pain and constipation. FINDINGS: 2 abdominal radiographs 
or obtaininga supine position. Smallamount of retained fecal material and air 
are detected throughout the largebowel. Cholecystectomy clips are seen in the 
right upper abdomen. Spleenappears to be enlarged. Mild lumbar dextroscoliosis 
is noted. No aggressivebone lesions. CONCLUSIONS: No acute findings.  [facility], Radi
ant Results Inft User - 2020  2:36 PM CDTHISTORY: Generalized abdominal 
pain and constipation.FINDINGS: 2 abdominal radiographs or obtaining a supine 
position. Smallamount of retained fecal material and air are detected throughout
the largebowel. Cholecystectomy clips are seen in the right upperabdomen. 
Spleenappears to be enlarged. Mild lumbar dextroscoliosis is noted. No 
aggressivebone lesions.CONCLUSIONS: No acute findings.[facility] ABDOMEN [id number] 19:27:48HISTORY: Generalized 
abdominal pain and constipation. TECHNIQUE: Upper abdominal organs were evaluate
d in multiple planes withthe patient in multiple different positions, without 
and with colorimaging.FINDINGS: Liver is 17.5 cm, spleen is 13.9 x 6.1 cm, right
kidney is 10.7 x5.3 x 5.8 cm and left kidney is 10 x 4.5 x 4.2 cm in size. 
Cortex of bothkidneys is approximately 16 mm. Bosniak type II cystic lesion of 
2.5 x 2.2cm size noted in the lateral interpolar region of right kidney. Liver 
showed heterogeneous coarse echotexture, calcified granulomas, atleast 2 5 to 6 
mm hypoechoic lesions in the left lobe and one in the rightlobe. There is also 
one echogenic 8 mm lesion in the left lobe. No hydronephrosis, free fluid in the
upper abdomen or aortic aneurysmdetected. Visualized portions of the pancreas 
appear normal. Hepatic andportal venous system appear patent, with hepatopetal 
portal flow noted.  Gallbladder is remote. Common hepatic duct is 8.1 mm. 
CONCLUSIONS:1. Mild hepatosplenomegaly with coarse echotexture of the liver 
parenchymasuggestive of chronic primary liver disease. Calcified granulomas, 
smallhypoechoic lesions in the right lobe and left lobe and one echogenic 
lesionin the left lobe discussed above. For complete evaluation, CT scan or 
MRIliver without and with contrast includingdelayed venous imaging requested.2. 
S/P cholecystectomy. Slightly dilated common hepatic duct could besecondary to 
cholecystectomy. Intrahepatic bile ducts are not dilated.3. Bosniak type II 
right renal cystic lesion which may also be furtherevaluated by CT scan as well 
as monitored by follow up ultrasound study in6 months.   [facility], Radiant Results 
Inft User - 2020  2:28 PM CDTHISTORY: Generalized abdominal pain and 
constipation.TECHNIQUE: Upper abdominal organs were evaluated in multiple planes
withthe patient in multiple different positions, without and with 
colorimaging.FINDINGS: Liver is 17.5 cm, spleen is 13.9 x 6.1 cm, right kidney 
is 10.7 x5.3 x 5.8 cm and left kidney is 10 x 4.5 x 4.2cm in size. Cortex of 
bothkidneys is approximately 16 mm. Bosniak type II cystic lesion of 2.5 x 2.2cm
size noted in the lateral interpolar region of right kidney.Liver showed 
heterogeneous coarse echotexture, calcified granulomas, atleast 2 5 to 6 mm 
hypoechoic lesions in the left lobe and one in therightlobe. There is also one 
echogenic 8 mm lesion in the left lobe.No hydronephrosis, free fluid inthe upper
abdomen or aortic aneurysmdetected. Visualized portions of the pancreas appear 
normal. Hepatic andportal venous system appear patent, with hepatopetal portal 
flow noted. Gallbladder is remote. Common hepatic duct is 8.1 mm.CONCLUSIONS:1. 
Mild hepatosplenomegaly with coarse echotexture of theliver parenchymasuggestive
of chronic primary liver disease. Calcified granulomas, smallhypoechoic lesions 
in the right lobe and left lobe and one echogenic lesionin the left lobe 
discussed above. For complete evaluation, CT scan or MRIliver without and with 
contrast including delayed venous imaging requested.2. S/P cholecystectomy. 
Slightly dilated common hepatic duct could besecondary to cholecystectomy. 
Intrahepatic bile ducts are not dilated.3. Bosniak type II right renal cystic 
lesion which mayalso be furtherevaluated by CT scan as well as monitored by 
follow up ultrasound study in6 months.[facility] CenterCORTISOL
[id number] 20:14:00





             Test Item    Value        Reference Range Interpretation Comments

 

             MANDI AM (test code = 6.4 ug/dL    4.5-23                    



             [id number])                                         

 

             RICK (test code = RICK) Biotin has been                           



                          reported to cause a                           



                          positive bias,                           



                          interpret results                           



                          relative to                            



                          patient's use of                           



                          biotin.                                

 

             Lab Interpretation (test Normal                                 



             code = 92381-7)                                        



[facility] Center[facility]AL[facility]IN2020-[facility] 18:29:00





             Test Item    Value        Reference Range Interpretation Comments

 

             Procalcitonin (test 0.20 ng/mL   <0.07        H            



             code = 6330424051)                                        

 

             RICK (test code = RICK) INTERPRETATION OF                           



                          PROCALCITONIN RESULTS IN                           



                          ADULTS >= 18 YEARS OF                           



                          AGE Initiation and                           



                          discontinuation of                           



                          antibiotics on patients                           



                          with suspected or                           



                          confirmed Lower                           



                          Respiratory Tract                           



                          Infection in Adults >=                           



                          18 years of age.                           



                          +--------------+--------                           



                          --------+---------------                           



                          +-----------------------                           



                          -----+|Procalcitonin                           



                          |Interpretation                           



                          ?|Antibiotic ? ?                           



                          |Considerations ? ? ? ?                           



                          ? ? ? |ng/mL ? ? ? ? | ?                           



                          ? ? ? ? ? ?                            



                          ?|recommendation | ? ? ?                           



                          ? ? ? ? ? ? ? ? ? ? ?                           



                          +--------------+--------                           



                          --------+---------------                           



                          +-----------------------                           



                          -----+| <0.1 ? ? ? ? |                           



                          Bacterial ? ? ?|                           



                          Strongly ? ? ?| ? ? ? ?                           



                          ? ? ? ? ? ? ? ? ? ? | ?                           



                          ? ? ? ? ? ?| infection                           



                          very | discouraged ? |                           



                          Overruling: ? ? ? ? ? ?                           



                          ? ? | ? ? ? ? ? ? ?|                           



                          unlikely ? ? ? | ? ? ? ?                           



                          ? ? ? | ? Clinically                           



                          unstable ? ? ?                           



                          +--------------+--------                           



                          --------+---------------                           



                          + ? High risk for                           



                          adverse ? ? | <0.25 ? ?                           



                          ? ?| Bacterial ? ? ?|                           



                          Discouraged ? | ?                           



                          outcome ? ? ? ? ? ? ? ?                           



                          ? | ? ? ? ? ? ? ?|                           



                          infection ? ? ?| ? ? ? ?                           



                          ? ? ? | ? SEE IMPORTANT                           



                          NOTE ? ? ? ?| ? ? ? ? ?                           



                          ? ?| unlikely ? ? ? | ?                           



                          ? ? ? ? ? ? | ? ? ? ? ?                           



                          ? ? ? ? ? ? ? ? ?                           



                          +--------------+--------                           



                          --------+---------------                           



                          +-----------------------                           



                          -----+| >=0.25 ? ? ? |                           



                          Bacterial ? ? ?|                           



                          Encouraged ? ?| ? ? ? ?                           



                          ? ? ? ? ? ? ? ? ? ? | ?                           



                          ? ? ? ? ? ?| infection ?                           



                          ? ?| ? ? ? ? ? ? ? | ? ?                           



                          ? ? ? ? ? ? ? ? ? ? ? ?                           



                          | ? ? ? ? ? ? ?| likely                           



                          ? ? ? ? | ? ? ? ? ? ? ?                           



                          | Consider treatment                           



                          failure                                



                          ?+--------------+-------                           



                          ---------+--------------                           



                          -+ if levels does not                           



                          decrease | >0.5 ? ? ? ?                           



                          | Bacterial ? ? ?|                           



                          Strongly ? ? ?|                           



                          appropriately ? ? ? ? ?                           



                          ? ? | ? ? ? ? ? ? ?|                           



                          infection very |                           



                          encouraged ? ?| ? ? ? ?                           



                          ? ? ? ? ? ? ? ? ? ? | ?                           



                          ? ? ? ? ? ?| likely ? ?                           



                          ? ? | ? ? ? ? ? ? ? | ?                           



                          ? ? ? ? ? ? ? ? ? ? ? ?                           



                          ?                                      



                          +--------------+--------                           



                          --------+---------------                           



                          +-----------------------                           



                          -----+ Discontinuation                           



                          of antibiotics in                           



                          high-acuity patients                           



                          with suspected or                           



                          confirmed sepsis in                           



                          Adults >= 18 years of                           



                          age.                                   



                          +--------------+--------                           



                          --------+---------------                           



                          +-----------------------                           



                          -----+|Procalcitonin                           



                          |Interpretation                           



                          ?|Antibiotic ? ?                           



                          |Considerations ? ? ? ?                           



                          ? ? ? |ng/mL ? ? ? ? | ?                           



                          ? ? ? ? ? ?                            



                          ?|recommendation | ? ? ?                           



                          ? ? ? ? ? ? ? ? ? ? ?                           



                          +--------------+--------                           



                          --------+---------------                           



                          +-----------------------                           



                          -----+| <0.25 ? ? ? ?|                           



                          Bacterial ? ? ?|                           



                          Strongly ? ? ?| ? ? ? ?                           



                          ? ? ? ? ? ? ? ? ? ? | ?                           



                          ? ? ? ? ? ?| infection                           



                          very | discouraged ? |                           



                          Overruling: ? ? ? ? ? ?                           



                          ? ? | ? ? ? ? ? ? ?|                           



                          unlikely ? ? ? | ? ? ? ?                           



                          ? ? ? | ? Clinically                           



                          unstable ? ? ?                           



                          +--------------+--------                           



                          --------+---------------                           



                          + ? High risk for                           



                          adverse ? ? | <0.5 or                           



                          drop | Bacterial ? ? ?|                           



                          Discouraged ? | ?                           



                          outcome ? ? ? ? ? ? ? ?                           



                          ? | >80% from ? ?|                           



                          infection ? ? ?| ? ? ? ?                           



                          ? ? ? | ? SEE IMPORTANT                           



                          NOTE ? ? ? ?| highest                           



                          PCT ?| unlikely ? ? ? |                           



                          ? ? ? ? ? ? ? | ? ? ? ?                           



                          ? ? ? ? ? ? ? ? ? ? |                           



                          level ? ? ? ?| ? ? ? ? ?                           



                          ? ? ?| ? ? ? ? ? ? ? | ?                           



                          ? ? ? ? ? ? ? ? ? ? ? ?                           



                          ?                                      



                          +--------------+--------                           



                          --------+---------------                           



                          +-----------------------                           



                          -----+| >=0.5 ? ? ? ?|                           



                          Bacterial ? ? ?|                           



                          Encouraged ? ?| ? ? ? ?                           



                          ? ? ? ? ? ? ? ? ? ? | ?                           



                          ? ? ? ? ? ?| infection ?                           



                          ? ?| ? ? ? ? ? ? ? | ? ?                           



                          ? ? ? ? ? ? ? ? ? ? ? ?                           



                          | ? ? ? ? ? ? ?| likely                           



                          ? ? ? ? | ? ? ? ? ? ? ?                           



                          | Consider treatment                           



                          failure                                



                          ?+--------------+-------                           



                          ---------+--------------                           



                          -+ if levels does not                           



                          decrease | >1.0 ? ? ? ?                           



                          | Bacterial ? ? ?|                           



                          Strongly ? ? ?|                           



                          appropriately ? ? ? ? ?                           



                          ? ? | ? ? ? ? ? ? ?|                           



                          infection very |                           



                          encouraged ? ?| ? ? ? ?                           



                          ? ? ? ? ? ? ? ? ? ? | ?                           



                          ? ? ? ? ? ?| likely ? ?                           



                          ? ? | ? ? ? ? ? ? ? | ?                           



                          ? ? ? ? ? ? ? ? ? ? ? ?                           



                          ?                                      



                          +--------------+--------                           



                          --------+---------------                           



                          +-----------------------                           



                          -----+ Percentage of                           



                          drop of Procalcitonin                           



                          calculation for                           



                          Discontinuation of                           



                          antibiotics in                           



                          high-acuity patients                           



                          with suspected or                           



                          confirmed sepsis in                           



                          Adults >= 18 years of                           



                          age.  ? ? ? ? ? ? ? ? ?                           



                          ? ? Procalcitonin                           



                          highest{}-Procalcitonin                           



                          current{}Delta                           



                          Procalcitonin =                           



                          ________________________                           



                          _______________________                           



                          x100%  ? ? ? ? ? ? ? ? ?                           



                          ? ? ? ? ? ? ?                           



                          Procalcitonin current {}                           



                          IMPORTANT NOTE:                           



                          Procalcitonin may be                           



                          elevated without                           



                          bacterial infection by                           



                          physiologic stress                           



                          related to trauma,                           



                          burns, chronic dialysis,                           



                          metastatic cancer,                           



                          surgery in the past                           



                          seven days, malaria,                           



                          some fungal infections,                           



                          and some forms of                           



                          vasculitis. The                           



                          interpretation algorithm                           



                          may not apply to                           



                          patients with                           



                          immunosuppression                           



                          (equivalent of >10 mg of                           



                          prednisone daily), HIV                           



                          with CD4 cell count <                           



                          350 cells/mm3, active                           



                          malignancy on systemic                           



                          chemotherapy, solid                           



                          organ transplant or                           



                          hematopoietic stem cell                           



                          transplantation, or                           



                          hospital acquired                           



                          pneumonia. Additionally,                           



                          some clinical trials of                           



                          procalcitonin have                           



                          excluded patients with                           



                          shock requiring                           



                          vasopressor use, acute                           



                          respiratory failure                           



                          requiring mechanical                           



                          ventilation, or those                           



                          with known lung                           



                          abscess/empyema. For                           



                          further information                           



                          please refer                           



                          to:[email or web address]                           



                          [email or web address]                           



                          [email or web address]                           

 

             Lab Interpretation Abnormal                               



             (test code = 45456-0)                                        



[id number]





             Test Item    Value        Reference Range Interpretation Comments

 

             BOH (test code = 0.1 mmol/L                             



             0429522887)                                         

 

             RICK (test code = Normal Ranges:  ? ?                           



             RICK)         Nonfasting ? ? ? ? ? Less                           



                          than 0.1 mmol/L ? ?                           



                          Overnight Fast ? ? ? Less                           



                          than 0.4 mmol/L ? ? Fasting                           



                          (1-2 weeks) ?6-8 mmol/L                           



                          Test developed and                           



                          characteristics determined                           



                          by [facility].                          

 



[facility]smolality [id number] 17:29:00





             Test Item    Value        Reference Range Interpretation Comments

 

             OSMOLALITY (test code =              See_Comment                [Au

tomated message]



             [id number])                                         The system [employer]



                                                                 generated this 

result



                                                                 transmitted ref

erence



                                                                 range: 278 - 30

5



                                                                 mOsm/kg. The re

ference



                                                                 range was not u

sed to



                                                                 interpret this 

result



                                                                 as normal/abnor

mal.

 

             Lab Interpretation (test Normal                                 



             code = 29660-7)                                        



[facility] GLUCOSE (AUTOMATED)2020 14:00:00





             Test Item    Value        Reference Range Interpretation Comments

 

             POCT GLU (test code = 7351580757) 161 mg/dL           H      

      

 

             Lab Interpretation (test code = Abnormal                           

    



             26926-3)                                            



[facility] GLUCOSE (AUTOMATED)2020 11:45:00





             Test Item    Value        Reference Range Interpretation Comments

 

             POCT GLU (test code = 7893388962) 105 mg/dL                  

      

 

             Lab Interpretation (test code = Normal                             

    



             24790-1)                                            



[facility] METABOLIC PANEL (NA, K, CL, CO2, 
GLUCOSE, BUN, CREATININE, CA)2020 10:48:00





             Test Item    Value        Reference Range Interpretation Comments

 

             NA (test code = 137 mmol/L   135-145                   



             3451064606)                                         

 

             K (test code = 3.9 mmol/L   3.5-5                     



             0638145245)                                         

 

             CL (test code = 108 mmol/L                       



             2586268188)                                         

 

             CO2 TOTAL (test code = 23 mmol/L    23-31                     



             7028436405)                                         

 

             AGAP (test code =              2-16                      



             4515077209)                                         

 

             BUN (test code = 21 mg/dL     7-23                      



             9564564874)                                         

 

             GLUCOSE (test code = 119 mg/dL           H            



             2306172127)                                         

 

             CREATININE (test code = 0.83 mg/dL   0.5-1.04                  



             4922145486)                                         

 

             CALCIUM (test code = 8.7 mg/dL    8.6-10.6                  



             3767915758)                                         

 

             eGFR Calculation              mL/min/1.73m2              



             (Non-)                                        



             (test code =                                        



             8556978904)                                         

 

             eGFR Calculation              mL/min/1.73m2              



             (African American)                                        



             (test code =                                        



             0091483076)                                         

 

             RICK (test code = RICK) Association of                           



                          Glomerular Filtration                           



                          Rate (GFR) and Staging                           



                          of Kidney Disease*                           



                          +---------------------                           



                          --+-------------------                           



                          --+-------------------                           



                          ------+| GFR                           



                          (mL/min/1.73 m2) ?|                           



                          With Kidney Damage ?|                           



                          ?Without Kidney                           



                          Damage+---------------                           



                          --------+-------------                           



                          --------+-------------                           



                          ------------+| ?>90 ?                           



                          ? ? ? ? ? ? ? ?|                           



                          ?Stage one ? ? ? ? ?|                           



                          ? Normal ? ? ? ? ? ? ?                           



                          ?+--------------------                           



                          ---+------------------                           



                          ---+------------------                           



                          -------+| ?60-89 ? ? ?                           



                          ? ? ? ? ?| ?Stage two                           



                          ? ? ? ? ?| ? Decreased                           



                          GFR ? ? ? ?                            



                          +---------------------                           



                          --+-------------------                           



                          --+-------------------                           



                          ------+| ?30-59 ? ? ?                           



                          ? ? ? ? ?| ?Stage                           



                          three ? ? ? ?| ? Stage                           



                          three ? ? ? ? ?                           



                          +---------------------                           



                          --+-------------------                           



                          --+-------------------                           



                          ------+| ?15-29 ? ? ?                           



                          ? ? ? ? ?| ?Stage four                           



                          ? ? ? ? | ? Stage four                           



                          ? ? ? ? ?                              



                          ?+--------------------                           



                          ---+------------------                           



                          ---+------------------                           



                          -------+| ?<15 (or                           



                          dialysis) ? ?| ?Stage                           



                          five ? ? ? ? | ? Stage                           



                          five ? ? ? ? ?                           



                          ?+--------------------                           



                          ---+------------------                           



                          ---+------------------                           



                          -------+ *Each stage                           



                          assumes the associated                           



                          GFR level has been in                           



                          effect for at least                           



                          three months. ?Stages                           



                          1 to 5, with or                           



                          without kidney                           



                          disease, indicate                           



                          chronic kidney                           



                          disease. Notes:                           



                          Determination of                           



                          stages one and two                           



                          (with eGFR                             



                          >59mL/min/1.73 m2)                           



                          requires estimation of                           



                          kidney damage for at                           



                          least three months as                           



                          defined by structural                           



                          or functional                           



                          abnormalities of the                           



                          kidney, manifested by                           



                          either:Pathological                           



                          abnormalities or                           



                          Markers of kidney                           



                          damage (including                           



                          abnormalities in the                           



                          composition of the                           



                          blood or urine or                           



                          abnormalities in                           



                          imaging tests).                           

 

             Lab Interpretation Abnormal                               



             (test code = 77204-0)                                        



[facility] GLUCOSE (AUTOMATED)2020 10:33:00





             Test Item    Value        Reference Range Interpretation Comments

 

             POCT GLU (test code = 6663022790) 125 mg/dL           H      

      

 

             Lab Interpretation (test code = Abnormal                           

    



             31792-3)                                            



[facility] GLUCOSE (AUTOMATED)2020 09:28:00





             Test Item    Value        Reference Range Interpretation Comments

 

             POCT GLU (test code = 6127275928) 130 mg/dL           H      

      

 

             Lab Interpretation (test code = Abnormal                           

    



             28633-2)                                            



[facility] OR [facility] ONLY - INFLUENZA A &amp; B 
DIRECT [id number] 08:59:00





             Test Item    Value        Reference Range Interpretation Comments

 

             Influenza A (test code = 20538-9) Negative     Negative            

      

 

             Influenza B (test code = 98158-4) Negative     Negative            

      

 

             Lab Interpretation (test code = Normal                             

    



             02974-4)                                            



[facility] GLUCOSE (AUTOMATED)2020 08:34:00





             Test Item    Value        Reference Range Interpretation Comments

 

             POCT GLU (test code = 3580035751) 127 mg/dL           H      

      

 

             Lab Interpretation (test code = Abnormal                           

    



             60009-5)                                            



[facility] GLUCOSE (AUTOMATED)2020 07:38:00





             Test Item    Value        Reference Range Interpretation Comments

 

             POCT GLU (test code = 4080693168) 137 mg/dL           H      

      

 

             Lab Interpretation (test code = Abnormal                           

    



             01225-8)                                            



[facility] GLUCOSE (AUTOMATED)2020 06:26:00





             Test Item    Value        Reference Range Interpretation Comments

 

             POCT GLU (test code = 4104444076) 151 mg/dL           H      

      

 

             Lab Interpretation (test code = Abnormal                           

    



             13946-4)                                            



[facility] METABOLIC PANEL (NA, K, CL, CO2, 
GLUCOSE, BUN, CREATININE, CA)2020 06:20:00





             Test Item    Value        Reference Range Interpretation Comments

 

             NA (test code = 137 mmol/L   135-145                   



             1230379012)                                         

 

             K (test code = 4.6 mmol/L   3.5-5                     



             7602554163)                                         

 

             CL (test code = 105 mmol/L                       



             3219937980)                                         

 

             CO2 TOTAL (test code = 23 mmol/L    23-31                     



             4806285022)                                         

 

             AGAP (test code =              2-16                      



             6568255298)                                         

 

             BUN (test code = 19 mg/dL     7-23                      



             4601692174)                                         

 

             GLUCOSE (test code = 205 mg/dL           H            



             2711700233)                                         

 

             CREATININE (test code = 0.62 mg/dL   0.5-1.04                  



             9161560103)                                         

 

             CALCIUM (test code = 9.7 mg/dL    8.6-10.6                  



             8436105767)                                         

 

             eGFR Calculation              mL/min/1.73m2              



             (Non-)                                        



             (test code =                                        



             7464687390)                                         

 

             eGFR Calculation              mL/min/1.73m2              



             (African American)                                        



             (test code =                                        



             5625922964)                                         

 

             RICK (test code = RICK) Association of                           



                          Glomerular Filtration                           



                          Rate (GFR) and Staging                           



                          of Kidney Disease*                           



                          +---------------------                           



                          --+-------------------                           



                          --+-------------------                           



                          ------+| GFR                           



                          (mL/min/1.73 m2) ?|                           



                          With Kidney Damage ?|                           



                          ?Without Kidney                           



                          Damage+---------------                           



                          --------+-------------                           



                          --------+-------------                           



                          ------------+| ?>90 ?                           



                          ? ? ? ? ? ? ? ?|                           



                          ?Stage one ? ? ? ? ?|                           



                          ? Normal ? ? ? ? ? ? ?                           



                          ?+--------------------                           



                          ---+------------------                           



                          ---+------------------                           



                          -------+| ?60-89 ? ? ?                           



                          ? ? ? ? ?| ?Stage two                           



                          ? ? ? ? ?| ? Decreased                           



                          GFR ? ? ? ?                            



                          +---------------------                           



                          --+-------------------                           



                          --+-------------------                           



                          ------+| ?30-59 ? ? ?                           



                          ? ? ? ? ?| ?Stage                           



                          three ? ? ? ?| ? Stage                           



                          three ? ? ? ? ?                           



                          +---------------------                           



                          --+-------------------                           



                          --+-------------------                           



                          ------+| ?15-29 ? ? ?                           



                          ? ? ? ? ?| ?Stage four                           



                          ? ? ? ? | ? Stage four                           



                          ? ? ? ? ?                              



                          ?+--------------------                           



                          ---+------------------                           



                          ---+------------------                           



                          -------+| ?<15 (or                           



                          dialysis) ? ?| ?Stage                           



                          five ? ? ? ? | ? Stage                           



                          five ? ? ? ? ?                           



                          ?+--------------------                           



                          ---+------------------                           



                          ---+------------------                           



                          -------+ *Each stage                           



                          assumes the associated                           



                          GFR level has been in                           



                          effect for at least                           



                          three months. ?Stages                           



                          1 to 5, with or                           



                          without kidney                           



                          disease, indicate                           



                          chronic kidney                           



                          disease. Notes:                           



                          Determination of                           



                          stages one and two                           



                          (with eGFR                             



                          >59mL/min/1.73 m2)                           



                          requires estimation of                           



                          kidney damage for at                           



                          least three months as                           



                          defined by structural                           



                          or functional                           



                          abnormalities of the                           



                          kidney, manifested by                           



                          either:Pathological                           



                          abnormalities or                           



                          Markers of kidney                           



                          damage (including                           



                          abnormalities in the                           



                          composition of the                           



                          blood or urine or                           



                          abnormalities in                           



                          imaging tests).                           

 

             Lab Interpretation Abnormal                               



             (test code = 53876-8)                                        



[facility] [id number] 06:14:00





             Test Item    Value        Reference Range Interpretation Comments

 

             AMMONIA (test code = 4670131953) 20 umol/L    9-33                 

     

 

             Lab Interpretation (test code = Normal                             

    



             20694-4)                                            



[facility] GLUCOSE (AUTOMATED)2020 05:33:00





             Test Item    Value        Reference Range Interpretation Comments

 

             POCT GLU (test code = 7027543302) 170 mg/dL           H      

      

 

             Lab Interpretation (test code = Abnormal                           

    



             58632-3)                                            



[facility] GLUCOSE (AUTOMATED)2020 04:35:00





             Test Item    Value        Reference Range Interpretation Comments

 

             POCT GLU (test code = 0752951781) 339 mg/dL           H      

      

 

             Lab Interpretation (test code = Abnormal                           

    



             24599-7)                                            



[facility] [id number] 04:31:00





             Test Item    Value        Reference Range Interpretation Comments

 

             TSH (test code =              See_Comment  H             [Automated

 message]



             0091645537)                                         The system [employer]



                                                                 generated this 

result



                                                                 transmitted ref

erence



                                                                 range: 0.45 - 4

.70



                                                                 mIU/L. The refe

rence



                                                                 range was not u

sed to



                                                                 interpret this 

result



                                                                 as normal/abnor

mal.

 

             Lab Interpretation (test Abnormal                               



             code = 35133-2)                                        



[facility] 
04:11:00





             Test Item    Value        Reference    Interpretation Comments



                                       Range                     

 

             HGB A1C (test code =              See_Comment  H             [Autom

ated



             4548-4)                                             message] The



                                                                 system which



                                                                 generated this



                                                                 result



                                                                 transmitted



                                                                 reference range

:



                                                                 4.0 - 6.0 %



                                                                 NGSP. The



                                                                 reference range



                                                                 was not used to



                                                                 interpret this



                                                                 result as



                                                                 normal/abnormal

.

 

             RICK (test code = %A1C (NGSP)                            



             RICK)         Interpretation                           



                          (ADA)4.8-5.6 ? ?                           



                          Normal or                              



                          (Non-Diabetic                           



                          Range)5.7-6.4 ? ?                           



                          Increased Risk                           



                          (Pre-Diabetic)>6.5 ?                           



                          ? ? ?Diabetes                           



                          Indicated                              

 

             Lab Interpretation Abnormal                               



             (test code =                                        



             16217-2)                                            



[facility] Center[facility] [id number] 03:59:00





             Test Item    Value        Reference Range Interpretation Comments

 

             MAGNESIUM (test code = 7998561699) 1.9 mg/dL    1.7-2.4            

       

 

             Lab Interpretation (test code = Normal                             

    



             46746-1)                                            



[facility] Center[facility]osph[facility] [id number] 03:59:00





             Test Item    Value        Reference Range Interpretation Comments

 

             PHOSPHORUS (test code = 0749016205) 4.6 mg/dL    2.5-5             

        

 

             Lab Interpretation (test code = Normal                             

    



             09554-1)                                            



[facility] GLUCOSE (AUTOMATED)2020 03:56:00





             Test Item    Value        Reference Range Interpretation Comments

 

             POCT GLU (test code = 576 mg/dL           HH           



             0694963601)                                         

 

             RICK (test code = RICK) Notified Provider                           

 

             Lab Interpretation (test Abnormal                               



             code = 30026-7)                                        



[facility] GLUCOSE (AUTOMATED)2020 03:56:00





             Test Item    Value        Reference Range Interpretation Comments

 

             POCT GLU (test code = 1693953778) 432 mg/dL           H      

      

 

             Lab Interpretation (test code = Abnormal                           

    



             10437-8)                                            



[facility] 03[facility]12:00





             Test Item    Value        Reference Range Interpretation Comments

 

             APPEARANCE (test code = Clear        Clear                     



             9802473395)                                         

 

             COLOR (test code = Straw        Yellow       A            



             6305372210)                                         

 

             PH (test code =              4.8-8.0                   



             7395312614)                                         

 

             SP GRAVITY (test code =              1.003-1.030               



             0336734272)                                         

 

             GLU U QUAL (test code = 500 mg/dL    Normal       A            



             4402861001)                                         

 

             BLOOD (test code = Negative     Negative                  



             1102695174)                                         

 

             KETONES (test code = Negative     Negative                  



             2172555450)                                         

 

             PROTEIN (test code = Negative     Negative                  



             2887-8)                                             

 

             UROBILIN (test code = Normal       Normal                    



             9194468441)                                         

 

             BILIRUBIN (test code = Negative     Negative                  



             8027956675)                                         

 

             NITRITE (test code = Negative     Negative                  



             4412045503)                                         

 

             LEUK MOE (test code = Negative     Negative                  



             6218183628)                                         

 

             RBC/HPF (test code =              See_Comment                [Autom

ated message]



             2295531702)                                         The system [employer]



                                                                 generated this



                                                                 result transmit

eduard



                                                                 reference range

: 0 -



                                                                 3 HPF. The refe

rence



                                                                 range was not u

sed



                                                                 to interpret th

is



                                                                 result as



                                                                 normal/abnormal

.

 

             WBC/HPF (test code = <1           See_Comment                [Autom

ated message]



             5304830302)                                         The system [employer]



                                                                 generated this



                                                                 result transmit

eduard



                                                                 reference range

: 0 -



                                                                 5 HPF. The refe

rence



                                                                 range was not u

sed



                                                                 to interpret th

is



                                                                 result as



                                                                 normal/abnormal

.

 

             BACTERIA (test code = Negative     Negative                  



             1973542182)                                         

 

             SQ EPITH (test code = <1           HPF                       



             8907199144)                                         

 

             Lab Interpretation (test Abnormal                               



             code = 81185-6)                                        



[facility]. METABOLIC PANEL (07460)2020 
03:10:00





             Test Item    Value        Reference Range Interpretation Comments

 

             NA (test code = 133 mmol/L   135-145      L            



             3321861983)                                         

 

             K (test code = 5.4 mmol/L   3.5-5        H            



             8987989222)                                         

 

             CL (test code = 98 mmol/L                        



             1447059380)                                         

 

             CO2 TOTAL (test code = 23 mmol/L    23-31                     



             3939674748)                                         

 

             AGAP (test code =              2-16                      



             3564752610)                                         

 

             BUN (test code = 19 mg/dL     7-23                      



             6118250557)                                         

 

             GLUCOSE (test code = 678 mg/dL           HH           



             1781625063)                                         

 

             CREATININE (test code = 0.65 mg/dL   0.5-1.04                  



             1692528295)                                         

 

             TOTAL BILI (test code = 1.0 mg/dL    0.1-1.1                   



             6928458147)                                         

 

             CALCIUM (test code = 9.7 mg/dL    8.6-10.6                  



             3296436761)                                         

 

             T PROTEIN (test code = 7.8 g/dL     6.3-8.2                   



             6696266147)                                         

 

             ALBUMIN (test code = 3.9 g/dL     3.5-5                     



             8292188390)                                         

 

             ALK PHOS (test code = 174 U/L             H            



             2622478421)                                         

 

             ALTv (test code = 44 U/L       5-35         H            



             1742-6)                                             

 

             AST(SGOT) (test code = 60 U/L       13-40        H            



             6507856384)                                         

 

             eGFR Calculation              mL/min/1.73m2              



             (Non-)                                        



             (test code =                                        



             0103440883)                                         

 

             eGFR Calculation              mL/min/1.73m2              



             (African American)                                        



             (test code =                                        



             1990355942)                                         

 

             RICK (test code = RICK) Association of                           



                          Glomerular Filtration                           



                          Rate (GFR) and Staging                           



                          of Kidney Disease*                           



                          +---------------------                           



                          --+-------------------                           



                          --+-------------------                           



                          ------+| GFR                           



                          (mL/min/1.73 m2) ?|                           



                          With Kidney Damage ?|                           



                          ?Without Kidney                           



                          Damage+---------------                           



                          --------+-------------                           



                          --------+-------------                           



                          ------------+| ?>90 ?                           



                          ? ? ? ? ? ? ? ?|                           



                          ?Stage one ? ? ? ? ?|                           



                          ? Normal ? ? ? ? ? ? ?                           



                          ?+--------------------                           



                          ---+------------------                           



                          ---+------------------                           



                          -------+| ?60-89 ? ? ?                           



                          ? ? ? ? ?| ?Stage two                           



                          ? ? ? ? ?| ? Decreased                           



                          GFR ? ? ? ?                            



                          +---------------------                           



                          --+-------------------                           



                          --+-------------------                           



                          ------+| ?30-59 ? ? ?                           



                          ? ? ? ? ?| ?Stage                           



                          three ? ? ? ?| ? Stage                           



                          three ? ? ? ? ?                           



                          +---------------------                           



                          --+-------------------                           



                          --+-------------------                           



                          ------+| ?15-29 ? ? ?                           



                          ? ? ? ? ?| ?Stage four                           



                          ? ? ? ? | ? Stage four                           



                          ? ? ? ? ?                              



                          ?+--------------------                           



                          ---+------------------                           



                          ---+------------------                           



                          -------+| ?<15 (or                           



                          dialysis) ? ?| ?Stage                           



                          five ? ? ? ? | ? Stage                           



                          five ? ? ? ? ?                           



                          ?+--------------------                           



                          ---+------------------                           



                          ---+------------------                           



                          -------+ *Each stage                           



                          assumes the associated                           



                          GFR level has been in                           



                          effect for at least                           



                          three months. ?Stages                           



                          1 to 5, with or                           



                          without kidney                           



                          disease, indicate                           



                          chronic kidney                           



                          disease. Notes:                           



                          Determination of                           



                          stages one and two                           



                          (with eGFR                             



                          >59mL/min/1.73 m2)                           



                          requires estimation of                           



                          kidney damage for at                           



                          least three months as                           



                          defined by structural                           



                          or functional                           



                          abnormalities of the                           



                          kidney, manifested by                           



                          either:Pathological                           



                          abnormalities or                           



                          Markers of kidney                           



                          damage (including                           



                          abnormalities in the                           



                          composition of the                           



                          blood or urine or                           



                          abnormalities in                           



                          imaging tests).                           

 

             Lab Interpretation Abnormal                               



             (test code = 68886-8)                                        



[facility] [id number] 02:59:44Impression: No
radiographic evidence for acute cardiopulmonary disease.  RL: [id number] AFC: [id number] 
Electronically signed by Ivory Brito MD, PhD at 2020 8:59 
PMIndication: Cough Comparison: None Findings: Single AP view of the chest. The 
cardiopericardial silhouette iswithin normal limits. The lungs are clear 
bilaterally. The visualized bonythorax is intact. [facility], Radiant Results Inft 
User - 2020  9:00 PM CSTIndication: CoughComparison: NoneFindings: Single 
AP view of the chest. The cardiopericardial silhouette iswithin normal limits. 
The lungs are clear bilaterally. The visualized bonythorax is 
intact.IMPRESSIONImpression:No radiographic evidence for acute cardiopulmonary 
disease.RL: [id number]: [id number] signed by Ivory Brito MD, PhD at 
2020 8:59 PMUnO[facility] WITH DIFFERENTIAL
2020 02:44:00





             Test Item    Value        Reference Range Interpretation Comments

 

             WBC (test code =              See_Comment                [Automated



             4627-2)                                             message] The sy

stem



                                                                 which generated



                                                                 this result



                                                                 transmitted



                                                                 reference range

:



                                                                 4.30 - 11.10



                                                                 10*3/?L. The



                                                                 reference range

 was



                                                                 not used to



                                                                 interpret this



                                                                 result as



                                                                 normal/abnormal

.

 

             RBC (test code =              See_Comment                [Automated



             011-8)                                              message] The sy

stem



                                                                 which generated



                                                                 this result



                                                                 transmitted



                                                                 reference range

:



                                                                 3.93 - 5.25



                                                                 10*6/?L. The



                                                                 reference range

 was



                                                                 not used to



                                                                 interpret this



                                                                 result as



                                                                 normal/abnormal

.

 

             HGB (test code = 11.6 g/dL    11.6-15                   



             718-7)                                              

 

             HCT (test code = 35.7 %       35.7-45.2                 



             4544-3)                                             

 

             MCV (test code = 90.8 fL      80.6-95.5                 



             787-2)                                              

 

             MCH (test code = 29.5 pg      25.9-32.8                 



             785-6)                                              

 

             MCHC (test code = 32.5 g/dL    31.6-35.1                 



             786-4)                                              

 

             RDW-SD (test code = 49.8 fL      39-49.9                   



             57438-6)                                            

 

             RDW-CV (test code = 15.0 %       12-15.5                   



             788-0)                                              

 

             PLT (test code =              See_Comment  L             [Automated



             777-3)                                              message] The sy

stem



                                                                 which generated



                                                                 this result



                                                                 transmitted



                                                                 reference range

:



                                                                 166 - 358 10*3/

?L.



                                                                 The reference r

olman



                                                                 was not used to



                                                                 interpret this



                                                                 result as



                                                                 normal/abnormal

.

 

             MPV (test code = 10.6 fL      9.5-12.9                  



             30130-5)                                            

 

             NRBC/100 WBC (test              See_Comment                [Automat

ed



             code = 0040174953)                                        message] 

The system



                                                                 which generated



                                                                 this result



                                                                 transmitted



                                                                 reference range

:



                                                                 0.0 - 10.0 /100



                                                                 WBCs. The refer

ence



                                                                 range was not u

sed



                                                                 to interpret th

is



                                                                 result as



                                                                 normal/abnormal

.

 

             NRBC x10^3 (test code <0.01        See_Comment                [Auto

mated



             = [id number])                                        message] The s

ystem



                                                                 which generated



                                                                 this result



                                                                 transmitted



                                                                 reference range

:



                                                                 10*3/?L. The



                                                                 reference range

 was



                                                                 not used to



                                                                 interpret this



                                                                 result as



                                                                 normal/abnormal

.

 

             GRAN MAT (NEUT) % 76.7 %                                 



             (test code = 770-8)                                        

 

             IMM GRAN % (test code 0.20 %                                 



             = [id number])                                        

 

             LYMPH % (test code = 16.7 %                                 



             736-9)                                              

 

             MONO % (test code = 5.4 %                                  



             5905-5)                                             

 

             EOS % (test code = 0.6 %                                  



             713-8)                                              

 

             BASO % (test code = 0.4 %                                  



             706-2)                                              

 

             GRAN MAT x10^3(ANC) 3.71 10*3/uL 1.88-7.09                 



             (test code =                                        



             1259178535)                                         

 

             IMM GRAN x10^3 (test <0.03        0-0.06                    



             code = 9696447174)                                        

 

             LYMPH x10^3 (test code 0.81 10*3/uL 1.32-3.29    L            



             = 731-0)                                            

 

             MONO x10^3 (test code 0.26 10*3/uL 0.33-0.92    L            



             = 742-7)                                            

 

             EOS x10^3 (test code = 0.03 10*3/uL 0.03-0.39                 



             711-2)                                              

 

             BASO x10^3 (test code <0.03        0.01-0.07                 



             = 704-7)                                            

 

             Lab Interpretation Abnormal                               



             (test code = 96540-6)                                        



[id number] 02:12:00





             Test Item    Value        Reference Range Interpretation Comments

 

             PH (test code =              7.32-7.42                 



             [id number])                                         

 

             PCO2 CHRISTIANO (test code =              See_Comment                [Auto

mated message]



             [id number])                                         The system [employer]



                                                                 generated this 

result



                                                                 transmitted ref

erence



                                                                 range: 41 - 51 

mmHg.



                                                                 The reference r

loman



                                                                 was not used to



                                                                 interpret this 

result



                                                                 as normal/abnor

mal.

 

             PO2 CHRISTIANO (test code =              See_Comment  H             [Autom

ated message]



             [id number])                                         The system [employer]



                                                                 generated this 

result



                                                                 transmitted ref

erence



                                                                 range: 25 - 40 

mmHg.



                                                                 The reference r

olman



                                                                 was not used to



                                                                 interpret this 

result



                                                                 as normal/abnor

mal.

 

             HCO3 CHRISTIANO (test code =              See_Comment                [Auto

mated message]



             [id number])                                         The system [employer]



                                                                 generated this 

result



                                                                 transmitted ref

erence



                                                                 range: 24 - 28 

mEq/L.



                                                                 The reference r

olman



                                                                 was not used to



                                                                 interpret this 

result



                                                                 as normal/abnor

mal.

 

             AC VBE(BEAKER) (test              mEq/L                     



             code = 9159536760)                                        

 

             Lab Interpretation (test Abnormal                               



             code = 47349-9)                                        



[facility]
2019 16:30:00





             Test Item    Value        Reference Range Interpretation Comments

 

             ALPHA-FETOPROTEIN (BEAKER) (test 10.9 ng/mL   <10.0        H       

     



             code = 1094)                                        



HEPATIC FUNCTION [id number] 16:13:00





             Test Item    Value        Reference Range Interpretation Comments

 

             TOTAL PROTEIN (BEAKER) (test code = 7.2 gm/dL    6.0-8.3           

        



             770)                                                

 

             ALBUMIN (BEAKER) (test code = 1145) 3.5 g/dL     3.5-5.0           

        

 

             BILIRUBIN TOTAL (BEAKER) (test code 0.8 mg/dL    0.2-1.2           

        



             = 377)                                              

 

             BILIRUBIN DIRECT (BEAKER) (test 0.4 mg/dL    0.1-0.5               

    



             code = 706)                                         

 

             ALKALINE PHOSPHATASE (BEAKER) (test 107 U/L                  

        



             code = 346)                                         

 

             AST (SGOT) (BEAKER) (test code = 31 U/L       5-34                 

     



             353)                                                

 

             ALT (SGPT) (BEAKER) (test code = 15 U/L       6-55                 

     



             347)                                                



BASIC METABOLIC [id number] 16:13:00





             Test Item    Value        Reference Range Interpretation Comments

 

             SODIUM (BEAKER) 138 meq/L    136-145                   



             (test code = 381)                                        

 

             POTASSIUM (BEAKER) 3.8 meq/L    3.5-5.1                   



             (test code = 379)                                        

 

             CHLORIDE (BEAKER) 103 meq/L                        



             (test code = 382)                                        

 

             CO2 (BEAKER) (test 27 meq/L     22-29                     



             code = 355)                                         

 

             BLOOD UREA NITROGEN 7 mg/dL      7-21                      



             (BEAKER) (test code                                        



             = 354)                                              

 

             CREATININE (BEAKER) 0.78 mg/dL   0.57-1.25                 



             (test code = 358)                                        

 

             GLUCOSE RANDOM 226 mg/dL           H            



             (BEAKER) (test code                                        



             = 652)                                              

 

             CALCIUM (BEAKER) 9.2 mg/dL    8.4-10.2                  



             (test code = 697)                                        

 

             EGFR (BEAKER) (test 76 mL/min/1.73                           ESTIMA

EDUARD GFR IS



             code = 1092) sq m                                   NOT AS ACCURATE

 AS



                                                                 CREATININE



                                                                 CLEARANCE IN



                                                                 PREDICTING



                                                                 GLOMERULAR



                                                                 FILTRATION RATE

.



                                                                 ESTIMATED GFR I

S



                                                                 NOT APPLICABLE 

FOR



                                                                 DIALYSIS PATIEN

TS.



PROTHROMBIN TIME/[id number] 16:05:00





             Test Item    Value        Reference Range Interpretation Comments

 

             PROTIME (BEAKER) (test code = 14.9 seconds 11.7-14.7    H          

  



             759)                                                

 

             INR (BEAKER) (test code = 370) 1.2          <=5.9                  

   



RECOMMENDED COUMADIN/WARFARIN INR THERAPY RANGESSTANDARD DOSE: 2.0 - 3.0   
Includes: PROPHYLAXIS forvenous thrombosis, systemic embolization; TREATMENT for
venous thrombosis and/or pulmonary embolus.HIGH RISK: Target INR is 2.5-3.5 for 
patients with mechanical heart valves.CBC W/PLT COUNT &amp; AUTO DIFFERENTIAL
2019 15:57:00





             Test Item    Value        Reference Range Interpretation Comments

 

             WHITE BLOOD CELL COUNT (BEAKER) 3.3 K/ L     3.5-10.5     L        

    



             (test code = 775)                                        

 

             RED BLOOD CELL COUNT (BEAKER) 3.88 M/ L    3.93-5.22    L          

  



             (test code = 761)                                        

 

             HEMOGLOBIN (BEAKER) (test code = 10.4 GM/DL   11.2-15.7    L       

     



             410)                                                

 

             HEMATOCRIT (BEAKER) (test code = 34.3 %       34.1-44.9            

     



             411)                                                

 

             MEAN CORPUSCULAR VOLUME (BEAKER) 88.4 fL      79.4-94.8            

     



             (test code = 753)                                        

 

             MEAN CORPUSCULAR HEMOGLOBIN 26.8 pg      25.6-32.2                 



             (BEAKER) (test code = 751)                                        

 

             MEAN CORPUSCULAR HEMOGLOBIN CONC 30.3 GM/DL   32.2-35.5    L       

     



             (BEAKER) (test code = 752)                                        

 

             RED CELL DISTRIBUTION WIDTH 17.0 %       11.7-14.4    H            



             (BEAKER) (test code = 412)                                        

 

             PLATELET COUNT (BEAKER) (test code 42 K/CU MM   150-450      L     

       



             = 756)                                              

 

             MEAN PLATELET VOLUME (BEAKER) 10.6 fL      9.4-12.3                

  



             (test code = 754)                                        

 

             NUCLEATED RED BLOOD CELLS (BEAKER) 0 /100 WBC   0-0                

       



             (test code = 413)                                        

 

             NEUTROPHILS RELATIVE PERCENT 68 %                                  

 



             (BEAKER) (test code = 429)                                        

 

             LYMPHOCYTES RELATIVE PERCENT 25 %                                  

 



             (BEAKER) (test code = 430)                                        

 

             MONOCYTES RELATIVE PERCENT 7 %                                    



             (BEAKER) (test code = 431)                                        

 

             EOSINOPHILS RELATIVE PERCENT 1 %                                   

 



             (BEAKER) (test code = 432)                                        

 

             BASOPHILS RELATIVE PERCENT 0 %                                    



             (BEAKER) (test code = 437)                                        

 

             NEUTROPHILS ABSOLUTE COUNT 2.21 K/ L    1.56-6.13                 



             (BEAKER) (test code = 670)                                        

 

             LYMPHOCYTES ABSOLUTE COUNT 0.80 K/ L    1.18-3.74    L            



             (BEAKER) (test code = 414)                                        

 

             MONOCYTES ABSOLUTE COUNT (BEAKER) 0.22 K/ L    0.24-0.36    L      

      



             (test code = 415)                                        

 

             EOSINOPHILS ABSOLUTE COUNT 0.03 K/ L    0.04-0.36    L            



             (BEAKER) (test code = 416)                                        

 

             BASOPHILS ABSOLUTE COUNT (BEAKER) 0.01 K/ L    0.01-0.08           

      



             (test code = 417)                                        

 

             IMMATURE GRANULOCYTES-RELATIVE 0 %          0-1                    

   



             PERCENT (BEAKER) (test code =                                      

  



             2801)                                               



Bedside [id number] 11:44:00





             Test Item    Value        Reference Range Interpretation Comments

 

             Bedside Glucose (test code = 33215-7) 310                 H  

          



Meter ID: [id number] [facility]
2018 19:51:00





             Test Item    Value        Reference Range Interpretation Comments

 

             Creatine Kinase MB (test code = 0.80         0-5.0                 

    



             53615-6)                                            



[facility] 19[facility]51[facility]





             Test Item    Value        Reference Range Interpretation Comments

 

             Troponin I (test code = BYO1538) -0.001       0-0.300              

     



[facility]inteCreatine [id number] 19:44:00





             Test Item    Value        Reference Range Interpretation Comments

 

             Creatine Kinase (test code = 2157-6) 35                      

         



[facility]inteVitamin B12 [id number] 10:03:00





             Test Item    Value        Reference Range Interpretation Comments

 

             Vitamin B12 Level (test code = 36313-0) 789          213-816       

            



[facility]inte[facility]olate2018-04-19 10:03:00





             Test Item    Value        Reference Range Interpretation Comments

 

             Folate (test code = 2284-8) 8.8          7.0-15.4                  



[facility]inteThyroid Stimulating Hormone (TSH)
2018 09:51:00





             Test Item    Value        Reference Range Interpretation Comments

 

             Thyroid Stimulating Hormone (TSH) (test 2.053        0.350-4.940   

            



             code = 19600-3)                                        



[facility]inteBlood Ogcwqqx8406-15-35 20:02:00





             Test Item    Value        Reference Range Interpretation Comments

 

             Blood Culture (test NO GROWTH AFTER 5                           



             code = 74591404) DAYS, FINAL REPORT                           



[facility] [id number] 20:02:00





             Test Item    Value        Reference Range Interpretation Comments

 

             Blood Culture (test NO GROWTH AFTER 5                           



             code = 58841331) DAYS, FINAL REPORT                           



[facility] [id number] 00:57:[facility]





             Test Item    Value        Reference Range Interpretation Comments

 

             Sodium Level (test code = 2951-2) 139          136-145             

      



[facility]intePotassium Mwtya2960-20-30 00:57:00





             Test Item    Value        Reference Range Interpretation Comments

 

             Potassium Level (test code = 2823-3) 2.9          3.5-5.1      LL  

         



Results called to [SAMMY RN/ER] at 0053 on 17 by Carlie Leiva. RB OK.
[facility]inteChloride [id number] 00:57:00





             Test Item    Value        Reference Range Interpretation Comments

 

             Chloride Level (test code = 2075-0) 109                 H    

        



[facility]inteCarbon Dioxide [id number] 00:57:00





             Test Item    Value        Reference Range Interpretation Comments

 

             Carbon Dioxide Level (test code = 20           22-29        L      

      



             2028)                                             



[facility] PointeAnion [id number] 00:57:00





             Test Item    Value        Reference Range Interpretation Comments

 

             Anion Gap (test code = 33037-3) 12.9         8-16                  

    



[facility]inteBlood Urea Mdrnkbwu6788-71-38 00:57:00





             Test Item    Value        Reference Range Interpretation Comments

 

             Blood Urea Nitrogen (test code = 6            7-26         L       

     



             3094-0)                                             



[facility]inteCreatinine2017-11-11 00:57:00





             Test Item    Value        Reference Range Interpretation Comments

 

             Creatinine (test code = 2160-0) 0.67         0.57-1.11             

    



[facility]inteBUN/Creatinine Uxnrb5922-02-74 00:57:00





             Test Item    Value        Reference Range Interpretation Comments

 

             BUN/Creatinine Ratio (test code = 9                            

      



             3097-3)                                             



[facility] PointeEstimat Glomerular Filtration Rate
2017 00:57:00





             Test Item    Value        Reference Range Interpretation Comments

 

             Estimat Glomerular Filtration Rate 60-          >60                

       



             (test code = 05471-0)                                        



Ranges were taken from the National Kidney Disease Education Program and the 
[employer] literature.Reference ranges:60 or greater: Xyytom79-
59 (for 3 consecutive months): Chronic kidneydisease 15 or less: Kidney failure
[facility]inteGlucose [id number] 00:57:00





             Test Item    Value        Reference Range Interpretation Comments

 

             Glucose Level (test code = YFB9764) 272                 H    

        



[facility] PointeCalcium Oszup5074-88-94 00:57:00





             Test Item    Value        Reference Range Interpretation Comments

 

             Calcium Level (test code = 01759-5) 8.1          8.4-10.2     L    

        



[facility] Centerodium Couww9388-98-61 00:57:00





             Test Item    Value        Reference Range Interpretation Comments

 

             Sodium Level (test code = 2951-2) 139          136-145             

      



[facility]intePotassium Ckjtj7053-21-11 00:57:00





             Test Item    Value        Reference Range Interpretation Comments

 

             Potassium Level (test code = 2823-3) 2.9          3.5-5.1      LL  

         



Results called to [SAMMY RN/ER] at 0053 on 17 by Carlie PRATT OK.
[facility] PointeChloride Xoibc2366-01-01 00:57:00





             Test Item    Value        Reference Range Interpretation Comments

 

             Chloride Level (test code = 2075-0) 109                 H    

        



[facility] PointeCarbon Dioxide [id number] 00:57:00





             Test Item    Value        Reference Range Interpretation Comments

 

             Carbon Dioxide Level (test code = 20           22-29        L      

      



             8-9)                                             



[facility] PointeAnion [id number] 00:57:00





             Test Item    Value        Reference Range Interpretation Comments

 

             Anion Gap (test code = 33037-3) 12.9         8-16                  

    



[facility]inteBlood Urea Sbyixyyq1416-71-90 00:57:00





             Test Item    Value        Reference Range Interpretation Comments

 

             Blood Urea Nitrogen (test code = 6            7-26         L       

     



             3094-0)                                             



[facility]inteCreatinine2017-11-11 00:57:00





             Test Item    Value        Reference Range Interpretation Comments

 

             Creatinine (test code = 2160-0) 0.67         0.57-1.11             

    



[facility]inteBUN/Creatinine Hgvsx6054-58-02 00:57:00





             Test Item    Value        Reference Range Interpretation Comments

 

             BUN/Creatinine Ratio (test code = 9            625                

      



             3097-3)                                             



[facility] PointeEstimat Glomerular Filtration Rate
2017 00:57:00





             Test Item    Value        Reference Range Interpretation Comments

 

             Estimat Glomerular Filtration Rate 60-          >60                

       



             (test code = 88149-6)                                        



Ranges were taken from the National Kidney Disease Education Program and the 
[employer] literature.Reference ranges:60 or greater: Eovnby81-
59 (for 3 consecutive months): Chronic kidneydisease 15 or less: Kidney failure
[facility]inteGlucose [id number] 00:57:00





             Test Item    Value        Reference Range Interpretation Comments

 

             Glucose Level (test code = XWP6915) 272                 H    

        



[facility]inteCalcium [id number] 00:57:00





             Test Item    Value        Reference Range Interpretation Comments

 

             Calcium Level (test code = 48332-3) 8.1          8.4-10.2     L    

        



[facility]To[facility] Bilirubin2017-11-10 21:06:00





             Test Item    Value        Reference Range Interpretation Comments

 

             Total Bilirubin (test code = 1975-2) 0.6          0.2-1.2          

         



[facility]inteAspartate Amino Transf (AST/SGOT)
2017-11-10 21:06:00





             Test Item    Value        Reference Range Interpretation Comments

 

             Aspartate Amino Transf (AST/SGOT) (test 50           5-34         H

            



             code = Aspartate Amino Transf                                      

  



             (AST/SGOT))                                         



[facility] PointeAlanine Aminotransferase (ALT/SGPT)
2017-11-10 21:06:00





             Test Item    Value        Reference Range Interpretation Comments

 

             Alanine Aminotransferase (ALT/SGPT) 46           0-55              

        



             (test code = 1742-6)                                        



[facility] Protein2017-11-10 21:06:00





             Test Item    Value        Reference Range Interpretation Comments

 

             Total Protein (test code = 2885-2) 7.3          6.5-8.1            

       



[facility]inteAlbumin2017-11-10 21:06:00





             Test Item    Value        Reference Range Interpretation Comments

 

             Albumin (test code = 1751-7) 3.1          3.5-5.0      L           

 



[facility]inteGlobulin2017-11-10 21:06:00





             Test Item    Value        Reference Range Interpretation Comments

 

             Globulin (test code = 80999-1) 4.2          2.3-3.5      H         

   



[facility]inteAlbumin/Globulin Ratio2017-11-10 21:06:00





             Test Item    Value        Reference Range Interpretation Comments

 

             Albumin/Globulin Ratio (test code = 0.7          0.8-2.0      L    

        



             1759-0)                                             



[facility]inteAlkaline Phosphatase2017-11-10 21:06:00





             Test Item    Value        Reference Range Interpretation Comments

 

             Alkaline Phosphatase (test code = 196                 H      

      



             6768-6)                                             



[facility] Bilirubin2017-11-10 21:06:00





             Test Item    Value        Reference Range Interpretation Comments

 

             Total Bilirubin (test code = 1975-2) 0.6          0.2-1.2          

         



[facility]inteAspartate Amino Transf (AST/SGOT)
2017-11-10 21:06:00





             Test Item    Value        Reference Range Interpretation Comments

 

             Aspartate Amino Transf (AST/SGOT) (test 50           5-34         H

            



             code = Aspartate Amino Transf                                      

  



             (AST/SGOT))                                         



[facility]inteAlanine Aminotransferase (ALT/SGPT)
2017-11-10 21:06:00





             Test Item    Value        Reference Range Interpretation Comments

 

             Alanine Aminotransferase (ALT/SGPT) 46           0-55              

        



             (test code = 1742-6)                                        



[facility]inteTotal Protein2017-11-10 21:06:00





             Test Item    Value        Reference Range Interpretation Comments

 

             Total Protein (test code = 2885-2) 7.3          6.5-8.1            

       



[facility]inteAlbumin2017-11-10 21:06:00





             Test Item    Value        Reference Range Interpretation Comments

 

             Albumin (test code = 1751-7) 3.1          3.5-5.0      L           

 



[facility]inteGlobulin2017-11-10 21:06:00





             Test Item    Value        Reference Range Interpretation Comments

 

             Globulin (test code = 99935-3) 4.2          2.3-3.5      H         

   



[facility]inteAlbumin/Globulin Ratio2017-11-10 21:06:00





             Test Item    Value        Reference Range Interpretation Comments

 

             Albumin/Globulin Ratio (test code = 0.7          0.8-2.0      L    

        



             1759-0)                                             



[facility]inteAlkaline Phosphatase2017-11-10 21:06:00





             Test Item    Value        Reference Range Interpretation Comments

 

             Alkaline Phosphatase (test code = 196                 H      

      



             6768-6)                                             



[facility]inteWhite Blood Count2017-11-10 20:53:00





             Test Item    Value        Reference Range Interpretation Comments

 

             White Blood Count (test code = 6690-2) 6.48         4.8-10.8       

           



[facility]inteRed Blood Count2017-11-10 20:53:00





             Test Item    Value        Reference Range Interpretation Comments

 

             Red Blood Count (test code = 789-8) 4.15         3.6-5.1           

        



[facility]inteHemoglobin2017-11-10 20:53:00





             Test Item    Value        Reference Range Interpretation Comments

 

             Hemoglobin (test code = 89346-5) 12.4         12.0-16.0            

     



[facility]inteHematocrit2017-11-10 20:53:00





             Test Item    Value        Reference Range Interpretation Comments

 

             Hematocrit (test code = 4544-3) 36.8         34.2-44.1             

    



[facility]inteMean Corpuscular Volume2017-11-10 
20:53:00





             Test Item    Value        Reference Range Interpretation Comments

 

             Mean Corpuscular Volume (test code = 88.7         81-99            

         



             787-2)                                              



[facility]inteMean Corpuscular Hemoglobin2017-11-10 
20:53:00





             Test Item    Value        Reference Range Interpretation Comments

 

             Mean Corpuscular Hemoglobin (test code 29.9         28-32          

           



             = 785-6)                                            



[facility]inteMean Corpuscular Hemoglobin Concent
2017-11-10 20:53:00





             Test Item    Value        Reference Range Interpretation Comments

 

             Mean Corpuscular Hemoglobin Concent 33.7         31-35             

        



             (test code = 786-4)                                        



[facility]inteRed Cell Distribution Width2017-11-10 
20:53:00





             Test Item    Value        Reference Range Interpretation Comments

 

             Red Cell Distribution Width (test code 14.0         11.7-14.4      

           



             = 20744-9)                                          



[facility]intePlatelet Count2017-11-10 20:53:00





             Test Item    Value        Reference Range Interpretation Comments

 

             Platelet Count (test code = 777-3) 54           140-360      L     

       



[facility]inteNeutrophils (%) (Auto)2017-11-10 20:53:00





             Test Item    Value        Reference Range Interpretation Comments

 

             Neutrophils (%) (Auto) (test code = 72.3         38.7-80.0         

        



             51929-7)                                            



[facility]inteLymphocytes (%) (Auto)2017-11-10 20:53:00





             Test Item    Value        Reference Range Interpretation Comments

 

             Lymphocytes (%) (Auto) (test code = 21.1         18.0-39.1         

        



             736-9)                                              



[facility]inteMonocytes (%) (Auto)2017-11-10 20:53:00





             Test Item    Value        Reference Range Interpretation Comments

 

             Monocytes (%) (Auto) (test code = 5.1          4.4-11.3            

      



             5905-5)                                             



[facility]inteEosinophils (%) (Auto)2017-11-10 20:53:00





             Test Item    Value        Reference Range Interpretation Comments

 

             Eosinophils (%) (Auto) (test code = 0.9          0.0-6.0           

        



             713-8)                                              



[facility]inteBasophils (%) (Auto)2017-11-10 20:53:00





             Test Item    Value        Reference Range Interpretation Comments

 

             Basophils (%) (Auto) (test code = 0.3          0.0-1.0             

      



             706-2)                                              



[facility]inteIM GRANULOCYTES %2017-11-10 20:53:00





             Test Item    Value        Reference Range Interpretation Comments

 

             IM GRANULOCYTES % (test code = IM 0.3          0.0-1.0             

      



             GRANULOCYTES %)                                        



[facility]inteNeutrophils # (Auto)2017-11-10 20:53:00





             Test Item    Value        Reference Range Interpretation Comments

 

             Neutrophils # (Auto) (test code = 4.7          2.1-6.9             

      



             751-8)                                              



[facility]inteLymphocytes # (Auto)2017-11-10 20:53:00





             Test Item    Value        Reference Range Interpretation Comments

 

             Lymphocytes # (Auto) (test code = 1.4          1.0-3.2             

      



             68974-4)                                            



[facility]inteMonocytes # (Auto)2017-11-10 20:53:00





             Test Item    Value        Reference Range Interpretation Comments

 

             Monocytes # (Auto) (test code = 742-7) 0.3          0.2-0.8        

           



[facility]inteEosinophils # (Auto)2017-11-10 20:53:00





             Test Item    Value        Reference Range Interpretation Comments

 

             Eosinophils # (Auto) (test code = 0.1          0.0-0.4             

      



             711-2)                                              



[facility]inteBasophils # (Auto)2017-11-10 20:53:00





             Test Item    Value        Reference Range Interpretation Comments

 

             Basophils # (Auto) (test code = 704-7) 0.0          0.0-0.1        

           



[facility]inteAbsolute Immature Granulocyte (auto
2017-11-10 20:53:00





             Test Item    Value        Reference Range Interpretation Comments

 

             Absolute Immature Granulocyte (auto 0.02         0-0.1             

        



             (test code = Absolute Immature                                     

   



             Granulocyte (auto)                                        



[facility]inteWhite Blood Count2017-11-10 20:53:00





             Test Item    Value        Reference Range Interpretation Comments

 

             White Blood Count (test code = 6690-2) 6.48         4.8-10.8       

           



[facility]inteRed Blood Count2017-11-10 20:53:00





             Test Item    Value        Reference Range Interpretation Comments

 

             Red Blood Count (test code = 789-8) 4.15         3.6-5.1           

        



[facility]inteHemoglobin2017-11-10 20:53:00





             Test Item    Value        Reference Range Interpretation Comments

 

             Hemoglobin (test code = 79289-0) 12.4         12.0-16.0            

     



[facility]inteHematocrit2017-11-10 20:53:00





             Test Item    Value        Reference Range Interpretation Comments

 

             Hematocrit (test code = 4544-3) 36.8         34.2-44.1             

    



[facility]inteMean Corpuscular Volume2017-11-10 
20:53:00





             Test Item    Value        Reference Range Interpretation Comments

 

             Mean Corpuscular Volume (test code = 88.7         81-99            

         



             787-2)                                              



[facility]inteMean Corpuscular Hemoglobin2017-11-10 
20:53:00





             Test Item    Value        Reference Range Interpretation Comments

 

             Mean Corpuscular Hemoglobin (test code 29.9         28-32          

           



             = 785-6)                                            



[facility]inteMean Corpuscular Hemoglobin Concent
2017-11-10 20:53:00





             Test Item    Value        Reference Range Interpretation Comments

 

             Mean Corpuscular Hemoglobin Concent 33.7         31-35             

        



             (test code = 786-4)                                        



[facility]inteRed Cell Distribution Width2017-11-10 
20:53:00





             Test Item    Value        Reference Range Interpretation Comments

 

             Red Cell Distribution Width (test code 14.0         11.7-14.4      

           



             = 08057-6)                                          



[facility]intePlatelet Count2017-11-10 20:53:00





             Test Item    Value        Reference Range Interpretation Comments

 

             Platelet Count (test code = 777-3) 54           140-360      L     

       



[facility]inteNeutrophils (%) (Auto)2017-11-10 20:53:00





             Test Item    Value        Reference Range Interpretation Comments

 

             Neutrophils (%) (Auto) (test code = 72.3         38.7-80.0         

        



             25238-5)                                            



[facility]inteLymphocytes (%) (Auto)2017-11-10 20:53:00





             Test Item    Value        Reference Range Interpretation Comments

 

             Lymphocytes (%) (Auto) (test code = 21.1         18.0-39.1         

        



             736-9)                                              



[facility]inteMonocytes (%) (Auto)2017-11-10 20:53:00





             Test Item    Value        Reference Range Interpretation Comments

 

             Monocytes (%) (Auto) (test code = 5.1          4.4-11.3            

      



             5905-5)                                             



[facility]inteEosinophils (%) (Auto)2017-11-10 20:53:00





             Test Item    Value        Reference Range Interpretation Comments

 

             Eosinophils (%) (Auto) (test code = 0.9          0.0-6.0           

        



             713-8)                                              



[facility]inteBasophils (%) (Auto)2017-11-10 20:53:00





             Test Item    Value        Reference Range Interpretation Comments

 

             Basophils (%) (Auto) (test code = 0.3          0.0-1.0             

      



             706-2)                                              



[facility]inteIM GRANULOCYTES %2017-11-10 20:53:00





             Test Item    Value        Reference Range Interpretation Comments

 

             IM GRANULOCYTES % (test code = IM 0.3          0.0-1.0             

      



             GRANULOCYTES %)                                        



[facility]inteNeutrophils # (Auto)2017-11-10 20:53:00





             Test Item    Value        Reference Range Interpretation Comments

 

             Neutrophils # (Auto) (test code = 4.7          2.1-6.9             

      



             751-8)                                              



[facility]inteLymphocytes # (Auto)2017-11-10 20:53:00





             Test Item    Value        Reference Range Interpretation Comments

 

             Lymphocytes # (Auto) (test code = 1.4          1.0-3.2             

      



             67840-2)                                            



[facility]inteMonocytes # (Auto)2017-11-10 20:53:00





             Test Item    Value        Reference Range Interpretation Comments

 

             Monocytes # (Auto) (test code = 742-7) 0.3          0.2-0.8        

           



[facility]inteEosinophils # (Auto)2017-11-10 20:53:00





             Test Item    Value        Reference Range Interpretation Comments

 

             Eosinophils # (Auto) (test code = 0.1          0.0-0.4             

      



             711-2)                                              



[facility]inteBasophils # (Auto)2017-11-10 20:53:00





             Test Item    Value        Reference Range Interpretation Comments

 

             Basophils # (Auto) (test code = 704-7) 0.0          0.0-0.1        

           



[facility]inteAbsolute Immature Granulocyte (auto
2017-11-10 20:53:00





             Test Item    Value        Reference Range Interpretation Comments

 

             Absolute Immature Granulocyte (auto 0.02         0-0.1             

        



             (test code = Absolute Immature                                     

   



             Granulocyte (auto)                                        



[facility] PointeBedside [id number] 02:22:00





             Test Item    Value        Reference Range Interpretation Comments

 

             Bedside Glucose (test code = 43286-8) 337                 H  

          



Meter ID: [id number] Scott & [facility] PointeCreatine Kinase MB
2017 20:46:00





             Test Item    Value        Reference Range Interpretation Comments

 

             Creatine Kinase MB (test code = 2.30         0.00-5.00             

    



             94291-1)                                            



[facility]inteTroponin -70-15 20:46:00





             Test Item    Value        Reference Range Interpretation Comments

 

             Troponin I (test code = JSE8893) 0.007        0-0.300              

     



[facility] PointeMagnesium [id number] 20:42:00





             Test Item    Value        Reference Range Interpretation Comments

 

             Magnesium Level (test code = 27245-9) 1.7          1.3-2.1         

          



[facility] PointeCreatine [id number] 20:42:00





             Test Item    Value        Reference Range Interpretation Comments

 

             Creatine Kinase (test code = 2157-6) 81                      

         



[facility] PointeMagnesium [id number] 20:42:00





             Test Item    Value        Reference Range Interpretation Comments

 

             Magnesium Level (test code = 33509-2) 1.7          1.3-2.1         

          



[facility] PointeProthrombin [id number] 20:34:00





             Test Item    Value        Reference Range Interpretation Comments

 

             Prothrombin Time (test code = 5902-2) 12.9         11.9-14.5       

          



[facility]inteProthromb Time International Ratio
2017 20:34:00





             Test Item    Value        Reference Range Interpretation Comments

 

             Prothromb Time International Ratio 0.93                            

       



             (test code = 6301-6)                                        



Oral Anticoagulant Therapy INR Values:1. Low Intensity Therapy        1.5 - 
2.02. Moderate IntensityTherapy   2.0 - 3.03. High Intensity Therapy(1)    2.5 -
3.54. High Intensity Therapy(2)    3.0 - 4.05. Panic Value INR              &gt;
5.0[facility] PointeActivated Partial Thromboplast Time
2017 20:34:00





             Test Item    Value        Reference Range Interpretation Comments

 

             Activated Partial Thromboplast Time 27.5         23.8-35.5         

        



             (test code = 97383-3)                                        



[facility] PointeProthrombin [id number] 20:34:00





             Test Item    Value        Reference Range Interpretation Comments

 

             Prothrombin Time (test code = 5902-2) 12.9         11.9-14.5       

          



[facility] PointeProthromb Time International Ratio
2017 20:34:00





             Test Item    Value        Reference Range Interpretation Comments

 

             Prothromb Time International Ratio 0.93                            

       



             (test code = 6301-6)                                        



Oral Anticoagulant Therapy INR Values:1. Low Intensity Therapy        1.5 - 
2.02. Moderate IntensityTherapy   2.0 - 3.03. High Intensity Therapy(1)    2.5 -
3.54. High Intensity Therapy(2)    3.0 - 4.05. Panic Value INR              &gt;
5.0[facility] PointeActivated Partial Thromboplast Time
2017 20:34:00





             Test Item    Value        Reference Range Interpretation Comments

 

             Activated Partial Thromboplast Time 27.5         23.8-35.5         

        



             (test code = 73461-1)                                        



[facility] [id number] 20:13:00





             Test Item    Value        Reference Range Interpretation Comments

 

             Urine WBC (test code = 5821-4) 0-5          0-5                    

   



[facility]inteUrine [id number] 20:13:00





             Test Item    Value        Reference Range Interpretation Comments

 

             Urine RBC (test code = 35462-8) NONE         0-5                   

    



[facility]inteUrine [id number] 20:13:00





             Test Item    Value        Reference Range Interpretation Comments

 

             Urine Bacteria (test code = 62777-0) FEW          NONE             

         



[facility] Kudpt7541-12-62 20:13:00





             Test Item    Value        Reference Range Interpretation Comments

 

             Urine Epithelial Cells (test code = FEW          NONE              

        



             25984-5)                                            



[facility] OEP8987-38-38 20:13:00





             Test Item    Value        Reference Range Interpretation Comments

 

             Urine WBC (test code = 5821-4) 0-5          0-5                    

   



[facility] RIT7492-63-07 20:13:00





             Test Item    Value        Reference Range Interpretation Comments

 

             Urine RBC (test code = 21421-6) NONE         0-5                   

    



[facility] Ulevpyqs2986-32-53 20:13:00





             Test Item    Value        Reference Range Interpretation Comments

 

             Urine Bacteria (test code = 75638-0) FEW          NONE             

         



[facility] Xcdgq0125-68-99 20:13:00





             Test Item    Value        Reference Range Interpretation Comments

 

             Urine Epithelial Cells (test code = FEW          NONE              

        



             16858-0)                                            



[facility] Agkvb1621-26-11 19:42:00





             Test Item    Value        Reference Range Interpretation Comments

 

             Urine Color (test code = 5778-6) STRAW        YELLOW               

     



[facility] Rdfqkwz3078-11-32 19:42:00





             Test Item    Value        Reference Range Interpretation Comments

 

             Urine Clarity (test code = 56562-0) CLEAR        CLEAR             

        



[facility] Specific Mfjjwwh0215-99-98 19:42:00





             Test Item    Value        Reference Range Interpretation Comments

 

             Urine Specific Gravity (test code = 1.005        1.010-1.025  L    

        



             5811-5)                                             



[facility] mU5115-94-40 19:42:00





             Test Item    Value        Reference Range Interpretation Comments

 

             Urine pH (test code = 92905-2) 7            5-7                    

   



[facility] Leukocyte Psahnrtu8253-37-31 
19:42:00





             Test Item    Value        Reference Range Interpretation Comments

 

             Urine Leukocyte Esterase (test code NEGATIVE     NEGATIVE          

        



             = 5799-2)                                           



[facility] Zxnizdz5110-75-35 19:42:00





             Test Item    Value        Reference Range Interpretation Comments

 

             Urine Nitrite (test code = 97065-4) NEGATIVE     NEGATIVE          

        



[facility] Apctzkb8706-29-97 19:42:00





             Test Item    Value        Reference Range Interpretation Comments

 

             Urine Protein (test code = 5804-0) NEGATIVE     NEGATIVE           

       



[facility] Glucose (UA)2017 19:42:00





             Test Item    Value        Reference Range Interpretation Comments

 

             Urine Glucose (UA) (test code = 2349-9) 3+           NEGATIVE     H

            



[facility] Enawemo1900-57-63 19:42:00





             Test Item    Value        Reference Range Interpretation Comments

 

             Urine Ketones (test code = 97916-5) NEGATIVE     NEGATIVE          

        



[facility] Xuhlpbrtznva9279-68-28 19:42:00





             Test Item    Value        Reference Range Interpretation Comments

 

             Urine Urobilinogen (test code = 1            0.2-1                 

    



             57250-9)                                            



[facility] Bmflahjfy0078-76-29 19:42:00





             Test Item    Value        Reference Range Interpretation Comments

 

             Urine Bilirubin (test code = 1978-6) NEGATIVE     NEGATIVE         

         



[facility] Alcge8285-29-62 19:42:00





             Test Item    Value        Reference Range Interpretation Comments

 

             Urine Blood (test code = 74659-4) NEGATIVE     NEGATIVE            

      



[facility] Bhrkr2197-74-65 19:42:00





             Test Item    Value        Reference Range Interpretation Comments

 

             Urine Color (test code = 5778-6) STRAW        YELLOW               

     



[facility] Ohtcrzq5671-20-77 19:42:00





             Test Item    Value        Reference Range Interpretation Comments

 

             Urine Clarity (test code = 48305-7) CLEAR        CLEAR             

        



[facility] Specific Ycabhiy4892-81-04 19:42:00





             Test Item    Value        Reference Range Interpretation Comments

 

             Urine Specific Gravity (test code = 1.005        1.010-1.025  L    

        



             5811-5)                                             



[facility] oL5714-23-69 19:42:00





             Test Item    Value        Reference Range Interpretation Comments

 

             Urine pH (test code = 43124-8) 7            5-7                    

   



[facility] Leukocyte Jsjkrumf5314-70-20 
19:42:00





             Test Item    Value        Reference Range Interpretation Comments

 

             Urine Leukocyte Esterase (test code NEGATIVE     NEGATIVE          

        



             = 5799-2)                                           



[facility] Lwmenfe3798-37-53 19:42:00





             Test Item    Value        Reference Range Interpretation Comments

 

             Urine Nitrite (test code = 12160-2) NEGATIVE     NEGATIVE          

        



[facility] Cqqglav0006-45-82 19:42:00





             Test Item    Value        Reference Range Interpretation Comments

 

             Urine Protein (test code = 5804-0) NEGATIVE     NEGATIVE           

       



[facility] Glucose (UA)2017 19:42:00





             Test Item    Value        Reference Range Interpretation Comments

 

             Urine Glucose (UA) (test code = 2349-9) 3+           NEGATIVE     H

            



[facility] Dbkwgcy2053-32-83 19:42:00





             Test Item    Value        Reference Range Interpretation Comments

 

             Urine Ketones (test code = 72941-7) NEGATIVE     NEGATIVE          

        



[facility] Lfpcsfjmuwff2890-10-61 19:42:00





             Test Item    Value        Reference Range Interpretation Comments

 

             Urine Urobilinogen (test code = 1            0.2-1                 

    



             19842-5)                                            



[facility] Zigentroe2498-78-28 19:42:00





             Test Item    Value        Reference Range Interpretation Comments

 

             Urine Bilirubin (test code = 1978-6) NEGATIVE     NEGATIVE         

         



[facility] Jaobu6426-40-85 19:42:00





             Test Item    Value        Reference Range Interpretation Comments

 

             Urine Blood (test code = 15589-4) NEGATIVE     NEGATIVE            

      



[facility] Level2017-10-06 02:32:00





             Test Item    Value        Reference Range Interpretation Comments

 

             Amylase Level (test code = 1798-8) 49                        

       



[facility]2017-10-06 02:32:00





             Test Item    Value        Reference Range Interpretation Comments

 

             Lipase (test code = 3040-3) 51           8-78                      



[facility] Level2017-10-06 02:32:00





             Test Item    Value        Reference Range Interpretation Comments

 

             Amylase Level (test code = 1798-8) 49                        

       



[facility]2017-10-06 02:32:00





             Test Item    Value        Reference Range Interpretation Comments

 

             Lipase (test code = 3040-3) 51           8-78                      



[facility]ate[facility] Gjgnmxls0389-11-01 17:00:00





             Test Item    Value        Reference Range Interpretation Comments

 

             Platelet Estimate (test SLIGHTLY DECREASED                         

  



             code = 64255-7)                                        



[facility] Morphology Edyaiml9631-10-10 
17:00:00





             Test Item    Value        Reference Range Interpretation Comments

 

             Platelet Morphology Comment (test FEW LARGE                        

      



             code = 45831-3)                                        



No platelet clumps seen on smearB[facility] PointeRed Cell 
Morphology Dtecrtp0087-19-96 17:00:00





             Test Item    Value        Reference Range Interpretation Comments

 

             Red Cell Morphology Comment (test code NORMAL                      

           



             = 6742-1)                                           



[facility]inte[facility]atelet Jympkfax3575-49-43 17:00:00





             Test Item    Value        Reference Range Interpretation Comments

 

             Platelet Estimate (test SLIGHTLY DECREASED                         

  



             code = 69040-6)                                        



[facility]inte[facility]atelet Morphology Hltsddx7078-28-70 
17:00:00





             Test Item    Value        Reference Range Interpretation Comments

 

             Platelet Morphology Comment (test FEW LARGE                        

      



             code = 71553-6)                                        



No platelet clumps seen on smearB[facility] PointeRed Cell 
Morphology Bcnyfii1856-26-28 17:00:00





             Test Item    Value        Reference Range Interpretation Comments

 

             Red Cell Morphology Comment (test code NORMAL                      

           



             = 6742-1)                                           



[facility] Hyaline Igphe9598-16-27 14:57:00





             Test Item    Value        Reference Range Interpretation Comments

 

             Urine Hyaline Casts (test code = 2-5          0-1          H       

     



             50948-1)                                            



[facility] Qvuua9888-63-53 14:57:00





             Test Item    Value        Reference Range Interpretation Comments

 

             Urine Yeast (test code = 03815-4) FEW          NONE         H      

      



[facility] Hyaline Lhgia7652-97-74 14:57:00





             Test Item    Value        Reference Range Interpretation Comments

 

             Urine Hyaline Casts (test code = 2-5          0-1          H       

     



             85003-5)                                            



[facility] Qftwc4519-90-42 14:57:00





             Test Item    Value        Reference Range Interpretation Comments

 

             Urine Yeast (test code = 16413-0) FEW          NONE         H      

      



[facility]   [address] [place] [address]    Patient Name : VALENTE GROVER         MR #: [id number]   : 
1960         Age/Sex: 57/F      Acct # : [id number]           Adm 
Physician  : ROSE KITCHEN MD       Admit Date  :  18       Location : [facility]  
 Room/Bed : [place] [address]          
________________________________________________________
___________________________________________     REPORT: Cardiology Report       
DATE OF STUDY:  2018       LEXISCAN MYOVIEW      The patient had 
resting perfusion images after an injection of11    millicuries of technetium-
99m Myoview.  Later, due to inability to    exercise, she was given Lexiscan 0.4
mg intravenously, and shortly    afterwards 33 millicuries of technetium-99m 
Myoview.  Perfusion images were    taken by rotational tomography.      
Comparison resting and Lexiscan stress images show no evidence of any    
perfusion defect.  Uptake is smooth and regular throughout.  No suggestion    of
any scar or any ischemia.  Additionally, gaited wall motion images were    
obtained and calculated ejection fraction normal at 54% without regional    wall
motion abnormality.        FINAL IMPRESSION    1.  Normal Lexiscan Myoview for 
perfusion.   2.  Normal left ventricular function, calculated ejection fraction 
54%.                 DD:  2018 16:58   DT:  2018 18:08   Job#:  [id number]
[id number]      cc:   ROSE KITCHEN MD           Signature                     Date
                          Dictated By: OLIVIA ESTRADA MD  Transcribed By: TIMOTHY 
on 18   &lt;Electronically signed by OLIVIA ESTRADA MD&gt;&lt;&lt;Signature on File&gt;&gt;18 1416    COPY TO:CT CHEST WO    
[place] [id number] [place] [address]  Patient Name: VALENTE GROVER   MR #: [id number]    : 
1960 Age/Sex: 57/F  Acct #: [id number] Req #: [id number]  Adm Physician:
ROSE KITCHEN MD    Ordered by: ROSE KITCHEN MD  Report #: [id number]   Location: 
[facility]  Room/Bed: [place] [address]    
_________________________________________________________
__________________________________________    Procedure: [id number] CT/CT CHEST 
WO  Exam Date: 18                            Exam Time: 1115       REPORT 
STATUS: Signed    PROCEDURE: CT CHEST WITHOUT CONTRAST   CT scan of the chest 
WITHOUT intravenous contrast, using standard    protocol.       TECHNIQUE:   The
chest was scanned utilizing a multidetector helical scanner from    the apex to 
the level of the adrenal glands.  No IV contrast was    administered as per 
physician request.  Coronal and sagittal    multiplanar reformations were 
obtained.       COMPARISON: None.       INDICATIONS:   CHEST PAIN           
FINDINGS:   Lines/tubes:  None.       Lungs and Airways:  The lungs and airways 
are normal with no focal    abnormality demonstrated. Right upper lobe scarring.
Bibasilar    dependentatelectasis.       Pleura: The pleural spaces are clear.  
    Heart and mediastinum:  The thyroid gland is normal. No significant    
mediastinal, hilar or axillary lymphadenopathy is seen. The heart and    
pericardium are within normal limits.       Soft tissues: Normal.       Abdomen:
Nodular contour of the liver. The spleen is enlarged,    measuring 17 cm in AP 
length. Multiple splenic and esophageal varices    are partially visualized. 
Trace ascites is present in the right upper    quadrant. Theadrenal glands are 
normal.       Bones: The visualized bony thorax is within normal limits.        
IMPRESSION:        1. No acute abnormality of the chest.    2. Hepatic 
parenchymal appearance consistent with cirrhotic morphology.        3. 
Splenomegaly and varices consistent with portal hypertension.          Dictated 
by:  Rose Alex M.D. on 2018 at 13:18        Electronically approved by: 
Rose Alex M.D. on 2018 at 13:18                Dictated By: ROSE ALEX MD  Electronically Signed By: ROSE ALEX MD on 18 1318  Transcribed By: 
PILAR on 18 1318       COPY TO:   ROSE KITCHEN ACUTE SERIES W/PA 
CXR    [place] [address]  Patient Name: VALENTE GROVER   MR #: [id number]    : 
1960 Age/Sex: 56/F  Acct #: [id number] Req #: [id number]  Adm Physician:
    Ordered by: SMITHA MCMAHAN MD  Report #: [id number] Location: ER  
Room/Bed:     
________________________________________________________________________
___________________________    Procedure: [id number] DX/ABDOMEN ACUTE SERIES W/PA
CXR  Exam Date: 10/06/17                            Exam Time: 0210       REPORT
STATUS: Signed    EXAM: ABDOMEN ACUTE SERIES W/PA CXR, supine and erect views of
the abdomen, AP   view of the chest   DATE: 10/6/2017 1:29 AM  Time stamp on 
exam: 0155 hours   INDICATION: Abdominal pain   COMPARISON: CT of the abdomen 
and pelvis 2017      FINDINGS:   LINES/TUBES: None      LUNGS: No 
consolidations or edema.       PLEURA: No effusions or pneumothorax.      HEART 
AND MEDIASTINUM: Normal size and contour.      BOWEL PATTERN: Non-obstructed 
bowel gas pattern.      BONES AND SOFT TISSUES: No acute bone findings. No abn
ormal calcifications. No   mass effect. Bilateral chest surgical clips. Surgical
clips right upperquadrant of the abdomen.      IMPRESSION:   No acute thoracic 
abnormality.      No evidence for bowel obstruction.               Signed by: 
Dr. Akiko Mehta M.D. on 10/6/2017 2:40 AM        Dictated By: AKIKO MEHTA MD  Electronically Signed By: AKIKO MEHTA MD on 10/06/17 0240  
Transcribed By: CLIFFORD on 10/06/17 0240       COPY TO:   SMITHA MCMAHAN MD

## 2021-11-21 VITALS — SYSTOLIC BLOOD PRESSURE: 148 MMHG | DIASTOLIC BLOOD PRESSURE: 78 MMHG

## 2021-11-21 VITALS — TEMPERATURE: 97.7 F | OXYGEN SATURATION: 95 %

## 2021-11-21 LAB
ALBUMIN SERPL BCP-MCNC: 2.1 G/DL (ref 3.4–5)
ALP SERPL-CCNC: 97 U/L (ref 45–117)
ALT SERPL W P-5'-P-CCNC: 14 U/L (ref 12–78)
AST SERPL W P-5'-P-CCNC: 24 U/L (ref 15–37)
BLD SMEAR INTERP: (no result)
BUN BLD-MCNC: 18 MG/DL (ref 7–18)
GLUCOSE SERPLBLD-MCNC: 292 MG/DL (ref 74–106)
HCT VFR BLD CALC: 26.1 % (ref 36–45)
HCT VFR BLD CALC: 28.5 % (ref 36–45)
HDLC SERPL-MCNC: 28 MG/DL (ref 40–60)
INR BLD: 1.11
LDLC SERPL CALC-MCNC: 85 MG/DL (ref ?–130)
LYMPHOCYTES # SPEC AUTO: 0.7 K/UL (ref 0.7–4.9)
LYMPHOCYTES # SPEC AUTO: 1 K/UL (ref 0.7–4.9)
MAGNESIUM SERPL-MCNC: 1.8 MG/DL (ref 1.8–2.4)
MORPHOLOGY BLD-IMP: (no result)
NT-PROBNP SERPL-MCNC: 2333 PG/ML (ref ?–125)
PMV BLD: 6.3 FL (ref 7.6–11.3)
PMV BLD: 7 FL (ref 7.6–11.3)
POTASSIUM SERPL-SCNC: 3.7 MMOL/L (ref 3.5–5.1)
RBC # BLD: 2.89 M/UL (ref 3.86–4.86)
RBC # BLD: 3.1 M/UL (ref 3.86–4.86)
TROPONIN (EMERG DEPT USE ONLY): < 0.02 NG/ML (ref 0–0.04)
TROPONIN I: < 0.02 NG/ML (ref 0–0.04)
TSH SERPL DL<=0.05 MIU/L-ACNC: 3.33 UIU/ML (ref 0.36–3.74)
UA DIPSTICK W REFLEX MICRO PNL UR: (no result)

## 2021-11-21 NOTE — RAD REPORT
EXAM DESCRIPTION:  RAD - Chest Single View - 11/20/2021 11:36 pm

 

CLINICAL HISTORY:  SOB

 

COMPARISON:  July 2020

 

TECHNIQUE:  AP portable chest image was obtained 11/20/2021 11:36 pm .

 

FINDINGS:  Lung volumes are low accentuating interstitial pattern. Stranding in each lung base is fav
ored to be atelectasis. A mild interstitial edema or infiltrate is possible. Small pleural effusions 
are suspected. Heart size is normal. No pneumothorax seen. No acute bony abnormality seen. No acute a
ortic findings suspected.

 

IMPRESSION:  Vasculature and interstitial markings are prominent at least partly due to shallow inspi
ration atelectasis. Early edema or interstitial infiltrate suspected.

 

Small pleural effusions are suspected.

## 2021-11-21 NOTE — P.HP
Certification for Inpatient


Patient admitted to: Observation


With expected LOS: <2 Midnights


Patient will require the following post-hospital care: None


Practitioner: I am a practitioner with admitting privileges, knowledge of 

patient current condition, hospital course, and medical plan of care.


Services: Services provided to patient in accordance with Admission requirements

found in Title 42 Section 412.3 of the Code of Federal Regulations





<Pascual Zaldivar - Last Filed: 21 00:56>





Patient History


Date of Service: 21


History of Present Illness: 


61-year-old  female with history of chronic systolic congestive heart 

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 

hypertension, hypothyroidism, COPD, diabetes mellitus type 2 presents emergency 

department for chest pain and dyspnea on exertion.  Patient reports increasing 

shortness of breath, lower extremity edema and dyspnea on exertion over the 

course of last 3 days.  Patient was admitted approximately 1 month ago at [facility] for similar symptoms.  Patient reports that she had echocardiogram done

at that time but unsure of her ejection fraction.  Chest x-ray appears to 

demonstrate volume overload pattern labs significant for platelets 32 white 

blood cell count 2.7 hemoglobin 8.7 glucose 292 GFR 45 BNP 2333.  ED provider 

wishes to admit for CHF exacerbation.








- Past Medical/Surgical History


Diabetic: Yes


-: Cirrohsis, hep C, hepatic carcinoma


-: Lymphedema


-: Diabetes mellitus type 2


-: Glaucoma


-: Hypothyroidism


-: COPD


-: Anxiety


-: Chronic systolic congestive heart failure


-: Cholecystectomy


-: 


-: Left mastectomy


-: Appendectomy


-: Hysterectomy


Psychosocial/ Personal History: Patient lives at home, alone





- Family History


Family History: Reviewed- Non-Contributory





- Social History


Smoking Status: Current every day smoker


Counseled patient to stop smoking for: less than 10 minutes


Smoking therapy provided: No (Patient declined)


Alcohol use: No


CD- Drugs: No


Caffeine use: Yes


Place of Residence: Home





<Pascual Zaldivar - Last Filed: 21 00:56>


Date of Service: 21





<Joaquina Toney - Last Filed: 21 20:20>


Allergies





acetaminophen [From Darvocet-N 100] Allergy (Verified 20 17:06)


   Unknown


azithromycin [From Zithromax Z-Manjeet] Allergy (Verified 20 17:06)


   Unknown


propoxyphene napsylate [From Darvocet-N 100] Allergy (Verified 20 17:06)


   Unknown


tramadol Allergy (Verified 20 17:06)


   Unknown


tramadol HCl [From Ultram] Allergy (Verified 20 17:06)


   Unknown


Erythromycin Allergy (Mild, Uncoded 20 17:07)


   Rash





Home Medications: 








Carvedilol [Coreg] 12.5 mg PO BID 21 


Divalproex Sodium [Depakote] 500 mg PO BID 21 


Furosemide [Lasix] 40 mg PO DAILY 21 


Gabapentin 300 mg PO BID 21 


Insulin Degludec [Tresiba Flextouch U-200] 50 unit SQ DAILY 21 


Insulin Lispro [Humalog*] 10 unit SQ TIDWM 21 


Levothyroxine [Synthroid] 75 mcg PO [id number] 21 


Quetiapine [Seroquel] 300 mg PO BEDTIME 21 


Sacubitril/Valsartan [Entresto 49 mg-51 mg Tablet] 1 tab PO BID 21 








Review of Systems


10-point ROS is otherwise unremarkable


Respiratory: Shortness of Breath, SOB with Excertion


Cardiovascular: Chest Pain, Edema





<Attema,Pascual ARZATE Alfredito - Last Filed: 21 00:56>





Physical Examination





- Physical Exam


General: Alert, In no apparent distress, Oriented x3


HEENT: Atraumatic, PERRLA, Mucous membr. moist/pink, EOMI, Sclerae nonicteric


Neck: Supple, 2+ carotid pulse no bruit, No LAD, Without JVD or thyroid 

abnormality


Respiratory: Diminished, Crackles/rales


Cardiovascular: Regular rate/rhythm, Normal S1 S2, Edema (1+ pitting edema 

bilateral lower extremities)


Capillary refill: <2 Seconds


Gastrointestinal: Normal bowel sounds, No tenderness


Musculoskeletal: No tenderness


Integumentary: No rashes


Neurological: Normal speech, Normal strength at 5/5 x4 extr, Normal tone, Normal

affect


Lymphatics: No axilla or inguinal lymphadenopathy





- Studies


Laboratory Data (last 24 hrs)





21 23:40: PT 12.8 H, INR 1.11


21 23:40: WBC 2.70 L, Hgb 8.7 L, Hct 26.1 L, Plt Count 32 L*


21 23:40: Sodium 141, Potassium 3.7, BUN 18, Creatinine 1.23, Glucose 292 

H, Magnesium 1.8, Total Bilirubin 0.9, AST 24, ALT 14, Alkaline Phosphatase 97








<Pascual Zaldivar - Last Filed: 21 00:56>





- Studies


Laboratory Data (last 24 hrs)





21 23:40: PT 12.8 H, INR 1.11


21 23:40: WBC 2.70 L, Hgb 8.7 L, Hct 26.1 L, Plt Count 32 L*


21 23:40: Sodium 141, Potassium 3.7, BUN 18, Creatinine 1.23, Glucose 292 

H, Magnesium 1.8, Total Bilirubin 0.9, AST 24, ALT 14, Alkaline Phosphatase 97








<Joaquina Toney - Last Filed: 21 20:20>





Assessment and Plan





- Plan


Assessment:


Dyspnea, chest pain secondary to acute on chronic systolic congestive heart 

failure


Severe thrombocytopenia secondary to cirrhosis of the liver/hepatitis C/hepatic 

carcinoma


Anemia of chronic disease


Diabetes mellitus type 2 with hyperglycemia


Hypertension


Hypothyroidism





Plan:


Dyspnea, chest pain secondary to acute on chronic systolic congestive heart 

failure: Patient takes Lasix 40 g p.o. twice daily at home will increase to 

Lasix 40 mg IV 3 times daily at this time, will obtain echocardiogram that was 

performed less than a month ago at [facility] and consult cardiology.  Continue

patient on medication Entresto, patient unsure of other medications will need to

obtain and continue as appropriate.  1500 cc/day fluid restriction, daily weigh

ts.


Severe thrombocytopenia secondary to cirrhosis of the liver/hepatitis C/hepatic 

carcinoma: SCD for DVT prophylaxis, patient being monitored at [facility] 

reports that she is not a candidate for any other forms of treatment including 

transplant.


Anemia of chronic disease: Transfuse to maintain hemoglobin greater than 8 given

acute on chronic systolic congestive heart failure and other comorbidities.


Diabetes mellitus type 2 with hyperglycemia: A [facility] Accu-Chek, sliding scale 

insulin.  A1c with morning labs.


Hypertension: Obtain and continue medications


Hypothyroidism: Obtain continue medication, thyroid panel with morning lab





DVT PPX: SCDs given thrombocytopenia


Code status: Full code


Discharge Plan: Home


Plan to discharge in: 24 Hours





- Advance Directives


Does patient have a Living Will: No


Does patient have a Durable POA for Healthcare: No





- Code Status/Comfort Care


Code Status Assessed: Yes (Full code)


Critical Care: No


Time Spent Managing Pts Care (In Minutes): 55





<Pascual Zaldivar - Last Filed: 21 00:56>


Date of Service: 21





Subjective:


Agree with the HPI as above





Physical Examination:


Vitals:  Afebrile vital signs are stable


Physical exam:


Cardiovascular:  Within normal limits.


Lungs:  Within normal limits


Abdomen:  Within normal limits


Neuro:  Awake, alert, oriented to person place and time





Assessment:


1.  Shortness of breath


2. Pancytopenia


3. Hepatocellular carcinoma





Plan:


1.  Continue with current plan of care as mentioned above





<Joaquina Toney - Last Filed: 21 20:20>

## 2021-11-21 NOTE — P.DS
Discharge Date: 11/21/21


Disposition: AMA-LEFT AGAINST MEDICAL ADVIC


Brief History of Present Illness: 





61-year-old  female with history of chronic systolic congestive heart 

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 

hypertension, hypothyroidism, COPD, diabetes mellitus type 2 presents emergency 

department for chest pain and dyspnea on exertion.  Patient reports increasing 

shortness of breath, lower extremity edema and dyspnea on exertion over the 

course of last 3 days.  Patient was admitted approximately 1 month ago at [facility] for similar symptoms.  Patient reports that she had echocardiogram done

at that time but unsure of her ejection fraction.  Chest x-ray appears to 

demonstrate volume overload pattern labs significant for platelets 32 white 

blood cell count 2.7 hemoglobin 8.7 glucose 292 GFR 45 BNP 2333.  ED provider 

wishes to admit for CHF exacerbation.


Hospital Course: 





Patient was feeling much better.  Patient respiratory status has improved.  

Patient decided to leave against medical advice.


Vital Signs/Physical Exam: 














Temp Pulse Resp BP Pulse Ox


 


 97.7 F   61   18   148/78 H  100 


 


 11/21/21 08:00  11/21/21 08:11  11/21/21 09:20  11/21/21 08:11  11/21/21 09:20








General: Alert, In no apparent distress, Oriented x3


Laboratory Data at Discharge: 














WBC  2.80 K/uL (4.3-10.9)  L  11/21/21  06:48    


 


Hgb  9.3 g/dL (12.0-15.0)  L  11/21/21  06:48    


 


Hct  28.5 % (36.0-45.0)  L  11/21/21  06:48    


 


Plt Count  34 K/uL (152-406)  L*  11/21/21  06:48    


 


PT  12.8 SECONDS (9.5-12.5)  H  11/20/21  23:40    


 


INR  1.11   11/20/21  23:40    


 


Sodium  141 mmol/L (136-145)   11/20/21  23:40    


 


Potassium  3.7 mmol/L (3.5-5.1)   11/20/21  23:40    


 


BUN  18 mg/dL (7-18)   11/20/21  23:40    


 


Creatinine  1.23 mg/dL (0.55-1.3)   11/20/21  23:40    


 


Glucose  292 mg/dL ()  H  11/20/21  23:40    


 


Magnesium  1.8 mg/dL (1.8-2.4)   11/20/21  23:40    


 


Total Bilirubin  0.9 mg/dL (0.2-1.0)   11/20/21  23:40    


 


AST  24 U/L (15-37)   11/20/21  23:40    


 


ALT  14 U/L (12-78)   11/20/21  23:40    


 


Alkaline Phosphatase  97 U/L ()   11/20/21  23:40    


 


Troponin I  Cancelled   11/21/21  13:00    


 


Triglycerides  155 mg/dL (<150)  H  11/21/21  06:48    


 


Cholesterol  144 mg/dL (<200)   11/21/21  06:48    


 


HDL Cholesterol  28 mg/dL (40-60)  L  11/21/21  06:48    


 


Cholesterol/HDL Ratio  5.14   11/21/21  06:48    








Home Medications: 








Carvedilol [Coreg] 12.5 mg PO BID 01/08/21 


Divalproex Sodium [Depakote] 500 mg PO BID 01/08/21 


Furosemide [Lasix] 40 mg PO DAILY 01/08/21 


Gabapentin 300 mg PO BID 01/08/21 


Insulin Degludec [Tresiba Flextouch U-200] 50 unit SQ DAILY 01/08/21 


Insulin Lispro [Humalog*] 10 unit SQ TIDWM 01/08/21 


Levothyroxine [Synthroid] 75 mcg PO [id number] 01/08/21 


Quetiapine [Seroquel] 300 mg PO BEDTIME 01/08/21 


Sacubitril/Valsartan [Entresto 49 mg-51 mg Tablet] 1 tab PO BID 01/08/21 





Physician Discharge Instructions: 


Patient left against medical advice


Followup: 


Diana Stevens [Primary Care Provider] - 


Time spent managing pt's care (in minutes): 35

## 2021-11-21 NOTE — ER
Nurse's Notes                                                                                     

 [facility]                                                                 

Name: Deborah Alatorre                                                                              

Age: 61 yrs                                                                                       

Sex: Female                                                                                       

: 1960                                                                                   

MRN: [id number]                                                                                   

Arrival Date: 2021                                                                          

Time: 22:26                                                                                       

Account#: [id number]                                                                            

Bed 25                                                                                            

Private MD:                                                                                       

Diagnosis: CHF Exacerbation                                                                       

                                                                                                  

Presentation:                                                                                     

                                                                                             

22:30 Chief complaint: Patient states: "I got out of the hospital 2 weeks ago, since then     aj1 

      I've been gaining fluid. For the past 3 days I've been having a hard time breathing,        

      having chest pain and tightness. Now my blood pressure has been going up. When I            

      checked it at home it was 200/111". Coronavirus screen: Vaccine status: Patient reports     

      receiving the 2nd dose of the covid vaccine. Ebola Screen: Patient denies travel to an      

      Ebola-affected area in the 21 days before illness onset. Initial Sepsis Screen: Does        

      the patient meet any 2 criteria? No. Patient's initial sepsis screen is negative. Does      

      the patient have a suspected source of infection? No. Patient's initial sepsis screen       

      is negative. Risk Assessment: Do you want to hurt yourself or someone else? Patient         

      reports no desire to harm self or others. Onset of symptoms was 2021.          

22:30 Method Of Arrival: Wheelchair                                                           aj1 

22:30 Acuity: CASEY 3                                                                           aj1 

                                                                                                  

Triage Assessment:                                                                                

22:32 General: Appears in no apparent distress. uncomfortable, Behavior is calm, cooperative, aj1 

      appropriate for age.                                                                        

22:32 Pain: Complains of pain in chest Pain does not radiate. Pain currently is 8 out of 10   aj1 

      on a pain scale. Quality of pain is described as pressure, Pain began 2-3 days ago. Is      

      intermittent, Alleviated by laying down. EENT: No signs and/or symptoms were reported       

      regarding the EENT system. Neuro: Level of Consciousness is awake, alert, obeys             

      commands, Oriented to person, place, time, situation. Cardiovascular: Reports chest         

      pain, shortness of breath, Patient's skin is warm and dry. Respiratory: Airway is           

      patent Respiratory effort is even, unlabored, Respiratory pattern is regular,               

      symmetrical.                                                                                

                                                                                                  

Historical:                                                                                       

- Allergies:                                                                                      

22:32 Darvocet-N 100;                                                                         aj1 

22:32 Demerol;                                                                                aj1 

22:32 Erythromycin;                                                                           aj1 

22:32 Toradol;                                                                                aj1 

22:32 tramadol;                                                                               aj1 

22:32 Zithromax;                                                                              aj 

- Home Meds:                                                                                      

22:32 carvedilol 6.25 mg Oral tab 1 tab [Active]; ernestro [Active]; Seroquel Oral [Active];  aj 

- PMHx:                                                                                           

22:32 Anxiety; Cancer, Breast; CHF; Chronic pain; Colitis; COPD; Depression; Diabetes - IDDM; aj1 

      Hypertension; Liver cell carcinoma; MUSCLE WEAKNESS;                                        

                                                                                                  

- Immunization history:: Flu vaccine is up to date.                                               

- Social history:: Smoking status: Patient reports the use of cigarette tobacco                   

  products, smokes one-half pack cigarettes per day.                                              

                                                                                                  

                                                                                                  

Vital Signs:                                                                                      

22:30  / 76; Pulse 87; Resp 24; Temp 98.0(T); Pulse Ox 94% on R/A; Weight 76.2 kg (R);  aj1 

      Height 5 ft. 3 in. (160.02 cm) (R); Pain 8/10;                                              

                                                                                             

03:30  / 80; Pulse 64; Resp 19; Temp 98.4; Pulse Ox 97% on R/A;                         mr2 

                                                                                             

22:30 Body Mass Index 29.76 (76.20 kg, 160.02 cm)                                             [facility] 

                                                                                                  

ED Course:                                                                                        

                                                                                             

22:26 Patient arrived in ED.                                                                    

22:32 Triage completed.                                                                       aj1 

22:32 Arm band placed on.                                                                     aj1 

22:44 Jay Miller, RN is Primary Nurse.                                                     mr2 

22:49 Herbert Recio MD is Attending Physician.                                             7 

23:35 XRAY Chest (1 view) In Process Unspecified.                                             [facility]

                                                                                             

00:30 Joaquina Toney MD is Hospitalizing Provider.                                           [facility] 

04:03 Primary Nurse role handed off by Jay Miller, BART                                      bb  

                                                                                                  

Administered Medications:                                                                         

                                                                                             

23:17 Drug: morphine 4 mg Route: IVP; Site: right antecubital;                                mr2 

23:17 Drug: Zofran (Ondansetron) 4 mg Route: IVP; Site: right antecubital;                    mr2 

                                                                                             

02:14 Drug: Lasix (furosemide) 20 mg Route: IVP; Site: left upper arm;                        mr2 

02:14 Drug: Lasix (furosemide) 40 mg Route: IVP; Site: left upper arm;                        mr2 

                                                                                                  

                                                                                                  

Outcome:                                                                                          

00:31 Decision to Hospitalize by Provider.                                                    altagracia 

04:03 Patient left the ED.                                                                    freddy  

10:51 Patient left the ED.                                                                      

                                                                                                  

Signatures:                                                                                       

Dispatcher [employer]                           EDUrsula Oro, BART                     RN   aj1                                                  

Henny Hollins RN                     RN   bb                                                   

Roxane Mcleod RN                      RN   ss                                                   

Herbert Recio MD MD   7                                                  

Ignacia Thompson                                                                                    

Jay Miller RN                       RN   mr2                                                  

                                                                                                  

**************************************************************************************************

## 2021-11-21 NOTE — EDPHYS
Physician Documentation                                                                           

 [facility]                                                                 

Name: Deborah Alatorre                                                                              

Age: 61 yrs                                                                                       

Sex: Female                                                                                       

: 1960                                                                                   

MRN: [id number]                                                                                   

Arrival Date: 2021                                                                          

Time: 22:26                                                                                       

Account#: [id number]                                                                            

Bed 25                                                                                            

Private MD:                                                                                       

ED Physician Herbert Recio                                                                      

HPI:                                                                                              

                                                                                             

23:08 This 61 yrs old Female presents to ER via Wheelchair with complaints of Chest Pain > 30 mh7 

      y/o, High Blood Pressure.                                                                   

23:08 The patient has shortness of breath with light activity. Onset: The symptoms/episode    mh7 

      began/occurred 3 day(s) ago. Duration: The symptoms are intermittent, with no pattern.      

      The patient's shortness of breath is aggravated by exertion, light activity, is             

      alleviated by sitting up. Associated signs and symptoms: Pertinent positives: chest         

      pain, non-productive cough, Pertinent negatives: productive cough, diaphoresis,             

      dizziness, fever, hemoptysis, loss of consciousness, nausea, numbness in extremities,       

      visual changes, vomiting. Severity of symptoms: At their worst the symptoms were            

      moderate 2 day(s) ago, in the emergency department the symptoms have improved               

      moderately.                                                                                 

                                                                                                  

Historical:                                                                                       

- Allergies:                                                                                      

22:32 Darvocet-N 100;                                                                         aj1 

22:32 Demerol;                                                                                aj1 

22:32 Erythromycin;                                                                           aj1 

22:32 Toradol;                                                                                aj1 

22:32 tramadol;                                                                               aj1 

22:32 Zithromax;                                                                              aj1 

- Home Meds:                                                                                      

22:32 carvedilol 6.25 mg Oral tab 1 tab [Active]; ernestro [Active]; Seroquel Oral [Active];  aj1 

- PMHx:                                                                                           

22:32 Anxiety; Cancer, Breast; CHF; Chronic pain; Colitis; COPD; Depression; Diabetes - IDDM; aj1 

      Hypertension; Liver cell carcinoma; MUSCLE WEAKNESS;                                        

                                                                                                  

- Immunization history:: Flu vaccine is up to date.                                               

- Social history:: Smoking status: Patient reports the use of cigarette tobacco                   

  products, smokes one-half pack cigarettes per day.                                              

                                                                                                  

                                                                                                  

ROS:                                                                                              

23:08 Constitutional: Negative for fever, chills, and weight loss, Eyes: Negative for injury, mh7 

      pain, redness, and discharge, ENT: Negative for injury, pain, and discharge, Neck:          

      Negative for injury, pain, and swelling, Abdomen/GI: Negative for abdominal pain,           

      nausea, vomiting, diarrhea, and constipation, Back: Negative for injury and pain, :       

      Negative for injury, bleeding, discharge, and swelling, MS/Extremity: Negative for          

      injury and deformity, Skin: Negative for injury, rash, and discoloration, Neuro:            

      Negative for headache, weakness, numbness, tingling, and seizure, Psych: Negative for       

      depression, anxiety, suicide ideation, homicidal ideation, and hallucinations,              

      Allergy/Immunology: Negative for hives, rash, and allergies, Endocrine: Negative for        

      neck swelling, polydipsia, polyuria, polyphagia, and marked weight changes,                 

      Hematologic/Lymphatic: Negative for swollen nodes, abnormal bleeding, and unusual           

      bruising.                                                                                   

                                                                                                  

Exam:                                                                                             

23:08 Constitutional:  This is a well developed, well nourished patient who is awake, alert,  mh7 

      and in no acute distress. Head/Face:  Normocephalic, atraumatic. Eyes:  Pupils equal        

      round and reactive to light, extra-ocular motions intact.  Lids and lashes normal.          

      Conjunctiva and sclera are non-icteric and not injected.  Cornea within normal limits.      

      Periorbital areas with no swelling, redness, or edema. Neck:  Trachea midline, no           

      thyromegaly or masses palpated, and no cervical lymphadenopathy.  Supple, full range of     

      motion without nuchal rigidity, or vertebral point tenderness.  No Meningismus.             

      Chest/axilla:  Normal chest wall appearance and motion.  Nontender with no deformity.       

      No lesions are appreciated. Cardiovascular:  Regular rate and rhythm with a normal S1       

      and S2.  No gallops, murmurs, or rubs.  Normal PMI, no JVD.  No pulse deficits.             

23:08 Back:  No spinal tenderness.  No costovertebral tenderness.  Full range of motion.          

      Skin:  Warm, dry with normal turgor.  Normal color with no rashes, no lesions, and no       

      evidence of cellulitis. Neuro:  Awake and alert, GCS 15, oriented to person, place,         

      time, and situation.  Cranial nerves II-XII grossly intact.  Motor strength 5/5 in all      

      extremities.  Sensory grossly intact.  Cerebellar exam normal.  Normal gait. Psych:         

      Awake, alert, with orientation to person, place and time.  Behavior, mood, and affect       

      are within normal limits.                                                                   

23:08 Respiratory: the patient does not display signs of respiratory distress,  Respirations:     

      normal, Breath sounds: rhonchi, that are mild, are scattered, Respiratory rate:  20         

                                                                                                  

Vital Signs:                                                                                      

22:30  / 76; Pulse 87; Resp 24; Temp 98.0(T); Pulse Ox 94% on R/A; Weight 76.2 kg (R);  aj1 

      Height 5 ft. 3 in. (160.02 cm) (R); Pain 8/10;                                              

                                                                                             

03:30  / 80; Pulse 64; Resp 19; Temp 98.4; Pulse Ox 97% on R/A;                         mr2 

                                                                                             

22:30 Body Mass Index 29.76 (76.20 kg, 160.02 cm)                                             aj1 

                                                                                                  

MDM:                                                                                              

00:28 Differential diagnosis: Anemia Anxiety Reaction asthma, Bronchitis CHF exacerbation,    7 

      Chronic Obstructive Pulmonary Disease Myocardial Infarction pneumonia, Pneumothorax         

      Psychogenic pulmonary edema, reactive airway disease. Data reviewed: vital signs,           

      nurses notes, old medical records, lab test result(s), cardiac enzymes, CBC,                

      electrolytes, EKG, radiologic studies, plain films. Data interpreted: Pulse oximetry:       

      on room air is 94 %. Interpretation: acceptable. Counseling: I had a detailed               

      discussion with the patient and/or guardian regarding: the historical points, exam          

      findings, and any diagnostic results supporting the discharge/admit diagnosis, the          

      presence of at least one elevated blood pressure reading (>120/80) during this              

      emergency department visit, lab results, radiology results, the need for further            

      work-up and treatment in the hospital. Response to treatment: the patient's symptoms        

      have mildly improved after treatment.                                                       

00:31 Patient medically screened.                                                             [facility] 

                                                                                                  

                                                                                             

23:02 Order name: Basic Metabolic Panel                                                       [facility] 

                                                                                             

23:02 Order name: CBC with Diff                                                               [facility] 

                                                                                             

23:02 Order name: LFT's                                                                       [facility] 

                                                                                             

23:02 Order name: Magnesium                                                                   [facility] 

                                                                                             

23:02 Order name: NT PRO-BNP; Complete Time: 00:28                                            [facility] 

                                                                                             

23:02 Order name: PT-INR; Complete Time: 00:09                                                [facility] 

                                                                                             

23:02 Order name: Troponin (emerg Dept Use Only); Complete Time: 00:28                        [facility] 

                                                                                             

23:03 Order name: Basic Metabolic Panel; Complete Time: 00:28                                 [facility]

                                                                                             

23:03 Order name: CBC with Automated Diff                                                     [facility]

                                                                                             

23:03 Order name: Liver (Hepatic) Function; Complete Time: 00:28                              [facility]

                                                                                             

23:03 Order name: Magnesium; Complete Time: 00:28                                             [facility]

                                                                                             

23:30 Order name: COVID-19 SARS RT PCR (Document "Date of Onset" if Symptomatic)              la1 

                                                                                             

00:11 Order name: CBC Smear Scan                                                              [facility]

                                                                                             

04:54 Order name: Urine Dipstick-Ancillary                                                    [facility]

                                                                                             

23:02 Order name: XRAY Chest (1 view)                                                         [facility] 

                                                                                             

23:02 Order name: EKG; Complete Time: 23:03                                                   [facility] 

                                                                                             

23:02 Order name: Cardiac monitoring                                                          [facility] 

                                                                                             

23:02 Order name: EKG - Nurse/Tech                                                            [facility] 

                                                                                             

05:18 Order name: Urinalysis                                                                  [facility]

                                                                                             

07:19 Order name: CBC with Automated Diff                                                     [facility]

                                                                                             

07:40 Order name: Glucose, Ancillary Testing                                                  [facility]

                                                                                             

07:46 Order name: Troponin I                                                                  [facility]

                                                                                             

07:46 Order name: Lipid Profile                                                               [facility]

                                                                                             

07:46 Order name: T4 Free                                                                     [facility]

                                                                                             

07:46 Order name: Thyroid Stimulating Hormone                                                 [facility]

                                                                                             

08:07 Order name: Hemoglobin A1c                                                              [facility]

                                                                                             

23:02 Order name: IV Saline Lock                                                              [facility] 

                                                                                             

23:02 Order name: Labs collected and sent                                                     [facility] 

                                                                                             

23:02 Order name: O2 Per Protocol                                                             [facility] 

                                                                                             

23:02 Order name: O2 Sat Monitoring                                                           [facility] 

                                                                                             

23:02 Order name: Urine Dipstick-Ancillary (obtain specimen); Complete Time: 04:54            [facility] 

                                                                                                  

Administered Medications:                                                                         

                                                                                             

23:17 Drug: morphine 4 mg Route: IVP; Site: right antecubital;                                mr2 

23:17 Drug: Zofran (Ondansetron) 4 mg Route: IVP; Site: right antecubital;                    mr2 

                                                                                             

02:14 Drug: Lasix (furosemide) 20 mg Route: IVP; Site: left upper arm;                        mr2 

02:14 Drug: Lasix (furosemide) 40 mg Route: IVP; Site: left upper arm;                        mr2 

                                                                                                  

                                                                                                  

Disposition Summary:                                                                              

21 00:31                                                                                    

Hospitalization Ordered                                                                           

      Hospitalization Status: Inpatient Admission                                             [facility] 

      Provider: Joaquina Toney                                                                [place] 

      Condition: Stable                                                                       [facility] 

      Problem: an acute exacerbation                                                          [facility] 

      Symptoms: have improved                                                                 mh7 

      Bed/Room Type: Standard                                                                 [facility] 

      Location: [facility] ER HOLD(21 02:58)                                                  [facility]  

      Room Assignment: ERHOLD-(21 02:58)                                                mw  

      Diagnosis                                                                                   

        - CHF Exacerbation                                                                    [facility] 

      Forms:                                                                                      

        - Medication Reconciliation Form                                                      7 

        - SBAR form                                                                           [facility] 

Signatures:                                                                                       

Dispatcher [employer]                           EDUrsula Oro RN RN   aj1                                                  

Citlalli Galvan RN RN mw Attema, Lee, JANETH-C                      [id number]                                                  

Herbert Recio MD MD   7                                                  

Jay Miller RN                       RN   mr2                                                  

                                                                                                  

Corrections: (The following items were deleted from the chart)                                    

02:58 00:31 Telemetry/MedSurg (Inpatient) [facility]  

02:58 00:31 [facility]  

                                                                                                  

**************************************************************************************************

## 2021-11-24 NOTE — EKG
Test Date:    2021-11-21               Test Time:    09:20:30

Technician:   DAVID                                     

                                                     

MEASUREMENT RESULTS:                                       

Intervals:                                           

Rate:         63                                     

AK:           160                                    

QRSD:         126                                    

QT:           500                                    

QTc:          511                                    

Axis:                                                

P:            35                                     

AK:           160                                    

QRS:          17                                     

T:            88                                     

                                                     

INTERPRETIVE STATEMENTS:                                       

                                                     

Normal sinus rhythm

Left bundle branch block

Abnormal ECG

Compared to ECG 11/20/2021 23:28:21

Left bundle-branch block now present

Left ventricular hypertrophy no longer present

ST (T wave) deviation no longer present

Prolonged QT interval no longer present



Electronically Signed On 11-24-21 08:04:19 CST by Jacek Burgess

## 2021-11-24 NOTE — EKG
Test Date:    2021-11-20               Test Time:    23:28:21

Technician:   RENÉE                                   

                                                     

MEASUREMENT RESULTS:                                       

Intervals:                                           

Rate:         81                                     

OH:           170                                    

QRSD:         118                                    

QT:           426                                    

QTc:          494                                    

Axis:                                                

P:            18                                     

OH:           170                                    

QRS:          6                                      

T:            0                                      

                                                     

INTERPRETIVE STATEMENTS:                                       

                                                     

Normal sinus rhythm

Left ventricular hypertrophy with QRS widening

Nonspecific ST abnormality

Prolonged QT

Abnormal ECG

Compared to ECG 11/20/2021 23:27:35

Prolonged QT interval now present

Possible ischemia no longer present

ST (T wave) deviation still present



Electronically Signed On 11-24-21 08:04:28 CST by Jacek Burgess

## 2021-11-24 NOTE — EKG
Test Date:    2021-11-20               Test Time:    23:27:35

Technician:   RENÉE                                   

                                                     

MEASUREMENT RESULTS:                                       

Intervals:                                           

Rate:         80                                     

TN:           162                                    

QRSD:         120                                    

QT:           448                                    

QTc:          516                                    

Axis:                                                

P:            -7                                     

TN:           162                                    

QRS:          13                                     

T:            27                                     

                                                     

INTERPRETIVE STATEMENTS:                                       

                                                     

Normal sinus rhythm

Left ventricular hypertrophy with QRS widening

ST & T wave abnormality, consider lateral ischemia

Abnormal ECG

Compared to ECG 07/21/2019 20:58:53

Left ventricular hypertrophy now present

ST (T wave) deviation now present

Possible ischemia now present

Left bundle-branch block no longer present



Electronically Signed On 11-24-21 08:04:29 CST by Jacek Burgess

## 2021-12-28 ENCOUNTER — HOSPITAL ENCOUNTER (INPATIENT)
Dept: HOSPITAL 97 - ER | Age: 61
Discharge: LEFT BEFORE DISCHARGE | DRG: 948 | End: 2021-12-28
Attending: HOSPITALIST | Admitting: HOSPITALIST
Payer: COMMERCIAL

## 2021-12-28 VITALS — SYSTOLIC BLOOD PRESSURE: 162 MMHG | DIASTOLIC BLOOD PRESSURE: 72 MMHG | TEMPERATURE: 97.2 F

## 2021-12-28 VITALS — BODY MASS INDEX: 29.6 KG/M2

## 2021-12-28 VITALS — OXYGEN SATURATION: 100 %

## 2021-12-28 DIAGNOSIS — Z85.05: ICD-10-CM

## 2021-12-28 DIAGNOSIS — I50.22: ICD-10-CM

## 2021-12-28 DIAGNOSIS — Z88.5: ICD-10-CM

## 2021-12-28 DIAGNOSIS — Z79.4: ICD-10-CM

## 2021-12-28 DIAGNOSIS — I11.0: ICD-10-CM

## 2021-12-28 DIAGNOSIS — Z88.8: ICD-10-CM

## 2021-12-28 DIAGNOSIS — Z88.1: ICD-10-CM

## 2021-12-28 DIAGNOSIS — Z90.49: ICD-10-CM

## 2021-12-28 DIAGNOSIS — J41.8: ICD-10-CM

## 2021-12-28 DIAGNOSIS — Z20.822: ICD-10-CM

## 2021-12-28 DIAGNOSIS — Z60.2: ICD-10-CM

## 2021-12-28 DIAGNOSIS — F19.20: ICD-10-CM

## 2021-12-28 DIAGNOSIS — D64.9: ICD-10-CM

## 2021-12-28 DIAGNOSIS — R41.82: Primary | ICD-10-CM

## 2021-12-28 DIAGNOSIS — R18.8: ICD-10-CM

## 2021-12-28 DIAGNOSIS — Z79.890: ICD-10-CM

## 2021-12-28 DIAGNOSIS — Z90.710: ICD-10-CM

## 2021-12-28 DIAGNOSIS — Z79.899: ICD-10-CM

## 2021-12-28 DIAGNOSIS — E11.65: ICD-10-CM

## 2021-12-28 DIAGNOSIS — K74.60: ICD-10-CM

## 2021-12-28 LAB
ALBUMIN SERPL BCP-MCNC: 2.1 G/DL (ref 3.4–5)
ALP SERPL-CCNC: 77 U/L (ref 45–117)
ALT SERPL W P-5'-P-CCNC: 44 U/L (ref 12–78)
AMYLASE SERPL-CCNC: 113 U/L (ref 25–115)
AST SERPL W P-5'-P-CCNC: 128 U/L (ref 15–37)
BLD SMEAR INTERP: (no result)
BUN BLD-MCNC: 44 MG/DL (ref 7–18)
CKMB CREATINE KINASE MB: 2 NG/ML (ref 1–3.6)
COHGB MFR BLDA: 5.3 % (ref 0–1.5)
GLUCOSE SERPLBLD-MCNC: 200 MG/DL (ref 74–106)
HCT VFR BLD CALC: 24.3 % (ref 36–45)
INR BLD: 1.25
LIPASE SERPL-CCNC: 97 U/L (ref 73–393)
LYMPHOCYTES # SPEC AUTO: 0.5 K/UL (ref 0.7–4.9)
METHAMPHET UR QL SCN: NEGATIVE
MORPHOLOGY BLD-IMP: (no result)
OXYHGB MFR BLDA: 75.7 % (ref 94–97)
PMV BLD: 6.7 FL (ref 7.6–11.3)
POTASSIUM SERPL-SCNC: 4.6 MMOL/L (ref 3.5–5.1)
RBC # BLD: 2.58 M/UL (ref 3.86–4.86)
SAO2 % BLDA: 81.1 % (ref 92–98.5)
SP GR UR: 1.02 (ref 1–1.03)
THC SERPL-MCNC: NEGATIVE NG/ML
TROPONIN (EMERG DEPT USE ONLY): 0.04 NG/ML (ref 0–0.04)

## 2021-12-28 PROCEDURE — 85730 THROMBOPLASTIN TIME PARTIAL: CPT

## 2021-12-28 PROCEDURE — 84145 PROCALCITONIN (PCT): CPT

## 2021-12-28 PROCEDURE — 82947 ASSAY GLUCOSE BLOOD QUANT: CPT

## 2021-12-28 PROCEDURE — 71045 X-RAY EXAM CHEST 1 VIEW: CPT

## 2021-12-28 PROCEDURE — 93005 ELECTROCARDIOGRAM TRACING: CPT

## 2021-12-28 PROCEDURE — 82140 ASSAY OF AMMONIA: CPT

## 2021-12-28 PROCEDURE — 51702 INSERT TEMP BLADDER CATH: CPT

## 2021-12-28 PROCEDURE — 96365 THER/PROPH/DIAG IV INF INIT: CPT

## 2021-12-28 PROCEDURE — 36415 COLL VENOUS BLD VENIPUNCTURE: CPT

## 2021-12-28 PROCEDURE — 70450 CT HEAD/BRAIN W/O DYE: CPT

## 2021-12-28 PROCEDURE — 80076 HEPATIC FUNCTION PANEL: CPT

## 2021-12-28 PROCEDURE — 87086 URINE CULTURE/COLONY COUNT: CPT

## 2021-12-28 PROCEDURE — 87088 URINE BACTERIA CULTURE: CPT

## 2021-12-28 PROCEDURE — 80307 DRUG TEST PRSMV CHEM ANLYZR: CPT

## 2021-12-28 PROCEDURE — 87205 SMEAR GRAM STAIN: CPT

## 2021-12-28 PROCEDURE — 80164 ASSAY DIPROPYLACETIC ACD TOT: CPT

## 2021-12-28 PROCEDURE — 85025 COMPLETE CBC W/AUTO DIFF WBC: CPT

## 2021-12-28 PROCEDURE — 84484 ASSAY OF TROPONIN QUANT: CPT

## 2021-12-28 PROCEDURE — 81003 URINALYSIS AUTO W/O SCOPE: CPT

## 2021-12-28 PROCEDURE — 83605 ASSAY OF LACTIC ACID: CPT

## 2021-12-28 PROCEDURE — 87040 BLOOD CULTURE FOR BACTERIA: CPT

## 2021-12-28 PROCEDURE — 80048 BASIC METABOLIC PNL TOTAL CA: CPT

## 2021-12-28 PROCEDURE — 83690 ASSAY OF LIPASE: CPT

## 2021-12-28 PROCEDURE — 82553 CREATINE MB FRACTION: CPT

## 2021-12-28 PROCEDURE — 82550 ASSAY OF CK (CPK): CPT

## 2021-12-28 PROCEDURE — 81015 MICROSCOPIC EXAM OF URINE: CPT

## 2021-12-28 PROCEDURE — 82150 ASSAY OF AMYLASE: CPT

## 2021-12-28 PROCEDURE — 81025 URINE PREGNANCY TEST: CPT

## 2021-12-28 PROCEDURE — 85610 PROTHROMBIN TIME: CPT

## 2021-12-28 PROCEDURE — 96366 THER/PROPH/DIAG IV INF ADDON: CPT

## 2021-12-28 PROCEDURE — 82805 BLOOD GASES W/O2 SATURATION: CPT

## 2021-12-28 PROCEDURE — 99285 EMERGENCY DEPT VISIT HI MDM: CPT

## 2021-12-28 PROCEDURE — 74176 CT ABD & PELVIS W/O CONTRAST: CPT

## 2021-12-28 SDOH — SOCIAL STABILITY - SOCIAL INSECURITY: PROBLEMS RELATED TO LIVING ALONE: Z60.2

## 2021-12-28 NOTE — RAD REPORT
EXAM DESCRIPTION:  RAD - Chest Single View - 12/28/2021 11:22 am

 

CLINICAL HISTORY:  Lethargy

Chest pain.

 

COMPARISON:  Chest Single View dated 11/20/2021; Chest Single View dated 7/14/2020; Chest Single View
 dated 7/21/2019; Chest Single View dated 9/26/2018

 

FINDINGS:  Portable technique limits examination quality.

 

Mild to moderate bilateral pulmonary opacities are present, greater on the left favoring pneumonia or
 less likely pulmonary edema. The heart is mildly prominent. No displaced fractures.

## 2021-12-28 NOTE — EDPHYS
Physician Documentation                                                                           

 [facility]                                                                 

Name: Deborah Alatorre                                                                              

Age: 61 yrs                                                                                       

Sex: Female                                                                                       

: 1960                                                                                   

MRN: [id number]                                                                                   

Arrival Date: 2021                                                                          

Time: 10:45                                                                                       

Account#: [id number]                                                                            

Bed 6                                                                                             

Private MD:                                                                                       

ED Physician Mahamed Hook                                                                       

HPI:                                                                                              

                                                                                             

14:21 This 61 yrs old Female presents to ER via EMS with complaints of LETHARGIC.             kdr 

14:21 The patient presents with decreased mental status, decreased responsiveness. Onset: The kdr 

      symptoms/episode began/occurred this morning, at an unknown time. Possible causes: CVA      

      or TIA, alcohol. Associated signs and symptoms: The patient has no apparent associated      

      signs or symptoms. Current symptoms: In the emergency department the patient's symptoms     

      have improved, mildly, Patient is still somewhat confused and slow to respond.              

      Patient's baseline: Neuro: alert and fully oriented, Motor: no deficits. It is unknown      

      whether or not the patient has had similar symptoms in the past. It is unknown whether      

      or not the patient has recently seen a physician. Follow-up mass was called to the          

      patient's house when family members found that it was hard to arouse her from sleep..       

                                                                                                  

Historical:                                                                                       

- Allergies:                                                                                      

11:22 Tramadol HCl;                                                                           bp  

11:22 ACETAMINOPHEN;                                                                          bp  

11:22 Darvocet-N 100;                                                                         bp  

11:22 Demerol;                                                                                bp  

11:22 Erythromycin;                                                                           bp  

11:22 Toradol;                                                                                bp  

11:22 tramadol;                                                                               bp  

11:22 Zithromax;                                                                              bp  

- Home Meds:                                                                                      

11:22 carvedilol 6.25 mg Oral tab 1 tab [Active]; Seroquel Oral [Active]; ernestro [Active];  bp  

      Depakote Oral [Active]; Methadone Oral [Active]; gabapentin oral [Active];                  

- PMHx:                                                                                           

11:22 Anxiety; Cancer, Breast; CHF; Chronic pain; Colitis; COPD; Depression; Diabetes - IDDM; bp  

      Hypertension; MUSCLE WEAKNESS; Cirrhosis of liver;                                          

                                                                                                  

- Immunization history:: Client reports having NOT received the Covid vaccine.                    

- Social history:: Smoking status: Patient reports the use of cigarette tobacco                   

  products, unknown amount.                                                                       

                                                                                                  

                                                                                                  

ROS:                                                                                              

14:21 Constitutional: Patient is a poor historian unable to give significant history          kdr 

14:21 Unable to obtain ROS due to altered mental status.                                          

                                                                                                  

Exam:                                                                                             

14:21 Constitutional:  This is a well developed, well nourished patient who is awake, alert,  kdr 

      and in no acute distress. Head/Face:  Normocephalic, atraumatic. Neck:  Trachea             

      midline, no thyromegaly or masses palpated, and no cervical lymphadenopathy.  Supple,       

      full range of motion without nuchal rigidity, or vertebral point tenderness.  No            

      Meningismus. Chest/axilla:  Normal chest wall appearance and motion.  Nontender with no     

      deformity.  No lesions are appreciated. Cardiovascular:  Regular rate and rhythm with a     

      normal S1 and S2.  No gallops, murmurs, or rubs.  Normal PMI, no JVD.  No pulse             

      deficits. Back:  No spinal tenderness.  No costovertebral tenderness.  Full range of        

      motion. Skin:  Warm, dry with normal turgor.  Normal color with no rashes, no lesions,      

      and no evidence of cellulitis. MS/ Extremity:  Pulses equal, no cyanosis.                   

      Neurovascular intact.  Full, normal range of motion.                                        

14:21 Respiratory: the patient does not display signs of respiratory distress,  Respirations:     

      normal, Breath sounds: rales, that are mild, are scattered.                                 

14:21 Abdomen/GI: Inspection: distension, that is moderate, obese Bowel sounds: diminished,       

      in all quadrants, Palpation: soft, mild abdominal tenderness, in all quadrants.             

                                                                                                  

Vital Signs:                                                                                      

10:45 BP 96 / 42; Pulse 88; Resp 16; Temp 98;                                                 bp  

12:00  / 68; Pulse 87; Resp 23; Pulse Ox 93% ;                                          bp  

13:00  / 73; Pulse 79; Resp 13; Pulse Ox 94% ;                                          bp  

15:00  / 55; Pulse 72; Resp 13; Pulse Ox 95% ;                                          bp  

16:00  / 71; Pulse 113; Resp 20; Pulse Ox 96% ;                                         bp  

17:00  / 66; Pulse 65; Resp 14; Pulse Ox 99% ;                                          bp  

18:00  / 71; Pulse 63; Resp 8; Pulse Ox 99% ;                                           bp  

20:12  / 70; Pulse 76; Resp 12 S; Pulse Ox 100% on 2 lpm NC;                            as6 

                                                                                                  

MDM:                                                                                              

14:21 Data reviewed: vital signs, nurses notes, lab test result(s), radiologic studies.       kdr 

      Counseling: I had a detailed discussion with the patient and/or guardian regarding: the     

      historical points, exam findings, and any diagnostic results supporting the                 

      discharge/admit diagnosis, lab results, radiology results, the need for outpatient          

      follow up.                                                                                  

17:31 Patient medically screened.                                                             kdr 

                                                                                                  

                                                                                             

10:53 Order name: Amylase, Serum                                                              kdr 

                                                                                             

10:53 Order name: Basic Metabolic Panel; Complete Time: 13:45                                 kdr 

                                                                                             

10:53 Order name: Blood Culture Adult (2)                                                     kdr 

                                                                                             

10:53 Order name: CBC with Diff; Complete Time: 13:45                                         kdr 

                                                                                             

10:53 Order name: CPK; Complete Time: 13:45                                                   kdr 

                                                                                             

10:53 Order name: Ckmb; Complete Time: 13:45                                                  kdr 

                                                                                             

10:53 Order name: LFT's; Complete Time: 13:45                                                 kdr 

                                                                                             

10:53 Order name: Lactate; Complete Time: 13:45                                               kdr 

                                                                                             

10:53 Order name: Lipase; Complete Time: 13:45                                                kdr 

                                                                                             

10:53 Order name: Procalcitonin; Complete Time: 13:45                                         kdr 

                                                                                             

10:53 Order name: Protime (+inr); Complete Time: 13:45                                        kdr 

                                                                                             

10:53 Order name: Ptt, Activated; Complete Time: 13:45                                        kdr 

                                                                                             

10:53 Order name: Troponin (emerg Dept Use Only); Complete Time: 13:45                        kdr 

                                                                                             

10:53 Order name: Urine Microscopic Only; Complete Time: 13:45                                kdr 

                                                                                             

10:53 Order name: Amylase; Complete Time: 13:45                                               EDMS

                                                                                             

10:55 Order name: ABG; Complete Time: 13:45                                                   kdr 

                                                                                             

10:55 Order name: Depakote; Complete Time: 13:45                                              kdr 

                                                                                             

11:14 Order name: AMMONIA; Complete Time: 13:45                                               bp  

                                                                                             

11:48 Order name: Urine Pregnancy--Ancillary (enter results); Complete Time: 13:45            dh4 

                                                                                             

11:48 Order name: CBC Smear Scan; Complete Time: 13:45                                        EDMS

                                                                                             

11:58 Order name: COVID-19 SARS RT PCR (Document "Date of Onset" if Symptomatic); Complete    bp  

      Time: 16:33                                                                                 

                                                                                             

13:05 Order name: Urine Dipstick-Ancillary; Complete Time: 13:45                              EDMS

                                                                                             

13:30 Order name: Urine Culture                                                               EDMS

                                                                                             

16:26 Order name: Comprehensive Metabolic Panel                                               EDMS

                                                                                             

16:26 Order name: Comprehensive Metabolic Panel                                               EDMS

                                                                                             

16:26 Order name: Magnesium                                                                   EDMS

                                                                                             

16:26 Order name: Magnesium                                                                   EDMS

                                                                                             

16:26 Order name: NT PRO-BNP                                                                  EDMS

                                                                                             

16:26 Order name: NT PRO-BNP                                                                  EDMS

                                                                                             

16:26 Order name: Phosphorus                                                                  EDMS

                                                                                             

10:53 Order name: Chest Single View XRAY; Complete Time: 13:45                                kdr 

                                                                                             

10:53 Order name: Accucheck; Complete Time: 11:21                                             kdr 

                                                                                             

10:53 Order name: Cardiac monitoring; Complete Time: 11:08                                    kdr 

                                                                                             

10:53 Order name: EKG - Nurse/Tech; Complete Time: 11:08                                      kdr 

                                                                                             

10:53 Order name: IV Saline Lock - Large Bore; Complete Time: 11:                           kdr 

                                                                                             

10:53 Order name: Labs collected and sent; Complete Time: 11:                               kdr 

                                                                                             

10:53 Order name: O2 Per Protocol; Complete Time: 11:                                       kdr 

                                                                                             

10:53 Order name: O2 Sat Monitoring; Complete Time: 11:09                                     kdr 

                                                                                             

10:53 Order name: Urine Dipstick-Ancillary (obtain specimen); Complete Time: 13:30            kdr 

                                                                                             

10:53 Order name: CT Head Brain wo Cont; Complete Time: 13:45                                 kdr 

                                                                                             

14:17 Order name: CT Abd/Pelvis - Without Contrast; Complete Time: 16:33                      kdr 

                                                                                             

16:26 Order name: Phosphorus                                                                  EDMS

                                                                                             

16:27 Order name: Regular                                                                     EDMS

                                                                                             

16:27 Order name: CBC with Automated Diff                                                     EDMS

                                                                                             

16:27 Order name: CBC with Automated Diff                                                     EDMS

                                                                                             

16:27 Order name: Protime (+INR)                                                              EDMS

                                                                                             

16:27 Order name: Protime (+INR)                                                              EDMS

                                                                                             

16:27 Order name: PTT, Activated Partial Thromb                                               EDMS

                                                                                             

16:27 Order name: PTT, Activated Partial Thromb                                               EDMS

                                                                                             

16:28 Order name: Urinalysis                                                                  EDMS

                                                                                             

18:46 Order name: Urine Drug Screen                                                           bp  

                                                                                             

19:27 Order name: Urine Drug Screen                                                           EDMS

                                                                                                  

Administered Medications:                                                                         

11:21 Not Given (Physician Discretion): NS 0.9% (30 ml/kg) 30 ml/kg IV at bolus once; Sepsis  bp  

      Protocol                                                                                    

14:45 Drug: Rocephin - (cefTRIAXone) 1 grams Route: IVPB; Infused Over: 30 mins; Site: left   bp  

      jugular;                                                                                    

21:05 Follow up: Response: No adverse reaction; IV Status: Completed infusion; IV Intake: [id number] 

                                                                                                  

                                                                                                  

Disposition Summary:                                                                              

21 17:31                                                                                    

Hospitalization Ordered                                                                           

      Hospitalization Status: Inpatient Admission                                             kdr 

      Provider: Joaquina Toney 

      Location: [facility] (Inpatient)                                                 kdr 

      Condition: Fair                                                                         kdr 

      Problem: new                                                                            kdr 

      Symptoms: have improved                                                                 kdr 

      Bed/Room Type: Standard                                                                 kdr 

      Room Assignment: 201(21 20:28)                                                    cs9 

      Diagnosis                                                                                   

        - Altered mental status, unspecified                                                  kdr 

        - Anemia, unspecified                                                                 kdr 

      Forms:                                                                                      

        - Medication Reconciliation Form                                                      kdr 

        - SBAR form                                                                           kdr 

Signatures:                                                                                       

Dispatcher [employer]                           EDMahamed Vance MD MD   kdr                                                  

Barry Lerma RN                      RN   Estelita Fortune                          cs9                                                  

Nick Harrison RN   as6                                                  

                                                                                                  

Corrections: (The following items were deleted from the chart)                                    

11: 11:22 PMHx: Liver cell carcinoma; bp                                                    bp  

20:28 17:31 kdr                                                                               cs9 

                                                                                                  

**************************************************************************************************

## 2021-12-28 NOTE — RAD REPORT
EXAM DESCRIPTION:  CT - Head Brain Wo Cont - 12/28/2021 11:20 am

 

CLINICAL HISTORY:  CONFUSED

Headache, drowsiness, lethargy

 

COMPARISON:  Head Brain Wo Cont dated 7/21/2019; HEAD BRAIN W O CONTRAST dated 2/3/2015

 

TECHNIQUE:  All CT scans are performed using dose optimization technique as appropriate and may inclu
de automated exposure control or mA/KV adjustment according to patient size.

 

FINDINGS:  No intracranial hemorrhage, hydrocephalus or extra-axial fluid collection.Mild brain atrop
hy with mild periventricular and deep white matter chronic microvascular ischemic changes.No midline 
shift is evident. 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

 

The paranasal sinuses and mastoids are clear. The calvarium is intact. Small subcutaneous soft tissue
 lesion is seen along the left scalp measuring 14 x 7 mm.

 

IMPRESSION:  No acute intracranial abnormality.

 

14 x 7 mm soft tissue subcutaneous left-sided scalp lesion. Recommend direct physical exam correlatio
n in this region.

## 2021-12-28 NOTE — XMS REPORT
Continuity of Care Document

                          Created on:2021



Patient:VALENTE GROVER

Sex:Female

:1960

External Reference #:[id number]





Demographics







                          Address                   [address] 



                                                    [place] [address]

 

                          Home Phone                [phone]

 

                          Work Phone                [phone]

 

                          Mobile Phone              [phone]

 

                          Email Address             DECLINE 09/15/21

 

                          Preferred Language        English

 

                          Marital Status            Unknown

 

                          Scientologist Affiliation     Unknown

 

                          Race                      Unknown

 

                          Additional Race(s)        White



                                                    Unavailable



                                                    Unavailable



                                                    Unavailable

 

                          Ethnic Group              Unknown









Author







                          Organization              [facility]

t

 

                          Address                   [address]



                                                    [place] [address]

 

                          Phone                     [phone]









Support







                Name            Relationship    Address         Phone

 

                Raheem          Other           Unavailable     [phone]

 

                Sneha         Relative        Unavailable     [phone]

 

                Raheem          Mother          [address] [phone]

[phone]



                                                [place] [address] 

 

                one else per patient Unavailable     Unavailable     Unavailable









Care Team Providers







                    Name                Role                Phone

 

                    Asked,  Pcp         Primary Care Physician Unavailable

 

                    SYSTEM,  NOT IN     Attending Clinician Unavailable

 

                    KAVITHA ABARCA          Attending Clinician Unavailable

 

                    Singer WILDER           Attending Clinician [phone]

 

                    KAVITHA Abarca MD       Attending Clinician [phone]

 

                    MARIA ELENA               Attending Clinician Unavailable

 

                    CHANTEL              Attending Clinician Unavailable

 

                    Kenan ARRIAGA            Attending Clinician [phone]

 

                    DEBORAH Sahu DO     Attending Clinician [phone]

 

                    José Miguel WILDER           Attending Clinician [phone]

 

                    Gena CORRIGAN          Attending Clinician [phone]

 

                    SIMA LEAVITT          Attending Clinician Unavailable

 

                    RONEY GORE       Attending Clinician Unavailable

 

                    AMY DIXON            Attending Clinician Unavailable

 

                    MEEK        Attending Clinician Unavailable

 

                    CLOVER VERDUGO       Attending Clinician Unavailable

 

                    CINTIA KHAN III       Attending Clinician Unavailable

 

                    Archana LEACH,  S       Attending Clinician [phone]

 

                    DANN Barone         Attending Clinician [phone]

 

                    Minerva CORRIGAN  H       Attending Clinician [phone]

 

                    ARIANNA               Attending Clinician Unavailable

 

                    Lab,  Fam Pob I     Attending Clinician Unavailable

 

                    Arianna ALEXANDERP           Attending Clinician [phone]

 

                    CINTIA Rocha NP       Attending Clinician [phone]

 

                    Doctor Unassigned,  Name Attending Clinician Unavailable

 

                    SARAHY SARABIA     Attending Clinician Unavailable

 

                    LISHA BAIN            Attending Clinician Unavailable

 

                    Taya FOWLER,  T        Attending Clinician [phone]

 

                    WECHSLER            Attending Clinician Unavailable

 

                    SINGER              Attending Clinician Unavailable

 

                    CATARINA DE LA CRUZ      Attending Clinician Unavailable

 

                    Ewa ONTIVEROS  R     Attending Clinician [phone]

 

                    Miguel FOWLER         Attending Clinician [phone]

 

                    Saranya CORRIGAN         Attending Clinician [phone]

 

                    Alyssa CORRIGAN,  P        Attending Clinician [phone]

 

                    Catarina eD La Cruz DO   Attending Clinician [phone]

 

                    SIRI Mancia MD         Attending Clinician [phone]

 

                    SIRI MANCIA            Attending Clinician Unavailable

 

                    Trupti Ospina Main      Attending Clinician Unavailable

 

                    Cris ARRIAGA            Attending Clinician Unavailable

 

                    JAGUAR              Attending Clinician Unavailable

 

                    Nathaniel FOWLER          Attending Clinician [phone]

 

                    Jaguar CORRIGAN           Attending Clinician [phone]

 

                    RADIOLOGY           Attending Clinician Unavailable

 

                    NATALIE SKAGGS    Attending Clinician Unavailable

 

                    FIDELINA                Attending Clinician Unavailable

 

                    TONY MCMAHAN      Attending Clinician Unavailable

 

                    SINGER              Admitting Clinician Unavailable

 

                    GURPREET EAGLE         Admitting Clinician Unavailable

 

                    Gena CORRIGAN          Admitting Clinician [phone]

 

                    ANT               Admitting Clinician Unavailable

 

                    BERKLEY               Admitting Clinician Unavailable

 

                    RONEY GORE       Admitting Clinician Unavailable

 

                    LISHA BAIN            Admitting Clinician Unavailable

 

                    SARANYA            Admitting Clinician Unavailable

 

                    Saranya CORRIGAN         Admitting Clinician [phone]

 

                    SIRI MANCIA            Admitting Clinician Unavailable

 

                    JAGUAR              Admitting Clinician Unavailable

 

                    Jaguar CORRIGAN           Admitting Clinician [phone]

 

                    DANN BARONE         Admitting Clinician Unavailable

 

                    FIDELINA                Admitting Clinician Unavailable









Payers







           Payer Name Policy Type Policy Number Effective Date Expiration Date S

kalpesh

 

           [facility] [place]            [id number]  2020            



           PLUS                             00:00:00              

 

           MEDICARE PART A            [id number]            



           \T\ B                            00:00:00              

 

           MEDICAID [facility]            [id number]  2018            



                                            00:00:00              

 

           [facility] MEDICARE            [id number]  [id number]            



           COMPLETE CHOICE                       00:00:00              

 

           [facility] MEDICARE            [id number]  2020-10-01            



           MEDICAID  DUAL HMO                       00:00:00              

 

           [place]ICAID [place]            [id number]  2017            



           TRADITIONAL STAR                       00:00:00              



           PLUS SSI                                               

 

           MEDICARE PART A            [id number]            



           AND B                            00:00:00              

 

           [employer]            [id number]  2018            [facility]

s



                                            00:00:00              - Patients



                                                                  Medical



                                                                  Center

 

           [employer] [facility]            [id number]  2017            [facility]

[facility]



           Plus                             00:00:00              - Patients



                                                                  Medical



                                                                  Center







Problems







       Condition Condition Condition Status Onset  Resolution Last   Treating Co

mments 

Source



       Name   Details Category        Date   Date   Treatment Clinician        



                                                 Date                 

 

       Acute on Acute on Disease Active                              Unive

rs



       chronic chronic                                              ity of



       systolic systolic               00:00:                             [place]



       and    and                  00                                 Medical



       diastolic diastolic                                                  [facility]

ch



       heart  heart                                                   



       failure, failure,                                                  



       NYHA class NYHA class                                                  



       3      3                                                       

 

       HFrEF  HFrEF  Disease Active                              [facility]



       (heart (heart                                              ity of



       failure failure               00:00:                             [place]



       with   with                 00                                 Medical



       reduced reduced                                                  Branch



       ejection ejection                                                  



       fraction) fraction)                                                  

 

       Nonischemi Nonischemi Disease Active                              U

nivers



       c      c                                                   ity of



       cardiomyop cardiomyop               00:00:                             [facility]

[place]



       athy   athy                 00                                 Medical



                                                                      Branch

 

       Essential Essential Disease Active                              Uni

vers



       hypertensi hypertensi                                              it

y of



       on     on                   00:00:                             [place]



                                   00                                 Medical



                                                                      Branch

 

       Pancytopen Pancytopen Disease Active                              U

nivers



       ia     ia                                                  ity of



                                   00:00:                             [place]



                                   00                                 Medical



                                                                      Branch

 

       Elevated Elevated Disease Active                              Unive

rs



       brain  brain                                               ity of



       natriureti natriureti               00:00:                             [facility]

[place]



       c peptide c peptide               00                                 Medi

carlos



       (BNP)  (BNP)                                                   Branch



       level  level                                                   

 

       Acute  Acute  Disease Active                              [facility]



       left-sided left-sided                                              it

y of



       CHF    CHF                  00:00:                             [place]



       (congestiv (congestiv               00                                 Me

dical



       e heart e heart                                                  Branch



       failure) failure)                                                  

 

       Acute  Acute  Disease Active                              [facility]



       respirator respirator                                              it

y of



       y failure y failure               00:00:                             [facility]

s



       with   with                 00                                 Medical



       hypoxia hypoxia                                                  Branch

 

       Obesity Obesity Disease Active                              [facility]



       (BMI   (BMI                 5-01                               ity of



       30-39.9) 30-39.9)               00:00:                             [place]



                                   00                                 Medical



                                                                      Branch

 

       Hematemesi Hematemesi Disease Active                              U

nivers



       s      s                    4-29                               ity of



                                   00:00:                             [place]



                                   00                                 Medical



                                                                      Branch

 

       Melena Melena Disease Active                              Univers



                                   4-29                               ity of



                                   00:00:                             [place]



                                   00                                 Medical



                                                                      Branch

 

       Partial Partial Disease Active                              [facility]



       small  small                4-28                               ity of



       bowel  bowel                00:00:                             [place]



       obstructio obstructio               00                                 Me

dical



       n      n                                                       Branch

 

       Hyperglyce Hyperglyce Disease Active                              U

nae reed                  1-18                               ity of



                                   00:00:                             [place]



                                   00                                 Medical



                                                                      Branch

 

       Cirrhosis Cirrhosis Disease Active                       Last   [facility]



                                   2-21                        [facility]



                                   00:00:                      t & Plan: Medical



                                   00                          [facility]



                                                               g of this 



                                                               note   



                                                               might be 



                                                               different 



                                                               from the 



                                                               original. 



                                                               Diagnosis 



                                                               based on 



                                                               the    



                                                               laborator 



                                                               y      



                                                               parameter 



                                                               s and  



                                                               imaging. 



                                                               Etiology 



                                                               is likely 



                                                               due to 



                                                               HBV/HCV. 



                                                               Her    



                                                               condition 



                                                               has    



                                                               decompens 



                                                               ated with 



                                                               features 



                                                               of portal 



                                                               hypertens 



                                                               ion.   



                                                               Cirrhosis 



                                                               guideline 



                                                               s      



                                                               reviewed. 

 

       Hepatocell Hepatocell Disease Active                       Last   [facility]

[facility]



       ular   ular                 2                        [facility]



       carcinoma carcinoma               00:00:                      t & Plan: M

edical



                                   00                          [facility]



                                                               g of this 



                                                               note   



                                                               might be 



                                                               different 



                                                               from the 



                                                               original. 



                                                               Diagnosis 



                                                               of HCC in 



                                                               2018 



                                                               s/p    



                                                               radiofreq 



                                                               uency  



                                                               ablation. 



                                                               She was 



                                                               referred 



                                                               for liver 



                                                               transplan 



                                                               t      



                                                               evaluatio 



                                                               n at this 



                                                               time but 



                                                               expressed 



                                                               desire 



                                                               not to 



                                                               follow 



                                                               through.T 



                                                               here is 



                                                               no     



                                                               evidence 



                                                               of     



                                                               residual 



                                                               tumor on 



                                                               recent 



                                                               imaging. 



                                                               Extensive 



                                                               discussio 



                                                               n      



                                                               provided 



                                                               that   



                                                               liver  



                                                               transplan 



                                                               t is the 



                                                               only   



                                                               definitiv 



                                                               e      



                                                               treatment 



                                                               for HCC 



                                                               and    



                                                               recurrenc 



                                                               e is   



                                                               likely. 



                                                               She    



                                                               understan 



                                                               ds and 



                                                               maintains 



                                                               her    



                                                               position. 

 

       Immunity Immunity Disease Active                       Last   [facility]

t



       status status               2-21                        [facility] -



       testing testing               00:00:                      t & Plan: Medic

al



                                   00                          [facility]



                                                               g of this 



                                                               note   



                                                               might be 



                                                               different 



                                                               from the 



                                                               original. 



                                                               All    



                                                               patients 



                                                               with   



                                                               chronic 



                                                               liver  



                                                               disease 



                                                               should be 



                                                               immunized 



                                                               to     



                                                               prevent 



                                                               hepatitis 



                                                               A and  



                                                               hepatitis 



                                                               B.     



                                                               Previous 



                                                               serology 



                                                               indicates 



                                                               immunity 



                                                               to HAV. 



                                                               Recommend 



                                                               HBV    



                                                               booster 



                                                               which can 



                                                               be     



                                                               provided 



                                                               by     



                                                               primary 



                                                               care.  

 

       Hepatitis Hepatitis Disease Active                       Last   [facility]



       C      C                    2-21                        [facility]



                                   00:00:                      t & Plan: Medical



                                   [address] [place] [address]



                                                               g of this 



                                                               note   



                                                               might be 



                                                               different 



                                                               from the 



                                                               original. 



                                                               HCV    



                                                               diagnosed 



                                                               in  



                                                               s/p    



                                                               partial 



                                                               treatment 



                                                               with   



                                                               Interfero 



                                                               n /    



                                                               Ribavirin 



                                                               . HCV RNA 



                                                               2018 



                                                               was    



                                                               undetecta 



                                                               ble. No 



                                                               further 



                                                               intervent 



                                                               ion    



                                                               necessary 



                                                               .      

 

       Chronic Chronic Disease Active                       Last   [facility]



       viral  viral                2-21                        [facility]



       hepatitis hepatitis               00:00:                      t & Plan: M

edical



       B without B without               00                          [facility] C

enter



       delta  delta                                            g of this 



       agent and agent and                                           note   



       without without                                           might be 



       coma   coma                                             different 



                                                               from the 



                                                               original. 



                                                               She has 



                                                               evidence 



                                                               of past 



                                                               HBV    



                                                               infection 



                                                               without 



                                                               immunity. 



                                                               HBV DNA 



                                                               from May 



                                                               2018   



                                                               undetecta 



                                                               ble. She 



                                                               may    



                                                               benefit 



                                                               from   



                                                               vaccinati 



                                                               on which 



                                                               can be 



                                                               obtained 



                                                               by her 



                                                               primary 



                                                               care.  

 

       Hernia of Hernia of Disease Active                       Last   [facility]



       anterior anterior               2-21                        [facility]

es -



       abdominal abdominal               00:00:                      t & Plan: M

edical



       wall   wall                 00                          [facility]



                                                               g of this 



                                                               note   



                                                               might be 



                                                               different 



                                                               from the 



                                                               original. 



                                                               She has 



                                                               an     



                                                               umbilical 



                                                               hernia 



                                                               that is 



                                                               being  



                                                               evaluated 



                                                               for    



                                                               repair. 



                                                               She has a 



                                                               34% one 



                                                               year   



                                                               mortality 



                                                               based on 



                                                               the [place] 



                                                               surgical 



                                                               risk   



                                                               score. In 



                                                               addition, 



                                                               she is 



                                                               Child-[employer]ug 



                                                               h Class A 



                                                               giving 



                                                               her a 10% 



                                                               abdominal 



                                                               surgery 



                                                               barron-oper 



                                                               ative  



                                                               mortality 



                                                               risk.  

 

       Heart  Heart  Disease Active                       Last   [facility]



       murmur murmur               2-                        [facility]



                                   00:00:                      t & Plan: Medical



                                   [address] [place] [address]



                                                               g of this 



                                                               note   



                                                               might be 



                                                               different 



                                                               from the 



                                                               original. 



                                                               Physical 



                                                               examinati 



                                                               on     



                                                               revealed 



                                                               an     



                                                               audible 



                                                               fixed S2 



                                                               split on 



                                                               cardiac 



                                                               examinati 



                                                               on which 



                                                               may be 



                                                               secondary 



                                                               to a   



                                                               bundle 



                                                               branch 



                                                               block. We 



                                                               recommend 



                                                               cardiolog 



                                                               y      



                                                               consultat 



                                                               ion prior 



                                                               to     



                                                               procedure 



                                                               .      

 

       Lower  Lower  Disease Active                       Last   [facility]



       extremity extremity               2-21                        [facility]

[facility] -



       edema  edema                00:00:                      t & Plan: Medical



                                   [address] [place] [address]



                                                               g of this 



                                                               note   



                                                               might be 



                                                               different 



                                                               from the 



                                                               original. 



                                                               Assymetri 



                                                               carlos lower 



                                                               extremity 



                                                               edema on 



                                                               physical 



                                                               examinati 



                                                               on. We 



                                                               ordered a 



                                                               venous 



                                                               doppler 



                                                               of the 



                                                               lower  



                                                               extremity 



                                                               to assess 



                                                               for    



                                                               venous 



                                                               thrombosi 



                                                               s.     



                                                               Previousl 



                                                               y on   



                                                               Furosemid 



                                                               e 20 mg 



                                                               daily. We 



                                                               write for 



                                                               a refill. 

 

       Cocaine Cocaine Disease Active                              [facility]



       dependence dependence               3-23                               it

y of



       ,      ,                    00:00:                             [place]



       continuous continuous               00                                 Me

dical



                                                                      Branch

 

       No known No known Disease                                           [facility]

di



       active active                                                  st



       problems problems                                                  Hospit

a



                                                                      l

 

       Chest pain Chest pain Problem Active                                    C

[facility]



                                                                      [facility] -



                                                                      Patient



                                                                      [facility]







Allergies, Adverse Reactions, Alerts







       Allergy Allergy Status Severity Reaction(s) Onset  Inactive Treating Comm

ents 

Source



       Name   Type                        Date   Date   Clinician        

 

       KETOROLA DRUG   Active Med    Rash                         [facility]



       C      INGREDI                      5-19                        ity of



                                          00:00:                      [place]



                                          00                          Medical



                                                                      Branch

 

       Ketorola Propensi Active        Rash                         Univer

s



       c      ty to                       5-19                        ity of



              adverse                      00:00:                      [place]



              reaction                      00                          Medical



              s                                                       Branch

 

       ONDANSET DRUG   Active        ITCHING -0                      [facility]



       MIGUEL    INGREDI                      4-                        ity of



                                          00:00:                      [place]



                                          00                          Medical



                                                                      Branch

 

       Ondanset Propensi Active        Itching -0                      Unive

rs



       miguel    ty to                       4-28                        ity of



              adverse                      00:00:                      [place]



              reaction                      00                          Medical



              s                                                       Branch

 

       Erythrom Propensi Active               0                      [facility]



       ycin   ty to                                               [facility]



              adverse                      00:00:                      Medical



              reaction                      00                          Center



              s                                                       

 

       Ketorola Propensi Active               0                      [facility]



       c      ty to                                               [facility]



              adverse                      00:00:                      Medical



              reaction                      00                          [place]



              s                                                       

 

       PROPOXYP DRUG   Active        Other-Cmnt 0                      Univ

ers



       HENE                               8-08                        ity of



       N-ACETAM                             00:00:                      [place]



       INOPHEN                             00                          Medical



                                                                      Branch

 

       TRAMADOL DRUG   Active        Other-Cmnt 0                      Univ

ers



              INGREDI                      8                        ity of



                                          00:00:                      [place]



                                          00                          Medical



                                                                      Branch

 

       AZITHROM DRUG   Active        Rash   2018-0                      [facility]



       YCIN   INGREDI                      8                        ity of



                                          00:00:                      [place]



                                          00                          Medical



                                                                      Branch

 

       Propoxyp Propensi Active        Other - See 2018-0               migraine

 Univers



       hene   ty to                comments 8-                        ity of



       N-Acetam adverse                      00:00:                      [place]



       inophen reaction                      00                          Medical



              s                                                       Branch

 

       Tramadol Propensi Active        Other - See 2018-0               Anxious 

Univers



              ty to                comments 8-08                        ity of



              adverse                      00:00:                      [place]



              reaction                      00                          Medical



              s                                                       Branch

 

       Azithrom Propensi Active        Rash   2018-0                      Univer

s



       ycin   ty to                       8-08                        ity of



              adverse                      00:00:                      [place]



              reaction                      00                          Medical



              [facility]

 

       Propoxyp Allergy Active        Migraines 2017-0                      [facility]



       hene   to                                                  [facility]



              Substanc                      00:00:                      Patient



              e                           00                          [facility]

 

       Azithrom Allergy Active        Rash   2017-0                      [facility]



       ycin   to                                                  [facility]



              Substanc                      00:00:                      Patient



              e                           00                          [facility]

 

       Tramadol Allergy Active        Insomnia for -0                      C

[facility]



              to                   days                           [facility]



              Substanc                      00:00:                      Patient



              e                           00                          [facility]

 

       PROPOXYP DRUG   Active               0                      [facility]                               5-18                        [facility]



       N-ACETAM                             00:00:                      n



       INOPHEN                             00                          

 

       PROPOXYP DRUG   Active               0                      [facility]                               5-18                        [facility]



       N-ACETAM                             00:00:                      n



       INOPHEN                             00                          

 

       Azithrom Propensi Active        Rash, Other                       C

HI [facility]



       ycin   ty to                (See                           [facility]



              adverse               Comments) 00:00:                      Medica

l



              reaction                      00                          Center



              s                                                       

 

       Tramadol Propensi Active        Other (See                Other  [facility]

I St



              ty to                Comments)                  reaction( [facility]

s -



              adverse                      00:00:               s):    Medical



              reaction                      00                   [facility]



              s                                                for    



                                                               daysUnabl 



                                                               e to   



                                                               sleep  

 

       Toradol Adverse Active        Info Not                             [facility]



              Reaction               Available                             [facility]

 -



                                                                      [facility]



                                                                      l



                                                                      Outpati



                                                                      ent



                                                                      Clinics

 

       Zithroma Adverse Active        Info Not                             [facility]

t



       x      Reaction               Available                             [facility]

 

       Tramadol Adverse Active        Info Not                             [facility]

t



       HCl    Reaction               Available                             [facility]

 -



                                                                      [facility]

 

       Erythrom Adverse Active        Info Not                             [facility]

t



       ycin   Reaction               Available                             [facility]

 -



                                                                      [facility]

 

       Demerol Adverse Active        Info Not                             [facility]



              Reaction               Available                             [facility]

 -



                                                                      [facility]

 

       Darvocet Adverse Active        Info Not                             [facility]

t



       -N 100 Reaction               Available                             [facility]

 

       Azithrom Adverse Active        Info Not                             [facility]

t



       ycin   Reaction               Available                             [facility]

 -



                                                                      [facility]



                                                                      l



                                                                      Outpati



                                                                      ent



                                                                      Clinics







Social History







           Social Habit Start Date Stop Date  Quantity   Comments   Source

 

           History SDOH                                             [facility]



           Alcohol Std Drinks                                             [facility]ospit

al

 

           History SDOH                                             [facility]



           Alcohol Binge                                             [facility]

 

           Exposure to                       Not sure              [facility]



           SARS-CoV-2 (event)                                             [facility]

 

           History SDOH                                             [facility]

 -



           Alcohol Comment                                             [facility]

enter

 

           History of tobacco                       Cigarette Smoker            

[facility]



           use                                                    [facility]

 

           Alcohol intake 2021 Ex-drinker            [facility]

 



                      00:00:00   00:00:00   (finding)             [facility]

 

           Tobacco use and 2020 Never used            [facility]

y of



           exposure   00:00:00   00:00:00                         [facility]

 

           History SDOH 2019 1                     [facility]



           Alcohol Frequency 00:00:00   00:00:00                         Hosp[facility]

l

 

           Cigarettes smoked 2019                       [facility]



           [facility] (pack per 00:00:00   00:00:00                         [facility]

 Center



           day) - Reported                                             

 

           Cigarette  2019                       [facility] -



           pack-years 00:00:00   00:00:00                         [facility]

 

           Sex Assigned At [birth year]                       [facility]

y of



           Birth      00:00:00   00:00:00                         [facility]









                Smoking Status  Start Date      Stop Date       Source

 

                Current every day smoker 2019 00:00:00                 Met

[facility]







Medications







       Ordered Filled Start  Stop   Current Ordering Indication Dosage Frequency

 Signature

                    Comments            Components          Source



     Medication Medication Date Date Medication? Clinician                (SIG) 

          



     Name Name                                                   

 

     magnesium      2021      Yes            400mg      400 mg,           Univ

ers



     oxide                                     Oral,           ity of



     (MAG-OX      15:00:                               DAILY,           [place]



     400) tablet      00                                 First dose           Me

dical



     400 mg                                         on Fri           Branch



                                                  12/3/21 at           



                                                  0900,           



                                                  Until           



                                                  Discontinu           



                                                  ed,            



                                                  Routine           

 

     furosemide      2021- No             40mg      40 mg, IV           U

nivers



     (LASIX)                                Push,           ity of



     injection      03:30: 02:34                          ONCE, 1           [place]

[place]



     40 mg      00   :00                           dose, On           Medical



                                                  Thu            Branch



                                                  21 at           



                                                  2130, ASAP           

 

     iopamidol      2021- No        [id number] 100mL      100 mL,         

  [facility]



     (ISOVUE                                Intravenou           ity o

f



     370-500 mL)      02:00: 00:40                          s, ONCE, 1          

 [place]



     injection      00   :00                           dose, On           Medica

l



     100 mL                                         Thu            Branch



                                                  21 at           



                                                  2000,           



                                                  Routine           

 

     morpHINE      2021- No             2mg       2 mg, Slow           Un

[place]



     injection 2                                IV Push,           ity

 of



     mg        00:16: 00:18                          ONCE, 1           [place]



               00   :00                           dose, On           Medical



                                                  Thu            Branch



                                                  21 at           



                                                  1830, STAT           

 

     furosemide      2021      Yes       [id number] 40mg      Take 1           

[facility]



     (LASIX) 40      -02                               tablet by           ity 

of



     mg tablet      00:00:                               mouth           [place]



               00                                 daily.           Medical



                                                                 Branch

 

     polyethylen      2021- No        [id number] 17g       Take 1         

  [facility]



     e glycol      9-28 10-29                          Packet by           ity o

f



     3350 17      00:00: 04:59                          mouth           [place]



     gram powder      00   :00                           daily for           Med

ical



                                                  30 days.           Branch

 

     spironolact      2021- No        [id number] 25mg      Take 1         

  [facility]



     one 25 mg      9-28 10-29                          tablet by           ity 

of



     tablet      00:00: 04:59                          mouth           [place]



               00   :00                           daily for           Medical



                                                  30 days.           Branch

 

     polyethylen      2021- No        [id number] 17g       Take 1         

  [facility]



     e glycol      9-28 10-29                          Packet by           ity o

f



     3350 17      00:00: 04:59                          mouth           [place]



     gram powder      00   :00                           daily for           Med

ical



                                                  30 days.           Branch

 

     spironolact      2021- No        [id number] 25mg      Take 1         

  [facility]



     one 25 mg      9-28 10-                          tablet by           ity 

of



     tablet      00:00: 04:59                          mouth           [place]



               00   :00                           daily for           Medical



                                                  30 days.           Branch

 

     QUEtiapine      2021- No             350mg      Take 350           U

nivers



     (SEROQUEL)                                mg by           ity of



     300 mg      14:59: 00:00                          mouth at           [place]



     tablet      37   :00                           bedtime.           Medical



                                                                 Branch

 

     carvediloL      2021- No             6.25mg      Take 6.25          

 [facility]



     6.25 mg                                mg by           ity of



     tablet      14:59: 00:00                          mouth 2           [place]



               37   :00                           (two)           Medical



                                                  times           Branch



                                                  daily with           



                                                  meals.           

 

     sacubitril-      2021- No             1{tbl}      Take 1           U

nivers



     valsartan                                tablet by           ity 

of



     (ENTRESTO)      14:59: 00:00                          mouth 2           [place]

as



     49-51 mg      37   :00                           (two)           Medical



     tablet                                         times           Branch



                                                  daily.           

 

     insulin      2021- No             30U       inject 30           Univ

ers



     aspart                                Units           ity of



     prot/insuln      14:59: 00:00                          under the           

[place]



     asp       37   :00                           skin 3           Medical



     (NOVOLOG                                         (three)           Branch



     MIX 70-30                                         times           



     SC)                                          daily.           

 

     potassium      2021- No             40meq      Take 40           Uni

vers



     chloride                                mEq by           ity of



     (KCL-20      14:59: 00:00                          mouth at           [place]



     ORAL)      37   :00                           bedtime.           Medical



                                                                 Branch

 

     furosemide      2021- No             80mg      Take 80 mg           

[facility]



     (LASIX) 40                                by mouth           ity 

of



     mg tablet      14:59: 00:00                          daily.           [place]



               37   :00                                          Medical



                                                                 Branch

 

     KCL       2021- No             40meq      40 mEq,           [facility]



     (KLOR-CON                                Oral,           ity of



     M20) tablet      14:45: 14:18                          ONCE, 1           [facility]

[place]



     40 mEq      00   :00                           dose, On           Medical



                                                  Mon            Branch



                                                  21 at           



                                                  0945,           



                                                  Routine           

 

     methadone 5      2021- No             140mg      Take 140           

[facility]



     mg tablet      -                          mg by           ity of



               14:08: 00:00                          mouth           [place]



               21   :00                           daily.           Medical



                                                                 Branch

 

     albuterol            Yes       [id number] 2{puff}      Inhale 2       

    [facility]



     90        9-27                               Puffs           ity of



     mcg/actuati      00:00:                               every 4           [place]

as



     on inhaler      00                                 (four)           Medical



                                                  hours as           Branch



                                                  needed for           



                                                  Wheezing           



                                                  or             



                                                  Shortness           



                                                  of Breath.           

 

     Insulin Asp            Yes                      inject           Univ

ers



     Prt-Insulin                                     under the           ity

 of



     Aspart      00:00:                               skin.           [place]



     (NOVOLOG      00                                                Medical



     MIX 70-30)                                                        Branch



     100 unit/mL                                                        



     (70-30)                                                        



     injection                                                        

 

     albuterol            Yes       [id number] 2{puff}      Inhale 2       

    [facility]



     90        9-27                               Puffs           ity of



     mcg/actuati      00:00:                               every 4           [place]

as



     on inhaler      00                                 (four)           Medical



                                                  hours as           Branch



                                                  needed for           



                                                  Wheezing           



                                                  or             



                                                  Shortness           



                                                  of Breath.           

 

     Insulin Asp            Yes                      inject           Univ

ers



     Prt-Insulin                                     under the           ity

 of



     Aspart      00:00:                               skin.           [place]



     (NOVOLOG      00                                                Medical



     MIX 70-30)                                                        Branch



     100 unit/mL                                                        



     (70-30)                                                        



     injection                                                        

 

     albuterol            Yes       [id number] 2{puff}      Inhale 2       

    [facility]



     90        9-27                               Puffs           ity of



     mcg/actuati      00:00:                               every 4           [place]

as



     on inhaler      00                                 (four)           Medical



                                                  hours as           Branch



                                                  needed for           



                                                  Wheezing           



                                                  or             



                                                  Shortness           



                                                  of Breath.           

 

     Insulin Asp            Yes                      inject           Univ

ers



     Prt-Insulin                                     under the           ity

 of



     Aspart      00:00:                               skin.           [place]



     (NOVOLOG      00                                                Medical



     MIX 70-30)                                                        Branch



     100 unit/mL                                                        



     (70-30)                                                        



     injection                                                        

 

     methadone      2021- No        2745 140mg      Take 14           Uni

vers



     10 mg       10-28                          tablets by           ity of



     tablet      00:00: 04:59                          mouth           [place]



               00   :00                           daily for           Medical



                                                  30 days.           Branch



                                                  Indication           



                                                  s: chronic           



                                                  pain           

 

     carvediloL      2021- No        [id number] 12.5mg      Take 1        

   [facility]



     12.5 mg      9-27 10-28                          tablet by           ity of



     tablet      00:00: 04:59                          mouth 2           [place]



               00   :00                           (two)           Medical



                                                  times           Branch



                                                  daily with           



                                                  meals for           



                                                  30 days.           

 

     divalproex      2021- No        [id number] 250mg      Take 1         

  [facility]



      mg      9-27 10-28                          tablet by           ity 

of



     24 hr      00:00: 04:59                          mouth at           [place]



     tablet      00   :00                           bedtime           Medical



                                                  for 30           Branch



                                                  days.           

 

     docusate      2021- No        [id number] 100mg      Take 1           

[facility]



     100 mg      9-27 10-28                          capsule by           ity of



     capsule      00:00: 04:59                          mouth 2           [place]



               00   :00                           (two)           Medical



                                                  times           Branch



                                                  daily for           



                                                  30 days.           

 

     furosemide      2021- No        [id number] 80mg      Take 2          

 [facility]



     (LASIX) 40      9-27 10-28                          tablets by           it

y of



     mg tablet      00:00: 04:59                          mouth           [place]



               00   :00                           daily for           Medical



                                                  30 days.           Branch

 

     KCL 20 mEq      2021- No        [id number] 40meq      Take 2         

  [facility]



     tablet      9-27 10-28                          tablets by           ity of



               00:00: 04:59                          mouth           [place]



               00   :00                           daily for           Medical



                                                  30 days.           Branch

 

     QUEtiapine      2021- No        [id number] 300mg      Take 1         

  [facility]



     300 mg      9-27 10-28                          tablet by           ity of



     tablet      00:00: 04:59                          mouth at           [place]



               00   :00                           bedtime           Medical



                                                  for 30           Branch



                                                  days.           

 

     rifAXIMin      2021- No        [id number] 550mg      Take 1          

 [facility]



     550 mg      9-27 10-28                          tablet by           ity of



     tablet      00:00: 04:59                          mouth 2           [place]



               00   :00                           (two)           Medical



                                                  times           Branch



                                                  daily for           



                                                  30 days.           

 

     sacubitriL-      2021- No        [id number] 2{tbl}      Take 2       

    [facility]



     valsartan      9-27 10-28                          tablets by           ity

 of



     24-26 mg      00:00: 04:59                          mouth 2           [place]



     tablet      00   :00                           (two)           Medical



                                                  times           Branch



                                                  daily for           



                                                  30 days.           

 

     sennosides      2021- No        [id number] 8.6mg      Take 1         

  [facility]



     8.6 mg      9-27 10-28                          tablet by           ity of



     tablet      00:00: 04:59                          mouth 2           [place]



               00   :00                           (two)           Medical



                                                  times           Branch



                                                  daily for           



                                                  30 days.           

 

     methadone      2021- No        2745 140mg      Take 14           Uni

vers



     10 mg      9-27 10-28                          tablets by           ity of



     tablet      00:00: 04:59                          mouth           [place]



               00   :00                           daily for           Medical



                                                  30 days.           Branch



                                                  Indication           



                                                  s: chronic           



                                                  pain           

 

     carvediloL      2021- No        [id number] 12.5mg      Take 1        

   [facility]



     12.5 mg      9-27 10-28                          tablet by           ity of



     tablet      00:00: 04:59                          mouth 2           [place]



               00   :00                           (two)           Medical



                                                  times           Branch



                                                  daily with           



                                                  meals for           



                                                  30 days.           

 

     divalproex      2021- No        [id number] 250mg      Take 1         

  [facility]



      mg      9-27 10-28                          tablet by           ity 

of



     24 hr      00:00: 04:59                          mouth at           [place]



     tablet      00   :00                           bedtime           Medical



                                                  for 30           Branch



                                                  days.           

 

     docusate      2021- No        [id number] 100mg      Take 1           

[facility]



     100 mg      9-27 10-28                          capsule by           ity of



     capsule      00:00: 04:59                          mouth 2           [place]



               00   :00                           (two)           Medical



                                                  times           Branch



                                                  daily for           



                                                  30 days.           

 

     furosemide      2021- No        [id number] 80mg      Take 2          

 [facility]



     (LASIX) 40      9-27 10-28                          tablets by           it

y of



     mg tablet      00:00: 04:59                          mouth           [place]



               00   :00                           daily for           Medical



                                                  30 days.           Branch

 

     KCL 20 mEq      2021- No        [id number] 40meq      Take 2         

  [facility]



     tablet      9-27 10-28                          tablets by           ity of



               00:00: 04:59                          mouth           [place]



               00   :00                           daily for           Medical



                                                  30 days.           Branch

 

     QUEtiapine      2021- No        [id number] 300mg      Take 1         

  [facility]



     300 mg      9-27 10-28                          tablet by           ity of



     tablet      00:00: 04:59                          mouth at           [place]



               00   :00                           bedtime           Medical



                                                  for 30           Branch



                                                  days.           

 

     rifAXIMin      2021- No        [id number] 550mg      Take 1          

 [facility]



     550 mg      9-27 10-28                          tablet by           ity of



     tablet      00:00: 04:59                          mouth 2           [place]



               00   :00                           (two)           Medical



                                                  times           Branch



                                                  daily for           



                                                  30 days.           

 

     sacubitriL-      2021- No        [id number] 2{tbl}      Take 2       

    [facility]



     valsartan      9-27 10-28                          tablets by           ity

 of



     24-26 mg      00:00: 04:59                          mouth 2           [place]



     tablet      00   :00                           (two)           Medical



                                                  times           [facility]anch



                                                  daily for           



                                                  30 days.           

 

     sennosides      2021- No        [id number] 8.6mg      Take 1         

  [facility]



     8.6 mg      9-27 10-28                          tablet by           ity of



     tablet      00:00: 04:59                          mouth 2           [place]



               00   :00                           (two)           Medical



                                                  times           Branch



                                                  daily for           



                                                  30 days.           

 

     HYDROcodone      2021- No        4647 1{tbl}      Take 1           U

nivers



     -acetaminop      9-27 10-05                          tablet by           it

y of



     hen       00:00: 04:59                          mouth           [place]



     mg tablet      00   :00                           every 6           Medical



                                                  (six)           Branch



                                                  hours as           



                                                  needed for           



                                                  Pain           



                                                  (scale           



                                                  7-10) for           



                                                  up to 7           



                                                  days.           



                                                  Indication           



                                                  s: acute           



                                                  pain           

 

     HYDROcodone      2021- No        4647 1{tbl}      Take 1           U

nivers



     -acetaminop      9-27 10-05                          tablet by           it

y of



     hen       00:00: 04:59                          mouth           [place]



     mg tablet      00   :00                           every 6           Medical



                                                  (six)           Branch



                                                  hours as           



                                                  needed for           



                                                  Pain           



                                                  (scale           



                                                  7-10) for           



                                                  up to 7           



                                                  days.           



                                                  Indication           



                                                  s: acute           



                                                  pain           

 

     methadone            Yes            140mg      140 mg,           Un[facility]

ers



     (DOLOPHINE      -                               Oral,           ity of



     HCL) tablet      14:00:                               DAILY,           [place]

[place]



     140 mg      00                                 First dose           Medical



                                                  on Sat           Branch



                                                  21 at           



                                                  0900,           



                                                  Until           



                                                  Discontinu           



                                                  ed,            



                                                  Routine           

 

     divalproex            Yes            250mg      250 mg,           Uni

vers



     ER        -                               Oral, QHS,           ity of



     (DEPAKOTE      02:00:                               First dose           [place]

[place]



     ER) 24 hr      00                                 on Fri           Medical



     tablet 250                                         21 at           [facility]



     mg                                           2100,           



                                                  Until           



                                                  Discontinu           



                                                  ed,            



                                                  Routine           

 

     methadone       No             20mg      20 mg,           Unive

rs



     (DOLOPHINE                                Oral,           ity of



     HCL) tablet      15:30: 16:56                          ONCE, 1           [facility]

[place]



     20 mg      00   :00                           dose, On           Medical



                                                  Fri            Branch



                                                  21 at           



                                                  1030,           



                                                  Routine           

 

     methadone      2021- No             120mg      120 mg,           Uni

vers



     (DOLOPHINE                                Oral,           ity of



     HCL) tablet      22:00: 15:25                          DAILY,           [place]

as



     120 mg      00   :02                           First dose           Medical



                                                  on u           Branch



                                                  21 at           



                                                  1700,           



                                                  Until           



                                                  Discontinu           



                                                  ed,            



                                                  Routine           

 

     polyethylen            Yes            17g       17 g,           Unive

rs



     e glycol                                     Oral,           ity of



     3350 powder      21:45:                               DAILY,           [place]

[place]



     17 g      00                                 First dose           Medical



                                                  on u           Branch



                                                  21 at           



                                                  1645,           



                                                  Until           



                                                  Discontinu           



                                                  ed,            



                                                  Routine           

 

     polyethylen       No             17g       17 g,           Univ

ers



     e glycol                                Oral,           ity of



     3350 powder      21:45: 22:43                          DAILY, 3           [facility]

[place]



     17 g      00   :31                           doses,           Medical



                                                  First dose           Branch



                                                  (after           



                                                  last           



                                                  reorder)           



                                                  on u           



                                                  21 at           



                                                  1645, Last           



                                                  dose on           



                                                  Sat            



                                                  21 at           



                                                  0900,           



                                                  Routine           

 

     LACTULOSE       No             30mg      Take 30 mg           U

nivers



     ORAL                                by mouth 2           ity of



               16:44: 00:00                          (two)           [place]



               19   :00                           times           Medical



                                                  daily.           Branch

 

     HYDROcodone      2021- No             1{tbl}      Take 1           U

nivers



     -acetaminop                                tablet by           it

y of



     hen       16:44: 00:00                          mouth           [place]



     mg tablet      19   :00                           every 4           Medical



                                                  (four)           Branch



                                                  hours as           



                                                  needed.           

 

     methadone 5       No             10mg      Take 10 mg          

 [facility]



     mg tablet                                by mouth 3           ity

 of



               16:44: 00:00                          (three)           [place]



               19   :00                           times           Medical



                                                  daily.           Branch

 

     magnesium       No             4g        4 g, IV           Univ

ers



     sulfate in                                Piggyback,           it

y of



     water 4      14:30: 14:04                          ONCE, 1           [place]



     gram/50 mL      00   :00                           dose, On           Medic

al



     (8 %) IV                                         u            Branch



     Piggyback 4                                         21 at           



     g                                            0930,           



                                                  Routine           

 

     KCL        No             40meq      40 mEq,           [facility]



     (KLOR-CON                                Oral,           ity of



     M20) tablet      14:30: 14:03                          ONCE, 1           [facility]

[place]



     40 mEq      00   :00                           dose, On           Medical



                                                  u            Branch



                                                  21 at           



                                                  0930,           



                                                  Routine           

 

     sacubitriL-            Yes            2{tbl}      2 tablet,          

 [facility]



     valsartan                                     Oral, BID,           ity 

of



     (ENTRESTO)      14:45:                               First dose           T

[facility]



     24-26 mg      00                                 on Wed           Medical



     tablet 2                                         21 at           [facility]

h



     tablet                                         0945,           



                                                  Until           



                                                  Discontinu           



                                                  ed,            



                                                  Routine<br           



                                                  >Faculty           



                                                  member           



                                                  approving           



                                                  Restricted           



                                                  medication           



                                                  : [facility]           

 

     cefTRIAXone      2021- No             1000mg      1,000 mg,         

  [facility]



     (ROCEPHIN)                                IV             ity of



     1,000 mg in      04:45: 05:44                          Piggyback,          

 [place]



     NaCl 0.9%      00   :00                           Q24H ABX,           Medic

al



     (NS) 50 mL                                         4 doses,           Branc

h



     MINI-BAG                                         First dose           



                                                  on 21 at           



                                                  2345, Last           



                                                  dose on           



                                                  21 at           



                                                  2345,           



                                                  Administer           



                                                  over 30           



                                                  Minutes,           



                                                  50             



                                                  mL<br>Reas           



                                                  on for           



                                                  Anti-Infec           



                                                  tive:           



                                                  Empiric           



                                                  Therapy           



                                                  for            



                                                  Suspected           



                                                  Infection<           



                                                  br>Empiric           



                                                  Therapy           



                                                  Site:           



                                                  Urine<br>D           



                                                  uration of           



                                                  therapy: 7           



                                                  days           

 

     carvediloL            Yes            12.5mg      12.5 mg,           U

nivers



     (COREG)                                     Oral, BID           ity of



     tablet 12.5      04:00:                               MEALS,           [place]

[place]



     mg        00                                 First dose           Medical



                                                  on [facility]



                                                  21 at           



                                                  2300,           



                                                  Until           



                                                  Discontinu           



                                                  ed,            



                                                  Routine           

 

     spironolact            Yes            25mg      25 mg,           Univ

ers



     one                                      Oral,           ity of



     (ALDACTONE)      04:00:                               DAILY,           [place]

[place]



     tablet 25      00                                 First dose           Medi

carlos



     mg                                           on [facility]



                                                  21 at           



                                                  2300,           



                                                  Until           



                                                  Discontinu           



                                                  ed,            



                                                  Routine           

 

     QUEtiapine            Yes            300mg      300 mg,           Uni

vers



     (SEROQUEL)                                     Oral, QHS,           ity

 of



     tablet 300      04:00:                               First dose           T

[facility]



     mg        00                                 on [facility]



                                                  21 at           Branch



                                                  2300,           



                                                  Until           



                                                  Discontinu           



                                                  ed,            



                                                  Routine           

 

     divalproex      2021- No             250mg      250 mg,           [place]

[place]



     ER                                  Oral, QHS,           ity of



     (DEPAKOTE      04:00: 22:03                          First dose           T

[facility]



     ER) 24 hr      00   :12                           on Tue           Medical



     tablet 250                                         21 at           [facility]



     mg                                           2300,           



                                                  Until           



                                                  Discontinu           



                                                  ed,            



                                                  Routine           

 

     insulin      0 - No             30U       30 Units,           Univ

ers



     glargine                                Subcutaneo           ity 

of



     (LANTUS      04:00: 13:20                          us, BID,           [place]



     U-100)      00   :25                           First dose           Medical



     injection                                         on Tue           Branch



     30 Units                                         21 at           



                                                  2300,           



                                                  Until           



                                                  Discontinu           



                                                  ed,            



                                                  Routine           

 

     rifAXIMin      0      Yes            550mg      550 mg,           Univ

ers



     (XIFAXAN)                                     Oral, BID,           ity 

of



     tablet 550      03:45:                               First dose           T

[facility]



     mg        00                                 on [facility]



                                                  21 at           Branch



                                                  2245,           



                                                  Until           



                                                  Discontinu           



                                                  ed,            



                                                  Routine<br           



                                                  >Reason           



                                                  for            



                                                  Anti-Infec           



                                                  tive:           



                                                  Empiric           



                                                  Therapy           



                                                  for            



                                                  Suspected           



                                                  Infection<           



                                                  br>Empiric           



                                                  Therapy           



                                                  Site:           



                                                  Abdominal<           



                                                  br>Duratio           



                                                  n of           



                                                  therapy: 7           



                                                  days           

 

     sennosides            Yes            8.6mg      8.6 mg,           Uni

vers



     (SENOKOT)                                     Oral, BID,           ity 

of



     tablet 8.6      03:45:                               First dose           T

[facility]



     mg        00                                 on [facility]



                                                  21 at           Branch



                                                  2245,           



                                                  Until           



                                                  Discontinu           



                                                  ed,            



                                                  Routine           

 

     docusate            Yes            100mg      100 mg,           [facility]

rs



     (COLACE)                                     Oral, BID,           ity o

f



     capsule 100      03:45:                               First dose           

[place]



     mg        00                                 on [facility]



                                                  21 at           Branch



                                                  2245,           



                                                  Until           



                                                  Discontinu           



                                                  ed,            



                                                  Routine           

 

     furosemide      0      Yes            40mg      40 mg, IV           [place]

[place]



     (LASIX)                                     Push,           ity of



     injection      03:45:                               Q12H,           [place]



     40 mg      00                                 First dose           Medical



                                                  (after           Branch



                                                  last           



                                                  reorder)           



                                                  on 21 at           



                                                  2245,           



                                                  Until           



                                                  Discontinu           



                                                  ed,            



                                                  Routine           

 

     polyethylen      0  No             17g       17 g,           Univ

ers



     e glycol      24                          Oral,           ity of



     3350 powder      03:45: 16:59                          QNOON, 3           [facility]

[place]



     17 g      00   :00                           doses,           Medical



                                                  First dose           [facility]



                                                  on 21 at           



                                                  2245, Last           



                                                  dose on           



                                                  21 at           



                                                  1200,           



                                                  Routine           

 

     lactulose      2021- No             30mL      30 mL,           [facility]

rs



     (CEPHULAC)                                Oral, BID,           it

y of



     solution 30      03:45: 21:35                          First dose          

 [place]



     mL        00   :16                           on [facility]



                                                  21 at           [facility]



                                                  2245,           



                                                  Until           



                                                  Discontinu           



                                                  ed,            



                                                  Routine           

 

     furosemide      2021- No             40mg      40 mg, IV           U

nivers



     (LASIX)                                Push,           ity of



     injection      22:30: 22:07                          ONCE, 1           [place]

s



     40 mg      00   :00                           dose, On           Medical



                                                  [facility]



                                                  21 at           



                                                  1730, ASAP           

 

     insulin      - 2020- No             5U        5 Units,           Unive

rs



     regular                                Subcutaneo           ity o

f



     human      09:15: 08:06                          , ONCE,           [place]



     (HUMULIN R)      00   :00                           1 dose,           Medic

al



     injection 5                                         Tue            [facility]



     Units                                         20 at           



                                                  0415,           



                                                  Routine           

 

     insulin      2020- No             15U       15 Units,           Univ

ers



     regular                                Slow IV           ity of



     human      07:15: 06:17                          Push,           [place]



     (HUMULIN R)      00   :00                           ONCE, 1           Medic

al



     injection                                         dose, Tue           [facility]

h



     15 Units                                         20 at           



                                                  0215,           



                                                  Routine           

 

     codeine-gua       2020- No             10mL      10 mL,           Uni

vers



     ifenesin                                Oral,           ity of



     (ROBITUSSIN      05:30: 04:33                          ONCE, 1           [place]

[place]



     AC)       00   :00                           dose, Tue           Medi

carlos



     mg/5 mL                                         20 at           [facility]



     solution 10                                         0030, ASAP           



     mL                                                          

 

     dextrometho      -0      Yes       [id number] 10mL      Take 10 mL      

     [facility]



     han-guaif                                     by mouth           ity 

of



     enesin      00:00:                               every 4           [place]



      mg/5      00                                 (four)           Medica

l



     mL syrup                                         hours as           [facility]



                                                  needed for           



                                                  Cough.           

 

     dextrometho      2020-0      Yes       [id number] 10mL      Take 10 mL    

       [facility]



     rphan-guaif                      [id number]           by mouth           i

ty of



     enesin      00:00:                               every 4           [place]



      mg/5      00                                 (four)           Medica

l



     mL syrup                                         hours as           [facility]



                                                  needed for           



                                                  Cough.           

 

     dextrometho      2020-0 - No        [id number] 10mL      Take 10 mL   

        [facility]



     razia                 [id number]           by mouth           

ity of



     enesin      00:00: 00:00                          every 4           [place]



      mg/5      00   :00                           (four)           Medica

l



     mL syrup                                         hours as           [facility]



                                                  needed for           



                                                  Cough.           

 

     insulin      -2020- No             10U       10 Units,           Univ

ers



     regular                                Subcutaneo           ity o

f



     human      23:30: 22:30                          us, ONCE,           [place]



     (HUMULIN R)      00   :00                           1 dose,           Medic

al



     injection                                         Sun            [facility]



     10 Units                                         20 at           



                                                  1830, STAT           

 

     insulin      2020-0      Yes            30U       inject 30           Unive

rs



     aspart      7-19                               Units           ity of



     prot/insuln      21:44:                               under the           [facility]

[facility]



     asp       41                                 skin 3           Medical



     (NOVOLOG                                         (three)           [facility]



     MIX 70-30                                         times           



     SC)                                          daily.           

 

     HYDROcodone      2020-0      Yes            1{tbl}      Take 1           Un

[place]



     -acetaminop      7-19                               tablet by           ity

 of



     hen       21:44:                               mouth           [place]



     mg tablet      41                                 every 4           Medical



                                                  (four)           [facility]



                                                  hours as           



                                                  needed.           

 

     methadone 5      2020-0      Yes            10mg      Take 10 mg           

[facility]



     mg tablet      7-19                               by mouth 3           ity 

of



               21:44:                               (three)           [place]



               41                                 times           Medical



                                                  daily.           [facility]

 

     insulin      2020-0      Yes            30U       inject 30           Unive

rs



     aspart      7-19                               Units           ity of



     prot/insuln      21:44:                               under the           [facility]

[place]



     asp       41                                 skin 3           Medical



     (NOVOLOG                                         (three)           [facility]



     MIX 70-30                                         times           



     SC)                                          daily.           

 

     HYDROcodone      2020-0      Yes            1{tbl}      Take 1           Un

[place]



     -acetaminop      7-19                               tablet by           ity

 of



     hen       21:44:                               mouth           [place]



     mg tablet      41                                 every 4           Medical



                                                  (four)           [facility]



                                                  hours as           



                                                  needed.           

 

     methadone 5      2020-0      Yes            10mg      Take 10 mg           

[facility]



     mg tablet      7-19                               by mouth 3           ity 

of



               21:44:                               (three)           [place]



               41                                 times           Medical



                                                  daily.           [facility]

 

     insulin      2020-0      Yes            30U       inject 30           Unive

rs



     aspart      7-19                               Units           ity of



     prot/insuln      21:44:                               under the           [facility]

[place]



     asp       41                                 skin 3           Medical



     (NOVOLOG                                         (three)           [facility]



     MIX 70-30                                         times           



     SC)                                          daily.           

 

     HYDROcodone      2020-0      Yes            1{tbl}      Take 1           Un

[place]



     -acetaminop      7-19                               tablet by           ity

 of



     hen       21:44:                               mouth           [place]



     mg tablet      41                                 every 4           Medical



                                                  (four)           [facility]



                                                  hours as           



                                                  needed.           

 

     methadone 5      2020-0      Yes            10mg      Take 10 mg           

[facility]



     mg tablet      7-19                               by mouth 3           ity 

of



               21:44:                               (three)           [place]



               41                                 times           Medical



                                                  daily.           [facility]

 

     insulin      2020-0      Yes            30U       inject 30           Unive

rs



     aspart      7-19                               Units           ity of



     prot/insuln      21:44:                               under the           [facility]

[facility]



     asp       41                                 skin 3           Medical



     (NOVOLOG                                         (three)           [facility]



     MIX 70-30                                         times           



     SC)                                          daily.           

 

     HYDROcodone      2020-0      Yes            1{tbl}      Take 1           Un

[place]



     -acetaminop      7-19                               tablet by           ity

 of



     hen       21:44:                               mouth           [place]



     mg tablet      41                                 every 4           Medical



                                                  (four)           [facility]



                                                  hours as           



                                                  needed.           

 

     methadone 5      2020-0      Yes            10mg      Take 10 mg           

[facility]



     mg tablet      7-19                               by mouth 3           ity 

of



               21:44:                               (three)           [place]



               41                                 times           Medical



                                                  daily.           [facility]

 

     insulin      2020-0      Yes            30U       inject 30           Unive

rs



     aspart      7-19                               Units           ity of



     prot/insuln      21:44:                               under the           [facility]

[facility]



     asp       41                                 skin 3           Medical



     (NOVOLOG                                         (three)           [facility]



     MIX 70-30                                         times           



     SC)                                          daily.           

 

     HYDROcodone      2020-0      Yes            1{tbl}      Take 1           Un

[place]



     -acetaminop      7-19                               tablet by           ity

 of



     hen       21:44:                               mouth           [place]



     mg tablet      41                                 every 4           Medical



                                                  (four)           [facility]



                                                  hours as           



                                                  needed.           

 

     methadone 5      2020-0      Yes            10mg      Take 10 mg           

[facility]



     mg tablet      7-19                               by mouth 3           ity 

of



               21:44:                               (three)           [place]



               41                                 times           Medical



                                                  daily.           [facility]

 

     insulin      2020-0      Yes            30U       inject 30           Unive

rs



     aspart      7-19                               Units           ity of



     prot/insuln      21:44:                               under the           [facility]

[facility]



     asp       41                                 skin 3           Medical



     (NOVOLOG                                         (three)           [facility]



     MIX 70-30                                         times           



     SC)                                          daily.           

 

     HYDROcodone      2020-0      Yes            1{tbl}      Take 1           Un

[place]



     -acetaminop      7-19                               tablet by           ity

 of



     hen       21:44:                               mouth           [place]



     mg tablet      41                                 every 4           Medical



                                                  (four)           [facility]



                                                  hours as           



                                                  needed.           

 

     methadone 5      2020-0      Yes            10mg      Take 10 mg           

[facility]



     mg tablet      7-19                               by mouth 3           ity 

of



               21:44:                               (three)           [place]



               41                                 times           Medical



                                                  daily.           [facility]

 

     albuterol      2020-0      Yes       [id number] 2{puff}      Inhale 2       

    [facility]



     90        7-19                               Puffs           ity of



     mcg/actuati      00:00:                               every 4           [place]

as



     on inhaler      00                                 (four)           Medical



                                                  hours as           [facility]



                                                  needed for           



                                                  Wheezing           



                                                  or             



                                                  Shortness           



                                                  of Breath.           

 

     Zinc 50 mg      2020-0      Yes       [id number] 100mg      Take 100        

   [facility]



     Tab       7-19                               mg by           ity of



               00:00:                               mouth at           [place]



               00                                 bedtime.           Medical



                                                                 [facility]

 

     benzonatate      2020-0      Yes       [id number] 100mg      Take 1         

  [facility]



     100 mg      7-19                               capsule by           ity of



     capsule      00:00:                               mouth 3           [place]



               00                                 (three)           Medical



                                                  times           [facility]



                                                  daily as           



                                                  needed for           



                                                  Cough.           

 

     albuterol      2020-0      Yes       [id number] 2{puff}      Inhale 2       

    [facility]



     90        7-19                               Puffs           ity of



     mcg/actuati      00:00:                               every 4           [place]

as



     on inhaler      00                                 (four)           Medical



                                                  hours as           [facility]



                                                  needed for           



                                                  Wheezing           



                                                  or             



                                                  Shortness           



                                                  of Breath.           

 

     Zinc 50 mg      2020-0      Yes       [id number] 100mg      Take 100        

   [facility]



     Tab       7-19                               mg by           ity of



               00:00:                               mouth at           [place]



               00                                 bedtime.           Medical



                                                                 [facility]

 

     benzonatate      2020-0      Yes       [id number] 100mg      Take 1         

  [facility]



     100 mg      7-19                               capsule by           ity of



     capsule      00:00:                               mouth 3           [place]



               00                                 (three)           Medical



                                                  times           [facility]



                                                  daily as           



                                                  needed for           



                                                  Cough.           

 

     albuterol      2020-0      Yes       [id number] 2{puff}      Inhale 2       

    [facility]



     90        7-19                               Puffs           ity of



     mcg/actuati      00:00:                               every 4           [place]

as



     on inhaler      00                                 (four)           Medical



                                                  hours as           [facility]



                                                  needed for           



                                                  Wheezing           



                                                  or             



                                                  Shortness           



                                                  of Breath.           

 

     Zinc 50 mg      2020-0      Yes       [id number] 100mg      Take 100        

   [facility]



     Tab       7-19                               mg by           ity of



               00:00:                               mouth at           [place]



               00                                 bedtime.           Medical



                                                                 [facility]

 

     benzonatate      2020-0      Yes       [id number] 100mg      Take 1         

  [facility]



     100 mg      7-19                               capsule by           ity of



     capsule      00:00:                               mouth 3           [place]



               00                                 (three)           Medical



                                                  times           [facility]



                                                  daily as           



                                                  needed for           



                                                  Cough.           

 

     albuterol      2020-0      Yes       [id number] 2{puff}      Inhale 2       

    [facility]



     90        7-19                               Puffs           ity of



     mcg/actuati      00:00:                               every 4           [place]

as



     on inhaler      00                                 (four)           Medical



                                                  hours as           [facility]



                                                  needed for           



                                                  Wheezing           



                                                  or             



                                                  Shortness           



                                                  of Breath.           

 

     Zinc 50 mg      2020-0      Yes       [id number] 100mg      Take 100        

   [facility]



     Tab       7-19                               mg by           ity of



               00:00:                               mouth at           [place]



               00                                 bedtime.           Medical



                                                                 [facility]

 

     benzonatate      2020-0      Yes       [id number] 100mg      Take 1         

  [facility]



     100 mg      7-19                               capsule by           ity of



     capsule      00:00:                               mouth 3           [place]



               00                                 (three)           Medical



                                                  times           [facility]



                                                  daily as           



                                                  needed for           



                                                  Cough.           

 

     albuterol      2020-0      Yes       [id number] 2{puff}      Inhale 2       

    [facility]



     90        7-19                               Puffs           ity of



     mcg/actuati      00:00:                               every 4           [place]

as



     on inhaler      00                                 (four)           Medical



                                                  hours as           [facility]



                                                  needed for           



                                                  Wheezing           



                                                  or             



                                                  Shortness           



                                                  of Breath.           

 

     Zinc 50 mg      2020-0      Yes       [id number] 100mg      Take 100        

   [facility]



     Tab       7-19                               mg by           ity of



               00:00:                               mouth at           [place]



               00                                 bedtime.           Medical



                                                                 [facility]

 

     benzonatate      2020-0      Yes       [id number] 100mg      Take 1         

  [facility]



     100 mg      7-19                               capsule by           ity of



     capsule      00:00:                               mouth 3           [place]



                                                (three)           [facility]



                                                  times           [facility]



                                                  daily as           



                                                  needed for           



                                                  Cough.           

 

     albuterol      2020-0      Yes       [id number] 2{puff}      Inhale 2       

    [facility]



     90        7-19                               Puffs           [facility]



     mcg/actuati      00:00:                               every 4           [place]

as



     on inhaler      00                                 (four)           Medical



                                                  hours as           [facility]



                                                  needed for           



                                                  Wheezing           



                                                  or             



                                                  Shortness           



                                                  of Breath.           

 

     Zinc 50 mg      2020-0      Yes       [id number] 100mg      Take 100        

   [facility]



     Tab       7-19                               mg by           [facility]



               00:00:                               mouth at           [place]



               00                                 bedtime.           Medical



                                                                 [facility]

 

     benzonatate      2020-0      Yes       [id number] 100mg      Take 1         

  [facility]



     100 mg      7-19                               capsule by           [facility] of



     capsule      00:00:                               mouth 3           [place]



               00                                 (three)           Medical



                                                  times           [facility]



                                                  daily as           



                                                  needed for           



                                                  Cough.           

 

     albuterol      2020-0 2021- No        [id number] 2{puff}      Inhale 2      

     [facility]



     90        7-19 09-27                          Puffs           [facility]



     mcg/actuati      00:00: 00:00                          every 4           [place]



     on inhaler      00   :00                           (four)           [facility]



                                                  hours as           [facility]



                                                  needed for           



                                                  Wheezing           



                                                  or             



                                                  Shortness           



                                                  of Breath.           

 

     Zinc 50 mg      2020-0 1- No        [id number] 100mg      Take 100       

    [facility]



     Tab       7-19 09-23                          mg by           [facility] of



               00:00: 00:00                          mouth at           [place]



               00   :00                           bedtime.           Medical



                                                                 [facility]

 

     benzonatate      2020-0 - No        [id number] 100mg      Take 1        

   [facility]



     100 mg                                capsule by           [facility]



     capsule      00:00: 00:00                          mouth 3           [place]



               00   :00                           (three)           Medical



                                                  times           [facility]



                                                  daily as           



                                                  needed for           



                                                  Cough.           

 

     vitamin C      - 2020- No        [id number] 1000mg      Take 1         

  [facility]



     with cd      30                          tablet by           [facility] 

of



     hips      00:00: 04:59                          mouth 3           [place]



     (VITAMIN C)      00   :00                           (three)           Medic

al



     1,000 mg                                         times           [facility]



     tablet                                         daily for           



                                                  10 days.           

 

     Cholecalcif      - 2020- No        [id number] 2000U      Take 1        

   [facility]



     juvenal,      -30                          tablet by           [facility]



     Vitamin D3,      00:00: 04:59                          mouth           [place]

[facility]



     (VITAMIN      00   :00                           daily for           [facility]

l



     D3) 2,000                                         10 days.           [facility]



     unit tablet                                                        

 

     foLIC acid      -2020- No        [id number] 1mg       Take 1          

 [facility]



     1 mg tablet      30                          tablet by           it

y of



               00:00: 04:59                          mouth           [place]



               00   :00                           daily for           Medical



                                                  10 days.           [facility]

 

     aspirin 81      - 2020- No        [id number] 81mg      Take 1          

 [facility]



     mg chewable      -30                          tablet by           it

y of



     tablet      00:00: 04:59                          mouth           [place]



               00   :00                           daily for           Medical



                                                  10 days.           [facility]

 

     vitamin C      -2020- No        [id number] 1000mg      Take 1         

  [facility]



     with dc      30                          tablet by           ity 

of



     hips      00:00: 04:59                          mouth 3           [place]



     (VITAMIN C)      00   :00                           (three)           Med[facility]

al



     1,000 mg                                         times           [facility]



     tablet                                         daily for           



                                                  10 days.           

 

     Cholecalcif      -2020- No        [id number] 2000U      Take 1        

   [facility]



     juvenal,      -30                          tablet by           [facility]



     Vitamin D3,      00:00: 04:59                          mouth           [place]

[facility]



     (VITAMIN      00   :00                           daily for           [facility]

l



     D3) 2,000                                         10 days.           [facility]



     unit tablet                                                        

 

     foLIC acid      -2020- No        [id number] 1mg       Take 1          

 [facility]



     1 mg tablet      -30                          tablet by           it

y of



               00:00: 04:59                          mouth           [place]



               00   :00                           daily for           Medical



                                                  10 days.           [facility]

 

     aspirin 81      - 2020- No        [id number] 81mg      Take 1          

 [facility]



     mg chewable      7--30                          tablet by           it

y of



     tablet      00:00: 04:59                          mouth           [place]



               00   :00                           daily for           Medical



                                                  10 days.           [facility]

 

     vitamin C      2020-0 2020- No        [id number] 1000mg      Take 1         

  [facility]



     with dc      7--30                          tablet by           ity 

of



     hips      00:00: 04:59                          mouth 3           [place]



     (VITAMIN C)      00   :00                           (three)           Medic

al



     1,000 mg                                         times           [facility]



     tablet                                         daily for           



                                                  10 days.           

 

     Cholecalcif      2020-0 2020- No        [id number] 2000U      Take 1        

   [facility]



     juvenal,      7--30                          tablet by           [facility]



     Vitamin D3,      00:00: 04:59                          mouth           [place]

[facility]



     (VITAMIN      00   :00                           daily for           [facility]

l



     D3) 2,000                                         10 days.           [facility]



     unit tablet                                                        

 

     foLIC acid      2020-0 2020- No        [id number] 1mg       Take 1          

 [facility]



     1 mg tablet      -30                          tablet by           it

y of



               00:00: 04:59                          mouth           [place]



               00   :00                           daily for           Medical



                                                  10 days.           [facility]

 

     aspirin 81      -0 2020- No        [id number] 81mg      Take 1          

 [facility]



     mg chewable      7--30                          tablet by           it

y of



     tablet      00:00: 04:59                          mouth           [place]



               00   :00                           daily for           Medical



                                                  10 days.           [facility]

 

     vitamin C      2020-0 2020- No        [id number] 1000mg      Take 1         

  [facility]



     with dc      --30                          tablet by           ity 

of



     hips      00:00: 04:59                          mouth 3           [place]



     (VITAMIN C)      00   :00                           (three)           Medic

al



     1,000 mg                                         times           [facility]



     tablet                                         daily for           



                                                  10 days.           

 

     Cholecalcif      -0 2020- No        [id number] 2000U      Take 1        

   [facility]



     juvenal,      7-30                          tablet by           [facility]



     Vitamin D3,      00:00: 04:59                          mouth           [place]

[facility]



     (VITAMIN      00   :00                           daily for           [facility]

l



     D3) 2,000                                         10 days.           [facility]



     unit tablet                                                        

 

     foLIC acid      2020-0 2020- No        [id number] 1mg       Take 1          

 [facility]



     1 mg tablet      --30                          tablet by           it

y of



               00:00: 04:59                          mouth           [place]



               00   :00                           daily for           Medical



                                                  10 days.           [facility]

 

     aspirin 81      2020-0 2020- No        [id number] 81mg      Take 1          

 [facility]



     mg chewable      7--30                          tablet by           it

y of



     tablet      00:00: 04:59                          mouth           [place]



               00   :00                           daily for           Medical



                                                  10 days.           [facility]

 

     vitamin C      2020-0 2020- No        [id number] 1000mg      Take 1         

  [facility]



     with dc                                tablet by           ity 

of



     hips      00:00: 04:59                          mouth 3           [place]



     (VITAMIN C)      00   :00                           (three)           Medic

al



     1,000 mg                                         times           [facility]



     tablet                                         daily for           



                                                  10 days.           

 

     Cholecalcif      - 2020- No        [id number] 2000U      Take 1        

   [facility]



     juvenal,                                tablet by           [facility]



     Vitamin D3,      00:00: 04:59                          mouth           [place]

[facility]



     (VITAMIN      00   :00                           daily for           [facility]

l



     D3) 2,000                                         10 days.           [place]



     unit tablet                                                        

 

     foLIC acid      - 2020- No        [id number] 1mg       Take 1          

 [facility]



     1 mg tablet                                tablet by           it

y of



               00:00: 04:59                          mouth           [place]



               00   :00                           daily for           Medical



                                                  10 days.           [place]

 

     aspirin 81      - 2020- No        [id number] 81mg      Take 1          

 [facility]



     mg chewable                                tablet by           it

y of



     tablet      00:00: 04:59                          mouth           [place]



               00   :00                           daily for           Medical



                                                  10 days.           [place]

 

     FENTanyl PF      - 2020- No             50ug      50 mcg,           [facility]

[place]



     (SUBLIMAZE      -                          Slow IV           ity o

f



     (PF))      04:45: 03:38                          Push,           [place]



     injection      00   :00                           ONCE, 1           Medical



     50 mcg                                         dose, Wed           [place]



                                                  6/3/20 at           



                                                  2345, STAT           

 

     iohexol      -0 2020- No             120mL      120 mL,           [facility]

rs



     (OMNIPAQUE      --04                          Intravenou           it

y of



     350       02:00: 03:45                          s, ONCE, 1           [place]



     BULK-150      00   :00                           dose, Wed           [facility]

l



     mL)                                          6/3/20 at           [place]



     injection                                         2100,           



     120 mL                                         Routine           

 

     iohexol      2020-0 2020- No             120mL      120 mL,           [facility]

rs



     (OMNIPAQUE      - 05-19                          Intravenou           it

y of



     350       18:55: 18:55                          s, ONCE, 1           [place]



     BULK-150      00   :00                           dose, Tue           [facility]

l



     mL)                                          20 at           [place]



     injection                                         1415,           



     120 mL                                         Routine           

 

     vancomycin      2020-0      Yes       [id number] 500mg      Take 2          

 [facility]



     250 mg      5-07                               capsules           [facility]



     capsule      00:00:                               by mouth 4           [place]

[facility]



               00                                 (four)           [facility]



                                                  times           [place]



                                                  daily.           

 

     vancomycin      2020-0      Yes       [id number] 500mg      Take 2          

 [facility]



     250 mg      5-07                               capsules           [facility]



     capsule      00:00:                               by mouth 4           [place]

[facility]



               00                                 (four)           [facility]



                                                  times           [facility]



                                                  daily.           

 

     vancomycin      2020-0      Yes       [id number] 500mg      Take 2          

 [facility]



     250 mg      5-07                               capsules           [facility]



     capsule      00:00:                               by mouth 4           [place]

[facility]



               00                                 (four)           [facility]



                                                  times           [facility]



                                                  daily.           

 

     vancomycin      2020-0      Yes       [id number] 500mg      Take 2          

 [facility]



     250 mg      5-07                               capsules           [facility]



     capsule      00:00:                               by mouth 4           [place]

[facility]



               00                                 (four)           Medical



                                                  times           [facility]



                                                  daily.           

 

     vancomycin      2020-0      Yes       [id number] 500mg      Take 2          

 [facility]



     250 mg      5-07                               capsules           [facility]



     capsule      00:00:                               by mouth 4           [place]

[facility]



               00                                 (four)           [facility]



                                                  times           [facility]



                                                  daily.           

 

     vancomycin      2020-0      Yes       [id number] 500mg      Take 2          

 [facility]



     250 mg      5-07                               capsules           [facility]



     capsule      00:00:                               by mouth 4           [place]

[facility]



               00                                 (four)           Medical



                                                  times           [facility]



                                                  daily.           

 

     vancomycin      2020-0      Yes       [id number] 500mg      Take 2          

 [facility]



     250 mg      5-07                               capsules           [facility]



     capsule      00:00:                               by mouth 4           [place]

[facility]



               00                                 (four)           Medical



                                                  times           [facility]



                                                  daily.           

 

     vancomycin      2020-0      Yes       [id number] 500mg      Take 2          

 [facility]



     250 mg      5-07                               capsules           [facility]



     capsule      00:00:                               by mouth 4           [place]

[facility]



               00                                 (four)           Medical



                                                  times           [facility]



                                                  daily.           

 

     vancomycin      2020-0      Yes       [id number] 500mg      Take 2          

 [facility]



     250 mg      5-07                               capsules           [facility]



     capsule      00:00:                               by mouth 4           [place]

[facility]



               00                                 (four)           [facility]



                                                  times           [facility]



                                                  daily.           

 

     vancomycin      2020-0 - No        [id number] 500mg      Take 2         

  [facility]



     250 mg      5-07 09-23                          capsules           ity of



     capsule      00:00: 00:00                          by mouth 4           [place]

as



               00   :00                           (four)           Medical



                                                  times           [facility]



                                                  daily.           

 

     insulin      2020-0      Yes            .25U/kg      21 Units           [facility]

[facility]



     glargine      5-06                     /d        (0.25           [facility]



     (LANTUS      02:00:                               Units/kg/d           [place]

[facility]



     U-100)      00                                 ay ?84           Medical



     injection                                         kg),           [facility]



     21 Units                                         Subcutaneo           



                                                  us, QHS,           



                                                  First dose           



                                                  on 20 at           



                                                  2100,           



                                                  Until           



                                                  Discontinu           



                                                  ed,            



                                                  Routine           

 

     QUEtiapine      2020-0      Yes            350mg      Take 350           [facility]

[place]



     (SEROQUEL)      5-05                               mg by           [facility]



     300 mg      19:19:                               mouth at           [place]



     tablet      34                                 bedtime.           [facility]



                                                                 [facility]

 

     carvediloL      2020-0      Yes            6.25mg      Take 6.25           

[facility]



     6.25 mg      5-05                               mg by           [facility]



     tablet      19:19:                               mouth 2           [place]



               34                                 (two)           [facility]



                                                  times           [facility]



                                                  daily with           



                                                  meals.           

 

     sacubitril-      2020-0      Yes            1{tbl}      Take 1           [facility]

[place]



     valsartan      5-05                               tablet by           ity o

f



     (ENTRESTO)      19:19:                               mouth 2           [place]

[facility]



     49-51 mg      34                                 (two)           [facility]



     tablet                                         times           [facility]



                                                  daily.           

 

     insulin      2020-0      Yes            30U       inject 30           [facility]

rs



     aspart      5-05                               Units           [facility]



     prot/insuln      19:19:                               under the           [facility]

[place]



     asp       34                                 skin 3           [facility]



     (NOVOLOG                                         (three)           [facility]



     MIX 70-30                                         times           



     SC)                                          daily.           

 

     potassium      2020-0      Yes            40meq      Take 40           [facility]

[facility]



     chloride      5-05                               mEq by           [facility]



     (KCL-20      19:19:                               mouth at           [place]



     ORAL)      34                                 bedtime.           [facility]



                                                                 [facility]

 

     LACTULOSE      2020-0      Yes            30mg      Take 30 mg           [facility]

[place]



     ORAL      5-05                               by mouth 2           [facility]



               19:19:                               (two)           [place]



               34                                 times           [facility]



                                                  daily.           [facility]

 

     furosemide      2020-0      Yes            80mg      Take 80 mg           U

[facility]



     (LASIX) 40      5-05                               by mouth           ity o

f



     mg tablet      19:19:                               daily.           [place]



               34                                                Medical



                                                                 [facility]

 

     HYDROcodone      2020-0      Yes            1{tbl}      Take 1           [facility]

[facility]



     -acetaminop      5-05                               tablet by           [facility]

 of



     hen       19:19:                               mouth           [place]



     mg tablet      34                                 every 4           Medical



                                                  (four)           [facility]



                                                  hours as           



                                                  needed.           

 

     methadone 5      2020-0      Yes            10mg      Take 10 mg           

[facility]



     mg tablet      5-05                               by mouth 3           [facility] 

of



               19:19:                               (three)           [place]



               34                                 times           Medical



                                                  daily.           Branch

 

     QUEtiapine      2020-0      Yes            350mg      Take 350           [facility]

[facility]



     (SEROQUEL)      5-05                               mg by           [facility] of



     300 mg      19:19:                               mouth at           [place]



     tablet      34                                 bedtime.           Medical



                                                                 Branch

 

     carvediloL      2020-0      Yes            6.25mg      Take 6.25           

[facility]



     6.25 mg      5-05                               mg by           [facility] of



     tablet      19:19:                               mouth 2           [place]



               34                                 (two)           Medical



                                                  times           [facility]



                                                  daily with           



                                                  meals.           

 

     sacubitril-      2020-0      Yes            1{tbl}      Take 1           [facility]

[facility]



     valsartan      5-05                               tablet by           [facility] o

f



     (ENTRESTO)      19:19:                               mouth 2           [place]

[facility]



     49-51 mg      34                                 (two)           Medical



     tablet                                         times           [facility]



                                                  daily.           

 

     insulin      2020-0      Yes            30U       inject 30           [facility]

rs



     aspart      5-05                               Units           [facility]



     prot/insuln      19:19:                               under the           [facility]

[facility]



     asp       34                                 skin 3           [facility]



     (NOVOLOG                                         (three)           [facility]anch



     MIX 70-30                                         times           



     SC)                                          daily.           

 

     potassium      2020-0      Yes            40meq      Take 40           [facility]

[facility]



     chloride      5-05                               mEq by           [facility]



     (KCL-20      19:19:                               mouth at           [place]



     ORAL)      34                                 bedtime.           Medical



                                                                 Branch

 

     LACTULOSE      2020-0      Yes            30mg      Take 30 mg           [facility]

[facility]



     ORAL      5-05                               by mouth 2           [facility] of



               19:19:                               (two)           [place]



               34                                 times           Medical



                                                  daily.           Branch

 

     furosemide      2020-0      Yes            80mg      Take 80 mg           [facility]

[facility]



     (LASIX) 40      5-05                               by mouth           ity o

f



     mg tablet      19:19:                               daily.           [place]



               34                                                Medical



                                                                 [facility]anch

 

     HYDROcodone      2020-0      Yes            1{tbl}      Take 1           [facility]

[facility]



     -acetaminop      5-05                               tablet by           [facility]

 of



     hen       19:19:                               mouth           [place]



     mg tablet      34                                 every 4           [facility]



                                                  (four)           [facility]



                                                  hours as           



                                                  needed.           

 

     methadone 5      2020-0      Yes            10mg      Take 10 mg           

[facility]



     mg tablet      5-05                               by mouth 3           [facility] 

of



               19:19:                               (three)           [place]



               34                                 times           Medical



                                                  daily.           Branch

 

     QUEtiapine      2020-0      Yes            350mg      Take 350           [facility]

[facility]



     (SEROQUEL)      5-05                               mg by           [facility] of



     300 mg      19:19:                               mouth at           [place]



     tablet      34                                 bedtime.           Medical



                                                                 Branch

 

     carvediloL      2020-0      Yes            6.25mg      Take 6.25           

[facility]



     6.25 mg      5-05                               mg by           [facility] of



     tablet      19:19:                               mouth 2           [place]



               34                                 (two)           Medical



                                                  times           [facility]



                                                  daily with           



                                                  meals.           

 

     sacubitril-      2020-0      Yes            1{tbl}      Take 1           [facility]

[facility]



     valsartan      5-05                               tablet by           ity o

f



     (ENTRESTO)      19:19:                               mouth 2           [place]

[facility]



     49-51 mg      34                                 (two)           Medical



     tablet                                         times           [facility]



                                                  daily.           

 

     insulin      2020-0      Yes            30U       inject 30           [facility]

rs



     aspart      5-05                               Units           [facility]



     prot/insuln      19:19:                               under the           [facility]

[facility]



     asp       34                                 skin 3           [facility]



     (NOVOLOG                                         (three)           Branch



     MIX 70-30                                         times           



     SC)                                          daily.           

 

     potassium      2020-0      Yes            40meq      Take 40           [facility]

ers



     chloride      5-05                               mEq by           [facility]



     (KCL-20      19:19:                               mouth at           [place]



     ORAL)      34                                 bedtime.           Medical



                                                                 Branch

 

     LACTULOSE      2020-0      Yes            30mg      Take 30 mg           [facility]

[facility]



     ORAL      5-05                               by mouth 2           [facility] of



               19:19:                               (two)           [place]



               34                                 times           Medical



                                                  daily.           Branch

 

     furosemide      2020-0      Yes            80mg      Take 80 mg           U

[facility]



     (LASIX) 40      5-05                               by mouth           ity o

f



     mg tablet      19:19:                               daily.           [place]



               34                                                Medical



                                                                 [facility]

 

     HYDROcodone      2020-0      Yes            1{tbl}      Take 1           [facility]

[facility]



     -acetaminop      5-05                               tablet by           [facility]

 of



     hen       19:19:                               mouth           [place]



     mg tablet      34                                 every 4           Medical



                                                  (four)           [facility]



                                                  hours as           



                                                  needed.           

 

     methadone 5      2020-0      Yes            10mg      Take 10 mg           

[facility]



     mg tablet      5-05                               by mouth 3           [facility] 

of



               19:19:                               (three)           [place]



               34                                 times           Medical



                                                  daily.           [facility]anch

 

     QUEtiapine      2020-0      Yes            350mg      Take 350           [facility]

[facility]



     (SEROQUEL)      5-05                               mg by           [facility] of



     300 mg      19:19:                               mouth at           [place]



     tablet      34                                 bedtime.           Medical



                                                                 Branch

 

     carvediloL      2020-0      Yes            6.25mg      Take 6.25           

[facility]



     6.25 mg      5-05                               mg by           [facility] of



     tablet      19:19:                               mouth 2           [place]



               34                                 (two)           Medical



                                                  times           [facility]



                                                  daily with           



                                                  meals.           

 

     sacubitril-      2020-0      Yes            1{tbl}      Take 1           [facility]

[facility]



     valsartan      5-05                               tablet by           ity o

f



     (ENTRESTO)      19:19:                               mouth 2           [place]

[facility]



     49-51 mg      34                                 (two)           Medical



     tablet                                         times           [facility]



                                                  daily.           

 

     insulin      2020-0      Yes            30U       inject 30           [facility]

rs



     aspart      5-05                               Units           [facility]



     prot/insuln      19:19:                               under the           [facility]

[facility]



     asp       34                                 skin 3           [facility]



     (NOVOLOG                                         (three)           [facility]



     MIX 70-30                                         times           



     SC)                                          daily.           

 

     potassium      2020-0      Yes            40meq      Take 40           [facility]

[facility]



     chloride      5-05                               mEq by           [facility]



     (KCL-20      19:19:                               mouth at           [place]



     ORAL)      34                                 bedtime.           Medical



                                                                 Branch

 

     LACTULOSE      2020-0      Yes            30mg      Take 30 mg           [facility]

[facility]



     ORAL      5-05                               by mouth 2           [facility] of



               19:19:                               (two)           [place]



               34                                 times           Medical



                                                  daily.           Branch

 

     furosemide      2020-0      Yes            80mg      Take 80 mg           U

[facility]



     (LASIX) 40      5-05                               by mouth           ity o

f



     mg tablet      19:19:                               daily.           [place]



               34                                                Medical



                                                                 [facility]

 

     HYDROcodone      2020-0      Yes            1{tbl}      Take 1           [facility]

[facility]



     -acetaminop      5-05                               tablet by           [facility]

 of



     hen       19:19:                               mouth           [place]



     mg tablet      34                                 every 4           [facility]



                                                  (four)           [facility]



                                                  hours as           



                                                  needed.           

 

     methadone 5      2020-0      Yes            10mg      Take 10 mg           

[facility]



     mg tablet      5-05                               by mouth 3           [facility] 

of



               19:19:                               (three)           [place]



               34                                 times           Medical



                                                  daily.           [facility]anch

 

     QUEtiapine      2020-0      Yes            350mg      Take 350           [facility]

[facility]



     (SEROQUEL)      5-05                               mg by           [facility] of



     300 mg      19:19:                               mouth at           [place]



     tablet      34                                 bedtime.           Medical



                                                                 [facility]

 

     carvediloL      2020-0      Yes            6.25mg      Take 6.25           

[facility]



     6.25 mg      5-05                               mg by           [facility] of



     tablet      19:19:                               mouth 2           [place]



               34                                 (two)           Medical



                                                  times           [facility]



                                                  daily with           



                                                  meals.           

 

     sacubitril-      2020-0      Yes            1{tbl}      Take 1           [facility]

[facility]



     valsartan      5-05                               tablet by           [facility] o

f



     (ENTRESTO)      19:19:                               mouth 2           [place]

[facility]



     49-51 mg      34                                 (two)           Medical



     tablet                                         times           [facility]



                                                  daily.           

 

     insulin      2020-0      Yes            30U       inject 30           [facility]

rs



     aspart      5-05                               Units           [facility]



     prot/insuln      19:19:                               under the           [facility]

[facility]



     asp       34                                 skin 3           [facility]



     (NOVOLOG                                         (three)           [facility]



     MIX 70-30                                         times           



     [facility])                                          daily.           

 

     potassium      2020-0      Yes            40meq      Take 40           [facility]

[facility]



     chloride      5-05                               mEq by           [facility]



     (KCL-20      19:19:                               mouth at           [place]



     ORAL)      34                                 bedtime.           Medical



                                                                 [facility]anch

 

     LACTULOSE      2020-0      Yes            30mg      Take 30 mg           [facility]

[facility]



     ORAL      5-05                               by mouth 2           [facility] of



               19:19:                               (two)           [place]



               34                                 times           Medical



                                                  daily.           Branch

 

     furosemide      2020-0      Yes            80mg      Take 80 mg           [facility]

[facility]



     (LASIX) 40      5-05                               by mouth           ity o

f



     mg tablet      19:19:                               daily.           [place]



               34                                                [facility]



                                                                 [facility]

 

     HYDROcodone      2020-0      Yes            1{tbl}      Take 1           [facility]

[facility]



     -acetaminop      5-05                               tablet by           [facility]

 of



     hen       19:19:                               mouth           [place]



     mg tablet      34                                 every 4           [facility]



                                                  (four)           [facility]



                                                  hours as           



                                                  needed.           

 

     methadone 5      2020-0      Yes            10mg      Take 10 mg           

[facility]



     mg tablet      5-05                               by mouth 3           [facility] 

of



               19:19:                               (three)           [place]



               34                                 times           Medical



                                                  daily.           [facility]

 

     QUEtiapine      2020-0      Yes            350mg      Take 350           [facility]

[facility]



     (SEROQUEL)      5-05                               mg by           [facility] of



     300 mg      19:19:                               mouth at           [place]



     tablet      34                                 bedtime.           Medical



                                                                 Br[facility]

 

     carvediloL      2020-0      Yes            6.25mg      Take 6.25           

[facility]



     6.25 mg      5-05                               mg by           [facility] of



     tablet      19:19:                               mouth 2           [place]



               34                                 (two)           Medical



                                                  times           [facility]



                                                  daily with           



                                                  meals.           

 

     sacubitril-      2020-0      Yes            1{tbl}      Take 1           [facility]

[facility]



     valsartan      5-05                               tablet by           ity o

f



     (ENTRESTO)      19:19:                               mouth 2           [place]

[facility]



     49-51 mg      34                                 (two)           Medical



     tablet                                         times           [facility]



                                                  daily.           

 

     insulin      2020-0      Yes            30U       inject 30           [facility]

rs



     aspart      5-05                               Units           [facility]



     prot/insuln      19:19:                               under the           [facility]

[facility]



     asp       34                                 skin 3           [facility]



     (NOVOLOG                                         (three)           [facility]



     MIX 70-30                                         times           



     SC)                                          daily.           

 

     potassium      2020-0      Yes            40meq      Take 40           [facility]

[facility]



     chloride      5-05                               mEq by           [facility] of



     (KCL-20      19:19:                               mouth at           [place]



     ORAL)      34                                 bedtime.           Medical



                                                                 [facility]

 

     LACTULOSE      2020-0      Yes            30mg      Take 30 mg           [facility]

[facility]



     ORAL      5-05                               by mouth 2           [facility] of



               19:19:                               (two)           [place]



               34                                 times           Medical



                                                  daily.           Branch

 

     furosemide      2020-0      Yes            80mg      Take 80 mg           U

[facility]



     (LASIX) 40      5-05                               by mouth           ity o

f



     mg tablet      19:19:                               daily.           [place]



               34                                                [facility]



                                                                 [facility]

 

     HYDROcodone      2020-0      Yes            1{tbl}      Take 1           [facility]

[facility]



     -acetaminop      5-05                               tablet by           [facility]

 of



     hen       19:19:                               mouth           [place]



     mg tablet      34                                 every 4           [facility]



                                                  (four)           [facility]



                                                  hours as           



                                                  needed.           

 

     methadone 5      2020-0      Yes            10mg      Take 10 mg           

[facility]



     mg tablet      5-05                               by mouth 3           [facility] 

of



               19:19:                               (three)           [place]



               34                                 times           Medical



                                                  daily.           [facility]

 

     QUEtiapine      2020-0      Yes            350mg      Take 350           [facility]

[facility]



     (SEROQUEL)      5-05                               mg by           [facility] of



     300 mg      19:19:                               mouth at           [place]



     tablet      34                                 bedtime.           Medical



                                                                 [facility]

 

     carvediloL      2020-0      Yes            6.25mg      Take 6.25           

[facility]



     6.25 mg      5-05                               mg by           [facility] of



     tablet      19:19:                               mouth 2           [place]



               34                                 (two)           Medical



                                                  times           [facility]



                                                  daily with           



                                                  meals.           

 

     sacubitril-      2020-0      Yes            1{tbl}      Take 1           [facility]

[facility]



     valsartan      5-05                               tablet by           ity o

f



     (ENTRESTO)      19:19:                               mouth 2           [place]

[facility]



     49-51 mg      34                                 (two)           Medical



     tablet                                         times           [facility]



                                                  daily.           

 

     potassium      2020-0      Yes            40meq      Take 40           [facility]

[facility]



     chloride      5-05                               mEq by           [facility] of



     (KCL-20      19:19:                               mouth at           [place]



     ORAL)      34                                 bedtime.           Medical



                                                                 [facility]

 

     LACTULOSE      2020-0      Yes            30mg      Take 30 mg           [facility]

[facility]



     ORAL      5-05                               by mouth 2           [facility] of



               19:19:                               (two)           [place]



               34                                 times           Medical



                                                  daily.           Branch

 

     furosemide      2020-0      Yes            80mg      Take 80 mg           [facility]

[facility]



     (LASIX) 40      5-05                               by mouth           ity o

f



     mg tablet      19:19:                               daily.           [place]



               34                                                Medical



                                                                 Branch

 

     QUEtiapine      2020-0      Yes            350mg      Take 350           [facility]

[place]



     (SEROQUEL)      5-05                               mg by           ity of



     300 mg      19:19:                               mouth at           [place]



     tablet      34                                 bedtime.           Medical



                                                                 Branch

 

     carvediloL      2020-0      Yes            6.25mg      Take 6.25           

[facility]



     6.25 mg      5-05                               mg by           [facility]



     tablet      19:19:                               mouth 2           [place]



               34                                 (two)           Medical



                                                  times           Branch



                                                  daily with           



                                                  meals.           

 

     sacubitril-      2020-0      Yes            1{tbl}      Take 1           [facility]

[place]



     valsartan      5-05                               tablet by           ity o

f



     (ENTRESTO)      19:19:                               mouth 2           [place]

[facility]



     49-51 mg      34                                 (two)           Medical



     tablet                                         times           [facility]



                                                  daily.           

 

     potassium      2020-0      Yes            40meq      Take 40           [facility]

ers



     chloride      5-05                               mEq by           [facility]



     (KCL-20      19:19:                               mouth at           [place]



     ORAL)      34                                 bedtime.           Medical



                                                                 Branch

 

     LACTULOSE      2020-0      Yes            30mg      Take 30 mg           [facility]

[place]



     ORAL      5-05                               by mouth 2           ity of



               19:19:                               (two)           [place]



               34                                 times           Medical



                                                  daily.           Branch

 

     furosemide      2020-0      Yes            80mg      Take 80 mg           [facility]

[facility]



     (LASIX) 40      5-05                               by mouth           ity o

f



     mg tablet      19:19:                               daily.           [place]



               34                                                Medical



                                                                 Branch

 

     QUEtiapine      2020-0      Yes            350mg      Take 350           [facility]

[place]



     (SEROQUEL)      5-05                               mg by           ity of



     300 mg      19:19:                               mouth at           [place]



     tablet      34                                 bedtime.           Medical



                                                                 Branch

 

     carvediloL      2020-0      Yes            6.25mg      Take 6.25           

[facility]



     6.25 mg      5-05                               mg by           ity of



     tablet      19:19:                               mouth 2           [place]



               34                                 (two)           Medical



                                                  times           Branch



                                                  daily with           



                                                  meals.           

 

     sacubitril-      2020-0      Yes            1{tbl}      Take 1           [facility]

[place]



     valsartan      5-05                               tablet by           ity o

f



     (ENTRESTO)      19:19:                               mouth 2           [place]

[facility]



     49-51 mg      34                                 (two)           Medical



     tablet                                         times           [facility]



                                                  daily.           

 

     potassium      2020-0      Yes            40meq      Take 40           [facility]

ers



     chloride      5-05                               mEq by           ity of



     (KCL-20      19:19:                               mouth at           [place]



     ORAL)      34                                 bedtime.           Medical



                                                                 Branch

 

     LACTULOSE      2020-0      Yes            30mg      Take 30 mg           [facility]

[place]



     ORAL      5-05                               by mouth 2           ity of



               19:19:                               (two)           [place]



               34                                 times           Medical



                                                  daily.           Branch

 

     furosemide      2020-0      Yes            80mg      Take 80 mg           [facility]

[facility]



     (LASIX) 40      5-05                               by mouth           ity o

f



     mg tablet      19:19:                               daily.           [place]



               34                                                Medical



                                                                 Branch

 

     QUEtiapine      2020-0      Yes            350mg      Take 350           [facility]

[place]



     (SEROQUEL)      5-05                               mg by           ity of



     300 mg      19:19:                               mouth at           [place]



     tablet      34                                 bedtime.           Medical



                                                                 Branch

 

     carvediloL      2020-0      Yes            6.25mg      Take 6.25           

[facility]



     6.25 mg      5-05                               mg by           ity of



     tablet      19:19:                               mouth 2           [place]



               34                                 (two)           Medical



                                                  times           Branch



                                                  daily with           



                                                  meals.           

 

     sacubitril-      2020-0      Yes            1{tbl}      Take 1           [facility]

[place]



     valsartan      5-05                               tablet by           ity o

f



     (ENTRESTO)      19:19:                               mouth 2           [place]

[facility]



     49-51 mg      34                                 (two)           Medical



     tablet                                         times           Branch



                                                  daily.           

 

     potassium      2020-0      Yes            40meq      Take 40           [facility]

ers



     chloride      5-05                               mEq by           ity of



     (KCL-20      19:19:                               mouth at           [place]



     ORAL)      34                                 bedtime.           Medical



                                                                 Branch

 

     LACTULOSE      2020-0      Yes            30mg      Take 30 mg           [facility]

[place]



     ORAL      5-05                               by mouth 2           ity of



               19:19:                               (two)           [place]



               34                                 times           Medical



                                                  daily.           Branch

 

     furosemide      2020-0      Yes            80mg      Take 80 mg           [facility]

[facility]



     (LASIX) 40      5-05                               by mouth           ity o

f



     mg tablet      19:19:                               daily.           [place]



               34                                                Medical



                                                                 Branch

 

     QUEtiapine      2020-0      Yes            350mg      Take 350           [facility]

[place]



     (SEROQUEL)      5-05                               mg by           ity of



     300 mg      19:19:                               mouth at           [place]



     tablet      34                                 bedtime.           Medical



                                                                 Branch

 

     carvediloL      2020-0      Yes            6.25mg      Take 6.25           

[facility]



     6.25 mg      5-05                               mg by           ity of



     tablet      19:19:                               mouth 2           [place]



               34                                 (two)           Medical



                                                  times           Branch



                                                  daily with           



                                                  meals.           

 

     sacubitril-      2020-0      Yes            1{tbl}      Take 1           [facility]

[place]



     valsartan      5-05                               tablet by           ity o

f



     (ENTRESTO)      19:19:                               mouth 2           [place]

[facility]



     49-51 mg      34                                 (two)           Medical



     tablet                                         times           [facility]



                                                  daily.           

 

     potassium      2020-0      Yes            40meq      Take 40           [facility]

ers



     chloride      5-05                               mEq by           [facility]



     (KCL-20      19:19:                               mouth at           [place]



     ORAL)      34                                 bedtime.           Medical



                                                                 Branch

 

     LACTULOSE      2020-0      Yes            30mg      Take 30 mg           [facility]

[place]



     ORAL      5-05                               by mouth 2           ity of



               19:19:                               (two)           [place]



               34                                 times           Medical



                                                  daily.           Branch

 

     furosemide      2020-0      Yes            80mg      Take 80 mg           [facility]

[facility]



     (LASIX) 40      5-05                               by mouth           ity o

f



     mg tablet      19:19:                               daily.           [place]



               34                                                Medical



                                                                 Branch

 

     QUEtiapine      2020-0      Yes            350mg      Take 350           [facility]

[place]



     (SEROQUEL)      5-05                               mg by           ity of



     300 mg      19:19:                               mouth at           [place]



     tablet      34                                 bedtime.           Medical



                                                                 Branch

 

     carvediloL      2020-0      Yes            6.25mg      Take 6.25           

[facility]



     6.25 mg      5-05                               mg by           ity of



     tablet      19:19:                               mouth 2           [place]



               34                                 (two)           Medical



                                                  times           Branch



                                                  daily with           



                                                  meals.           

 

     sacubitril-      2020-0      Yes            1{tbl}      Take 1           [facility]

[place]



     valsartan      5-05                               tablet by           ity o

f



     (ENTRESTO)      19:19:                               mouth 2           [place]

[facility]



     49-51 mg      34                                 (two)           Medical



     tablet                                         times           Branch



                                                  daily.           

 

     potassium      2020-0      Yes            40meq      Take 40           [facility]

ers



     chloride      5-05                               mEq by           ity of



     (KCL-20      19:19:                               mouth at           [place]



     ORAL)      34                                 bedtime.           Medical



                                                                 Branch

 

     LACTULOSE      2020-0      Yes            30mg      Take 30 mg           [facility]

[place]



     ORAL      5-05                               by mouth 2           ity of



               19:19:                               (two)           [place]



               34                                 times           Medical



                                                  daily.           Branch

 

     furosemide      2020-0      Yes            80mg      Take 80 mg           U

[facility]



     (LASIX) 40      5-05                               by mouth           ity o

f



     mg tablet      19:19:                               daily.           [address]



                                                                 Branch

 

     furosemide      2020-0      Yes            40mg      40 mg,           [facility]

rs



     (LASIX)      5-05                               Oral,           ity of



     tablet 40      15:30:                               DAILY,           [place]



     mg        00                                 First dose           Medical



                                                  on Tue           Branch



                                                  20 at           



                                                  1030,           



                                                  Until           



                                                  Discontinu           



                                                  ed,            



                                                  Routine           

 

     Sliding      2020-0      Yes                      Subcutaneo           [facility]

ers



     Scale      5-05                               us, Q4H,           ity of



     Insulin -      13:00:                               First dose           [place]



     Aspart      00                                 on [facility]



     (NOVOLOG) +                                         20 at           [facility]



     Fsbg                                         0800,           



     Testing                                         Until           



                                                  Discontinu           



                                                  ed,            



                                                  Routine           

 

     insulin      -0 2020- No             .2U/kg/      17 Units           [facility]

[place]



     glargine      5-05 05-05                d         (rounded           ity of



     (LANTUS      02:00: 12:56                          from 16.8           [place]

[facility]



     U-100)      00   :51                           Units =           Medical



     injection                                         0.2            Branch



     17 Units                                         Units/kg/d           



                                                  ay ?84           



                                                  kg),           



                                                  Subcutaneo           



                                                  , QHS,           



                                                  First dose           



                                                  on 20 at           



                                                  2100,           



                                                  Until           



                                                  Discontinu           



                                                  ed,            



                                                  Routine           

 

     vancomycin      2020-0      Yes       [id number] 500mg      Take 2           U

nivers



     250 mg      5-05                               capsules           ity of



     capsule      00:00:                               by mouth 4           [place]

[facility]



               00                                 (four)           Medical



                                                  times           [facility]



                                                  daily.           

 

     vancomycin      2020-0      Yes       [id number] 500mg      Take 2           U

n[facility]



     250 mg      5-05                               capsules           ity of



     capsule      00:00:                               by mouth 4           [place]

[facility]



               00                                 (four)           Medical



                                                  times           [facility]



                                                  daily.           

 

     vancomycin      2020-0      Yes       [id number] 500mg      Take 2           U

nivers



     250 mg      5-05                               capsules           ity of



     capsule      00:00:                               by mouth 4           [place]

[facility]



               00                                 (four)           Medical



                                                  times           Branch



                                                  daily.           

 

     vancomycin      2020-0      Yes       [id number] 500mg      Take 2           U

nivers



     250 mg      5-05                               capsules           ity of



     capsule      00:00:                               by mouth 4           [place]

[facility]



               00                                 (four)           Medical



                                                  times           Branch



                                                  daily.           

 

     vancomycin      2020-0      Yes       [id number] 500mg      Take 2           U

nivers



     250 mg      5-05                               capsules           ity of



     capsule      00:00:                               by mouth 4           [place]

[facility]



               00                                 (four)           Medical



                                                  times           Branch



                                                  daily.           

 

     vancomycin      2020-0      Yes       [id number] 500mg      Take 2           U

nivers



     250 mg      5-05                               capsules           ity of



     capsule      00:00:                               by mouth 4           [place]

[facility]



               00                                 (four)           Medical



                                                  times           Branch



                                                  daily.           

 

     vancomycin      2020-0      Yes       [id number] 500mg      Take 2           U

nivers



     250 mg      5-05                               capsules           ity of



     capsule      00:00:                               by mouth 4           [place]

[facility]



               00                                 (four)           Medical



                                                  times           Branch



                                                  daily.           

 

     vancomycin      2020-0      Yes       [id number] 500mg      Take 2           U

nivers



     250 mg      5-05                               capsules           ity of



     capsule      00:00:                               by mouth 4           [place]

[facility]



               00                                 (four)           Medical



                                                  times           Branch



                                                  daily.           

 

     vancomycin      2020-0      Yes       [id number] 500mg      Take 2           U

nivers



     250 mg      5-05                               capsules           ity of



     capsule      00:00:                               by mouth 4           [place]

[facility]



               00                                 (four)           Medical



                                                  times           Branch



                                                  daily.           

 

     vancomycin      2020-0      Yes       [id number] 500mg      Take 2           U

nivers



     250 mg      5-05                               capsules           ity of



     capsule      00:00:                               by mouth 4           [place]

[facility]



               00                                 (four)           Medical



                                                  times           Branch



                                                  daily.           

 

     vancomycin      2020-0      Yes       [id number] 500mg      Take 2           U

nivers



     250 mg      5-05                               capsules           ity of



     capsule      00:00:                               by mouth 4           [place]

[facility]



               00                                 (four)           Medical



                                                  times           Branch



                                                  daily.           

 

     vancomycin      2020-0      Yes       [id number] 500mg      Take 2           U

nivers



     250 mg      5-05                               capsules           ity of



     capsule      00:00:                               by mouth 4           [place]

[facility]



               00                                 (four)           Medical



                                                  times           Branch



                                                  daily.           

 

     vancomycin      2020-0 - No        [id number] 500mg      Take 2           

[facility]



     250 mg      5-05 09-23                          capsules           ity of



     capsule      00:00: 00:00                          by mouth 4           [place]

as



               00   :00                           (four)           Medical



                                                  times           Branch



                                                  daily.           

 

     methadone      2020-0      Yes            30mg      30 mg,           Un[facility]

s



     (DOLOPHINE      5-04                               Oral,           ity of



     HCL) tablet      15:45:                               DAILY,           [place]

[facility]



     30 mg      00                                 First dose           Medical



                                                  on 20 at           



                                                  1045,           



                                                  Until           



                                                  Discontinu           



                                                  ed,            



                                                  Routine           

 

     carvediloL      2020-0      Yes            6.25mg      6.25 mg,           U

n[facility]



     (COREG)      5-04                               Oral, BID           ity of



     tablet 6.25      13:00:                               MEALS,           [place]

[facility]



     mg        00                                 First dose           Medical



                                                  on Mon           Branch



                                                  20 at           



                                                  0800,           



                                                  Until           



                                                  Discontinu           



                                                  ed,            



                                                  Routine           

 

     QUEtiapine      2020-0      Yes            300mg      300 mg,           Uni

vers



     (SEROQUEL)                                     Oral, QHS,           ity

 of



     tablet 300      02:00:                               First dose           T

[place]



     mg        00                                 on [facility]



                                                  5/3/20 at           Br[facility]



                                                  2100,           



                                                  Until           



                                                  Discontinu           



                                                  ed,            



                                                  Routine           

 

     magnesium      -0 2020- No             2g        2 g, IV           Univ

ers



     sulfate in                                Piggyback,           it

y of



     water 2      17:30: 16:38                          ONCE, 1           [place]



     gram/50 mL      00   :00                           dose, [facility]

carlos



     (4 %)                                         5/3/20 at           Br[facility]



     infusion 2                                         1230,           



     g                                            Routine           

 

     KCL       - 2020- No             40meq      40 mEq,           [facility]



     (KLOR-CON                                Oral, ONCE           ity

 of



     M20) tablet      17:30: 16:38                          NOW, 1           [place]

as



     40 mEq      00   :00                           dose, [facility]



                                                  5/3/20 at           Branch



                                                  1230,           



                                                  Routine           

 

     QUEtiapine      -0 2020- No             200mg      200 mg,           Un

[place]



     (SEROQUEL)                                Oral,           ity of



     tablet 200      06:45: 05:43                          ONCE, 1           [place]

as



     mg        00   :00                           dose, [facility]



                                                  5/3/20 at           Branch



                                                  0145,           



                                                  Routine           

 

     QUEtiapine      -0 2020- No             100mg      100 mg,           Un

[place]



     (SEROQUEL)                                Oral, QHS,           it

y of



     tablet 100      02:00: 05:32                          First dose           

[place]



     mg        00   :57                           on [facility]



                                                  20 at           [facility]



                                                  2100,           



                                                  Until           



                                                  Discontinu           



                                                  ed,            



                                                  Routine           

 

     pantoprazol      -0      Yes            40mg      40 mg,           Univ

ers



     e                                        Oral, BID,           ity of



     (PROTONIX)      01:00:                               First dose           T

[facility]



     EC tablet      00                                 on Sat           Medical



     40 mg                                         20 at           [facility]



                                                  2000,           



                                                  Until           



                                                  Discontinu           



                                                  ed,            



                                                  Routine           

 

     Sliding      2020-0 2020- No                       Subcutaneo           Uni

vers



     Scale                                us, TID           ity of



     Insulin -      22:00: 12:57                          MEALS+HS,           [place]

[place]



     Aspart      00   :38                           First dose           Medical



     (NOVOLOG) +                                         on Sat           Branch



     Fsbg                                         20 at           



     Testing                                         1700,           



                                                  Until           



                                                  Discontinu           



                                                  ed,            



                                                  Routine           

 

     furosemide      -0 2020- No             80mg      80 mg,           Univ

ers



     (LASIX)                                Oral,           ity of



     tablet 80      17:00: 04:57                          DAILY,           [place]



     mg        00   :48                           First dose           Medical



                                                  on Sat           Branch



                                                  20 at           



                                                  1200,           



                                                  Until           



                                                  Discontinu           



                                                  ed,            



                                                  Routine           

 

     QUEtiapine      2020- No             100mg      100 mg,           Un

[place]



     (SEROQUEL)                                Oral, QHS,           it

y of



     tablet 100      05:15: 11:12                          First dose           

[place]



     mg        00   :52                           on Sat           Medical



                                                  20 at           [facility]



                                                  0015,           



                                                  Until           



                                                  Discontinu           



                                                  ed,            



                                                  Routine           

 

     morpHINE      2020- No             2mg       2 mg, Slow           Un

[place]



     injection 2                                IV Push,           ity

 of



     mg        20:45: 15:47                          Q8HPRN,           [place]



               52   :59                           Starting           Medical



                                                  Fri 20           Branch



                                                  at 1545,           



                                                  Until Mon           



                                                  20 at           



                                                  1047,           



                                                  Routine,           



                                                  Pain           



                                                  (scale           



                                                  7-10)           

 

     vancomycin      2020- No             500mg      500 mg,           Un

[place]



     (VANCOCIN)      03                          Rectal,           ity o

f



     500 mg in      17:45: 19:17                          Q6H ABX,           [place]

as



     NaCl 0.9%      00   :43                           First dose           Medi

[place]



     (NS) enema                                         on Fri           [facility]



                                                  20 at           



                                                  1245,           



                                                  Until           



                                                  Discontinu           



                                                  ed, 100           



                                                  mL<br>Reas           



                                                  on for           



                                                  Anti-Infec           



                                                  tive:           



                                                  Documented           



                                                  Infection<           



                                                  br>Documen           



                                                  eduard            



                                                  Infection           



                                                  Site:           



                                                  Abdominal<           



                                                  br>Duratio           



                                                  n of           



                                                  Therapy: 7           



                                                  days           

 

     metroNIDAZO      2020- No             500mg      500 mg, IV         

  [facility]



     LE (FLAGYL                                Piggyback,           it

y of



     I.V.)      17:30: 15:50                          Q8H ABX,           [place]



     Piggyback      00   :59                           First dose           Medi

[place]



     500 mg                                         on Fri           [facility]



                                                  20 at           



                                                  1230,           



                                                  Until           



                                                  Discontinu           



                                                  ed, 100           



                                                  mL<br>Reas           



                                                  on for           



                                                  Anti-Infec           



                                                  tive:           



                                                  Documented           



                                                  Infection<           



                                                  br>Documen           



                                                  eduard            



                                                  Infection           



                                                  Site:           



                                                  Abdominal<           



                                                  br&gt;Dura           



                                                  tion of           



                                                  Therapy: 7           



                                                  days           

 

     vancomycin            Yes            500mg      500 mg,           Uni

vers



     (FIRVANQ)                                     Oral, QID,           ity 

of



     50 mg/mL      17:00:                               First dose           [place]

as



     oral      00                                 on Fri           Medical



     solution                                         20 at           [facility]



     500 mg                                         1200,           



                                                  Until           



                                                  Discontinu           



                                                  ed,            



                                                  Routine<br           



                                                  >Reason           



                                                  for            



                                                  Anti-Infec           



                                                  tive:           



                                                  Documented           



                                                  Infection<           



                                                  br>Documen           



                                                  eduard            



                                                  Infection           



                                                  Site:           



                                                  Abdominal<           



                                                  br>Duratio           



                                                  n of           



                                                  Therapy: 7           



                                                  days           

 

     furosemide       No             40mg      40 mg,           Univ

ers



     (LASIX)                                Slow IV           ity of



     injection      16:23: 17:15                          Push,           [place]



     40 mg      00   :00                           ONCE, 1           Medical



                                                  dose, Fri           [facility]



                                                  20 at           



                                                  1130,           



                                                  Routine           

 

     magnesium       No             4g        4 g, IV           Univ

ers



     sulfate in                                Piggyback,           it

y of



     water 4      15:00: 22:13                          ONCE, 1           [place]



     gram/50 mL      00   :00                           dose, Fri           Medi

[place]



     (8 %) IV                                         20 at           [place]



     Piggyback 4                                         1000,           



     g                                            Routine           

 

     KCL                    40meq      40 mEq, IV           Unive

rs



     (POTASSIUM                                Piggyback,           it

y of



     CHLORIDE)      15:00: 16:27                          ONCE, 1           [place]

s



     40 mEq in      00   :00                           dose, Fri           Medic

al



     NaCl 0.9%                                         20 at           [facility]

h



     (NS)                                         1000, 250           



     piggyback                                         mL             

 

     iohexol       No             120mL      120 mL,           Unive

rs



     (OMNIPAQUE                                Intravenou           it

y of



     350       14:25: 14:25                          s, ONCE, 1           [place]



     BULK-150      00   :00                           dose, Fri           Medica

l



     mL)                                          20 at           [place]



     injection                                         0945,           



     120 mL                                         Routine           

 

     iohexol       No             120mL      120 mL,           Unive

rs



     (OMNIPAQUE                                Intravenou           it

y of



     350       14:54: 14:54                          s, ONCE, 1           [place]



     BULK-150      00   :00                           dose, Thu           Medica

l



     mL)                                          20 at           [facility]



     injection                                         1015,           



     120 mL                                         Routine           

 

     linezolid       No             600mg      600 mg, IV           

[facility]



     in dextrose                                Piggyback,           i

ty of



     5% (ZYVOX)      13:00: 14:48                          Q12H,           [place]



     600 mg/300      00   :15                           First dose           Med

ical



     mL infusion                                         on Thu           [facility]



     600 mg                                         20 at           



                                                  0800,           



                                                  Until           



                                                  Discontinu           



                                                  ed, 300           



                                                  mL<br>Reas           



                                                  on for           



                                                  Anti-Infec           



                                                  tive:           



                                                  Empiric           



                                                  Therapy           



                                                  for            



                                                  Suspected           



                                                  Infection<           



                                                  br>Empiric           



                                                  Therapy           



                                                  Site:           



                                                  Abdominal<           



                                                  br>Duratio           



                                                  n of           



                                                  therapy: 7           



                                                  days<br>Re           



                                                  stricted           



                                                  use            



                                                  approved           



                                                  by:            



                                                  Complicate           



                                                  d              



                                                  intra-abdo           



                                                  naty           



                                                  infection           

 

     magnesium      2020- No             4g        4 g, IV           Univ

ers



     sulfate in                                Piggyback,           it

y of



     water 4      08:30: 10:46                          ONCE, 1           [place]



     gram/50 mL      00   :00                           dose, Thu           Medi

[place]



     (8 %) IV                                         20 at           [facility]



     Piggyback 4                                         0330,           



     g                                            Routine           

 

     morpHINE      2020- No             2mg       2 mg, Slow           Un

[place]



     injection 2                                IV Push,           ity

 of



     mg        05:33: 17:32                          Q6HPRN,           [place]



               31   :45                           Starting           Medical



                                                  Thu            [facility]



                                                  20 at           



                                                  0033,           



                                                  Until 20 at           



                                                  1232,           



                                                  Routine,           



                                                  Pain           



                                                  (scale           



                                                  7-10)           

 

     lactated      2020- No             1000mL      at 125           [facility]

ers



     ringers IV                                mL/hr,           ity of



     infusion      22:30: 12:15                          1,000 mL,           [place]

as



     1,000 mL      00   :17                           IV             Medical



                                                  Infusion,           [facility]



                                                  CONTINUOUS           



                                                  , Starting           



                                                  20 at           



                                                  1730,           



                                                  Until 20 at           



                                                  0715,           



                                                  Routine           

 

     meropenem       No             500mg      500 mg, IV           

[facility]



     (MERREM)                                Piggyback,           ity 

of



     500 mg in      22:15: 14:48                          Administer           T

[place]



     NaCl 0.9%      00   :15                           over 60           Medical



     (NS) 50 mL                                         Minutes,           [facility]



     MINI-BAG                                         Q6H ABX,           



                                                  First dose           



                                                  on 20 at           



                                                  1715,           



                                                  Until           



                                                  Discontinu           



                                                  ed,            



                                                  ASAP<br>Re           



                                                  stricted           



                                                  use            



                                                  approved           



                                                  by: HOLLIE           



                                                  8TH            



                                                  FLOOR<br>R           



                                                  caty for           



                                                  Anti-Infec           



                                                  tive:           



                                                  Empiric           



                                                  Therapy           



                                                  for            



                                                  Suspected           



                                                  Infection<           



                                                  br>Empiric           



                                                  Therapy           



                                                  Site:           



                                                  Abdominal<           



                                                  br>Duratio           



                                                  n of           



                                                  therapy: 7           



                                                  days           

 

     Sliding      2020- No                       Subcutaneo           Uni

vers



     Scale       0502                          us, Q4H,           ity of



     Insulin -      21:30: 21:00                          First dose           T

[place]



     Aspart      00   :05                           on Wed           Medical



     (NOVOLOG) +                                         20 at           Br

anch



     Fsbg                                         1630,           



     Testing                                         Until           



                                                  Discontinu           



                                                  ed,            



                                                  Routine           

 

     dextrose      2020-0      Yes            250mL      250 mL, IV           [facility]

[place]



     10% (D10W)                                     Infusion,           ity 

of



     bolus      21:10:                               PRN - SEE           [place]



     infusion      07                                 INSTRUCTIO           Medic

al



     250 mL                                         NS, For           [facility]



                                                  glucose <           



                                                  70 and           



                                                  patient           



                                                  unable to           



                                                  swallow,           



                                                  Starting           



                                                  Wed            



                                                  20 at           



                                                  1610<br>De           



                                                  xtrose 10%           



                                                  250 mL bag           



                                                  contains:&           



                                                  nbsp;10 gm           



                                                  = 100           



                                                  mL&nbsp;20           



                                                  gm = 200           



                                                  mL&nbsp;25           



                                                  gm = 250           



                                                  mL (whole           



                                                  bag)&nbsp;           



                                                  &nbsp;The           



                                                  maximum           



                                                  rate at           



                                                  which           



                                                  dextrose           



                                                  can be           



                                                  infused           



                                                  without           



                                                  producing           



                                                  glycosuria           



                                                  is 0.5           



                                                  g/kg/hour.           



                                                  &nbsp;&nbs           



                                                  p;BUD: If           



                                                  wrapper is           



                                                  open bag           



                                                  is good           



                                                  for 30           



                                                  days at           



                                                  room           



                                                  temperatur           



                                                  e.&nbsp;<b           



                                                  r>             

 

     glucagon      -0      Yes            1mg       1 mg,           [facility]



     (GLUCAGEN                                     Intramuscu           ity 

of



     DIAGNOSTIC      21:08:                               lar, PRN,           [facility]

[place]



     KIT)      33                                 Starting           Medical



     injection 1                                         Wed            [facility]



     mg                                           20 at           



                                                  1608,           



                                                  Until           



                                                  Discontinu           



                                                  ed, ASAP,           



                                                  Blood           



                                                  Glucose           



                                                  &lt; or =           



                                                  70 mg/dL           



                                                  and            



                                                  patient is           



                                                  unable to           



                                                  swallow or           



                                                  has mental           



                                                  changes.           

 

     morpHINE      -0 2020- No             2mg       2 mg, Slow           [facility]

[place]



     injection 2      -30                          IV Push,           ity

 of



     mg        18:15: 03:14                          Q2HPRN,           [place]



               00   :00                           Starting           Medical



                                                  Wed            [facility]



                                                  20 at           



                                                  1315,           



                                                  Until 20 at           



                                                  2214,           



                                                  Routine,           



                                                  Pain           



                                                  (scale           



                                                  7-10)           

 

     NaCl 0.9%      2020-0 2020- No             1000mL      at 75           Univ

ers



     (NS) IV       05-01                          mL/hr, IV           ity of



     infusion      17:30: 12:15                          Infusion,           [place]

as



     1,000 mL      00   :17                           CONTINUOUS           Medic

al



                                                  , Starting           [facility]



                                                  Wed            



                                                  20 at           



                                                  1230,           



                                                  Until 20 at           



                                                  0715,           



                                                  Routine           

 

     insulin      -0 2020- No             10U       10 Units,           Univ

ers



     glargine       04-30                          Subcutaneo           ity 

of



     (LANTUS      17:30: 22:00                          us, DAILY,           [place]

as



     U-100)      00   :34                           First dose           Medical



     injection                                         on Wed           [facility]



     10 Units                                         20 at           



                                                  1230,           



                                                  Until           



                                                  Discontinu           



                                                  ed,            



                                                  Routine           

 

     Sliding      -0 2020- No                       Subcutaneo           Uni

vers



     Scale                                us, Q4H,           ity of



     Insulin -      09:00: 21:18                          First dose           [facility]

[place]



     Lispro      00   :49                           on Wed           Medical



     (HumaLOG) +                                         20 at           Br

anch



     Fsbg                                         0400,           



     Testing                                         Until           



                                                  Discontinu           



                                                  ed,            



                                                  Routine           

 

     proMETHazin      -0      Yes            25mg      25 mg, IV           U

nivers



     e                                        Piggyback,           ity of



     (PHENERGAN)      07:09:                               Q6HPRN,           [place]

as



     25 mg in      37                                 Starting           Medical



     NaCl 0.9%                                         Wed            [facility]



     (NS) 50 mL                                         20 at           



     piggyback                                         0209,           



                                                  Until           



                                                  Discontinu           



                                                  ed, 50 mL           

 

     octreotide      0 2020- No             50ug/h      50 mcg/hr          

 [facility]



     (SANDOSTATI                                (10            ity of



     N) 1,250      06:15: 14:10                          mL/hr), IV           [facility]

[facility]



     mcg in NaCl      00   :40                           Infusion,           Med

ical



     0.9% (NS)                                         CONTINUOUS           [facility]

ch



     infusion                                         , Starting           



                                                  Wed            



                                                  20 at           



                                                  0115,           



                                                  Until Sat           



                                                  20 at           



                                                  0910           

 

     pantoprazol      2020- No             8mg/h      8 mg/hr           U

nivers



     e         02                          (50            ity of



     (PROTONIX)      06:15: 14:32                          mL/hr), IV           

[place]



     80 mg in      00   :18                           Piggyback,           Medic

al



     NaCl 0.9%                                         CONTINUOUS           [facility]

ch



     (NS) 500 mL                                         , Starting           



     infusion                                         Wed            



                                                  20 at           



                                                  0115,           



                                                  Until Sat           



                                                  20 at           



                                                  0932, 500           



                                                  mL             

 

     NaCl 0.9%      0 2020- No             1000mL      at 125           Uni

vers



     (NS) IV                                mL/hr, IV           ity of



     infusion      05:15: 17:25                          Infusion,           [place]

as



     1,000 mL      00   :53                           CONTINUOUS           Medic

al



                                                  , Starting           [facility]



                                                  Wed            



                                                  20 at           



                                                  0015,           



                                                  Until Wed           



                                                  20 at           



                                                  1225,           



                                                  Routine           

 

     piperacilli      -0 2020- No             3.375g      3.375 g,          

 [facility]



     n-tazobacta                                IV             ity of



     m (ZOSYN)      05:15: 21:05                          Piggyback,           [facility]

[place]



     3.375      00   :50                           Q6H ABX,           Medical



     gram/50 mL                                         First dose           Bra

nch



     Piggyback                                         on Wed           



     RTU 3.375 g                                         20 at           



                                                  0015,           



                                                  Until           



                                                  Discontinu           



                                                  ed, 50           



                                                  mL<br&gt;R           



                                                  caty for           



                                                  Anti-Infec           



                                                  tive:           



                                                  Empiric           



                                                  Therapy           



                                                  for            



                                                  Suspected           



                                                  Infection<           



                                                  br>Empiric           



                                                  Therapy           



                                                  Site:           



                                                  Abdominal<           



                                                  br>Duratio           



                                                  n of           



                                                  therapy:           



                                                  72 hours           

 

     morpHINE      -2020- No             2mg       2 mg, Slow           [facility]

[place]



     injection 2                                IV Push,           ity

 of



     mg        04:06: 18:06                          Q4HPRN,           [place]



               20   :32                           Starting           Medical



                                                  e            [facility]



                                                  20 at           



                                                  2306,           



                                                  Until 20 at           



                                                  1306,           



                                                  Routine,           



                                                  Pain           



                                                  (scale           



                                                  7-10)           

 

     vancomycin      - 2020- No             1000mg      1,000 mg,          

 [facility]



     1000 mg in                                IV             ity of



      mL      03:45: 03:45                          Piggyback,           [facility]

[place]



     RTU IV      00   :00                           ONCE, 1           Medical



     Piggyback                                         dose, [facility]

h



     1,000 mg                                         20 at           



                                                  2245<br>Re           



                                                  ason for           



                                                  Anti-Infec           



                                                  tive:           



                                                  Empiric           



                                                  Therapy           



                                                  for            



                                                  Suspected           



                                                  Infection<           



                                                  br>Empiric           



                                                  Therapy           



                                                  Site:           



                                                  Abdominal<           



                                                  br>Duratio           



                                                  n of           



                                                  therapy:           



                                                  72 hours           

 

     proMETHazin      - 2020- No             25mg      25 mg, IV           

[facility]



     e                                   Piggyback,           ity of



     (PHENERGAN)      03:15: 02:08                          ONCE, 1           Te

[facility]



     25 mg in      00   :00                           dose, Tue           Medica

l



     NaCl 0.9%                                         20 at           [facility]

ch



     (NS) 50 mL                                         2215, 50           



     piggyback                                         mL             

 

     NaCl 0.9%       2020- No             1000mL      at 125           Uni

vers



     (NS) IV       04-30                          mL/hr,           ity of



     infusion      02:31: 00:24                          Intravenou           Te

[place]



     1,000 mL      00   :00                           s, ONCE, 1           Medic

al



                                                  dose, Tue           [facility]



                                                  20 at           



                                                  2145, ASAP           

 

     iohexol      - 2020- No             120mL      120 mL,           Unive

rs



     (OMNIPAQUE                                Intravenou           it

y of



     350       00:30: 00:18                          s, ONCE, 1           [place]



     BULK-150      00   :00                           dose, Tue           Medica

l



     mL)                                          20 at           [place]



     injection                                         1930,           



     120 mL                                         Routine           

 

     proMETHazin      -0 2020- No             25mg      25 mg, IV           

[facility]



     e                                   Piggyback,           ity of



     (PHENERGAN)      00:15: 23:24                          ONCE, 1           [facility]

[facility]



     25 mg in      00   :00                           dose, Tue           Medica

l



     NaCl 0.9%                                         20 at           [facility]



     (NS) 50 mL                                         191, 50           



     piggyback                                         mL             

 

     morpHINE      -0 2020- No             4mg       4 mg, Slow           Un

[place]



     injection 4                                IV Push,           ity

 of



     mg        00:15: 23:30                          ONCE, 1           [place]



               00   :00                           dose, Tue           Medical



                                                  20 at           [place]



                                                  191, STAT           

 

     pantoprazol      -0 2020- No             40mg      40 mg, IV           

[facility]



     e                                   Piggyback,           ity of



     (PROTONIX)      00:15: 23:42                          ONCE, 1           [place]

as



     40 mg in      00   :00                           dose, Tue           Medica

l



     NaCl 0.9%                                         20 at           [facility]



     (NS) 100 mL                                         , 100           



     MINI-BAG                                         mL             

 

     NaCl 0.9%      -0 2020- No             1000mL      at 999           Uni

vers



     (NS) bolus       04-29                          mL/hr,           ity of



     infusion      23:30: 02:08                          1,000 mL,           [place]

as



     1,000 mL      00   :00                           IV             Medical



                                                  Infusion,           [place]



                                                  ONCE, 1           



                                                  dose, Tue           



                                                  20 at           



                                                  1830, ASAP           

 

     NaCl 0.9%      2020-0 2020- No             1000mL      at 999           Uni

vers



     (NS) bolus       04-29                          mL/hr,           ity of



     infusion      23:15: 00:31                          1,000 mL,           [place]

as



     1,000 mL      00   :00                           IV             Medical



                                                  Infusion,           [place]



                                                  ONCE, 1           



                                                  dose, Tue           



                                                  20 at           



                                                  1815, ASAP           

 

     Lactulose Lactulose -0      Yes  Alfred[facility]                1 packet       

    [facility]



               [facility]           [facility]



               00[facility]                                              [facility]



               00                                                l



                                                                 Outpati



                                                                 ent



                                                                 Clinics

 

     Valsartan Valsartan 2020-0      Yes  Alfred[facility]                1 tablet       

    [facility]



               [facility]           [facility]



               00[facility]                                              [facility]



               00                                                l



                                                                 Outpati



                                                                 ent



                                                                 Clinics

 

     QUEtiapine      2020-0      Yes            300mg      Take 300           Un

[place]



     (SEROQUEL)      1-19                               mg by           ity of



     300 mg      22:47:                               mouth at           [place]



     tablet      17                                 bedtime.           Medical



                                                                 [facility]

 

     carvediloL      2020-0      Yes            6.25mg      Take 6.25           

[facility]



     6.25 mg      1-19                               mg by           ity of



     tablet      22:47:                               mouth 2           [place]



               17                                 (two)           Medical



                                                  times           [facility]



                                                  daily with           



                                                  meals.           

 

     sacubitril-      2020-0      Yes            1{tbl}      Take 1           Un

[place]



     valsartan      1-19                               tablet by           ity o

f



     (ENTRESTO)      22:47:                               mouth 2           [place]

s



     49-51 mg      17                                 (two)           Medical



     tablet                                         times           [facility]



                                                  daily.           

 

     insulin      2020-0      Yes            35U       inject 35           [facility]

[facility]



     aspart      1-19                               Units           [facility]



     prot/insuln      22:47:                               under the           [facility]

[facility]



     asp       17                                 skin 2           [facility]



     (NOVOLOG                                         (two)           Branch



     MIX 70-30                                         times           



     SC)                                          daily.           

 

     potassium      2020-0      Yes            20meq      Take 20           [facility]

ers



     chloride      1-19                               mEq by           [facility]



     (KCL-20      22:47:                               mouth 2           [place]



     ORAL)      17                                 (two)           Medical



                                                  times           Branch



                                                  daily.           

 

     LACTULOSE      2020-0      Yes            30mg      Take 30 mg           [facility]

[place]



     ORAL      1-19                               by mouth 2           ity of



               22:47:                               (two)           [place]



               17                                 times           Medical



                                                  daily.           Branch

 

     furosemide      2020-0      Yes            40mg      Take 40 mg           U

[facility]



     (LASIX) 40      1-19                               by mouth           ity o

f



     mg tablet      22:47:                               every           [place]



               17                                 morning           Medical



                                                  and            Branch



                                                  evening.           

 

     HYDROcodone      2020-0      Yes            1{tbl}      Take 1           [facility]

[place]



     -acetaminop      1-19                               tablet by           ity

 of



     hen       22:47:                               mouth           [place]



     mg tablet      17                                 every 4           Medical



                                                  (four)           Branch



                                                  hours as           



                                                  needed.           

 

     methadone 5      2020-0      Yes            30mg      Take 30 mg           

[facility]



     mg tablet      1-19                               by mouth           [facility]



               22:47:                               at             [place]



               17                                 bedtime.           Medical



                                                                 Branch

 

     QUEtiapine      2020-0      Yes            300mg      Take 300           [facility]

[place]



     (SEROQUEL)      1-19                               mg by           ity of



     300 mg      22:47:                               mouth at           [place]



     tablet      17                                 bedtime.           Medical



                                                                 Branch

 

     carvediloL      2020-0      Yes            6.25mg      Take 6.25           

[facility]



     6.25 mg      1-19                               mg by           ity of



     tablet      22:47:                               mouth 2           [place]



               17                                 (two)           Medical



                                                  times           Branch



                                                  daily with           



                                                  meals.           

 

     sacubitril-      2020-0      Yes            1{tbl}      Take 1           [facility]

[place]



     valsartan      1-19                               tablet by           ity o

f



     (ENTRESTO)      22:47:                               mouth 2           [place]

[facility]



     49-51 mg      17                                 (two)           Medical



     tablet                                         times           [facility]



                                                  daily.           

 

     insulin      2020-0      Yes            35U       inject 35           [facility]

rs



     aspart      1-19                               Units           [facility]



     prot/insuln      22:47:                               under the           [facility]

[facility]



     asp       17                                 skin 2           [facility]



     (NOVOLOG                                         (two)           Branch



     MIX 70-30                                         times           



     SC)                                          daily.           

 

     potassium      2020-0      Yes            20meq      Take 20           [facility]

ers



     chloride      1-19                               mEq by           [facility]



     (KCL-20      22:47:                               mouth 2           [place]



     ORAL)      17                                 (two)           Medical



                                                  times           Branch



                                                  daily.           

 

     LACTULOSE      2020-0      Yes            30mg      Take 30 mg           [facility]

[place]



     ORAL      1-19                               by mouth 2           ity of



               22:47:                               (two)           [place]



               17                                 times           Medical



                                                  daily.           Branch

 

     furosemide      2020-0      Yes            40mg      Take 40 mg           U

[facility]



     (LASIX) 40      1-19                               by mouth           ity o

f



     mg tablet      22:47:                               every           [place]



               17                                 morning           Medical



                                                  and            Branch



                                                  evening.           

 

     HYDROcodone      2020-0      Yes            1{tbl}      Take 1           [facility]

[place]



     -acetaminop      1-19                               tablet by           ity

 of



     hen       22:47:                               mouth           [place]



     mg tablet      17                                 every 4           Medical



                                                  (four)           Branch



                                                  hours as           



                                                  needed.           

 

     methadone 5      2020-0      Yes            30mg      Take 30 mg           

[facility]



     mg tablet      1-19                               by mouth           [facility]



               22:47:                               at             [place]



               17                                 bedtime.           Medical



                                                                 Branch

 

     QUEtiapine      2020-0      Yes            300mg      Take 300           [facility]

[place]



     (SEROQUEL)      1-19                               mg by           ity of



     300 mg      22:47:                               mouth at           [place]



     tablet      17                                 bedtime.           Medical



                                                                 Branch

 

     carvediloL      2020-0      Yes            6.25mg      Take 6.25           

[facility]



     6.25 mg      1-19                               mg by           ity of



     tablet      22:47:                               mouth 2           [place]



               17                                 (two)           Medical



                                                  times           Branch



                                                  daily with           



                                                  meals.           

 

     sacubitril-      2020-0      Yes            1{tbl}      Take 1           [facility]

[place]



     valsartan      1-19                               tablet by           ity o

f



     (ENTRESTO)      22:47:                               mouth 2           [place]

[facility]



     49-51 mg      17                                 (two)           Medical



     tablet                                         times           Branch



                                                  daily.           

 

     insulin      2020-0      Yes            35U       inject 35           [facility]

rs



     aspart      1-19                               Units           [facility]



     prot/insuln      22:47:                               under the           [facility]

[facility]



     asp       17                                 skin 2           Medical



     (NOVOLOG                                         (two)           Branch



     MIX 70-30                                         times           



     [facility])                                          daily.           

 

     potassium      2020-0      Yes            20meq      Take 20           [facility]

ers



     chloride      1-19                               mEq by           [facility]



     (KCL-20      22:47:                               mouth 2           [place]



     ORAL)      17                                 (two)           Medical



                                                  times           Branch



                                                  daily.           

 

     LACTULOSE      2020-0      Yes            30mg      Take 30 mg           [facility]

[place]



     ORAL      1-19                               by mouth 2           ity of



               22:47:                               (two)           [place]



               17                                 times           Medical



                                                  daily.           Branch

 

     furosemide      2020-0      Yes            40mg      Take 40 mg           U

[facility]



     (LASIX) 40      1-19                               by mouth           ity o

f



     mg tablet      22:47:                               every           [place]



               17                                 morning           Medical



                                                  and            Branch



                                                  evening.           

 

     HYDROcodone      2020-0      Yes            1{tbl}      Take 1           [facility]

[place]



     -acetaminop      1-19                               tablet by           ity

 of



     hen       22:47:                               mouth           [place]



     mg tablet      17                                 every 4           Medical



                                                  (four)           Branch



                                                  hours as           



                                                  needed.           

 

     methadone 5      2020-0      Yes            30mg      Take 30 mg           

[facility]



     mg tablet      1-19                               by mouth           [facility]



               22:47:                               at             [place]



               17                                 bedtime.           Medical



                                                                 Branch

 

     QUEtiapine      2020-0      Yes            300mg      Take 300           [facility]

[place]



     (SEROQUEL)      1-19                               mg by           ity of



     300 mg      22:47:                               mouth at           [place]



     tablet      17                                 bedtime.           Medical



                                                                 Branch

 

     carvediloL      2020-0      Yes            6.25mg      Take 6.25           

[facility]



     6.25 mg      1-19                               mg by           ity [facility]



     tablet      22:47:                               mouth 2           [place]



               17                                 (two)           Medical



                                                  times           Branch



                                                  daily with           



                                                  meals.           

 

     sacubitril-      2020-0      Yes            1{tbl}      Take 1           [facility]

[place]



     valsartan      1-19                               tablet by           ity o

f



     (ENTRESTO)      22:47:                               mouth 2           [place]

[facility]



     49-51 mg      17                                 (two)           Medical



     tablet                                         times           Br[facility]



                                                  daily.           

 

     insulin      2020-0      Yes            35U       inject 35           [facility]

rs



     aspart      1-19                               Units           [facility]



     prot/insuln      22:47:                               under the           [facility]

[facility]



     asp       17                                 skin 2           [facility]



     (NOVOLOG                                         (two)           Branch



     MIX 70-30                                         times           



     [facility])                                          daily.           

 

     potassium      2020-0      Yes            20meq      Take 20           [facility]

ers



     chloride      1-19                               mEq by           [facility] of



     (KCL-20      22:47:                               mouth 2           [place]



     ORAL)      17                                 (two)           Medical



                                                  times           Branch



                                                  daily.           

 

     LACTULOSE      2020-0      Yes            30mg      Take 30 mg           [facility]

[place]



     ORAL      1-19                               by mouth 2           ity of



               22:47:                               (two)           [place]



               17                                 times           Medical



                                                  daily.           Branch

 

     furosemide      2020-0      Yes            40mg      Take 40 mg           U

[facility]



     (LASIX) 40      1-19                               by mouth           ity o

f



     mg tablet      22:47:                               every           [place]



               17                                 morning           Medical



                                                  and            Branch



                                                  evening.           

 

     HYDROcodone      2020-0      Yes            1{tbl}      Take 1           [facility]

[place]



     -acetaminop      1-19                               tablet by           ity

 of



     hen       22:47:                               mouth           [place]



     mg tablet      17                                 every 4           Medical



                                                  (four)           [facility]



                                                  hours as           



                                                  needed.           

 

     methadone 5      2020-0      Yes            30mg      Take 30 mg           

[facility]



     mg tablet      1-19                               by mouth           [facility]



               22:47:                               at             [place]



               17                                 bedtime.           Medical



                                                                 Branch

 

     QUEtiapine      2020-0      Yes            300mg      Take 300           [facility]

[place]



     (SEROQUEL)      1-19                               mg by           ity of



     300 mg      22:47:                               mouth at           [place]



     tablet      17                                 bedtime.           Medical



                                                                 Branch

 

     carvediloL      2020-0      Yes            6.25mg      Take 6.25           

[facility]



     6.25 mg      1-19                               mg by           ity of



     tablet      22:47:                               mouth 2           [place]



               17                                 (two)           Medical



                                                  times           Branch



                                                  daily with           



                                                  meals.           

 

     sacubitril-      2020-0      Yes            1{tbl}      Take 1           [facility]

[place]



     valsartan      1-19                               tablet by           ity o

f



     (ENTRESTO)      22:47:                               mouth 2           [place]

[facility]



     49-51 mg      17                                 (two)           Medical



     tablet                                         times           [facility]



                                                  daily.           

 

     insulin      2020-0      Yes            35U       inject 35           [facility]

rs



     aspart      1-19                               Units           [facility]



     prot/insuln      22:47:                               under the           [facility]

[facility]



     asp       17                                 skin 2           [facility]



     (NOVOLOG                                         (two)           Branch



     MIX 70-30                                         times           



     [facility])                                          daily.           

 

     potassium      2020-0      Yes            20meq      Take 20           [facility]

ers



     chloride      1-19                               mEq by           ity of



     (KCL-20      22:47:                               mouth 2           [place]



     ORAL)      17                                 (two)           Medical



                                                  times           Branch



                                                  daily.           

 

     LACTULOSE      2020-0      Yes            30mg      Take 30 mg           [facility]

[place]



     ORAL      1-19                               by mouth 2           ity of



               22:47:                               (two)           [place]



               17                                 times           Medical



                                                  daily.           Branch

 

     furosemide      2020-0      Yes            40mg      Take 40 mg           U

[facility]



     (LASIX) 40      1-19                               by mouth           ity o

f



     mg tablet      22:47:                               every           [place]



               17                                 morning           Medical



                                                  and            Branch



                                                  evening.           

 

     HYDROcodone      2020-0      Yes            1{tbl}      Take 1           [facility]

[place]



     -acetaminop      -19                               tablet by           ity

 of



     hen       22:47:                               mouth           [place]



     mg tablet      17                                 every 4           Medical



                                                  (four)           Branch



                                                  hours as           



                                                  needed.           

 

     methadone 5      2020-0      Yes            30mg      Take 30 mg           

[facility]



     mg tablet      -19                               by mouth           [facility]



               22:47:                               at             [place]



               17                                 bedtime.           Medical



                                                                 Branch

 

     Sliding      2020-0      Yes                      Subcutaneo           Univ

ers



     Scale      1-19                               us, Q4H,           ity of



     Insulin -      22:00:                               First dose           [facility]

[place]



     Aspart      00                                 on [facility]



     (NOVOLOG) +                                         20 at           

anch



     Fsbg                                         1600,           



     Testing                                         Until           



                                                  Discontinu           



                                                  ed,            



                                                  Routine           

 

     insulin      2020-0      Yes            3U        3 Units,           Univer

s



     aspart      -                               Subcutaneo           ity of



     RAPID      19:15:                               us, TID           [place]



     (NOVOLOG      00                                 MEALS,           Medical



     U-100                                         First dose           Branch



     INSULIN                                         on Sun           



     ASPART)                                         20 at           



     injection 3                                         1315,           



     Units                                         Until           



                                                  Discontinu           



                                                  ed,            



                                                  Routine           

 

     sacubitril-      2020-0      Yes            1{tbl}      1 tablet,          

 [facility]



     valsartan                                     Oral, BID,           ity 

of



     (ENTRESTO)      14:00:                               First dose           [facility]

[facility]



     49-51 mg      00                                 on Sun           Medical



     tablet 1                                         20 at           [facility]

h



     tablet                                         0800,           



                                                  Until           



                                                  Discontinu           



                                                  ed,            



                                                  Routine<br           



                                                  >Faculty           



                                                  member           



                                                  approving           



                                                  Restricted           



                                                  medication           



                                                  : MEGADC           

 

     carvediloL      2020-0      Yes            6.25mg      6.25 mg,           U

n[facility]



     (COREG)                                     Oral, BID           ity of



     tablet 6.25      14:00:                               MEALS,           [place]

[facility]



     mg        00                                 First dose           Medical



                                                  on Sun           Branch



                                                  20 at           



                                                  0800,           



                                                  Until           



                                                  Discontinu           



                                                  ed,            



                                                  Routine           

 

     rifAXIMin      2020-0      Yes            550mg      550 mg,           Univ

ers



     (XIFAXAN)                                     Oral, BID,           ity 

of



     tablet 550      14:00:                               First dose           T

exas



     mg        00                                 on [place]           Medical



                                                  20 at           [facility]



                                                  0800,           



                                                  Until           



                                                  Discontinu           



                                                  ed,            



                                                  Routine<br           



                                                  >Reason           



                                                  for            



                                                  Anti-Infec           



                                                  tive:           



                                                  Empiric           



                                                  Therapy           



                                                  for            



                                                  Suspected           



                                                  Infection<           



                                                  br>Empiric           



                                                  Therapy           



                                                  Site:           



                                                  Abdominal<           



                                                  br>Duratio           



                                                  n of           



                                                  therapy: 7           



                                                  days           

 

     lactulose      2020-0      Yes            30mL      30 mL,           Univer

s



     (CEPHULAC)                                     Oral, BID,           ity

 of



     solution 30      14:00:                               First dose           

[place]



     mL        00                                 on [facility]



                                                  20 at           [facility]



                                                  0800,           



                                                  Until           



                                                  Discontinu           



                                                  ed,            



                                                  Routine           

 

     insulin NPH      -      Yes            35U       35 Units,           U

nivers



     and regular                                     Subcutaneo           it

y of



     human 70-30      13:30:                               us, BIDAC,           

[place]



     (HUMULIN      00                                 First dose           Medic

al



     70-30 U-100                                         on [facility]



     INSULIN)                                         20 at           



     100 unit/mL                                         0730,           



     (70-30)                                         Until           



     injection                                         Discontinu           



     35 Units                                         ed,            



                                                  Routine           

 

     Sliding      -0 2020- No                       Subcutaneo           Uni

vers



     Scale                                us, AC+HS,           ity of



     Insulin -      13:30: 19:00                          First dose           T

exas



     Aspart      00   :16                           on [facility]



     (NOVOLOG) +                                         20 at           [facility]



     Fsbg                                         0730,           



     Testing                                         Until           



                                                  Discontinu           



                                                  ed,            



                                                  Routine           

 

     NaCl 0.9%       2020- No             1000mL      at 100           Uni

vers



     (NS) IV                                mL/hr, IV           ity of



     infusion      11:15: 21:17                          Infusion,           [place]

as



     1,000 mL      00   :34                           CONTINUOUS           Medic

al



                                                  , Starting           [facility]            



                                                  20 at           



                                                  0515,           



                                                  Until Sun           



                                                  20 at           



                                                  1517,           



                                                  Routine           

 

     NaCl 0.9%      0 2020- No             500mL      at 999           Univ

ers



     (NS) bolus                                mL/hr, 500           it

y of



     infusion      09:30: 08:35                          mL, IV           [place]



     500 mL      00   :00                           Infusion,           Medical



                                                  ONCE, 1           [facility]



                                                  dose, [place]           



                                                  20 at           



                                                  0330, STAT           

 

     albuterol      -      Yes            2{puff}      2 Puff,           Un

[place]



     (VENTOLIN)                                     Inhalation           ity

 of



     inhaler 2      06:21:                               , Q4HPRN,           [place]

as



     Puff      25                                 Starting           [facility]



                                                  20 at           



                                                  0021,           



                                                  Until           



                                                  Discontinu           



                                                  ed,            



                                                  Routine,           



                                                  Wheezing,           



                                                  Shortness           



                                                  of Breath           

 

     QUEtiapine      -0      Yes            300mg      300 mg,           Uni

vers



     (SEROQUEL)                                     Oral, QHS,           ity

 of



     tablet 300      05:30:                               First dose           T

exas



     mg        00                                 on [facility]



                                                  20 at           [facility]



                                                  2330,           



                                                  Until           



                                                  Discontinu           



                                                  ed,            



                                                  Routine           

 

     methadone      2020-0      Yes            30mg      30 mg,           Univer

s



     (DOLOPHINE      -19                               Oral, QHS,           ity

 of



     HCL) tablet      05:15:                               First dose           

[place]



     30 mg      00                                 on Sat           Medical



                                                  20 at           [facility]



                                                  2315,           



                                                  Until           



                                                  Discontinu           



                                                  ed,            



                                                  Routine           

 

     FENTanyl PF      -0 2020- No             25ug      25 mcg,           Un

[place]



     (SUBLIMAZE                                Slow IV           ity o

f



     (PF))      04:45: 04:04                          Push,           [place]



     injection      00   :00                           ONCE, 1           Medical



     25 mcg                                         dose, Sat           [facility]



                                                  20 at           



                                                  2245, STAT           

 

     insulin      2020-0 2020- No             10U       10 Units,           Univ

ers



     regular                                IV Push,           ity of



     human      04:15: 03:22                          ONCE, 1           [place]



     (HUMULIN R)      00   :00                           dose, Sat           Med

ical



     injection                                         20 at           [facility]

ch



     10 Units                                         2215,           



                                                  Routine           

 

     HYDROcodone      -0 2020- No             1{tbl}      1 tablet,         

  [facility]



     -acetaminop                                Oral,           ity of



     hen (NORCO)      04:13: 08:54                          Q6HPRN,           Te

[place]



      mg      02   :57                           Starting           Medica

l



     tablet 1                                         Sat            [facility]



     tablet                                         20 at           



                                                  2213,           



                                                  Until Sun           



                                                  20 at           



                                                  0254,           



                                                  Routine,           



                                                  Pain           



                                                  (scale           



                                                  7-10)           

 

     glucagon      -0      Yes            1mg       1 mg,           [facility]



     (GLUCAGEN                                     Intramuscu           ity 

of



     DIAGNOSTIC      03:44:                               lar, PRN,           Te

x[facility]



     KIT)      34                                 Starting           Medical



     injection 1                                         Sat            [facility]



     mg                                           20 at           



                                                  2144,           



                                                  Until           



                                                  Discontinu           



                                                  ed, ASAP,           



                                                  Blood           



                                                  Glucose           



                                                  &lt; or =           



                                                  70 mg/dL           



                                                  and            



                                                  patient is           



                                                  unable to           



                                                  swallow or           



                                                  has mental           



                                                  changes.           

 

     insulin      2020-0 2020- No             .1U/kg/      0.1            Univer

s



     regular                      h         Units/kg/h           ity o

f



     human      03:30: 21:16                          r ?69.9 kg           [place]



     (HUMULIN R)      25   :49                           (6.99           Medical



     100 Units                                         mL/hr), IV           [facility]

ch



     in NaCl                                         Infusion,           



     0.9% (NS)                                         TITRATE,           



     100 mL                                         Starting           



     infusion                                         Sat            



                                                  20 at           



                                                  2130,           



                                                  Until Sun           



                                                  20 at           



                                                  1516           

 

     NaCl 0.9%      2020- No             1000mL      at 999           Uni

vers



     (NS) bolus      1-19 01-19                          mL/hr,           ity of



     infusion      03:00: 02:09                          1,000 mL,           [place]

as



     1,000 mL      00   :00                           IV             Medical



                                                  Infusion,           [facility]



                                                  ONCE, 1           



                                                  dose, Sat           



                                                  20 at           



                                                  2100, STAT           

 

     insulin NPH            Yes            35U       Inject 35           C

[facility]



     100 unit/mL      2-21                               Units           [facility]



     (3 mL) InPn      13:37:                               subcutaneo           

Medical



               55                                 usly 2           Center



                                                  (two)           



                                                  times           



                                                  daily           



                                                  before           



                                                  meals           



                                                  NOVOLIN .           

 

     sertraline            Yes            100mg QD   Take 100           [facility]

[facility]



     (ZOLOFT)      2-21                               mg by           [facility]



     100 MG      13:33:                               mouth           Medical



     tablet      34                                 daily.           Center

 

     methadone            Yes            100mg QD   Take 100           [facility]



     HCl       2-21                               mg by           [facility]



     (METHADONE      13:33:                               mouth           Medica

l



     ORAL)      34                                 daily.           Center

 

     insulin            Yes            30U       Inject 30           [facility]

t



     lispro      2-21                               Units           [facility]



     (HUMALOG)      13:33:                               subcutaneo           Me

dical



     100 unit/mL      34                                 usly 3           Center



     injection                                         (three)           



                                                  times           



                                                  daily           



                                                  before           



                                                  meals.           

 

     metFORMIN            Yes            500mg      Take 500           [facility]

 [facility]



     (GLUCOPHAGE      2-21                               mg by           [facility]



     ) 500 MG      13:33:                               mouth 2           Medica

l



     tablet      33                                 (two)           Center



                                                  times           



                                                  daily with           



                                                  breakfast           



                                                  and            



                                                  dinner.           

 

     No known                No                                      Methodi



     medications                                                        st



                                                                 [facility]osp[facility]



                                                                 l

 

     Alprazolam Alprazolam           Yes            .25       Twice A           

[facility]



     (Xanax) (Xanax)                                    Day            [facility]



     0.25 Mg 0.25 Mg                                                   Patient



     Tablet Tablet                                                   s



                                                                 Medical



                                                                 Center

 

     Hydrocodone Hydrocodone           Yes            1         Every 4         

  [facility]



     Bit/Acetami Bit/Acetami                                    Hours as        

   [facility]



     nophen nophen                                    needed for           [facility]

nt



     (Norco (Norco                                    Pain           s



                                                         Medical



     Tablet) 1 Tablet) 1                                                   [place]

r



     Each Tablet Each Tablet                                                   

 

     Insulin Insulin           Yes            35        Every 12           [facility]

t.



     Detemir Detemir                                    Hours           [facility]



     (Levemir) (Levemir)                                                   Patie

nt



     100 Unit/1 100 Unit/1                                                   s



     Ml Vial Ml Vial                                                   Medical



                                                                 Center

 

     Quetiapine Quetiapine           Yes            50        Bedtime           

[facility]



     Fumarate Fumarate                                                   [facility]



     (Seroquel) (Seroquel)                                                   Pat

ient



     25 Mg 25 Mg                                                   s



     Tablet Tablet                                                   Medical



                                                                 [place]

 

     Quetiapine Quetiapine           Yes  [facility]fredo                not            

[facility]



     Fumarate Fumarate                [place]                defined           [facility]

s -



                                                                 [facility]



                                                                 [facility]



                                                                 [facility]

 

     Carvedilol Carvedilol           Yes  [facility]fredo                not            

[facility]



                              [place]                defined           [facility]



                                                                 [facility]



                                                                 [facility]



                                                                 [facility]

 

     Furosemide Furosemide           Yes  [facility]                not            

[facility]



                              [place]                defined           [facility]



                                                                 [facility]



                                                                 [facility]



                                                                 [facility]

 

     NovoLog Mix NovoLog Mix           Yes  Aflredo                not          

  [facility]



     70/30 70/30                [place]                defined           [facility]



     Flexpen Flexpen                                                   [facility]



                                                                 [facility]



                                                                 [facility]



                                                                 [facility]

 

     Insulin Insulin      - No                                      [facility]



     Aspart Aspart                                               [facility]



     (Novolog (Novolog      00:00                                         [facility]

t



     Mix 70-30 Mix 70-30      :00                                          s



     Vial) 100 Vial) 100                                                   Medic

al



     Units/Ml Units/Ml                                                   Center



     Ml, Unknown Ml, Unknown                                                   



     Dose Dose                                                   

 

     Insulin Insulin       No                                      [facility]



     Glargine Glargine                                               [facility] 

-



     (Lantus) (Lantus)      00:00                                         [facility]

t



     100  100       :00                                          s



     Units/Ml Units/Ml                                                   Medical



     Ml, Unknown Ml, Unknown                                                   C

enter



     Dose Dose                                                   

 

     Trazodone Trazodone      - No                       Daily           [facility]



     Hcl 50 Mg Hcl 50 Mg                                               [facility]

s -



     Tablet, Tablet,      00:00                                         Patient



     Unknown Unknown      :00                                          s



     Dose  Oral Dose  Oral                                                   Med

[facility]







Vital Signs







             Vital Name   Observation Time Observation Value Comments     Source

 

             Systolic blood 2021 03:00:00 183 mm[Hg]                [facility]

 

             Diastolic blood 2021 03:00:00 101 mm[Hg]                [facility]

 

             Heart rate   2021 03:00:00 82 /min                   [facility]

 

             Respiratory rate 2021 03:00:00 9 /min                    [facility]

 

             Oxygen saturation in 2021 03:00:00 94 /min                   

[facility] blood by                                        [place] [facility]

carlos



             Pulse oximetry                                        [facility]

 

             Body temperature 2021 23:38:00 37.44 [place]                 [facility]

 

             Body weight  2021 23:38:00 76.204 kg                 [facility]

 

             BMI          2021 23:38:00 29.76 kg/m2               [facility]

 

             Systolic blood 2021 16:57:00 131 mm[Hg]                Univer

sity of



             pressure                                            [place] [facility]



                                                                 [facility]

 

             Diastolic blood 2021 16:57:00 70 mm[Hg]                 Unive

rsity of



             pressure                                            [place] [facility]



                                                                 [facility]

 

             Heart rate   2021 16:57:00 60 /min                   [facility]

[facility]



                                                                 [facility]



                                                                 Branch

 

             Body temperature 2021 16:57:00 36.22 [place]                 [facility]

[facility]



                                                                 [facility]



                                                                 Branch

 

             Respiratory rate 2021 16:57:00 16 /min                   [facility]

[facility]



                                                                 [facility]



                                                                 Branch

 

             Oxygen saturation in 2021 16:57:00 94 /min                   

[facility]



             Arterial blood by                                        [facility]

[facility]



             Pulse oximetry                                        Branch

 

             Body weight  2021 08:51:00 72.848 kg                 [facility]

[facility]



                                                                 [facility]



                                                                 Branch

 

             BMI          2021 08:51:00 28.45 kg/m2               [facility]

ty of



                                                                 [facility]



                                                                 [place]

 

             WEIGHT       2021 14:17:00 78.5 kg                   

 

             WEIGHT       2021 06:00:00 77.3 kg                   

 

             HEIGHT       2021 11:30:00 154 cm                    

 

             HEIGHT       2021 11:20:00 154 cm                    

 

             WEIGHT       2021 11:20:00 74.1 kg                   

 

             WEIGHT       2021 06:36:00 76.8 kg                   

 

             Systolic blood 2021 01:00:00 175 mm[Hg]                [facility]

[facility]ity of



             pressure                                            [facility]



                                                                 Branch

 

             Diastolic blood 2021 01:00:00 77 mm[Hg]                 [facility]

[facility]ity of



             pressure                                            [facility]



                                                                 Branch

 

             Heart rate   2021 01:00:00 68 /min                   [facility]

[facility]



                                                                 [facility]



                                                                 Branch

 

             Respiratory rate 2021 01:00:00 17 /min                   [facility]

[facility] of



                                                                 [facility]



                                                                 Branch

 

             Oxygen saturation in 2021 01:00:00 100 /min                  

[facility]



             Arterial blood by                                        [facility]

[facility]



             Pulse oximetry                                        [facility]

 

             Body temperature 2021 00:32:00 36.83 [place]                 [facility]

[facility]



                                                                 [facility]



                                                                 Branch

 

             Body height  2021 00:32:00 160 cm                    [facility]

[facility] of



                                                                 [facility]



                                                                 Branch

 

             Body weight  2021 00:32:00 76.658 kg                 [facility]

[facility] of



                                                                 [facility]



                                                                 Branch

 

             BMI          2021 00:32:00 29.94 kg/m2               [facility]

ty of



                                                                 [facility]



                                                                 Branch

 

             WEIGHT       2021 15:12:00 81 kg                     

 

             WEIGHT       2020-12-10 14:32:00 79.6 kg                   

 

             Systolic blood 2020 07:31:00 138 mm[Hg]                [facility]

[facility]ity of



             pressure                                            [facility]



                                                                 Branch

 

             Diastolic blood 2020 07:31:00 86 mm[Hg]                 [facility]

[facility] of



             pressure                                            [facility]



                                                                 Branch

 

             Heart rate   2020 07:31:00 80 /min                   [facility]

[facility]



                                                                 [facility]



                                                                 Branch

 

             Oxygen saturation in 2020 07:31:00 98 /min                   

[facility]



             Arterial blood by                                        [facility]

[facility]



             Pulse oximetry                                        [facility]anch

 

             Respiratory rate 2020 06:00:00 12 /min                   [facility]

ers[facility] of



                                                                 [facility]



                                                                 Branch

 

             Body temperature 2020 03:16:00 36.56 [place]                 [facility]

[facility]



                                                                 [facility]



                                                                 Branch

 

             Body weight  2020 03:16:00 72.576 kg                 [facility]

[facility] of



                                                                 [facility]



                                                                 Branch

 

             BMI          2020 03:16:00 28.34 kg/m2               [facility]

ty of



                                                                 [facility]



                                                                 Branch

 

             Systolic blood 2020 07:31:00 138 mm[Hg]                [facility]

[facility]ity of



             pressure                                            [facility]



                                                                 Branch

 

             Diastolic blood 2020 07:31:00 86 mm[Hg]                 [facility]

[facility]ity of



             pressure                                            [facility]



                                                                 Branch

 

             Heart rate   2020 07:31:00 80 /min                   [facility]

[facility] of



                                                                 [facility]



                                                                 Branch

 

             Oxygen saturation in 2020 07:31:00 98 /min                   

[facility]



             Arterial blood by                                        [facility]

[facility]



             Pulse oximetry                                        [facility]anch

 

             Respiratory rate 2020 06:00:00 12 /min                   [facility]

ers[facility] of



                                                                 [facility]



                                                                 Branch

 

             Body temperature 2020 03:16:00 36.56 [place]                 [facility]

[facility]



                                                                 [facility]



                                                                 Branch

 

             Body weight  2020 03:16:00 72.576 kg                 [facility]

[facility] of



                                                                 [facility]



                                                                 Branch

 

             BMI          2020 03:16:00 28.34 kg/m2               [facility]

[facility] of



                                                                 [facility]



                                                                 [facility]anch

 

             Systolic blood 2020 21:00:00 115 mm[Hg]                [facility]

[facility]ity of



             pressure                                            [facility]



                                                                 Branch

 

             Diastolic blood 2020 21:00:00 66 mm[Hg]                 [facility]

[facility] of



             pressure                                            [facility]



                                                                 [facility]anch

 

             Heart rate   2020 21:00:00 73 /min                   [facility]

[facility] of



                                                                 [facility]



                                                                 [facility]

 

             Respiratory rate 2020 21:00:00 16 /min                   [facility]

ers[facility] of



                                                                 [facility]



                                                                 [facility]anch

 

             Oxygen saturation in 2020 21:00:00 93 /min                   

[facility]



             Arterial blood by                                        [facility]

[facility]



             Pulse oximetry                                        Branch

 

             Body temperature 2020 19:35:00 37.61 [place]                 [facility]

[facility]



                                                                 [facility]



                                                                 [facility]anch

 

             Body height  2020 19:35:00 160 cm                    [facility]

[facility] of



                                                                 [facility]



                                                                 [facility]anch

 

             Body weight  2020 19:35:00 72.576 kg                 [facility]

[facility] of



                                                                 [facility]



                                                                 [facility]anch

 

             BMI          2020 19:35:00 28.34 kg/m2               [facility]

ty of



                                                                 [facility]



                                                                 Branch

 

             Systolic blood 2020 21:00:00 115 mm[Hg]                [facility]

sity of



             pressure                                            [facility]



                                                                 Branch

 

             Diastolic blood 2020 21:00:00 66 mm[Hg]                 [facility]

rsity of



             pressure                                            [facility]



                                                                 Branch

 

             Heart rate   2020 21:00:00 73 /min                   [facility]

[facility] of



                                                                 [facility]



                                                                 [facility]anch

 

             Respiratory rate 2020 21:00:00 16 /min                   [facility]

ers[facility] of



                                                                 [facility]



                                                                 [facility]anch

 

             Oxygen saturation in 2020 21:00:00 93 /min                   

[facility]



             Arterial blood by                                        [facility]

[facility]



             Pulse oximetry                                        [facility]anch

 

             Body temperature 2020 19:35:00 37.61 [place]                 [facility]

[facility]



                                                                 [facility]



                                                                 [facility]anch

 

             Body height  2020 19:35:00 160 cm                    [facility]

[facility] of



                                                                 [facility]



                                                                 [facility]

 

             Body weight  2020 19:35:00 72.576 kg                 [facility]

[facility] of



                                                                 [facility]



                                                                 [facility]anch

 

             BMI          2020 19:35:00 28.34 kg/m2               [facility]

[facility] of



                                                                 [facility]



                                                                 [facility]anch

 

             Systolic blood 2020 05:00:00 147 mm[Hg]                [facility]

[facility]ity of



             pressure                                            [facility]



                                                                 Branch

 

             Diastolic blood 2020 05:00:00 73 mm[Hg]                 [facility]

rsity of



             pressure                                            [facility]



                                                                 Branch

 

             Heart rate   2020 05:00:00 88 /min                   [facility]

[facility] of



                                                                 [facility]



                                                                 [facility]anch

 

             Oxygen saturation in 2020 05:00:00 97 /min                   

[facility]



             Arterial blood by                                        [facility]

[facility]



             Pulse oximetry                                        [facility]anch

 

             Respiratory rate 2020 03:45:00 15 /min                   [facility]

ers[facility] of



                                                                 [facility]



                                                                 [facility]

 

             Body height  2020 00:45:54 160 cm                    [facility]

[facility]



                                                                 [facility]



                                                                 [facility]

 

             Body weight  2020 00:45:54 80.513 kg                 [facility]

[facility] of



                                                                 [facility]



                                                                 [facility]

 

             BMI          2020 00:45:54 31.44 kg/m2               [facility]

[facility] of



                                                                 [facility]



                                                                 [facility]anch

 

             Body temperature 2020 00:14:00 37.61 [place]                 [facility]

ers[facility]



                                                                 [facility]



                                                                 [facility]anch

 

             Systolic blood 2020 05:00:00 147 mm[Hg]                [facility]

[facility]ity of



             pressure                                            [facility]



                                                                 [facility]anch

 

             Diastolic blood 2020 05:00:00 73 mm[Hg]                 [facility]

[facility] of



             pressure                                            [facility]



                                                                 [facility]anch

 

             Heart rate   2020 05:00:00 88 /min                   [facility]

[facility] of



                                                                 [facility]



                                                                 [facility]

 

             Oxygen saturation in 2020 05:00:00 97 /min                   

[facility]



             Arterial blood by                                        [facility]

[facility]



             Pulse oximetry                                        [facility]anch

 

             Respiratory rate 2020 03:45:00 15 /min                   [facility]

ers[facility] of



                                                                 [facility]



                                                                 [facility]

 

             Body height  2020 00:45:54 160 cm                    [facility]

[facility] of



                                                                 [facility]



                                                                 [facility]

 

             Body weight  2020 00:45:54 80.513 kg                 [facility]

[facility] of



                                                                 [facility]



                                                                 [facility]anch

 

             BMI          2020 00:45:54 31.44 kg/m2               [facility]

[facility] of



                                                                 [facility]



                                                                 [facility]anch

 

             Body temperature 2020 00:14:00 37.61 [place]                 [facility]

[facility]



                                                                 [facility]



                                                                 [facility]anch

 

             Systolic blood 2020 19:28:00 164 mm[Hg]                [facility]

[facility]ity of



             pressure                                            [facility]



                                                                 [facility]anch

 

             Diastolic blood 2020 19:28:00 94 mm[Hg]                 [facility]

rsity of



             pressure                                            [facility]



                                                                 [facility]anch

 

             Heart rate   2020 19:28:00 92 /min                   [facility]

[facility] of



                                                                 [facility]



                                                                 [facility]

 

             Respiratory rate 2020 19:28:00 20 /min                   [facility]

ers[facility] of



                                                                 [facility]



                                                                 [facility]anch

 

             Oxygen saturation in 2020 19:28:00 94 /min                   

[facility]



             Arterial blood by                                        [facility]

[facility]



             Pulse oximetry                                        [facility]anch

 

             Body temperature 2020 17:18:00 37.11 [place]                 [facility]

ers[facility] of



                                                                 [facility]



                                                                 [facility]anch

 

             Body height  2020 17:18:00 160 cm                    [facility]

[facility] of



                                                                 [facility]



                                                                 [facility]

 

             Body weight  2020 17:18:00 73.936 kg                 [facility]

[facility] of



                                                                 [facility]



                                                                 [facility]anch

 

             BMI          2020 17:18:00 28.87 kg/m2               [facility]

[facility] of



                                                                 [facility]



                                                                 [facility]

 

             Systolic blood 2020 19:28:00 164 mm[Hg]                [facility]

[facility]ity of



             pressure                                            [facility]



                                                                 [facility]

 

             Diastolic blood 2020 19:28:00 94 mm[Hg]                 [facility]

rsity of



             pressure                                            [facility]



                                                                 [facility]

 

             Heart rate   2020 19:28:00 92 /min                   [facility]

[facility]



                                                                 [facility]



                                                                 [facility]

 

             Respiratory rate 2020 19:28:00 20 /min                   [facility]

ers[facility] of



                                                                 [facility]



                                                                 [facility]

 

             Oxygen saturation in 2020 19:28:00 94 /min                   

[facility]



             Arterial blood by                                        [facility]

[facility]



             Pulse oximetry                                        [facility]anch

 

             Body temperature 2020 17:18:00 37.11 [place]                 [facility]

[facility] of



                                                                 [facility]



                                                                 [facility]

 

             Body height  2020 17:18:00 160 cm                    [facility]

[facility] of



                                                                 [facility]



                                                                 [facility]

 

             Body weight  2020 17:18:00 73.936 kg                 [facility]

[facility] of



                                                                 [facility]



                                                                 [facility]

 

             BMI          2020 17:18:00 28.87 kg/m2               [facility]

[facility] of



                                                                 [facility]



                                                                 [facility]anch

 

             Systolic blood 2020 12:41:00 144 mm[Hg]                [facility]

[facility]ity of



             pressure                                            [facility]



                                                                 [facility]anch

 

             Diastolic blood 2020 12:41:00 76 mm[Hg]                 [facility]

[facility] of



             pressure                                            [facility]



                                                                 [facility]anch

 

             Heart rate   2020 12:41:00 74 /min                   [facility]

[facility]



                                                                 [facility]



                                                                 [facility]

 

             Body temperature 2020 12:41:00 36.89 [place]                 [facility]

[facility]



                                                                 [facility]



                                                                 [facility]

 

             Respiratory rate 2020 12:41:00 18 /min                   [facility]

ers[facility] of



                                                                 [facility]



                                                                 [facility]anch

 

             Oxygen saturation in 2020 12:41:00 96 /min                   

[facility]



             Arterial blood by                                        [facility]

[facility]



             Pulse oximetry                                        [facility]anch

 

             Body weight  2020 13:00:00 84 kg                     [facility]

[facility]



                                                                 [facility]



                                                                 [facility]

 

             BMI          2020 13:00:00 32.80 kg/m2               [facility]

[facility] of



                                                                 [facility]



                                                                 [facility]

 

             Body height  2020 03:58:00 160 cm                    [facility]

[facility] of



                                                                 [facility]



                                                                 [facility]

 

             Systolic blood 2020 22:00:00 138 mm[Hg]                [facility]

[facility]ity of



             pressure                                            [facility]



                                                                 [facility]anch

 

             Diastolic blood 2020 22:00:00 75 mm[Hg]                 [facility]

rsity of



             pressure                                            [facility]



                                                                 Branch

 

             Heart rate   2020 22:00:00 68 /min                   [facility]

 

             Body temperature 2020 22:00:00 36.61 [place]                 [facility]

 

             Respiratory rate 2020 22:00:00 16 /min                   [facility]

 

             Oxygen saturation in 2020 22:00:00 97 /min                   

[facility] blood by                                        [place] [facility]

carlos



             Pulse oximetry                                        [place]

 

             Body height  2020 04:25:00 161.3 cm                  [facility]

 

             Body weight  2020 04:25:00 72.485 kg                 [facility]

 

             BMI          2020 04:25:00 27.86 kg/m2               [facility]







Procedures







                Procedure       Date / Time     Performing Clinician Source



                                Performed                       

 

                TROPONIN I      2021 02:45:00 Ricci Abarca [facility]

 

                COVID-19 (ID NOW RAPID 2021 01:15:00 Ricci Abarca [facility]



                TESTING)                                        [facility]

 

                CT CHEST PULMONARY 2021 00:48:44 Pk Nash [facility] [place]



                ANGIOGRAM                                       [facility]

 

                MAGNESIUM       2021 00:09:00 [facility]

 

                TROPONIN I      2021 00:09:00 [facility]

 

                COMP. METABOLIC PANEL 2021 00:09:00 Pk Nash [facility]

[facility] [place]



                ([id number])                                         [facility]

 

                CBC WITH DIFF   2021 00:09:00 Singer, [facility]

 

                N-TERMINAL PRO-BNP 2021 00:09:00 Singer Pk [facility]

 

                XR CHEST 1 VW   2021 23:53:48 [facility]

 

                COVID-19 (ID NOW RAPID 2021 17:04:00 Orlin Greene [facility]

[facility] [place]



                TESTING)                                        [facility]

 

                XR CHEST 1 VW   2021 15:32:27 Shamar Beaver [facility]

 

                POCT GLUCOSE (AUTOMATED) 2021 12:28:00 Abisai Avery

[facility]

 

                CBC WITH DIFF   2021 07:55:00 Orlin Greene [facility]

 

                BASIC METABOLIC PANEL 2021 07:53:00 Orlin Greene [facility]



                (NA, K, CL, CO2, GLUCOSE,                                 Medica

l Branch



                BUN, CREATININE, CA)                                 

 

                POCT GLUCOSE (AUTOMATED) 2021 21:36:00 Abisai Avery  [facility]

 

                POCT GLUCOSE (AUTOMATED) 2021 16:59:00 Abisai Avery  [facility]

 

                POCT GLUCOSE (AUTOMATED) 2021 12:34:00 Abisai Avery  [facility]

 

                AMMONIA, PLASMA 2021 09:10:00 Audelia Lisa [facility]

 

                CBC WITH DIFF   2021 09:10:00 Jaguar [facility]

 

                BASIC METABOLIC PANEL 2021 09:02:00 Audelia Lisa [facility]

[facility] 

[place]



                (NA, K, CL, CO2, GLUCOSE,                                 Medica

l Branch



                BUN, CREATININE, CA)                                 

 

                CBC WITH DIFF   2021 08:57:00 Jaguar Audelia Mariela [facility]

 

                BASIC METABOLIC PANEL 2021 08:50:00 Audelia Lisa [facility]

[facility] 

[place]



                (NA, K, CL, CO2, GLUCOSE,                                 Medica

l Branch



                BUN, CREATININE, CA)                                 

 

                MAGNESIUM       2021 10:05:00 Abisai Avery  [place]

[facility]

 

                COMP. METABOLIC PANEL 2021 10:05:00 Abisai Avery  [facility]



                ([id number])                                         [facility]

 

                N-TERMINAL PRO-BNP 2021 10:05:00 Abisai Avery  [facility]

 

                POCT GLUCOSE (AUTOMATED) 2021 21:38:00 Abisai Avery  [facility]

 

                POCT GLUCOSE (AUTOMATED) 2021 16:37:00 Abisai Avery  [facility]

 

                POCT GLUCOSE (AUTOMATED) 2021 12:16:00 Abisai Avery  [facility]

 

                PHOSPHORUS      2021 10:01:00 Abisai Avery  [facility]

 

                MAGNESIUM       2021 10:01:00 Gena marvin  [facility]

 

                FERRITIN SERUM  2021 10:01:00 Shamar Beaver [facility]

 

                VITAMIN B12, LEVEL 2021 10:01:00 Shamar Beaver [facility]

 

                FOLATE          2021 10:01:00 Saranya [facility]

 

                COMP. METABOLIC PANEL 2021 10:01:00 Abisai Avery  [facility]



                ([id number])                                         [facility]

 

                IRON PANEL      2021 10:01:00 Saranya [facility]

 

                CBC WITH DIFF   2021 10:01:00 Gean [facility]

 

                N-TERMINAL PRO-BNP 2021 10:01:00 Abisai Avery  [facility]

 

                VITAMIN D, 25-OH 2021 10:01:00 Saranya Shamar [facility]

 

                POCT GLUCOSE (AUTOMATED) 2021 01:34:00 Abisai Avery  [facility]

 

                POCT GLUCOSE (AUTOMATED) 2021 22:16:00 Abisai Avery  [facility]

 

                VALPROIC ACID, FREE 2021 19:45:00 Abisai Avery  [facility]

 

                PROCALCITONIN   2021 19:45:00 Abisai Avery  [facility]

 

                TRANSTHORACIC ECHO (TTE) 2021 19:03:47 Abisai Avery  [facility]

 

                POCT GLUCOSE (AUTOMATED) 2021 16:51:00 Abisai Avery  [facility]

 

                POCT GLUCOSE (AUTOMATED) 2021 12:48:00 Abisai Avery  [facility]

 

                ACUTE CARE VENOUS BLOOD 2021 10:17:00 Abisai Avery  [facility]

[facility] [place]



                GAS                                             [facility]

 

                PHOSPHORUS      2021 10:01:00 Abisai Avery  [facility]

 

                MAGNESIUM       2021 10:01:00 Julio Valdez   [facility]

 

                FERRITIN SERUM  2021 10:01:00 Shamar Beaver [facility]

 

                AMMONIA, PLASMA 2021 10:01:00 Gena marvin  [facility]

 

                TROPONIN I      2021 10:01:00 Gena marvin  [facility]

 

                COMP. METABOLIC PANEL 2021 10:01:00 Abisai Avery  [facility]



                ([id number])                                         [facility]

 

                CBC WITH DIFF   2021 10:01:00 Gena [facility]

 

                PROTHROMBIN TIME / INR 2021 10:01:00 Abisai Avery  [facility]

 

                N-TERMINAL PRO-BNP 2021 10:01:00 Abisai Avery  [facility]

 

                AMMONIA, PLASMA 2021 04:59:00 Gena marvin  [facility]

 

                TROPONIN I      2021 04:59:00 Abisai Avery  [facility]

 

                VALPROIC ACID, TOTAL 2021 04:59:00 Abisai Avery  [facility]

 

                OSMOLALITY URINE 2021 04:52:00 Gena marvin  [facility]

 

                URINE DRUG (IMMUNOASSAY) 2021 04:52:00 Abisai Avery  [facility]



                SCREEN                                          

 

                URINE CULTURE   2021 04:52:00 Abisai Avery  [facility]

 

                PROTEIN CREAT RATIO URINE 2021 04:52:00 Abisai Avery  [facility]

 

                UREA NITROGEN, URINE 2021 04:52:00 Abisai Avery  [facility]

 

                SODIUM, URINE RANDOM 2021 04:52:00 Abisai Avery  [facility]

 

                URINE DRUG (LCMSMS) - 2021 04:52:00 Gena [facility]



                SYNTHETIC OPIATES PANEL                                 [facility] 

Branch

 

                POCT GLUCOSE (AUTOMATED) 2021 04:12:00 Abisai Avery  [facility]

 

                URINALYSIS      2021 01:06:00 Alexandria Sahu [facility]

 

                PHOSPHORUS      2021 23:48:00 Gena [facility]

 

                URIC ACID       2021 23:48:00 Gena [facility]

 

                MAGNESIUM       2021 23:48:00 Gena [facility]

 

                THYROID STIMULATING 2021 23:48:00 Gena marvin  [facility]



                HORMONE                                         [facility]

 

                BASIC METABOLIC PANEL 2021 23:48:00 Julio Valdez   [facility]



                (NA, K, CL, CO2, GLUCOSE,                                 Medica

l Branch



                BUN, CREATININE, CA)                                 

 

                LIPID PANEL ([id number])(TOTAL 2021 23:48:00 Abisai Avery  [facility]



                CHOLESTEROL,                                    [facility]



                TRIGLYCERIDES, HDL)                                 

 

                HB ECG ROUTINE & RHYTHM 2021 20:43:17 Alexandria Sahu U

[facility]

 

                AMMONIA, PLASMA 2021 20:04:00 Alexandria Sahu [facility]

 

                TROPONIN I      2021 20:04:00 Alexandria Sahu [facility]

 

                HEPATIC FUNCTION PANEL 2021 20:04:00 Alexandria Sahu [facility]



                ([id number]) (ALB,T.PRO,BILI                                 Medical 

Branch



                T,BU/BC,ALT,AST,ALK PHOS)                                 

 

                BASIC METABOLIC PANEL 2021 20:04:00 Alexandria Sahu [facility]



                (NA, K, CL, CO2, GLUCOSE,                                 Medica

l Branch



                BUN, CREATININE, CA)                                 

 

                DIFF CONSULT    2021 20:04:00 Abisai Avery  [facility]

 

                CBC WITH DIFF   2021 20:04:00 Alexandria Sahu [facility]

 

                GLYCOSYLATED HEMOGLOBIN 2021 20:04:00 Abisai Avery  [facility]

[facility] [place]



                (A1C)                                           [facility]

 

                N-TERMINAL PRO-BNP 2021 20:04:00 Alexandria Sahu [facility]

sity [facility]

 

                COVID-19 (ID NOW RAPID 2021 20:04:00 Alexandria Sahu Un

[facility]



                TESTING)                                        Medical Branch

 

                LAB ONLY COVID  2021 20:04:00 Alexandria Sahu [facility]

 

                LACTIC ACID WHOLE BLOOD 2021 20:03:00 Alexandria [facility]

 

                XR CHEST 1 VW   2021 20:00:10 Alexandria Sahu [facility]

 

                EMERGENCY DEPARTMENT 2021 05:01:00 Doctor Unassigned, [facility]



                DOCUMENTS                       [place]         [facility]

 

                PROTHROMBIN TIME / INR 2021 01:55:00 Audelia Magaña  [facility]

 

                ACTIVATED PARTIAL 2021 01:55:00 Audelia Magaña  [facility]



                [facility]

 

                URINALYSIS      2021 01:55:00 Audelia Magaña  [facility]

 

                AMMONIA, PLASMA 2021 00:59:00 Audelia Magaña  [facility]

 

                POCT GLUCOSE (AUTOMATED) 2020 07:28:00 Ricci Abarca [facility]

 

                XR CHEST 1 VW   2020 06:15:50 Ricci Abarca [facility]

 

                URINALYSIS      2020 03:30:00 Ricci Abarca [facility]

 

                TROPONIN I      2020 03:29:00 Ricci Abarca [facility]

 

                COMP. METABOLIC PANEL 2020 03:29:00 Ricci Abarca [facility]



                ([id number])                                         Medical Branch

 

                CBC WITH DIFF   2020 03:29:00 Ricci Abarca [facility]

 

                N-TERMINAL PRO-BNP 2020 03:29:00 Ricci Abarca [facility]

 

                EKG-12 LEAD     2020 03:25:41 Ricci Abarca [facility]

 

                POCT GLUCOSE(AGE 2020 03:21:00 Ricci Abarca [facility]



                0-30DAYS)                                       Medical Branch

 

                XR CHEST 1 VW COVID 2020 22:00:27 Karli Rocha [facility]

 

                COVID-19 (ID NOW RAPID 2020 21:06:00 Karli Rocha [facility]

[facility] [place]



                TESTING)                                        [facility] Branch

 

                ACUTE CARE VENOUS BLOOD 2020 20:44:00 Karli Rocha Uni

[facility] [place]



                GAS                                             [facility]

 

                LIPASE          2020 20:41:00 Karli Rocha [facility]

 

                TROPONIN I      2020 20:41:00 Karli Rocha [facility]

 

                HEPATIC FUNCTION PANEL 2020 20:41:00 Karli Rocha [facility]

[facility] [place]



                ([id number]) (ALB,T.PRO,BILI                                 [facility] 

Branch



                T,BU/BC,ALT,AST,ALK PHOS)                                 

 

                BASIC METABOLIC PANEL 2020 20:41:00 Karli Rocha [facility]

[facility] [place]



                (NA, K, CL, CO2, GLUCOSE,                                 Medica

l Branch



                BUN, CREATININE, CA)                                 

 

                CBC WITH DIFF   2020 20:41:00 Karli Rocha [facility]

 

                URINALYSIS      2020 20:41:00 Karli Rocha [facility]

 

                N-TERMINAL PRO-BNP 2020 20:41:00 Karli Rocha [facility]

 

                ADC / LCC - DRUG SCREEN 2020 20:41:00 Karli Rocha Uni

[facility] [place]



                TRIAGE                                          [facility]

 

                EKG-12 LEAD     2020 20:21:42 Karli Rocha [facility]

 

                CONSENT/REFUSAL FOR 2020 19:20:03 Doctor Unassigned, [facility]



                DIAGNOSIS AND TREATMENT                 [place]         Medical 

[place]

 

                XR CHEST 2 VW   2020 01:47:16 Mahamed Bain   [facility]

 

                CT ABDOMEN PELVIS W 2020 01:45:39 Mahamed Bain   [facility] [place]



                CONTRAST                                        [facility]

 

                PROTHROMBIN TIME / INR 2020 00:33:00 [facility]amed [facility]ain   [facility]

[facility]

 

                URINALYSIS      2020 00:33:00 Mahamed Bain   [facility]

 

                COVID-19 (ID NOW RAPID 2020 00:33:00 Mahamed Bain   [facility]

[facility]



                TESTING)                                        [facility] Branch

 

                POCT PREGNANCY TEST 2020 00:26:00 Mahamed Bain   [facility]

 

                LIPASE          2020 00:19:00 Mahamed Bain   [facility]

 

                TROPONIN I      2020 00:19:00 Mahamed Bain   [facility]

 

                HEPATIC FUNCTION PANEL 2020 00:19:00 Mahamed Bain   [facility]



                ([id number]) (ALB,T.PRO,BILI                                 Medical 

Branch



                T,BU/BC,ALT,AST,ALK PHOS)                                 

 

                BASIC METABOLIC PANEL 2020 00:19:00 Mhaamed Bain   [facility]



                (NA, K, CL, CO2, GLUCOSE,                                 Medica

l Branch



                BUN, CREATININE, CA)                                 

 

                CBC WITH DIFFERENTIAL 2020 00:19:00 Mahamed Bain   [facility]

 

                N-TERMINAL PRO-BNP 2020 00:19:00 Mahamed Bain   [facility]

 

                EKG-12 LEAD     2020 00:14:24 Mahamed Bain   [facility]

 

                CONSENT/REFUSAL FOR 2020 00:01:47 Doctor Unassrita, [facility]



                DIAGNOSIS AND TREATMENT                 [place]         Medical 

Branch

 

                CT ABDOMEN PELVIS W 2020 19:02:08 Pk Nash [facility]

[facility] [place]



                CONTRAST                                        [facility]

 

                LIPASE          2020 17:40:00 Pk Nash [facility]

 

                COMP. METABOLIC PANEL 2020 17:40:00 Pk Nash [facility]

[facility] [place]



                ([id number])                                         Medical Branch

 

                CBC WITH DIFFERENTIAL 2020 17:40:00 Singer, [place] [facility]

 

                URINALYSIS      2020 17:40:00 [facility]

 

                NOTICE OF PRIVACY 2020 17:11:40 Doctor Unassigned, [facility]



                PRACTICES                       [place]         [facility]

 

                ASSIGNMENT OF BENEFITS 2020 17:11:15 Doctor Unassigned, [facility] Name         [facility]

 

                POCT GLUCOSE (AUTOMATED) 2020 11:02:00 De La Cruz, Lynsey Catarina [facility]

[facility]

 

                POCT GLUCOSE (AUTOMATED) 2020 03:12:00 De La Cruz, Lynsey Catarina U

[facility]

 

                EXTRA TUBE LAV  2020 [facility]:40:00 [facility]

 

                EXTRA TUBE LT. BLUE 2020 01:40:00 Tabatha [facility]

 

                EXTRA TUBE RED  2020 01:40:00 Mays, [facility]

 

                CREATINE KINASE 2020 01:38:00 Jeevan [facility]

 

                MAGNESIUM       2020 01:38:00 Jeevan [facility]

 

                PROLACTIN       2020 01:38:00 [facility]

 

                TROPONIN I      2020 01:38:00 Jeevan [facility]

 

                BASIC METABOLIC PANEL 2020 01:38:00 [facility]



                (NA, K, CL, CO2, GLUCOSE,                 [facility]

l Branch



                BUN, CREATININE, CA)                                 

 

                POCT GLUCOSE (AUTOMATED) 2020 18:50:00 De La Cruz, Lynsey Catarina U

[facility]

 

                XR KUB          2020 16:40:00 Miguel Cano [facility]

 

                POCT GLUCOSE (AUTOMATED) 2020 13:01:00 De La Cruz, Lynsey Catarina U

[facility]

 

                BASIC METABOLIC PANEL 2020 09:36:00 Hans Rangel   [facility]

[facility] [place]



                (NA, K, CL, CO2, GLUCOSE,                                 Medica

l Branch



                BUN, CREATININE, CA)                                 

 

                CBC WITH DIFFERENTIAL 2020 09:36:00 [facility]

 

                CBC WITH DIFFERENTIAL 2020 09:36:00 Rachael [facility]

 

                POCT GLUCOSE (AUTOMATED) 2020 02:59:00 De La Cruz, Lynsey Catarina U

[facility]

 

                POCT GLUCOSE (AUTOMATED) 2020 21:18:00 De La Cruz, Lynsey Catarina U

[facility]

 

                XR KUB          2020 [facility]04[facility]00 [facility]

 

                POCT GLUCOSE (AUTOMATED) 2020 16:44:00 De La Cruz, Lynsey Catarina U

[facility]

 

                MAGNESIUM       2020 10:03:00 Juan Carlos [facility]

 

                HEPATIC FUNCTION PANEL 2020 10:03:00 Hans Rangel   [facility]



                ([id number]) (ALB,T.PRO,BILI                                 Medical 

Branch



                T,BU/BC,ALT,AST,ALK PHOS)                                 

 

                BASIC METABOLIC PANEL 2020 10:03:00 Hans aRngel   [facility]iver

[facility] [place]



                (NA, K, CL, CO2, GLUCOSE,                                 Medica

l Branch



                BUN, CREATININE, CA)                                 

 

                CBC WITH DIFFERENTIAL 2020 10:03:00 Rachael [facility]

 

                PROTHROMBIN TIME / INR 2020 10:03:00 [facility]

 

                POCT GLUCOSE (AUTOMATED) 2020 03:05:00 De La Cruz, Lynsey Catarina U

[facility]

 

                CBC WITH DIFFERENTIAL 2020 23:03:00 Rachael [facility]

 

                POCT GLUCOSE (AUTOMATED) 2020 22:57:00 Lynsey De La Cruz U

[facility]

 

                XR KUB          2020 17:01:00 Miguel Cano [facility]



                                                [facility]

 

                POCT GLUCOSE (AUTOMATED) 2020 16:52:00 Lynsey De La Cruz U

[facility]

 

                EKG-12 LEAD     2020 16:46:33 [facility]

 

                PROFILE / HEMOGRAM 2020 15:06:00 Juan Carlos [facility]

 

                POCT GLUCOSE (AUTOMATED) 2020 12:54:00 Shamar Beaver

[facility]

 

                MAGNESIUM       2020 09:25:00 Juan Carlos [facility]

 

                FERRITIN SERUM  2020 09:25:00 [facility]

 

                HEPATIC FUNCTION PANEL 2020 09:25:00 [facility]



                ([id number]) (ALB,T.PRO,BILI                                 [facility]



                T,BU/BC,ALT,AST,ALK PHOS)                                 

 

                BASIC METABOLIC PANEL 2020 09:25:00 Juan Carlos, Hans   [facility]

[facility] [place]



                (NA, K, CL, CO2, GLUCOSE,                                 Medica

l Branch



                BUN, CREATININE, CA)                                 

 

                PROFILE / HEMOGRAM 2020 09:25:00 Juan Carlos [facility]

 

                PROTHROMBIN TIME / INR 2020 09:25:00 Juan Carlos [facility]

 

                POCT GLUCOSE (AUTOMATED) 2020 04:32:00 Shamar Beaver

[facility]

 

                PROFILE / HEMOGRAM 2020 03:14:00 Juan Carlos [facility]

 

                POCT GLUCOSE (AUTOMATED) 2020 00:42:00 Shamar Beaver

[facility]

 

                POCT GLUCOSE (AUTOMATED) 2020 21:27:00 Shamar Beaver

[facility]

 

                MAGNESIUM       2020 21:15:00 [facility]

 

                BASIC METABOLIC PANEL 2020 21:15:00 Hans Rangel   [facility]

[facility] [place]



                (NA, K, CL, CO2, GLUCOSE,                                 Medica

l Branch



                BUN, CREATININE, CA)                                 

 

                PROFILE / HEMOGRAM 2020 21:15:00 Juan Carlos [facility]

 

                TRANSFUSE PLATELETS 2020 17:56:15 Tim [facility]

 

                POCT GLUCOSE (AUTOMATED) 2020 16:53:00 Shamar Beaver [facility]

 

                PROFILE / HEMOGRAM 2020 15:58:00 Juan [facility]

 

                PREPARE PLATELETS 2020 15:46:33 Tim [facility]

 

                TRANSFUSE PACKED RBC 2020 15:36:45 Mingo [facility]

 

                CT ANGIOGRAM    2020 14:33:11 Tim [facility]



                ABDOMEN/PELVIS                                  [facility]

 

                PREPARE PACKED RBC 2020 13:34:31 Mingo [facility]

 

                POCT GLUCOSE (AUTOMATED) 2020 12:38:00 Shamar Beaver [facility]

 

                TRANSFUSE PACKED RBC 2020 12:36:06 Mingo [facility]

 

                POCT GLUCOSE (AUTOMATED) 2020 10:06:00 Shamar Beaver [facility]

 

                POCT GLUCOSE (AUTOMATED) 2020 10:04:00 Shamar Beaver

[facility]

 

                MAGNESIUM       2020 09:59:00 Juan Carlos [facility]

 

                HEPATIC FUNCTION PANEL 2020 09:59:00 Hans Rangel   [facility]



                ([id number]) (ALB,T.PRO,BILI                                 Medical 

Branch



                T,BU/BC,ALT,AST,ALK PHOS)                                 

 

                BASIC METABOLIC PANEL 2020 09:59:00 Hans Rangel   [facility]

[facility] [place]



                (NA, K, CL, CO2, GLUCOSE,                                 Medica

l Branch



                BUN, CREATININE, CA)                                 

 

                CBC WITH DIFFERENTIAL 2020 09:59:00 [facility]

 

                PROTHROMBIN TIME / INR 2020 09:59:00 Hans Rangel   [facility]

 

                POCT GLUCOSE (AUTOMATED) 2020 04:35:00 Shamar Beaver

[facility]

 

                CBC WITH DIFFERENTIAL 2020 04:28:00 [facility]

 

                POCT GLUCOSE (AUTOMATED) 2020 01:14:00 Shamar Beaver [facility]

 

                CBC WITH DIFFERENTIAL 2020 21:54:00 Juan Carlos [facility]

 

                POCT GLUCOSE (AUTOMATED) 2020 21:49:00 Shamar Beaver [facility]

 

                TRANSFUSE PACKED RBC 2020 21:25:36 Juan Carlos [facility]

 

                CLOSTRIDIUM DIFFICILE 2020 20:50:00 Shamar Beaver [facility]

 

                PREPARE PACKED RBC 2020 18:52:37 Hans Rangel   [facility]

 

                HB ABO GROUPING 2020 17:58:00 Juan Carlos [facility]

 

                POCT GLUCOSE (AUTOMATED) 2020 17:28:00 Shamar Beaver [facility]

 

                POCT GLUCOSE (AUTOMATED) 2020 16:24:00 Shamar Beaver [facility]

 

                CT ABDOMEN PELVIS W 2020 15:05:30 Judson Garner [facility]



                CONTRAST                        Bridget            [facility]

 

                CBC WITH DIFFERENTIAL 2020 14:12:00 Hans Rangel   [facility]

 

                FIBRINOGEN      2020 14:12:00 Juan Carlos [facility]

 

                POCT GLUCOSE (AUTOMATED) 2020 12:45:00 Shamar Beaver

[facility]

 

                PHOSPHORUS      2020 10:42:00 Abisai Avery  [facility]

 

                COMP. METABOLIC PANEL 2020 10:42:00 Gena marvin  [facility]



                ([id number])                                         [facility] [place]

 

                N-TERMINAL PRO-BNP 2020 10:42:00 Abisai Avery  [facility]

 

                CBC WITH DIFFERENTIAL 2020 09:32:00 Gena [facility]

 

                POCT GLUCOSE (AUTOMATED) 2020 09:29:00 Shamar Beaver [facility]

 

                MAGNESIUM       2020 05:28:00 Hans Rangel   [facility]

 

                HEPATIC FUNCTION PANEL 2020 05:28:00 Hans Rangel   [facility]



                ([id number]) (ALB,T.PRO,BILI                                 Medical 

Branch



                T,BU/BC,ALT,AST,ALK PHOS)                                 

 

                BASIC METABOLIC PANEL 2020 05:28:00 Jefe Rangelis   [facility]

[facility] [place]



                (NA, K, CL, CO2, GLUCOSE,                                 Medica

l Branch



                BUN, CREATININE, CA)                                 

 

                PROTHROMBIN TIME / INR 2020 05:28:00 Hans Rangel   [facility]

 

                CBC WITH DIFFERENTIAL 2020 05:27:00 [facility]

 

                POCT GLUCOSE (AUTOMATED) 2020 05:02:00 Shamar Beaver [facility]

 

                POCT GLUCOSE (AUTOMATED) 2020 02:27:00 Shamar Beaver [facility]

 

                XR ABDOMEN 1 VW 2020 02:15:21 Hans Rangel   [facility]

 

                MRSA / MSSA SCREEN BY 2020 01:23:00 Hans Rangel   [facility]

[facility] [place]



                PCR, NARES                                      [facility]

 

                ALPHA FETOPROTEIN 2020 23:31:00 Ken Lacy [facility]



                                                C               [facility]

 

                IRON PANEL      2020 23:31:00 Saw Chew [facility]



                                                [facility]

 

                CBC WITH DIFFERENTIAL 2020 23:31:00 Hans Rangel   [facility]

 

                LACTIC ACID WHOLE BLOOD 2020 23:31:00 aHns Rangel   [facility]

 

                POCT GLUCOSE (AUTOMATED) 2020 21:30:00 Shamar Beaver [facility]

 

                POCT GLUCOSE (AUTOMATED) 2020 16:10:00 Shamar Beaver [facility]

 

                POCT GLUCOSE (AUTOMATED) 2020 12:52:00 Shamar Beaver [facility]

 

                LACTIC ACID WHOLE BLOOD 2020 10:28:00 Shamar Beaver [facility]

 

                POCT GLUCOSE (AUTOMATED) 2020 09:17:00 Shamar Beaver [facility]

 

                HEPATIC FUNCTION PANEL 2020 05:57:00 Shamar Beaver [facility]



                ([id number]) (ALB,T.PRO,BILI                                 [facility]



                T,BU/BC,ALT,AST,ALK PHOS)                                 

 

                BASIC METABOLIC PANEL 2020 05:57:00 Shamar Beaver [facility]



                (NA, K, CL, CO2, GLUCOSE,                                 Medica

l Branch



                BUN, CREATININE, CA)                                 

 

                CBC WITH DIFFERENTIAL 2020 05:57:00 Saranya [facility]

 

                GLYCOSYLATED HEMOGLOBIN 2020 05:57:00 Shamar Beaver [facility]



                (A1C)                                           [facility]

 

                N-TERMINAL PRO-BNP 2020 05:57:00 Abisai Avery  [facility]

 

                LACTIC ACID WHOLE BLOOD 2020 05:56:00 Shamar Beaver [facility]

 

                POCT GLUCOSE (AUTOMATED) 2020 04:07:00 Shamar Beaver [facility]

 

                XR CHEST 1 VW   2020 02:46:27 Miguel [facility]

 

                URINE CULTURE   2020 02:31:00 Miguel [facility]

 

                BASIC METABOLIC PANEL 2020 02:02:00 [facility]



                (NA, K, CL, CO2, GLUCOSE,                                 Medica

l Branch



                BUN, CREATININE, CA)                                 

 

                LACTIC ACID WHOLE BLOOD 2020 02:02:00 Tata Mcneil [facility]

 

                URINALYSIS      2020 00:31:00 Tata Mcneil [facility]

 

                EKG-12 LEAD     2020 00:30:02 Ricci Abarca [facility]

 

                CT ABDOMEN PELVIS W 2020 00:23:50 Tata Mcneil [facility] [place]



                CONTRAST                                        [facility]

 

                EKG-12 LEAD     2020 23:53:36 Tata Mcneil [facility]

 

                BLOOD CULTURE SCREEN 2020 23:18:00 Tata Mcneil [facility]

 

                LACTIC ACID WHOLE BLOOD 2020 23:17:00 Tata Mcneil [facility]

 

                BLOOD CULTURE SCREEN 2020 23:16:00 Tata Mcneil [facility]

 

                LIPASE          2020 23:15:00 Tata Mcneil [facility]

 

                COMP. METABOLIC PANEL 2020 23:15:00 Tata Mcneil [facility]

[facility] [place]



                ([id number])                                         [facility]

 

                PROTHROMBIN TIME / INR 2020 23:15:00 Tata Mcneil [facility]

[facility]

 

                ACTIVATED PARTIAL 2020 23:15:00 Tata Mcneil [facility]



                [facility]

 

                HB ABO GROUPING 2020 23:15:00 Tata Mcneil [facility]

 

                CBC WITH DIFFERENTIAL 2020 23:15:00 Tata Mcneil [facility]

 

                CORONAVIRUS COVID-19 2020 23:15:00 Tata Mcneil [facility]

[facility] [place]



                TESTING                                         [facility]

 

                EMERGENCY DEPARTMENT 2020 05:01:00 Doctor Unassigned, [facility]



                DOCUMENTS                       E[place]ace         Medical [facility]

 

                HOSPITAL ADMISSION 2020 05:01:00 Doctor Unaalla, [facility]



                                                [place]         Medical Branch

 

                HOSPITAL ADMISSION MISC - 2020 05:01:00 Doctor Unassigned,

 [facility]



                MEDICARE PATIENTS RIGHTS                 No Name         Medical

 Branch



                IMPORTANT MESSAGE                                 

 

                TOTAL IRON BINDING 2020 19:40:00 [facility]

 

                HEPATIC FUNCTION PANEL 2020 19:40:00 Viry Larkin   [facility]



                ([id number]) (ALB,T.PRO,BILI                                 Medical 

Branch



                T,BU/BC,ALT,AST,ALK PHOS)                                 

 

                BASIC METABOLIC PANEL 2020 19:40:00 [facility]



                (NA, K, CL, CO2, GLUCOSE,                                 Medica

l Branch



                BUN, CREATININE, CA)                                 

 

                ALPHA FETOPROTEIN 2020 19:40:00 Viry Larkin   [facility]

 

                CBC WITH DIFFERENTIAL 2020 19:40:00 Viry Larkin [facility]

 

                PROTHROMBIN TIME / INR 2020 19:40:00 Viry Larkin   [facility]

 

                ACTIVATED PARTIAL 2020 [facility]:40:00 [facility]



                THRMPLAS [facility]

 

                XR ABDOMEN 2 VW 2020 19:33:21 Yesy Mancia [facility]

 

                US ABDOMEN COMPLETE 2020 19:23:36 Yesy Mancia   [facility]

 

                CONSENT/REFUSAL FOR 2020 18:46:28 Doctor Unassigned, [facility]



                DIAGNOSIS AND TREATMENT                 [place]         [facility]

 

                ASSIGNMENT OF BENEFITS 2020 18:46:09 Doctor Unassigned, [facility]



                                                [facility]

 

                POCT GLUCOSE (AUTOMATED) 2020 13:51:00 Mookie Lisa  [facility]

 

                POCT GLUCOSE (AUTOMATED) 2020 11:39:00 Mookie Lisa  [facility]

 

                POCT GLUCOSE (AUTOMATED) 2020 10:30:00 Mookie Lisa  [facility]

 

                CORTISOL AM     2020 09:45:00 Jaguar [facility]

 

                BASIC METABOLIC PANEL 2020 09:45:00 Mookie Lisa  [facility]



                (NA, K, CL, CO2, GLUCOSE,                                 Medica

l Branch



                BUN, CREATININE, CA)                                 

 

                PROCALCITONIN   2020 09:45:00 Jaguar [facility]

 

                POCT GLUCOSE (AUTOMATED) 2020 09:20:00 Mookie Lisa

[facility]

 

                POCT GLUCOSE (AUTOMATED) 2020 08:28:00 Mookie Lisa

[facility]

 

                POCT GLUCOSE (AUTOMATED) 2020 07:30:00 Mookie Lisa

[facility]

 

                POCT GLUCOSE (AUTOMATED) 2020 06:24:00 Mookie Lisa  [facility]

 

                OSMOLALITY SERUM 2020 05:50:00 [facility]iel [facility]

 

                BETA HYDROXY-BUTYRATE 2020 05:50:00 Nathaniel [facility]

 

                BASIC METABOLIC PANEL 2020 05:50:00 Jaguar [facility]



                (NA, K, CL, CO2, GLUCOSE,                                 Medica

l Branch



                BUN, CREATININE, CA)                                 

 

                ADC,CLC OR LCC ONLY - 2020 05:50:00 Jaguar [facility]



                INFLUENZA A & B DIRECT                                 Medical B

ranch



                ANTIGEN                                         

 

                AMMONIA, PLASMA 2020 05:49:00 [facility]

 

                POCT GLUCOSE (AUTOMATED) 2020 05:29:00 [facility]

 

                POCT GLUCOSE (AUTOMATED) 2020 04:33:00 Mookie Lisa  [facility]

 

                POCT GLUCOSE (AUTOMATED) 2020 03:53:00 Rylie Armstrong [facility]

 

                POCT GLUCOSE (AUTOMATED) 2020 03:02:00 Rylie Armstrong [facility]

 

                XR CHEST 1 VW   2020 02:44:56 Nathaniel [facility]

 

                PHOSPHORUS      2020 02:06:00 Nathaniel [facility]

 

                MAGNESIUM       2020 02:06:00 Nathaniel [facility]

 

                THYROID STIMULATING 2020 02:06:00 Jaguar [facility]



                HORMONE                                         [facility]

 

                COMP. METABOLIC PANEL 2020 02:06:00 Nathaniel Rylie [facility]

[facility] [place]



                ([id number])                                         [facility] [place]

 

                CBC WITH DIFFERENTIAL 2020 02:06:00 Rylie Armstrong [facility]

sity [facility]

 

                GLYCOSYLATED HEMOGLOBIN 2020 02:06:00 Rylie Armstrong [facility]

[facility] [place]



                (A1C)                                           [facility]

 

                ACUTE CARE VENOUS BLOOD 2020 02:05:00 Rylie Armstrong [facility]

[facility] [place]



                GAS                                             [facility]

 

                URINALYSIS      2020 01:58:00 Rylie Armstrong [place] [facility]

f [facility]

 

                Computed tomography of 2018 00:00:00 ROSE KITCHEN [facility]



                chest without contrast                                 Patients 

Medical



                                                                Center







Plan of Care







             Planned Activity Planned Date Details      Comments     Source

 

             Future Scheduled 2020   INFLUENZA VACCINE (#1)              C

[facility]



             Test         00:00:00     [code = INFLUENZA              Medical Ce

nter



                                       VACCINE (#1)]              

 

             Future Scheduled 2020   DEPRESSION SCREENING              [facility]



             Test         00:00:00     (12+) [code =              Medical Center



                                       DEPRESSION SCREENING              



                                       (12+)]                    

 

             Future Scheduled 2019   HEPATITIS B VACCINE (2              C

[facility]



             Test         00:00:00     of 2 - CpG risk 2-dose              Medic

al Center



                                       series) [code =              



                                       HEPATITIS B VACCINE (2              



                                       of 2 - CpG risk 2-dose              



                                       series)]                  

 

             Future Scheduled 2008   MEDICARE ANNUAL              [facility]

[facility] -



             Test         00:00:00     WELLNESS (YEAR 2 or              Medical 

Center



                                       FIRST YEAR if no IPPE)              



                                       [code = MEDICARE              



                                       ANNUAL WELLNESS (YEAR              



                                       2 or FIRST YEAR if no              



                                       IPPE)]                    

 

             Future Scheduled 2006-10-22   Lipid panel               [facility]

s -



             Test         00:00:00     (procedure) [code =              Medical 

Center



                                       85721356]                 

 

             Future Scheduled 1982-10-22   Screening for              [facility]

es -



             Test         00:00:00     malignant neoplasm of              Medica

l Center



                                       cervix (procedure)              



                                       [code = 546009984]              

 

             Future Scheduled 1967-10-22   PNEUMOCOCCAL VACCINE              [facility]



             Test         00:00:00     0-64 YRS (1 of 1 -              Medical C

enter



                                       PPSV23) [code =              



                                       PNEUMOCOCCAL VACCINE              



                                       0-64 YRS (1 of 1 -              



                                       PPSV23)]                  

 

             Future Scheduled 1961   Screening for              [facility]

es -



             Test         00:00:00     malignant neoplasm of              Medica

l Center



                                       breast (procedure)              



                                       [code = 718716294]              

 

             Future Scheduled 1961   Screening for              [facility]

es -



             Test         00:00:00     malignant neoplasm of              Medica

l Center



                                       colon (procedure)              



                                       [code = 634330605]              

 

             Future Scheduled              INFLUENZA VACCINE              [facility]

[facility] Hospital



             Test                      [code = INFLUENZA              



                                       VACCINE]                  

 

             Future Scheduled              DIABETES: RETINAL EYE              [facility]

[facility]



             Test                      EXAM [code = DIABETES:              



                                       RETINAL EYE EXAM]              

 

             Future Scheduled              DIABETIC FOOT EXAM              [facility]



             Test                      [code = DIABETIC FOOT              



                                       EXAM]                     

 

             Future Scheduled              COVID-19 VACCINE (1)              [facility]

[facility]



             Test                      [code = COVID-19              



                                       VACCINE (1)]              

 

             Future Scheduled              [facility]



             Test                      malignant neoplasm of              



                                       cervix (procedure)              



                                       [code = 071013430]              

 

             Future Scheduled              BREAST CANCER              [facility]



             Test                      SCREENING [code =              



                                       BREAST CANCER              



                                       SCREENING]                

 

             Future Scheduled              COLONOSCOPY SCREENING              [facility]

[facility]



             Test                      [code = COLONOSCOPY              



                                       SCREENING]                

 

             Future Scheduled              SHINGLES VACCINES (#1)              [facility]

[facility]



             Test                      [code = SHINGLES              



                                       VACCINES (#1)]              







Encounters







        Start   End     Encounter Admission Attending Care    Care    Encounter 

Source



        Date/Time Date/Time Type    Type    Clinicians Facility Department ID   

   

 

        2021         Outpatient         SYSTEM, MDA     MDA     [id number]

 MD



        14:17:57                         PROVIDER                         Guillermo mercado

 

        2021         Emergency                 [facility]    [id number] 

Univers



        00:19:12                                                         ity of



                                                                        [facility]

 

        2021-10-31         Emergency                 [facility]    [id number] 

Univers



        01:31:24                                                         ity [facility]

 

        2021-10-29         Emergency                 [facility]    [id number] 

Univers



        09:09:35                                                         ity [facility]

 

        2021-10-29         Emergency                 [facility]    [id number] 

Univers



        07:32:28                                                         ity [facility]

 

        2021 Emergency X       [facility]    ERT     [id number]

[id number] Univers



        17:37:00 22:30:00                 RICCI                          ity [facility]

 

        2021 Emergency         Pk Nash [facility]    [id number]

[id number] 

Univers



        17:37:00 22:30:00                 Ricci Abarca [place] [id number] 

        ity [facility] [id number]         [facility]  [id number]         Medi

carlos



                                                        084             Branch

 

        2021-10-27 2021-10-27 Outpatient CARLTON LANDIS     [id number]

[id number] MD



        15:16:02 15:16:02                 TARSHA mercado

 

        2021-10-22 2021-10-24 Inpatient UR      [facility] ED [facility]     Hosp Med [id number]

[id number] MD



        11:48:00 11:13:00                                                 Guillermorachel mercado

 

        2021-10-23 2021-10-23 Inpatient EL              MDA     MDA     [id number]

57 MD



        16:30:38 16:44:50                                                 Guillermorachel mercado

 

        2021-10-23 2021-10-23 Inpatient EL              MDA     MDA     [id number]

45 MD



        13:15:32 13:44:54                                                 Guillermorachel mercado

 

        2021-10-22 2021-10-22 Outpatient CARLTON LANDIS     MDA     [id number]

153 MD



        10:00:00 11:47:00                 TARSHA mercado

 

        2021 Transition         Pilar Grayson  1.2.840.114 [id number]

[id number] Univers



        00:00:00 00:00:00 of Pinky Hutchins   350.1.13.10         it

y of



                                                [place]   4.2.7.2.686         [place]

[place]



                                                        [phone]         Medi

carlos



                                                        403             Branch

 

        2021 [facility]         Alexandria Sahu [facility]    1.2.84

0.114 [id number] 

[facility]



        14:38:00 19:07:00 Encounter         Julio Valdez 350.1.13.10  

       ity of



                                        Abisai Avery 4.2.7.2.686      

   [facility]  [phone]         Medi

carlos



                                                        081             Branch

 

        2021 Outpatient ANGELICA LEAVITT MDA     MDA     [id number]

268 MD



        09:00:00 23:59:00                 JASEN mercado

 

        2021 Outpatient CARLTON JOHNSTON     MDA     [id number]

269 MD



        07:26:48 08:59:00                 JASEN mercado

 

        2021 Outpatient ER      BAO,  MDA     Emergency [id number]

12313 MD



        08:34:00 14:46:00                 LEILANI mercado

 

        2021 Outpatient ANGELICA DIXON   MDA     MDA     [id number]

692 MD



        15:22:07 15:22:07                 DMITRI mercado

 

        2021 Outpatient EL      MARIA ELENA,  MDA     MDA     [id number]

226 MD



        14:09:22 14:31:49                 TARSHA mercado

 

        2021 Inpatient ER      CARRENO-NANCY MDA     [id number]

[id number] MD



        08:31:00 17:06:00                 SAMUEL MERCADO



                                                                        n

 

        2021 Inpatient EL      CARRENO-NANCY MDA     MDA     [id number]

[id number] MD



        15:37:00 17:05:23                 SAMUEL MERCADO



                                                                        n

 

        2021 Inpatient EL      KARTIK, MDA     MDA     [id number]

5314 MD



        14:19:17 14:22:52                 ORLIN mercado

 

        2021 Outpatient ANGELICA ARREDONDO,  MDA     MDA     [id number]

316 MD



        12:50:42 23:59:00                 TARSHA mercado

 

        2021 Outpatient ANGELICA LEAVITT, MDA     MDA     [id number]

235 MD



        13:43:09 13:43:09                 JASEN mercado

 

        2021 Outpatient ANGELICA ELAVITT, MDA     MDA     [id number]

285 MD



        07:02:33 12:49:00                 JASEN mercado

 

        2021 Outpatient EL      ERIN III, MDA     MDA     1079

[id number] MD



        08:30:00 23:59:00                 JAGUAR mercado

 

        2021 Outpatient EL      KHAN III, MDA     MDA     1079

[id number] MD



        11:13:43 12:18:50                 JAGUAR mercado

 

        2021 Outpatient ER      [facility],  MDA     Emergency [id number]

[id number] MD



        06:37:00 12:15:00                 LEILANI mercado

 

        2021 Emergency         [facility]    1.2.652.133 [id number]

8891 [facility]



        18:32:00 20:31:00                 Audelia PLUMMER [place] 350.1.13.10         i

ty of



                                                Alice 4.2.7.2.686         [facility]  [phone]         Medi

carlos



                                                        084             Branch

 

        2021 Outpatient ANGELICA ARREDONDO,  MDA     MDA     [id number]

214 MD



        13:49:49 15:58:39                 TARSHA mercado

 

        2021 Outpatient ANGELICA LEAVITT, MDA     MDA     [id number]

264 MD



        11:38:38 23:59:00                 JASEN mercado

 

        2021 Outpatient ANGELICA LEAVITT, MDA     MDA     [id number]

257 MD



        12:44:44 12:44:44                 JASEN mercado

 

        2020-12-10 2020-12-10 Outpatient ANGELICA ARREDONDO,  MDA     MDA     [id number]

361 MD



        13:55:28 15:15:41                 TARSHA mercado

 

        2020 Outpatient ANGELICA ARREDONDO,  MDA     MDA     [id number]

015 MD



        00:00:00 00:00:00                 TARSHA mercado

 

        2020 Outpatient ANGELICA ARREDONDO,  MDA     MDA     [id number]

348 MD



        10:51:07 10:51:07                 TARSHA mercado

 

        2020 Outpatient ANGELICA LEAVITT, MDA     MDA     [id number]

397 MD



        06:45:00 23:59:00                 JASEN mercado

 

        2020 Outpatient ANGELICA LEAVITT, MDA     MDA     [id number]

367 MD



        09:59:46 09:59:46                 JASEN mercado

 

        2020 Emergency         [facility] hospital    1.2.488.502 [id number]

1755 [facility]



        22:06:56 03:30:00                 Waarnoldorobin PLUMMER [place] 350.1.13.10         

ity [facility] 4.2.7.2.[address] [place] [address]  [phone]         Medi

carlos



                                                        084             Branch

 

        2020 Emergency         Critical access hospital    1.2.972.878 9441

1755 



        22:06:56 03:30:00                 Ricci PLUMMER [place] 350.1.13.10         



                                                [place] 4.2.7.2.[address] [place] [address]  [phone]         



                                                        084             

 

        2020 Telephone         MICHELL Barone    1.2.840.114 770

14249 [facility]



        00:00:00 00:00:00                 Lauren ELMORE   350.1.13.10       

  ity of



                                                [facility] 4.2.7.2.686         [place]

as



                                                        [phone]         [address]

 

        2020 Telephone         [facility]    1.2.298.253 4542

1361 [facility]



        00:00:00 00:00:00                 [facility]  350.1.13.10         i

ty of



                                                [place] 4.2.7.2.686         [place]

as



                                                Professio [phone]         [facility]

[facility]     [address]             [place]



                                                Office                  



                                                Building                 



                                                One                     

 

        2020 Telephone         Abisai MICHELL    1.2.840.114 770

[id number] 



        00:00:00 00:00:00                 Lauren ELMORE   350.1.13.10       

  



                                                [facility] 4.2.7.2.686         



                                                        [phone]         



                                                        [facility]             

 

        2020 Telephone         [facility]    1.2.691.494 6813

1361 



        00:00:00 00:00:00                 [facility]  350.1.13.10         



                                                [place] 4.2.7.2.686         



                                                Professio [phone]         



                                                nal     [facility]             



                                                Office                  



                                                Building                 



                                                One                     

 

        2020 Outpatient R       ARIANNA  [facility]    [id number]

691 Univers



        16:20:00 16:20:00                 CAROLEE                         ity of



                                                                        [facility]

 

        2020 Laboratory         Lab, [facility] Fam Pob I [facility]    1.2.

840.114 [id number] 

Univers



        12:32:06 12:52:06 Only            Car[facility]  350.1.13.10    

     ity of



                                                [place] 4.2.7.2.686         [place]

as



                                                Professio [phone]         Me

dical



                                                [facility]     044             [place]



                                                Office                  



                                                Building                 



                                                One                     

 

        2020 Laboratory         Lab, [facility]    1.2.840.114 [id number]

[id number] 



        12:32:06 12:52:06 Only            Fam Pob I Health  350.1.13.10         



                                                [place] 4.2.7.2.686         



                                                Professio [phone]         



                                                nal     [facility]             



                                                Office                  



                                                Building                 



                                                One                     

 

        2020 Outpatient R               [facility]    [id number]

-20 Univers



        12:00:00 12:00:00                                         [id number]  ity of



                                                                        [facility]

 

        2020 Outpatient R               [facility]    [id number]

300 Univers



        12:00:00 12:00:00                                                 ity of



                                                                        [facility]

 

        2020 Emergency         [facility]    1.2.893.481 4241

6606 [facility]



        14:49:44 18:01:00                 Karli Rich 350.1.13.10         

ity of



                                                [place] 4.2.7.2.686         [facility]  [phone]         Medi

carlos



                                                        [address] [place] [address]

 

        2020 Emergency         [facility], [facility]    1.2.986.624 6566

6606 



        14:49:44 18:01:00                 Karli Rich 350.1.13.10         



                                                [place] 4.2.7.2.686         



                                                [place]  [phone]         



                                                        [facility]             

 

        2020 Orders          Doctor GALARZA    1.2.840.114 [id number]

03 Univers



        00:00:00 00:00:00 Only            Unassigned, CATARINA   350.1.13.10       

  ity of



                                        [facility] 4.2.7.2.686         [place]

as



                                                        [phone]         Medi

carlos



                                                        [address]             [place]

 

        2020 Orders          Doctor GALARZA    1.2.840.114 [id number]

03 



        00:00:00 00:00:00 Only            Unassigned, CATARINA   350.1.13.10       

  



                                        [facility] 4.2.7.2.686         



                                                        [phone]         



                                                        009             

 

        2020 Outpatient ANGELICA ARREDONDO,  MDA     MDA     [id number]

841 MD



        00:00:00 00:00:00                 TARSHA mercado

 

        2020 Outpatient ANGELICA SARABIA, MDA     MDA     [id number]

88934 MD



        00:00:00 00:00:00                 NORRIS mercado

 

        2020 Outpatient ANGELICA LEAVITT, MDA     MDA     [id number]

635 MD



        00:00:00 00:00:00                 JASEN mercado

 

        2020 Emergency                 MDA     MDA     [id number]

93 MD



        08:14:08 08:14:08                                                 Guillermo mercado

 

        2020 Emergency X       MAHAEMD BAIN [facility]    ERT     1027

[id number] Univers



        19:10:30 01:03:00                                                 itamy [facility]

 

        2020 Emergency         Mahamed Bain [facility]    1.2.840.114 

[id number] Univers



        19:10:30 01:03:00                 T       [place] 350.1.13.10         i

ty of



                                                [place] 4.2.7.2.686         [facility]  [phone]         [address]

 

        2020 Emergency         Mahamed Bain [facility]    1.2.840.114 

[id number] 



        19:10:30 01:03:00                 T       [place] 350.1.13.10         



                                                [place] 4.2.7.2.686         



                                                [place]  [phone]         



                                                        084             

 

        2020 Emergency ER      WECHSLER, [facility]     Emergency 1064

[id number] MD



        07:39:47 10:44:00                 ADRIAN mercado

 

        2020 [facility]       [facility]    ERT     [id number]

08 Univers



        12:20:39 14:51:00                 PK mane [facility]

 

        2020 Emergency         [facility]    1.2.586.785 0604

0034 [facility]



        12:20:39 14:51:00                 Pk Hilario 350.1.13.10         i

ty of



                                                [place] 4.2.7.2.686         [facility]  [phone]         [address]

 

        2020 Emergency         [facility]    1.2.353.111 9557

0034 



        12:20:39 14:51:00                 Pk [place] 350.1.13.10         



                                                [place] 4.2.7.2.686         



                                                [place]  [phone]         



                                                        [facility]             

 

        2020 Transition         Pilar Grayson  1.2.840.114 755

[id number] Univers



        00:00:00 00:00:00 of Pinky Hutchins   350.1.13.10         it

y of



                                                Carlos Eduardo   4.2.7.2.686         [place]

[place]



                                                        [phone]         Medi

carlos



                                                        403             Branch

 

        2020 Transition         Pilar Grayson  1.2.840.114 755

[id number] 



        00:00:00 00:00:00 of Care         Mayra Guzmány   350.1.13.10         



                                                [place]   4.2.7.2.686         



                                                        [phone]         



                                                        403             

 

        2020 [facility]     1026

[id number] Univers



        17:51:10 13:56:00                                                 ity of



                                                                        [facility]

 

        [facility]  1.2.840.1

14 [id number] 

Univers



        17:51:10 13:56:00 Encounter         Alberto Rivera   350.1.13.10 

        ity of



                                        [facility] 4.2.7.2.686    

     [place]



                                        Julio Shah         [phone]      

   Medical



                                        Sammy De La Cruzh Catarina         [address]           

  [place]

 

        2020 [facility]    1.2.840.114 [id number]

[id number] Univers



        13:40:00 23:59:00 Encounter         SIRI Rich 350.1.13.10        

 ity [facility] 4.2.7.2.686         [facility]  [phone]         Medi

carlos



                                                        807             [place]

 

        2020 Outpatient         [facility]    572

196P-20 Univers



        14:30:00 14:30:00                                         [id number]  ity of



                                                                        [facility]

 

        2020 Technician         Trent Ospina Lab Main [facility]    1.2.8

40.114 [id number] 

Univers



        13:48:12 14:03:12 Visit           [facility] Hilario 350.1.13.10    

     ity [facility] 4.2.7.2.686         Shanel Sherman [phone]         Me

dical



                                                [facility]     353             [facility]                 

 

        2020 Outpatient [facility]    102

[id number] Univers



        13:39:15 13:39:00                                                 ity of



                                                                        [facility]

 

        [facility]    1.2.840.114 [id number]

[id number] Univers



        13:39:00 13:39:00 Encounter         SIRI Rich 350.1.13.10        

 ity of



                                                [place] 4.2.7.2.686         [facility]  [phone]         Medi

carlos



                                                        806             Branch

 

        2020 Outpatient                 [facility] [place] [address]

[address] [facility]



        13:42:00 13:42:00                         t Bone  B[facility]         [facility]

 [facility]



                                                [facility]           Memori

a



                                                [facility]         ent



                                                                        [facility]

 

        2020 Transition         Pilar Lynch  1.2.840.114 737

[id number] Univers



        00:00:00 00:00:00 of Pinky Hutchins   350.1.13.10         it

y of



                                                [place]   4.2.7.2.686         [place]

[place]



                                                        [phone]         Medi

carlos



                                                        403             Branch

 

        2020 Outpatient                 [facility] [place] 28

[address] [facility]



        08:30:00 08:30:00                         t Bone  Bone and         [facility]

 -



                                                [facility]           Memori

a



                                                [facility]         ent



                                                                        [facility]

 

        2020 Inpatient X       JAGUAR [facility]    EMI     [id number]

10 Univers



        19:42:06 16:46:00                 SHEBEY                          ity [facility]

 

        2020 [facility]         [facility]    1.2.840.1

14 [id number] 

Univers



        19:42:06 16:46:00 Encounter         Mookie Lisa 350.1.13.10 

        ity [facility] 4.2.7.2.686         [facility]  [phone]         Medi

carlos



                                                        080             Branch

 

        2020-01-10 2020-01-10 Outpatient R       RADIOLOGY [facility]    [id number]

32136 Univers



        12:15:11 23:59:00                                                 ity of



                                                                        [facility]

 

        2018 Outpatient EL WECHSLER, MDA MDA     [id number]

80837 MD



        00:00:00 00:00:00                 ADRIAN mercado

 

        2018 Discharged ER      ROSE KITCHEN [facility]  A00

[id number] [facility]



        01:06:00 13:30:00 Inpatient                                 07      Luke

s -



                        (obs)                                           Patient



                                                                        s



                                                                        [facility]

 

        2017-11-10 2017 Departed                 [facility]  [id number]

[address] [facility]



        19:37:00 01:16:00 Emergency                                 41      Luke

s -



                        Room                                            Patient



                                                                        s



                                                                        [facility]

 

        2017 Departed                 [facility]  [id number]

[address] [facility]



        17:55:00 02:48:00 Emergency                                 74      Luke

s -



                        Room                                            Patient



                                                                        [facility]

 

        2017-10-06 2017-10-06 Registered ER      MARTIR, [facility]  A00

[id number] [facility]



        01:09:00 01:09:00 Emergency         SMITHA                 56      ke

s -



                        Room                                            Patient



                                                                        [facility]

 

        2017 Departed                 [facility]  [id number]

[address] [facility]



        12:49:00 19:28:00 Emergency                                 03      [place]

s -



                        Room                                            Patient



                                                                        [facility]







Results







           Test Description Test Time  Test Comments Results    Result Comments 

Source









                    TROPONIN I          2021 03:15:29 









                      Test Item  Value      Reference Range Interpretation Comme

nts









             TROPONIN I (test code = 0.010 ng/mL  See_Comment                [Au

tomated message] The



             [id number])                                         system which ge

nerated



                                                                 this result tra

nsmitted



                                                                 reference range

: <=0.034.



                                                                 The reference r

olman was



                                                                 not used to int

erpret



                                                                 this result as



                                                                 normal/abnormal

.

 

             RICK (test code = RICK) Reference (Normal)                           



                          Range (defined by the                           



                          99th percentile                           



                          reference limit): <=                           



                          0.034 ng/mL Note:                           



                          Cardiac troponin begins                           



                          to rise 3-4 hours after                           



                          the onset of ischemia.                           



                          Repeat in 4-6 hours if                           



                          the sample was drawn                           



                          within 3-4 hours of the                           



                          onset of the symptom                           



                          and found normal.                           



                          Diagnosis of myocardial                           



                          injury is made with                           



                          acute changes in cTn                           



                          concentrations with at                           



                          least one serial sample                           



                          above the 99th                           



                          percentile upper                           



                          reference limit (URL),                           



                          taken together with the                           



                          patient's clinical                           



                          presentation.  Biotin                           



                          has been reported to                           



                          cause a negative bias,                           



                          interpret results                           



                          relative to patient's                           



                          use of biotin.                           

 

             Lab Interpretation Normal                                 



             (test code = 33488-4)                                        



[facility] WITH [id number] 00:54:58





             Test Item    Value        Reference Range Interpretation Comments

 

             WBC (test code =              See_Comment  L             [Automated



             6690-2)                                             message] The



                                                                 system which



                                                                 generated this



                                                                 result



                                                                 transmitted



                                                                 reference range

:



                                                                 4.30 - 11.10



                                                                 10*3/?L. The



                                                                 reference range



                                                                 was not used to



                                                                 interpret this



                                                                 result as



                                                                 normal/abnormal

.

 

             RBC (test code =              See_Comment  L             [Automated



             789-8)                                              message] The



                                                                 system which



                                                                 generated this



                                                                 result



                                                                 transmitted



                                                                 reference range

:



                                                                 3.93 - 5.25



                                                                 10*6/?L. The



                                                                 reference range



                                                                 was not used to



                                                                 interpret this



                                                                 result as



                                                                 normal/abnormal

.

 

             HGB (test code = 9.0 g/dL     11.6-15.0    L            



             718-7)                                              

 

             HCT (test code = 28.6 %       35.7-45.2    L            



             4544-3)                                             

 

             MCV (test code = 95.3 fL      80.6-95.5                 



             787-2)                                              

 

             MCH (test code = 30.0 pg      25.9-32.8                 



             785-6)                                              

 

             MCHC (test code = 31.5 g/dL    31.6-35.1    L            



             786-4)                                              

 

             RDW-SD (test code = 56.8 fL      39.0-49.9    H            



             10406-1)                                            

 

             RDW-CV (test code = 16.2 %       12.0-15.5    H            



             788-0)                                              

 

             PLT (test code =              See_Comment  LL            [Automated



             777-3)                                              message] The



                                                                 system which



                                                                 generated this



                                                                 result



                                                                 transmitted



                                                                 reference range

:



                                                                 166 - 358



                                                                 10*3/?L. The



                                                                 reference range



                                                                 was not used to



                                                                 interpret this



                                                                 result as



                                                                 normal/abnormal

.

 

             MPV (test code = 9.4 fL       9.5-12.9     L            



             23989-2)                                            

 

             NRBC/100 WBC (test              See_Comment                [Automat

ed



             code = 0815915360)                                        message] 

The



                                                                 system which



                                                                 generated this



                                                                 result



                                                                 transmitted



                                                                 reference range

:



                                                                 0.0 - 10.0 /100



                                                                 WBCs. The



                                                                 reference range



                                                                 was not used to



                                                                 interpret this



                                                                 result as



                                                                 normal/abnormal

.

 

             NRBC x10^3 (test code <0.01        See_Comment                [Auto

mated



             = 3654715076)                                        message] The



                                                                 system which



                                                                 generated this



                                                                 result



                                                                 transmitted



                                                                 reference range

:



                                                                 10*3/?L. The



                                                                 reference range



                                                                 was not used to



                                                                 interpret this



                                                                 result as



                                                                 normal/abnormal

.

 

             GRAN MAT (NEUT) % 64.5 %                                 



             (test code = 770-8)                                        

 

             IMM GRAN % (test code 0.90 %                                 



             = 9822519094)                                        

 

             LYMPH % (test code = 27.4 %                                 



             736-9)                                              

 

             MONO % (test code = 6.0 %                                  



             5905-5)                                             

 

             EOS % (test code = 0.9 %                                  



             713-8)                                              

 

             BASO % (test code = 0.3 %                                  



             706-2)                                              

 

             GRAN MAT x10^3(ANC) 2.04 10*3/uL 1.88-7.09                 



             (test code =                                        



             9984707979)                                         

 

             IMM GRAN x10^3 (test 0.03 10*3/uL 0.00-0.06                 



             code = 1541664054)                                        

 

             LYMPH x10^3 (test 0.87 10*3/uL 1.32-3.29    L            



             code = 731-0)                                        

 

             MONO x10^3 (test code 0.19 10*3/uL 0.33-0.92    L            



             = 742-7)                                            

 

             EOS x10^3 (test code 0.03 10*3/uL 0.03-0.39                 



             = 711-2)                                            

 

             BASO x10^3 (test code <0.03        0.01-0.07                 



             = 704-7)                                            

 

             PLT ESTIMATE (test Critically   Normal       AA           



             code = 9317-9) Decreased                              

 

             Lab Interpretation Abnormal                               



             (test code = 46690-2)                                        



[facility] -51-80 00:50:13





             Test Item    Value        Reference    Interpretation Comments



                                       Range                     

 

             TROPONIN I (test 0.008 ng/mL  See_Comment                [Automated



             code = 6613280707)                                        message] 

The



                                                                 system which



                                                                 generated this



                                                                 result



                                                                 transmitted



                                                                 reference range

:



                                                                 <=0.034. The



                                                                 reference range



                                                                 was not used to



                                                                 interpret this



                                                                 result as



                                                                 normal/abnormal

.

 

             RICK (test code = Reference (Normal)                           



             RICK)         Range (defined by                           



                          the 99th percentile                           



                          reference limit): <=                           



                          0.034 ng/mL Note:                           



                          Cardiac troponin                           



                          begins to rise 3-4                           



                          hours after the                           



                          onset of ischemia.                           



                          Repeat in 4-6 hours                           



                          if the sample was                           



                          drawn within 3-4                           



                          hours of the onset                           



                          of the symptom and                           



                          found normal.                           



                          Diagnosis of                           



                          myocardial injury is                           



                          made with acute                           



                          changes in cTn                           



                          concentrations with                           



                          at least one serial                           



                          sample above the                           



                          99th percentile                           



                          upper reference                           



                          limit (URL), taken                           



                          together with the                           



                          patient's clinical                           



                          presentation.                           



                          Biotin has been                           



                          reported to cause a                           



                          negative bias,                           



                          interpret results                           



                          relative to                            



                          patient's use of                           



                          biotin.                                

 

             Lab Interpretation Normal                                 



             (test code =                                        



             72150-1)                                            



[id number]05





             Test Item    Value        Reference Range Interpretation Comments

 

             NT-proBNP (test code 1770 pg/mL   See_Comment  H             [Autom

ated



             = 0029070635)                                        message] The



                                                                 system which



                                                                 generated this



                                                                 result



                                                                 transmitted



                                                                 reference range

:



                                                                 <=125. The



                                                                 reference range



                                                                 was not used to



                                                                 interpret this



                                                                 result as



                                                                 normal/abnormal

.

 

             RICK (test code = RICK) Biotin has been                           



                          reported to                            



                          cause a negative                           



                          bias, interpret                           



                          results relative                           



                          to patient's use                           



                          of biotin.                             

 

             Lab Interpretation Abnormal                               



             (test code = 05560-3)                                        



[facility]





             Test Item    Value        Reference Range Interpretation Comments

 

             MAGNESIUM (test code = 1426161780) 1.5 mg/dL    1.7-2.4      L     

       

 

             Lab Interpretation (test code = Abnormal                           

    



             58488-8)                                            



[facility]. METABOLIC PANEL (70859)2021 
00:38:04





             Test Item    Value        Reference Range Interpretation Comments

 

             NA (test code = 135 mmol/L   135-145                   



             4982439559)                                         

 

             K (test code = 4.8 mmol/L   3.5-5.0                   



             5355380070)                                         

 

             CL (test code = 101 mmol/L                       



             9026713372)                                         

 

             CO2 TOTAL (test code = 32 mmol/L    23-31        H            



             3539156839)                                         

 

             AGAP (test code =              2-16                      



             4608263854)                                         

 

             BUN (test code = 19 mg/dL     7-23                      



             6895269728)                                         

 

             GLUCOSE (test code = 286 mg/dL           H            



             5314406763)                                         

 

             CREATININE (test code = 0.96 mg/dL   0.50-1.04                 



             0433302289)                                         

 

             TOTAL BILI (test code = 0.8 mg/dL    0.1-1.1                   



             0400464692)                                         

 

             CALCIUM (test code = 8.4 mg/dL    8.6-10.6     L            



             3065719041)                                         

 

             T PROTEIN (test code = 6.6 g/dL     6.3-8.2                   



             1742170503)                                         

 

             ALBUMIN (test code = 2.9 g/dL     3.5-5.0      L            



             4888576847)                                         

 

             ALK PHOS (test code = 85 U/L                           



             4906736170)                                         

 

             ALTv (test code = 14 U/L       5-35                      



             1742-6)                                             

 

             AST(SGOT) (test code = 28 U/L       13-40                     



             9421769665)                                         

 

             eGFR (test code =              mL/min/1.73m2              



             6261021949)                                         

 

             RICK (test code = RICK) Association of                           



                          Glomerular Filtration                           



                          Rate (GFR) and Staging                           



                          of Kidney Disease*                           



                          +---------------------                           



                          --+-------------------                           



                          --+-------------------                           



                          ------+| GFR                           



                          (mL/min/1.73 m2) ?|                           



                          With Kidney Damage ?|                           



                          ?Without Kidney                           



                          Damage+---------------                           



                          --------+-------------                           



                          --------+-------------                           



                          ------------+| ?>90 ?                           



                          ? ? ? ? ? ? ? ?|                           



                          ?Stage one ? ? ? ? ?|                           



                          ? Normal ? ? ? ? ? ? ?                           



                          ?+--------------------                           



                          ---+------------------                           



                          ---+------------------                           



                          -------+| ?60-89 ? ? ?                           



                          ? ? ? ? ?| ?Stage two                           



                          ? ? ? ? ?| ? Decreased                           



                          GFR ? ? ? ?                            



                          +---------------------                           



                          --+-------------------                           



                          --+-------------------                           



                          ------+| ?30-59 ? ? ?                           



                          ? ? ? ? ?| ?Stage                           



                          three ? ? ? ?| ? Stage                           



                          three ? ? ? ? ?                           



                          +---------------------                           



                          --+-------------------                           



                          --+-------------------                           



                          ------+| ?15-29 ? ? ?                           



                          ? ? ? ? ?| ?Stage four                           



                          ? ? ? ? | ? Stage four                           



                          ? ? ? ? ?                              



                          ?+--------------------                           



                          ---+------------------                           



                          ---+------------------                           



                          -------+| ?<15 (or                           



                          dialysis) ? ?| ?Stage                           



                          five ? ? ? ? | ? Stage                           



                          five ? ? ? ? ?                           



                          ?+--------------------                           



                          ---+------------------                           



                          ---+------------------                           



                          -------+ *Each stage                           



                          assumes the associated                           



                          GFR level has been in                           



                          effect for at least                           



                          three months. ?Stages                           



                          1 to 5, with or                           



                          without kidney                           



                          disease, indicate                           



                          chronic kidney                           



                          disease. Notes:                           



                          Determination of                           



                          stages one and two                           



                          (with eGFR                             



                          >59mL/min/1.73 m2)                           



                          requires estimation of                           



                          kidney damage for at                           



                          least three months as                           



                          defined by structural                           



                          or functional                           



                          abnormalities of the                           



                          kidney, manifested by                           



                          either:Pathological                           



                          abnormalities or                           



                          Markers of kidney                           



                          damage (including                           



                          abnormalities in the                           



                          composition of the                           



                          blood or urine or                           



                          abnormalities in                           



                          imaging tests).                           

 

             Lab Interpretation Abnormal                               



             (test code = 84376-9)                                        



[facility]





             Test Item    Value        Reference Range Interpretation Comments

 

             POCT GLU (test code = 2678594030) 191 mg/dL           H      

      

 

             Lab Interpretation (test code = Abnormal                           

    



             38178-6)                                            



[facility]NA, K, CL, CO2, 
GLUCOSE, BUN, CREATININE, CA)2021 10:39:29





             Test Item    Value        Reference Range Interpretation Comments

 

             NA (test code = 135 mmol/L   135-145                   



             9676339240)                                         

 

             K (test code = 3.7 mmol/L   3.5-5.0                   



             0455478558)                                         

 

             CL (test code = 95 mmol/L           L            



             1067989651)                                         

 

             CO2 TOTAL (test code = 34 mmol/L    23-31        H            



             3512778236)                                         

 

             AGAP (test code =              2-16                      



             2690573214)                                         

 

             BUN (test code = 35 mg/dL     7-23         H            



             6064807261)                                         

 

             GLUCOSE (test code = 260 mg/dL           H            



             6441529349)                                         

 

             CREATININE (test code = 1.22 mg/dL   0.50-1.04    H            



             0047488038)                                         

 

             CALCIUM (test code = 8.4 mg/dL    8.6-10.6     L            



             4749275779)                                         

 

             eGFR (test code =              mL/min/1.73m2              



             3421237903)                                         

 

             RICK (test code = RICK) Association of                           



                          Glomerular Filtration                           



                          Rate (GFR) and Staging                           



                          of Kidney Disease*                           



                          +---------------------                           



                          --+-------------------                           



                          --+-------------------                           



                          ------+| GFR                           



                          (mL/min/1.73 m2) ?|                           



                          With Kidney Damage ?|                           



                          ?Without Kidney                           



                          Damage+---------------                           



                          --------+-------------                           



                          --------+-------------                           



                          ------------+| ?>90 ?                           



                          ? ? ? ? ? ? ? ?|                           



                          ?Stage one ? ? ? ? ?|                           



                          ? Normal ? ? ? ? ? ? ?                           



                          ?+--------------------                           



                          ---+------------------                           



                          ---+------------------                           



                          -------+| ?60-89 ? ? ?                           



                          ? ? ? ? ?| ?Stage two                           



                          ? ? ? ? ?| ? Decreased                           



                          GFR ? ? ? ?                            



                          +---------------------                           



                          --+-------------------                           



                          --+-------------------                           



                          ------+| ?30-59 ? ? ?                           



                          ? ? ? ? ?| ?Stage                           



                          three ? ? ? ?| ? Stage                           



                          three ? ? ? ? ?                           



                          +---------------------                           



                          --+-------------------                           



                          --+-------------------                           



                          ------+| ?15-29 ? ? ?                           



                          ? ? ? ? ?| ?Stage four                           



                          ? ? ? ? | ? Stage four                           



                          ? ? ? ? ?                              



                          ?+--------------------                           



                          ---+------------------                           



                          ---+------------------                           



                          -------+| ?<15 (or                           



                          dialysis) ? ?| ?Stage                           



                          five ? ? ? ? | ? Stage                           



                          five ? ? ? ? ?                           



                          ?+--------------------                           



                          ---+------------------                           



                          ---+------------------                           



                          -------+ *Each stage                           



                          assumes the associated                           



                          GFR level has been in                           



                          effect for at least                           



                          three months. ?Stages                           



                          1 to 5, with or                           



                          without kidney                           



                          disease, indicate                           



                          chronic kidney                           



                          disease. Notes:                           



                          Determination of                           



                          stages one and two                           



                          (with eGFR                             



                          >59mL/min/1.73 m2)                           



                          requires estimation of                           



                          kidney damage for at                           



                          least three months as                           



                          defined by structural                           



                          or functional                           



                          abnormalities of the                           



                          kidney, manifested by                           



                          either:Pathological                           



                          abnormalities or                           



                          Markers of kidney                           



                          damage (including                           



                          abnormalities in the                           



                          composition of the                           



                          blood or urine or                           



                          abnormalities in                           



                          imaging tests).                           

 

             Lab Interpretation Abnormal                               



             (test code = 36434-3)                                        



[facility] WITH [id number] 10:35:25





             Test Item    Value        Reference Range Interpretation Comments

 

             WBC (test code =              See_Comment  L             [Automated



             6690-2)                                             message] The sy

stem



                                                                 which generated



                                                                 this result



                                                                 transmitted



                                                                 reference range

:



                                                                 4.30 - 11.10



                                                                 10*3/?L. The



                                                                 reference range

 was



                                                                 not used to



                                                                 interpret this



                                                                 result as



                                                                 normal/abnormal

.

 

             RBC (test code =              See_Comment  L             [Automated



             789-8)                                              message] The sy

stem



                                                                 which generated



                                                                 this result



                                                                 transmitted



                                                                 reference range

:



                                                                 3.93 - 5.25



                                                                 10*6/?L. The



                                                                 reference range

 was



                                                                 not used to



                                                                 interpret this



                                                                 result as



                                                                 normal/abnormal

.

 

             HGB (test code = 10.6 g/dL    11.6-15.0    L            



             718-7)                                              

 

             HCT (test code = 33.1 %       35.7-45.2    L            



             4544-3)                                             

 

             MCV (test code = 94.8 fL      80.6-95.5                 



             787-2)                                              

 

             MCH (test code = 30.4 pg      25.9-32.8                 



             785-6)                                              

 

             MCHC (test code = 32.0 g/dL    31.6-35.1                 



             786-4)                                              

 

             RDW-SD (test code = 55.9 fL      39.0-49.9    H            



             00420-0)                                            

 

             RDW-CV (test code = 16.0 %       12.0-15.5    H            



             788-0)                                              

 

             PLT (test code =              See_Comment  LL            [Automated



             777-3)                                              message] The sy

stem



                                                                 which generated



                                                                 this result



                                                                 transmitted



                                                                 reference range

:



                                                                 166 - 358 10*3/

?L.



                                                                 The reference r

olman



                                                                 was not used to



                                                                 interpret this



                                                                 result as



                                                                 normal/abnormal

.

 

             MPV (test code = 10.3 fL      9.5-12.9                  



             51762-1)                                            

 

             NRBC/100 WBC (test              See_Comment                [Automat

ed



             code = 4000904616)                                        message] 

The system



                                                                 which generated



                                                                 this result



                                                                 transmitted



                                                                 reference range

:



                                                                 0.0 - 10.0 /100



                                                                 WBCs. The refer

ence



                                                                 range was not u

sed



                                                                 to interpret th

is



                                                                 result as



                                                                 normal/abnormal

.

 

             NRBC x10^3 (test code <0.01        See_Comment                [Auto

mated



             = 0942270620)                                        message] The s

ystem



                                                                 which generated



                                                                 this result



                                                                 transmitted



                                                                 reference range

:



                                                                 10*3/?L. The



                                                                 reference range

 was



                                                                 not used to



                                                                 interpret this



                                                                 result as



                                                                 normal/abnormal

.

 

             GRAN MAT (NEUT) % 54.8 %                                 



             (test code = 770-8)                                        

 

             IMM GRAN % (test code 0.30 %                                 



             = 4677890342)                                        

 

             LYMPH % (test code = 38.1 %                                 



             736-9)                                              

 

             MONO % (test code = 5.1 %                                  



             5905-5)                                             

 

             EOS % (test code = 1.1 %                                  



             713-8)                                              

 

             BASO % (test code = 0.6 %                                  



             706-2)                                              

 

             GRAN MAT x10^3(ANC) 1.94 10*3/uL 1.88-7.09                 



             (test code =                                        



             9506969790)                                         

 

             IMM GRAN x10^3 (test <0.03        0.00-0.06                 



             code = 9809386118)                                        

 

             LYMPH x10^3 (test code 1.35 10*3/uL 1.32-3.29                 



             = 731-0)                                            

 

             MONO x10^3 (test code 0.18 10*3/uL 0.33-0.92    L            



             = 742-7)                                            

 

             EOS x10^3 (test code = 0.04 10*3/uL 0.03-0.39                 



             711-2)                                              

 

             BASO x10^3 (test code <0.03        0.01-0.07                 



             = 704-7)                                            

 

             Lab Interpretation Abnormal                               



             (test code = 81074-8)                                        



[facility] GLUCOSE (AUTOMATED)2021 21:40:17





             Test Item    Value        Reference Range Interpretation Comments

 

             POCT GLU (test code = 0720276739) 323 mg/dL           H      

      

 

             Lab Interpretation (test code = Abnormal                           

    



             67482-5)                                            



[facility] GLUCOSE (AUTOMATED)2021 17:09:48





             Test Item    Value        Reference Range Interpretation Comments

 

             POCT GLU (test code = 3378337916) 406 mg/dL           H      

      

 

             Lab Interpretation (test code = Abnormal                           

    



             24513-2)                                            



[facility] GLUCOSE (AUTOMATED)2021 12:42:19





             Test Item    Value        Reference Range Interpretation Comments

 

             POCT GLU (test code = 1197624530) 148 mg/dL           H      

      

 

             Lab Interpretation (test code = Abnormal                           

    



             77516-9)                                            



[facility] WITH [id number] 11:06:27





             Test Item    Value        Reference Range Interpretation Comments

 

             WBC (test code =              See_Comment  L             [Automated



             6690-2)                                             message] The sy

stem



                                                                 which generated



                                                                 this result



                                                                 transmitted



                                                                 reference range

:



                                                                 4.30 - 11.10



                                                                 10*3/?L. The



                                                                 reference range

 was



                                                                 not used to



                                                                 interpret this



                                                                 result as



                                                                 normal/abnormal

.

 

             RBC (test code =              See_Comment  L             [Automated



             789-8)                                              message] The sy

stem



                                                                 which generated



                                                                 this result



                                                                 transmitted



                                                                 reference range

:



                                                                 3.93 - 5.25



                                                                 10*6/?L. The



                                                                 reference range

 was



                                                                 not used to



                                                                 interpret this



                                                                 result as



                                                                 normal/abnormal

.

 

             HGB (test code = 10.1 g/dL    11.6-15.0    L            



             718-7)                                              

 

             HCT (test code = 31.6 %       35.7-45.2    L            



             4544-3)                                             

 

             MCV (test code = 94.0 fL      80.6-95.5                 



             787-2)                                              

 

             MCH (test code = 30.1 pg      25.9-32.8                 



             785-6)                                              

 

             MCHC (test code = 32.0 g/dL    31.6-35.1                 



             786-4)                                              

 

             RDW-SD (test code = 56.1 fL      39.0-49.9    H            



             60374-8)                                            

 

             RDW-CV (test code = 16.1 %       12.0-15.5    H            



             788-0)                                              

 

             PLT (test code =              See_Comment  LL            [Automated



             777-3)                                              message] The sy

stem



                                                                 which generated



                                                                 this result



                                                                 transmitted



                                                                 reference range

:



                                                                 166 - 358 10*3/

?L.



                                                                 The reference r

olman



                                                                 was not used to



                                                                 interpret this



                                                                 result as



                                                                 normal/abnormal

.

 

             MPV (test code = 10.4 fL      9.5-12.9                  



             50767-5)                                            

 

             NRBC/100 WBC (test              See_Comment                [Automat

ed



             code = 2732007334)                                        message] 

The system



                                                                 which generated



                                                                 this result



                                                                 transmitted



                                                                 reference range

:



                                                                 0.0 - 10.0 /100



                                                                 WBCs. The refer

ence



                                                                 range was not u

sed



                                                                 to interpret th

is



                                                                 result as



                                                                 normal/abnormal

.

 

             NRBC x10^3 (test code <0.01        See_Comment                [Auto

mated



             = [id number])                                        message] The s

ystem



                                                                 which generated



                                                                 this result



                                                                 transmitted



                                                                 reference range

:



                                                                 10*3/?L. The



                                                                 reference range

 was



                                                                 not used to



                                                                 interpret this



                                                                 result as



                                                                 normal/abnormal

.

 

             GRAN MAT (NEUT) % 54.4 %                                 



             (test code = 770-8)                                        

 

             IMM GRAN % (test code 0.00 %                                 



             = [id number])                                        

 

             LYMPH % (test code = 37.3 %                                 



             736-9)                                              

 

             MONO % (test code = 6.8 %                                  



             5905-5)                                             

 

             EOS % (test code = 1.2 %                                  



             713-8)                                              

 

             BASO % (test code = 0.3 %                                  



             706-2)                                              

 

             GRAN MAT x10^3(ANC) 1.75 10*3/uL 1.88-7.09    L            



             (test code =                                        



             9108932262)                                         

 

             IMM GRAN x10^3 (test <0.03        0.00-0.06                 



             code = 2050347793)                                        

 

             LYMPH x10^3 (test code 1.20 10*3/uL 1.32-3.29    L            



             = 731-0)                                            

 

             MONO x10^3 (test code 0.22 10*3/uL 0.33-0.92    L            



             = 742-7)                                            

 

             EOS x10^3 (test code = 0.04 10*3/uL 0.03-0.39                 



             711-2)                                              

 

             BASO x10^3 (test code <0.03        0.01-0.07                 



             = 704-7)                                            

 

             Lab Interpretation Abnormal                               



             (test code = 19666-9)                                        



[facility] METABOLIC PANEL (NA, K, CL, CO2, 
GLUCOSE, BUN, CREATININE, CA)2021 10:21:40





             Test Item    Value        Reference Range Interpretation Comments

 

             NA (test code = 136 mmol/L   135-145                   



             9030928282)                                         

 

             K (test code = 4.0 mmol/L   3.5-5.0                   



             3355700112)                                         

 

             CL (test code = 98 mmol/L                        



             7439858902)                                         

 

             CO2 TOTAL (test code = 36 mmol/L    23-31        H            



             1449270530)                                         

 

             AGAP (test code =              2-16                      



             5705022596)                                         

 

             BUN (test code = 34 mg/dL     7-23         H            



             5777323381)                                         

 

             GLUCOSE (test code = 193 mg/dL           H            



             2829806291)                                         

 

             CREATININE (test code = 1.21 mg/dL   0.50-1.04    H            



             8524185065)                                         

 

             CALCIUM (test code = 8.2 mg/dL    8.6-10.6     L            



             1351190989)                                         

 

             eGFR (test code =              mL/min/1.73m2              



             6353472565)                                         

 

             RICK (test code = RICK) Association of                           



                          Glomerular Filtration                           



                          Rate (GFR) and Staging                           



                          of Kidney Disease*                           



                          +---------------------                           



                          --+-------------------                           



                          --+-------------------                           



                          ------+| GFR                           



                          (mL/min/1.73 m2) ?|                           



                          With Kidney Damage ?|                           



                          ?Without Kidney                           



                          Damage+---------------                           



                          --------+-------------                           



                          --------+-------------                           



                          ------------+| ?>90 ?                           



                          ? ? ? ? ? ? ? ?|                           



                          ?Stage one ? ? ? ? ?|                           



                          ? Normal ? ? ? ? ? ? ?                           



                          ?+--------------------                           



                          ---+------------------                           



                          ---+------------------                           



                          -------+| ?60-89 ? ? ?                           



                          ? ? ? ? ?| ?Stage two                           



                          ? ? ? ? ?| ? Decreased                           



                          GFR ? ? ? ?                            



                          +---------------------                           



                          --+-------------------                           



                          --+-------------------                           



                          ------+| ?30-59 ? ? ?                           



                          ? ? ? ? ?| ?Stage                           



                          three ? ? ? ?| ? Stage                           



                          three ? ? ? ? ?                           



                          +---------------------                           



                          --+-------------------                           



                          --+-------------------                           



                          ------+| ?15-29 ? ? ?                           



                          ? ? ? ? ?| ?Stage four                           



                          ? ? ? ? | ? Stage four                           



                          ? ? ? ? ?                              



                          ?+--------------------                           



                          ---+------------------                           



                          ---+------------------                           



                          -------+| ?<15 (or                           



                          dialysis) ? ?| ?Stage                           



                          five ? ? ? ? | ? Stage                           



                          five ? ? ? ? ?                           



                          ?+--------------------                           



                          ---+------------------                           



                          ---+------------------                           



                          -------+ *Each stage                           



                          assumes the associated                           



                          GFR level has been in                           



                          effect for at least                           



                          three months. ?Stages                           



                          1 to 5, with or                           



                          without kidney                           



                          disease, indicate                           



                          chronic kidney                           



                          disease. Notes:                           



                          Determination of                           



                          stages one and two                           



                          (with eGFR                             



                          >59mL/min/1.73 m2)                           



                          requires estimation of                           



                          kidney damage for at                           



                          least three months as                           



                          defined by structural                           



                          or functional                           



                          abnormalities of the                           



                          kidney, manifested by                           



                          either:Pathological                           



                          abnormalities or                           



                          Markers of kidney                           



                          damage (including                           



                          abnormalities in the                           



                          composition of the                           



                          blood or urine or                           



                          abnormalities in                           



                          imaging tests).                           

 

             Lab Interpretation Abnormal                               



             (test code = 52416-3)                                        



[id number]39:39





             Test Item    Value        Reference Range Interpretation Comments

 

             AMMONIA (test code = 1316501803) 25 umol/L    9-33                 

     

 

             Lab Interpretation (test code = Normal                             

    



             12425-3)                                            



[facility] WITH [id number] 13:08:43





             Test Item    Value        Reference Range Interpretation Comments

 

             WBC (test code =              See_Comment  L             [Automated



             0455-2)                                             message] The



                                                                 system which



                                                                 generated this



                                                                 result



                                                                 transmitted



                                                                 reference range

:



                                                                 4.30 - 11.10



                                                                 10*3/?L. The



                                                                 reference range



                                                                 was not used to



                                                                 interpret this



                                                                 result as



                                                                 normal/abnormal

.

 

             RBC (test code =              See_Comment  L             [Automated



             356-8)                                              message] The



                                                                 system which



                                                                 generated this



                                                                 result



                                                                 transmitted



                                                                 reference range

:



                                                                 3.93 - 5.25



                                                                 10*6/?L. The



                                                                 reference range



                                                                 was not used to



                                                                 interpret this



                                                                 result as



                                                                 normal/abnormal

.

 

             HGB (test code = 9.8 g/dL     11.6-15.0    L            



             718-7)                                              

 

             HCT (test code = 30.1 %       35.7-45.2    L            



             4544-3)                                             

 

             MCV (test code = 93.8 fL      80.6-95.5                 



             787-2)                                              

 

             MCH (test code = 30.5 pg      25.9-32.8                 



             785-6)                                              

 

             MCHC (test code = 32.6 g/dL    31.6-35.1                 



             786-4)                                              

 

             RDW-SD (test code = 55.4 fL      39.0-49.9    H            



             66609-9)                                            

 

             RDW-CV (test code = 16.2 %       12.0-15.5    H            



             788-0)                                              

 

             PLT (test code =              See_Comment  LL            [Automated



             777-3)                                              message] The



                                                                 system which



                                                                 generated this



                                                                 result



                                                                 transmitted



                                                                 reference range

:



                                                                 166 - 358



                                                                 10*3/?L. The



                                                                 reference range



                                                                 was not used to



                                                                 interpret this



                                                                 result as



                                                                 normal/abnormal

.

 

             MPV (test code = 11.8 fL      9.5-12.9                  



             62930-3)                                            

 

             NRBC/100 WBC (test              See_Comment                [Automat

ed



             code = 4179216803)                                        message] 

The



                                                                 system which



                                                                 generated this



                                                                 result



                                                                 transmitted



                                                                 reference range

:



                                                                 0.0 - 10.0 /100



                                                                 WBCs. The



                                                                 reference range



                                                                 was not used to



                                                                 interpret this



                                                                 result as



                                                                 normal/abnormal

.

 

             NRBC x10^3 (test code <0.01        See_Comment                [Auto

mated



             = 5718505542)                                        message] The



                                                                 system which



                                                                 generated this



                                                                 result



                                                                 transmitted



                                                                 reference range

:



                                                                 10*3/?L. The



                                                                 reference range



                                                                 was not used to



                                                                 interpret this



                                                                 result as



                                                                 normal/abnormal

.

 

             GRAN MAT (NEUT) % 50.4 %                                 



             (test code = 770-8)                                        

 

             IMM GRAN % (test code 0.30 %                                 



             = 7707000149)                                        

 

             LYMPH % (test code = 41.8 %                                 



             736-9)                                              

 

             MONO % (test code = 6.3 %                                  



             5905-5)                                             

 

             EOS % (test code = 0.9 %                                  



             713-8)                                              

 

             BASO % (test code = 0.3 %                                  



             706-2)                                              

 

             GRAN MAT x10^3(ANC) 1.69 10*3/uL 1.88-7.09    L            



             (test code =                                        



             7654073585)                                         

 

             IMM GRAN x10^3 (test <0.03        0.00-0.06                 



             code = 4672207887)                                        

 

             LYMPH x10^3 (test 1.40 10*3/uL 1.32-3.29                 



             code = 731-0)                                        

 

             MONO x10^3 (test code 0.21 10*3/uL 0.33-0.92    L            



             = 742-7)                                            

 

             EOS x10^3 (test code 0.03 10*3/uL 0.03-0.39                 



             = 711-2)                                            

 

             BASO x10^3 (test code <0.03        0.01-0.07                 



             = 704-7)                                            

 

             PLT ESTIMATE (test Critically   Normal       AA           



             code = 9317-9) Decreased                              

 

             Lab Interpretation Abnormal                               



             (test code = 68879-1)                                        



[facility] METABOLIC PANEL (NA, K, CL, CO2, 
GLUCOSE, BUN, CREATININE, CA)2021 11:05:41





             Test Item    Value        Reference Range Interpretation Comments

 

             NA (test code = 134 mmol/L   135-145      L            



             4977248090)                                         

 

             K (test code = 4.4 mmol/L   3.5-5.0                   



             4072272286)                                         

 

             CL (test code = 95 mmol/L           L            



             7931971182)                                         

 

             CO2 TOTAL (test code = 38 mmol/L    23-31        H            



             9156869711)                                         

 

             AGAP (test code =              2-16         L            



             6471452525)                                         

 

             BUN (test code = 27 mg/dL     7-23         H            



             3931463651)                                         

 

             GLUCOSE (test code = 72 mg/dL                         



             4681453702)                                         

 

             CREATININE (test code = 0.99 mg/dL   0.50-1.04                 



             2162786134)                                         

 

             CALCIUM (test code = 8.1 mg/dL    8.6-10.6     L            



             7339403249)                                         

 

             eGFR (test code =              mL/min/1.73m2              



             2202021892)                                         

 

             RICK (test code = RICK) Association of                           



                          Glomerular Filtration                           



                          Rate (GFR) and Staging                           



                          of Kidney Disease*                           



                          +---------------------                           



                          --+-------------------                           



                          --+-------------------                           



                          ------+| GFR                           



                          (mL/min/1.73 m2) ?|                           



                          With Kidney Damage ?|                           



                          ?Without Kidney                           



                          Damage+---------------                           



                          --------+-------------                           



                          --------+-------------                           



                          ------------+| ?>90 ?                           



                          ? ? ? ? ? ? ? ?|                           



                          ?Stage one ? ? ? ? ?|                           



                          ? Normal ? ? ? ? ? ? ?                           



                          ?+--------------------                           



                          ---+------------------                           



                          ---+------------------                           



                          -------+| ?60-89 ? ? ?                           



                          ? ? ? ? ?| ?Stage two                           



                          ? ? ? ? ?| ? Decreased                           



                          GFR ? ? ? ?                            



                          +---------------------                           



                          --+-------------------                           



                          --+-------------------                           



                          ------+| ?30-59 ? ? ?                           



                          ? ? ? ? ?| ?Stage                           



                          three ? ? ? ?| ? Stage                           



                          three ? ? ? ? ?                           



                          +---------------------                           



                          --+-------------------                           



                          --+-------------------                           



                          ------+| ?15-29 ? ? ?                           



                          ? ? ? ? ?| ?Stage four                           



                          ? ? ? ? | ? Stage four                           



                          ? ? ? ? ?                              



                          ?+--------------------                           



                          ---+------------------                           



                          ---+------------------                           



                          -------+| ?<15 (or                           



                          dialysis) ? ?| ?Stage                           



                          five ? ? ? ? | ? Stage                           



                          five ? ? ? ? ?                           



                          ?+--------------------                           



                          ---+------------------                           



                          ---+------------------                           



                          -------+ *Each stage                           



                          assumes the associated                           



                          GFR level has been in                           



                          effect for at least                           



                          three months. ?Stages                           



                          1 to 5, with or                           



                          without kidney                           



                          disease, indicate                           



                          chronic kidney                           



                          disease. Notes:                           



                          Determination of                           



                          stages one and two                           



                          (with eGFR                             



                          >59mL/min/1.73 m2)                           



                          requires estimation of                           



                          kidney damage for at                           



                          least three months as                           



                          defined by structural                           



                          or functional                           



                          abnormalities of the                           



                          kidney, manifested by                           



                          either:Pathological                           



                          abnormalities or                           



                          Markers of kidney                           



                          damage (including                           



                          abnormalities in the                           



                          composition of the                           



                          blood or urine or                           



                          abnormalities in                           



                          imaging tests).                           

 

             Lab Interpretation Abnormal                               



             (test code = 64110-6)                                        



[facility]07





             Test Item    Value        Reference Range Interpretation Comments

 

             POCT GLU (test code = 3911612574) 114 mg/dL           H      

      

 

             Lab Interpretation (test code = Abnormal                           

    



             06825-6)                                            



[id number] 11:12:38





             Test Item    Value        Reference Range Interpretation Comments

 

             NT-proBNP (test code 753 pg/mL    See_Comment  H             [Autom

ated



             = [id number])                                        message] The



                                                                 system which



                                                                 generated this



                                                                 result



                                                                 transmitted



                                                                 reference range

:



                                                                 <=125. The



                                                                 reference range



                                                                 was not used to



                                                                 interpret this



                                                                 result as



                                                                 normal/abnormal

.

 

             RICK (test code = RICK) Biotin has been                           



                          reported to                            



                          cause a negative                           



                          bias, interpret                           



                          results relative                           



                          to patient's use                           



                          of biotin.                             

 

             Lab Interpretation Abnormal                               



             (test code = 63765-2)                                        



[facility]. METABOLIC PANEL ([id number])2021 
11:03:39





             Test Item    Value        Reference Range Interpretation Comments

 

             NA (test code = 138 mmol/L   135-145                   



             2855012970)                                         

 

             K (test code = 3.7 mmol/L   3.5-5.0                   



             2710209482)                                         

 

             CL (test code = 95 mmol/L           L            



             3932454051)                                         

 

             CO2 TOTAL (test code = 37 mmol/L    23-31        H            



             0410378494)                                         

 

             AGAP (test code =              2-16                      



             6833739041)                                         

 

             BUN (test code = 23 mg/dL     7-23                      



             3936255920)                                         

 

             GLUCOSE (test code = 137 mg/dL           H            



             1509137039)                                         

 

             CREATININE (test code = 1.04 mg/dL   0.50-1.04                 



             3558303755)                                         

 

             TOTAL BILI (test code = 0.6 mg/dL    0.1-1.1                   



             7989336504)                                         

 

             CALCIUM (test code = 8.3 mg/dL    8.6-10.6     L            



             1437364671)                                         

 

             T PROTEIN (test code = 6.7 g/dL     6.3-8.2                   



             1698031793)                                         

 

             ALBUMIN (test code = 3.0 g/dL     3.5-5.0      L            



             6078631862)                                         

 

             ALK PHOS (test code = 112 U/L                          



             5171082027)                                         

 

             ALTv (test code = 22 U/L       5-35                      



             1742-6)                                             

 

             AST(SGOT) (test code = 44 U/L       13-40        H            



             7074529726)                                         

 

             eGFR (test code =              mL/min/1.73m2              



             4877545799)                                         

 

             RICK (test code = RICK) Association of                           



                          Glomerular Filtration                           



                          Rate (GFR) and Staging                           



                          of Kidney Disease*                           



                          +---------------------                           



                          --+-------------------                           



                          --+-------------------                           



                          ------+| GFR                           



                          (mL/min/1.73 m2) ?|                           



                          With Kidney Damage ?|                           



                          ?Without Kidney                           



                          Damage+---------------                           



                          --------+-------------                           



                          --------+-------------                           



                          ------------+| ?>90 ?                           



                          ? ? ? ? ? ? ? ?|                           



                          ?Stage one ? ? ? ? ?|                           



                          ? Normal ? ? ? ? ? ? ?                           



                          ?+--------------------                           



                          ---+------------------                           



                          ---+------------------                           



                          -------+| ?60-89 ? ? ?                           



                          ? ? ? ? ?| ?Stage two                           



                          ? ? ? ? ?| ? Decreased                           



                          GFR ? ? ? ?                            



                          +---------------------                           



                          --+-------------------                           



                          --+-------------------                           



                          ------+| ?30-59 ? ? ?                           



                          ? ? ? ? ?| ?Stage                           



                          three ? ? ? ?| ? Stage                           



                          three ? ? ? ? ?                           



                          +---------------------                           



                          --+-------------------                           



                          --+-------------------                           



                          ------+| ?15-29 ? ? ?                           



                          ? ? ? ? ?| ?Stage four                           



                          ? ? ? ? | ? Stage four                           



                          ? ? ? ? ?                              



                          ?+--------------------                           



                          ---+------------------                           



                          ---+------------------                           



                          -------+| ?<15 (or                           



                          dialysis) ? ?| ?Stage                           



                          five ? ? ? ? | ? Stage                           



                          five ? ? ? ? ?                           



                          ?+--------------------                           



                          ---+------------------                           



                          ---+------------------                           



                          -------+ *Each stage                           



                          assumes the associated                           



                          GFR level has been in                           



                          effect for at least                           



                          three months. ?Stages                           



                          1 to 5, with or                           



                          without kidney                           



                          disease, indicate                           



                          chronic kidney                           



                          disease. Notes:                           



                          Determination of                           



                          stages one and two                           



                          (with eGFR                             



                          >59mL/min/1.73 m2)                           



                          requires estimation of                           



                          kidney damage for at                           



                          least three months as                           



                          defined by structural                           



                          or functional                           



                          abnormalities of the                           



                          kidney, manifested by                           



                          either:Pathological                           



                          abnormalities or                           



                          Markers of kidney                           



                          damage (including                           



                          abnormalities in the                           



                          composition of the                           



                          blood or urine or                           



                          abnormalities in                           



                          imaging tests).                           

 

             Lab Interpretation Abnormal                               



             (test code = 15820-3)                                        



[facility] 11[facility]03:39





             Test Item    Value        Reference Range Interpretation Comments

 

             MAGNESIUM (test code = 2153201224) 2.1 mg/dL    1.7-2.4            

       

 

             Lab Interpretation (test code = Normal                             

    



             28073-8)                                            



[facility] 21:42:42





             Test Item    Value        Reference Range Interpretation Comments

 

             POCT GLU (test code = 9321431923) 234 mg/dL           H      

      

 

             Lab Interpretation (test code = Abnormal                           

    



             85257-5)                                            



[facility] [id number] 16:58:06





             Test Item    Value        Reference Range Interpretation Comments

 

             VIT D 25OH (test code = <13          25-80        L            



             44441-6)                                            

 

             RICK (test code = RICK) Deficiency: <20                           



                          ng/mLInsufficiency:                           



                          20-24 ng/mLOptimal:                           



                          25-80 ng/mL                            

 

             Lab Interpretation (test Abnormal                               



             code = 85370-1)                                        



[facility], [id number] 16:30:39





             Test Item    Value        Reference Range Interpretation Comments

 

             VIT B12 (test code = 760 pg/mL    240-930                   



             3773504261)                                         

 

             RICK (test code = RICK) Biotin has been                           



                          reported to cause a                           



                          positive bias,                           



                          interpret results                           



                          relative to                            



                          patient's use of                           



                          biotin.                                

 

             Lab Interpretation (test Normal                                 



             code = 74468-9)                                        



[facility] 16[facility]20:34





             Test Item    Value        Reference Range Interpretation Comments

 

             FOLATE SER (test code = 5337448459) 8.3 ng/mL    3.0-20.0          

        

 

             Lab Interpretation (test code = Normal                             

    



             22163-1)                                            



[facility] [id number] 12:52:39





             Test Item    Value        Reference Range Interpretation Comments

 

             FERRITIN (test code = 81.8 ng/mL   11.0-264.0                



             7607638184)                                         

 

             RICK (test code = RICK) Biotin has been                           



                          reported to cause a                           



                          negative bias,                           



                          interpret results                           



                          relative to                            



                          patient's use of                           



                          biotin.                                

 

             Lab Interpretation (test Normal                                 



             code = 11503-2)                                        



[facility] [id number] 12:51:15





             Test Item    Value        Reference Range Interpretation Comments

 

             FERRITIN (test code = 82.3 ng/mL   11.0-264.0                



             5751274447)                                         

 

             RICK (test code = RICK) Biotin has been                           



                          reported to cause a                           



                          negative bias,                           



                          interpret results                           



                          relative to                            



                          patient's use of                           



                          biotin.                                

 

             Lab Interpretation (test Normal                                 



             code = 40990-3)                                        



[facility] 12:46:25





             Test Item    Value        Reference Range Interpretation Comments

 

             POCT GLU (test code = 8110210319) 58 mg/dL            L      

      

 

             Lab Interpretation (test code = Abnormal                           

    



             67547-2)                                            



[facility] [id number] 12:36:55





             Test Item    Value        Reference Range Interpretation Comments

 

             IRON (test code = 6540313628) 72 ug/dL                       

  

 

             TIBC (test code = 0304731902) 354 ug/dL    250-410                 

  

 

             % FE SAT (test code = 1140441623) 20 %         20-50               

      

 

             Lab Interpretation (test code = Normal                             

    



             84342-1)                                            



[facility] 12:27:58





             Test Item    Value        Reference Range Interpretation Comments

 

             MAGNESIUM (test code = 5336294716) 1.4 mg/dL    1.7-2.4      L     

       

 

             Lab Interpretation (test code = Abnormal                           

    



             58316-1)                                            



[facility] (80289)2021 
12:27:53





             Test Item    Value        Reference Range Interpretation Comments

 

             NA (test code = 138 mmol/L   135-145                   



             3862949067)                                         

 

             K (test code = 3.3 mmol/L   3.5-5.0      L            



             7715103425)                                         

 

             CL (test code = 98 mmol/L                        



             2483020669)                                         

 

             CO2 TOTAL (test code = 39 mmol/L    23-31        H            



             8888849983)                                         

 

             AGAP (test code =              2-16         L            



             6827865127)                                         

 

             BUN (test code = 23 mg/dL     7-23                      



             9569192613)                                         

 

             GLUCOSE (test code = 58 mg/dL            L            



             4969244816)                                         

 

             CREATININE (test code = 1.00 mg/dL   0.50-1.04                 



             1655921472)                                         

 

             TOTAL BILI (test code = 0.7 mg/dL    0.1-1.1                   



             2363297789)                                         

 

             CALCIUM (test code = 8.5 mg/dL    8.6-10.6     L            



             4705537328)                                         

 

             T PROTEIN (test code = 6.7 g/dL     6.3-8.2                   



             4704418664)                                         

 

             ALBUMIN (test code = 3.0 g/dL     3.5-5.0      L            



             0687651081)                                         

 

             ALK PHOS (test code = 88 U/L                           



             4077022390)                                         

 

             ALTv (test code = 20 U/L       5-35                      



             1742-6)                                             

 

             AST(SGOT) (test code = 43 U/L       13-40        H            



             5138052632)                                         

 

             eGFR (test code =              mL/min/1.73m2              



             5228734489)                                         

 

             RICK (test code = RICK) Association of                           



                          Glomerular Filtration                           



                          Rate (GFR) and Staging                           



                          of Kidney Disease*                           



                          +---------------------                           



                          --+-------------------                           



                          --+-------------------                           



                          ------+| GFR                           



                          (mL/min/1.73 m2) ?|                           



                          With Kidney Damage ?|                           



                          ?Without Kidney                           



                          Damage+---------------                           



                          --------+-------------                           



                          --------+-------------                           



                          ------------+| ?>90 ?                           



                          ? ? ? ? ? ? ? ?|                           



                          ?Stage one ? ? ? ? ?|                           



                          ? Normal ? ? ? ? ? ? ?                           



                          ?+--------------------                           



                          ---+------------------                           



                          ---+------------------                           



                          -------+| ?60-89 ? ? ?                           



                          ? ? ? ? ?| ?Stage two                           



                          ? ? ? ? ?| ? Decreased                           



                          GFR ? ? ? ?                            



                          +---------------------                           



                          --+-------------------                           



                          --+-------------------                           



                          ------+| ?30-59 ? ? ?                           



                          ? ? ? ? ?| ?Stage                           



                          three ? ? ? ?| ? Stage                           



                          three ? ? ? ? ?                           



                          +---------------------                           



                          --+-------------------                           



                          --+-------------------                           



                          ------+| ?15-29 ? ? ?                           



                          ? ? ? ? ?| ?Stage four                           



                          ? ? ? ? | ? Stage four                           



                          ? ? ? ? ?                              



                          ?+--------------------                           



                          ---+------------------                           



                          ---+------------------                           



                          -------+| ?<15 (or                           



                          dialysis) ? ?| ?Stage                           



                          five ? ? ? ? | ? Stage                           



                          five ? ? ? ? ?                           



                          ?+--------------------                           



                          ---+------------------                           



                          ---+------------------                           



                          -------+ *Each stage                           



                          assumes the associated                           



                          GFR level has been in                           



                          effect for at least                           



                          three months. ?Stages                           



                          1 to 5, with or                           



                          without kidney                           



                          disease, indicate                           



                          chronic kidney                           



                          disease. Notes:                           



                          Determination of                           



                          stages one and two                           



                          (with eGFR                             



                          >59mL/min/1.73 m2)                           



                          requires estimation of                           



                          kidney damage for at                           



                          least three months as                           



                          defined by structural                           



                          or functional                           



                          abnormalities of the                           



                          kidney, manifested by                           



                          either:Pathological                           



                          abnormalities or                           



                          Markers of kidney                           



                          damage (including                           



                          abnormalities in the                           



                          composition of the                           



                          blood or urine or                           



                          abnormalities in                           



                          imaging tests).                           

 

             Lab Interpretation Abnormal                               



             (test code = 85270-2)                                        



[facility]





             Test Item    Value        Reference Range Interpretation Comments

 

             PHOSPHORUS (test code = 6835589995) 4.1 mg/dL    2.5-5.0           

        

 

             Lab Interpretation (test code = Normal                             

    



             71729-0)                                            



[id number] 12:26:37





             Test Item    Value        Reference Range Interpretation Comments

 

             NT-proBNP (test code 1770 pg/mL   See_Comment  H             [Autom

ated



             = [id number])                                        message] The



                                                                 system which



                                                                 generated this



                                                                 result



                                                                 transmitted



                                                                 reference range

:



                                                                 <=125. The



                                                                 reference range



                                                                 was not used to



                                                                 interpret this



                                                                 result as



                                                                 normal/abnormal

.

 

             RICK (test code = RICK) Biotin has been                           



                          reported to                            



                          cause a negative                           



                          bias, interpret                           



                          results relative                           



                          to patient's use                           



                          of biotin.                             

 

             Lab Interpretation Abnormal                               



             (test code = 17496-4)                                        



[facility] WITH [id number] 11:52:16





             Test Item    Value        Reference Range Interpretation Comments

 

             WBC (test code =              See_Comment  L             [Automated



             6690-2)                                             message] The



                                                                 system which



                                                                 generated this



                                                                 result transmit

eduard



                                                                 reference range

:



                                                                 4.30 - 11.10



                                                                 10*3/?L. The



                                                                 reference range



                                                                 was not used to



                                                                 interpret this



                                                                 result as



                                                                 normal/abnormal

.

 

             RBC (test code =              See_Comment  L             [Automated



             789-8)                                              message] The



                                                                 system which



                                                                 generated this



                                                                 result transmit

eduard



                                                                 reference range

:



                                                                 3.93 - 5.25



                                                                 10*6/?L. The



                                                                 reference range



                                                                 was not used to



                                                                 interpret this



                                                                 result as



                                                                 normal/abnormal

.

 

             HGB (test code = 9.0 g/dL     11.6-15.0    L            



             718-7)                                              

 

             HCT (test code = 28.1 %       35.7-45.2    L            



             4544-3)                                             

 

             MCV (test code = 95.3 fL      80.6-95.5                 



             787-2)                                              

 

             MCH (test code = 30.5 pg      25.9-32.8                 



             785-6)                                              

 

             MCHC (test code = 32.0 g/dL    31.6-35.1                 



             786-4)                                              

 

             RDW-SD (test code = 56.6 fL      39.0-49.9    H            



             36642-5)                                            

 

             RDW-CV (test code = 16.2 %       12.0-15.5    H            



             788-0)                                              

 

             PLT (test code =              See_Comment  LL            [Automated



             777-3)                                              message] The



                                                                 system which



                                                                 generated this



                                                                 result transmit

eduard



                                                                 reference range

:



                                                                 166 - 358 10*3/

?L.



                                                                 The reference



                                                                 range was not u

sed



                                                                 to interpret th

is



                                                                 result as



                                                                 normal/abnormal

.

 

             MPV (test code = 9.6 fL       9.5-12.9                  



             01359-1)                                            

 

             IPF % (test code = 1.7 %        1.3-7.7                   Platelet 

count



             [id number])                                         measured by



                                                                 fluorescence



                                                                 method.

 

             NRBC/100 WBC (test              See_Comment                [Automat

ed



             code = [id number])                                        message] 

The



                                                                 system which



                                                                 generated this



                                                                 result transmit

eduard



                                                                 reference range

:



                                                                 0.0 - 10.0 /100



                                                                 WBCs. The



                                                                 reference range



                                                                 was not used to



                                                                 interpret this



                                                                 result as



                                                                 normal/abnormal

.

 

             NRBC x10^3 (test code <0.01        See_Comment                [Auto

mated



             = [id number])                                        message] The



                                                                 system which



                                                                 generated this



                                                                 result transmit

eduard



                                                                 reference range

:



                                                                 10*3/?L. The



                                                                 reference range



                                                                 was not used to



                                                                 interpret this



                                                                 result as



                                                                 normal/abnormal

.

 

             GRAN MAT (NEUT) % 53.0 %                                 



             (test code = 770-8)                                        

 

             IMM GRAN % (test code 0.40 %                                 



             = 7802912366)                                        

 

             LYMPH % (test code = 37.6 %                                 



             736-9)                                              

 

             MONO % (test code = 7.0 %                                  



             5905-5)                                             

 

             EOS % (test code = 1.2 %                                  



             713-8)                                              

 

             BASO % (test code = 0.8 %                                  



             706-2)                                              

 

             GRAN MAT x10^3(ANC) 1.28 10*3/uL 1.88-7.09    L            



             (test code =                                        



             4201311136)                                         

 

             IMM GRAN x10^3 (test <0.03        0.00-0.06                 



             code = 4707624950)                                        

 

             LYMPH x10^3 (test 0.91 10*3/uL 1.32-3.29    L            



             code = 731-0)                                        

 

             MONO x10^3 (test code 0.17 10*3/uL 0.33-0.92    L            



             = 742-7)                                            

 

             EOS x10^3 (test code 0.03 10*3/uL 0.03-0.39                 



             = 711-2)                                            

 

             BASO x10^3 (test code <0.03        0.01-0.07                 



             = 704-7)                                            

 

             PLT ESTIMATE (test Critically   Normal       AA           



             code = 9317-9) Decreased                              

 

             Lab Interpretation Abnormal                               



             (test code = 61743-5)                                        



[facility]





             Test Item    Value        Reference Range Interpretation Comments

 

             Procalcitonin (test 0.05 ng/mL   <0.07                     



             code = [id number])                                        

 

             RICK (test code = RICK) INTERPRETATION OF                           



                          PROCALCITONIN RESULTS IN                           



                          ADULTS >= 18 YEARS OF                           



                          AGE Initiation and                           



                          discontinuation of                           



                          antibiotics on patients                           



                          with suspected or                           



                          confirmed Lower                           



                          Respiratory Tract                           



                          Infection in Adults >=                           



                          18 years of age.                           



                          +--------------+--------                           



                          --------+---------------                           



                          +-----------------------                           



                          -----+|Procalcitonin                           



                          |Interpretation                           



                          ?|Antibiotic ? ?                           



                          |Considerations ? ? ? ?                           



                          ? ? ? |ng/mL ? ? ? ? | ?                           



                          ? ? ? ? ? ?                            



                          ?|recommendation | ? ? ?                           



                          ? ? ? ? ? ? ? ? ? ? ?                           



                          +--------------+--------                           



                          --------+---------------                           



                          +-----------------------                           



                          -----+| <0.1 ? ? ? ? |                           



                          Bacterial ? ? ?|                           



                          Strongly ? ? ?| ? ? ? ?                           



                          ? ? ? ? ? ? ? ? ? ? | ?                           



                          ? ? ? ? ? ?| infection                           



                          very | discouraged ? |                           



                          Overruling: ? ? ? ? ? ?                           



                          ? ? | ? ? ? ? ? ? ?|                           



                          unlikely ? ? ? | ? ? ? ?                           



                          ? ? ? | ? Clinically                           



                          unstable ? ? ?                           



                          +--------------+--------                           



                          --------+---------------                           



                          + ? High risk for                           



                          adverse ? ? | <0.25 ? ?                           



                          ? ?| Bacterial ? ? ?|                           



                          Discouraged ? | ?                           



                          outcome ? ? ? ? ? ? ? ?                           



                          ? | ? ? ? ? ? ? ?|                           



                          infection ? ? ?| ? ? ? ?                           



                          ? ? ? | ? SEE IMPORTANT                           



                          NOTE ? ? ? ?| ? ? ? ? ?                           



                          ? ?| unlikely ? ? ? | ?                           



                          ? ? ? ? ? ? | ? ? ? ? ?                           



                          ? ? ? ? ? ? ? ? ?                           



                          +--------------+--------                           



                          --------+---------------                           



                          +-----------------------                           



                          -----+| >=0.25 ? ? ? |                           



                          Bacterial ? ? ?|                           



                          Encouraged ? ?| ? ? ? ?                           



                          ? ? ? ? ? ? ? ? ? ? | ?                           



                          ? ? ? ? ? ?| infection ?                           



                          ? ?| ? ? ? ? ? ? ? | ? ?                           



                          ? ? ? ? ? ? ? ? ? ? ? ?                           



                          | ? ? ? ? ? ? ?| likely                           



                          ? ? ? ? | ? ? ? ? ? ? ?                           



                          | Consider treatment                           



                          failure                                



                          ?+--------------+-------                           



                          ---------+--------------                           



                          -+ if levels does not                           



                          decrease | >0.5 ? ? ? ?                           



                          | Bacterial ? ? ?|                           



                          Strongly ? ? ?|                           



                          appropriately ? ? ? ? ?                           



                          ? ? | ? ? ? ? ? ? ?|                           



                          infection very |                           



                          encouraged ? ?| ? ? ? ?                           



                          ? ? ? ? ? ? ? ? ? ? | ?                           



                          ? ? ? ? ? ?| likely ? ?                           



                          ? ? | ? ? ? ? ? ? ? | ?                           



                          ? ? ? ? ? ? ? ? ? ? ? ?                           



                          ?                                      



                          +--------------+--------                           



                          --------+---------------                           



                          +-----------------------                           



                          -----+ Discontinuation                           



                          of antibiotics in                           



                          high-acuity patients                           



                          with suspected or                           



                          confirmed sepsis in                           



                          Adults >= 18 years of                           



                          age.                                   



                          +--------------+--------                           



                          --------+---------------                           



                          +-----------------------                           



                          -----+|Procalcitonin                           



                          |Interpretation                           



                          ?|Antibiotic ? ?                           



                          |Considerations ? ? ? ?                           



                          ? ? ? |ng/mL ? ? ? ? | ?                           



                          ? ? ? ? ? ?                            



                          ?|recommendation | ? ? ?                           



                          ? ? ? ? ? ? ? ? ? ? ?                           



                          +--------------+--------                           



                          --------+---------------                           



                          +-----------------------                           



                          -----+| <0.25 ? ? ? ?|                           



                          Bacterial ? ? ?|                           



                          Strongly ? ? ?| ? ? ? ?                           



                          ? ? ? ? ? ? ? ? ? ? | ?                           



                          ? ? ? ? ? ?| infection                           



                          very | discouraged ? |                           



                          Overruling: ? ? ? ? ? ?                           



                          ? ? | ? ? ? ? ? ? ?|                           



                          unlikely ? ? ? | ? ? ? ?                           



                          ? ? ? | ? Clinically                           



                          unstable ? ? ?                           



                          +--------------+--------                           



                          --------+---------------                           



                          + ? High risk for                           



                          adverse ? ? | <0.5 or                           



                          drop | Bacterial ? ? ?|                           



                          Discouraged ? | ?                           



                          outcome ? ? ? ? ? ? ? ?                           



                          ? | >80% from ? ?|                           



                          infection ? ? ?| ? ? ? ?                           



                          ? ? ? | ? SEE IMPORTANT                           



                          NOTE ? ? ? ?| highest                           



                          PCT ?| unlikely ? ? ? |                           



                          ? ? ? ? ? ? ? | ? ? ? ?                           



                          ? ? ? ? ? ? ? ? ? ? |                           



                          level ? ? ? ?| ? ? ? ? ?                           



                          ? ? ?| ? ? ? ? ? ? ? | ?                           



                          ? ? ? ? ? ? ? ? ? ? ? ?                           



                          ?                                      



                          +--------------+--------                           



                          --------+---------------                           



                          +-----------------------                           



                          -----+| >=0.5 ? ? ? ?|                           



                          Bacterial ? ? ?|                           



                          Encouraged ? ?| ? ? ? ?                           



                          ? ? ? ? ? ? ? ? ? ? | ?                           



                          ? ? ? ? ? ?| infection ?                           



                          ? ?| ? ? ? ? ? ? ? | ? ?                           



                          ? ? ? ? ? ? ? ? ? ? ? ?                           



                          | ? ? ? ? ? ? ?| likely                           



                          ? ? ? ? | ? ? ? ? ? ? ?                           



                          | Consider treatment                           



                          failure                                



                          ?+--------------+-------                           



                          ---------+--------------                           



                          -+ if levels does not                           



                          decrease | >1.0 ? ? ? ?                           



                          | Bacterial ? ? ?|                           



                          Strongly ? ? ?|                           



                          appropriately ? ? ? ? ?                           



                          ? ? | ? ? ? ? ? ? ?|                           



                          infection very |                           



                          encouraged ? ?| ? ? ? ?                           



                          ? ? ? ? ? ? ? ? ? ? | ?                           



                          ? ? ? ? ? ?| likely ? ?                           



                          ? ? | ? ? ? ? ? ? ? | ?                           



                          ? ? ? ? ? ? ? ? ? ? ? ?                           



                          ?                                      



                          +--------------+--------                           



                          --------+---------------                           



                          +-----------------------                           



                          -----+ Percentage of                           



                          drop of Procalcitonin                           



                          calculation for                           



                          Discontinuation of                           



                          antibiotics in                           



                          high-acuity patients                           



                          with suspected or                           



                          confirmed sepsis in                           



                          Adults >= 18 years of                           



                          age.  ? ? ? ? ? ? ? ? ?                           



                          ? ? Procalcitonin                           



                          highest{}-Procalcitonin                           



                          current{}Delta                           



                          Procalcitonin =                           



                          ________________________                           



                          _______________________                           



                          x100%  ? ? ? ? ? ? ? ? ?                           



                          ? ? ? ? ? ? ?                           



                          Procalcitonin current {}                           



                          IMPORTANT NOTE:                           



                          Procalcitonin may be                           



                          elevated without                           



                          bacterial infection by                           



                          physiologic stress                           



                          related to trauma,                           



                          burns, chronic dialysis,                           



                          metastatic cancer,                           



                          surgery in the past                           



                          seven days, malaria,                           



                          some fungal infections,                           



                          and some forms of                           



                          vasculitis. The                           



                          interpretation algorithm                           



                          may not apply to                           



                          patients with                           



                          immunosuppression                           



                          (equivalent of >10 mg of                           



                          prednisone daily), HIV                           



                          with CD4 cell count <                           



                          350 cells/mm3, active                           



                          malignancy on systemic                           



                          chemotherapy, solid                           



                          organ transplant or                           



                          hematopoietic stem cell                           



                          transplantation, or                           



                          hospital acquired                           



                          pneumonia. Additionally,                           



                          some clinical trials of                           



                          procalcitonin have                           



                          excluded patients with                           



                          shock requiring                           



                          vasopressor use, acute                           



                          respiratory failure                           



                          requiring mechanical                           



                          ventilation, or those                           



                          with known lung                           



                          abscess/empyema. For                           



                          further information                           



                          please refer                           



                          to:[email or web address]                           



                          [email or web address]                           

 

             Lab Interpretation Normal                                 



             (test code = 49451-4)                                        



[facility] [id number] 07[facility]31:01





             Test Item    Value        Reference Range Interpretation Comments

 

             Valproic Acid, Free 6.1 ug/mL    4.0-15.0                  



             (test code =                                        



             8477553570)                                         

 

             RICK (test code = RICK) Toxic Range: ? ? ? ?                         

  



                          Greater than 15 ug/mL                           



                          Test developed and                           



                          characteristics                           



                          determined by [facility] [place]                           



                          Laboratory Services.                           

 

             Lab Interpretation Normal                                 



             (test code = 80693-7)                                        



[facility] 01:36:59





             Test Item    Value        Reference Range Interpretation Comments

 

             POCT GLU (test code = 6348607837) 218 mg/dL           H      

      

 

             Lab Interpretation (test code = Abnormal                           

    



             60512-1)                                            



[facility]ERR[facility] [id number] 00:12:31





             Test Item    Value        Reference Range Interpretation Comments

 

             FERRITIN (test code = 87.2 ng/mL   11.0-264.0                



             [id number])                                         

 

             RICK (test code = RICK) Biotin has been                           



                          reported to cause a                           



                          negative bias,                           



                          interpret results                           



                          relative to                            



                          patient's use of                           



                          biotin.                                

 

             Lab Interpretation (test Normal                                 



             code = 09747-2)                                        



[facility] [id number] 23:16:01
LEUKOCYTOPENIA WITH ABSOLUTE LYMPHOCYTOPENIA, MONOCYTOPENIA, AND EOSINOPENIA. 
NORMOCYTIC HYPOCHROMICANEMIA.  SEVERE THROMBOCYTOPENIA.[facility] GLUCOSE (AUTOMATED)2021 22:23:28





             Test Item    Value        Reference Range Interpretation Comments

 

             POCT GLU (test code = 5926382596) 194 mg/dL           H      

      

 

             Lab Interpretation (test code = Abnormal                           

    



             43201-3)                                            



[facility] GLUCOSE (AUTOMATED)2021 16:56:37





             Test Item    Value        Reference Range Interpretation Comments

 

             POCT GLU (test code = 6994398831) 161 mg/dL           H      

      

 

             Lab Interpretation (test code = Abnormal                           

    



             43549-0)                                            



[facility] GLUCOSE (AUTOMATED)2021 12:59:40





             Test Item    Value        Reference Range Interpretation Comments

 

             POCT GLU (test code = 8079555539) 116 mg/dL           H      

      

 

             Lab Interpretation (test code = Abnormal                           

    



             39477-6)                                            



[facility] -74-12 12:27:33





             Test Item    Value        Reference    Interpretation Comments



                                       Range                     

 

             TROPONIN I (test 0.011 ng/mL  See_Comment                [Automated



             code = 2852812293)                                        message] 

The



                                                                 system which



                                                                 generated this



                                                                 result



                                                                 transmitted



                                                                 reference range

:



                                                                 <=0.034. The



                                                                 reference range



                                                                 was not used to



                                                                 interpret this



                                                                 result as



                                                                 normal/abnormal

.

 

             RICK (test code = Reference (Normal)                           



             RICK)         Range (defined by                           



                          the 99th percentile                           



                          reference limit): <=                           



                          0.034 ng/mL Note:                           



                          Cardiac troponin                           



                          begins to rise 3-4                           



                          hours after the                           



                          onset of ischemia.                           



                          Repeat in 4-6 hours                           



                          if the sample was                           



                          drawn within 3-4                           



                          hours of the onset                           



                          of the symptom and                           



                          found normal.                           



                          Diagnosis of                           



                          myocardial injury is                           



                          made with acute                           



                          changes in cTn                           



                          concentrations with                           



                          at least one serial                           



                          sample above the                           



                          99th percentile                           



                          upper reference                           



                          limit (URL), taken                           



                          together with the                           



                          patient's clinical                           



                          presentation.                           



                          Biotin has been                           



                          reported to cause a                           



                          negative bias,                           



                          interpret results                           



                          relative to                            



                          patient's use of                           



                          biotin.                                

 

             Lab Interpretation Normal                                 



             (test code =                                        



             42550-6)                                            



[facility] [id number] 12:24:30





             Test Item    Value        Reference Range Interpretation Comments

 

             NT-proBNP (test code 3470 pg/mL   See_Comment  H             [Autom

ated



             = [id number])                                        message] The



                                                                 system which



                                                                 generated this



                                                                 result



                                                                 transmitted



                                                                 reference range

:



                                                                 <=125. The



                                                                 reference range



                                                                 was not used to



                                                                 interpret this



                                                                 result as



                                                                 normal/abnormal

.

 

             RICK (test code = RICK) Biotin has been                           



                          reported to                            



                          cause a negative                           



                          bias, interpret                           



                          results relative                           



                          to patient's use                           



                          of biotin.                             

 

             Lab Interpretation Abnormal                               



             (test code = 29282-6)                                        



[facility]gn[id number] 12:20:57





             Test Item    Value        Reference Range Interpretation Comments

 

             MAGNESIUM (test code = 8155061585) 1.5 mg/dL    1.7-2.4      L     

       

 

             Lab Interpretation (test code = Abnormal                           

    



             32384-0)                                            



[facility]. METABOLIC PANEL (55206)2021 
12:20:52





             Test Item    Value        Reference Range Interpretation Comments

 

             NA (test code = 139 mmol/L   135-145                   



             9402484237)                                         

 

             K (test code = 3.5 mmol/L   3.5-5.0                   



             5848944448)                                         

 

             CL (test code = 101 mmol/L                       



             4782030219)                                         

 

             CO2 TOTAL (test code = 33 mmol/L    23-31        H            



             3866692246)                                         

 

             AGAP (test code =              2-16                      



             3548524170)                                         

 

             BUN (test code = 23 mg/dL     7-23                      



             3710466830)                                         

 

             GLUCOSE (test code = 150 mg/dL           H            



             8949973423)                                         

 

             CREATININE (test code = 0.89 mg/dL   0.50-1.04                 



             0097754454)                                         

 

             TOTAL BILI (test code = 0.8 mg/dL    0.1-1.1                   



             1782473321)                                         

 

             CALCIUM (test code = 8.4 mg/dL    8.6-10.6     L            



             2029782693)                                         

 

             T PROTEIN (test code = 6.5 g/dL     6.3-8.2                   



             5332830114)                                         

 

             ALBUMIN (test code = 2.9 g/dL     3.5-5.0      L            



             6911900524)                                         

 

             ALK PHOS (test code = 95 U/L                           



             4321104464)                                         

 

             ALTv (test code = 20 U/L       5-35                      



             1742-6)                                             

 

             AST(SGOT) (test code = 38 U/L       13-40                     



             7022602063)                                         

 

             eGFR (test code =              mL/min/1.73m2              



             9609125607)                                         

 

             RICK (test code = RICK) Association of                           



                          Glomerular Filtration                           



                          Rate (GFR) and Staging                           



                          of Kidney Disease*                           



                          +---------------------                           



                          --+-------------------                           



                          --+-------------------                           



                          ------+| GFR                           



                          (mL/min/1.73 m2) ?|                           



                          With Kidney Damage ?|                           



                          ?Without Kidney                           



                          Damage+---------------                           



                          --------+-------------                           



                          --------+-------------                           



                          ------------+| ?>90 ?                           



                          ? ? ? ? ? ? ? ?|                           



                          ?Stage one ? ? ? ? ?|                           



                          ? Normal ? ? ? ? ? ? ?                           



                          ?+--------------------                           



                          ---+------------------                           



                          ---+------------------                           



                          -------+| ?60-89 ? ? ?                           



                          ? ? ? ? ?| ?Stage two                           



                          ? ? ? ? ?| ? Decreased                           



                          GFR ? ? ? ?                            



                          +---------------------                           



                          --+-------------------                           



                          --+-------------------                           



                          ------+| ?30-59 ? ? ?                           



                          ? ? ? ? ?| ?Stage                           



                          three ? ? ? ?| ? Stage                           



                          three ? ? ? ? ?                           



                          +---------------------                           



                          --+-------------------                           



                          --+-------------------                           



                          ------+| ?15-29 ? ? ?                           



                          ? ? ? ? ?| ?Stage four                           



                          ? ? ? ? | ? Stage four                           



                          ? ? ? ? ?                              



                          ?+--------------------                           



                          ---+------------------                           



                          ---+------------------                           



                          -------+| ?<15 (or                           



                          dialysis) ? ?| ?Stage                           



                          five ? ? ? ? | ? Stage                           



                          five ? ? ? ? ?                           



                          ?+--------------------                           



                          ---+------------------                           



                          ---+------------------                           



                          -------+ *Each stage                           



                          assumes the associated                           



                          GFR level has been in                           



                          effect for at least                           



                          three months. ?Stages                           



                          1 to 5, with or                           



                          without kidney                           



                          disease, indicate                           



                          chronic kidney                           



                          disease. Notes:                           



                          Determination of                           



                          stages one and two                           



                          (with eGFR                             



                          >59mL/min/1.73 m2)                           



                          requires estimation of                           



                          kidney damage for at                           



                          least three months as                           



                          defined by structural                           



                          or functional                           



                          abnormalities of the                           



                          kidney, manifested by                           



                          either:Pathological                           



                          abnormalities or                           



                          Markers of kidney                           



                          damage (including                           



                          abnormalities in the                           



                          composition of the                           



                          blood or urine or                           



                          abnormalities in                           



                          imaging tests).                           

 

             Lab Interpretation Abnormal                               



             (test code = 59976-3)                                        



[facility]





             Test Item    Value        Reference Range Interpretation Comments

 

             PHOSPHORUS (test code = 9215557230) 4.3 mg/dL    2.5-5.0           

        

 

             Lab Interpretation (test code = Normal                             

    



             11020-2)                                            



[facility], [id number] 11:44:26





             Test Item    Value        Reference Range Interpretation Comments

 

             AMMONIA (test code = 6074491277) 35 umol/L    9-33         H       

     

 

             Lab Interpretation (test code = Abnormal                           

    



             65602-7)                                            



[facility] [id number] 11:44:21





             Test Item    Value        Reference Range Interpretation Comments

 

             WBC (test code =              See_Comment  L             [Automated



             7090-2)                                             message] The



                                                                 system which



                                                                 generated this



                                                                 result transmit

eduard



                                                                 reference range

:



                                                                 4.30 - 11.10



                                                                 10*3/?L. The



                                                                 reference range



                                                                 was not used to



                                                                 interpret this



                                                                 result as



                                                                 normal/abnormal

.

 

             RBC (test code =              See_Comment  L             [Automated



             059-8)                                              message] The



                                                                 system which



                                                                 generated this



                                                                 result transmit

eduard



                                                                 reference range

:



                                                                 3.93 - 5.25



                                                                 10*6/?L. The



                                                                 reference range



                                                                 was not used to



                                                                 interpret this



                                                                 result as



                                                                 normal/abnormal

.

 

             HGB (test code = 8.9 g/dL     11.6-15.0    L            



             718-7)                                              

 

             HCT (test code = 27.9 %       35.7-45.2    L            



             4544-3)                                             

 

             MCV (test code = 95.9 fL      80.6-95.5    H            



             787-2)                                              

 

             MCH (test code = 30.6 pg      25.9-32.8                 



             785-6)                                              

 

             MCHC (test code = 31.9 g/dL    31.6-35.1                 



             786-4)                                              

 

             RDW-SD (test code = 58.4 fL      39.0-49.9    H            



             48462-1)                                            

 

             RDW-CV (test code = 16.7 %       12.0-15.5    H            



             788-0)                                              

 

             PLT (test code =              See_Comment  LL            [Automated



             777-3)                                              message] The



                                                                 system which



                                                                 generated this



                                                                 result transmit

eduard



                                                                 reference range

:



                                                                 166 - 358 10*3/

?L.



                                                                 The reference



                                                                 range was not u

sed



                                                                 to interpret th

is



                                                                 result as



                                                                 normal/abnormal

.

 

             MPV (test code = 9.1 fL       9.5-12.9     L            



             26753-6)                                            

 

             IPF % (test code = 1.6 %        1.3-7.7                   Platelet 

count



             [id number])                                         measured by



                                                                 fluorescence



                                                                 method.

 

             NRBC/100 WBC (test              See_Comment                [Automat

ed



             code = 1239793928)                                        message] 

The



                                                                 system which



                                                                 generated this



                                                                 result transmit

eduard



                                                                 reference range

:



                                                                 0.0 - 10.0 /100



                                                                 WBCs. The



                                                                 reference range



                                                                 was not used to



                                                                 interpret this



                                                                 result as



                                                                 normal/abnormal

.

 

             NRBC x10^3 (test code              See_Comment                [Auto

mated



             = 1109333208)                                        message] The



                                                                 system which



                                                                 generated this



                                                                 result transmit

eduard



                                                                 reference range

:



                                                                 10*3/?L. The



                                                                 reference range



                                                                 was not used to



                                                                 interpret this



                                                                 result as



                                                                 normal/abnormal

.

 

             GRAN MAT (NEUT) % 52.1 %                                 



             (test code = 770-8)                                        

 

             IMM GRAN % (test code 0.40 %                                 



             = 8491603772)                                        

 

             LYMPH % (test code = 38.8 %                                 



             736-9)                                              

 

             MONO % (test code = 6.5 %                                  



             5905-5)                                             

 

             EOS % (test code = 1.3 %                                  



             713-8)                                              

 

             BASO % (test code = 0.9 %                                  



             706-2)                                              

 

             GRAN MAT x10^3(ANC) 1.21 10*3/uL 1.88-7.09    L            



             (test code =                                        



             3714825500)                                         

 

             IMM GRAN x10^3 (test <0.03        0.00-0.06                 



             code = 1536063909)                                        

 

             LYMPH x10^3 (test 0.90 10*3/uL 1.32-3.29    L            



             code = 731-0)                                        

 

             MONO x10^3 (test code 0.15 10*3/uL 0.33-0.92    L            



             = 742-7)                                            

 

             EOS x10^3 (test code 0.03 10*3/uL 0.03-0.39                 



             = 711-2)                                            

 

             BASO x10^3 (test code <0.03        0.01-0.07                 



             = 704-7)                                            

 

             PLT ESTIMATE (test Critically   Normal       AA           



             code = 9317-9) Decreased                              

 

             Lab Interpretation Abnormal                               



             (test code = 86367-8)                                        



[facility]PROTHR[facility] / [id number] 11:22:04





             Test Item    Value        Reference Range Interpretation Comments

 

             PROTIME PATIENT (test              See_Comment                [Auto

mated message]



             code = 5964-2)                                        The system wh

ich



                                                                 generated this 

result



                                                                 transmitted ref

erence



                                                                 range: 12.0 - 1

4.7



                                                                 Seconds. The re

ference



                                                                 range was not u

sed to



                                                                 interpret this 

result



                                                                 as normal/abnor

mal.

 

             INR (test code = 6301-6)                                        Nor

mal INR <1.1;



                                                                 Warfarin Therap

eutic



                                                                 range 2.0 to 3.

0 or



                                                                 2.5 to 3.5, dep

ending



                                                                 upon the indica

tions.

 

             Lab Interpretation (test Normal                                 



             code = 71729-4)                                        



[id number] 06[facility]51:36





             Test Item    Value        Reference    Interpretation Comments



                                       Range                     

 

             TROPONIN I (test 0.010 ng/mL  See_Comment                [Automated



             code = 4988488123)                                        message] 

The



                                                                 system which



                                                                 generated this



                                                                 result



                                                                 transmitted



                                                                 reference range

:



                                                                 <=0.034. The



                                                                 reference range



                                                                 was not used to



                                                                 interpret this



                                                                 result as



                                                                 normal/abnormal

.

 

             RICK (test code = Reference (Normal)                           



             RICK)         Range (defined by                           



                          the 99th percentile                           



                          reference limit): <=                           



                          0.034 ng/mL Note:                           



                          Cardiac troponin                           



                          begins to rise 3-4                           



                          hours after the                           



                          onset of ischemia.                           



                          Repeat in 4-6 hours                           



                          if the sample was                           



                          drawn within 3-4                           



                          hours of the onset                           



                          of the symptom and                           



                          found normal.                           



                          Diagnosis of                           



                          myocardial injury is                           



                          made with acute                           



                          changes in cTn                           



                          concentrations with                           



                          at least one serial                           



                          sample above the                           



                          99th percentile                           



                          upper reference                           



                          limit (URL), taken                           



                          together with the                           



                          patient's clinical                           



                          presentation.                           



                          Biotin has been                           



                          reported to cause a                           



                          negative bias,                           



                          interpret results                           



                          relative to                            



                          patient's use of                           



                          biotin.                                

 

             Lab Interpretation Normal                                 



             (test code =                                        



             58613-6)                                            



[facility], [id number] 06:44:39





             Test Item    Value        Reference Range Interpretation Comments

 

             VALPROIC A (test code = 33 ug/mL            L            



             1264494947)                                         

 

             RICK (test code = RICK) Toxic Range: ? ? ?                           



                          ? ? ?Greater than                           



                          100 ug/mL                              

 

             Lab Interpretation (test Abnormal                               



             code = 33238-2)                                        



[facility], [id number] 05:32:10





             Test Item    Value        Reference Range Interpretation Comments

 

             AMMONIA (test code = 5849110963) 49 umol/L    9-33         H       

     

 

             Lab Interpretation (test code = Abnormal                           

    



             43105-7)                                            



[facility] [id number] 04:42:00





             Test Item    Value        Reference Range Interpretation Comments

 

             TSH (test code =              See_Comment                [Automated

 message]



             9836764255)                                         The system [employer]



                                                                 generated this 

result



                                                                 transmitted ref

erence



                                                                 range: 0.45 - 4

.70



                                                                 mIU/L. The refe

rence



                                                                 range was not u

sed to



                                                                 interpret this 

result



                                                                 as normal/abnor

mal.

 

             Lab Interpretation (test Normal                                 



             code = 82802-6)                                        



[facility]2021 04:14:58





             Test Item    Value        Reference Range Interpretation Comments

 

             POCT GLU (test code = 2075720641) 204 mg/dL           H      

      

 

             Lab Interpretation (test code = Abnormal                           

    



             06853-6)                                            



[facility] CampusLIP[facility] (24628)(TOTAL CHOLESTEROL, 
TRIGLYCERIDES, HDL)2021 04:11:59





             Test Item    Value        Reference Range Interpretation Comments

 

             CHOL (test code = 173 mg/dL    120-200                   



             8046191823)                                         

 

             HDL (test code = 30 mg/dL     >50          L            



             0922093472)                                         

 

             HDLC RATIO (test code =              See_Comment  H             [Au

tomated message]



             5074026380)                                         The system [employer]



                                                                 generated this



                                                                 result transmit

eduard



                                                                 reference range

:



                                                                 <=4.5. The refe

rence



                                                                 range was not u

sed



                                                                 to interpret th

is



                                                                 result as



                                                                 normal/abnormal

.

 

             TRIG (test code = 168 mg/dL                        



             8066638661)                                         

 

             LDL CHOL (test code = 109 mg/dL    See_Comment                [Auto

mated message]



             20909-9)                                            The system [employer]



                                                                 generated this



                                                                 result transmit

eduard



                                                                 reference range

:



                                                                 <=160. The refe

rence



                                                                 range was not u

sed



                                                                 to interpret th

is



                                                                 result as



                                                                 normal/abnormal

.

 

             VLDL (test code = 34 mg/dL     5-60                      



             9419152905)                                         

 

             Lab Interpretation (test Abnormal                               



             code = 91033-9)                                        



[facility]





             Test Item    Value        Reference Range Interpretation Comments

 

             MAGNESIUM (test code = 4237085412) 1.6 mg/dL    1.7-2.4      L     

       

 

             Lab Interpretation (test code = Abnormal                           

    



             77141-7)                                            



[facility]





             Test Item    Value        Reference Range Interpretation Comments

 

             PHOSPHORUS (test code = 3178199962) 3.9 mg/dL    2.5-5.0           

        

 

             Lab Interpretation (test code = Normal                             

    



             07806-0)                                            



[id number] 04:10:57





             Test Item    Value        Reference Range Interpretation Comments

 

             URIC ACID (test code = 2836303455) 6.6 mg/dL    2.9-6.0      H     

       

 

             Lab Interpretation (test code = Abnormal                           

    



             53739-8)                                            



[facility] 
04:10:52





             Test Item    Value        Reference Range Interpretation Comments

 

             HGB A1C (test code = 7.6 %        4.0-5.7      H            



             4548-4)                                             

 

             RICK (test code = RICK) Reference                              



                          RangesNormal:                           



                          <5.7%Prediabetes:                           



                          5.7 - 6.4%Diabetes:                           



                          > 6.5%                                 

 

             Lab Interpretation (test Abnormal                               



             code = 89862-6)                                        



[facility] (NA, K, CL, CO2, 
GLUCOSE, BUN, CREATININE, CA)2021 00:06:53





             Test Item    Value        Reference Range Interpretation Comments

 

             NA (test code = 138 mmol/L   135-145                   



             8952101823)                                         

 

             K (test code = 4.1 mmol/L   3.5-5.0                   



             9367978495)                                         

 

             CL (test code = 99 mmol/L                        



             0741557409)                                         

 

             CO2 TOTAL (test code = 35 mmol/L    23-31        H            



             7183558840)                                         

 

             AGAP (test code =              2-16                      



             1206331708)                                         

 

             BUN (test code = 21 mg/dL     7-23                      



             9542785482)                                         

 

             GLUCOSE (test code = 253 mg/dL           H            



             9530494948)                                         

 

             CREATININE (test code = 0.91 mg/dL   0.50-1.04                 



             7280914914)                                         

 

             CALCIUM (test code = 8.6 mg/dL    8.6-10.6                  



             8138774341)                                         

 

             eGFR (test code =              mL/min/1.73m2              



             2947591728)                                         

 

             RICK (test code = RICK) Association of                           



                          Glomerular Filtration                           



                          Rate (GFR) and Staging                           



                          of Kidney Disease*                           



                          +---------------------                           



                          --+-------------------                           



                          --+-------------------                           



                          ------+| GFR                           



                          (mL/min/1.73 m2) ?|                           



                          With Kidney Damage ?|                           



                          ?Without Kidney                           



                          Damage+---------------                           



                          --------+-------------                           



                          --------+-------------                           



                          ------------+| ?>90 ?                           



                          ? ? ? ? ? ? ? ?|                           



                          ?Stage one ? ? ? ? ?|                           



                          ? Normal ? ? ? ? ? ? ?                           



                          ?+--------------------                           



                          ---+------------------                           



                          ---+------------------                           



                          -------+| ?60-89 ? ? ?                           



                          ? ? ? ? ?| ?Stage two                           



                          ? ? ? ? ?| ? Decreased                           



                          GFR ? ? ? ?                            



                          +---------------------                           



                          --+-------------------                           



                          --+-------------------                           



                          ------+| ?30-59 ? ? ?                           



                          ? ? ? ? ?| ?Stage                           



                          three ? ? ? ?| ? Stage                           



                          three ? ? ? ? ?                           



                          +---------------------                           



                          --+-------------------                           



                          --+-------------------                           



                          ------+| ?15-29 ? ? ?                           



                          ? ? ? ? ?| ?Stage four                           



                          ? ? ? ? | ? Stage four                           



                          ? ? ? ? ?                              



                          ?+--------------------                           



                          ---+------------------                           



                          ---+------------------                           



                          -------+| ?<15 (or                           



                          dialysis) ? ?| ?Stage                           



                          five ? ? ? ? | ? Stage                           



                          five ? ? ? ? ?                           



                          ?+--------------------                           



                          ---+------------------                           



                          ---+------------------                           



                          -------+ *Each stage                           



                          assumes the associated                           



                          GFR level has been in                           



                          effect for at least                           



                          three months. ?Stages                           



                          1 to 5, with or                           



                          without kidney                           



                          disease, indicate                           



                          chronic kidney                           



                          disease. Notes:                           



                          Determination of                           



                          stages one and two                           



                          (with eGFR                             



                          >59mL/min/1.73 m2)                           



                          requires estimation of                           



                          kidney damage for at                           



                          least three months as                           



                          defined by structural                           



                          or functional                           



                          abnormalities of the                           



                          kidney, manifested by                           



                          either:Pathological                           



                          abnormalities or                           



                          Markers of kidney                           



                          damage (including                           



                          abnormalities in the                           



                          composition of the                           



                          blood or urine or                           



                          abnormalities in                           



                          imaging tests).                           

 

             Lab Interpretation Abnormal                               



             (test code = 51591-5)                                        



[facility] WITH [id number] 21:16:41





             Test Item    Value        Reference Range Interpretation Comments

 

             WBC (test code =              See_Comment  L             [Automated



             6690-2)                                             message] The



                                                                 system which



                                                                 generated this



                                                                 result transmit

eduard



                                                                 reference range

:



                                                                 4.30 - 11.10



                                                                 10*3/?L. The



                                                                 reference range



                                                                 was not used to



                                                                 interpret this



                                                                 result as



                                                                 normal/abnormal

.

 

             RBC (test code =              See_Comment  L             [Automated



             789-8)                                              message] The



                                                                 system which



                                                                 generated this



                                                                 result transmit

eduard



                                                                 reference range

:



                                                                 3.93 - 5.25



                                                                 10*6/?L. The



                                                                 reference range



                                                                 was not used to



                                                                 interpret this



                                                                 result as



                                                                 normal/abnormal

.

 

             HGB (test code = 9.3 g/dL     11.6-15.0    L            



             718-7)                                              

 

             HCT (test code = 29.5 %       35.7-45.2    L            



             4544-3)                                             

 

             MCV (test code = 95.5 fL      80.6-95.5                 



             787-2)                                              

 

             MCH (test code = 30.1 pg      25.9-32.8                 



             785-6)                                              

 

             MCHC (test code = 31.5 g/dL    31.6-35.1    L            



             786-4)                                              

 

             RDW-SD (test code = 57.5 fL      39.0-49.9    H            



             37334-3)                                            

 

             RDW-CV (test code = 16.8 %       12.0-15.5    H            



             788-0)                                              

 

             PLT (test code =              See_Comment  LL            [Automated



             777-3)                                              message] The



                                                                 system which



                                                                 generated this



                                                                 result transmit

eduard



                                                                 reference range

:



                                                                 166 - 358 10*3/

?L.



                                                                 The reference



                                                                 range was not u

sed



                                                                 to interpret th

is



                                                                 result as



                                                                 normal/abnormal

.

 

             MPV (test code = 10.5 fL      9.5-12.9                  



             87527-6)                                            

 

             IPF % (test code = 1.4 %        1.3-7.7                   Platelet 

count



             7198935278)                                         measured by



                                                                 fluorescence



                                                                 method.

 

             NRBC/100 WBC (test              See_Comment                [Automat

ed



             code = 9000132523)                                        message] 

The



                                                                 system which



                                                                 generated this



                                                                 result transmit

eduard



                                                                 reference range

:



                                                                 0.0 - 10.0 /100



                                                                 WBCs. The



                                                                 reference range



                                                                 was not used to



                                                                 interpret this



                                                                 result as



                                                                 normal/abnormal

.

 

             NRBC x10^3 (test code <0.01        See_Comment                [Auto

mated



             = 2995173741)                                        message] The



                                                                 system which



                                                                 generated this



                                                                 result transmit

eduard



                                                                 reference range

:



                                                                 10*3/?L. The



                                                                 reference range



                                                                 was not used to



                                                                 interpret this



                                                                 result as



                                                                 normal/abnormal

.

 

             GRAN MAT (NEUT) % 69.0 %                                 



             (test code = 770-8)                                        

 

             IMM GRAN % (test code 0.40 %                                 



             = 2501201009)                                        

 

             LYMPH % (test code = 23.9 %                                 



             736-9)                                              

 

             MONO % (test code = 5.6 %                                  



             5905-5)                                             

 

             EOS % (test code = 0.7 %                                  



             713-8)                                              

 

             BASO % (test code = 0.4 %                                  



             706-2)                                              

 

             GRAN MAT x10^3(ANC) 1.97 10*3/uL 1.88-7.09                 



             (test code =                                        



             1129129949)                                         

 

             IMM GRAN x10^3 (test <0.03        0.00-0.06                 



             code = 0119592834)                                        

 

             LYMPH x10^3 (test 0.68 10*3/uL 1.32-3.29    L            



             code = 731-0)                                        

 

             MONO x10^3 (test code 0.16 10*3/uL 0.33-0.92    L            



             = 742-7)                                            

 

             EOS x10^3 (test code <0.03        0.03-0.39    L            



             = 711-2)                                            

 

             BASO x10^3 (test code <0.03        0.01-0.07                 



             = 704-7)                                            

 

             TARGET CELLS (test 2+           See_Comment  A             [Automat

ed



             code = 51073-9)                                        message] The



                                                                 system which



                                                                 generated this



                                                                 result transmit

eduard



                                                                 reference range

:



                                                                 (none). The



                                                                 reference range



                                                                 was not used to



                                                                 interpret this



                                                                 result as



                                                                 normal/abnormal

.

 

             PLT ESTIMATE (test Critically   Normal       AA           



             code = 9317-9) Decreased                              

 

             Lab Interpretation Abnormal                               



             (test code = 71440-4)                                        



[facility] METABOLIC PANEL (NA, K, CL, CO2, 
GLUCOSE, BUN, CREATININE, CA)2021 20:48:54





             Test Item    Value        Reference Range Interpretation Comments

 

             NA (test code = 137 mmol/L   135-145                   



             4937601252)                                         

 

             K (test code = 4.5 mmol/L   3.5-5.0                   



             4523486227)                                         

 

             CL (test code = 101 mmol/L                       



             2284869520)                                         

 

             CO2 TOTAL (test code = 32 mmol/L    23-31        H            



             6644275525)                                         

 

             AGAP (test code =              2-16                      



             9682899130)                                         

 

             BUN (test code = 21 mg/dL     7-23                      



             4182050501)                                         

 

             GLUCOSE (test code = 321 mg/dL           H            



             3158488293)                                         

 

             CREATININE (test code = 0.86 mg/dL   0.50-1.04                 



             3449465738)                                         

 

             CALCIUM (test code = 8.4 mg/dL    8.6-10.6     L            



             5778794527)                                         

 

             eGFR (test code =              mL/min/1.73m2              



             8451580591)                                         

 

             RICK (test code = RICK) Association of                           



                          Glomerular Filtration                           



                          Rate (GFR) and Staging                           



                          of Kidney Disease*                           



                          +---------------------                           



                          --+-------------------                           



                          --+-------------------                           



                          ------+| GFR                           



                          (mL/min/1.73 m2) ?|                           



                          With Kidney Damage ?|                           



                          ?Without Kidney                           



                          Damage+---------------                           



                          --------+-------------                           



                          --------+-------------                           



                          ------------+| ?>90 ?                           



                          ? ? ? ? ? ? ? ?|                           



                          ?Stage one ? ? ? ? ?|                           



                          ? Normal ? ? ? ? ? ? ?                           



                          ?+--------------------                           



                          ---+------------------                           



                          ---+------------------                           



                          -------+| ?60-89 ? ? ?                           



                          ? ? ? ? ?| ?Stage two                           



                          ? ? ? ? ?| ? Decreased                           



                          GFR ? ? ? ?                            



                          +---------------------                           



                          --+-------------------                           



                          --+-------------------                           



                          ------+| ?30-59 ? ? ?                           



                          ? ? ? ? ?| ?Stage                           



                          three ? ? ? ?| ? Stage                           



                          three ? ? ? ? ?                           



                          +---------------------                           



                          --+-------------------                           



                          --+-------------------                           



                          ------+| ?15-29 ? ? ?                           



                          ? ? ? ? ?| ?Stage four                           



                          ? ? ? ? | ? Stage four                           



                          ? ? ? ? ?                              



                          ?+--------------------                           



                          ---+------------------                           



                          ---+------------------                           



                          -------+| ?<15 (or                           



                          dialysis) ? ?| ?Stage                           



                          five ? ? ? ? | ? Stage                           



                          five ? ? ? ? ?                           



                          ?+--------------------                           



                          ---+------------------                           



                          ---+------------------                           



                          -------+ *Each stage                           



                          assumes the associated                           



                          GFR level has been in                           



                          effect for at least                           



                          three months. ?Stages                           



                          1 to 5, with or                           



                          without kidney                           



                          disease, indicate                           



                          chronic kidney                           



                          disease. Notes:                           



                          Determination of                           



                          stages one and two                           



                          (with eGFR                             



                          >59mL/min/1.73 m2)                           



                          requires estimation of                           



                          kidney damage for at                           



                          least three months as                           



                          defined by structural                           



                          or functional                           



                          abnormalities of the                           



                          kidney, manifested by                           



                          either:Pathological                           



                          abnormalities or                           



                          Markers of kidney                           



                          damage (including                           



                          abnormalities in the                           



                          composition of the                           



                          blood or urine or                           



                          abnormalities in                           



                          imaging tests).                           

 

             Lab Interpretation Abnormal                               



             (test code = 12384-4)                                        



[facility] (ALB,T.PRO,BILI
T,BU/BC,ALT,AST,ALK PHOS)2021 20:48:54





             Test Item    Value        Reference Range Interpretation Comments

 

             TOTAL BILI (test code = 3895424678) 1.3 mg/dL    0.1-1.1      H    

        

 

             BILI UNCON (test code = 9852892061) 0.9 mg/dL    0.1-1.1           

        

 

             BILI CONJ (test code = 9145875915) 0.0 mg/dL    0.0-0.3            

       

 

             T PROTEIN (test code = 7432012289) 7.1 g/dL     6.3-8.2            

       

 

             ALBUMIN (test code = 5887879162) 3.1 g/dL     3.5-5.0      L       

     

 

             ALK PHOS (test code = 6835095305) 106 U/L                    

      

 

             ALTv (test code = 1742-6) 22 U/L       5-35                      

 

             AST(SGOT) (test code = 0827006258) 48 U/L       13-40        H     

       

 

             Lab Interpretation (test code = Abnormal                           

    



             67390-7)                                            



[facility] 20[facility]48:54





             Test Item    Value        Reference    Interpretation Comments



                                       Range                     

 

             TROPONIN I (test 0.010 ng/mL  See_Comment                [Automated



             code = 3003700711)                                        message] 

The



                                                                 system which



                                                                 generated this



                                                                 result



                                                                 transmitted



                                                                 reference range

:



                                                                 <=0.034. The



                                                                 reference range



                                                                 was not used to



                                                                 interpret this



                                                                 result as



                                                                 normal/abnormal

.

 

             RICK (test code = Reference (Normal)                           



             RICK)         Range (defined by                           



                          the 99th percentile                           



                          reference limit): <=                           



                          0.034 ng/mL Note:                           



                          Cardiac troponin                           



                          begins to rise 3-4                           



                          hours after the                           



                          onset of ischemia.                           



                          Repeat in 4-6 hours                           



                          if the sample was                           



                          drawn within 3-4                           



                          hours of the onset                           



                          of the symptom and                           



                          found normal.                           



                          Diagnosis of                           



                          myocardial injury is                           



                          made with acute                           



                          changes in cTn                           



                          concentrations with                           



                          at least one serial                           



                          sample above the                           



                          99th percentile                           



                          upper reference                           



                          limit (URL), taken                           



                          together with the                           



                          patient's clinical                           



                          presentation.                           



                          Biotin has been                           



                          reported to cause a                           



                          negative bias,                           



                          interpret results                           



                          relative to                            



                          patient's use of                           



                          biotin.                                

 

             Lab Interpretation Normal                                 



             (test code =                                        



             19372-6)                                            



[id number] 20:48:54





             Test Item    Value        Reference Range Interpretation Comments

 

             NT-proBNP (test code 3360 pg/mL   See_Comment  H             [Autom

ated



             = 5057721340)                                        message] The



                                                                 system which



                                                                 generated this



                                                                 result



                                                                 transmitted



                                                                 reference range

:



                                                                 <=125. The



                                                                 reference range



                                                                 was not used to



                                                                 interpret this



                                                                 result as



                                                                 normal/abnormal

.

 

             RICK (test code = RICK) Biotin has been                           



                          reported to                            



                          cause a negative                           



                          bias, interpret                           



                          results relative                           



                          to patient's use                           



                          of biotin.                             

 

             Lab Interpretation Abnormal                               



             (test code = 34261-7)                                        



[facility], [id number] 20:36:31





             Test Item    Value        Reference Range Interpretation Comments

 

             AMMONIA (test code = 19 umol/L    9-33                      Slight 

hemolysis



             [id number])                                         

 

             Lab Interpretation (test Normal                                 



             code = 42644-3)                                        



[facility]20[facility] 02:34:00





             Test Item    Value        Reference Range Interpretation Comments

 

             APTT Patient (test              See_Comment                [Automat

ed



             code = 3173-2)                                        message] The



                                                                 system which



                                                                 generated this



                                                                 result



                                                                 transmitted



                                                                 reference range

:



                                                                 23 - 38 Seconds

.



                                                                 The reference



                                                                 range was not



                                                                 used to interpr

et



                                                                 this result as



                                                                 normal/abnormal

.

 

             RICK (test code = RICK) The [facility] [place] patient                           



                          population mean                           



                          normal value for                           



                          aPTT is 30                             



                          seconds.                               

 

             Lab Interpretation Normal                                 



             (test code = 14090-1)                                        



[facility] 02:34:00





             Test Item    Value        Reference Range Interpretation Comments

 

             APPEARANCE (test code = Clear        Clear                     



             1683151173)                                         

 

             COLOR (test code = Yellow       Yellow                    



             7707062963)                                         

 

             PH (test code =              4.8-8.0                   



             8147054316)                                         

 

             SP GRAVITY (test code =              1.003-1.030               



             1957221386)                                         

 

             GLU U QUAL (test code = 150 mg/dL    Normal       A            



             0231365672)                                         

 

             BLOOD (test code = 2+           Negative     A            



             0641738588)                                         

 

             KETONES (test code = Negative     Negative                  



             0115250473)                                         

 

             PROTEIN (test code = 100 mg/dL    Negative     A            



             2887-8)                                             

 

             UROBILIN (test code = 2.0 mg/dL    Normal       A            



             4092532272)                                         

 

             BILIRUBIN (test code = Negative     Negative                  



             2191860270)                                         

 

             NITRITE (test code = Negative     Negative                  



             6608054185)                                         

 

             LEUK MOE (test code = 25/uL        Negative     A            



             5816734071)                                         

 

             RBC/HPF (test code =              See_Comment  H             [Autom

ated message]



             1590239380)                                         The system [employer]



                                                                 generated this



                                                                 result transmit

eduard



                                                                 reference range

: 0 -



                                                                 3 HPF. The refe

rence



                                                                 range was not u

sed



                                                                 to interpret th

is



                                                                 result as



                                                                 normal/abnormal

.

 

             WBC/HPF (test code =              See_Comment                [Autom

ated message]



             4251198859)                                         The system [employer]



                                                                 generated this



                                                                 result transmit

eduard



                                                                 reference range

: 0 -



                                                                 5 HPF. The refe

rence



                                                                 range was not u

sed



                                                                 to interpret th

is



                                                                 result as



                                                                 normal/abnormal

.

 

             BACTERIA (test code = Negative     Negative                  



             2593107997)                                         

 

             MUCOUS (test code = Slight       Negative LPF A            



             6909225430)                                         

 

             SQ EPITH (test code =              HPF                       



             6704060351)                                         

 

             Lab Interpretation (test Abnormal                               



             code = 56685-8)                                        



[facility] CampusPROTHROM[facility] / [id number] 02:31:00





             Test Item    Value        Reference Range Interpretation Comments

 

             PROTIME PATIENT (test              See_Comment                [Auto

mated message]



             code = 5964-2)                                        The system wh

ich



                                                                 generated this 

result



                                                                 transmitted ref

erence



                                                                 range: 12.0 - 1

4.7



                                                                 Seconds. The re

ference



                                                                 range was not u

sed to



                                                                 interpret this 

result



                                                                 as normal/abnor

mal.

 

             INR (test code = 6301-6)                                        Nor

mal INR <1.1;



                                                                 Warfarin Therap

eutic



                                                                 range 2.0 to 3.

0 or



                                                                 2.5 to 3.5, dep

ending



                                                                 upon the indica

tions.

 

             Lab Interpretation (test Normal                                 



             code = 19463-5)                                        



[facility] CampusAM[facility], [id number] 02:22:00





             Test Item    Value        Reference Range Interpretation Comments

 

             AMMONIA (test code = 9669186630) 15 umol/L    9-33                 

     

 

             Lab Interpretation (test code = Normal                             

    



             17431-3)                                            



[facility] GLUCOSE (AUTOMATED)2020 07:33:00





             Test Item    Value        Reference Range Interpretation Comments

 

             POCT GLU (test code = 9179973352) 434 mg/dL           H      

      

 

             Lab Interpretation (test code = Abnormal                           

    



             63769-8)                                            



[facility] GLUCOSE(AGE 0-30DAYS)2020 07:28:00





             Test Item    Value        Reference Range Interpretation Comments

 

             POCT Glu (age 0-30days) (test code 434 mg/dl           A     

       



             = 3343)                                             

 

             Lab Interpretation (test code = Abnormal                           

    



             46242-8)                                            



[facility] with [id number] 04:38:00





             Test Item    Value        Reference Range Interpretation Comments

 

             WBC (test code =              See_Comment                [Automated



             7090-2)                                             message] The sy

stem



                                                                 which generated



                                                                 this result



                                                                 transmitted



                                                                 reference range

:



                                                                 4.30 - 11.10



                                                                 10*3/?L. The



                                                                 reference range

 was



                                                                 not used to



                                                                 interpret this



                                                                 result as



                                                                 normal/abnormal

.

 

             RBC (test code =              See_Comment  L             [Automated



             709-8)                                              message] The sy

stem



                                                                 which generated



                                                                 this result



                                                                 transmitted



                                                                 reference range

:



                                                                 3.93 - 5.25



                                                                 10*6/?L. The



                                                                 reference range

 was



                                                                 not used to



                                                                 interpret this



                                                                 result as



                                                                 normal/abnormal

.

 

             HGB (test code = 11.0 g/dL    11.6-15      L            



             718-7)                                              

 

             HCT (test code = 34.7 %       35.7-45.2    L            



             4544-3)                                             

 

             MCV (test code = 94.6 fL      80.6-95.5                 



             787-2)                                              

 

             MCH (test code = 30.0 pg      25.9-32.8                 



             785-6)                                              

 

             MCHC (test code = 31.7 g/dL    31.6-35.1                 



             786-4)                                              

 

             RDW-SD (test code = 52.5 fL      39-49.9      H            



             35406-7)                                            

 

             RDW-CV (test code = 15.2 %       12-15.5                   



             788-0)                                              

 

             PLT (test code =              See_Comment  L             [Automated



             777-3)                                              message] The sy

stem



                                                                 which generated



                                                                 this result



                                                                 transmitted



                                                                 reference range

:



                                                                 166 - 358 10*3/

?L.



                                                                 The reference r

olman



                                                                 was not used to



                                                                 interpret this



                                                                 result as



                                                                 normal/abnormal

.

 

             MPV (test code = 9.5 fL       9.5-12.9                  



             50881-5)                                            

 

             IPF % (test code = 2.5 %        1.3-7.7                   Platelet 

count



             9092396488)                                         measured by



                                                                 fluorescence



                                                                 method.

 

             NRBC/100 WBC (test              See_Comment                [Automat

ed



             code = 4061729731)                                        message] 

The system



                                                                 which generated



                                                                 this result



                                                                 transmitted



                                                                 reference range

:



                                                                 0.0 - 10.0 /100



                                                                 WBCs. The refer

ence



                                                                 range was not u

sed



                                                                 to interpret th

is



                                                                 result as



                                                                 normal/abnormal

.

 

             NRBC x10^3 (test code <0.01        See_Comment                [Auto

mated



             = 8576324630)                                        message] The s

ystem



                                                                 which generated



                                                                 this result



                                                                 transmitted



                                                                 reference range

:



                                                                 10*3/?L. The



                                                                 reference range

 was



                                                                 not used to



                                                                 interpret this



                                                                 result as



                                                                 normal/abnormal

.

 

             GRAN MAT (NEUT) % 84.8 %                                 



             (test code = 770-8)                                        

 

             IMM GRAN % (test code 0.50 %                                 



             = 1700145822)                                        

 

             LYMPH % (test code = 10.4 %                                 



             736-9)                                              

 

             MONO % (test code = 4.1 %                                  



             5905-5)                                             

 

             EOS % (test code = 0.0 %                                  



             713-8)                                              

 

             BASO % (test code = 0.2 %                                  



             706-2)                                              

 

             GRAN MAT x10^3(ANC) 4.81 10*3/uL 1.88-7.09                 



             (test code =                                        



             8090110387)                                         

 

             IMM GRAN x10^3 (test 0.03 10*3/uL 0-0.06                    



             code = 9206259782)                                        

 

             LYMPH x10^3 (test code 0.59 10*3/uL 1.32-3.29    L            



             = 731-0)                                            

 

             MONO x10^3 (test code 0.23 10*3/uL 0.33-0.92    L            



             = 742-7)                                            

 

             EOS x10^3 (test code = <0.03        0.03-0.39    L            



             711-2)                                              

 

             BASO x10^3 (test code <0.03        0.01-0.07                 



             = 704-7)                                            

 

             PLT ESTIMATE (test Decreased    Normal       A            



             code = 9317-9)                                        

 

             Lab Interpretation Abnormal                               



             (test code = 64768-3)                                        



[facility]





             Test Item    Value        Reference Range Interpretation Comments

 

             APPEARANCE (test code = Clear        Clear                     



             8815878801)                                         

 

             COLOR (test code = Yellow       Yellow                    



             2226354521)                                         

 

             PH (test code =              4.8-8.0                   



             0151135110)                                         

 

             SP GRAVITY (test code =              1.003-1.030               



             6867084724)                                         

 

             GLU U QUAL (test code = 500 mg/dL    Normal       A            



             8815807498)                                         

 

             BLOOD (test code = Negative     Negative                  



             2413887805)                                         

 

             KETONES (test code = Negative     Negative                  



             7483529807)                                         

 

             PROTEIN (test code = Negative     Negative                  



             2887-8)                                             

 

             UROBILIN (test code = Normal       Normal                    



             0462553552)                                         

 

             BILIRUBIN (test code = Negative     Negative                  



             0771031682)                                         

 

             NITRITE (test code = Negative     Negative                  



             6247537380)                                         

 

             LEUK MOE (test code = Negative     Negative                  



             1864335809)                                         

 

             RBC/HPF (test code = <1           See_Comment                [Autom

ated message]



             0810963762)                                         The system [employer]



                                                                 generated this



                                                                 result transmit

eduard



                                                                 reference range

: 0 -



                                                                 3 HPF. The refe

rence



                                                                 range was not u

sed



                                                                 to interpret th

is



                                                                 result as



                                                                 normal/abnormal

.

 

             WBC/HPF (test code = <1           See_Comment                [Autom

ated message]



             3461334566)                                         The system [employer]



                                                                 generated this



                                                                 result transmit

eduard



                                                                 reference range

: 0 -



                                                                 5 HPF. The refe

rence



                                                                 range was not u

sed



                                                                 to interpret th

is



                                                                 result as



                                                                 normal/abnormal

.

 

             BACTERIA (test code = Few          Negative     A            



             2295616732)                                         

 

             SQ EPITH (test code =              HPF                       



             9301558063)                                         

 

             Lab Interpretation (test Abnormal                               



             code = 94547-4)                                        



[facility]. METABOLIC PANEL ([id number])2020 
04:18:00





             Test Item    Value        Reference Range Interpretation Comments

 

             NA (test code = 129 mmol/L   135-145      L            



             8548381986)                                         

 

             K (test code = 4.8 mmol/L   3.5-5                     



             4108200095)                                         

 

             CL (test code = 98 mmol/L                        



             3344212861)                                         

 

             CO2 TOTAL (test code = 22 mmol/L    23-31        L            



             6819791045)                                         

 

             AGAP (test code =              2-16                      



             3839244793)                                         

 

             BUN (test code = 38 mg/dL     7-23         H            



             6432871723)                                         

 

             GLUCOSE (test code = 745 mg/dL           HH           



             8587534641)                                         

 

             CREATININE (test code = 0.96 mg/dL   0.5-1.04                  



             5268596049)                                         

 

             TOTAL BILI (test code = 0.7 mg/dL    0.1-1.1                   



             0434260855)                                         

 

             CALCIUM (test code = 9.1 mg/dL    8.6-10.6                  



             1038722473)                                         

 

             T PROTEIN (test code = 7.8 g/dL     6.3-8.2                   



             4570094616)                                         

 

             ALBUMIN (test code = 3.5 g/dL     3.5-5                     



             4842507676)                                         

 

             ALK PHOS (test code = 194 U/L             H            



             9827669202)                                         

 

             ALTv (test code = 31 U/L       5-35                      



             1742-6)                                             

 

             AST(SGOT) (test code = 42 U/L       13-40        H            



             7985564322)                                         

 

             eGFR Calculation              mL/min/1.73m2              



             (Non-)                                        



             (test code =                                        



             3580043014)                                         

 

             eGFR Calculation              mL/min/1.73m2              



             (African American)                                        



             (test code =                                        



             3683143485)                                         

 

             RICK (test code = RICK) Association of                           



                          Glomerular Filtration                           



                          Rate (GFR) and Staging                           



                          of Kidney Disease*                           



                          +---------------------                           



                          --+-------------------                           



                          --+-------------------                           



                          ------+| GFR                           



                          (mL/min/1.73 m2) ?|                           



                          With Kidney Damage ?|                           



                          ?Without Kidney                           



                          Damage+---------------                           



                          --------+-------------                           



                          --------+-------------                           



                          ------------+| ?>90 ?                           



                          ? ? ? ? ? ? ? ?|                           



                          ?Stage one ? ? ? ? ?|                           



                          ? Normal ? ? ? ? ? ? ?                           



                          ?+--------------------                           



                          ---+------------------                           



                          ---+------------------                           



                          -------+| ?60-89 ? ? ?                           



                          ? ? ? ? ?| ?Stage two                           



                          ? ? ? ? ?| ? Decreased                           



                          GFR ? ? ? ?                            



                          +---------------------                           



                          --+-------------------                           



                          --+-------------------                           



                          ------+| ?30-59 ? ? ?                           



                          ? ? ? ? ?| ?Stage                           



                          three ? ? ? ?| ? Stage                           



                          three ? ? ? ? ?                           



                          +---------------------                           



                          --+-------------------                           



                          --+-------------------                           



                          ------+| ?15-29 ? ? ?                           



                          ? ? ? ? ?| ?Stage four                           



                          ? ? ? ? | ? Stage four                           



                          ? ? ? ? ?                              



                          ?+--------------------                           



                          ---+------------------                           



                          ---+------------------                           



                          -------+| ?<15 (or                           



                          dialysis) ? ?| ?Stage                           



                          five ? ? ? ? | ? Stage                           



                          five ? ? ? ? ?                           



                          ?+--------------------                           



                          ---+------------------                           



                          ---+------------------                           



                          -------+ *Each stage                           



                          assumes the associated                           



                          GFR level has been in                           



                          effect for at least                           



                          three months. ?Stages                           



                          1 to 5, with or                           



                          without kidney                           



                          disease, indicate                           



                          chronic kidney                           



                          disease. Notes:                           



                          Determination of                           



                          stages one and two                           



                          (with eGFR                             



                          >59mL/min/1.73 m2)                           



                          requires estimation of                           



                          kidney damage for at                           



                          least three months as                           



                          defined by structural                           



                          or functional                           



                          abnormalities of the                           



                          kidney, manifested by                           



                          either:Pathological                           



                          abnormalities or                           



                          Markers of kidney                           



                          damage (including                           



                          abnormalities in the                           



                          composition of the                           



                          blood or urine or                           



                          abnormalities in                           



                          imaging tests).                           

 

             Lab Interpretation Abnormal                               



             (test code = 42052-2)                                        



[facility] -27-40 04:16:00





             Test Item    Value        Reference Range Interpretation Comments

 

             TROPONIN I (test <0.012       See_Comment                [Automated



             code = 5235442683)                                        message] 

The



                                                                 system which



                                                                 generated this



                                                                 result



                                                                 transmitted



                                                                 reference range

:



                                                                 <=0.034 ng/mL.



                                                                 The reference



                                                                 range was not



                                                                 used to interpr

et



                                                                 this result as



                                                                 normal/abnormal

.

 

             RCIK (test code = Equal or Less than                           



             RICK)         0.034                                  



                          ng/ml---Normal                           



                          ?Note: Cardiac                           



                          troponin begins to                           



                          rise 3-4 hours                           



                          after the onset of                           



                          ischemia. Repeat                           



                          in 4-6 hours if                           



                          the sample was                           



                          drawn within 3-4                           



                          hours of the onset                           



                          of the symptom and                           



                          found normal.                           



                          Between 0.035 and                           



                          0.120 ng/mL---                           



                          Borderline.                            



                          Questionable                           



                          myocardial injury                           



                          or necrosis ?                           



                          ?Note: Serial                           



                          measurement may be                           



                          necessary to                           



                          confirm or exclude                           



                          the diagnosis of                           



                          myocardial injury                           



                          or necrosis;                           



                          Clinical                               



                          correlation                            



                          (symptoms, EKGs,                           



                          imaging studies,                           



                          and others)                            



                          required; Repeat                           



                          in 4-6 hours if                           



                          clinically                             



                          indicated. ? ? ? ?                           



                          Equal or Higher                           



                          than 0.121                             



                          ng/mL---Abnormal.                           



                          Myocardial Injury                           



                          or Necrosis Likely                           



                          ? ? ? ?  Biotin                           



                          has been reported                           



                          to cause a                             



                          negative bias,                           



                          interpret results                           



                          relative to                            



                          patient's use of                           



                          biotin. ? ? ? ? ?                           



                          ? ? ? ? ? ? ? ? ?                           



                          ? ? ? ? ? ? ? ?  ?                           



                          ? ? ? ? ? ? ? ? ?                           



                          ? ? ? ? ? ? ? ? ?                           



                          ? ? ? ? ? ? ? ? ?                           

 

             Lab Interpretation Normal                                 



             (test code =                                        



             85917-4)                                            



[id number] 04:13:00





             Test Item    Value        Reference Range Interpretation Comments

 

             NT-proBNP (test code 317 pg/mL    See_Comment  H             [Autom

ated



             = [id number])                                        message] The



                                                                 system which



                                                                 generated this



                                                                 result



                                                                 transmitted



                                                                 reference range

:



                                                                 <=125. The



                                                                 reference range



                                                                 was not used to



                                                                 interpret this



                                                                 result as



                                                                 normal/abnormal

.

 

             RICK (test code = RICK) Biotin has been                           



                          reported to                            



                          cause a negative                           



                          bias, interpret                           



                          results relative                           



                          to patient's use                           



                          of biotin.                             

 

             Lab Interpretation Abnormal                               



             (test code = 48691-5)                                        



[facility] 0-30DA[facility]2020 03:21:00





             Test Item    Value        Reference Range Interpretation Comments

 

             POCT Glu (age 0-30days) (test code = high                    

         



             3343)                                               

 

             Lab Interpretation (test code = Normal                             

    



             89989-4)                                            



[facility] 1 VW [id number] 22:27:15 1. No 
acute intrathoracic abnormality, specifically no detectableradiographic findings
to suggest COVID-19 pneumonia. Disclaimer: Generally, the findings on chest 
imaging in COVID-19 are notspecific, and overlap with other infections, 
including influenza, H1N1,SARS and MERS.According to the Centers for Disease 
Control (CDC) and the American Collegeof Radiology, viral testing remains the 
only specific method of diagnosiseven if CXR or CT findings are suggestive of 
COVID-19. PROCEDURE: CHEST XRAY oneview portable,  CLINICAL INDICATION: cough  
COMPARISON: 6/3/2020 FINDINGS: Lungs: Lungs are clear. No suspicious abnormality
is noted. Pleura: No pleural effusion or pneumothorax is seen. The heart is n
ormal insize. No acute bony abnormality. Multiple surgical clips are seen 
superimposed over the right axilla andright breast as well as adjacent to the 
aortic knob and left upper lung. [facility], Rad[facility] Results Inft User - 2020  
5:28 PM CDTPROCEDURE: CHEST XRAY one view portable, CLINICAL INDICATION: cough 
COMPARISON: 6/3/2020FINDINGS:Lungs: Lungs are clear. No suspicious abnormality 
is noted.Pleura: No pleural effusion or pneumothorax is seen. The heart is 
normal insize.No acute bony abnormality.Multiple surgical clips are seen 
superimposed over the right axilla andright breast as well as adjacent to the 
aortic knob and left upper lung.IMPRESSION1. No acute intrathoracic abnormality,
specifically no detectableradiographic findings to suggest COVID-19 
pneumonia.Disclaimer: Generally, the findings on chest imaging in COVID-19 are 
notspecific, and overlap with other infections, including influenza, H1N1,SARS 
and MERS.According to the Centers for Disease Control (CDC) and the American 
Collegeof Radiology, viral testing remains the only specific method of 
diagnosiseven if CXR or CT findings are suggestive of COVID-19.[facility] / [facility] - DRUG SCREEN [id number] 22:22:00





             Test Item    Value        Reference Range Interpretation Comments

 

             BENZO U (test code = Presumptive  Negative     A            



             1615130149)  Positive                               

 

             JONATHAN U (test code = Negative     Negative                  



             2412855559)                                         

 

             AMPHET (test code = Negative     Negative                  



             9258736560)                                         

 

             THC (test code = Negative     Negative                  



             5789040206)                                         

 

             METHADONE (test code Presumptive  Negative     A            



             = 0768991460) Positive                               

 

             Meth U (test code = Negative     Negative                  



             5185442847)                                         

 

             OPIATES (test code = Negative     Negative                  



             4456732204)                                         

 

             Cocaine Metabolite Negative     Negative                  



             (test code =                                        



             4101637993)                                         

 

             PROPOXY (test code = Negative     Negative                  



             3349256332)                                         

 

             Tric U (test code = Presumptive  Negative     A            Confirma

tion of



             1327151062)  Positive                               Presumptive



                                                                 Positive TCA



                                                                 result requires



                                                                 physician order



                                                                 and this will b

e



                                                                 sent to referen

ce



                                                                 lab.

 

             PCP (test code = Negative     Negative                  



             5373377447)                                         

 

             OXYCOD (test code = Negative     Negative                  



             1661337004)                                         

 

             RICK (test code = Urine Drug Cutoff                           



             RICK)         Ranges                                 



                          Benzodiazepines:                           



                          ? ? 150                                



                          ng/mLBarbiturates                           



                          : ? ? ? ?200                           



                          ng/mLAmphetamine:                           



                          ? ? ? ? 500                            



                          ng/mLCannabinoids                           



                          : ? ? ? ?50                            



                          ?ng/mLMethadone:                           



                          ? ? ? ? ? 200                           



                          ng/mLMethamphetam                           



                          ine: ? ? 500                           



                          ng/mL Opiates: ?                           



                          ? ? ? ? ? 100                           



                          ng/mL or 2000                           



                          ng/mLCocaine: ? ?                           



                          ? ? ? ? 150                            



                          ng/mLPropoxyphene                           



                          : ? ? ? ?300                           



                          ng/mLTricyclics:                           



                          ? ? ? ? ?300                           



                          ng/mLOxycodone: ?                           



                          ? ? ? ? 100                            



                          ng/mLPCP: ? ? ? ?                           



                          ? ? ? ? 25 ?ng/mL                           



                           The results are                           



                          to be used only                           



                          for medical                            



                          (i.e., treatment)                           



                          purposes.                              



                          Unconfirmed                            



                          screening results                           



                          must not be used                           



                          for non-medical                           



                          purposes (e.g.,                           



                          employment                             



                          testing, legal                           



                          testing).                              

 

             Lab Interpretation Abnormal                               



             (test code =                                        



             65730-5)                                            



[facility]inal[facility]2020[facility] 22:11:00





             Test Item    Value        Reference Range Interpretation Comments

 

             APPEARANCE (test code = Clear        Clear                     



             9754430025)                                         

 

             COLOR (test code = Yellow       Yellow                    



             0492980569)                                         

 

             PH (test code =              4.8-8.0                   



             1705931860)                                         

 

             SP GRAVITY (test code =              1.003-1.030               



             0657345302)                                         

 

             GLU U QUAL (test code = 500 mg/dL    Normal       A            



             1510798307)                                         

 

             BLOOD (test code = Negative     Negative                  



             4977374082)                                         

 

             KETONES (test code = Negative     Negative                  



             1993272939)                                         

 

             PROTEIN (test code = Negative     Negative                  



             2887-8)                                             

 

             UROBILIN (test code = 4.0 mg/dL    Normal       A            



             6762002503)                                         

 

             BILIRUBIN (test code = Negative     Negative                  



             8931561015)                                         

 

             NITRITE (test code = Negative     Negative                  



             7346733604)                                         

 

             LEUK MOE (test code = Negative     Negative                  



             3514018418)                                         

 

             RBC/HPF (test code =              See_Comment                [Autom

ated message]



             2161161394)                                         The system [employer]



                                                                 generated this



                                                                 result transmit

eduard



                                                                 reference range

: 0 -



                                                                 3 HPF. The refe

rence



                                                                 range was not u

sed



                                                                 to interpret th

is



                                                                 result as



                                                                 normal/abnormal

.

 

             WBC/HPF (test code = <1           See_Comment                [Autom

ated message]



             3298310740)                                         The system [employer]



                                                                 generated this



                                                                 result transmit

eduard



                                                                 reference range

: 0 -



                                                                 5 HPF. The refe

rence



                                                                 range was not u

sed



                                                                 to interpret th

is



                                                                 result as



                                                                 normal/abnormal

.

 

             BACTERIA (test code = Few          Negative     A            



             7938995440)                                         

 

             SQ EPITH (test code =              HPF                       



             1382173500)                                         

 

             Lab Interpretation (test Abnormal                               



             code = 60631-3)                                        



[facility]57-65 21:59:00





             Test Item    Value        Reference Range Interpretation Comments

 

             TROPONIN I (test <0.012       See_Comment                [Automated



             code = 4620747457)                                        message] 

The



                                                                 system which



                                                                 generated this



                                                                 result



                                                                 transmitted



                                                                 reference range

:



                                                                 <=0.034 ng/mL.



                                                                 The reference



                                                                 range was not



                                                                 used to interpr

et



                                                                 this result as



                                                                 normal/abnormal

.

 

             RICK (test code = Equal or Less than                           



             RICK)         0.034                                  



                          ng/ml---Normal                           



                          ?Note: Cardiac                           



                          troponin begins to                           



                          rise 3-4 hours                           



                          after the onset of                           



                          ischemia. Repeat                           



                          in 4-6 hours if                           



                          the sample was                           



                          drawn within 3-4                           



                          hours of the onset                           



                          of the symptom and                           



                          found normal.                           



                          Between 0.035 and                           



                          0.120 ng/mL---                           



                          Borderline.                            



                          Questionable                           



                          myocardial injury                           



                          or necrosis ?                           



                          ?Note: Serial                           



                          measurement may be                           



                          necessary to                           



                          confirm or exclude                           



                          the diagnosis of                           



                          myocardial injury                           



                          or necrosis;                           



                          Clinical                               



                          correlation                            



                          (symptoms, EKGs,                           



                          imaging studies,                           



                          and others)                            



                          required; Repeat                           



                          in 4-6 hours if                           



                          clinically                             



                          indicated. ? ? ? ?                           



                          Equal or Higher                           



                          than 0.121                             



                          ng/mL---Abnormal.                           



                          Myocardial Injury                           



                          or Necrosis Likely                           



                          ? ? ? ?  Biotin                           



                          has been reported                           



                          to cause a                             



                          negative bias,                           



                          interpret results                           



                          relative to                            



                          patient's use of                           



                          biotin. ? ? ? ? ?                           



                          ? ? ? ? ? ? ? ? ?                           



                          ? ? ? ? ? ? ? ?  ?                           



                          ? ? ? ? ? ? ? ? ?                           



                          ? ? ? ? ? ? ? ? ?                           



                          ? ? ? ? ? ? ? ? ?                           

 

             Lab Interpretation Normal                                 



             (test code =                                        



             51329-9)                                            



[id number] 21:56:00





             Test Item    Value        Reference Range Interpretation Comments

 

             NT-proBNP (test code 149 pg/mL    See_Comment  H             [Autom

ated



             = 5384465207)                                        message] The



                                                                 system which



                                                                 generated this



                                                                 result



                                                                 transmitted



                                                                 reference range

:



                                                                 <=125. The



                                                                 reference range



                                                                 was not used to



                                                                 interpret this



                                                                 result as



                                                                 normal/abnormal

.

 

             RICK (test code = RICK) Biotin has been                           



                          reported to                            



                          cause a negative                           



                          bias, interpret                           



                          results relative                           



                          to patient's use                           



                          of biotin.                             

 

             Lab Interpretation Abnormal                               



             (test code = 10181-6)                                        



[facility] (NA, K, CL, CO2, 
GLUCOSE, BUN, CREATININE, CA)2020 21:52:00





             Test Item    Value        Reference Range Interpretation Comments

 

             NA (test code = 132 mmol/L   135-145      L            



             4195452782)                                         

 

             K (test code = 5.1 mmol/L   3.5-5        H            



             5491908918)                                         

 

             CL (test code = 99 mmol/L                        



             0327549001)                                         

 

             CO2 TOTAL (test code = 27 mmol/L    23-31                     



             4017429724)                                         

 

             AGAP (test code =              2-16                      



             2407151931)                                         

 

             BUN (test code = 22 mg/dL     7-23                      



             1126969919)                                         

 

             GLUCOSE (test code = 563 mg/dL           HH           



             1299150895)                                         

 

             CREATININE (test code = 0.75 mg/dL   0.5-1.04                  



             5384556145)                                         

 

             CALCIUM (test code = 8.7 mg/dL    8.6-10.6                  



             0739306992)                                         

 

             eGFR Calculation              mL/min/1.73m2              



             (Non-)                                        



             (test code =                                        



             9344667232)                                         

 

             eGFR Calculation              mL/min/1.73m2              



             (African American)                                        



             (test code =                                        



             4840985941)                                         

 

             RICK (test code = RICK) Association of                           



                          Glomerular Filtration                           



                          Rate (GFR) and Staging                           



                          of Kidney Disease*                           



                          +---------------------                           



                          --+-------------------                           



                          --+-------------------                           



                          ------+| GFR                           



                          (mL/min/1.73 m2) ?|                           



                          With Kidney Damage ?|                           



                          ?Without Kidney                           



                          Damage+---------------                           



                          --------+-------------                           



                          --------+-------------                           



                          ------------+| ?>90 ?                           



                          ? ? ? ? ? ? ? ?|                           



                          ?Stage one ? ? ? ? ?|                           



                          ? Normal ? ? ? ? ? ? ?                           



                          ?+--------------------                           



                          ---+------------------                           



                          ---+------------------                           



                          -------+| ?60-89 ? ? ?                           



                          ? ? ? ? ?| ?Stage two                           



                          ? ? ? ? ?| ? Decreased                           



                          GFR ? ? ? ?                            



                          +---------------------                           



                          --+-------------------                           



                          --+-------------------                           



                          ------+| ?30-59 ? ? ?                           



                          ? ? ? ? ?| ?Stage                           



                          three ? ? ? ?| ? Stage                           



                          three ? ? ? ? ?                           



                          +---------------------                           



                          --+-------------------                           



                          --+-------------------                           



                          ------+| ?15-29 ? ? ?                           



                          ? ? ? ? ?| ?Stage four                           



                          ? ? ? ? | ? Stage four                           



                          ? ? ? ? ?                              



                          ?+--------------------                           



                          ---+------------------                           



                          ---+------------------                           



                          -------+| ?<15 (or                           



                          dialysis) ? ?| ?Stage                           



                          five ? ? ? ? | ? Stage                           



                          five ? ? ? ? ?                           



                          ?+--------------------                           



                          ---+------------------                           



                          ---+------------------                           



                          -------+ *Each stage                           



                          assumes the associated                           



                          GFR level has been in                           



                          effect for at least                           



                          three months. ?Stages                           



                          1 to 5, with or                           



                          without kidney                           



                          disease, indicate                           



                          chronic kidney                           



                          disease. Notes:                           



                          Determination of                           



                          stages one and two                           



                          (with eGFR                             



                          >59mL/min/1.73 m2)                           



                          requires estimation of                           



                          kidney damage for at                           



                          least three months as                           



                          defined by structural                           



                          or functional                           



                          abnormalities of the                           



                          kidney, manifested by                           



                          either:Pathological                           



                          abnormalities or                           



                          Markers of kidney                           



                          damage (including                           



                          abnormalities in the                           



                          composition of the                           



                          blood or urine or                           



                          abnormalities in                           



                          imaging tests).                           

 

             Lab Interpretation Abnormal                               



             (test code = 64568-0)                                        



[facility]ALB, T.PRO, BILI T, 
BU/BC, ALT, AST, ALK PHOS)2020 21:49:00





             Test Item    Value        Reference Range Interpretation Comments

 

             TOTAL BILI (test code = 7766341885) 0.9 mg/dL    0.1-1.1           

        

 

             BILI UNCON (test code = 8673027514) 0.8 mg/dL    0.1-1.1           

        

 

             BILI CONJ (test code = 7569360214) 0.0 mg/dL    0-0.3              

       

 

             T PROTEIN (test code = 0731431893) 7.4 g/dL     6.3-8.2            

       

 

             ALBUMIN (test code = 0487239379) 3.3 g/dL     3.5-5        L       

     

 

             ALK PHOS (test code = 3301661041) 148 U/L             H      

      

 

             ALTv (test code = 1742-6) 22 U/L       5-35                      

 

             AST(SGOT) (test code = 1235197658) 42 U/L       13-40        H     

       

 

             Lab Interpretation (test code = Abnormal                           

    



             54766-6)                                            



[id number] 21:49:00





             Test Item    Value        Reference Range Interpretation Comments

 

             LIPASE (test code = 3098179416) 86 U/L       0-220                 

    

 

             Lab Interpretation (test code = Normal                             

    



             62764-3)                                            



[facility]2020 
21:44:00





             Test Item    Value        Reference Range Interpretation Comments

 

             SARS-CoV-2 Rapid ID NOW Positive     Not Detected A            



             (test code = 45104-8)                                        

 

             RICK (test code = RICK) ID NOW COVID-19 Assay                        

   



                          is an isothermal                           



                          nucleic acid                           



                          amplification test                           



                          intended for the                           



                          qualitative detection                           



                          of nucleic acid from                           



                          SARS-CoV-2 viral RNA                           



                          in nasopharyngeal (NP)                           



                          specimens. It is used                           



                          under Emergency Use                           



                          Authorization (EUA) by                           



                          FDA. The limit of                           



                          detection (LOD) of the                           



                          assay is 125 Genome                           



                          Equivalents/mL. A                           



                          positive result is                           



                          indicative of the                           



                          presence of SARS-CoV-2                           



                          RNA. ?Clinical                           



                          correlation with                           



                          patient history and                           



                          other diagnostic                           



                          information is                           



                          necessary to determine                           



                          patient infection                           



                          status. A negative                           



                          (Not Detected) result                           



                          does not preclude                           



                          SARS-CoV-2 infection.                           



                          In patients with                           



                          clinical symptoms and                           



                          other tests that are                           



                          consistent with                           



                          SARS-CoV-2 infection,                           



                          negative results                           



                          should be treated as                           



                          presumptive negative                           



                          and a new specimen                           



                          should be tested with                           



                          alternative PCR                           



                          molecular test.                           



                          Invalid: Please                           



                          collect a new specimen                           



                          for repeat patient                           



                          testing if clinically                           



                          indicated.                             

 

             Lab Interpretation Abnormal                               



             (test code = 43472-3)                                        



[facility] with [id number] 21:40:00





             Test Item    Value        Reference Range Interpretation Comments

 

             WBC (test code =              See_Comment  L             [Automated



             6690-2)                                             message] The sy

stem



                                                                 which generated



                                                                 this result



                                                                 transmitted



                                                                 reference range

:



                                                                 4.30 - 11.10



                                                                 10*3/?L. The



                                                                 reference range

 was



                                                                 not used to



                                                                 interpret this



                                                                 result as



                                                                 normal/abnormal

.

 

             RBC (test code =              See_Comment  L             [Automated



             789-8)                                              message] The sy

stem



                                                                 which generated



                                                                 this result



                                                                 transmitted



                                                                 reference range

:



                                                                 3.93 - 5.25



                                                                 10*6/?L. The



                                                                 reference range

 was



                                                                 not used to



                                                                 interpret this



                                                                 result as



                                                                 normal/abnormal

.

 

             HGB (test code = 10.5 g/dL    11.6-15      L            



             718-7)                                              

 

             HCT (test code = 32.6 %       35.7-45.2    L            



             4544-3)                                             

 

             MCV (test code = 94.5 fL      80.6-95.5                 



             787-2)                                              

 

             MCH (test code = 30.4 pg      25.9-32.8                 



             785-6)                                              

 

             MCHC (test code = 32.2 g/dL    31.6-35.1                 



             786-4)                                              

 

             RDW-SD (test code = 52.6 fL      39-49.9      H            



             91334-9)                                            

 

             RDW-CV (test code = 15.6 %       12-15.5      H            



             788-0)                                              

 

             PLT (test code =              See_Comment  LL            [Automated



             777-3)                                              message] The sy

stem



                                                                 which generated



                                                                 this result



                                                                 transmitted



                                                                 reference range

:



                                                                 166 - 358 10*3/

?L.



                                                                 The reference r

olman



                                                                 was not used to



                                                                 interpret this



                                                                 result as



                                                                 normal/abnormal

.

 

             MPV (test code = 11.4 fL      9.5-12.9                  



             57798-5)                                            

 

             IPF % (test code = 4.6 %        1.3-7.7                   Platelet 

count



             [id number])                                         measured by



                                                                 fluorescence



                                                                 method.

 

             NRBC/100 WBC (test              See_Comment                [Automat

ed



             code = 5173102051)                                        message] 

The system



                                                                 which generated



                                                                 this result



                                                                 transmitted



                                                                 reference range

:



                                                                 0.0 - 10.0 /100



                                                                 WBCs. The refer

ence



                                                                 range was not u

sed



                                                                 to interpret th

is



                                                                 result as



                                                                 normal/abnormal

.

 

             NRBC x10^3 (test code <0.01        See_Comment                [Auto

mated



             = 5199124190)                                        message] The s

ystem



                                                                 which generated



                                                                 this result



                                                                 transmitted



                                                                 reference range

:



                                                                 10*3/?L. The



                                                                 reference range

 was



                                                                 not used to



                                                                 interpret this



                                                                 result as



                                                                 normal/abnormal

.

 

             GRAN MAT (NEUT) % 62.3 %                                 



             (test code = 770-8)                                        

 

             IMM GRAN % (test code 0.30 %                                 



             = 4144936488)                                        

 

             LYMPH % (test code = 30.3 %                                 



             736-9)                                              

 

             MONO % (test code = 5.8 %                                  



             5905-5)                                             

 

             EOS % (test code = 0.8 %                                  



             713-8)                                              

 

             BASO % (test code = 0.5 %                                  



             706-2)                                              

 

             GRAN MAT x10^3(ANC) 2.47 10*3/uL 1.88-7.09                 



             (test code =                                        



             7352465525)                                         

 

             IMM GRAN x10^3 (test <0.03        0-0.06                    



             code = 7437855409)                                        

 

             LYMPH x10^3 (test code 1.20 10*3/uL 1.32-3.29    L            



             = 731-0)                                            

 

             MONO x10^3 (test code 0.23 10*3/uL 0.33-0.92    L            



             = 742-7)                                            

 

             EOS x10^3 (test code = 0.03 10*3/uL 0.03-0.39                 



             711-2)                                              

 

             BASO x10^3 (test code <0.03        0.01-0.07                 



             = 704-7)                                            

 

             Lab Interpretation Abnormal                               



             (test code = 98584-8)                                        



[facility] CARE VENOUS BLOOD [id number] 20:50:00





             Test Item    Value        Reference Range Interpretation Comments

 

             PH (test code =              7.32-7.42    H            



             7985133010)                                         

 

             PCO2 CHRISTIANO (test code =              See_Comment  L             [Auto

mated message]



             9390994492)                                         The system [employer]



                                                                 generated this 

result



                                                                 transmitted ref

erence



                                                                 range: 41 - 51 

mmHg.



                                                                 The reference r

olman



                                                                 was not used to



                                                                 interpret this 

result



                                                                 as normal/abnor

mal.

 

             PO2 CHRISTIANO (test code =              See_Comment  HH            [Autom

ated message]



             3997189915)                                         The system [employer]



                                                                 generated this 

result



                                                                 transmitted ref

erence



                                                                 range: 25 - 40 

mmHg.



                                                                 The reference r

olman



                                                                 was not used to



                                                                 interpret this 

result



                                                                 as normal/abnor

mal.

 

             HCO3 CHRISTIANO (test code =              See_Comment                [Auto

mated message]



             8086613467)                                         The system [employer]



                                                                 generated this 

result



                                                                 transmitted ref

erence



                                                                 range: 24 - 28 

mEq/L.



                                                                 The reference r

olman



                                                                 was not used to



                                                                 interpret this 

result



                                                                 as normal/abnor

mal.

 

             AC VBE(BEAKER) (test              mEq/L                     



             code = 0944393387)                                        

 

             Lab Interpretation (test Abnormal                               



             code = 53061-8)                                        



[facility] [id number] 
04:55:51 1. ?Short segments of colonic wall thickening with engorgement of the 
vasarecta and surrounding inflammatory stranding at the hepatic flexure 
andsplenic flexure. These findings are favored to represent colitis which maybe 
infectious or inflammatory rather than hepatic colopathy. 2. ?Changes of cirrhos
is with sequelae of portal hypertension includingsmall volume ascites, small 
gastroesophageal varices, and splenomegaly. 3. ?Irregular hypoattenuating lesion
at the inferior aspect of segment VI,essentially unchanged from the 2020. 
This finding likely representsposttreatment change from the residual treated 
segment VI HCC. Attention onfollow-up. 4. ?Skin thickening noted over the left 
breast, correlation withmammography is recommended.  Preliminary Report Dictated
by Resident: Paul Mccall ?MD Liu., have reviewed this study 
and agree with theabove report.EXAM: CT ABDOMEN AND PELVIS WITH CONTRAST 
HISTORY: Abdominal distention COMPARISON: 2020. TECHNIQUE AND FINDINGS: 
Contiguous axial imaging from the level of the lungbases through the pubic 
symphysis was performed afterthe uncomplicatedadministration of 120 cc of 
intravenous Omnipaque contrast. Coronal andsagittal reconstructions were 
obtained. ?Auto mA and/or iterativereconstruction were used to reduce radiation 
dose. FINDINGS: Subsegmental atelectasis is noted in the lingula, right middle 
lobe, andlower lobes. Calcified granulomas are present within both lower lobes. 
The liver is enlarged measuring 20.3 cm in thecraniocaudal dimension.Cirrhotic 
liver morphology. Nodular contour. There is an irregular,wedge-shaped 5.5 x 1.8 
cm hypoattenuating lesion in segment VI with areasof subcapsular retraction. 
Punctate calcifications in segment V and VI. Noadditional focal lesions are 
seen. Status post cholecystectomy. Milddilatation of the common bile duct to 10 
mm, likely secondary to reservoirphenomenon. The spleen is enlarged measuring 
15.1 cm in the craniocaudal dimension. Theadrenals and pancreas are 
unremarkable. 2.9 cm right interpolar regionrenal cyst. Additional cortical 
hypodensities are too small to accuratelycharacterize. Small sliding-type hiatal
hernia. There is a short segment of thickenedtransverse colon at the level of 
the hepatic flexure with engorgement ofthe vasa recta and mild surrounding 
inflammatory changes. A similarthickened loop of bowel with surrounding 
inflammatory changes is noted atthe splenic flexure (2:63). Small volume 
perihepatic ascites. A small amount of fluid tracks along theleft paracolic 
gutter. Prominent portacaval lymph nodes measure up to 1.3cm. A lymph node at 
the portahepatis measures 1.2 cm in the short axis. Moderate calcified and 
noncalcified plaque affect the aortoiliacvasculature. Small esophageal varices 
suspected. Body wall anasarca. Mild spondylosis at L5-S1. Scarring along the 
anteriorabdominal wall is likely postsurgical. There is soft tissue density 
alongthe medial margin of the bilateral breasts, more prominent on the 
right,likely represent postsurgical change. There are adjacent surgical 
staplesbilaterally as well as in the axilla. Skin thickening isnoted over 
theleft breast.   [facility], Radiant Results Inft User - 2020 11:56 PM CDTEXAM:
CT ABDOMEN AND PELVIS WITH CONTRASTHISTORY: Abdominal distentionCOMPARISON: 
2020.TECHNIQUE AND FINDINGS:Contiguous axial imaging from the level of the 
lungbases through the pubic symphysis was performed after the 
uncomplicatedadministration of 120 cc of intravenous Omnipaque contrast. Coronal
andsagittalreconstructions were obtained.  Auto mA and/or 
iterativereconstruction were used to reduce radiationdose.FINDINGS:Subsegmental 
atelectasis is noted in the lingula, right middle lobe, andlower lobes. Ca
lcified granulomas are present within both lower lobes.The liver is enlarged 
measuring 20.3 cm in the craniocaudal dimension.Cirrhotic liver morphology. 
Nodular contour. There is an irregular,wedge-shaped 5.5 x 1.8 cm hypoattenuating
lesion in segment VI with areasof subcapsular retraction. Punctate ca
lcifications in segment V and VI. Noadditional focal lesions are seen. Status 
post cholecystectomy. Milddilatation of the common bile duct to 10 mm, likely 
secondary to reservoirphenomenon.The spleen is enlarged measuring 15.1 cm in the
craniocaudal dimension. Theadrenals and pancreas are unremarkable. 2.9 cm right 
interpolar regionrenal cyst. Additional cortical hypodensities are too small to 
accuratelycharacterize.Small sliding-type hiatal hernia. There is a short 
segment of thickenedtransverse colon at the level of the hepatic flexure with 
engorgement ofthe vasa recta and mild surrounding inflammatory changes. A 
similarthickened loop of bowel with surrounding inflammatory changes is noted 
atthesplenic flexure (2:63).Small volume perihepatic ascites. A small amount of 
fluid tracks along theleft paracolic gutter. Prominent portacaval lymph nodes 
measure up to 1.3cm. A lymph node at the priscilla hepatis measures 1.2 cm in the 
short axis.Moderate calcified and noncalcified plaque affect the aortoil
iacvasculature. Small esophageal varices suspected.Body wall anasarca. Mild 
spondylosis at L5-S1. Scarring along the anteriorabdominal wall is likely 
postsurgical. There is soft tissue density alongthemedial margin of the 
bilateral breasts, more prominent on the right,likely represent postsurgical mily
nge. There are adjacent surgical staplesbilaterally as well as in the axilla. 
Skin thickening is noted over theleft breast. IMPRESSION1.  Short segments of 
colonic wall thickening with engorgement of the vasarecta and surrounding 
inflammatory stranding at the hepatic flexure andsplenic flexure. These findings
are favored to represent colitis which maybe infectious or inflammatory rather 
than hepatic colopathy.2.  Changes of cirrhosis with sequelae of portal 
hypertension includingsmall volume ascites, small gastroesophageal varices, and 
splenomegaly.3.  Irregular hypoattenuating lesion at the inferior aspect of 
segment VI,essentially unchanged from the 2020. This finding likely 
representsposttreatment change from the residual treated segment VI HCC. 
Attention onfollow-up.4.  Skin thickening noted over the left breast, 
correlation withmammography is recommended.Preliminary Report Dictated by
Resident: Paul Oh MD., have reviewed this study and 
agree with theabove report.[facility] 2 Views
2020 02:49:00 No acute cardiopulmonary process. Preliminary Report 
Dictated by Resident: Paul Pineda MD., have 
reviewed this study and agree with theabove report.XR CHEST 2 VW Comparison: 
2020 History: sob  Findings: The lungs exhibit mildly prominent basilar 
reticular nodular opacities. Nopleural effusion, focal consolidation, or 
pneumothorax is identified.Surgical staples are scattered over the thorax. The 
cardiomediastinal silhouette is within normal limits for size. No acute osseous 
abnormality is present. [facility], Radiant Results Inft User - 2020  9:50 PM 
CDTXR CHEST2 VWComparison: 2020History: sob Findings:The lungs exhibit 
mildly prominent basilar reticular nodular opacities. Nopleural effusion, focal 
consolidation, or pneumothorax is identified.Surgical staples are scattered over
the thorax.The cardiomediastinal silhouette is within normal limits for size.No 
acute osseous abnormality is present.IMPRESSIONNo acute cardiopulmonary 
process.Preliminary Report Dictated by Resident: Paul Stephens MD., have reviewed this study and agree with theabove report.
[facility] WITH [id number] 01:39:00





             Test Item    Value        Reference Range Interpretation Comments

 

             WBC (test code =              See_Comment  L             [Automated



             5990-2)                                             message] The sy

stem



                                                                 which generated



                                                                 this result



                                                                 transmitted



                                                                 reference range

:



                                                                 4.30 - 11.10



                                                                 10*3/?L. The



                                                                 reference range

 was



                                                                 not used to



                                                                 interpret this



                                                                 result as



                                                                 normal/abnormal

.

 

             RBC (test code =              See_Comment  L             [Automated



             789-8)                                              message] The sy

stem



                                                                 which generated



                                                                 this result



                                                                 transmitted



                                                                 reference range

:



                                                                 3.93 - 5.25



                                                                 10*6/?L. The



                                                                 reference range

 was



                                                                 not used to



                                                                 interpret this



                                                                 result as



                                                                 normal/abnormal

.

 

             HGB (test code = 9.3 g/dL     11.6-15      L            



             718-7)                                              

 

             HCT (test code = 29.0 %       35.7-45.2    L            



             4544-3)                                             

 

             MCV (test code = 95.1 fL      80.6-95.5                 



             787-2)                                              

 

             MCH (test code = 30.5 pg      25.9-32.8                 



             785-6)                                              

 

             MCHC (test code = 32.1 g/dL    31.6-35.1                 



             786-4)                                              

 

             RDW-SD (test code = 64.8 fL      39-49.9      H            



             82151-8)                                            

 

             RDW-CV (test code = 19.3 %       12-15.5      H            



             788-0)                                              

 

             PLT (test code =              See_Comment  L             [Automated



             777-3)                                              message] The sy

stem



                                                                 which generated



                                                                 this result



                                                                 transmitted



                                                                 reference range

:



                                                                 166 - 358 10*3/

?L.



                                                                 The reference r

olman



                                                                 was not used to



                                                                 interpret this



                                                                 result as



                                                                 normal/abnormal

.

 

             MPV (test code = 10.0 fL      9.5-12.9                  



             71237-7)                                            

 

             IPF % (test code = 2.2 %        1.3-7.7                   Platelet 

count



             [id number])                                         measured by



                                                                 fluorescence



                                                                 method.

 

             NRBC/100 WBC (test              See_Comment                [Automat

ed



             code = 1831907727)                                        message] 

The system



                                                                 which generated



                                                                 this result



                                                                 transmitted



                                                                 reference range

:



                                                                 0.0 - 10.0 /100



                                                                 WBCs. The refer

ence



                                                                 range was not u

sed



                                                                 to interpret th

is



                                                                 result as



                                                                 normal/abnormal

.

 

             NRBC x10^3 (test code <0.01        See_Comment                [Auto

mated



             = [id number])                                        message] The s

ystem



                                                                 which generated



                                                                 this result



                                                                 transmitted



                                                                 reference range

:



                                                                 10*3/?L. The



                                                                 reference range

 was



                                                                 not used to



                                                                 interpret this



                                                                 result as



                                                                 normal/abnormal

.

 

             GRAN MAT (NEUT) % 60.1 %                                 



             (test code = 770-8)                                        

 

             IMM GRAN % (test code 0.50 %                                 



             = 8357303602)                                        

 

             LYMPH % (test code = 29.5 %                                 



             736-9)                                              

 

             MONO % (test code = 9.2 %                                  



             5905-5)                                             

 

             EOS % (test code = 0.5 %                                  



             713-8)                                              

 

             BASO % (test code = 0.2 %                                  



             706-2)                                              

 

             GRAN MAT x10^3(ANC) 2.43 10*3/uL 1.88-7.09                 



             (test code =                                        



             9728056347)                                         

 

             IMM GRAN x10^3 (test <0.03        0-0.06                    



             code = 8776823765)                                        

 

             LYMPH x10^3 (test code 1.19 10*3/uL 1.32-3.29    L            



             = 731-0)                                            

 

             MONO x10^3 (test code 0.37 10*3/uL 0.33-0.92                 



             = 742-7)                                            

 

             EOS x10^3 (test code = <0.03        0.03-0.39    L            



             711-2)                                              

 

             BASO x10^3 (test code <0.03        0.01-0.07                 



             = 704-7)                                            

 

             POLYCHROMASIA (test 2+           See_Comment                [Automa

eduard



             code = 00672-3)                                        message] The

 system



                                                                 which generated



                                                                 this result



                                                                 transmitted



                                                                 reference range

:



                                                                 2+. The referen

ce



                                                                 range was not u

sed



                                                                 to interpret th

is



                                                                 result as



                                                                 normal/abnormal

.

 

             ROUKARENAUX (test code = Present      See_Comment  A             [Auto

mated



             7797-4)                                             message] The sy

stem



                                                                 which generated



                                                                 this result



                                                                 transmitted



                                                                 reference range

:



                                                                 (none). The



                                                                 reference range

 was



                                                                 not used to



                                                                 interpret this



                                                                 result as



                                                                 normal/abnormal

.

 

             Lab Interpretation Abnormal                               



             (test code = 58352-6)                                        



[facility] (NA, K, CL, CO2, 
GLUCOSE, BUN, CREATININE, CA)2020 01:20:00





             Test Item    Value        Reference Range Interpretation Comments

 

             NA (test code = 133 mmol/L   135-145      L            



             7914756819)                                         

 

             K (test code = 4.4 mmol/L   3.5-5                     



             2499902071)                                         

 

             CL (test code = 105 mmol/L                       



             0791727887)                                         

 

             CO2 TOTAL (test code = 28 mmol/L    23-31                     



             5439111788)                                         

 

             AGAP (test code = <1           2-16         L            



             6669014202)                                         

 

             BUN (test code = 16 mg/dL     7-23                      



             8530075796)                                         

 

             GLUCOSE (test code = 291 mg/dL           H            



             9485973023)                                         

 

             CREATININE (test code = 0.66 mg/dL   0.5-1.04                  



             1098005223)                                         

 

             CALCIUM (test code = 8.4 mg/dL    8.6-10.6     L            



             3987880672)                                         

 

             eGFR Calculation              mL/min/1.73m2              



             (Non-)                                        



             (test code =                                        



             6520200203)                                         

 

             eGFR Calculation              mL/min/1.73m2              



             (African American)                                        



             (test code =                                        



             2176157818)                                         

 

             RICK (test code = RICK) Association of                           



                          Glomerular Filtration                           



                          Rate (GFR) and Staging                           



                          of Kidney Disease*                           



                          +---------------------                           



                          --+-------------------                           



                          --+-------------------                           



                          ------+| GFR                           



                          (mL/min/1.73 m2) ?|                           



                          With Kidney Damage ?|                           



                          ?Without Kidney                           



                          Damage+---------------                           



                          --------+-------------                           



                          --------+-------------                           



                          ------------+| ?>90 ?                           



                          ? ? ? ? ? ? ? ?|                           



                          ?Stage one ? ? ? ? ?|                           



                          ? Normal ? ? ? ? ? ? ?                           



                          ?+--------------------                           



                          ---+------------------                           



                          ---+------------------                           



                          -------+| ?60-89 ? ? ?                           



                          ? ? ? ? ?| ?Stage two                           



                          ? ? ? ? ?| ? Decreased                           



                          GFR ? ? ? ?                            



                          +---------------------                           



                          --+-------------------                           



                          --+-------------------                           



                          ------+| ?30-59 ? ? ?                           



                          ? ? ? ? ?| ?Stage                           



                          three ? ? ? ?| ? Stage                           



                          three ? ? ? ? ?                           



                          +---------------------                           



                          --+-------------------                           



                          --+-------------------                           



                          ------+| ?15-29 ? ? ?                           



                          ? ? ? ? ?| ?Stage four                           



                          ? ? ? ? | ? Stage four                           



                          ? ? ? ? ?                              



                          ?+--------------------                           



                          ---+------------------                           



                          ---+------------------                           



                          -------+| ?<15 (or                           



                          dialysis) ? ?| ?Stage                           



                          five ? ? ? ? | ? Stage                           



                          five ? ? ? ? ?                           



                          ?+--------------------                           



                          ---+------------------                           



                          ---+------------------                           



                          -------+ *Each stage                           



                          assumes the associated                           



                          GFR level has been in                           



                          effect for at least                           



                          three months. ?Stages                           



                          1 to 5, with or                           



                          without kidney                           



                          disease, indicate                           



                          chronic kidney                           



                          disease. Notes:                           



                          Determination of                           



                          stages one and two                           



                          (with eGFR                             



                          >59mL/min/1.73 m2)                           



                          requires estimation of                           



                          kidney damage for at                           



                          least three months as                           



                          defined by structural                           



                          or functional                           



                          abnormalities of the                           



                          kidney, manifested by                           



                          either:Pathological                           



                          abnormalities or                           



                          Markers of kidney                           



                          damage (including                           



                          abnormalities in the                           



                          composition of the                           



                          blood or urine or                           



                          abnormalities in                           



                          imaging tests).                           

 

             Lab Interpretation Abnormal                               



             (test code = 70262-6)                                        



[id number]





             Test Item    Value        Reference Range Interpretation Comments

 

             TROPONIN I (test <0.012       See_Comment                [Automated



             code = 0836076522)                                        message] 

The



                                                                 system which



                                                                 generated this



                                                                 result



                                                                 transmitted



                                                                 reference range

:



                                                                 <=0.034 ng/mL.



                                                                 The reference



                                                                 range was not



                                                                 used to interpr

et



                                                                 this result as



                                                                 normal/abnormal

.

 

             IRCK (test code = Equal or Less than                           



             RICK)         0.034                                  



                          ng/ml---Normal                           



                          ?Note: Cardiac                           



                          troponin begins to                           



                          rise 3-4 hours                           



                          after the onset of                           



                          ischemia. Repeat                           



                          in 4-6 hours if                           



                          the sample was                           



                          drawn within 3-4                           



                          hours of the onset                           



                          of the symptom and                           



                          found normal.                           



                          Between 0.035 and                           



                          0.120 ng/mL---                           



                          Borderline.                            



                          Questionable                           



                          myocardial injury                           



                          or necrosis ?                           



                          ?Note: Serial                           



                          measurement may be                           



                          necessary to                           



                          confirm or exclude                           



                          the diagnosis of                           



                          myocardial injury                           



                          or necrosis;                           



                          Clinical                               



                          correlation                            



                          (symptoms, EKGs,                           



                          imaging studies,                           



                          and others)                            



                          required; Repeat                           



                          in 4-6 hours if                           



                          clinically                             



                          indicated. ? ? ? ?                           



                          Equal or Higher                           



                          than 0.121                             



                          ng/mL---Abnormal.                           



                          Myocardial Injury                           



                          or Necrosis Likely                           



                          ? ? ? ?  Biotin                           



                          has been reported                           



                          to cause a                             



                          negative bias,                           



                          interpret results                           



                          relative to                            



                          patient's use of                           



                          biotin. ? ? ? ? ?                           



                          ? ? ? ? ? ? ? ? ?                           



                          ? ? ? ? ? ? ? ?  ?                           



                          ? ? ? ? ? ? ? ? ?                           



                          ? ? ? ? ? ? ? ? ?                           



                          ? ? ? ? ? ? ? ? ?                           

 

             Lab Interpretation Normal                                 



             (test code =                                        



             55016-5)                                            



[id number] 01:13:00





             Test Item    Value        Reference Range Interpretation Comments

 

             NT-proBNP (test code 407 pg/mL    See_Comment  H             [Autom

ated



             = [id number])                                        message] The



                                                                 system which



                                                                 generated this



                                                                 result



                                                                 transmitted



                                                                 reference range

:



                                                                 <=125. The



                                                                 reference range



                                                                 was not used to



                                                                 interpret this



                                                                 result as



                                                                 normal/abnormal

.

 

             RICK (test code = RICK) Biotin has been                           



                          reported to                            



                          cause a negative                           



                          bias, interpret                           



                          results relative                           



                          to patient's use                           



                          of biotin.                             

 

             Lab Interpretation Abnormal                               



             (test code = 71984-7)                                        



[facility] NOW [facility]ID TEST[facility]2020 
01:07:00





             Test Item    Value        Reference Range Interpretation Comments

 

             SARS-CoV-2 Rapid ID NOW Not Detected Not Detected              



             (test code = 22906-5)                                        

 

             RICK (test code = RICK) ID NOW COVID-19 Assay                        

   



                          is an isothermal                           



                          nucleic acid                           



                          amplification test                           



                          intended for the                           



                          qualitative detection                           



                          of nucleic acid from                           



                          SARS-CoV-2 viral RNA                           



                          in nasopharyngeal (NP)                           



                          specimens. It is used                           



                          under Emergency Use                           



                          Authorization (EUA) by                           



                          FDA. The limit of                           



                          detection (LOD) of the                           



                          assay is 125 Genome                           



                          Equivalents/mL. A                           



                          positive result is                           



                          indicative of the                           



                          presence of SARS-CoV-2                           



                          RNA. ?Clinical                           



                          correlation with                           



                          patient history and                           



                          other diagnostic                           



                          information is                           



                          necessary to determine                           



                          patient infection                           



                          status. A negative                           



                          (Not Detected) result                           



                          does not preclude                           



                          SARS-CoV-2 infection.                           



                          In patients with                           



                          clinical symptoms and                           



                          other tests that are                           



                          consistent with                           



                          SARS-CoV-2 infection,                           



                          negative results                           



                          should be treated as                           



                          presumptive negative                           



                          and a new specimen                           



                          should be tested with                           



                          alternative PCR                           



                          molecular test.                           



                          Invalid: Please                           



                          collect a new specimen                           



                          for repeat patient                           



                          testing if clinically                           



                          indicated.                             

 

             Lab Interpretation Normal                                 



             (test code = 93649-5)                                        



[facility] / [id number] 01:05:00





             Test Item    Value        Reference Range Interpretation Comments

 

             PROTIME PATIENT (test              See_Comment                [Auto

mated message]



             code = 5964-2)                                        The system [employer]



                                                                 generated this 

result



                                                                 transmitted ref

erence



                                                                 range: 12.0 - 1

4.7



                                                                 Seconds. The re

ference



                                                                 range was not u

sed to



                                                                 interpret this 

result



                                                                 as normal/abnor

mal.

 

             INR (test code = 6301-6)                                        Nor

mal INR <1.1;



                                                                 Warfarin Therap

eutic



                                                                 range 2.0 to 3.

0 or



                                                                 2.5 to 3.5, dep

ending



                                                                 upon the indica

tions.

 

             Lab Interpretation (test Normal                                 



             code = 92155-8)                                        



[facility] (ALB, T.PRO, BILI T, 
BU/BC, ALT, AST, ALK PHOS)2020 01:04:00





             Test Item    Value        Reference Range Interpretation Comments

 

             TOTAL BILI (test code = 9446549658) 1.6 mg/dL    0.1-1.1      H    

        

 

             BILI UNCON (test code = 0734041588) 1.2 mg/dL    0.1-1.1      H    

        

 

             BILI CONJ (test code = 9354348150) 0.0 mg/dL    0-0.3              

       

 

             T PROTEIN (test code = 7705090430) 6.5 g/dL     6.3-8.2            

       

 

             ALBUMIN (test code = 1984920489) 2.9 g/dL     3.5-5        L       

     

 

             ALK PHOS (test code = 3131046237) 175 U/L             H      

      

 

             ALTv (test code = 1742-6) 26 U/L       5-35                      

 

             AST(SGOT) (test code = 6813248144) 49 U/L       13-40        H     

       

 

             Lab Interpretation (test code = Abnormal                           

    



             47724-2)                                            



[facility] Campus[facility] [id number] 01:04:00





             Test Item    Value        Reference Range Interpretation Comments

 

             LIPASE (test code = 0090360932) 76 U/L       0-220                 

    

 

             Lab Interpretation (test code = Normal                             

    



             21241-6)                                            



[facility] 00:58:00





             Test Item    Value        Reference Range Interpretation Comments

 

             APPEARANCE (test code = Hazy         Clear        A            



             4461598126)                                         

 

             COLOR (test code = Yellow       Yellow                    



             3792522494)                                         

 

             PH (test code =              4.8-8.0                   



             6551734875)                                         

 

             SP GRAVITY (test code =              1.003-1.030               



             6309273350)                                         

 

             GLU U QUAL (test code = 500 mg/dL    Normal       A            



             8751270672)                                         

 

             BLOOD (test code = Negative     Negative                  



             7933400484)                                         

 

             KETONES (test code = Negative     Negative                  



             3452430463)                                         

 

             PROTEIN (test code = 30 mg/dL     Negative     A            



             2887-8)                                             

 

             UROBILIN (test code = 2.0 mg/dL    Normal       A            



             7247100238)                                         

 

             BILIRUBIN (test code = Negative     Negative                  



             9708982884)                                         

 

             NITRITE (test code = Negative     Negative                  



             7524673849)                                         

 

             LEUK MOE (test code = Negative     Negative                  



             0337055567)                                         

 

             RBC/HPF (test code =              See_Comment                [Autom

ated message]



             1721030777)                                         The system [employer]



                                                                 generated this



                                                                 result transmit

eduard



                                                                 reference range

: 0 -



                                                                 3 HPF. The refe

rence



                                                                 range was not u

sed



                                                                 to interpret th

is



                                                                 result as



                                                                 normal/abnormal

.

 

             WBC/HPF (test code =              See_Comment                [Autom

ated message]



             1114553250)                                         The system [employer]



                                                                 generated this



                                                                 result transmit

eduard



                                                                 reference range

: 0 -



                                                                 5 HPF. The refe

rence



                                                                 range was not u

sed



                                                                 to interpret th

is



                                                                 result as



                                                                 normal/abnormal

.

 

             BACTERIA (test code = Negative     Negative                  



             6991957752)                                         

 

             MUCOUS (test code = Slight       Negative LPF A            



             3383394296)                                         

 

             SQ EPITH (test code =              HPF                       



             3825604878)                                         

 

             HYAL CAST (test code =              See_Comment                [Aut

omated message]



             9074249303)                                         The system [employer]



                                                                 generated this



                                                                 result transmit

eduard



                                                                 reference range

: <=2



                                                                 LPF. The refere

nce



                                                                 range was not u

sed



                                                                 to interpret th

is



                                                                 result as



                                                                 normal/abnormal

.

 

             Lab Interpretation (test Abnormal                               



             code = 89181-1)                                        



[facility] Pregnancy Test, [id number] 00:26:00





             Test Item    Value        Reference Range Interpretation Comments

 

             POCT PREG (test code = 1605) negative                              

 

 

             On board controls acceptable with present                          

      



             C Line (test code = 3574)                                        

 

             POCT PREG LOT # (test code = 3575) [id number]                      

       

 

             POCT PREG TEST  DATE (test 2021                        

     



             code = 3576)                                        

 

             Lab Interpretation (test code = Normal                             

    



             50782-4)                                            



[facility]CT [facility] PEL[facility] W [id number] 
19:20:42CT Abdomen and Pelvis with intravenous contrast. CLINICAL HISTORY: Acute
generalized abdominal pain.DOSE: Up-to-date CT equipment and radiation dose 
reduction techniques wereemployed.  CTDIvol: 7.09 mGy. DLP: 368 mGy-cm. 
TECHNIQUE : Contiguous axial imaging from the level of the lung basesthrough the
pubic symphysis were performed after the uncomplicatedadministration of 
Omnipaque contrast material.Coronal and sagittalreconstructions were obtained. 
Auto mA and/or iterative reconstruction wereused to reduce radiation dose. 
FINDINGS: Comparison is made with previous CT scans of 2020 as well as. Lower lungs: Minimal fibrosis in the right lower lung, lingula and 
rightmiddle lobe. Calcified granuloma in the lingula segment and anterior 
basalright lower lung. No pleural effusion or pericardial effusion. 
Possibleshort sliding hiatal hernia and small esophageal varices. Liver, 
Gallbladder and Spleen: S/P cholecystectomy. Liver is enlarged, 19.4cm and 
showed an indeterminate low-density lesion of 5.4 x 1.8 x 3(transverse x AP x 
length) cm size in the right lobe. Undulating serosalsurface of the liver is 
consistent with chronic primary liver disease withportal hypertension causing 
splenomegaly. Spleen is at 18 x 6 cm. Dilatedportal venous system noted. Portal 
vein is patent. Peritoneum: ?No free air or free fluid. No lymphadenopathy. 
Pancreas and Adrenals: ?Unremarkable pancreas and right adrenal gland. 
Mildnonspecific left adrenal gland hypertrophy noted. Kidneys and Ureters: ?No 
visible calculi in the renal collecting systems. No hydroureter or 
hydronephrosis. No enhancing kidney lesions are seen.Bosniak type I cystic 
lesion of 2.6 cm size in the lateral interpolar rightkidney noted. Vessels: 
Atherosclerosis. No AAA. Retroperitoneum: No abnormal fluid or lymphadenopathy. 
Bowel: Pericolonic congestion surrounding cecum and descending colon seenin the 
previous study is improved. This time there is minimal congestionpresent around 
the ascending colon. 3.5 cm size large diverticulum noted along the medial 
border of theduodenal C-loop just distal to the ampulla. Bladder and Reprodu
ctive Organs: Hysterectomy. No gross pathology in theunopacified urinary 
bladder. Bones: Moderate degenerative disc disease at L5-S1 and old fracture 
ofupper plate of L2 with loss of 40% of its height in the central portion. Soft 
tissues: Abdominal scar tissue noted in the suprapubic region fromprevious 
surgery. An irregular shaped fat containing 2.5 cm size umbilicalhernia. 
CONCLUSION:1. No acute intra-abdominal or pelvic abnormalities detected.2. 
Interval improvement noted in the congestion of pericolonic fatsurrounding a 
sending colon compared to the previous study. Mucosalenhancement is still pr
esent in the right cerebral large bowel. This couldbe due to nonspecific 
colitis. Mild constipation noted. No significantdiverticular disease3. 
Hepatosplenomegaly with hepatic architecture suggestive of chronicprimary liver 
disease, possibly cirrhosis with portal hypertension andesophageal varices.4. W
edge shaped lesion in the lower right lobe of the liver is difficult 
toaccurately characterize. It is unchanged when compared with the recent CTscans
of 2020 and 2020.5. S/P cholecystectomy and hysterectomy.     [facility], 
Radiant Results Inft User - 2020  2:21 PM CDTCT Abdomen and Pelvis with 
intravenous contrast.CLINICAL HISTORY: Acute generalized abdominal pain.DOSE: 
Up-to-date CT equipment and radiation dose reduction techniques wereemployed. 
CTDIvol: 7.09 mGy. DLP: 368 mGy-cm.TECHNIQUE : Contiguous axial imaging from the
level of the lung basesthrough the pubic symphysis were performed after the 
uncomplicatedadministration of Omnipaque contrast material. Coronal and 
sagittalreconstructions were obtained. Auto mA and/or iterative reconstruction 
wereused to reduce radiation dose.FINDINGS: Comparison is made with previous CT 
scans of 2020 as well as2020.Lower lungs: Minimal fibrosis in the 
right lower lung, lingula and rightmiddle lobe. Calcified granuloma in the 
lingula segment and anterior basalright lower lung. No pleural effusion or 
pericardial effusion. Possibleshort sliding hiatal hernia and small esophageal 
varices.Liver, Gallbladder and Spleen: S/P cholecystectomy. Liver is enlarged, 
19.4cm and showed an indeterminate low-density lesion of 5.4 x 1.8 x 3(transver
se x AP x length) cm size in the right lobe. Undulating serosalsurface of the 
liver is consistent with chronic primary liver disease withportal hypertension 
causing splenomegaly. Spleen is at 18 x 6 cm. Dilatedportal venous system noted.
Portal vein is patent.Peritoneum:  No free air or free fluid. No
lymphadenopathy.Pancreas and Adrenals:  Unremarkable pancreas and right adrenal 
gland. Mildnonspecific left adrenal gland hypertrophy noted.Kidneys and Ureters:
 No visible calculi in the renal collecting systems. No hydroureter or 
hydronephrosis. No enhancing kidney lesions are seen.Bosniak type I cystic 
lesion of 2.6 cm size in the lateral interpolar rightkidney noted. Vessels: 
Atherosclerosis. No AAA.Retroperitoneum: No abnormal fluid or 
lymphadenopathy.Bowel: Pericolonic congestion surrounding cecum and descending 
colon seenin the previous study is improved. This time there is minimal 
congestionpresent around the ascending colon.3.5 cm size large diverticulum 
noted along the medial border of theduodenal C-loop just distal to the 
ampulla.Bladder and Reproductive Organs: Hysterectomy. No gross pathology in 
theunopacified urinary bladder.Bones: Moderate degenerative disc disease at L5-
S1 and old fracture ofupper plate of L2 with loss of 40% of its height in the 
central portion.Soft tissues: Abdominal scar tissue noted in the suprapubic 
region fromprevious surgery. An irregular shaped fat containing 2.5 cm size 
umbilicalhernia.CONCLUSION:1. No acute intra-abdominal or pelvic abnormalities 
detected.2. Interval improvement noted in the congestion of pericolonic 
fatsurrounding a sending colon compared to the previous study. 
Mucosalenhancement is still present in the right cerebral large bowel.This 
couldbe due to nonspecific colitis. Mild constipation noted. No 
significantdiverticular disease3. Hepatosplenomegaly with hepatic architecture 
suggestive of chronicprimary liver disease, possibly cirrhosis with portal 
hypertension andesophageal varices.4. Wedge shaped lesion in the lower right lob
e of the liver is difficult toaccurately characterize. It is unchanged when 
compared with the recentCTscans of 2020 and 2020.5. S/P 
cholecystectomy and hysterectomy.[facility] 
Metabolic [id number] 19:15:00





             Test Item    Value        Reference Range Interpretation Comments

 

             NA (test code = 132 mmol/L   135-145      L            



             8617996965)                                         

 

             K (test code = 5.4 mmol/L   3.5-5        H            



             1086308185)                                         

 

             CL (test code = 99 mmol/L                        



             9436440185)                                         

 

             CO2 TOTAL (test code = 25 mmol/L    23-31                     



             1570435566)                                         

 

             AGAP (test code =              2-16                      



             5722424567)                                         

 

             BUN (test code = 15 mg/dL     7-23                      



             5250299018)                                         

 

             GLUCOSE (test code = 502 mg/dL           HH           



             3614997817)                                         

 

             CREATININE (test code = 0.71 mg/dL   0.5-1.04                  



             8920265051)                                         

 

             TOTAL BILI (test code = 1.4 mg/dL    0.1-1.1      H            



             1203589411)                                         

 

             CALCIUM (test code = 9.2 mg/dL    8.6-10.6                  



             3578076552)                                         

 

             T PROTEIN (test code = 7.4 g/dL     6.3-8.2                   



             8777662181)                                         

 

             ALBUMIN (test code = 3.5 g/dL     3.5-5                     



             1118860571)                                         

 

             ALK PHOS (test code = 185 U/L             H            



             3265386826)                                         

 

             ALTv (test code = 31 U/L       5-35                      



             1742-6)                                             

 

             AST(SGOT) (test code = 42 U/L       13-40        H            



             2285667778)                                         

 

             eGFR Calculation              mL/min/1.73m2              



             (Non-)                                        



             (test code =                                        



             3309156755)                                         

 

             eGFR Calculation              mL/min/1.73m2              



             (African American)                                        



             (test code =                                        



             6385888672)                                         

 

             RICK (test code = RICK) Association of                           



                          Glomerular Filtration                           



                          Rate (GFR) and Staging                           



                          of Kidney Disease*                           



                          +---------------------                           



                          --+-------------------                           



                          --+-------------------                           



                          ------+| GFR                           



                          (mL/min/1.73 m2) ?|                           



                          With Kidney Damage ?|                           



                          ?Without Kidney                           



                          Damage+---------------                           



                          --------+-------------                           



                          --------+-------------                           



                          ------------+| ?>90 ?                           



                          ? ? ? ? ? ? ? ?|                           



                          ?Stage one ? ? ? ? ?|                           



                          ? Normal ? ? ? ? ? ? ?                           



                          ?+--------------------                           



                          ---+------------------                           



                          ---+------------------                           



                          -------+| ?60-89 ? ? ?                           



                          ? ? ? ? ?| ?Stage two                           



                          ? ? ? ? ?| ? Decreased                           



                          GFR ? ? ? ?                            



                          +---------------------                           



                          --+-------------------                           



                          --+-------------------                           



                          ------+| ?30-59 ? ? ?                           



                          ? ? ? ? ?| ?Stage                           



                          three ? ? ? ?| ? Stage                           



                          three ? ? ? ? ?                           



                          +---------------------                           



                          --+-------------------                           



                          --+-------------------                           



                          ------+| ?15-29 ? ? ?                           



                          ? ? ? ? ?| ?Stage four                           



                          ? ? ? ? | ? Stage four                           



                          ? ? ? ? ?                              



                          ?+--------------------                           



                          ---+------------------                           



                          ---+------------------                           



                          -------+| ?<15 (or                           



                          dialysis) ? ?| ?Stage                           



                          five ? ? ? ? | ? Stage                           



                          five ? ? ? ? ?                           



                          ?+--------------------                           



                          ---+------------------                           



                          ---+------------------                           



                          -------+ *Each stage                           



                          assumes the associated                           



                          GFR level has been in                           



                          effect for at least                           



                          three months. ?Stages                           



                          1 to 5, with or                           



                          without kidney                           



                          disease, indicate                           



                          chronic kidney                           



                          disease. Notes:                           



                          Determination of                           



                          stages one and two                           



                          (with eGFR                             



                          >59mL/min/1.73 m2)                           



                          requires estimation of                           



                          kidney damage for at                           



                          least three months as                           



                          defined by structural                           



                          or functional                           



                          abnormalities of the                           



                          kidney, manifested by                           



                          either:Pathological                           



                          abnormalities or                           



                          Markers of kidney                           



                          damage (including                           



                          abnormalities in the                           



                          composition of the                           



                          blood or urine or                           



                          abnormalities in                           



                          imaging tests).                           

 

             Lab Interpretation Abnormal                               



             (test code = 18579-0)                                        



[facility] WITH [id number] 19:05:00





             Test Item    Value        Reference Range Interpretation Comments

 

             WBC (test code =              See_Comment                [Automated



             6690-2)                                             message] The sy

stem



                                                                 which generated



                                                                 this result



                                                                 transmitted



                                                                 reference range

:



                                                                 4.30 - 11.10



                                                                 10*3/?L. The



                                                                 reference range

 was



                                                                 not used to



                                                                 interpret this



                                                                 result as



                                                                 normal/abnormal

.

 

             RBC (test code =              See_Comment  L             [Automated



             789-8)                                              message] The sy

stem



                                                                 which generated



                                                                 this result



                                                                 transmitted



                                                                 reference range

:



                                                                 3.93 - 5.25



                                                                 10*6/?L. The



                                                                 reference range

 was



                                                                 not used to



                                                                 interpret this



                                                                 result as



                                                                 normal/abnormal

.

 

             HGB (test code = 11.6 g/dL    11.6-15                   



             718-7)                                              

 

             HCT (test code = 34.5 %       35.7-45.2    L            



             4544-3)                                             

 

             MCV (test code = 91.5 fL      80.6-95.5                 



             787-2)                                              

 

             MCH (test code = 30.8 pg      25.9-32.8                 



             785-6)                                              

 

             MCHC (test code = 33.6 g/dL    31.6-35.1                 



             786-4)                                              

 

             RDW-SD (test code = 58.3 fL      39-49.9      H            



             19251-2)                                            

 

             RDW-CV (test code = 17.5 %       12-15.5      H            



             788-0)                                              

 

             PLT (test code =              See_Comment  L             [Automated



             777-3)                                              message] The sy

stem



                                                                 which generated



                                                                 this result



                                                                 transmitted



                                                                 reference range

:



                                                                 166 - 358 10*3/

?L.



                                                                 The reference r

olman



                                                                 was not used to



                                                                 interpret this



                                                                 result as



                                                                 normal/abnormal

.

 

             MPV (test code = 10.0 fL      9.5-12.9                  



             90144-0)                                            

 

             IPF % (test code = 3.1 %        1.3-7.7                   Platelet 

count



             0795504833)                                         measured by



                                                                 fluorescence



                                                                 method.

 

             NRBC/100 WBC (test              See_Comment                [Automat

ed



             code = 4588857543)                                        message] 

The system



                                                                 which generated



                                                                 this result



                                                                 transmitted



                                                                 reference range

:



                                                                 0.0 - 10.0 /100



                                                                 WBCs. The refer

ence



                                                                 range was not u

sed



                                                                 to interpret th

is



                                                                 result as



                                                                 normal/abnormal

.

 

             NRBC x10^3 (test code <0.01        See_Comment                [Auto

mated



             = 5402162399)                                        message] The s

ystem



                                                                 which generated



                                                                 this result



                                                                 transmitted



                                                                 reference range

:



                                                                 10*3/?L. The



                                                                 reference range

 was



                                                                 not used to



                                                                 interpret this



                                                                 result as



                                                                 normal/abnormal

.

 

             GRAN MAT (NEUT) % 72.1 %                                 



             (test code = 770-8)                                        

 

             IMM GRAN % (test code 0.50 %                                 



             = 3878074521)                                        

 

             LYMPH % (test code = 16.7 %                                 



             736-9)                                              

 

             MONO % (test code = 9.4 %                                  



             5905-5)                                             

 

             EOS % (test code = 0.8 %                                  



             713-8)                                              

 

             BASO % (test code = 0.5 %                                  



             706-2)                                              

 

             GRAN MAT x10^3(ANC) 4.43 10*3/uL 1.88-7.09                 



             (test code =                                        



             5354774365)                                         

 

             IMM GRAN x10^3 (test 0.03 10*3/uL 0-0.06                    



             code = 2578329548)                                        

 

             LYMPH x10^3 (test code 1.03 10*3/uL 1.32-3.29    L            



             = 731-0)                                            

 

             MONO x10^3 (test code 0.58 10*3/uL 0.33-0.92                 



             = 742-7)                                            

 

             EOS x10^3 (test code = 0.05 10*3/uL 0.03-0.39                 



             711-2)                                              

 

             BASO x10^3 (test code 0.03 10*3/uL 0.01-0.07                 



             = 704-7)                                            

 

             Lab Interpretation Abnormal                               



             (test code = 68119-0)                                        



[facility] [id number] 19:02:00





             Test Item    Value        Reference Range Interpretation Comments

 

             LIPASE (test code = 1061140247) 69 U/L       0-220                 

    

 

             Lab Interpretation (test code = Normal                             

    



             90966-8)                                            



[facility] 18[facility]38:00





             Test Item    Value        Reference Range Interpretation Comments

 

             APPEARANCE (test code = Clear        Clear                     



             5713933054)                                         

 

             COLOR (test code = Yellow       Yellow                    



             4233347354)                                         

 

             PH (test code =              4.8-8.0                   



             2833961238)                                         

 

             SP GRAVITY (test code =              1.003-1.030               



             1255439131)                                         

 

             GLU U QUAL (test code = 500 mg/dL    Normal       A            



             5029455435)                                         

 

             BLOOD (test code = Negative     Negative                  



             7639066196)                                         

 

             KETONES (test code = Negative     Negative                  



             8625655439)                                         

 

             PROTEIN (test code = Negative     Negative                  



             2887-8)                                             

 

             UROBILIN (test code = Normal       Normal                    



             6010252304)                                         

 

             BILIRUBIN (test code = Negative     Negative                  



             4551601779)                                         

 

             NITRITE (test code = Negative     Negative                  



             1121684334)                                         

 

             LEUK MOE (test code = Negative     Negative                  



             2570164578)                                         

 

             RBC/HPF (test code =              See_Comment                [Autom

ated message]



             3004557729)                                         The system [employer]



                                                                 generated this



                                                                 result transmit

eduard



                                                                 reference range

: 0 -



                                                                 3 HPF. The refe

rence



                                                                 range was not u

sed



                                                                 to interpret th

is



                                                                 result as



                                                                 normal/abnormal

.

 

             WBC/HPF (test code = <1           See_Comment                [Autom

ated message]



             [id number])                                         The system [employer]



                                                                 generated this



                                                                 result transmit

eduard



                                                                 reference range

: 0 -



                                                                 5 HPF. The refe

rence



                                                                 range was not u

sed



                                                                 to interpret th

is



                                                                 result as



                                                                 normal/abnormal

.

 

             BACTERIA (test code = Negative     Negative                  



             1182719342)                                         

 

             SQ EPITH (test code =              HPF                       



             2947366939)                                         

 

             Lab Interpretation (test Abnormal                               



             code = 97043-5)                                        



[facility] GLUCOSE (AUTOMATED)2020 11:03:00





             Test Item    Value        Reference Range Interpretation Comments

 

             POCT GLU (test code = 2685868471) 272 mg/dL           H      

      

 

             Lab Interpretation (test code = Abnormal                           

    



             98250-7)                                            



[facility]





             Test Item    Value        Reference Range Interpretation Comments

 

             PROLACTIN (test code = 4861599441) 8.1 ng/mL    2.7-19.6           

       

 

             Lab Interpretation (test code = Normal                             

    



             12306-0)                                            



[facility] GLUCOSE (AUTOMATED)2020 03:13:00





             Test Item    Value        Reference Range Interpretation Comments

 

             POCT GLU (test code = 7168937777) 295 mg/dL           H      

      

 

             Lab Interpretation (test code = Abnormal                           

    



             49158-2)                                            



[facility]





             Test Item    Value        Reference Range Interpretation Comments

 

             TROPONIN I (test 0.003 ng/mL  See_Comment                [Automated



             code = 4489353275)                                        message] 

The



                                                                 system which



                                                                 generated this



                                                                 result



                                                                 transmitted



                                                                 reference range

:



                                                                 <=0.034. The



                                                                 reference range



                                                                 was not used to



                                                                 interpret this



                                                                 result as



                                                                 normal/abnormal

.

 

             RICK (test code = Equal or Less than                           



             RICK)         0.034                                  



                          ng/ml---Normal                           



                          ?Note: Cardiac                           



                          troponin begins to                           



                          rise 3-4 hours                           



                          after the onset of                           



                          ischemia. Repeat                           



                          in 4-6 hours if                           



                          the sample was                           



                          drawn within 3-4                           



                          hours of the onset                           



                          of the symptom and                           



                          found normal.                           



                          Between 0.035 and                           



                          0.120 ng/mL---                           



                          Borderline.                            



                          Questionable                           



                          myocardial injury                           



                          or necrosis ?                           



                          ?Note: Serial                           



                          measurement may be                           



                          necessary to                           



                          confirm or exclude                           



                          the diagnosis of                           



                          myocardial injury                           



                          or necrosis;                           



                          Clinical                               



                          correlation                            



                          (symptoms, EKGs,                           



                          imaging studies,                           



                          and others)                            



                          required; Repeat                           



                          in 4-6 hours if                           



                          clinically                             



                          indicated. ? ? ? ?                           



                          Equal or Higher                           



                          than 0.121                             



                          ng/mL---Abnormal.                           



                          Myocardial Injury                           



                          or Necrosis Likely                           



                          ? ? ? ?  Biotin                           



                          has been reported                           



                          to cause a                             



                          negative bias,                           



                          interpret results                           



                          relative to                            



                          patient's use of                           



                          biotin. ? ? ? ? ?                           



                          ? ? ? ? ? ? ? ? ?                           



                          ? ? ? ? ? ? ? ?  ?                           



                          ? ? ? ? ? ? ? ? ?                           



                          ? ? ? ? ? ? ? ? ?                           



                          ? ? ? ? ? ? ? ? ?                           

 

             Lab Interpretation Normal                                 



             (test code =                                        



             25349-4)                                            



[facility]NA, K, CL, CO2, 
GLUCOSE, BUN, CREATININE, CA)2020 02:07:00





             Test Item    Value        Reference Range Interpretation Comments

 

             NA (test code = 137 mmol/L   135-145                   



             9599213156)                                         

 

             K (test code = 3.6 mmol/L   3.5-5                     



             9464391378)                                         

 

             CL (test code = 104 mmol/L                       



             1319323908)                                         

 

             CO2 TOTAL (test code = 24 mmol/L    23-31                     



             4302411503)                                         

 

             AGAP (test code =              2-16                      



             1530505590)                                         

 

             BUN (test code = 11 mg/dL     7-23                      



             3784578524)                                         

 

             GLUCOSE (test code = 321 mg/dL           H            



             2800236379)                                         

 

             CREATININE (test code = 0.65 mg/dL   0.5-1.04                  



             7990145209)                                         

 

             CALCIUM (test code = 8.2 mg/dL    8.6-10.6     L            



             2324362772)                                         

 

             eGFR Calculation              mL/min/1.73m2              



             (Non-)                                        



             (test code =                                        



             8650041046)                                         

 

             eGFR Calculation              mL/min/1.73m2              



             (African American)                                        



             (test code =                                        



             6785998588)                                         

 

             RICK (test code = RICK) Association of                           



                          Glomerular Filtration                           



                          Rate (GFR) and Staging                           



                          of Kidney Disease*                           



                          +---------------------                           



                          --+-------------------                           



                          --+-------------------                           



                          ------+| GFR                           



                          (mL/min/1.73 m2) ?|                           



                          With Kidney Damage ?|                           



                          ?Without Kidney                           



                          Damage+---------------                           



                          --------+-------------                           



                          --------+-------------                           



                          ------------+| ?>90 ?                           



                          ? ? ? ? ? ? ? ?|                           



                          ?Stage one ? ? ? ? ?|                           



                          ? Normal ? ? ? ? ? ? ?                           



                          ?+--------------------                           



                          ---+------------------                           



                          ---+------------------                           



                          -------+| ?60-89 ? ? ?                           



                          ? ? ? ? ?| ?Stage two                           



                          ? ? ? ? ?| ? Decreased                           



                          GFR ? ? ? ?                            



                          +---------------------                           



                          --+-------------------                           



                          --+-------------------                           



                          ------+| ?30-59 ? ? ?                           



                          ? ? ? ? ?| ?Stage                           



                          three ? ? ? ?| ? Stage                           



                          three ? ? ? ? ?                           



                          +---------------------                           



                          --+-------------------                           



                          --+-------------------                           



                          ------+| ?15-29 ? ? ?                           



                          ? ? ? ? ?| ?Stage four                           



                          ? ? ? ? | ? Stage four                           



                          ? ? ? ? ?                              



                          ?+--------------------                           



                          ---+------------------                           



                          ---+------------------                           



                          -------+| ?<15 (or                           



                          dialysis) ? ?| ?Stage                           



                          five ? ? ? ? | ? Stage                           



                          five ? ? ? ? ?                           



                          ?+--------------------                           



                          ---+------------------                           



                          ---+------------------                           



                          -------+ *Each stage                           



                          assumes the associated                           



                          GFR level has been in                           



                          effect for at least                           



                          three months. ?Stages                           



                          1 to 5, with or                           



                          without kidney                           



                          disease, indicate                           



                          chronic kidney                           



                          disease. Notes:                           



                          Determination of                           



                          stages one and two                           



                          (with eGFR                             



                          >59mL/min/1.73 m2)                           



                          requires estimation of                           



                          kidney damage for at                           



                          least three months as                           



                          defined by structural                           



                          or functional                           



                          abnormalities of the                           



                          kidney, manifested by                           



                          either:Pathological                           



                          abnormalities or                           



                          Markers of kidney                           



                          damage (including                           



                          abnormalities in the                           



                          composition of the                           



                          blood or urine or                           



                          abnormalities in                           



                          imaging tests).                           

 

             Lab Interpretation Abnormal                               



             (test code = 95105-4)                                        



[facility]





             Test Item    Value        Reference Range Interpretation Comments

 

             MAGNESIUM (test code = 7536231160) 1.8 mg/dL    1.7-2.4            

       

 

             Lab Interpretation (test code = Normal                             

    



             88103-9)                                            



[facility] [id number] 02:07:00





             Test Item    Value        Reference Range Interpretation Comments

 

             CK (test code = 9938868304) 35 U/L                           

 

             Lab Interpretation (test code = Normal                             

    



             21226-9)                                            



[facility] GLUCOSE (AUTOMATED)2020 18:51:00





             Test Item    Value        Reference Range Interpretation Comments

 

             POCT GLU (test code = 8957360391) 310 mg/dL           H      

      

 

             Lab Interpretation (test code = Abnormal                           

    



             29440-5)                                            



[id number] 17:26:50EXAM: XR KUB HISTORY:
serial progression r/o toxic megacolon, ?,sbo r/o  COMPARISON: None. FINDINGS:
Gas is present throughout nondilated loops of small and large intestine,and 
obstruction is not suspected. Neither is toxic megacolon demonstrated.The bowel 
gas pattern is normal and no opaque stones ormasses are found.[facility], [facility] 
Results In[employer] User - 2020 12:27 PM CDTEXAM: XR KUBHISTORY: serial 
progression r/o toxic megacolon,  ,sbo r/o COMPARISON: None.FINDINGS:Gas is 
present throughout nondilated loops of small and large intestine,and obstruction
is not suspected. Neither is toxic megacolon demonstrated.The bowel gas pattern 
is normal and no opaque stones or masses are found.[facility] GLUCOSE (AUTOMATED)2020 13:04:00





             Test Item    Value        Reference Range Interpretation Comments

 

             POCT GLU (test code = 4613299735) 270 mg/dL           H      

      

 

             Lab Interpretation (test code = Abnormal                           

    



             93879-3)                                            



[facility] WITH [id number] 11:01:00





             Test Item    Value        Reference Range Interpretation Comments

 

             WBC (test code =              See_Comment                [Automated



             5490-2)                                             message] The sy

stem



                                                                 which generated



                                                                 this result



                                                                 transmitted



                                                                 reference range

:



                                                                 4.30 - 11.10



                                                                 10*3/?L. The



                                                                 reference range

 was



                                                                 not used to



                                                                 interpret this



                                                                 result as



                                                                 normal/abnormal

.

 

             RBC (test code =              See_Comment  L             [Automated



             569-8)                                              message] The sy

stem



                                                                 which generated



                                                                 this result



                                                                 transmitted



                                                                 reference range

:



                                                                 3.93 - 5.25



                                                                 10*6/?L. The



                                                                 reference range

 was



                                                                 not used to



                                                                 interpret this



                                                                 result as



                                                                 normal/abnormal

.

 

             HGB (test code = 10.9 g/dL    11.6-15      L            



             718-7)                                              

 

             HCT (test code = 32.8 %       35.7-45.2    L            



             4544-3)                                             

 

             MCV (test code = 90.1 fL      80.6-95.5                 



             787-2)                                              

 

             MCH (test code = 29.9 pg      25.9-32.8                 



             785-6)                                              

 

             MCHC (test code = 33.2 g/dL    31.6-35.1                 



             786-4)                                              

 

             RDW-SD (test code = 54.9 fL      39-49.9      H            



             18144-7)                                            

 

             RDW-CV (test code = 17.2 %       12-15.5      H            



             788-0)                                              

 

             PLT (test code =              See_Comment  LL            [Automated



             777-3)                                              message] The sy

stem



                                                                 which generated



                                                                 this result



                                                                 transmitted



                                                                 reference range

:



                                                                 166 - 358 10*3/

?L.



                                                                 The reference r

olman



                                                                 was not used to



                                                                 interpret this



                                                                 result as



                                                                 normal/abnormal

.

 

             MPV (test code = 9.9 fL       9.5-12.9                  



             59061-9)                                            

 

             IPF % (test code = 4.4 %        1.3-7.7                   Platelet 

count



             [id number])                                         measured by



                                                                 fluorescence



                                                                 method.

 

             NRBC/100 WBC (test              See_Comment                [Automat

ed



             code = 3119304726)                                        message] 

The system



                                                                 which generated



                                                                 this result



                                                                 transmitted



                                                                 reference range

:



                                                                 0.0 - 10.0 /100



                                                                 WBCs. The refer

ence



                                                                 range was not u

sed



                                                                 to interpret th

is



                                                                 result as



                                                                 normal/abnormal

.

 

             NRBC x10^3 (test code <0.01        See_Comment                [Auto

mated



             = [id number])                                        message] The s

ystem



                                                                 which generated



                                                                 this result



                                                                 transmitted



                                                                 reference range

:



                                                                 10*3/?L. The



                                                                 reference range

 was



                                                                 not used to



                                                                 interpret this



                                                                 result as



                                                                 normal/abnormal

.

 

             GRAN MAT (NEUT) % 75.2 %                                 



             (test code = 770-8)                                        

 

             IMM GRAN % (test code 1.40 %                                 



             = 5342649322)                                        

 

             LYMPH % (test code = 15.7 %                                 



             736-9)                                              

 

             MONO % (test code = 6.8 %                                  



             5905-5)                                             

 

             EOS % (test code = 0.7 %                                  



             713-8)                                              

 

             BASO % (test code = 0.2 %                                  



             706-2)                                              

 

             GRAN MAT x10^3(ANC) 4.23 10*3/uL 1.88-7.09                 



             (test code =                                        



             2520192160)                                         

 

             IMM GRAN x10^3 (test 0.08 10*3/uL 0-0.06       H            



             code = 7567542355)                                        

 

             LYMPH x10^3 (test code 0.88 10*3/uL 1.32-3.29    L            



             = 731-0)                                            

 

             MONO x10^3 (test code 0.38 10*3/uL 0.33-0.92                 



             = 742-7)                                            

 

             EOS x10^3 (test code = 0.04 10*3/uL 0.03-0.39                 



             711-2)                                              

 

             BASO x10^3 (test code <0.03        0.01-0.07                 



             = 704-7)                                            

 

             Lab Interpretation Abnormal                               



             (test code = 81206-4)                                        



[facility] Metabolic Panel (NA, K, CL, CO2, 
Glucose, BUN, Creatinine, CA)2020 10:45:00





             Test Item    Value        Reference Range Interpretation Comments

 

             NA (test code = 137 mmol/L   135-145                   



             2267987781)                                         

 

             K (test code = 3.7 mmol/L   3.5-5                     Slight



             2312753597)                                         hemolysis

 

             CL (test code = 105 mmol/L                       



             0404972579)                                         

 

             CO2 TOTAL (test code 25 mmol/L    23-31                     



             = 6325651962)                                        

 

             AGAP (test code =              2-16                      



             8140578578)                                         

 

             BUN (test code = 10 mg/dL     7-23                      Slight



             1815565659)                                         hemolysis

 

             GLUCOSE (test code = 307 mg/dL           H            



             7178646450)                                         

 

             CREATININE (test code 0.56 mg/dL   0.5-1.04                  



             = 0458842811)                                        

 

             CALCIUM (test code = 7.4 mg/dL    8.6-10.6     L            



             6191781801)                                         

 

             eGFR Calculation              mL/min/1.73m2              



             (Non-                                        



             American) (test code                                        



             = 5862130312)                                        

 

             eGFR Calculation              mL/min/1.73m2              



             (African American)                                        



             (test code =                                        



             9716641116)                                         

 

             RICK (test code = RICK) Association of                           



                          Glomerular                             



                          Filtration Rate                           



                          (GFR) and Staging                           



                          of Kidney Disease*                           



                          +------------------                           



                          -----+-------------                           



                          --------+----------                           



                          ---------------+|                           



                          GFR (mL/min/1.73                           



                          m2) ?| With Kidney                           



                          Damage ?| ?Without                           



                          Kidney                                 



                          Damage+------------                           



                          -----------+-------                           



                          --------------+----                           



                          -------------------                           



                          --+| ?>90 ? ? ? ? ?                           



                          ? ? ? ?| ?Stage one                           



                          ? ? ? ? ?| ? Normal                           



                          ? ? ? ? ? ? ?                           



                          ?+-----------------                           



                          ------+------------                           



                          ---------+---------                           



                          ----------------+|                           



                          ?60-89 ? ? ? ? ? ?                           



                          ? ?| ?Stage two ? ?                           



                          ? ? ?| ? Decreased                           



                          GFR ? ? ? ?                            



                          +------------------                           



                          -----+-------------                           



                          --------+----------                           



                          ---------------+|                           



                          ?30-59 ? ? ? ? ? ?                           



                          ? ?| ?Stage three ?                           



                          ? ? ?| ? Stage                           



                          three ? ? ? ? ?                           



                          +------------------                           



                          -----+-------------                           



                          --------+----------                           



                          ---------------+|                           



                          ?15-29 ? ? ? ? ? ?                           



                          ? ?| ?Stage four ?                           



                          ? ? ? | ? Stage                           



                          four ? ? ? ? ?                           



                          ?+-----------------                           



                          ------+------------                           



                          ---------+---------                           



                          ----------------+|                           



                          ?<15 (or dialysis)                           



                          ? ?| ?Stage five ?                           



                          ? ? ? | ? Stage                           



                          five ? ? ? ? ?                           



                          ?+-----------------                           



                          ------+------------                           



                          ---------+---------                           



                          ----------------+                           



                          *Each stage assumes                           



                          the associated GFR                           



                          level has been in                           



                          effect for at least                           



                          three months.                           



                          ?Stages 1 to 5,                           



                          with or without                           



                          kidney disease,                           



                          indicate chronic                           



                          kidney disease.                           



                          Notes:                                 



                          Determination of                           



                          stages one and two                           



                          (with eGFR                             



                          >59mL/min/1.73 m2)                           



                          requires estimation                           



                          of kidney damage                           



                          for at least three                           



                          months as defined                           



                          by structural or                           



                          functional                             



                          abnormalities of                           



                          the kidney,                            



                          manifested by                           



                          either:Pathological                           



                          abnormalities or                           



                          Markers of kidney                           



                          damage (including                           



                          abnormalities in                           



                          the composition of                           



                          the blood or urine                           



                          or abnormalities in                           



                          imaging tests).                           

 

             Lab Interpretation Abnormal                               



             (test code = 29161-4)                                        



[facility]





             Test Item    Value        Reference Range Interpretation Comments

 

             POCT GLU (test code = 0727914443) 285 mg/dL           H      

      

 

             Lab Interpretation (test code = Abnormal                           

    



             98134-1)                                            



[facility] [id number] 00:01:00





             Test Item    Value        Reference Range Interpretation Comments

 

             Blood Culture-Aerobic No organisms No growth                 Previo

us



             (test code = 17928-3) isolated                               prelim

inary



                                                                 verified result



                                                                 was Culture In



                                                                 Progress on



                                                                 2020 at 22

01



                                                                 CDTPrevious



                                                                 preliminary



                                                                 verified result



                                                                 was No growth a

t



                                                                 24 hours on



                                                                 2020 at 19

01



                                                                 CDTPrevious



                                                                 preliminary



                                                                 verified result



                                                                 was No growth a

t



                                                                 48 hours on



                                                                 2020 at 19

01



                                                                 CDTPrevious



                                                                 preliminary



                                                                 verified result



                                                                 was No growth a

t



                                                                 72 hours on



                                                                 2020 at 190

1



                                                                 CDT

 

             Blood        No organisms No growth                 Previous



             Culture-Anaerobic isolated                               preliminar

y



             (test code = 17512-7)                                        verifi

ed result



                                                                 was Culture In



                                                                 Progress on



                                                                 2020 at 22

01



                                                                 CDTPrevious



                                                                 preliminary



                                                                 verified result



                                                                 was No growth a

t



                                                                 24 hours on



                                                                 2020 at 19

01



                                                                 CDTPrevious



                                                                 preliminary



                                                                 verified result



                                                                 was No growth a

t



                                                                 48 hours on



                                                                 2020 at 19

01



                                                                 CDTPrevious



                                                                 preliminary



                                                                 verified result



                                                                 was No growth a

t



                                                                 72 hours on



                                                                 2020 at 190

1



                                                                 CDT

 

             Lab Interpretation Normal                                 



             (test code = 73200-0)                                        



[facility] [id number] 00:01:00





             Test Item    Value        Reference Range Interpretation Comments

 

             Blood Culture-Aerobic No organisms No growth                 Previo

us



             (test code = 17928-3) isolated                               prelim

inary



                                                                 verified result



                                                                 was Culture In



                                                                 Progress on



                                                                 2020 at 22

01



                                                                 CDTPrevious



                                                                 preliminary



                                                                 verified result



                                                                 was No growth a

t



                                                                 24 hours on



                                                                 2020 at 19

01



                                                                 CDTPrevious



                                                                 preliminary



                                                                 verified result



                                                                 was No growth a

t



                                                                 48 hours on



                                                                 2020 at 19

01



                                                                 CDTPrevious



                                                                 preliminary



                                                                 verified result



                                                                 was No growth a

t



                                                                 72 hours on



                                                                 2020 at 190

1



                                                                 CDT

 

             Blood        No organisms No growth                 Previous



             Culture-Anaerobic isolated                               preliminar

y



             (test code = 61650-8)                                        verifi

ed result



                                                                 was Culture In



                                                                 Progress on



                                                                 2020 at 22

01



                                                                 CDTPrevious



                                                                 preliminary



                                                                 verified result



                                                                 was No growth a

t



                                                                 24 hours on



                                                                 2020 at 19

01



                                                                 CDTPrevious



                                                                 preliminary



                                                                 verified result



                                                                 was No growth a

t



                                                                 48 hours on



                                                                 2020 at 19

01



                                                                 CDTPrevious



                                                                 preliminary



                                                                 verified result



                                                                 was No growth a

t



                                                                 72 hours on



                                                                 2020 at 190

1



                                                                 CDT

 

             Lab Interpretation Normal                                 



             (test code = 76391-1)                                        



[facility] 21:19:00





             Test Item    Value        Reference Range Interpretation Comments

 

             POCT GLU (test code = 6357590299) 337 mg/dL           H      

      

 

             Lab Interpretation (test code = Abnormal                           

    



             33827-2)                                            



[facility] [id number] 17:43:38 No radiographic 
findings to suggest with toxic megacolon.EXAM: XR KUB COMPARISON: 2020. 
HISTORY: monitor for toxic megacolon  FINDINGS: The colon appears normally 
dilated with preservation of the haustralmarkings and no evidence of mucosal 
edema. Air is noted throughout thecolon to the level of the rectum. The bowel 
gas pattern is overallnonobstructive. No intra-abdominal free air seen. 
Cholecystectomy clips are present. The bony structures are grosslyunremarkable. 
[facility], Radiant Results [facility] User - 2020 12:44 PM CDTEXAM: XR KUBCOMPARISON:
2020.HISTORY: monitor for toxic megacolon FINDINGS:The colon appears 
normally dilated with preservation of the haustralmarkings and no evidence of
mucosal edema. Air is noted throughout thecolon to the level of the rectum. The 
bowel gas pattern isoverallnonobstructive.No intra-abdominal free air 
seen.Cholecystectomy clips are present. The bony structures are 
grosslyunremarkable.IMPRESSIONNo radiographic findings to suggest with toxic 
megacolon.[facility] 
16:45:00





             Test Item    Value        Reference Range Interpretation Comments

 

             POCT GLU (test code = 319 mg/dL           H            Notifi

ed Provider



             [id number])                                         

 

             Lab Interpretation (test Abnormal                               



             code = 80258-9)                                        



[facility] WITH [id number] 11:35:00





             Test Item    Value        Reference Range Interpretation Comments

 

             WBC (test code =              See_Comment                [Automated



             6690-2)                                             message] The sy

stem



                                                                 which generated



                                                                 this result



                                                                 transmitted



                                                                 reference range

:



                                                                 4.30 - 11.10



                                                                 10*3/?L. The



                                                                 reference range

 was



                                                                 not used to



                                                                 interpret this



                                                                 result as



                                                                 normal/abnormal

.

 

             RBC (test code =              See_Comment  L             [Automated



             789-8)                                              message] The sy

stem



                                                                 which generated



                                                                 this result



                                                                 transmitted



                                                                 reference range

:



                                                                 3.93 - 5.25



                                                                 10*6/?L. The



                                                                 reference range

 was



                                                                 not used to



                                                                 interpret this



                                                                 result as



                                                                 normal/abnormal

.

 

             HGB (test code = 10.9 g/dL    11.6-15      L            



             718-7)                                              

 

             HCT (test code = 32.8 %       35.7-45.2    L            



             4544-3)                                             

 

             MCV (test code = 88.9 fL      80.6-95.5                 



             787-2)                                              

 

             MCH (test code = 29.5 pg      25.9-32.8                 



             785-6)                                              

 

             MCHC (test code = 33.2 g/dL    31.6-35.1                 



             786-4)                                              

 

             RDW-SD (test code = 53.9 fL      39-49.9      H            



             95680-4)                                            

 

             RDW-CV (test code = 17.2 %       12-15.5      H            



             788-0)                                              

 

             PLT (test code =              See_Comment  LL            [Automated



             777-3)                                              message] The sy

stem



                                                                 which generated



                                                                 this result



                                                                 transmitted



                                                                 reference range

:



                                                                 166 - 358 10*3/

?L.



                                                                 The reference r

olman



                                                                 was not used to



                                                                 interpret this



                                                                 result as



                                                                 normal/abnormal

.

 

             MPV (test code = 10.0 fL      9.5-12.9                  



             46268-5)                                            

 

             IPF % (test code = 3.2 %        1.3-7.7                   Platelet 

count



             0312289699)                                         measured by



                                                                 fluorescence



                                                                 method.

 

             NRBC/100 WBC (test              See_Comment                [Automat

ed



             code = 9084794563)                                        message] 

The system



                                                                 which generated



                                                                 this result



                                                                 transmitted



                                                                 reference range

:



                                                                 0.0 - 10.0 /100



                                                                 WBCs. The refer

ence



                                                                 range was not u

sed



                                                                 to interpret th

is



                                                                 result as



                                                                 normal/abnormal

.

 

             NRBC x10^3 (test code <0.01        See_Comment                [Auto

mated



             = 2453005829)                                        message] The s

ystem



                                                                 which generated



                                                                 this result



                                                                 transmitted



                                                                 reference range

:



                                                                 10*3/?L. The



                                                                 reference range

 was



                                                                 not used to



                                                                 interpret this



                                                                 result as



                                                                 normal/abnormal

.

 

             GRAN MAT (NEUT) % 73.9 %                                 



             (test code = 770-8)                                        

 

             IMM GRAN % (test code 1.60 %                                 



             = 5499325707)                                        

 

             LYMPH % (test code = 16.1 %                                 



             736-9)                                              

 

             MONO % (test code = 6.6 %                                  



             5905-5)                                             

 

             EOS % (test code = 1.4 %                                  



             713-8)                                              

 

             BASO % (test code = 0.4 %                                  



             706-2)                                              

 

             GRAN MAT x10^3(ANC) 4.14 10*3/uL 1.88-7.09                 



             (test code =                                        



             4846483594)                                         

 

             IMM GRAN x10^3 (test 0.09 10*3/uL 0-0.06       H            



             code = 0154318344)                                        

 

             LYMPH x10^3 (test code 0.90 10*3/uL 1.32-3.29    L            



             = 731-0)                                            

 

             MONO x10^3 (test code 0.37 10*3/uL 0.33-0.92                 



             = 742-7)                                            

 

             EOS x10^3 (test code = 0.08 10*3/uL 0.03-0.39                 



             711-2)                                              

 

             BASO x10^3 (test code <0.03        0.01-0.07                 



             = 704-7)                                            

 

             BANDS (test code = Increased                 A            



             5279259819)                                         

 

             Lab Interpretation Abnormal                               



             (test code = 04115-2)                                        



[facility] Metabolic Panel (NA, K, CL, CO2, 
Glucose, BUN, Creatinine, CA)2020 11:07:00





             Test Item    Value        Reference Range Interpretation Comments

 

             NA (test code = 136 mmol/L   135-145                   



             7086690343)                                         

 

             K (test code = 3.3 mmol/L   3.5-5        L            Slight



             3976353153)                                         hemolysis

 

             CL (test code = 105 mmol/L                       



             9388142501)                                         

 

             CO2 TOTAL (test code 25 mmol/L    23-31                     



             = 0765292016)                                        

 

             AGAP (test code =              2-16                      



             5363192839)                                         

 

             BUN (test code = 12 mg/dL     7-23                      Slight



             4053966679)                                         hemolysis

 

             GLUCOSE (test code = 275 mg/dL           H            



             5792281123)                                         

 

             CREATININE (test code 0.60 mg/dL   0.5-1.04                  



             = 4400070847)                                        

 

             CALCIUM (test code = 7.4 mg/dL    8.6-10.6     L            



             9299598015)                                         

 

             eGFR Calculation              mL/min/1.73m2              



             (Non-                                        



             American) (test code                                        



             = 3061718881)                                        

 

             eGFR Calculation              mL/min/1.73m2              



             (African American)                                        



             (test code =                                        



             3711912501)                                         

 

             RICK (test code = RICK) Association of                           



                          Glomerular                             



                          Filtration Rate                           



                          (GFR) and Staging                           



                          of Kidney Disease*                           



                          +------------------                           



                          -----+-------------                           



                          --------+----------                           



                          ---------------+|                           



                          GFR (mL/min/1.73                           



                          m2) ?| With Kidney                           



                          Damage ?| ?Without                           



                          Kidney                                 



                          Damage+------------                           



                          -----------+-------                           



                          --------------+----                           



                          -------------------                           



                          --+| ?>90 ? ? ? ? ?                           



                          ? ? ? ?| ?Stage one                           



                          ? ? ? ? ?| ? Normal                           



                          ? ? ? ? ? ? ?                           



                          ?+-----------------                           



                          ------+------------                           



                          ---------+---------                           



                          ----------------+|                           



                          ?60-89 ? ? ? ? ? ?                           



                          ? ?| ?Stage two ? ?                           



                          ? ? ?| ? Decreased                           



                          GFR ? ? ? ?                            



                          +------------------                           



                          -----+-------------                           



                          --------+----------                           



                          ---------------+|                           



                          ?30-59 ? ? ? ? ? ?                           



                          ? ?| ?Stage three ?                           



                          ? ? ?| ? Stage                           



                          three ? ? ? ? ?                           



                          +------------------                           



                          -----+-------------                           



                          --------+----------                           



                          ---------------+|                           



                          ?15-29 ? ? ? ? ? ?                           



                          ? ?| ?Stage four ?                           



                          ? ? ? | ? Stage                           



                          four ? ? ? ? ?                           



                          ?+-----------------                           



                          ------+------------                           



                          ---------+---------                           



                          ----------------+|                           



                          ?<15 (or dialysis)                           



                          ? ?| ?Stage five ?                           



                          ? ? ? | ? Stage                           



                          five ? ? ? ? ?                           



                          ?+-----------------                           



                          ------+------------                           



                          ---------+---------                           



                          ----------------+                           



                          *Each stage assumes                           



                          the associated GFR                           



                          level has been in                           



                          effect for at least                           



                          three months.                           



                          ?Stages 1 to 5,                           



                          with or without                           



                          kidney disease,                           



                          indicate chronic                           



                          kidney disease.                           



                          Notes:                                 



                          Determination of                           



                          stages one and two                           



                          (with eGFR                             



                          >59mL/min/1.73 m2)                           



                          requires estimation                           



                          of kidney damage                           



                          for at least three                           



                          months as defined                           



                          by structural or                           



                          functional                             



                          abnormalities of                           



                          the kidney,                            



                          manifested by                           



                          either:Pathological                           



                          abnormalities or                           



                          Markers of kidney                           



                          damage (including                           



                          abnormalities in                           



                          the composition of                           



                          the blood or urine                           



                          or abnormalities in                           



                          imaging tests).                           

 

             Lab Interpretation Abnormal                               



             (test code = 89884-9)                                        



[id number] 11[facility]07:00





             Test Item    Value        Reference Range Interpretation Comments

 

             MAGNESIUM (test code = 0256020613) 1.5 mg/dL    1.7-2.4      L     

       

 

             Lab Interpretation (test code = Abnormal                           

    



             94610-6)                                            



[facility] (ALB, T.PRO, BILI T, 
BU/BC, ALT, AST, ALK, PHOS)2020 11:07:00





             Test Item    Value        Reference Range Interpretation Comments

 

             TOTAL BILI (test code = 7094855492) 1.2 mg/dL    0.1-1.1      H    

        

 

             BILI UNCON (test code = 2171929212) 0.7 mg/dL    0.1-1.1           

        

 

             BILI CONJ (test code = 3618746075) 0.0 mg/dL    0-0.3              

       

 

             T PROTEIN (test code = 8366634741) 6.1 g/dL     6.3-8.2      L     

       

 

             ALBUMIN (test code = 1942389040) 2.7 g/dL     3.5-5        L       

     

 

             ALK PHOS (test code = 9535189926) 92 U/L                     

      

 

             ALTv (test code = 1742-6) 20 U/L       5-35                      

 

             AST(SGOT) (test code = 6759508590) 37 U/L       13-40              

       

 

             Lab Interpretation (test code = Abnormal                           

    



             04656-3)                                            



[facility]  / [id number] 10:59:00





             Test Item    Value        Reference Range Interpretation Comments

 

             PROTIME PATIENT (test              See_Comment  H             [Auto

mated message]



             code = 5964-2)                                        The system 

ich



                                                                 generated this 

result



                                                                 transmitted ref

erence



                                                                 range: 10.1 - 1

2.6



                                                                 Seconds. The



                                                                 reference range

 was



                                                                 not used to int

erpret



                                                                 this result as



                                                                 normal/abnormal

.

 

             INR (test code = 6301-6)                                        Nor

mal INR <1.1;



                                                                 Warfarin Therap

eutic



                                                                 range 2.0 to 3.

0 or



                                                                 2.5 to 3.5, dep

ending



                                                                 upon the indica

tions.

 

             Lab Interpretation (test Abnormal                               



             code = 84847-8)                                        



[facility] [id number] 03:41:00





             Test Item    Value        Reference Range Interpretation Comments

 

             FERRITIN (test code = 382.0 ng/mL         H            



             5142304715)                                         

 

             RICK (test code = RICK) Biotin has been                           



                          reported to cause a                           



                          negative bias,                           



                          interpret results                           



                          relative to                            



                          patient's use of                           



                          biotin.                                

 

             Lab Interpretation (test Abnormal                               



             code = 00208-0)                                        



[facility]2020 03:06:00





             Test Item    Value        Reference Range Interpretation Comments

 

             POCT GLU (test code = 6042072007) 274 mg/dL           H      

      

 

             Lab Interpretation (test code = Abnormal                           

    



             38724-0)                                            



[facility]IR[facility] [id number] 02:56:00





             Test Item    Value        Reference Range Interpretation Comments

 

             IRON (test code = 4465073823) 171 ug/dL           H          

  

 

             TIBC (test code = 0348098047) 319 ug/dL    250-410                 

  

 

             % FE SAT (test code = 0522007871) 54 %         20-50        H      

      

 

             Lab Interpretation (test code = Abnormal                           

    



             39134-3)                                            



[facility] CampusCBC WITH [id number] 23:39:00





             Test Item    Value        Reference Range Interpretation Comments

 

             WBC (test code =              See_Comment                [Automated



             0898-2)                                             message] The sy

stem



                                                                 which generated



                                                                 this result



                                                                 transmitted



                                                                 reference range

:



                                                                 4.30 - 11.10



                                                                 10*3/?L. The



                                                                 reference range

 was



                                                                 not used to



                                                                 interpret this



                                                                 result as



                                                                 normal/abnormal

.

 

             RBC (test code =              See_Comment  L             [Automated



             110-8)                                              message] The sy

stem



                                                                 which generated



                                                                 this result



                                                                 transmitted



                                                                 reference range

:



                                                                 3.93 - 5.25



                                                                 10*6/?L. The



                                                                 reference range

 was



                                                                 not used to



                                                                 interpret this



                                                                 result as



                                                                 normal/abnormal

.

 

             HGB (test code = 11.5 g/dL    11.6-15      L            



             718-7)                                              

 

             HCT (test code = 35.1 %       35.7-45.2    L            



             4544-3)                                             

 

             MCV (test code = 89.8 fL      80.6-95.5                 



             787-2)                                              

 

             MCH (test code = 29.4 pg      25.9-32.8                 



             785-6)                                              

 

             MCHC (test code = 32.8 g/dL    31.6-35.1                 



             786-4)                                              

 

             RDW-SD (test code = 55.3 fL      39-49.9      H            



             88963-8)                                            

 

             RDW-CV (test code = 17.3 %       12-15.5      H            



             788-0)                                              

 

             PLT (test code =              See_Comment  L             [Automated



             777-3)                                              message] The sy

stem



                                                                 which generated



                                                                 this result



                                                                 transmitted



                                                                 reference range

:



                                                                 166 - 358 10*3/

?L.



                                                                 The reference r

olman



                                                                 was not used to



                                                                 interpret this



                                                                 result as



                                                                 normal/abnormal

.

 

             MPV (test code = 10.2 fL      9.5-12.9                  



             32152-2)                                            

 

             IPF % (test code = 2.0 %        1.3-7.7                   Platelet 

count



             [id number])                                         measured by



                                                                 fluorescence



                                                                 method.

 

             NRBC/100 WBC (test              See_Comment                [Automat

ed



             code = 0252105717)                                        message] 

The system



                                                                 which generated



                                                                 this result



                                                                 transmitted



                                                                 reference range

:



                                                                 0.0 - 10.0 /100



                                                                 WBCs. The refer

ence



                                                                 range was not u

sed



                                                                 to interpret th

is



                                                                 result as



                                                                 normal/abnormal

.

 

             NRBC x10^3 (test code <0.01        See_Comment                [Auto

mated



             = 4014351079)                                        message] The s

ystem



                                                                 which generated



                                                                 this result



                                                                 transmitted



                                                                 reference range

:



                                                                 10*3/?L. The



                                                                 reference range

 was



                                                                 not used to



                                                                 interpret this



                                                                 result as



                                                                 normal/abnormal

.

 

             GRAN MAT (NEUT) % 78.7 %                                 



             (test code = 770-8)                                        

 

             IMM GRAN % (test code 1.30 %                                 



             = 9505594868)                                        

 

             LYMPH % (test code = 12.3 %                                 



             736-9)                                              

 

             MONO % (test code = 6.4 %                                  



             5905-5)                                             

 

             EOS % (test code = 1.2 %                                  



             713-8)                                              

 

             BASO % (test code = 0.1 %                                  



             706-2)                                              

 

             GRAN MAT x10^3(ANC) 6.48 10*3/uL 1.88-7.09                 



             (test code =                                        



             1907129679)                                         

 

             IMM GRAN x10^3 (test 0.11 10*3/uL 0-0.06       H            



             code = 2546057080)                                        

 

             LYMPH x10^3 (test code 1.01 10*3/uL 1.32-3.29    L            



             = 731-0)                                            

 

             MONO x10^3 (test code 0.53 10*3/uL 0.33-0.92                 



             = 742-7)                                            

 

             EOS x10^3 (test code = 0.10 10*3/uL 0.03-0.39                 



             711-2)                                              

 

             BASO x10^3 (test code <0.03        0.01-0.07                 



             = 704-7)                                            

 

             Lab Interpretation Abnormal                               



             (test code = 07311-4)                                        



[facility] GLUCOSE (AUTOMATED)2020 22:58:00





             Test Item    Value        Reference Range Interpretation Comments

 

             POCT GLU (test code = 0304685359) 181 mg/dL           H      

      

 

             Lab Interpretation (test code = Abnormal                           

    



             18991-9)                                            



[id number]12 Nonspecific, mild 
gaseous distention of large and small bowel with noevidence of mechanical 
obstruction. Right-sided pleural effusion.EXAM: XR KUB COMPARISON: CT dated 
2020. HISTORY: partial SBO, monitor progression  FINDINGS: Nonspecific loops
of large and small bowel demonstrate gaseous distentionwith no localizing 
findings to suggest obstruction. There is air notedthroughout the colon into the
distal colon. No intra-abdominal free air is seen. Cholecystectomy changes are 
noted. Incidental note of a right-sided pleural effusion. No acute or aggressive
bony lesions. [facility], [facility] - 2020  2:23 PM CDTEXAM: XR 
KUBCOMPARISON: CT dated 2020.HISTORY: partial SBO, monitor progression 
FINDINGS:Nonspecific loops of large and small bowel demonstrate gaseous 
distentionwith no localizing findings to suggest obstruction. There is air 
notedthroughout the colon into the distalcolon.No intra-abdominal free air is 
seen.Cholecystectomy changes are noted.Incidental note of a right-sided pleural 
effusion.No acute or aggressive bony lesions.IMPRESSIONNonspecific, mild gaseous
distention of large and small bowel with noevidence of mechanical 
obstruction.Right-sided pleural effusion.[facility] 
GLUCOSE (AUTOMATED)2020 17:04:00





             Test Item    Value        Reference Range Interpretation Comments

 

             POCT GLU (test code = 1073662593) 181 mg/dL           H      

      

 

             Lab Interpretation (test code = Abnormal                           

    



             67315-0)                                            



[facility] [id number] 15:26:00





             Test Item    Value        Reference Range Interpretation Comments

 

             WBC (test code =              See_Comment                [Automated

 message]



             6690-2)                                             The system [employer]



                                                                 generated this 

result



                                                                 transmitted ref

erence



                                                                 range: 4.30 - 1

1.10



                                                                 10*3/?L. The



                                                                 reference range

 was



                                                                 not used to int

erpret



                                                                 this result as



                                                                 normal/abnormal

.

 

             RBC (test code = 789-8)              See_Comment  L             [Au

tomated message]



                                                                 The system [employer]



                                                                 generated this 

result



                                                                 transmitted ref

erence



                                                                 range: 3.93 - 5

.25



                                                                 10*6/?L. The



                                                                 reference range

 was



                                                                 not used to int

erpret



                                                                 this result as



                                                                 normal/abnormal

.

 

             HGB (test code = 718-7) 10.2 g/dL    11.6-15      L            

 

             HCT (test code = 30.5 %       35.7-45.2    L            



             4544-3)                                             

 

             MCH (test code = 785-6) 29.8 pg      25.9-32.8                 

 

             MCV (test code = 787-2) 89.2 fL      80.6-95.5                 

 

             MCHC (test code = 33.4 g/dL    31.6-35.1                 



             786-4)                                              

 

             PLT (test code = 777-3)              See_Comment  LL            [Au

tomated message]



                                                                 The system [employer]



                                                                 generated this 

result



                                                                 transmitted ref

erence



                                                                 range: 166 - 35

8



                                                                 10*3/?L. The



                                                                 reference range

 was



                                                                 not used to int

erpret



                                                                 this result as



                                                                 normal/abnormal

.

 

             MPV (test code = 9.0 fL       9.5-12.9     L            



             66776-0)                                            

 

             RDW-CV (test code = 17.5 %       12-15.5      H            



             788-0)                                              

 

             RDW-SD (test code = 55.7 fL      39-49.9      H            



             73410-7)                                            

 

             NRBC x10^3 (test code =              See_Comment                [Au

tomated message]



             6759120505)                                         The system [employer]



                                                                 generated this 

result



                                                                 transmitted ref

erence



                                                                 range: 10*3/?L.

 The



                                                                 reference range

 was



                                                                 not used to int

erpret



                                                                 this result as



                                                                 normal/abnormal

.

 

             NRBC/100 WBC (test code              See_Comment                [Au

tomated message]



             = 6878098422)                                        The system [email or web address]





                                                                 generated this 

result



                                                                 transmitted ref

erence



                                                                 range: 0.0 - 10

.0



                                                                 /100 WBCs. The



                                                                 reference range

 was



                                                                 not used to int

erpret



                                                                 this result as



                                                                 normal/abnormal

.

 

             IPF % (test code = 3.2 %        1.3-7.7                   Platelet 

count



             6200432107)                                         measured by



                                                                 fluorescence me

thod.

 

             Lab Interpretation Abnormal                               



             (test code = 61024-8)                                        



[facility] 12[facility]57:00





             Test Item    Value        Reference Range Interpretation Comments

 

             POCT GLU (test code = 9617900786) 151 mg/dL           H      

      

 

             Lab Interpretation (test code = Abnormal                           

    



             27094-3)                                            



[facility] [id number] 10:23:00





             Test Item    Value        Reference Range Interpretation Comments

 

             WBC (test code =              See_Comment                [Automated

 message]



             6690-2)                                             The system [employer]



                                                                 generated this 

result



                                                                 transmitted ref

erence



                                                                 range: 4.30 - 1

1.10



                                                                 10*3/?L. The



                                                                 reference range

 was



                                                                 not used to int

erpret



                                                                 this result as



                                                                 normal/abnormal

.

 

             RBC (test code = 789-8)              See_Comment  L             [Au

tomated message]



                                                                 The system [employer]



                                                                 generated this 

result



                                                                 transmitted ref

erence



                                                                 range: 3.93 - 5

.25



                                                                 10*6/?L. The



                                                                 reference range

 was



                                                                 not used to int

erpret



                                                                 this result as



                                                                 normal/abnormal

.

 

             HGB (test code = 718-7) 9.9 g/dL     11.6-15      L            

 

             HCT (test code = 30.1 %       35.7-45.2    L            



             4544-3)                                             

 

             MCH (test code = 785-6) 29.3 pg      25.9-32.8                 

 

             MCV (test code = 787-2) 89.1 fL      80.6-95.5                 

 

             MCHC (test code = 32.9 g/dL    31.6-35.1                 



             786-4)                                              

 

             PLT (test code = 777-3)              See_Comment  LL            [Au

tomated message]



                                                                 The system [employer]



                                                                 generated this 

result



                                                                 transmitted ref

erence



                                                                 range: 166 - 35

8



                                                                 10*3/?L. The



                                                                 reference range

 was



                                                                 not used to int

erpret



                                                                 this result as



                                                                 normal/abnormal

.

 

             MPV (test code = 8.4 fL       9.5-12.9     L            



             94705-9)                                            

 

             RDW-CV (test code = 17.5 %       12-15.5      H            



             788-0)                                              

 

             RDW-SD (test code = 56.2 fL      39-49.9      H            



             32834-7)                                            

 

             NRBC x10^3 (test code =              See_Comment                [Au

tomated message]



             3264777396)                                         The system [employer]



                                                                 generated this 

result



                                                                 transmitted ref

erence



                                                                 range: 10*3/?L.

 The



                                                                 reference range

 was



                                                                 not used to int

erpret



                                                                 this result as



                                                                 normal/abnormal

.

 

             NRBC/100 WBC (test code              See_Comment                [Au

tomated message]



             = 4245457277)                                        The system [employer]



                                                                 generated this 

result



                                                                 transmitted ref

erence



                                                                 range: 0.0 - 10

.0



                                                                 /100 WBCs. The



                                                                 reference range

 was



                                                                 not used to int

erpret



                                                                 this result as



                                                                 normal/abnormal

.

 

             IPF % (test code = 2.0 %        1.3-7.7                   Platelet 

count



             2679262113)                                         measured by



                                                                 fluorescence me

thod.

 

             Lab Interpretation Abnormal                               



             (test code = 96162-2)                                        



[facility]NA, K, CL, CO2, 
Glucose, BUN, Creatinine, CA)2020 10:09:00





             Test Item    Value        Reference Range Interpretation Comments

 

             NA (test code = 136 mmol/L   135-145                   



             5590390731)                                         

 

             K (test code = 3.7 mmol/L   3.5-5                     



             7172622146)                                         

 

             CL (test code = 109 mmol/L          H            



             3123553601)                                         

 

             CO2 TOTAL (test code = 21 mmol/L    23-31        L            



             3511041052)                                         

 

             AGAP (test code =              2-16                      



             5775414097)                                         

 

             BUN (test code = 16 mg/dL     7-23                      



             2033687589)                                         

 

             GLUCOSE (test code = 151 mg/dL           H            



             9548892864)                                         

 

             CREATININE (test code = 0.59 mg/dL   0.5-1.04                  



             3172690732)                                         

 

             CALCIUM (test code = 7.1 mg/dL    8.6-10.6     L            



             9899774703)                                         

 

             eGFR Calculation              mL/min/1.73m2              



             (Non-)                                        



             (test code =                                        



             2561656190)                                         

 

             eGFR Calculation              mL/min/1.73m2              



             (African American)                                        



             (test code =                                        



             5956239198)                                         

 

             RICK (test code = RICK) Association of                           



                          Glomerular Filtration                           



                          Rate (GFR) and Staging                           



                          of Kidney Disease*                           



                          +---------------------                           



                          --+-------------------                           



                          --+-------------------                           



                          ------+| GFR                           



                          (mL/min/1.73 m2) ?|                           



                          With Kidney Damage ?|                           



                          ?Without Kidney                           



                          Damage+---------------                           



                          --------+-------------                           



                          --------+-------------                           



                          ------------+| ?>90 ?                           



                          ? ? ? ? ? ? ? ?|                           



                          ?Stage one ? ? ? ? ?|                           



                          ? Normal ? ? ? ? ? ? ?                           



                          ?+--------------------                           



                          ---+------------------                           



                          ---+------------------                           



                          -------+| ?60-89 ? ? ?                           



                          ? ? ? ? ?| ?Stage two                           



                          ? ? ? ? ?| ? Decreased                           



                          GFR ? ? ? ?                            



                          +---------------------                           



                          --+-------------------                           



                          --+-------------------                           



                          ------+| ?30-59 ? ? ?                           



                          ? ? ? ? ?| ?Stage                           



                          three ? ? ? ?| ? Stage                           



                          three ? ? ? ? ?                           



                          +---------------------                           



                          --+-------------------                           



                          --+-------------------                           



                          ------+| ?15-29 ? ? ?                           



                          ? ? ? ? ?| ?Stage four                           



                          ? ? ? ? | ? Stage four                           



                          ? ? ? ? ?                              



                          ?+--------------------                           



                          ---+------------------                           



                          ---+------------------                           



                          -------+| ?<15 (or                           



                          dialysis) ? ?| ?Stage                           



                          five ? ? ? ? | ? Stage                           



                          five ? ? ? ? ?                           



                          ?+--------------------                           



                          ---+------------------                           



                          ---+------------------                           



                          -------+ *Each stage                           



                          assumes the associated                           



                          GFR level has been in                           



                          effect for at least                           



                          three months. ?Stages                           



                          1 to 5, with or                           



                          without kidney                           



                          disease, indicate                           



                          chronic kidney                           



                          disease. Notes:                           



                          Determination of                           



                          stages one and two                           



                          (with eGFR                             



                          >59mL/min/1.73 m2)                           



                          requires estimation of                           



                          kidney damage for at                           



                          least three months as                           



                          defined by structural                           



                          or functional                           



                          abnormalities of the                           



                          kidney, manifested by                           



                          either:Pathological                           



                          abnormalities or                           



                          Markers of kidney                           



                          damage (including                           



                          abnormalities in the                           



                          composition of the                           



                          blood or urine or                           



                          abnormalities in                           



                          imaging tests).                           

 

             Lab Interpretation Abnormal                               



             (test code = 43912-6)                                        



[id number] 10[facility]09:00





             Test Item    Value        Reference Range Interpretation Comments

 

             MAGNESIUM (test code = 5838369691) 2.3 mg/dL    1.7-2.4            

       

 

             Lab Interpretation (test code = Normal                             

    



             88579-4)                                            



[facility] (ALB, T.PRO, BILI T, 
BU/BC, ALT, AST, ALK, PHOS)2020 10:09:00





             Test Item    Value        Reference Range Interpretation Comments

 

             TOTAL BILI (test code = 7896551640) 1.7 mg/dL    0.1-1.1      H    

        

 

             BILI UNCON (test code = 5353019054) 1.3 mg/dL    0.1-1.1      H    

        

 

             BILI CONJ (test code = 9711352708) 0.0 mg/dL    0-0.3              

       

 

             T PROTEIN (test code = 1730228917) 5.7 g/dL     6.3-8.2      L     

       

 

             ALBUMIN (test code = 2597772547) 2.5 g/dL     3.5-5        L       

     

 

             ALK PHOS (test code = 1104873711) 77 U/L                     

      

 

             ALTv (test code = 1742-6) 20 U/L       5-35                      

 

             AST(SGOT) (test code = 5359337423) 33 U/L       13-40              

       

 

             Lab Interpretation (test code = Abnormal                           

    



             34267-0)                                            



[facility]  / [id number] 09:53:00





             Test Item    Value        Reference Range Interpretation Comments

 

             PROTIME PATIENT (test              See_Comment  H             [Auto

mated message]



             code = 5964-2)                                        The system [employer]



                                                                 generated this 

result



                                                                 transmitted ref

erence



                                                                 range: 10.1 - 1

2.6



                                                                 Seconds. The



                                                                 reference range

 was



                                                                 not used to int

erpret



                                                                 this result as



                                                                 normal/abnormal

.

 

             INR (test code = 6301-6)                                        Nor

mal INR <1.1;



                                                                 Warfarin Therap

eutic



                                                                 range 2.0 to 3.

0 or



                                                                 2.5 to 3.5, dep

ending



                                                                 upon the indica

tions.

 

             Lab Interpretation (test Abnormal                               



             code = 32083-3)                                        



[facility] 04:43:00





             Test Item    Value        Reference Range Interpretation Comments

 

             POCT GLU (test code = 2010684518) 164 mg/dL           H      

      

 

             Lab Interpretation (test code = Abnormal                           

    



             99560-5)                                            



[facility] / [id number] 03:24:00





             Test Item    Value        Reference Range Interpretation Comments

 

             WBC (test code =              See_Comment                [Automated

 message]



             6690-2)                                             The system [employer]



                                                                 generated this 

result



                                                                 transmitted ref

erence



                                                                 range: 4.30 - 1

1.10



                                                                 10*3/?L. The



                                                                 reference range

 was



                                                                 not used to int

erpret



                                                                 this result as



                                                                 normal/abnormal

.

 

             RBC (test code = 789-8)              See_Comment  L             [Au

tomated message]



                                                                 The system [employer]



                                                                 generated this 

result



                                                                 transmitted ref

erence



                                                                 range: 3.93 - 5

.25



                                                                 10*6/?L. The



                                                                 reference range

 was



                                                                 not used to int

erpret



                                                                 this result as



                                                                 normal/abnormal

.

 

             HGB (test code = 718-7) 10.4 g/dL    11.6-15      L            

 

             HCT (test code = 31.4 %       35.7-45.2    L            



             4544-3)                                             

 

             MCH (test code = 785-6) 29.3 pg      25.9-32.8                 

 

             MCV (test code = 787-2) 88.5 fL      80.6-95.5                 

 

             MCHC (test code = 33.1 g/dL    31.6-35.1                 



             786-4)                                              

 

             PLT (test code = 777-3)              See_Comment  LL            [Au

tomated message]



                                                                 The system [employer]



                                                                 generated this 

result



                                                                 transmitted ref

erence



                                                                 range: 166 - 35

8



                                                                 10*3/?L. The



                                                                 reference range

 was



                                                                 not used to int

erpret



                                                                 this result as



                                                                 normal/abnormal

.

 

             MPV (test code = 9.1 fL       9.5-12.9     L            



             43502-1)                                            

 

             RDW-CV (test code = 17.6 %       12-15.5      H            



             788-0)                                              

 

             RDW-SD (test code = 56.1 fL      39-49.9      H            



             51831-7)                                            

 

             NRBC x10^3 (test code =              See_Comment                [Au

tomated message]



             0945554774)                                         The system [employer]



                                                                 generated this 

result



                                                                 transmitted ref

erence



                                                                 range: 10*3/?L.

 The



                                                                 reference range

 was



                                                                 not used to int

erpret



                                                                 this result as



                                                                 normal/abnormal

.

 

             NRBC/100 WBC (test code              See_Comment                [Au

tomated message]



             = 7236220637)                                        The system [email or web address]





                                                                 generated this 

result



                                                                 transmitted ref

erence



                                                                 range: 0.0 - 10

.0



                                                                 /100 WBCs. The



                                                                 reference range

 was



                                                                 not used to int

erpret



                                                                 this result as



                                                                 normal/abnormal

.

 

             IPF % (test code = 1.9 %        1.3-7.7                   Platelet 

count



             [id number])                                         measured by



                                                                 fluorescence me

thod.

 

             Lab Interpretation Abnormal                               



             (test code = 05849-4)                                        



[facility] 00:53:00





             Test Item    Value        Reference Range Interpretation Comments

 

             POCT GLU (test code = 0412587242) 201 mg/dL           H      

      

 

             Lab Interpretation (test code = Abnormal                           

    



             46759-7)                                            



[facility] METABOLIC PANEL (NA, K, CL, CO2, 
GLUCOSE, BUN, CREATININE, CA)2020 22:06:00





             Test Item    Value        Reference Range Interpretation Comments

 

             NA (test code = 136 mmol/L   135-145                   



             8729217585)                                         

 

             K (test code = 4.1 mmol/L   3.5-5                     



             5578884395)                                         

 

             CL (test code = 107 mmol/L                       



             7467896909)                                         

 

             CO2 TOTAL (test code = 24 mmol/L    23-31                     



             4376525296)                                         

 

             AGAP (test code =              2-16                      



             3850456954)                                         

 

             BUN (test code = 18 mg/dL     7-23                      



             4412728091)                                         

 

             GLUCOSE (test code = 133 mg/dL           H            



             3529877809)                                         

 

             CREATININE (test code = 0.60 mg/dL   0.5-1.04                  



             8043394448)                                         

 

             CALCIUM (test code = 7.4 mg/dL    8.6-10.6     L            



             5974072765)                                         

 

             eGFR Calculation              mL/min/1.73m2              



             (Non-)                                        



             (test code =                                        



             0389126907)                                         

 

             eGFR Calculation              mL/min/1.73m2              



             (African American)                                        



             (test code =                                        



             0671852440)                                         

 

             RICK (test code = RICK) Association of                           



                          Glomerular Filtration                           



                          Rate (GFR) and Staging                           



                          of Kidney Disease*                           



                          +---------------------                           



                          --+-------------------                           



                          --+-------------------                           



                          ------+| GFR                           



                          (mL/min/1.73 m2) ?|                           



                          With Kidney Damage ?|                           



                          ?Without Kidney                           



                          Damage+---------------                           



                          --------+-------------                           



                          --------+-------------                           



                          ------------+| ?>90 ?                           



                          ? ? ? ? ? ? ? ?|                           



                          ?Stage one ? ? ? ? ?|                           



                          ? Normal ? ? ? ? ? ? ?                           



                          ?+--------------------                           



                          ---+------------------                           



                          ---+------------------                           



                          -------+| ?60-89 ? ? ?                           



                          ? ? ? ? ?| ?Stage two                           



                          ? ? ? ? ?| ? Decreased                           



                          GFR ? ? ? ?                            



                          +---------------------                           



                          --+-------------------                           



                          --+-------------------                           



                          ------+| ?30-59 ? ? ?                           



                          ? ? ? ? ?| ?Stage                           



                          three ? ? ? ?| ? Stage                           



                          three ? ? ? ? ?                           



                          +---------------------                           



                          --+-------------------                           



                          --+-------------------                           



                          ------+| ?15-29 ? ? ?                           



                          ? ? ? ? ?| ?Stage four                           



                          ? ? ? ? | ? Stage four                           



                          ? ? ? ? ?                              



                          ?+--------------------                           



                          ---+------------------                           



                          ---+------------------                           



                          -------+| ?<15 (or                           



                          dialysis) ? ?| ?Stage                           



                          five ? ? ? ? | ? Stage                           



                          five ? ? ? ? ?                           



                          ?+--------------------                           



                          ---+------------------                           



                          ---+------------------                           



                          -------+ *Each stage                           



                          assumes the associated                           



                          GFR level has been in                           



                          effect for at least                           



                          three months. ?Stages                           



                          1 to 5, with or                           



                          without kidney                           



                          disease, indicate                           



                          chronic kidney                           



                          disease. Notes:                           



                          Determination of                           



                          stages one and two                           



                          (with eGFR                             



                          >59mL/min/1.73 m2)                           



                          requires estimation of                           



                          kidney damage for at                           



                          least three months as                           



                          defined by structural                           



                          or functional                           



                          abnormalities of the                           



                          kidney, manifested by                           



                          either:Pathological                           



                          abnormalities or                           



                          Markers of kidney                           



                          damage (including                           



                          abnormalities in the                           



                          composition of the                           



                          blood or urine or                           



                          abnormalities in                           



                          imaging tests).                           

 

             Lab Interpretation Abnormal                               



             (test code = 68909-9)                                        



[facility] / [id number] 21:54:00





             Test Item    Value        Reference Range Interpretation Comments

 

             WBC (test code =              See_Comment                [Automated

 message]



             6690-2)                                             The system [employer]



                                                                 generated this 

result



                                                                 transmitted ref

erence



                                                                 range: 4.30 - 1

1.10



                                                                 10*3/?L. The



                                                                 reference range

 was



                                                                 not used to int

erpret



                                                                 this result as



                                                                 normal/abnormal

.

 

             RBC (test code = 789-8)              See_Comment                [Au

tomated message]



                                                                 The system [employer]



                                                                 generated this 

result



                                                                 transmitted ref

erence



                                                                 range: 3.93 - 5

.25



                                                                 10*6/?L. The



                                                                 reference range

 was



                                                                 not used to int

erpret



                                                                 this result as



                                                                 normal/abnormal

.

 

             HGB (test code = 718-7) 11.4 g/dL    11.6-15      L            

 

             HCT (test code = 35.5 %       35.7-45.2    L            



             4544-3)                                             

 

             MCH (test code = 785-6) 28.7 pg      25.9-32.8                 

 

             MCV (test code = 787-2) 89.4 fL      80.6-95.5                 

 

             MCHC (test code = 32.1 g/dL    31.6-35.1                 



             786-4)                                              

 

             PLT (test code = 777-3)              See_Comment  L             [Au

tomated message]



                                                                 The system [employer]



                                                                 generated this 

result



                                                                 transmitted ref

erence



                                                                 range: 166 - 35

8



                                                                 10*3/?L. The



                                                                 reference range

 was



                                                                 not used to int

erpret



                                                                 this result as



                                                                 normal/abnormal

.

 

             MPV (test code = 9.0 fL       9.5-12.9     L            



             53672-3)                                            

 

             RDW-CV (test code = 17.5 %       12-15.5      H            



             788-0)                                              

 

             RDW-SD (test code = 56.3 fL      39-49.9      H            



             75647-1)                                            

 

             NRBC x10^3 (test code =              See_Comment                [Au

tomated message]



             [id number])                                         The system [employer]



                                                                 generated this 

result



                                                                 transmitted ref

erence



                                                                 range: 10*3/?L.

 The



                                                                 reference range

 was



                                                                 not used to int

erpret



                                                                 this result as



                                                                 normal/abnormal

.

 

             NRBC/100 WBC (test code              See_Comment                [Au

tomated message]



             = [id number])                                        The system [employer]



                                                                 generated this 

result



                                                                 transmitted ref

erence



                                                                 range: 0.0 - 10

.0



                                                                 /100 WBCs. The



                                                                 reference range

 was



                                                                 not used to int

erpret



                                                                 this result as



                                                                 normal/abnormal

.

 

             IPF % (test code = 2.3 %        1.3-7.7                   Platelet 

count



             [id number])                                         measured by



                                                                 fluorescence me

thod.

 

             Lab Interpretation Abnormal                               



             (test code = 56212-8)                                        



[facility]





             Test Item    Value        Reference Range Interpretation Comments

 

             MAGNESIUM (test code = 2860738060) 2.7 mg/dL    1.7-2.4      H     

       

 

             Lab Interpretation (test code = Abnormal                           

    



             42932-0)                                            



[facility] GLUCOSE (AUTOMATED)2020 21:29:00





             Test Item    Value        Reference Range Interpretation Comments

 

             POCT GLU (test code = 1358143146) 135 mg/dL           H      

      

 

             Lab Interpretation (test code = Abnormal                           

    



             05201-2)                                            



[facility] ANGIOGRAM ABDOMEN/[id number] 20:21:03
Study is limited secondary to presence of intraluminal contrast on 
thenoncontrast study from the prior CT scan. No active extravasation ofcontrast 
seen in the area where there was no contrast on the noncontrast CTfrom today. no
arterial secondary signs ?aneurysm, early draining vein (AVM ) seen Changes 
consistent ?right and transverse colon with mild dilatation oftransverse colon 
with air fluid levels . Consider infectious colitis. ?Nochanges of ischemia or 
vessel occlusion. Small sliding hiatal hernia. Trace perihepatic and pericolonic
ascites, likely reactive. Cirrhosis without focal hepatic lesion with sequela of
portal hypertensionincluding splenomegaly and gastroesophageal varices. 
Preliminary Report Dictated by Resident: David Boone MD., have reviewed this studyand agree with theabove report.EXAM: CT ABDOMEN AND
PELVIS WITH AND WITHOUT CONTRAST HISTORY: GI bleed With and without contrast 
COMPARISON: 2020 CT abdomen and pelvis with contrast. DOSE: 3760.82 
mGy-cm TECHNIQUE AND FINDINGS: Contiguous axial imaging from the level of the 
lungbases throughthe pubic symphysis was performed before and after 
theuncomplicated administration of 120 mL of intravenous Omnipaque 
contrast.Precontrast, arterial, venous and 90 seconds delayed phase images 
wereobtained according to the GI bleed protocol. Coronal and 
sagittalreconstructions were obtained. ?Auto mA and/or iterative 
reconstructionwere used to reduce radiation dose. FINDINGS: Suboptimal study 
secondary to presence of contrast from the previous CTscan from 2020 within
the bowel lumen at various sites on theprecontrast study.. LOWER THORAX: 
Bibasilar atelectasis. Scattered interstitial septalthickening, bronchiectasis 
and bronchial wall thickening. No cardiomegaly. LIVER: Cirrhotic liver morphol
ogy with nodular contours and hypertrophy ofthe segment 4, 1 2 and 3. A 
nonenhancing 3.7 cm segment 5 oblong hypodensestructure measures simple cyst 
attenuation. Mild periportal edema ispresent. GALLBLADDER AND BILIARY TREE: 
Prior cholecystectomy withpostcholecystectomy reservoir phenomenon. SPLEEN: Sp
lenomegaly at 19.5 cm. PANCREAS: No ductal dilation or masses. ADRENAL GLANDS: 
No adrenal nodules. KIDNEYS: No hydronephrosis, stones, or masses. 2.4 cm right 
interpolarsimple cyst. PERITONEUM AND RETROPERITONEUM: No free air. Trace volume
perihepatic andpericolonic simple ascites. LYMPH NODES: No lymphadenopathy. GI 
TRACT: Small sliding hiatal hernia. Again noted is extensive prominentascending 
colon, and transverse colon ?mucosal enhancement and subjacentfatty stranding, 
with relatively sparing ofleft colon. Dilatation oftransverse colon and air 
fluid levels. Appendix is nonvisualized. PELVIS/BLADDER: Urinary bladder is 
underdistended with concern wallthickening secondary to underdistention. VES
SELS: Mild scattered aortoiliac atherosclerotic plaquing andcalcification 
results in no significant stenosis of the branch vesselostia. Small 
gastroesophageal varices.. BONES AND SOFT TISSUES: No suspicious lytic or 
sclerotic bony lesions. [facility], Radiant Results Inft User - 2020  3:22 PM 
CDTEXAM:CT ABDOMEN AND PELVIS WITH AND WITHOUT CONTRASTHISTORY: GI bleed With 
and without contrastCOMPARISON: 2020 CT abdomen and pelvis with 
contrast.DOSE: 3760.82 mGy-cmTECHNIQUE AND FINDINGS: Contiguous axial imaging 
from the level of the lungbases through the pubic symphysis was performed before
and after theuncomplicated administration of 120 mL of intravenous Omnipaque 
contrast.Precontrast, arterial, venous and 90 seconds delayed phase images 
wereobtained according to the GI bleed protocol. Coronal and 
sagittalreconstructions were obtained.  Auto mA and/or iterative 
reconstructionwere used to reduce radiation dose.FINDINGS:Suboptimal study 
secondary to presence of contrast from the previous CTscan from 2020 within
the bowel lumen at various sites on theprecontrast study..LOWER THORAX: 
Bibasilar atelectasis. Scattered interstitial septalthickening, bronchiectasis 
and bronchial wall thickening. No cardiomegaly.LIVER: Cirrhotic liver morphology
with nodular contours and hypertrophy ofthe segment 4, 1 2 and 3. A nonenhancing
3.7 cm segment 5 oblong hypodensestructure measures simple cyst attenuation. 
Mild periportal edema ispresent.GALLBLADDER AND BILIARY TREE: Prior 
cholecystectomy withpostcholecystectomy reservoir phenomenon.SPLEEN: 
Splenomegaly at 19.5 cm.PANCREAS: No ductal dilation or masses.ADRENAL GLANDS: 
No adrenal nodules.KIDNEYS: No hydronephrosis, stones, or masses. 2.4 cm right 
interpolarsimple cyst.PERITONEUM AND RETROPERITONEUM: No free air. Trace volume 
perihepatic andpericolonic simple ascites.LYMPH NODES: No lymphadenopathy.GI 
TRACT: Small sliding hiatal hernia. Again noted is extensive prominentascending 
colon, and transverse colon  mucosal enhancement and subjacentfatty stranding, 
with relatively sparing of left colon. Dilatation oftransverse colon and air flu
id levels. Appendix is nonvisualized.PELVIS/BLADDER: Urinary bladder is 
underdistended with concern wallthickening secondary to underdistention.VESSELS:
Mild scattered aortoiliac atherosclerotic plaquing andcalcification results in 
no significant stenosis of the branch vesselostia.Small gastroesophageal 
varices..BONES AND SOFT TISSUES: No suspicious lytic or sclerotic bony 
lesions.IMPRESSIONStudy is limited secondary to presence of intraluminal 
contrast on thenoncontrast study from the prior CT scan. No active extravasation
ofcontrast seen in the area where there was no contrast on the noncontrast C
Tfrom today. no arterial secondary signs  aneurysm, early draining vein (AVM ) 
seenChanges consistent  right and transverse colon with mild dilatation 
oftransverse colon with air fluid levels . Consider infectious colitis.  
Nochanges of ischemia or vessel occlusion.Small sliding hiatal hernia.Trace per
ihepatic and pericolonic ascites, likely reactive.Cirrhosis without focal 
hepatic lesion with sequela of portal hypertensionincluding splenomegaly and 
gastroesophageal varices.Preliminary Report Dictated by Resident: David Proctor MD., have reviewed this study and agree with th
curtboamee report.[facility] - 30 minutes 
after transfusion of each [id number] 17:03:00





             Test Item    Value        Reference Range Interpretation Comments

 

             WBC (test code =              See_Comment                [Automated

 message]



             6690-2)                                             The system [employer]



                                                                 generated this 

result



                                                                 transmitted ref

erence



                                                                 range: 4.30 - 1

1.10



                                                                 10*3/?L. The



                                                                 reference range

 was



                                                                 not used to int

erpret



                                                                 this result as



                                                                 normal/abnormal

.

 

             RBC (test code = 789-8)              See_Comment  L             [Au

tomated message]



                                                                 The system [employer]



                                                                 generated this 

result



                                                                 transmitted ref

erence



                                                                 range: 3.93 - 5

.25



                                                                 10*6/?L. The



                                                                 reference range

 was



                                                                 not used to int

erpret



                                                                 this result as



                                                                 normal/abnormal

.

 

             HGB (test code = 718-7) 11.3 g/dL    11.6-15      L            

 

             HCT (test code = 35.1 %       35.7-45.2    L            



             4544-3)                                             

 

             MCH (test code = 785-6) 29.0 pg      25.9-32.8                 

 

             MCV (test code = 787-2) 90.0 fL      80.6-95.5                 

 

             MCHC (test code = 32.2 g/dL    31.6-35.1                 



             786-4)                                              

 

             PLT (test code = 777-3)              See_Comment  LL            [Au

tomated message]



                                                                 The system [employer]



                                                                 generated this 

result



                                                                 transmitted ref

erence



                                                                 range: 166 - 35

8



                                                                 10*3/?L. The



                                                                 reference range

 was



                                                                 not used to int

erpret



                                                                 this result as



                                                                 normal/abnormal

.

 

             MPV (test code = 9.1 fL       9.5-12.9     L            



             84206-6)                                            

 

             RDW-CV (test code = 17.2 %       12-15.5      H            



             788-0)                                              

 

             RDW-SD (test code = 57.1 fL      39-49.9      H            



             29005-5)                                            

 

             NRBC x10^3 (test code =              See_Comment                [Au

tomated message]



             4431542373)                                         The system [facility]

[employer]



                                                                 generated this 

result



                                                                 transmitted ref

erence



                                                                 range: 10*3/?L.

 The



                                                                 reference range

 was



                                                                 not used to int

erpret



                                                                 this result as



                                                                 normal/abnormal

.

 

             NRBC/100 WBC (test code              See_Comment                [Au

tomated message]



             = [id number])                                        The system [facility]



                                                                 generated this 

result



                                                                 transmitted ref

erence



                                                                 range: 0.0 - 10

.0



                                                                 /100 WBCs. The



                                                                 reference range

 was



                                                                 not used to int

erpret



                                                                 this result as



                                                                 normal/abnormal

.

 

             IPF % (test code = 2.7 %        1.3-7.7                   Platelet 

count



             [id number])                                         measured by



                                                                 fluorescence me

thod.

 

             Lab Interpretation Abnormal                               



             (test code = 95896-3)                                        



[facility] 16[facility]59[facility]00





             Test Item    Value        Reference Range Interpretation Comments

 

             POCT GLU (test code = 1428342822) 127 mg/dL           H      

      

 

             Lab Interpretation (test code = Abnormal                           

    



             04682-7)                                            



[facility] [id number] 16:24:00





             Test Item    Value        Reference Range Interpretation Comments

 

             Clostridioides (Clostridium) Positive     Negative     A           

 



             difficile (test code = 93800-8)                                    

    

 

             Lab Interpretation (test code = Abnormal                           

    



             30641-5)                                            



[facility] Platelets (in units): 1 
Units~Indication: 2) Platelets &lt; 50,000 with active hemorrhage orpotential to
bleed from invasive [id number] 15:46:33





             Test Item    Value        Reference Range Interpretation Comments

 

             Unit Blood Type (test A Pos                                  



             code = 4410)                                        

 

             ISBT Blood Type Code                                        



             (test code = 061280)                                        

 

             Unit Number (test code [id number]                           



             = 4411)                                             

 

             Blood Expiration Date                                        



             & Time (test code =                                        



             953535)                                             

 

             Status Information Issued                                 



             (test code = 4412)                                        

 

             Product Identification Platelets                              



             (test code = 4413)                                        

 

             Product Code (test [id number]                               Performed

 at [facility] [place]



             code = 4414)                                        Laboratory



                                                                 Services - [facility]



                                                                 Blood [address] [place] [address]



                                                                 [phone] Free:



                                                                 [phone]

A



                                                                 No. [id number]



[facility] Packed RBC (in units), 2 Units
2020 13:34:31





             Test Item    Value        Reference Range Interpretation Comments

 

             Cross Match Result Compatible                             



             (test code = 4409)                                        

 

             ISBT Blood Type Code                                        



             (test code = 561826)                                        

 

             Unit Blood Type (test A Pos                                  



             code = 4410)                                        

 

             Unit Number (test [id number]                           



             code = 4411)                                        

 

             Blood Expiration Date                                        



             & Time (test code =                                        



             283534)                                             

 

             Status Information Issued                                 



             (test code = 4412)                                        

 

             Product      Red Blood Cells                           



             Identification (test                                        



             code = 4413)                                        

 

             Product Code (test [id number]                               Performed

 at [facility] [place]



             code = 4414)                                        Laboratory



                                                                 Services - [facility]



                                                                 Blood [address] [place] [address]



                                                                 [address]Qket Free:



                                                                 [phone]

A



                                                                 No. [id number]



[facility] 12:42:00





             Test Item    Value        Reference Range Interpretation Comments

 

             POCT GLU (test code = 0484113444) 109 mg/dL                  

      

 

             Lab Interpretation (test code = Normal                             

    



             85992-4)                                            



[facility] Metabolic Panel (NA, K, CL, CO2, 
Glucose, BUN, Creatinine, CA)2020 11:04:00





             Test Item    Value        Reference Range Interpretation Comments

 

             NA (test code = 133 mmol/L   135-145      L            



             3636744897)                                         

 

             K (test code = 3.2 mmol/L   3.5-5        L            



             8722338790)                                         

 

             CL (test code = 112 mmol/L          H            



             2910799024)                                         

 

             CO2 TOTAL (test code = 12 mmol/L    23-31        L            



             5573723875)                                         

 

             AGAP (test code =              2-16                      



             1845284751)                                         

 

             BUN (test code = 9 mg/dL      7-23                      



             7784609925)                                         

 

             GLUCOSE (test code = 46 mg/dL            LL           



             6285277512)                                         

 

             CREATININE (test code = 0.22 mg/dL   0.5-1.04     L            



             4418259379)                                         

 

             CALCIUM (test code = 5.5 mg/dL    8.6-10.6     LL           



             5757169184)                                         

 

             eGFR Calculation              mL/min/1.73m2              



             (Non-)                                        



             (test code =                                        



             4697266430)                                         

 

             eGFR Calculation              mL/min/1.73m2              



             (African American)                                        



             (test code =                                        



             1178418079)                                         

 

             RICK (test code = RICK) Association of                           



                          Glomerular Filtration                           



                          Rate (GFR) and Staging                           



                          of Kidney Disease*                           



                          +---------------------                           



                          --+-------------------                           



                          --+-------------------                           



                          ------+| GFR                           



                          (mL/min/1.73 m2) ?|                           



                          With Kidney Damage ?|                           



                          ?Without Kidney                           



                          Damage+---------------                           



                          --------+-------------                           



                          --------+-------------                           



                          ------------+| ?>90 ?                           



                          ? ? ? ? ? ? ? ?|                           



                          ?Stage one ? ? ? ? ?|                           



                          ? Normal ? ? ? ? ? ? ?                           



                          ?+--------------------                           



                          ---+------------------                           



                          ---+------------------                           



                          -------+| ?60-89 ? ? ?                           



                          ? ? ? ? ?| ?Stage two                           



                          ? ? ? ? ?| ? Decreased                           



                          GFR ? ? ? ?                            



                          +---------------------                           



                          --+-------------------                           



                          --+-------------------                           



                          ------+| ?30-59 ? ? ?                           



                          ? ? ? ? ?| ?Stage                           



                          three ? ? ? ?| ? Stage                           



                          three ? ? ? ? ?                           



                          +---------------------                           



                          --+-------------------                           



                          --+-------------------                           



                          ------+| ?15-29 ? ? ?                           



                          ? ? ? ? ?| ?Stage four                           



                          ? ? ? ? | ? Stage four                           



                          ? ? ? ? ?                              



                          ?+--------------------                           



                          ---+------------------                           



                          ---+------------------                           



                          -------+| ?<15 (or                           



                          dialysis) ? ?| ?Stage                           



                          five ? ? ? ? | ? Stage                           



                          five ? ? ? ? ?                           



                          ?+--------------------                           



                          ---+------------------                           



                          ---+------------------                           



                          -------+ *Each stage                           



                          assumes the associated                           



                          GFR level has been in                           



                          effect for at least                           



                          three months. ?Stages                           



                          1 to 5, with or                           



                          without kidney                           



                          disease, indicate                           



                          chronic kidney                           



                          disease. Notes:                           



                          Determination of                           



                          stages one and two                           



                          (with eGFR                             



                          >59mL/min/1.73 m2)                           



                          requires estimation of                           



                          kidney damage for at                           



                          least three months as                           



                          defined by structural                           



                          or functional                           



                          abnormalities of the                           



                          kidney, manifested by                           



                          either:Pathological                           



                          abnormalities or                           



                          Markers of kidney                           



                          damage (including                           



                          abnormalities in the                           



                          composition of the                           



                          blood or urine or                           



                          abnormalities in                           



                          imaging tests).                           

 

             Lab Interpretation Abnormal                               



             (test code = 22010-4)                                        



[id number] 10[facility]55[facility]00





             Test Item    Value        Reference Range Interpretation Comments

 

             MAGNESIUM (test code = 5554213870) 1.2 mg/dL    1.7-2.4      L     

       

 

             Lab Interpretation (test code = Abnormal                           

    



             13700-2)                                            



[facility] (ALB, T.PRO, BILI T, 
BU/BC, ALT, AST, ALK, PHOS)2020 10:55:00





             Test Item    Value        Reference Range Interpretation Comments

 

             TOTAL BILI (test code = 9123176841) 0.7 mg/dL    0.1-1.1           

        

 

             BILI UNCON (test code = 9860888511) 0.5 mg/dL    0.1-1.1           

        

 

             BILI CONJ (test code = 8487480412) 0.0 mg/dL    0-0.3              

       

 

             T PROTEIN (test code = 8715758353) 3.0 g/dL     6.3-8.2      L     

       

 

             ALBUMIN (test code = 5831455193) 1.2 g/dL     3.5-5        L       

     

 

             ALK PHOS (test code = 4119642198) 39 U/L                     

      

 

             ALTv (test code = 1742-6) 10 U/L       5-35                      

 

             AST(SGOT) (test code = 7757942956) 18 U/L       13-40              

       

 

             Lab Interpretation (test code = Abnormal                           

    



             74582-8)                                            



[facility] WITH [id number] 10:54:00





             Test Item    Value        Reference Range Interpretation Comments

 

             WBC (test code =              See_Comment                [Automated



             6690-2)                                             message] The sy

stem



                                                                 which generated



                                                                 this result



                                                                 transmitted



                                                                 reference range

:



                                                                 4.30 - 11.10



                                                                 10*3/?L. The



                                                                 reference range

 was



                                                                 not used to



                                                                 interpret this



                                                                 result as



                                                                 normal/abnormal

.

 

             RBC (test code =              See_Comment  L             [Automated



             789-8)                                              message] The sy

stem



                                                                 which generated



                                                                 this result



                                                                 transmitted



                                                                 reference range

:



                                                                 3.93 - 5.25



                                                                 10*6/?L. The



                                                                 reference range

 was



                                                                 not used to



                                                                 interpret this



                                                                 result as



                                                                 normal/abnormal

.

 

             HGB (test code = 5.5 g/dL     11.6-15      L            



             718-7)                                              

 

             HCT (test code = 17.1 %       35.7-45.2    L            



             4544-3)                                             

 

             MCV (test code = 97.2 fL      80.6-95.5    H            



             787-2)                                              

 

             MCH (test code = 31.3 pg      25.9-32.8                 



             785-6)                                              

 

             MCHC (test code = 32.2 g/dL    31.6-35.1                 



             786-4)                                              

 

             RDW-SD (test code = 53.6 fL      39-49.9      H            



             42738-3)                                            

 

             RDW-CV (test code = 15.3 %       12-15.5                   



             788-0)                                              

 

             PLT (test code =              See_Comment  LL            [Automated



             777-3)                                              message] The sy

stem



                                                                 which generated



                                                                 this result



                                                                 transmitted



                                                                 reference range

:



                                                                 166 - 358 10*3/

?L.



                                                                 The reference r

olman



                                                                 was not used to



                                                                 interpret this



                                                                 result as



                                                                 normal/abnormal

.

 

             MPV (test code = 9.6 fL       9.5-12.9                  



             74158-0)                                            

 

             IPF % (test code = 1.9 %        1.3-7.7                   Platelet 

count



             [id number])                                         measured by



                                                                 fluorescence



                                                                 method.

 

             NRBC/100 WBC (test              See_Comment                [Automat

ed



             code = 6318615393)                                        message] 

The system



                                                                 which generated



                                                                 this result



                                                                 transmitted



                                                                 reference range

:



                                                                 0.0 - 10.0 /100



                                                                 WBCs. The refer

ence



                                                                 range was not u

sed



                                                                 to interpret th

is



                                                                 result as



                                                                 normal/abnormal

.

 

             NRBC x10^3 (test code <0.01        See_Comment                [Auto

mated



             = 9583997252)                                        message] The s

ystem



                                                                 which generated



                                                                 this result



                                                                 transmitted



                                                                 reference range

:



                                                                 10*3/?L. The



                                                                 reference range

 was



                                                                 not used to



                                                                 interpret this



                                                                 result as



                                                                 normal/abnormal

.

 

             GRAN MAT (NEUT) % 81.5 %                                 



             (test code = 770-8)                                        

 

             IMM GRAN % (test code 1.10 %                                 



             = 8408631107)                                        

 

             LYMPH % (test code = 11.7 %                                 



             736-9)                                              

 

             MONO % (test code = 4.6 %                                  



             5905-5)                                             

 

             EOS % (test code = 0.9 %                                  



             713-8)                                              

 

             BASO % (test code = 0.2 %                                  



             706-2)                                              

 

             GRAN MAT x10^3(ANC) 4.45 10*3/uL 1.88-7.09                 



             (test code =                                        



             0303178670)                                         

 

             IMM GRAN x10^3 (test 0.06 10*3/uL 0-0.06                    



             code = 6220589777)                                        

 

             LYMPH x10^3 (test code 0.64 10*3/uL 1.32-3.29    L            



             = 731-0)                                            

 

             MONO x10^3 (test code 0.25 10*3/uL 0.33-0.92    L            



             = 742-7)                                            

 

             EOS x10^3 (test code = 0.05 10*3/uL 0.03-0.39                 



             711-2)                                              

 

             BASO x10^3 (test code <0.03        0.01-0.07                 



             = 704-7)                                            

 

             Lab Interpretation Abnormal                               



             (test code = 71642-3)                                        



[facility]  [id number] 10:30:00





             Test Item    Value        Reference Range Interpretation Comments

 

             PROTIME PATIENT (test              See_Comment  H             [Auto

mated message]



             code = 5964-2)                                        The system wh

ich



                                                                 generated this 

result



                                                                 transmitted ref

erence



                                                                 range: 10.1 - 1

2.6



                                                                 Seconds. The



                                                                 reference range

 was



                                                                 not used to int

erpret



                                                                 this result as



                                                                 normal/abnormal

.

 

             INR (test code = 6301-6)                                        Nor

mal INR <1.1;



                                                                 Warfarin Therap

eutic



                                                                 range 2.0 to 3.

0 or



                                                                 2.5 to 3.5, dep

ending



                                                                 upon the indica

tions.

 

             Lab Interpretation (test Abnormal                               



             code = 12218-4)                                        



[facility] GLUCOSE (AUTOMATED)2020 10:07:00





             Test Item    Value        Reference Range Interpretation Comments

 

             POCT GLU (test code = 0074900167) 45 mg/dL            LL     

      

 

             Lab Interpretation (test code = Abnormal                           

    



             74157-3)                                            



[facility] GLUCOSE (AUTOMATED)2020 10:07:00





             Test Item    Value        Reference Range Interpretation Comments

 

             POCT GLU (test code = 5128316915) 101 mg/dL                  

      

 

             Lab Interpretation (test code = Normal                             

    



             29151-8)                                            



[facility] WITH [id number] 05:39:00





             Test Item    Value        Reference Range Interpretation Comments

 

             WBC (test code =              See_Comment                [Automated



             6690-2)                                             message] The sy

stem



                                                                 which generated



                                                                 this result



                                                                 transmitted



                                                                 reference range

:



                                                                 4.30 - 11.10



                                                                 10*3/?L. The



                                                                 reference range

 was



                                                                 not used to



                                                                 interpret this



                                                                 result as



                                                                 normal/abnormal

.

 

             RBC (test code =              See_Comment  L             [Automated



             789-8)                                              message] The sy

stem



                                                                 which generated



                                                                 this result



                                                                 transmitted



                                                                 reference range

:



                                                                 3.93 - 5.25



                                                                 10*6/?L. The



                                                                 reference range

 was



                                                                 not used to



                                                                 interpret this



                                                                 result as



                                                                 normal/abnormal

.

 

             HGB (test code = 7.8 g/dL     11.6-15      L            



             718-7)                                              

 

             HCT (test code = 23.8 %       35.7-45.2    L            



             4544-3)                                             

 

             MCV (test code = 94.4 fL      80.6-95.5                 



             787-2)                                              

 

             MCH (test code = 31.0 pg      25.9-32.8                 



             785-6)                                              

 

             MCHC (test code = 32.8 g/dL    31.6-35.1                 



             786-4)                                              

 

             RDW-SD (test code = 52.9 fL      39-49.9      H            



             95192-8)                                            

 

             RDW-CV (test code = 15.4 %       12-15.5                   



             788-0)                                              

 

             PLT (test code =              See_Comment  LL            [Automated



             777-3)                                              message] The sy

stem



                                                                 which generated



                                                                 this result



                                                                 transmitted



                                                                 reference range

:



                                                                 166 - 358 10*3/

?L.



                                                                 The reference r

olman



                                                                 was not used to



                                                                 interpret this



                                                                 result as



                                                                 normal/abnormal

.

 

             MPV (test code = 10.1 fL      9.5-12.9                  



             48512-6)                                            

 

             IPF % (test code = 2.1 %        1.3-7.7                   Platelet 

count



             [id number])                                         measured by



                                                                 fluorescence



                                                                 method.

 

             NRBC/100 WBC (test              See_Comment                [Automat

ed



             code = 7025213198)                                        message] 

The system



                                                                 which generated



                                                                 this result



                                                                 transmitted



                                                                 reference range

:



                                                                 0.0 - 10.0 /100



                                                                 WBCs. The refer

ence



                                                                 range was not u

sed



                                                                 to interpret th

is



                                                                 result as



                                                                 normal/abnormal

.

 

             NRBC x10^3 (test code <0.01        See_Comment                [Auto

mated



             = 5250341968)                                        message] The s

ystem



                                                                 which generated



                                                                 this result



                                                                 transmitted



                                                                 reference range

:



                                                                 10*3/?L. The



                                                                 reference range

 was



                                                                 not used to



                                                                 interpret this



                                                                 result as



                                                                 normal/abnormal

.

 

             GRAN MAT (NEUT) % 78.1 %                                 



             (test code = 770-8)                                        

 

             IMM GRAN % (test code 3.10 %                                 



             = 0029612390)                                        

 

             LYMPH % (test code = 13.3 %                                 



             736-9)                                              

 

             MONO % (test code = 4.4 %                                  



             5905-5)                                             

 

             EOS % (test code = 0.9 %                                  



             713-8)                                              

 

             BASO % (test code = 0.2 %                                  



             706-2)                                              

 

             GRAN MAT x10^3(ANC) 5.09 10*3/uL 1.88-7.09                 



             (test code =                                        



             8835814201)                                         

 

             IMM GRAN x10^3 (test 0.20 10*3/uL 0-0.06       H            



             code = 2601544285)                                        

 

             LYMPH x10^3 (test code 0.87 10*3/uL 1.32-3.29    L            



             = 731-0)                                            

 

             MONO x10^3 (test code 0.29 10*3/uL 0.33-0.92    L            



             = 742-7)                                            

 

             EOS x10^3 (test code = 0.06 10*3/uL 0.03-0.39                 



             711-2)                                              

 

             BASO x10^3 (test code <0.03        0.01-0.07                 



             = 704-7)                                            

 

             BANDS (test code = Increased                 A            



             1192921156)                                         

 

             TOXIC CHANGES (test Present                   A            



             code = 803-7)                                        

 

             Lab Interpretation Abnormal                               



             (test code = 89198-7)                                        



[facility] GLUCOSE (AUTOMATED)2020 04:36:00





             Test Item    Value        Reference Range Interpretation Comments

 

             POCT GLU (test code = 7478981321) 113 mg/dL           H      

      

 

             Lab Interpretation (test code = Abnormal                           

    



             26957-9)                                            



[facility] GLUCOSE (AUTOMATED)2020 01:15:00





             Test Item    Value        Reference Range Interpretation Comments

 

             POCT GLU (test code = 2002257616) 103 mg/dL                  

      

 

             Lab Interpretation (test code = Normal                             

    



             09182-1)                                            



[facility] WITH [id number] 22:32:00





             Test Item    Value        Reference Range Interpretation Comments

 

             WBC (test code =              See_Comment                [Automated



             6690-2)                                             message] The sy

stem



                                                                 which generated



                                                                 this result



                                                                 transmitted



                                                                 reference range

:



                                                                 4.30 - 11.10



                                                                 10*3/?L. The



                                                                 reference range

 was



                                                                 not used to



                                                                 interpret this



                                                                 result as



                                                                 normal/abnormal

.

 

             RBC (test code =              See_Comment  L             [Automated



             789-8)                                              message] The sy

stem



                                                                 which generated



                                                                 this result



                                                                 transmitted



                                                                 reference range

:



                                                                 3.93 - 5.25



                                                                 10*6/?L. The



                                                                 reference range

 was



                                                                 not used to



                                                                 interpret this



                                                                 result as



                                                                 normal/abnormal

.

 

             HGB (test code = 7.7 g/dL     11.6-15      L            



             718-7)                                              

 

             HCT (test code = 24.0 %       35.7-45.2    L            



             4544-3)                                             

 

             MCV (test code = 96.4 fL      80.6-95.5    H            



             787-2)                                              

 

             MCH (test code = 30.9 pg      25.9-32.8                 



             785-6)                                              

 

             MCHC (test code = 32.1 g/dL    31.6-35.1                 



             786-4)                                              

 

             RDW-SD (test code = 53.6 fL      39-49.9      H            



             74741-4)                                            

 

             RDW-CV (test code = 15.3 %       12-15.5                   



             788-0)                                              

 

             PLT (test code =              See_Comment  LL            [Automated



             777-3)                                              message] The sy

stem



                                                                 which generated



                                                                 this result



                                                                 transmitted



                                                                 reference range

:



                                                                 166 - 358 10*3/

?L.



                                                                 The reference r

olman



                                                                 was not used to



                                                                 interpret this



                                                                 result as



                                                                 normal/abnormal

.

 

             MPV (test code = 10.5 fL      9.5-12.9                  



             18808-0)                                            

 

             IPF % (test code = 3.0 %        1.3-7.7                   Platelet 

count



             9953424516)                                         measured by



                                                                 fluorescence



                                                                 method.

 

             NRBC/100 WBC (test              See_Comment                [Automat

ed



             code = 6126030062)                                        message] 

The system



                                                                 which generated



                                                                 this result



                                                                 transmitted



                                                                 reference range

:



                                                                 0.0 - 10.0 /100



                                                                 WBCs. The refer

ence



                                                                 range was not u

sed



                                                                 to interpret th

is



                                                                 result as



                                                                 normal/abnormal

.

 

             NRBC x10^3 (test code <0.01        See_Comment                [Auto

mated



             = 9118183579)                                        message] The s

ystem



                                                                 which generated



                                                                 this result



                                                                 transmitted



                                                                 reference range

:



                                                                 10*3/?L. The



                                                                 reference range

 was



                                                                 not used to



                                                                 interpret this



                                                                 result as



                                                                 normal/abnormal

.

 

             GRAN MAT (NEUT) % 82.8 %                                 



             (test code = 770-8)                                        

 

             IMM GRAN % (test code 1.50 %                                 



             = 6678808637)                                        

 

             LYMPH % (test code = 10.9 %                                 



             736-9)                                              

 

             MONO % (test code = 4.2 %                                  



             5905-5)                                             

 

             EOS % (test code = 0.5 %                                  



             713-8)                                              

 

             BASO % (test code = 0.1 %                                  



             706-2)                                              

 

             GRAN MAT x10^3(ANC) 6.44 10*3/uL 1.88-7.09                 



             (test code =                                        



             7635155561)                                         

 

             IMM GRAN x10^3 (test 0.12 10*3/uL 0-0.06       H            



             code = 4077556192)                                        

 

             LYMPH x10^3 (test code 0.85 10*3/uL 1.32-3.29    L            



             = 731-0)                                            

 

             MONO x10^3 (test code 0.33 10*3/uL 0.33-0.92                 



             = 742-7)                                            

 

             EOS x10^3 (test code = 0.04 10*3/uL 0.03-0.39                 



             711-2)                                              

 

             BASO x10^3 (test code <0.03        0.01-0.07                 



             = 704-7)                                            

 

             BANDS (test code = Increased                 A            



             1796961264)                                         

 

             Lab Interpretation Abnormal                               



             (test code = 16890-1)                                        



[facility]





             Test Item    Value        Reference Range Interpretation Comments

 

             POCT GLU (test code = 1110196551) 105 mg/dL                  

      

 

             Lab Interpretation (test code = Normal                             

    



             34180-4)                                            



[facility] ([facility] 1 Units
2020 18:52:37





             Test Item    Value        Reference Range Interpretation Comments

 

             Cross Match Result Compatible                             



             (test code = 4409)                                        

 

             ISBT Blood Type Code                                        



             (test code = 210670)                                        

 

             Unit Blood Type (test A Pos                                  



             code = 4410)                                        

 

             Unit Number (test [id number]                           



             code = 4411)                                        

 

             Blood Expiration Date                                        



             & Time (test code =                                        



             380157)                                             

 

             Status Information Issued                                 



             (test code = 4412)                                        

 

             Product      Red Blood Cells                           



             Identification (test                                        



             code = 4413)                                        

 

             Product Code (test X6516L89                               Performed

 at [facility] [place]



             code = 4414)                                        Laboratory



                                                                 Services - GAL



                                                                 Blood [address]



                                                                 [facility]

s



                                                                 [address]Odic Free:



                                                                 [phone]

A



                                                                 No. [id number]



[facility] - ONCE [id number] 
18:38:13





             Test Item    Value        Reference Range Interpretation Comments

 

             ABO & RH (test code A POSITIVE                             Performe

d at [facility], [place]



             = 20)                                               Laboratory Serv

ices -



                                                                 GAL Blood Bank3

01



                                                                 [facility]

s



                                                                 [address]Ofiv Free:



                                                                 [phone]

A No.



                                                                 [id number]

 

             IAT (test code = Negative                               Performed a

t [facility] [place]



             1185)                                               Laboratory Serv

ices -



                                                                 GAL Blood Bank3

01



                                                                 [facility]

s



                                                                 [address]Pzsj Free:



                                                                 [phone]

A No.



                                                                 [id number]



[facility] GLUCOSE (AUTOMATED)2020 17:29:00





             Test Item    Value        Reference Range Interpretation Comments

 

             POCT GLU (test code = 9894663134) 233 mg/dL           H      

      

 

             Lab Interpretation (test code = Abnormal                           

    



             87858-4)                                            



[facility] MSSA Screen by Misael [id number] 
16:55:00





             Test Item    Value        Reference Range Interpretation Comments

 

             MSSA Screen by Misael SIERRA (test code Negative     Negative         

         



             = 57607-6)                                          

 

             MRSA/MSSA Positive? (test code = No           No                   

     



             [id number])                                         

 

             Lab Interpretation (test code = Normal                             

    



             69905-3)                                            



[facility] GLUCOSE (AUTOMATED)2020 16:29:00





             Test Item    Value        Reference Range Interpretation Comments

 

             POCT GLU (test code = 4108057458) 211 mg/dL           H      

      

 

             Lab Interpretation (test code = Abnormal                           

    



             11096-1)                                            



[facility] PEL[facility] W [id number] 
15:28:52 1. ?No evidence of bowel obstruction. Prominent mucosal enhancement in 
thedistal stomach suggests gastritis. Mural thickening and mucosal 
enhancementseen involving the ascending and transverse colon may represent 
congestivechanges versus colitis. There is surrounding free fluid and fat 
stranding. 2. ?Cirrhosis with portal hypertension. A hypodensity is seen in the 
rightlobe of the liver (segment VI)measuring approximately 2.6 cm,indeterminate 
on this single phase study. Recommend further evaluation withMRI. EXAM: CT 
ABDOMEN AND PELVIS WITH CONTRAST HISTORY: 59-year-old female with bowel 
obstruction. COMPARISON: 2020 DOSE: Total exam  mGy-cm 
TECHNIQUE AND FINDINGS: Contiguous axial imaging was performed after 
theuncomplicated administration of 120 cc of intravenous Omnipaque cont
rast.Coronal and sagittal reconstructions were obtained. FINDINGS:LOWER THORAX: 
Bibasilar atelectasis is seen but no evidence of pleural orpericardial effusion.
 LIVER: Cirrhosis with portal hypertension. A hypodensity is seen in theright 
lobe of the liver (segment VI) measuring approximately 3.6 cm,indeterminate on 
this single phase study. Patent portal vein and hepaticvenous branches. Mild 
periportal edema is unchanged. GALLBLADDER AND BILIARY TREE: Prominent 
extrahepatic CBD likely secondaryto prior cholecystectomy. SPLEEN: Enlarged 
spleen but no focal lesions. PANCREAS: No ductal dilation or focal lesions. 
ADRENAL GLANDS: No adrenal nodules. KIDNEYS: No hydronephrosis, stones, or solid
lesions. Stable cyst is seenin the right kidney. PERITONEUM AND RETROPERITONEUM:
Minimal free fluid is seen in the upperabdomen, slightly worse when compared to 
the prior study. LYMPH NODES: No lymphadenopathyis seen. GI TRACT: No evidence 
of dilated bowel loops. Prominent mucosal enhancementis seen in the distal 
stomach suggesting gastritis (301:41). The ?ascendingand transverse colon show 
mural thickeningand mucosal enhancement withsome surrounding fat stranding which
may represent colitis versuscongestive changes. No evidence of appendicitis or 
diverticulitis. PELVIS/BLADDER: The urinary bladder appears normal. No adnexal 
masses areseen. VESSELS: Scattered atherosclerotic calcifications. An enlarged m
ain portalvein. BONES AND SOFT TISSUES: No suspicious lytic or sclerotic bone 
lesions.  [facility], Radiant Results Inft User - 2020 10:29 AM CDTEXAM: CT 
ABDOMEN AND PELVIS WITH CONTRASTHISTORY: 59-year-old female with bowel 
obstruction.COMPARISON: 2020DOSE: Total exam  mGy-cmTECHNIQUE 
AND FINDINGS: Contiguous axial imaging was performed after theuncomplicated 
administration of 120 cc of intravenous Omnipaque contrast.Coronal and sagittal 
reconstructions were obtained.FINDINGS:LOWERTHORAX: Bibasilar atelectasis is 
seen but no evidence of pleural orpericardial effusion. LIVER: Cirrhosis with 
portal hypertension. A hypodensity is seen in theright lobe of the liver 
(segment VI) measuring approximately 3.6 cm,indeterminate on this single phase 
study. Patent portal vein and hepaticvenous branches. Mild periportal edema is 
unchanged.GALLBLADDER AND BILIARY TREE: Prominent extrahepatic CBD likely 
secondaryto prior cholecystectomy.SPLEEN: Enlarged spleen but no focal 
lesions.PANCREAS:No ductal dilation or focal lesions.ADRENAL GLANDS: No adrenal 
nodules.KIDNEYS: No hydronephrosis, stones, or solid lesions. Stable cyst is 
seenin the right kidney.PERITONEUM AND RETROPERITONEUM: Minimal free fluid is 
seen in the upperabdomen, slightly worse when compared to the prior study.LYMPH 
NODES: No lymphadenopathy is seen.GI TRACT: No evidence of dilated bowel loops. 
Prominent mucosal enhancementis seen in the distal stomach suggesting gastritis 
(301:41). The  ascendingand transverse colon show mural thickening and mucosal 
enhancement withsome surrounding fat stranding which may represent colitis 
versuscongestive changes. No evidence of appendicitis or 
diverticulitis.PELVIS/BLADDER: The urinary bladder appears normal. No adnexal 
masses areseen.VESSELS: Scattered atherosclerotic calcifications. An enlarged 
main portalvein.BONES AND SOFT TISSUES: No suspicious lytic or sclerotic bone 
lesions.IMPRESSION1.  No evidence of bowel obstruction. Prominent mucosal 
enhancement in thedistal stomachsuggests gastritis. Mural thickening and mucosal
enhancementseen involving the ascending and transverse colon may represent 
congestivechanges versus colitis. There is surrounding free fluid and fat stra
nding.2.  Cirrhosis with portal hypertension. A hypodensity is seen in the 
rightlobe of the liver (segment VI) measuring approximately 2.6 cm,indeterminate
on this single phase study. Recommend furtherevaluation withMRI.[facility] WITH [id number] 15:24:00





             Test Item    Value        Reference Range Interpretation Comments

 

             WBC (test code =              See_Comment                [Automated



             6690-2)                                             message] The



                                                                 system which



                                                                 generated this



                                                                 result transmit

eduard



                                                                 reference range

:



                                                                 4.30 - 11.10



                                                                 10*3/?L. The



                                                                 reference range



                                                                 was not used to



                                                                 interpret this



                                                                 result as



                                                                 normal/abnormal

.

 

             RBC (test code =              See_Comment  L             [Automated



             789-8)                                              message] The



                                                                 system which



                                                                 generated this



                                                                 result transmit

eduard



                                                                 reference range

:



                                                                 3.93 - 5.25



                                                                 10*6/?L. The



                                                                 reference range



                                                                 was not used to



                                                                 interpret this



                                                                 result as



                                                                 normal/abnormal

.

 

             HGB (test code = 6.9 g/dL     11.6-15      L            



             718-7)                                              

 

             HCT (test code = 22.9 %       35.7-45.2    L            



             4544-3)                                             

 

             MCV (test code = 98.7 fL      80.6-95.5    H            



             787-2)                                              

 

             MCH (test code = 29.7 pg      25.9-32.8                 



             785-6)                                              

 

             MCHC (test code = 30.1 g/dL    31.6-35.1    L            



             786-4)                                              

 

             RDW-SD (test code = 56.2 fL      39-49.9      H            



             63173-6)                                            

 

             RDW-CV (test code = 15.9 %       12-15.5      H            



             788-0)                                              

 

             PLT (test code =              See_Comment  LL            [Automated



             777-3)                                              message] The



                                                                 system which



                                                                 generated this



                                                                 result transmit

eduard



                                                                 reference range

:



                                                                 166 - 358 10*3/

?L.



                                                                 The reference



                                                                 range was not u

sed



                                                                 to interpret th

is



                                                                 result as



                                                                 normal/abnormal

.

 

             MPV (test code = 10.7 fL      9.5-12.9                  



             31404-9)                                            

 

             IPF % (test code = 3.6 %        1.3-7.7                   Platelet 

count



             [id number])                                         measured by



                                                                 fluorescence



                                                                 method.

 

             NRBC/100 WBC (test              See_Comment                [Automat

ed



             code = 6518071567)                                        message] 

The



                                                                 system which



                                                                 generated this



                                                                 result transmit

eduard



                                                                 reference range

:



                                                                 0.0 - 10.0 /100



                                                                 WBCs. The



                                                                 reference range



                                                                 was not used to



                                                                 interpret this



                                                                 result as



                                                                 normal/abnormal

.

 

             NRBC x10^3 (test code <0.01        See_Comment                [Auto

mated



             = 7900345728)                                        message] The



                                                                 system which



                                                                 generated this



                                                                 result transmit

eduard



                                                                 reference range

:



                                                                 10*3/?L. The



                                                                 reference range



                                                                 was not used to



                                                                 interpret this



                                                                 result as



                                                                 normal/abnormal

.

 

             GRAN MAT (NEUT) % 83.1 %                                 



             (test code = 770-8)                                        

 

             IMM GRAN % (test code 1.80 %                                 



             = 1674797548)                                        

 

             LYMPH % (test code = 10.5 %                                 



             736-9)                                              

 

             MONO % (test code = 4.4 %                                  



             5905-5)                                             

 

             EOS % (test code = 0.1 %                                  



             713-8)                                              

 

             BASO % (test code = 0.1 %                                  



             706-2)                                              

 

             GRAN MAT x10^3(ANC) 7.11 10*3/uL 1.88-7.09    H            



             (test code =                                        



             7149102411)                                         

 

             IMM GRAN x10^3 (test 0.15 10*3/uL 0-0.06       H            



             code = 6538408635)                                        

 

             LYMPH x10^3 (test 0.90 10*3/uL 1.32-3.29    L            



             code = 731-0)                                        

 

             MONO x10^3 (test code 0.38 10*3/uL 0.33-0.92                 



             = 742-7)                                            

 

             EOS x10^3 (test code <0.03        0.03-0.39    L            



             = 711-2)                                            

 

             BASO x10^3 (test code <0.03        0.01-0.07                 



             = 704-7)                                            

 

             BANDS (test code = MARKED INCREASED              A            



             0273661044)                                         

 

             DOHLE BODIES (test Present                   A            



             code = 7792-5)                                        

 

             REACT LYMPHS (test Rare                                   



             code = 3443832634)                                        

 

             TOXIC CHANGES (test Present                   A            



             code = 803-7)                                        

 

             Lab Interpretation Abnormal                               



             (test code = 90901-3)                                        



[facility]





             Test Item    Value        Reference Range Interpretation Comments

 

             Fibrinogen (test code = 5681434656) 356 mg/dL    167-453           

        

 

             Lab Interpretation (test code = Normal                             

    



             89718-3)                                            



[facility] [id number] 14:04:00





             Test Item    Value        Reference Range Interpretation Comments

 

             URINE CULTURE (test 10,000 - 100,000 CFU/mL                        

   



             code = 630-4) mixed aerobic organisms -                           



                          suggests endogenous                           



                          microbial contamination                           



[facility] - To confirm nasogastric tube
placement.2020 14:03:13 Nonspecific gaseous dilatation of transverse 
colon, which can be seen inthe setting of partial distal large bowel obstruction
or adynamic ileus. No esophagogastric tube, visualized in the field-of-view. 
Preliminary Report Dictated by Resident: Paul Sanchez MD., have reviewed this study and agree with theabove report.EXAM: XR ABDOMEN 1 
VW HISTORY: 59 years-old Female; NG placement  TECHNIQUE: Frontal radiograph of 
the abdomen and pelvis was obtained. COMPARISON: [facility] 2020.  FINDINGS: No 
esophagogastric tube was visualized in the field-of-view. Nonspecific gaseous 
dilatation of transverse colon is noted, measuring upto 9 cm, new compared to 
the prior. Gas in the descending and sigmoid colonis noted. No abnormal 
calcifications or radiopaque stones are identified.  Cholecystectomy clips are 
present in the right upper quadrant. Scatteredsurgical clips are present in the 
thorax. No acute bony abnormalities are noted. [facility], Radiant Results Inft User -
2020  9:04 AM CDTEXAM: XR ABDOMEN 1 VWHISTORY: 59 years-old Female; NG 
placement TECHNIQUE: Frontal radiograph of the abdomen and pelvis was 
obtained.COMPARISON: [facility] 2020. FINDINGS:No esophagogastric tube was visu
alized in the field-of-view.Nonspecific gaseous dilatation of transverse colon 
is noted, measuring upto 9 cm, new compared to the prior. Gas in the descending 
and sigmoid colonis noted.No abnormal calcifications or radiopaque stones are 
identified. Cholecystectomy clips are present in the right upper quadrant. 
Scatteredsurgical clips are present in the thorax.No acute bony abnormalities 
are noted.IMPRESSIONNonspecific gaseous dilatation of transverse colon, which 
can be seen inthe setting of partialdistal large bowel obstruction or adynamic 
ileus.No esophagogastric tube, visualized in the field-of-view.Preliminary 
Report Dictated by Resident: Paul Florez MD., have 
reviewedthis study and agree with theabove report.[facility]





             Test Item    Value        Reference Range Interpretation Comments

 

             POCT GLU (test code = 4235506628) 228 mg/dL           H      

      

 

             Lab Interpretation (test code = Abnormal                           

    



             94809-2)                                            



[id number] 11:44:00





             Test Item    Value        Reference Range Interpretation Comments

 

             NT-proBNP (test code 400 pg/mL    See_Comment  H             [Autom

ated



             = 6870026243)                                        message] The



                                                                 system which



                                                                 generated this



                                                                 result



                                                                 transmitted



                                                                 reference range

:



                                                                 <=125. The



                                                                 reference range



                                                                 was not used to



                                                                 interpret this



                                                                 result as



                                                                 normal/abnormal

.

 

             RICK (test code = RICK) Biotin has been                           



                          reported to                            



                          cause a negative                           



                          bias, interpret                           



                          results relative                           



                          to patient's use                           



                          of biotin.                             

 

             Lab Interpretation Abnormal                               



             (test code = 78639-1)                                        



[facility]. METABOLIC PANEL (04085)2020 
11:32:00





             Test Item    Value        Reference Range Interpretation Comments

 

             NA (test code = 136 mmol/L   135-145                   



             3597192313)                                         

 

             K (test code = 3.6 mmol/L   3.5-5                     



             5815284427)                                         

 

             CL (test code = 110 mmol/L          H            



             6202590992)                                         

 

             CO2 TOTAL (test code = 20 mmol/L    23-31        L            



             3879169103)                                         

 

             AGAP (test code =              2-16                      



             2568929721)                                         

 

             BUN (test code = 17 mg/dL     7-23                      



             3641596178)                                         

 

             GLUCOSE (test code = 153 mg/dL           H            



             3284280055)                                         

 

             CREATININE (test code = 0.47 mg/dL   0.5-1.04     L            



             5700559167)                                         

 

             TOTAL BILI (test code = 2.0 mg/dL    0.1-1.1      H            



             3136013603)                                         

 

             CALCIUM (test code = 6.6 mg/dL    8.6-10.6     L            



             4401455692)                                         

 

             T PROTEIN (test code = 5.0 g/dL     6.3-8.2      L            



             6424266654)                                         

 

             ALBUMIN (test code = 2.1 g/dL     3.5-5        L            



             5367549277)                                         

 

             ALK PHOS (test code = 79 U/L                           



             6804687380)                                         

 

             ALTv (test code = 21 U/L       5-35                      



             1742-6)                                             

 

             AST(SGOT) (test code = 27 U/L       13-40                     



             0707117270)                                         

 

             eGFR Calculation              mL/min/1.73m2              



             (Non-)                                        



             (test code =                                        



             8078140564)                                         

 

             eGFR Calculation              mL/min/1.73m2              



             (African American)                                        



             (test code =                                        



             3595707539)                                         

 

             RICK (test code = RICK) Association of                           



                          Glomerular Filtration                           



                          Rate (GFR) and Staging                           



                          of Kidney Disease*                           



                          +---------------------                           



                          --+-------------------                           



                          --+-------------------                           



                          ------+| GFR                           



                          (mL/min/1.73 m2) ?|                           



                          With Kidney Damage ?|                           



                          ?Without Kidney                           



                          Damage+---------------                           



                          --------+-------------                           



                          --------+-------------                           



                          ------------+| ?>90 ?                           



                          ? ? ? ? ? ? ? ?|                           



                          ?Stage one ? ? ? ? ?|                           



                          ? Normal ? ? ? ? ? ? ?                           



                          ?+--------------------                           



                          ---+------------------                           



                          ---+------------------                           



                          -------+| ?60-89 ? ? ?                           



                          ? ? ? ? ?| ?Stage two                           



                          ? ? ? ? ?| ? Decreased                           



                          GFR ? ? ? ?                            



                          +---------------------                           



                          --+-------------------                           



                          --+-------------------                           



                          ------+| ?30-59 ? ? ?                           



                          ? ? ? ? ?| ?Stage                           



                          three ? ? ? ?| ? Stage                           



                          three ? ? ? ? ?                           



                          +---------------------                           



                          --+-------------------                           



                          --+-------------------                           



                          ------+| ?15-29 ? ? ?                           



                          ? ? ? ? ?| ?Stage four                           



                          ? ? ? ? | ? Stage four                           



                          ? ? ? ? ?                              



                          ?+--------------------                           



                          ---+------------------                           



                          ---+------------------                           



                          -------+| ?<15 (or                           



                          dialysis) ? ?| ?Stage                           



                          five ? ? ? ? | ? Stage                           



                          five ? ? ? ? ?                           



                          ?+--------------------                           



                          ---+------------------                           



                          ---+------------------                           



                          -------+ *Each stage                           



                          assumes the associated                           



                          GFR level has been in                           



                          effect for at least                           



                          three months. ?Stages                           



                          1 to 5, with or                           



                          without kidney                           



                          disease, indicate                           



                          chronic kidney                           



                          disease. Notes:                           



                          Determination of                           



                          stages one and two                           



                          (with eGFR                             



                          >59mL/min/1.73 m2)                           



                          requires estimation of                           



                          kidney damage for at                           



                          least three months as                           



                          defined by structural                           



                          or functional                           



                          abnormalities of the                           



                          kidney, manifested by                           



                          either:Pathological                           



                          abnormalities or                           



                          Markers of kidney                           



                          damage (including                           



                          abnormalities in the                           



                          composition of the                           



                          blood or urine or                           



                          abnormalities in                           



                          imaging tests).                           

 

             Lab Interpretation Abnormal                               



             (test code = 54074-5)                                        



[facility]2020[facility] 11:32:00





             Test Item    Value        Reference Range Interpretation Comments

 

             PHOSPHORUS (test code = 3559019078) 1.4 mg/dL    2.5-5        L    

        

 

             Lab Interpretation (test code = Abnormal                           

    



             22081-5)                                            



[facility] WITH [id number] 10:41:00





             Test Item    Value        Reference Range Interpretation Comments

 

             WBC (test code =              See_Comment                [Automated



             8707-2)                                             message] The sy

stem



                                                                 which generated



                                                                 this result



                                                                 transmitted



                                                                 reference range

:



                                                                 4.30 - 11.10



                                                                 10*3/?L. The



                                                                 reference range

 was



                                                                 not used to



                                                                 interpret this



                                                                 result as



                                                                 normal/abnormal

.

 

             RBC (test code =              See_Comment  L             [Automated



             649-8)                                              message] The sy

stem



                                                                 which generated



                                                                 this result



                                                                 transmitted



                                                                 reference range

:



                                                                 3.93 - 5.25



                                                                 10*6/?L. The



                                                                 reference range

 was



                                                                 not used to



                                                                 interpret this



                                                                 result as



                                                                 normal/abnormal

.

 

             HGB (test code = 7.3 g/dL     11.6-15      L            



             718-7)                                              

 

             HCT (test code = 23.6 %       35.7-45.2    L            



             4544-3)                                             

 

             MCV (test code = 98.3 fL      80.6-95.5    H            



             787-2)                                              

 

             MCH (test code = 30.4 pg      25.9-32.8                 



             785-6)                                              

 

             MCHC (test code = 30.9 g/dL    31.6-35.1    L            



             786-4)                                              

 

             RDW-SD (test code = 57.1 fL      39-49.9      H            



             34779-3)                                            

 

             RDW-CV (test code = 15.8 %       12-15.5      H            



             788-0)                                              

 

             PLT (test code =              See_Comment  LL            [Automated



             777-3)                                              message] The sy

stem



                                                                 which generated



                                                                 this result



                                                                 transmitted



                                                                 reference range

:



                                                                 166 - 358 10*3/

?L.



                                                                 The reference r

olman



                                                                 was not used to



                                                                 interpret this



                                                                 result as



                                                                 normal/abnormal

.

 

             MPV (test code = 11.7 fL      9.5-12.9                  



             42041-5)                                            

 

             IPF % (test code = 4.7 %        1.3-7.7                   Platelet 

count



             [id number])                                         measured by



                                                                 fluorescence



                                                                 method.

 

             NRBC/100 WBC (test              See_Comment                [Automat

ed



             code = 9549754597)                                        message] 

The system



                                                                 which generated



                                                                 this result



                                                                 transmitted



                                                                 reference range

:



                                                                 0.0 - 10.0 /100



                                                                 WBCs. The refer

ence



                                                                 range was not u

sed



                                                                 to interpret th

is



                                                                 result as



                                                                 normal/abnormal

.

 

             NRBC x10^3 (test code              See_Comment                [Auto

mated



             = 5387810283)                                        message] The s

ystem



                                                                 which generated



                                                                 this result



                                                                 transmitted



                                                                 reference range

:



                                                                 10*3/?L. The



                                                                 reference range

 was



                                                                 not used to



                                                                 interpret this



                                                                 result as



                                                                 normal/abnormal

.

 

             GRAN MAT (NEUT) % 78.4 %                                 



             (test code = 770-8)                                        

 

             IMM GRAN % (test code 5.40 %                                 



             = 0908572576)                                        

 

             LYMPH % (test code = 11.3 %                                 



             736-9)                                              

 

             MONO % (test code = 4.7 %                                  



             5905-5)                                             

 

             EOS % (test code = 0.1 %                                  



             713-8)                                              

 

             BASO % (test code = 0.1 %                                  



             706-2)                                              

 

             GRAN MAT x10^3(ANC) 6.12 10*3/uL 1.88-7.09                 



             (test code =                                        



             7033235572)                                         

 

             IMM GRAN x10^3 (test 0.42 10*3/uL 0-0.06       H            



             code = 8187477783)                                        

 

             LYMPH x10^3 (test code 0.88 10*3/uL 1.32-3.29    L            



             = 731-0)                                            

 

             MONO x10^3 (test code 0.37 10*3/uL 0.33-0.92                 



             = 742-7)                                            

 

             EOS x10^3 (test code = <0.03        0.03-0.39    L            



             711-2)                                              

 

             BASO x10^3 (test code <0.03        0.01-0.07                 



             = 704-7)                                            

 

             BASO STIPPLING (test Present                   A            



             code = 703-9)                                        

 

             Lab Interpretation Abnormal                               



             (test code = 11415-3)                                        



[facility]





             Test Item    Value        Reference Range Interpretation Comments

 

             POCT GLU (test code = 8819154142) 168 mg/dL           H      

      

 

             Lab Interpretation (test code = Abnormal                           

    



             96519-1)                                            



[facility] WITH [id number] 06:13:00





             Test Item    Value        Reference Range Interpretation Comments

 

             WBC (test code =              See_Comment                [Automated



             6690-2)                                             message] The



                                                                 system which



                                                                 generated this



                                                                 result transmit

eduard



                                                                 reference range

:



                                                                 4.30 - 11.10



                                                                 10*3/?L. The



                                                                 reference range



                                                                 was not used to



                                                                 interpret this



                                                                 result as



                                                                 normal/abnormal

.

 

             RBC (test code =              See_Comment  L             [Automated



             789-8)                                              message] The



                                                                 system which



                                                                 generated this



                                                                 result transmit

eduard



                                                                 reference range

:



                                                                 3.93 - 5.25



                                                                 10*6/?L. The



                                                                 reference range



                                                                 was not used to



                                                                 interpret this



                                                                 result as



                                                                 normal/abnormal

.

 

             HGB (test code = 7.0 g/dL     11.6-15      L            



             718-7)                                              

 

             HCT (test code = 21.9 %       35.7-45.2    L            



             4544-3)                                             

 

             MCV (test code = 96.5 fL      80.6-95.5    H            



             787-2)                                              

 

             MCH (test code = 30.8 pg      25.9-32.8                 



             785-6)                                              

 

             MCHC (test code = 32.0 g/dL    31.6-35.1                 



             786-4)                                              

 

             RDW-SD (test code = 56.0 fL      39-49.9      H            



             36828-1)                                            

 

             RDW-CV (test code = 16.0 %       12-15.5      H            



             788-0)                                              

 

             PLT (test code =              See_Comment  LL            [Automated



             777-3)                                              message] The



                                                                 system which



                                                                 generated this



                                                                 result transmit

eduard



                                                                 reference range

:



                                                                 166 - 358 10*3/

?L.



                                                                 The reference



                                                                 range was not u

sed



                                                                 to interpret th

is



                                                                 result as



                                                                 normal/abnormal

.

 

             MPV (test code = 10.2 fL      9.5-12.9                  



             79173-6)                                            

 

             IPF % (test code = 2.8 %        1.3-7.7                   Platelet 

count



             8297651659)                                         measured by



                                                                 fluorescence



                                                                 method.

 

             NRBC/100 WBC (test              See_Comment                [Automat

ed



             code = 6332142974)                                        message] 

The



                                                                 system which



                                                                 generated this



                                                                 result transmit

eduard



                                                                 reference range

:



                                                                 0.0 - 10.0 /100



                                                                 WBCs. The



                                                                 reference range



                                                                 was not used to



                                                                 interpret this



                                                                 result as



                                                                 normal/abnormal

.

 

             NRBC x10^3 (test code <0.01        See_Comment                [Auto

mated



             = 3177883047)                                        message] The



                                                                 system which



                                                                 generated this



                                                                 result transmit

eduard



                                                                 reference range

:



                                                                 10*3/?L. The



                                                                 reference range



                                                                 was not used to



                                                                 interpret this



                                                                 result as



                                                                 normal/abnormal

.

 

             GRAN MAT (NEUT) % 85.0 %                                 



             (test code = 770-8)                                        

 

             IMM GRAN % (test code 0.90 %                                 



             = 5063195900)                                        

 

             LYMPH % (test code = 10.2 %                                 



             736-9)                                              

 

             MONO % (test code = 3.8 %                                  



             5905-5)                                             

 

             EOS % (test code = 0.1 %                                  



             713-8)                                              

 

             BASO % (test code = 0.0 %                                  



             706-2)                                              

 

             GRAN MAT x10^3(ANC) 6.63 10*3/uL 1.88-7.09                 



             (test code =                                        



             0451448754)                                         

 

             IMM GRAN x10^3 (test 0.07 10*3/uL 0-0.06       H            



             code = 8358511158)                                        

 

             LYMPH x10^3 (test 0.80 10*3/uL 1.32-3.29    L            



             code = 731-0)                                        

 

             MONO x10^3 (test code 0.30 10*3/uL 0.33-0.92    L            



             = 742-7)                                            

 

             EOS x10^3 (test code <0.03        0.03-0.39    L            



             = 711-2)                                            

 

             BASO x10^3 (test code <0.03        0.01-0.07                 



             = 704-7)                                            

 

             BANDS (test code = MARKED INCREASED              A            



             2118005162)                                         

 

             Lab Interpretation Abnormal                               



             (test code = 38981-0)                                        



[facility] Metabolic Panel (NA, K, CL, CO2, 
Glucose, BUN, Creatinine, CA)2020 06:00:00





             Test Item    Value        Reference Range Interpretation Comments

 

             NA (test code = 138 mmol/L   135-145                   



             8552162742)                                         

 

             K (test code = 3.6 mmol/L   3.5-5                     



             5175492295)                                         

 

             CL (test code = 111 mmol/L          H            



             4876374281)                                         

 

             CO2 TOTAL (test code = 20 mmol/L    23-31        L            



             5295157767)                                         

 

             AGAP (test code =              2-16                      



             0635162923)                                         

 

             BUN (test code = 17 mg/dL     7-23                      



             5633751723)                                         

 

             GLUCOSE (test code = 136 mg/dL           H            



             4524816275)                                         

 

             CREATININE (test code = 0.46 mg/dL   0.5-1.04     L            



             1430982188)                                         

 

             CALCIUM (test code = 6.5 mg/dL    8.6-10.6     L            



             8277998230)                                         

 

             eGFR Calculation              mL/min/1.73m2              



             (Non-)                                        



             (test code =                                        



             8705611028)                                         

 

             eGFR Calculation              mL/min/1.73m2              



             (African American)                                        



             (test code =                                        



             0016094919)                                         

 

             RICK (test code = RICK) Association of                           



                          Glomerular Filtration                           



                          Rate (GFR) and Staging                           



                          of Kidney Disease*                           



                          +---------------------                           



                          --+-------------------                           



                          --+-------------------                           



                          ------+| GFR                           



                          (mL/min/1.73 m2) ?|                           



                          With Kidney Damage ?|                           



                          ?Without Kidney                           



                          Damage+---------------                           



                          --------+-------------                           



                          --------+-------------                           



                          ------------+| ?>90 ?                           



                          ? ? ? ? ? ? ? ?|                           



                          ?Stage one ? ? ? ? ?|                           



                          ? Normal ? ? ? ? ? ? ?                           



                          ?+--------------------                           



                          ---+------------------                           



                          ---+------------------                           



                          -------+| ?60-89 ? ? ?                           



                          ? ? ? ? ?| ?Stage two                           



                          ? ? ? ? ?| ? Decreased                           



                          GFR ? ? ? ?                            



                          +---------------------                           



                          --+-------------------                           



                          --+-------------------                           



                          ------+| ?30-59 ? ? ?                           



                          ? ? ? ? ?| ?Stage                           



                          three ? ? ? ?| ? Stage                           



                          three ? ? ? ? ?                           



                          +---------------------                           



                          --+-------------------                           



                          --+-------------------                           



                          ------+| ?15-29 ? ? ?                           



                          ? ? ? ? ?| ?Stage four                           



                          ? ? ? ? | ? Stage four                           



                          ? ? ? ? ?                              



                          ?+--------------------                           



                          ---+------------------                           



                          ---+------------------                           



                          -------+| ?<15 (or                           



                          dialysis) ? ?| ?Stage                           



                          five ? ? ? ? | ? Stage                           



                          five ? ? ? ? ?                           



                          ?+--------------------                           



                          ---+------------------                           



                          ---+------------------                           



                          -------+ *Each stage                           



                          assumes the associated                           



                          GFR level has been in                           



                          effect for at least                           



                          three months. ?Stages                           



                          1 to 5, with or                           



                          without kidney                           



                          disease, indicate                           



                          chronic kidney                           



                          disease. Notes:                           



                          Determination of                           



                          stages one and two                           



                          (with eGFR                             



                          >59mL/min/1.73 m2)                           



                          requires estimation of                           



                          kidney damage for at                           



                          least three months as                           



                          defined by structural                           



                          or functional                           



                          abnormalities of the                           



                          kidney, manifested by                           



                          either:Pathological                           



                          abnormalities or                           



                          Markers of kidney                           



                          damage (including                           



                          abnormalities in the                           



                          composition of the                           



                          blood or urine or                           



                          abnormalities in                           



                          imaging tests).                           

 

             Lab Interpretation Abnormal                               



             (test code = 15637-6)                                        



[facility] [id number] 06:00:00





             Test Item    Value        Reference Range Interpretation Comments

 

             MAGNESIUM (test code = 3161788904) 1.3 mg/dL    1.7-2.4      L     

       

 

             Lab Interpretation (test code = Abnormal                           

    



             24314-6)                                            



[facility] (ALB, T.PRO, BILI T, 
BU/BC, ALT, AST, ALK, PHOS)2020 06:00:00





             Test Item    Value        Reference Range Interpretation Comments

 

             TOTAL BILI (test code = 6436487438) 2.1 mg/dL    0.1-1.1      H    

        

 

             BILI UNCON (test code = 3241790352) 1.6 mg/dL    0.1-1.1      H    

        

 

             BILI CONJ (test code = 3172815894) 0.0 mg/dL    0-0.3              

       

 

             T PROTEIN (test code = 7393351582) 4.8 g/dL     6.3-8.2      L     

       

 

             ALBUMIN (test code = 0183125727) 2.0 g/dL     3.5-5        L       

     

 

             ALK PHOS (test code = 6886946637) 59 U/L                     

      

 

             ALTv (test code = 1742-6) 19 U/L       5-35                      

 

             AST(SGOT) (test code = 0894817938) 27 U/L       13-40              

       

 

             Lab Interpretation (test code = Abnormal                           

    



             09825-0)                                            



[facility]  / [id number] 05:43:00





             Test Item    Value        Reference Range Interpretation Comments

 

             PROTIME PATIENT (test              See_Comment  H             [Auto

mated message]



             code = 5964-2)                                        The system wh

ich



                                                                 generated this 

result



                                                                 transmitted ref

erence



                                                                 range: 10.1 - 1

2.6



                                                                 Seconds. The



                                                                 reference range

 was



                                                                 not used to int

erpret



                                                                 this result as



                                                                 normal/abnormal

.

 

             INR (test code = 6301-6)                                        Nor

mal INR <1.1;



                                                                 Warfarin Therap

eutic



                                                                 range 2.0 to 3.

0 or



                                                                 2.5 to 3.5, dep

ending



                                                                 upon the indica

tions.

 

             Lab Interpretation (test Abnormal                               



             code = 84101-1)                                        



[facility] GLUCOSE (AUTOMATED)2020 05:18:00





             Test Item    Value        Reference Range Interpretation Comments

 

             POCT GLU (test code = 6078088157) 191 mg/dL           H      

      

 

             Lab Interpretation (test code = Abnormal                           

    



             40073-5)                                            



[facility] GLUCOSE (AUTOMATED)2020 02:28:00





             Test Item    Value        Reference Range Interpretation Comments

 

             POCT GLU (test code = 6771301226) 202 mg/dL           H      

      

 

             Lab Interpretation (test code = Abnormal                           

    



             72603-3)                                            



[facility] [id number] 00:34:00





             Test Item    Value        Reference Range Interpretation Comments

 

             AFP (test code = 8.4 ng/mL    See_Comment  H             [Automated



             6113300740)                                         message] The



                                                                 system which



                                                                 generated this



                                                                 result



                                                                 transmitted



                                                                 reference range

:



                                                                 <=7.5. The



                                                                 reference range



                                                                 was not used to



                                                                 interpret this



                                                                 result as



                                                                 normal/abnormal

.

 

             RICK (test code = RICK) Biotin has been                           



                          reported to                            



                          cause a negative                           



                          bias, interpret                           



                          results relative                           



                          to patient's use                           



                          of biotin.                             

 

             Lab Interpretation Abnormal                               



             (test code = 06095-0)                                        



[facility] CampusCBC WITH [id number] 00:30:00





             Test Item    Value        Reference Range Interpretation Comments

 

             WBC (test code =              See_Comment                [Automated



             6690-2)                                             message] The



                                                                 system which



                                                                 generated this



                                                                 result transmit

eduard



                                                                 reference range

:



                                                                 4.30 - 11.10



                                                                 10*3/?L. The



                                                                 reference range



                                                                 was not used to



                                                                 interpret this



                                                                 result as



                                                                 normal/abnormal

.

 

             RBC (test code =              See_Comment  L             [Automated



             789-8)                                              message] The



                                                                 system which



                                                                 generated this



                                                                 result transmit

eduard



                                                                 reference range

:



                                                                 3.93 - 5.25



                                                                 10*6/?L. The



                                                                 reference range



                                                                 was not used to



                                                                 interpret this



                                                                 result as



                                                                 normal/abnormal

.

 

             HGB (test code = 8.8 g/dL     11.6-15      L            



             718-7)                                              

 

             HCT (test code = 27.7 %       35.7-45.2    L            



             4544-3)                                             

 

             MCV (test code = 95.2 fL      80.6-95.5                 



             787-2)                                              

 

             MCH (test code = 30.2 pg      25.9-32.8                 



             785-6)                                              

 

             MCHC (test code = 31.8 g/dL    31.6-35.1                 



             786-4)                                              

 

             RDW-SD (test code = 53.6 fL      39-49.9      H            



             49806-1)                                            

 

             RDW-CV (test code = 15.7 %       12-15.5      H            



             788-0)                                              

 

             PLT (test code =              See_Comment  LL            [Automated



             777-3)                                              message] The



                                                                 system which



                                                                 generated this



                                                                 result transmit

eduard



                                                                 reference range

:



                                                                 166 - 358 10*3/

?L.



                                                                 The reference



                                                                 range was not u

sed



                                                                 to interpret th

is



                                                                 result as



                                                                 normal/abnormal

.

 

             MPV (test code = 10.5 fL      9.5-12.9                  



             27098-4)                                            

 

             IPF % (test code = 4.0 %        1.3-7.7                   Platelet 

count



             [id number])                                         measured by



                                                                 fluorescence



                                                                 method.

 

             NRBC/100 WBC (test              See_Comment                [Automat

ed



             code = 8011161177)                                        message] 

The



                                                                 system which



                                                                 generated this



                                                                 result transmit

eduard



                                                                 reference range

:



                                                                 0.0 - 10.0 /100



                                                                 WBCs. The



                                                                 reference range



                                                                 was not used to



                                                                 interpret this



                                                                 result as



                                                                 normal/abnormal

.

 

             NRBC x10^3 (test code <0.01        See_Comment                [Auto

mated



             = 1248557554)                                        message] The



                                                                 system which



                                                                 generated this



                                                                 result transmit

eduard



                                                                 reference range

:



                                                                 10*3/?L. The



                                                                 reference range



                                                                 was not used to



                                                                 interpret this



                                                                 result as



                                                                 normal/abnormal

.

 

             GRAN MAT (NEUT) % 85.3 %                                 



             (test code = 770-8)                                        

 

             IMM GRAN % (test code 2.50 %                                 



             = 4637902589)                                        

 

             LYMPH % (test code = 7.7 %                                  



             736-9)                                              

 

             MONO % (test code = 4.4 %                                  



             5905-5)                                             

 

             EOS % (test code = 0.0 %                                  



             713-8)                                              

 

             BASO % (test code = 0.1 %                                  



             706-2)                                              

 

             GRAN MAT x10^3(ANC) 8.57 10*3/uL 1.88-7.09    H            



             (test code =                                        



             0206727226)                                         

 

             IMM GRAN x10^3 (test 0.25 10*3/uL 0-0.06       H            



             code = 4894267542)                                        

 

             LYMPH x10^3 (test 0.77 10*3/uL 1.32-3.29    L            



             code = 731-0)                                        

 

             MONO x10^3 (test code 0.44 10*3/uL 0.33-0.92                 



             = 742-7)                                            

 

             EOS x10^3 (test code <0.03        0.03-0.39    L            



             = 711-2)                                            

 

             BASO x10^3 (test code <0.03        0.01-0.07                 



             = 704-7)                                            

 

             BASO STIPPLING (test Present                   A            



             code = 703-9)                                        

 

             KALA CELLS (test code 2+           See_Comment  A             [Auto

mated



             = 7790-1)                                           message] The



                                                                 system which



                                                                 generated this



                                                                 result transmit

eduard



                                                                 reference range

:



                                                                 (none). The



                                                                 reference range



                                                                 was not used to



                                                                 interpret this



                                                                 result as



                                                                 normal/abnormal

.

 

             BANDS (test code = MARKED INCREASED              A            



             1770960063)                                         

 

             Lab Interpretation Abnormal                               



             (test code = 53653-5)                                        



[facility] [id number] 00:08:00





             Test Item    Value        Reference Range Interpretation Comments

 

             LACTIC ACID (test code = 2.62 mmol/L  0.5-2.2      H            



             3322656608)                                         

 

             Lab Interpretation (test code = Abnormal                           

    



             06424-4)                                            



[facility] 21:36:00





             Test Item    Value        Reference Range Interpretation Comments

 

             POCT GLU (test code = 0080420938) 260 mg/dL           H      

      

 

             Lab Interpretation (test code = Abnormal                           

    



             85029-1)                                            



[facility] CampusN-[facility]MIN[facility] [id number]-[id number] 18:12:00





             Test Item    Value        Reference Range Interpretation Comments

 

             NT-proBNP (test code 209 pg/mL    See_Comment  H             [Autom

ated



             = 9436168114)                                        message] The



                                                                 system which



                                                                 generated this



                                                                 result



                                                                 transmitted



                                                                 reference range

:



                                                                 <=125. The



                                                                 reference range



                                                                 was not used to



                                                                 interpret this



                                                                 result as



                                                                 normal/abnormal

.

 

             RICK (test code = RICK) Biotin has been                           



                          reported to                            



                          cause a negative                           



                          bias, interpret                           



                          results relative                           



                          to patient's use                           



                          of biotin.                             

 

             Lab Interpretation Abnormal                               



             (test code = 48070-6)                                        



[facility] GLUCOSE (AUTOMATED)2020 16:33:00





             Test Item    Value        Reference Range Interpretation Comments

 

             POCT GLU (test code = 1812598418) 299 mg/dL           H      

      

 

             Lab Interpretation (test code = Abnormal                           

    



             91519-5)                                            



[facility] GLUCOSE (AUTOMATED)2020 13:00:00





             Test Item    Value        Reference Range Interpretation Comments

 

             POCT GLU (test code = 5583630055) 351 mg/dL           H      

      

 

             Lab Interpretation (test code = Abnormal                           

    



             12510-3)                                            



[facility] (75123[facility] (ALB,T.PRO,BILI
T,BU/BC,ALT,AST,ALK PHOS)2020 11:25:00





             Test Item    Value        Reference Range Interpretation Comments

 

             TOTAL BILI (test code = 9917233328) 3.8 mg/dL    0.1-1.1      H    

        

 

             BILI UNCON (test code = 4937878316) 2.9 mg/dL    0.1-1.1      H    

        

 

             BILI CONJ (test code = 8012763623) 0.2 mg/dL    0-0.3              

       

 

             T PROTEIN (test code = 3256288476) 7.5 g/dL     6.3-8.2            

       

 

             ALBUMIN (test code = 9554731251) 3.7 g/dL     3.5-5                

     

 

             ALK PHOS (test code = 7383786441) 128 U/L             H      

      

 

             ALTv (test code = 1742-6) 36 U/L       5-35         H            

 

             AST(SGOT) (test code = 2973277584) 61 U/L       13-40        H     

       

 

             Lab Interpretation (test code = Abnormal                           

    



             63327-1)                                            



[facility] Whole Zhmah5230-10-01 10:41:00





             Test Item    Value        Reference Range Interpretation Comments

 

             LACTIC ACID (test code = 2.41 mmol/L  0.5-2.2      H            



             1931080473)                                         

 

             Lab Interpretation (test code = Abnormal                           

    



             82388-8)                                            



[facility] GLUCOSE (AUTOMATED)2020 09:26:00





             Test Item    Value        Reference Range Interpretation Comments

 

             POCT GLU (test code = 9103257245) 371 mg/dL           H      

      

 

             Lab Interpretation (test code = Abnormal                           

    



             43212-2)                                            



[facility] CampusGlycosylated Hemoglobin (A1C)2020 
08:42:00





             Test Item    Value        Reference    Interpretation Comments



                                       Range                     

 

             HGB A1C (test code =              See_Comment  H             [Autom

ated



             4548-4)                                             message] The



                                                                 system which



                                                                 generated this



                                                                 result



                                                                 transmitted



                                                                 reference range

:



                                                                 4.0 - 6.0 %



                                                                 NGSP. The



                                                                 reference range



                                                                 was not used to



                                                                 interpret this



                                                                 result as



                                                                 normal/abnormal

.

 

             RICK (test code = %A1C (NGSP)                            



             RICK)         Interpretation                           



                          (ADA)4.8-5.6 ? ?                           



                          Normal or                              



                          (Non-Diabetic                           



                          Range)5.7-6.4 ? ?                           



                          Increased Risk                           



                          (Pre-Diabetic)>6.5 ?                           



                          ? ? ?Diabetes                           



                          Indicated                              

 

             Lab Interpretation Abnormal                               



             (test code =                                        



             91443-6)                                            



[facility] WITH [id number] 07:08:00





             Test Item    Value        Reference Range Interpretation Comments

 

             WBC (test code =              See_Comment  H             [Automated



             6690-2)                                             message] The sy

stem



                                                                 which generated



                                                                 this result



                                                                 transmitted



                                                                 reference range

:



                                                                 4.30 - 11.10



                                                                 10*3/?L. The



                                                                 reference range

 was



                                                                 not used to



                                                                 interpret this



                                                                 result as



                                                                 normal/abnormal

.

 

             RBC (test code =              See_Comment                [Automated



             789-8)                                              message] The sy

stem



                                                                 which generated



                                                                 this result



                                                                 transmitted



                                                                 reference range

:



                                                                 3.93 - 5.25



                                                                 10*6/?L. The



                                                                 reference range

 was



                                                                 not used to



                                                                 interpret this



                                                                 result as



                                                                 normal/abnormal

.

 

             HGB (test code = 12.6 g/dL    11.6-15                   



             718-7)                                              

 

             HCT (test code = 38.9 %       35.7-45.2                 



             4544-3)                                             

 

             MCV (test code = 93.7 fL      80.6-95.5                 



             787-2)                                              

 

             MCH (test code = 30.4 pg      25.9-32.8                 



             785-6)                                              

 

             MCHC (test code = 32.4 g/dL    31.6-35.1                 



             786-4)                                              

 

             RDW-SD (test code = 53.3 fL      39-49.9      H            



             18066-4)                                            

 

             RDW-CV (test code = 15.7 %       12-15.5      H            



             788-0)                                              

 

             PLT (test code =              See_Comment  L             [Automated



             777-3)                                              message] The sy

stem



                                                                 which generated



                                                                 this result



                                                                 transmitted



                                                                 reference range

:



                                                                 166 - 358 10*3/

?L.



                                                                 The reference r

olman



                                                                 was not used to



                                                                 interpret this



                                                                 result as



                                                                 normal/abnormal

.

 

             MPV (test code = 9.7 fL       9.5-12.9                  



             68419-2)                                            

 

             IPF % (test code = 3.2 %        1.3-7.7                   Platelet 

count



             [id number])                                         measured by



                                                                 fluorescence



                                                                 method.

 

             NRBC/100 WBC (test              See_Comment                [Automat

ed



             code = 7151545758)                                        message] 

The system



                                                                 which generated



                                                                 this result



                                                                 transmitted



                                                                 reference range

:



                                                                 0.0 - 10.0 /100



                                                                 WBCs. The refer

ence



                                                                 range was not u

sed



                                                                 to interpret th

is



                                                                 result as



                                                                 normal/abnormal

.

 

             NRBC x10^3 (test code <0.01        See_Comment                [Auto

mated



             = 2866297666)                                        message] The s

ystem



                                                                 which generated



                                                                 this result



                                                                 transmitted



                                                                 reference range

:



                                                                 10*3/?L. The



                                                                 reference range

 was



                                                                 not used to



                                                                 interpret this



                                                                 result as



                                                                 normal/abnormal

.

 

             GRAN MAT (NEUT) % 89.8 %                                 



             (test code = 770-8)                                        

 

             IMM GRAN % (test code 0.60 %                                 



             = 3483847758)                                        

 

             LYMPH % (test code = 6.1 %                                  



             736-9)                                              

 

             MONO % (test code = 3.3 %                                  



             5905-5)                                             

 

             EOS % (test code = 0.0 %                                  



             713-8)                                              

 

             BASO % (test code = 0.2 %                                  



             706-2)                                              

 

             GRAN MAT x10^3(ANC) 12.10 10*3/uL 1.88-7.09    H            



             (test code =                                        



             6060125121)                                         

 

             IMM GRAN x10^3 (test 0.08 10*3/uL 0-0.06       H            



             code = 9579194385)                                        

 

             LYMPH x10^3 (test 0.82 10*3/uL 1.32-3.29    L            



             code = 731-0)                                        

 

             MONO x10^3 (test code 0.44 10*3/uL 0.33-0.92                 



             = 742-7)                                            

 

             EOS x10^3 (test code <0.03        0.03-0.39    L            



             = 711-2)                                            

 

             BASO x10^3 (test code 0.03 10*3/uL 0.01-0.07                 



             = 704-7)                                            

 

             PLT ESTIMATE (test Decreased    Normal       A            



             code = 9317-9)                                        

 

             Lab Interpretation Abnormal                               



             (test code = 45645-5)                                        



[facility] Metabolic Panel (NA, K, CL, CO2, 
GLUCOSE, BUN, CREATININE, CA)2020 06:43:00





             Test Item    Value        Reference Range Interpretation Comments

 

             NA (test code = 139 mmol/L   135-145                   



             8185035222)                                         

 

             K (test code = 4.7 mmol/L   3.5-5                     



             4462532168)                                         

 

             CL (test code = 107 mmol/L                       



             7503541412)                                         

 

             CO2 TOTAL (test code = 19 mmol/L    23-31        L            



             2799089991)                                         

 

             AGAP (test code =              2-16                      



             8196336326)                                         

 

             BUN (test code = 22 mg/dL     7-23                      



             1816636045)                                         

 

             GLUCOSE (test code = 344 mg/dL           H            



             1459550908)                                         

 

             CREATININE (test code = 0.79 mg/dL   0.5-1.04                  



             6241347341)                                         

 

             CALCIUM (test code = 8.3 mg/dL    8.6-10.6     L            



             8628048279)                                         

 

             eGFR Calculation              mL/min/1.73m2              



             (Non-)                                        



             (test code =                                        



             2545610737)                                         

 

             eGFR Calculation              mL/min/1.73m2              



             (African American)                                        



             (test code =                                        



             2072676879)                                         

 

             RICK (test code = RICK) Association of                           



                          Glomerular Filtration                           



                          Rate (GFR) and Staging                           



                          of Kidney Disease*                           



                          +---------------------                           



                          --+-------------------                           



                          --+-------------------                           



                          ------+| GFR                           



                          (mL/min/1.73 m2) ?|                           



                          With Kidney Damage ?|                           



                          ?Without Kidney                           



                          Damage+---------------                           



                          --------+-------------                           



                          --------+-------------                           



                          ------------+| ?>90 ?                           



                          ? ? ? ? ? ? ? ?|                           



                          ?Stage one ? ? ? ? ?|                           



                          ? Normal ? ? ? ? ? ? ?                           



                          ?+--------------------                           



                          ---+------------------                           



                          ---+------------------                           



                          -------+| ?60-89 ? ? ?                           



                          ? ? ? ? ?| ?Stage two                           



                          ? ? ? ? ?| ? Decreased                           



                          GFR ? ? ? ?                            



                          +---------------------                           



                          --+-------------------                           



                          --+-------------------                           



                          ------+| ?30-59 ? ? ?                           



                          ? ? ? ? ?| ?Stage                           



                          three ? ? ? ?| ? Stage                           



                          three ? ? ? ? ?                           



                          +---------------------                           



                          --+-------------------                           



                          --+-------------------                           



                          ------+| ?15-29 ? ? ?                           



                          ? ? ? ? ?| ?Stage four                           



                          ? ? ? ? | ? Stage four                           



                          ? ? ? ? ?                              



                          ?+--------------------                           



                          ---+------------------                           



                          ---+------------------                           



                          -------+| ?<15 (or                           



                          dialysis) ? ?| ?Stage                           



                          five ? ? ? ? | ? Stage                           



                          five ? ? ? ? ?                           



                          ?+--------------------                           



                          ---+------------------                           



                          ---+------------------                           



                          -------+ *Each stage                           



                          assumes the associated                           



                          GFR level has been in                           



                          effect for at least                           



                          three months. ?Stages                           



                          1 to 5, with or                           



                          without kidney                           



                          disease, indicate                           



                          chronic kidney                           



                          disease. Notes:                           



                          Determination of                           



                          stages one and two                           



                          (with eGFR                             



                          >59mL/min/1.73 m2)                           



                          requires estimation of                           



                          kidney damage for at                           



                          least three months as                           



                          defined by structural                           



                          or functional                           



                          abnormalities of the                           



                          kidney, manifested by                           



                          either:Pathological                           



                          abnormalities or                           



                          Markers of kidney                           



                          damage (including                           



                          abnormalities in the                           



                          composition of the                           



                          blood or urine or                           



                          abnormalities in                           



                          imaging tests).                           

 

             Lab Interpretation Abnormal                               



             (test code = 64048-9)                                        



[id number]





             Test Item    Value        Reference Range Interpretation Comments

 

             LACTIC ACID (test code = 3.22 mmol/L  0.5-2.2      H            



             9982810948)                                         

 

             Lab Interpretation (test code = Abnormal                           

    



             43683-7)                                            



[facility] 04:13:00





             Test Item    Value        Reference Range Interpretation Comments

 

             POCT GLU (test code = 4500711630) 350 mg/dL           H      

      

 

             Lab Interpretation (test code = Abnormal                           

    



             00784-5)                                            



[facility] [id number] 03:01:54 No acute 
intrathoracic abnormality. Preliminary Report Dictated by Resident: Ziggy Longoria MD., have reviewed this study and agree with the 
abovereport.XR CHEST 1 VW Comparison: Chest x-ray 2020 History: fever  
Findings: The lungs are clear. No focal consolidation. No pleural effusion 
orpneumothorax is identified. The heart is normal in size. No acute osseous 
abnormality. Multiple surgical staples overlie the thorax. [facility], Radiant Results
Inft User - 2020 10:02 PM CDTXRCHEST 1 VWComparison: Chest x-ray 
2020History: fever Findings:The lungs are clear. No focal consolidation. No
pleural effusion orpneumothorax is identified.The heart is normal in size.No 
acute osseous abnormality. Multiple surgical staples overlie the 
thorax.IMPRESSIONNo acute intrathoracic abnormality.Preliminary Report Dictated 
by Resident: Ziggy Woodward MD., have reviewed this study and 
agree with the abovereport.[facility] (NA, K, CL, CO2, GLUCOSE, BUN, CREATININE, CA)2020 02:22:00





             Test Item    Value        Reference Range Interpretation Comments

 

             NA (test code = 137 mmol/L   135-145                   



             6156764944)                                         

 

             K (test code = 5.3 mmol/L   3.5-5        H            



             9362843814)                                         

 

             CL (test code = 105 mmol/L                       



             1223640184)                                         

 

             CO2 TOTAL (test code = 22 mmol/L    23-31        L            



             7197813411)                                         

 

             AGAP (test code =              2-16                      



             4527950999)                                         

 

             BUN (test code = 24 mg/dL     7-23         H            



             0423175968)                                         

 

             GLUCOSE (test code = 339 mg/dL           H            



             6416728472)                                         

 

             CREATININE (test code = 0.79 mg/dL   0.5-1.04                  



             2018741930)                                         

 

             CALCIUM (test code = 8.1 mg/dL    8.6-10.6     L            



             0385229263)                                         

 

             eGFR Calculation              mL/min/1.73m2              



             (Non-)                                        



             (test code =                                        



             1954501033)                                         

 

             eGFR Calculation              mL/min/1.73m2              



             (African American)                                        



             (test code =                                        



             5043243732)                                         

 

             RICK (test code = RICK) Association of                           



                          Glomerular Filtration                           



                          Rate (GFR) and Staging                           



                          of Kidney Disease*                           



                          +---------------------                           



                          --+-------------------                           



                          --+-------------------                           



                          ------+| GFR                           



                          (mL/min/1.73 m2) ?|                           



                          With Kidney Damage ?|                           



                          ?Without Kidney                           



                          Damage+---------------                           



                          --------+-------------                           



                          --------+-------------                           



                          ------------+| ?>90 ?                           



                          ? ? ? ? ? ? ? ?|                           



                          ?Stage one ? ? ? ? ?|                           



                          ? Normal ? ? ? ? ? ? ?                           



                          ?+--------------------                           



                          ---+------------------                           



                          ---+------------------                           



                          -------+| ?60-89 ? ? ?                           



                          ? ? ? ? ?| ?Stage two                           



                          ? ? ? ? ?| ? Decreased                           



                          GFR ? ? ? ?                            



                          +---------------------                           



                          --+-------------------                           



                          --+-------------------                           



                          ------+| ?30-59 ? ? ?                           



                          ? ? ? ? ?| ?Stage                           



                          three ? ? ? ?| ? Stage                           



                          three ? ? ? ? ?                           



                          +---------------------                           



                          --+-------------------                           



                          --+-------------------                           



                          ------+| ?15-29 ? ? ?                           



                          ? ? ? ? ?| ?Stage four                           



                          ? ? ? ? | ? Stage four                           



                          ? ? ? ? ?                              



                          ?+--------------------                           



                          ---+------------------                           



                          ---+------------------                           



                          -------+| ?<15 (or                           



                          dialysis) ? ?| ?Stage                           



                          five ? ? ? ? | ? Stage                           



                          five ? ? ? ? ?                           



                          ?+--------------------                           



                          ---+------------------                           



                          ---+------------------                           



                          -------+ *Each stage                           



                          assumes the associated                           



                          GFR level has been in                           



                          effect for at least                           



                          three months. ?Stages                           



                          1 to 5, with or                           



                          without kidney                           



                          disease, indicate                           



                          chronic kidney                           



                          disease. Notes:                           



                          Determination of                           



                          stages one and two                           



                          (with eGFR                             



                          >59mL/min/1.73 m2)                           



                          requires estimation of                           



                          kidney damage for at                           



                          least three months as                           



                          defined by structural                           



                          or functional                           



                          abnormalities of the                           



                          kidney, manifested by                           



                          either:Pathological                           



                          abnormalities or                           



                          Markers of kidney                           



                          damage (including                           



                          abnormalities in the                           



                          composition of the                           



                          blood or urine or                           



                          abnormalities in                           



                          imaging tests).                           

 

             Lab Interpretation Abnormal                               



             (test code = 20615-8)                                        



[facility] [id number] 
02:16:57 1. Dilated small bowel loops in the upper abdomen with loops of 
jejunummeasuring up to 2.4 cm representing partial small bowel 
obstruction.Transition point is seen in the central abdomen on 2:63 with d
istallycollapsed loops of jejunum. No evidence of ischemia. 2. Cirrhotic liver 
morphology with 4.5 cm hypodense segment VI lesionmeasuring 47 Hounsfield units 
concerning for malignancy. Recommend GIconsultation and correlation with prior 
imaging studies and/or follow-uptriple phase abdominal CT.  3. Moderate portal 
hypertension including splenomegaly, dilated main portalvein, small 
gastroesophageal varices and portal colopathy. 4. Diffuse thickening of the 
large bowel could represent infectious orinflammatory colitis Findings relayed 
to Dr. Abarca at time of dictation. Preliminary Report Dictated by Resident: 
Ziggy Gamboa MD., have reviewed this study and agree with 
the abovereport.CT ABDOMEN AND PELVIS WITH CONTRAST HISTORY: Abd pain, acute, 
generalized, with fever  COMPARISON: None. TECHNIQUE: Contiguous axial imaging 
from the level of the lung basesthrough the pubic symphysis was performed after 
the administration of 120cc of intravenous Omnipaque contrast. Coronal and sa
gittal reconstructionswere obtained. FINDINGS: LOWER THORAX: Scattered calcified
granulomas are seenin the lung bases. Nopleural effusion is present. No 
cardiomegaly. LIVER: The liver is enlarged measuring nearly 20 cm in 
craniocaudaldimension with heterogenous parenchyma and micronodular contour, con
sistentwith cirrhosis. A 4.5 cm lobulated hypodense lesion in segment VI 
[id number] Hounsfield units, indeterminate. Scattered subcentimeter 
calcificationsare seen. GALLBLADDER: Prior cholecystectomy. Mild central 
intrahepatic biliaryductal dilation and prominence of the distal common bile 
duct at 7 mmlikely representing reservoir phenomenon from prior cholecystectomy.
SPLEEN: Splenomegaly up to 16 cm in craniocaudal dimension. PANCREAS: No ductal 
dilation or masses. Somewhat atrophic pancreas. ADRENAL GLANDS: No adrenal 
nodules. KIDNEYS: No hydronephrosis, stones, or solid masses. 3.0 cm simple cyst
atthe right midpole. Subcentimeter cortical hypodensities bilaterally are 
toosmall to characterize. No obstructive nephrolithiasis. No hydronephrosis. 
PERITONEUM AND RETROPERITONEUM: No free air. Small volume of abdominalascites in
the right subdiaphragmatic space and in the right paracolicgutter. Diffuse ill-
defined mesenteric stranding in the upper abdomenaround the mesenteric root 
vessels and large bowel is nonspecific, butlikely related to underlying liver 
disease. LYMPH NODES: Mildly enlargedporta hepatis lymph nodes measuring up to 
1.2cm, likely reactive to liver disease. GI TRACT: Small sliding hiatal hernia. 
The stomach is distended with gasand layering ingested fluid material. Dilated s
mall bowel loops in theupper abdomen with loops of jejunum measuring up to 2.4 
cm with transitionpoint on 2:63 in the central abdomen with collapsed loops of 
distaljejunum. Fecalization of small bowel suggesting delayed GI transit. 
Largebowel wall thickening and decreased enhancement with surroundingpericolonic
stranding consistent presenting portal colopathy. Thedescending and rectosigmoid
colon arenormally enhancing. Status postappendectomy. PELVIS: The urinary 
bladder is underdistended. Prior hysterectomy VESSELS: Dilated main portal vein 
measuring 1.5 cm. Tiny gastroesophagealcollateral vessels. Conventional hepatic 
arterial anatomy. The mesentericroot vessels are patent. BONES AND SOFT TISSUES:
No aggressive or suspicious osseous lesions.Diastasis recti and postsurgical 
changes in the anterior abdominal wallwithout competition. [facility], Radiant Results
[employer] User - 2020  9:18 PM CDTCT ABDOMEN AND PELVIS WITH CONTRASTHISTORY: 
Abd pain, acute, generalized, with fever COMPARISON: None.TECHNIQUE: Contiguous 
axial imaging from the level of the lung basesthrough the pubic symphysis was 
performed after the administration of 120cc of intravenous Omnipaque contrast. 
Coronal and sagittal reconstructionswere obtained.FINDINGS:LOWER THORAX: 
Scattered calcified granulomas are seen in the lung bases. Nopleural effusion is
present. No cardiomegaly.LIVER: The liver is enlarged measuring nearly 20 cm in 
craniocaudaldimension with heterogenous parenchyma and micronodular contour, 
consistentwith cirrhosis. A 4.5 cm lobulated hypodense lesion in segment VI 
[id number] Hounsfield units, indeterminate. Scattered subcentimeter 
calcificationsare seen.GALLBLADDER: Prior cholecystectomy. Mild central intrah
epatic biliaryductal dilation and prominence of the distal common bile duct at 7
mmlikely representing reservoir phenomenon from prior cholecystectomy.SPLEEN: 
Splenomegaly up to 16 cm in craniocaudal dimension.PANCREAS: No ductal dilation 
or masses. Somewhat atrophic pancreas.ADRENAL GLANDS: No adrenal 
nodules.KIDNEYS: No hydronephrosis, stones, or solid masses. 3.0 cm simple cyst 
atthe right midpole. Subcentimeter cortical hypodensities bilaterally are 
toosmall to characterize. No obstructive nephrolithiasis. No 
hydronephrosis.PERITONEUM AND RETROPERITONEUM: No free air. Small volume of 
abdominalascites in the right subdiaphragmatic space and in the right 
paracolicgutter. Diffuse ill-defined mesenteric stranding in the upper 
abdomenaround the mesenteric root vessels and large bowel is nonspecific, 
butlikely related to underlying liver disease.LYMPH NODES: Mildly enlarged priscilla
hepatis lymph nodes measuring up to 1.2cm, likely reactive to liver disease.GI 
TRACT: Small sliding hiatal hernia. Thestomach is distended with gasand layering
ingested fluid material. Dilated small bowel loops in theupper abdomen with 
loops of jejunum measuring up to 2.4 cm with transitionpoint on 2:63 in the 
centralabdomen with collapsed loops of distaljejunum. Fecalization of small 
bowel suggesting delayed GI transit. Largebowel wall thickening and decreased 
enhancement with surroundingpericolonic stranding consistent presenting portal 
colopathy. Thedescending and rectosigmoid colon are normally enhancing. Status 
postappendectomy.PELVIS: The urinary bladder is underdistended. Prior 
hysterectomyVESSELS: Dilatedmain portal vein measuring 1.5 cm. Tiny 
gastroesophagealcollateral vessels. Conventional hepatic arterial anatomy. The 
mesentericroot vessels are patent.BONES AND SOFT TISSUES: No aggressive or 
suspicious osseous lesions.Diastasis recti and postsurgical changes in the 
anterior abdominal wallwithout competition.IMPRESSION1. Dilated small bowel 
loops in the upper abdomen with loops of jejunummeasuring up to 2.4 cm 
representing partial small bowel obstruction.Transition point is seen in the 
central abdomen on 2:63 with distallycollapsed loops of jejunum. No evidence of 
ischemia.2. Cirrhotic liver morphology with 4.5 cm hypodense segment VI 
lesionmeasuring 47 Hounsfield units concerning for malignancy. Recommend 
GIconsultation and correlation with prior imaging studies and/or follow-uptriple
phase abdominal CT. 3. Moderate portal hypertension including splenomegaly, 
dilated main portalvein, small gastroesophageal varices and portal colopathy.4. 
Diffuse thickening of the large bowel could represent infectious orinflammatory 
colitisFindings relayed to Dr. Abarca at time of dictation.Preliminary Report 
Dictated by Resident: Juvencio Elias, Ziggy Bowers MD., have reviewed this 
study and agree with the abovereport.[facility] CampusLactic 
Acid Whole [id number] 02:10:00





             Test Item    Value        Reference Range Interpretation Comments

 

             LACTIC ACID (test code = 4.56 mmol/L  0.3-2.6                   



             [id number])                                         



[facility]IN[facility]IS2020-04-29 01:05:00





             Test Item    Value        Reference Range Interpretation Comments

 

             APPEARANCE (test code = Clear        Clear                     



             1862859224)                                         

 

             COLOR (test code = Fiorella        Yellow       A            



             6361537816)                                         

 

             PH (test code =              4.8-8.0                   



             8035354037)                                         

 

             SP GRAVITY (test code =              1.003-1.030               



             1214377789)                                         

 

             GLU U QUAL (test code = 500 mg/dL    Normal       A            



             1766466413)                                         

 

             BLOOD (test code = Negative     Negative                  



             1861580513)                                         

 

             KETONES (test code = 5 mg/dL      Negative     A            



             0565335008)                                         

 

             PROTEIN (test code = Negative     Negative                  



             2887-8)                                             

 

             UROBILIN (test code = 4.0 mg/dL    Normal       A            



             4995514316)                                         

 

             BILIRUBIN (test code = Negative     Negative                  



             5096405335)                                         

 

             NITRITE (test code = Negative     Negative                  



             4628592833)                                         

 

             LEUK MOE (test code = Negative     Negative                  



             1470009451)                                         

 

             RBC/HPF (test code =              See_Comment                [Autom

ated message]



             [id number])                                         The system [employer]



                                                                 generated this



                                                                 result transmit

eduard



                                                                 reference range

: 0 -



                                                                 3 HPF. The refe

rence



                                                                 range was not u

sed



                                                                 to interpret th

is



                                                                 result as



                                                                 normal/abnormal

.

 

             WBC/HPF (test code = <1           See_Comment                [Autom

ated message]



             9509350179)                                         The system [employer]



                                                                 generated this



                                                                 result transmit

eduard



                                                                 reference range

: 0 -



                                                                 5 HPF. The refe

rence



                                                                 range was not u

sed



                                                                 to interpret th

is



                                                                 result as



                                                                 normal/abnormal

.

 

             BACTERIA (test code = Moderate     Negative     A            



             2740719569)                                         

 

             MUCOUS (test code = Slight       Negative LPF A            



             8122017723)                                         

 

             SQ EPITH (test code =              HPF                       



             3484234023)                                         

 

             HYAL CAST (test code =              See_Comment  H             [Aut

omated message]



             8082610537)                                         The system [employer]

h



                                                                 generated this



                                                                 result transmit

eduard



                                                                 reference range

: <=2



                                                                 LPF. The refere

nce



                                                                 range was not u

sed



                                                                 to interpret th

is



                                                                 result as



                                                                 normal/abnormal

.

 

             Lab Interpretation (test Abnormal                               



             code = 40248-9)                                        



[facility] CampusType and Screen - ONCE [id number] 01:03:27





             Test Item    Value        Reference Range Interpretation Comments

 

             ABO & RH (test code A Positive                             Performe

d at [facility] [place]



             = 20)                                               Laboratory Serv

[facility] [place], [place] [address]Toll 

Free:



                                                                 [phone]

A No.



                                                                 [id number]

 

             IAT (test code = Negative                               Performed a

t [facility] [place]



             1185)                                               Laboratory Serv

[facility] [place] [address] 

Free:



                                                                 [phone]

A No.



                                                                 [id number]



[facility] CampusaPTT2020-04-29 00:26:00





             Test Item    Value        Reference Range Interpretation Comments

 

             APTT Patient (test              See_Comment                [Automat

ed



             code = 3173-2)                                        message] The



                                                                 system which



                                                                 generated this



                                                                 result



                                                                 transmitted



                                                                 reference range

:



                                                                 23 - 38 Seconds

.



                                                                 The reference



                                                                 range was not



                                                                 used to interpr

et



                                                                 this result as



                                                                 normal/abnormal

.

 

             RICK (test code = RICK) The [facility] [place] patient                           



                          population mean                           



                          normal value for                           



                          aPTT is 30                             



                          seconds.                               

 

             Lab Interpretation Normal                                 



             (test code = 54009-1)                                        



[facility] CampusPROTHROMBIN TIME / [id number] 00:24:00





             Test Item    Value        Reference Range Interpretation Comments

 

             PROTIME PATIENT (test              See_Comment                [Auto

mated message]



             code = 5964-2)                                        The system [employer]

ich



                                                                 generated this 

result



                                                                 transmitted ref

erence



                                                                 range: 12.0 - 1

4.7



                                                                 Seconds. The re

ference



                                                                 range was not u

sed to



                                                                 interpret this 

result



                                                                 as normal/abnor

mal.

 

             INR (test code = 6301-6)                                        Nor

mal INR <1.1;



                                                                 Warfarin Therap

eutic



                                                                 range 2.0 to 3.

0 or



                                                                 2.5 to 3.5, dep

ending



                                                                 upon the indica

tions.

 

             Lab Interpretation (test Normal                                 



             code = 85772-3)                                        



[facility] CampusCORONAVIRUS COVID-19 [id number] 
00:15:00





             Test Item    Value        Reference Range Interpretation Comments

 

             SARS-CoV-2 (test code = Not Detected Not Detected              



             60391-2)                                            

 

             RICK (test code = RICK) ID NOW COVID-19 Assay                        

   



                          is an isothermal                           



                          nucleic acid                           



                          amplification test                           



                          intended for the                           



                          qualitative detection                           



                          of nucleic acid from                           



                          SARS-CoV-2 viral RNA                           



                          in nasopharyngeal (NP)                           



                          specimens. It is used                           



                          under Emergency Use                           



                          Authorization (EUA) by                           



                          FDA. The limit of                           



                          detection (LOD) of the                           



                          assay is 125 Genome                           



                          Equivalents/mL. A                           



                          positive result is                           



                          indicative of the                           



                          presence of SARS-CoV-2                           



                          RNA. ?Clinical                           



                          correlation with                           



                          patient history and                           



                          other diagnostic                           



                          information is                           



                          necessary to determine                           



                          patient infection                           



                          status. A negative                           



                          (Not Detected) result                           



                          does not preclude                           



                          SARS-CoV-2 infection.                           



                          Clinical correlation                           



                          with patient history                           



                          and other diagnostic                           



                          information should be                           



                          used in patient                           



                          management decisions.                           



                          Invalid: Please                           



                          collect a new specimen                           



                          for repeat patient                           



                          testing if clinically                           



                          indicated.                             

 

             Lab Interpretation Normal                                 



             (test code = 58304-1)                                        



[facility] WITH [id number] 23:58:00





             Test Item    Value        Reference Range Interpretation Comments

 

             WBC (test code =              See_Comment  H             [Automated



             5590-2)                                             message] The sy

stem



                                                                 which generated



                                                                 this result



                                                                 transmitted



                                                                 reference range

:



                                                                 4.30 - 11.10



                                                                 10*3/?L. The



                                                                 reference range

 was



                                                                 not used to



                                                                 interpret this



                                                                 result as



                                                                 normal/abnormal

.

 

             RBC (test code =              See_Comment                [Automated



             629-8)                                              message] The sy

stem



                                                                 which generated



                                                                 this result



                                                                 transmitted



                                                                 reference range

:



                                                                 3.93 - 5.25



                                                                 10*6/?L. The



                                                                 reference range

 was



                                                                 not used to



                                                                 interpret this



                                                                 result as



                                                                 normal/abnormal

.

 

             HGB (test code = 14.4 g/dL    11.6-15                   



             718-7)                                              

 

             HCT (test code = 43.2 %       35.7-45.2                 



             4544-3)                                             

 

             MCV (test code = 91.7 fL      80.6-95.5                 



             787-2)                                              

 

             MCH (test code = 30.6 pg      25.9-32.8                 



             785-6)                                              

 

             MCHC (test code = 33.3 g/dL    31.6-35.1                 



             786-4)                                              

 

             RDW-SD (test code = 50.8 fL      39-49.9      H            



             72711-7)                                            

 

             RDW-CV (test code = 15.3 %       12-15.5                   



             788-0)                                              

 

             PLT (test code =              See_Comment  L             [Automated



             777-3)                                              message] The sy

stem



                                                                 which generated



                                                                 this result



                                                                 transmitted



                                                                 reference range

:



                                                                 166 - 358 10*3/

?L.



                                                                 The reference r

olman



                                                                 was not used to



                                                                 interpret this



                                                                 result as



                                                                 normal/abnormal

.

 

             MPV (test code = 10.4 fL      9.5-12.9                  



             96670-2)                                            

 

             IPF % (test code = 3.4 %        1.3-7.7                   Platelet 

count



             1052513873)                                         measured by



                                                                 fluorescence



                                                                 method.

 

             NRBC/100 WBC (test              See_Comment                [Automat

ed



             code = 6940965495)                                        message] 

The system



                                                                 which generated



                                                                 this result



                                                                 transmitted



                                                                 reference range

:



                                                                 0.0 - 10.0 /100



                                                                 WBCs. The refer

ence



                                                                 range was not u

sed



                                                                 to interpret th

is



                                                                 result as



                                                                 normal/abnormal

.

 

             NRBC x10^3 (test code <0.01        See_Comment                [Auto

mated



             = 5241907428)                                        message] The s

ystem



                                                                 which generated



                                                                 this result



                                                                 transmitted



                                                                 reference range

:



                                                                 10*3/?L. The



                                                                 reference range

 was



                                                                 not used to



                                                                 interpret this



                                                                 result as



                                                                 normal/abnormal

.

 

             GRAN MAT (NEUT) % 92.2 %                                 



             (test code = 770-8)                                        

 

             IMM GRAN % (test code 0.80 %                                 



             = 9376327711)                                        

 

             LYMPH % (test code = 3.6 %                                  



             736-9)                                              

 

             MONO % (test code = 3.2 %                                  



             5905-5)                                             

 

             EOS % (test code = 0.0 %                                  



             713-8)                                              

 

             BASO % (test code = 0.2 %                                  



             706-2)                                              

 

             GRAN MAT x10^3(ANC) 18.80 10*3/uL 1.88-7.09    H            



             (test code =                                        



             6014700932)                                         

 

             IMM GRAN x10^3 (test 0.16 10*3/uL 0-0.06       H            



             code = 4271437046)                                        

 

             LYMPH x10^3 (test 0.73 10*3/uL 1.32-3.29    L            



             code = 731-0)                                        

 

             MONO x10^3 (test code 0.65 10*3/uL 0.33-0.92                 



             = 742-7)                                            

 

             EOS x10^3 (test code <0.03        0.03-0.39    L            



             = 711-2)                                            

 

             BASO x10^3 (test code 0.05 10*3/uL 0.01-0.07                 



             = 704-7)                                            

 

             Lab Interpretation Abnormal                               



             (test code = 26631-6)                                        



[id number][facility] 
23:47:00





             Test Item    Value        Reference Range Interpretation Comments

 

             NA (test code = 138 mmol/L   135-145                   



             2558337138)                                         

 

             K (test code = 5.8 mmol/L   3.5-5        H            



             0335591624)                                         

 

             CL (test code = 101 mmol/L                       



             9348638139)                                         

 

             CO2 TOTAL (test code = 28 mmol/L    23-31                     



             0315389414)                                         

 

             AGAP (test code =              2-16                      



             4515802500)                                         

 

             BUN (test code = 27 mg/dL     7-23         H            



             0875457148)                                         

 

             GLUCOSE (test code = 358 mg/dL           H            



             4161835568)                                         

 

             CREATININE (test code = 0.92 mg/dL   0.5-1.04                  



             3237775074)                                         

 

             TOTAL BILI (test code = 3.3 mg/dL    0.1-1.1      H            



             3718757129)                                         

 

             CALCIUM (test code = 9.2 mg/dL    8.6-10.6                  



             8037637382)                                         

 

             T PROTEIN (test code = 8.6 g/dL     6.3-8.2      H            



             0569888927)                                         

 

             ALBUMIN (test code = 4.4 g/dL     3.5-5                     



             9177843187)                                         

 

             ALK PHOS (test code = 161 U/L             H            



             2944579081)                                         

 

             ALTv (test code = 43 U/L       5-35         H            



             1742-6)                                             

 

             AST(SGOT) (test code = 82 U/L       13-40        H            



             1891991329)                                         

 

             eGFR Calculation              mL/min/1.73m2              



             (Non-)                                        



             (test code =                                        



             5168464627)                                         

 

             eGFR Calculation              mL/min/1.73m2              



             (African American)                                        



             (test code =                                        



             1498314825)                                         

 

             RICK (test code = RICK) Association of                           



                          Glomerular Filtration                           



                          Rate (GFR) and Staging                           



                          of Kidney Disease*                           



                          +---------------------                           



                          --+-------------------                           



                          --+-------------------                           



                          ------+| GFR                           



                          (mL/min/1.73 m2) ?|                           



                          With Kidney Damage ?|                           



                          ?Without Kidney                           



                          Damage+---------------                           



                          --------+-------------                           



                          --------+-------------                           



                          ------------+| ?>90 ?                           



                          ? ? ? ? ? ? ? ?|                           



                          ?Stage one ? ? ? ? ?|                           



                          ? Normal ? ? ? ? ? ? ?                           



                          ?+--------------------                           



                          ---+------------------                           



                          ---+------------------                           



                          -------+| ?60-89 ? ? ?                           



                          ? ? ? ? ?| ?Stage two                           



                          ? ? ? ? ?| ? Decreased                           



                          GFR ? ? ? ?                            



                          +---------------------                           



                          --+-------------------                           



                          --+-------------------                           



                          ------+| ?30-59 ? ? ?                           



                          ? ? ? ? ?| ?Stage                           



                          three ? ? ? ?| ? Stage                           



                          three ? ? ? ? ?                           



                          +---------------------                           



                          --+-------------------                           



                          --+-------------------                           



                          ------+| ?15-29 ? ? ?                           



                          ? ? ? ? ?| ?Stage four                           



                          ? ? ? ? | ? Stage four                           



                          ? ? ? ? ?                              



                          ?+--------------------                           



                          ---+------------------                           



                          ---+------------------                           



                          -------+| ?<15 (or                           



                          dialysis) ? ?| ?Stage                           



                          five ? ? ? ? | ? Stage                           



                          five ? ? ? ? ?                           



                          ?+--------------------                           



                          ---+------------------                           



                          ---+------------------                           



                          -------+ *Each stage                           



                          assumes the associated                           



                          GFR level has been in                           



                          effect for at least                           



                          three months. ?Stages                           



                          1 to 5, with or                           



                          without kidney                           



                          disease, indicate                           



                          chronic kidney                           



                          disease. Notes:                           



                          Determination of                           



                          stages one and two                           



                          (with eGFR                             



                          >59mL/min/1.73 m2)                           



                          requires estimation of                           



                          kidney damage for at                           



                          least three months as                           



                          defined by structural                           



                          or functional                           



                          abnormalities of the                           



                          kidney, manifested by                           



                          either:Pathological                           



                          abnormalities or                           



                          Markers of kidney                           



                          damage (including                           



                          abnormalities in the                           



                          composition of the                           



                          blood or urine or                           



                          abnormalities in                           



                          imaging tests).                           

 

             Lab Interpretation Abnormal                               



             (test code = 98737-9)                                        



[facility]





             Test Item    Value        Reference Range Interpretation Comments

 

             LIPASE (test code = 0545009105) 120 U/L      0-220                 

    

 

             Lab Interpretation (test code = Normal                             

    



             00673-5)                                            



[facility] Whole [id number] 23:24:00





             Test Item    Value        Reference Range Interpretation Comments

 

             LACTIC ACID (test code = 3.95 mmol/L  0.3-2.6                   



             0012903043)                                         



[facility] [id number] 22:56:00





             Test Item    Value        Reference Range Interpretation Comments

 

             AFP (test code = 12.3 ng/mL   See_Comment  H             [Automated



             4301237439)                                         message] The



                                                                 system which



                                                                 generated this



                                                                 result



                                                                 transmitted



                                                                 reference range

:



                                                                 <=7.5. The



                                                                 reference range



                                                                 was not used to



                                                                 interpret this



                                                                 result as



                                                                 normal/abnormal

.

 

             RICK (test code = RICK) Biotin has been                           



                          reported to                            



                          cause a negative                           



                          bias, interpret                           



                          results relative                           



                          to patient's use                           



                          of biotin.                             

 

             Lab Interpretation Abnormal                               



             (test code = 69606-6)                                        



[facility] [id number] 21:33:00





             Test Item    Value        Reference Range Interpretation Comments

 

             TIBC (test code = 2239941116) 390 ug/dL    250-410                 

  

 

             Lab Interpretation (test code = Normal                             

    



             17701-5)                                            



[facility] (56396[facility] (ALB,T.PRO,BILI
T,BU/BC,ALT,AST,ALK PHOS)2020 21:24:00





             Test Item    Value        Reference Range Interpretation Comments

 

             TOTAL BILI (test code = 4115321420) 1.0 mg/dL    0.1-1.1           

        

 

             BILI UNCON (test code = 8551601938) 0.9 mg/dL    0.1-1.1           

        

 

             BILI CONJ (test code = 1916244564) 0.0 mg/dL    0-0.3              

       

 

             T PROTEIN (test code = 6591031217) 7.6 g/dL     6.3-8.2            

       

 

             ALBUMIN (test code = 2791416584) 3.9 g/dL     3.5-5                

     

 

             ALK PHOS (test code = 9370262058) 141 U/L             H      

      

 

             ALTv (test code = 1742-6) 41 U/L       5-35         H            

 

             AST(SGOT) (test code = 2088246804) 56 U/L       13-40        H     

       

 

             Lab Interpretation (test code = Abnormal                           

    



             13477-7)                                            



[facility] (NA, K, CL, CO2, 
GLUCOSE, BUN, CREATININE, CA)2020 21:24:00





             Test Item    Value        Reference Range Interpretation Comments

 

             NA (test code = 136 mmol/L   135-145                   



             9791121808)                                         

 

             K (test code = 4.3 mmol/L   3.5-5                     



             7829664574)                                         

 

             CL (test code = 100 mmol/L                       



             9682422746)                                         

 

             CO2 TOTAL (test code = 26 mmol/L    23-31                     



             1305777267)                                         

 

             AGAP (test code =              2-16                      



             7853380167)                                         

 

             BUN (test code = 19 mg/dL     7-23                      



             8834446851)                                         

 

             GLUCOSE (test code = 239 mg/dL           H            



             2815264877)                                         

 

             CREATININE (test code = 1.09 mg/dL   0.5-1.04     H            



             1552971266)                                         

 

             CALCIUM (test code = 9.6 mg/dL    8.6-10.6                  



             6908287742)                                         

 

             eGFR Calculation              mL/min/1.73m2              



             (Non-)                                        



             (test code =                                        



             3946035807)                                         

 

             eGFR Calculation              mL/min/1.73m2              



             (African American)                                        



             (test code =                                        



             7155794518)                                         

 

             RICK (test code = RICK) Association of                           



                          Glomerular Filtration                           



                          Rate (GFR) and Staging                           



                          of Kidney Disease*                           



                          +---------------------                           



                          --+-------------------                           



                          --+-------------------                           



                          ------+| GFR                           



                          (mL/min/1.73 m2) ?|                           



                          With Kidney Damage ?|                           



                          ?Without Kidney                           



                          Damage+---------------                           



                          --------+-------------                           



                          --------+-------------                           



                          ------------+| ?>90 ?                           



                          ? ? ? ? ? ? ? ?|                           



                          ?Stage one ? ? ? ? ?|                           



                          ? Normal ? ? ? ? ? ? ?                           



                          ?+--------------------                           



                          ---+------------------                           



                          ---+------------------                           



                          -------+| ?60-89 ? ? ?                           



                          ? ? ? ? ?| ?Stage two                           



                          ? ? ? ? ?| ? Decreased                           



                          GFR ? ? ? ?                            



                          +---------------------                           



                          --+-------------------                           



                          --+-------------------                           



                          ------+| ?30-59 ? ? ?                           



                          ? ? ? ? ?| ?Stage                           



                          three ? ? ? ?| ? Stage                           



                          three ? ? ? ? ?                           



                          +---------------------                           



                          --+-------------------                           



                          --+-------------------                           



                          ------+| ?15-29 ? ? ?                           



                          ? ? ? ? ?| ?Stage four                           



                          ? ? ? ? | ? Stage four                           



                          ? ? ? ? ?                              



                          ?+--------------------                           



                          ---+------------------                           



                          ---+------------------                           



                          -------+| ?<15 (or                           



                          dialysis) ? ?| ?Stage                           



                          five ? ? ? ? | ? Stage                           



                          five ? ? ? ? ?                           



                          ?+--------------------                           



                          ---+------------------                           



                          ---+------------------                           



                          -------+ *Each stage                           



                          assumes the associated                           



                          GFR level has been in                           



                          effect for at least                           



                          three months. ?Stages                           



                          1 to 5, with or                           



                          without kidney                           



                          disease, indicate                           



                          chronic kidney                           



                          disease. Notes:                           



                          Determination of                           



                          stages one and two                           



                          (with eGFR                             



                          >59mL/min/1.73 m2)                           



                          requires estimation of                           



                          kidney damage for at                           



                          least three months as                           



                          defined by structural                           



                          or functional                           



                          abnormalities of the                           



                          kidney, manifested by                           



                          either:Pathological                           



                          abnormalities or                           



                          Markers of kidney                           



                          damage (including                           



                          abnormalities in the                           



                          composition of the                           



                          blood or urine or                           



                          abnormalities in                           



                          imaging tests).                           

 

             Lab Interpretation Abnormal                               



             (test code = 93765-9)                                        



[facility] WITH [id number] 20:18:00





             Test Item    Value        Reference Range Interpretation Comments

 

             WBC (test code =              See_Comment                [Automated



             2549-2)                                             message] The sy

stem



                                                                 which generated



                                                                 this result



                                                                 transmitted



                                                                 reference range

:



                                                                 4.30 - 11.10



                                                                 10*3/?L. The



                                                                 reference range

 was



                                                                 not used to



                                                                 interpret this



                                                                 result as



                                                                 normal/abnormal

.

 

             RBC (test code =              See_Comment                [Automated



             309-5)                                              message] The sy

stem



                                                                 which generated



                                                                 this result



                                                                 transmitted



                                                                 reference range

:



                                                                 3.93 - 5.25



                                                                 10*6/?L. The



                                                                 reference range

 was



                                                                 not used to



                                                                 interpret this



                                                                 result as



                                                                 normal/abnormal

.

 

             HGB (test code = 12.8 g/dL    11.6-15                   



             718-7)                                              

 

             HCT (test code = 38.0 %       35.7-45.2                 



             4544-3)                                             

 

             MCV (test code = 89.8 fL      80.6-95.5                 



             787-2)                                              

 

             MCH (test code = 30.3 pg      25.9-32.8                 



             785-6)                                              

 

             MCHC (test code = 33.7 g/dL    31.6-35.1                 



             786-4)                                              

 

             RDW-SD (test code = 48.0 fL      39-49.9                   



             18357-0)                                            

 

             RDW-CV (test code = 14.8 %       12-15.5                   



             788-0)                                              

 

             PLT (test code =              See_Comment  L             [Automated



             777-3)                                              message] The sy

stem



                                                                 which generated



                                                                 this result



                                                                 transmitted



                                                                 reference range

:



                                                                 166 - 358 10*3/

?L.



                                                                 The reference r

olman



                                                                 was not used to



                                                                 interpret this



                                                                 result as



                                                                 normal/abnormal

.

 

             MPV (test code = 10.2 fL      9.5-12.9                  



             04817-6)                                            

 

             IPF % (test code = 2.9 %        1.3-7.7                   Platelet 

count



             [id number])                                         measured by



                                                                 fluorescence



                                                                 method.

 

             NRBC/100 WBC (test              See_Comment                [Automat

ed



             code = 9761091987)                                        message] 

The system



                                                                 which generated



                                                                 this result



                                                                 transmitted



                                                                 reference range

:



                                                                 0.0 - 10.0 /100



                                                                 WBCs. The refer

ence



                                                                 range was not u

sed



                                                                 to interpret th

is



                                                                 result as



                                                                 normal/abnormal

.

 

             NRBC x10^3 (test code <0.01        See_Comment                [Auto

mated



             = 1901161055)                                        message] The s

ystem



                                                                 which generated



                                                                 this result



                                                                 transmitted



                                                                 reference range

:



                                                                 10*3/?L. The



                                                                 reference range

 was



                                                                 not used to



                                                                 interpret this



                                                                 result as



                                                                 normal/abnormal

.

 

             GRAN MAT (NEUT) % 73.3 %                                 



             (test code = 770-8)                                        

 

             IMM GRAN % (test code 0.50 %                                 



             = 6479775678)                                        

 

             LYMPH % (test code = 18.6 %                                 



             736-9)                                              

 

             MONO % (test code = 5.8 %                                  



             5905-5)                                             

 

             EOS % (test code = 1.4 %                                  



             713-8)                                              

 

             BASO % (test code = 0.4 %                                  



             706-2)                                              

 

             GRAN MAT x10^3(ANC) 4.14 10*3/uL 1.88-7.09                 



             (test code =                                        



             5354447979)                                         

 

             IMM GRAN x10^3 (test 0.03 10*3/uL 0-0.06                    



             code = 4935813989)                                        

 

             LYMPH x10^3 (test code 1.05 10*3/uL 1.32-3.29    L            



             = 731-0)                                            

 

             MONO x10^3 (test code 0.33 10*3/uL 0.33-0.92                 



             = 742-7)                                            

 

             EOS x10^3 (test code = 0.08 10*3/uL 0.03-0.39                 



             711-2)                                              

 

             BASO x10^3 (test code <0.03        0.01-0.07                 



             = 704-7)                                            

 

             Lab Interpretation Abnormal                               



             (test code = 11648-4)                                        



[facility] [id number] 20:07:00





             Test Item    Value        Reference Range Interpretation Comments

 

             PROTIME PATIENT (test              See_Comment                [Auto

mated message]



             code = 5964-2)                                        The system wh

ich



                                                                 generated this 

result



                                                                 transmitted ref

erence



                                                                 range: 12.0 - 1

4.7



                                                                 Seconds. The re

ference



                                                                 range was not u

sed to



                                                                 interpret this 

result



                                                                 as normal/abnor

mal.

 

             INR (test code = 6301-6)                                        Nor

mal INR <1.1;



                                                                 Warfarin Therap

eutic



                                                                 range 2.0 to 3.

0 or



                                                                 2.5 to 3.5, dep

ending



                                                                 upon the indica

tions.

 

             Lab Interpretation (test Normal                                 



             code = 31565-2)                                        



[facility]





             Test Item    Value        Reference Range Interpretation Comments

 

             APTT Patient (test              See_Comment                [Automat

ed



             code = 3173-2)                                        message] The



                                                                 system which



                                                                 generated this



                                                                 result



                                                                 transmitted



                                                                 reference range

:



                                                                 23 - 38 Seconds

.



                                                                 The reference



                                                                 range was not



                                                                 used to interpr

et



                                                                 this result as



                                                                 normal/abnormal

.

 

             RICK (test code = RICK) The [facility] [place] patient                           



                          population mean                           



                          normal value for                           



                          aPTT is 30                             



                          seconds.                               

 

             Lab Interpretation Normal                                 



             (test code = 42578-9)                                        



[facility] [id number] 19:35:11HISTORY: 
Generalized abdominal pain and constipation. FINDINGS: 2 abdominal radiographs 
or obtaininga supine position. Smallamount of retained fecal material and air 
are detected throughout the largebowel. Cholecystectomy clips are seen in the 
right upper abdomen. Spleenappears to be enlarged. Mild lumbar dextroscoliosis 
is noted. No aggressivebone lesions. CONCLUSIONS: No acute findings.  [facility], Radi
ant Results Inft User - 2020  2:36 PM CDTHISTORY: Generalized abdominal 
pain and constipation.FINDINGS: 2 abdominal radiographs or obtaining a supine 
position. Smallamount of retained fecal material and air are detected throughout
the largebowel. Cholecystectomy clips are seen in the right upperabdomen. 
Spleenappears to be enlarged. Mild lumbar dextroscoliosis is noted. No 
aggressivebone lesions.CONCLUSIONS: No acute findings.[facility] ABDOMEN [id number] 19:27:48HISTORY: Generalized 
abdominal pain and constipation. TECHNIQUE: Upper abdominal organs were evaluate
d in multiple planes withthe patient in multiple different positions, without 
and with colorimaging.FINDINGS: Liver is 17.5 cm, spleen is 13.9 x 6.1 cm, right
kidney is 10.7 x5.3 x 5.8 cm and left kidney is 10 x 4.5 x 4.2 cm in size. 
Cortex of bothkidneys is approximately 16 mm. Bosniak type II cystic lesion of 
2.5 x 2.2cm size noted in the lateral interpolar region of right kidney. Liver 
showed heterogeneous coarse echotexture, calcified granulomas, atleast 2 5 to 6 
mm hypoechoic lesions in the left lobe and one in the rightlobe. There is also 
one echogenic 8 mm lesion in the left lobe. No hydronephrosis, free fluid in the
upper abdomen or aortic aneurysmdetected. Visualized portions of the pancreas 
appear normal. Hepatic andportal venous system appear patent, with hepatopetal 
portal flow noted.  Gallbladder is remote. Common hepatic duct is 8.1 mm. 
CONCLUSIONS:1. Mild hepatosplenomegaly with coarse echotexture of the liver 
parenchymasuggestive of chronic primary liver disease. Calcified granulomas, 
smallhypoechoic lesions in the right lobe and left lobe and one echogenic 
lesionin the left lobe discussed above. For complete evaluation, CT scan or 
MRIliver without and with contrast includingdelayed venous imaging requested.2. 
S/P cholecystectomy. Slightly dilated common hepatic duct could besecondary to 
cholecystectomy. Intrahepatic bile ducts are not dilated.3. Bosniak type II 
right renal cystic lesion which may also be furtherevaluated by CT scan as well 
as monitored by follow up ultrasound study in6 months.   [facility], Radiant Results 
Inft User - 2020  2:28 PM CDTHISTORY: Generalized abdominal pain and 
constipation.TECHNIQUE: Upper abdominal organs were evaluated in multiple planes
withthe patient in multiple different positions, without and with 
colorimaging.FINDINGS: Liver is 17.5 cm, spleen is 13.9 x 6.1 cm, right kidney 
is 10.7 x5.3 x 5.8 cm and left kidney is 10 x 4.5 x 4.2cm in size. Cortex of 
bothkidneys is approximately 16 mm. Bosniak type II cystic lesion of 2.5 x 2.2cm
size noted in the lateral interpolar region of right kidney.Liver showed 
heterogeneous coarse echotexture, calcified granulomas, atleast 2 5 to 6 mm 
hypoechoic lesions in the left lobe and one in therightlobe. There is also one 
echogenic 8 mm lesion in the left lobe.No hydronephrosis, free fluid inthe upper
abdomen or aortic aneurysmdetected. Visualized portions of the pancreas appear 
normal. Hepatic andportal venous system appear patent, with hepatopetal portal 
flow noted. Gallbladder is remote. Common hepatic duct is 8.1 mm.CONCLUSIONS:1. 
Mild hepatosplenomegaly with coarse echotexture of theliver parenchymasuggestive
of chronic primary liver disease. Calcified granulomas, smallhypoechoic lesions 
in the right lobe and left lobe and one echogenic lesionin the left lobe 
discussed above. For complete evaluation, CT scan or MRIliver without and with 
contrast including delayed venous imaging requested.2. S/P cholecystectomy. 
Slightly dilated common hepatic duct could besecondary to cholecystectomy. 
Intrahepatic bile ducts are not dilated.3. Bosniak type II right renal cystic 
lesion which mayalso be furtherevaluated by CT scan as well as monitored by 
follow up ultrasound study in6 months.[facility]
[id number] 20:14:00





             Test Item    Value        Reference Range Interpretation Comments

 

             MANDI AM (test code = 6.4 ug/dL    4.5-23                    



             [id number])                                         

 

             RICK (test code = RICK) Biotin has been                           



                          reported to cause a                           



                          positive bias,                           



                          interpret results                           



                          relative to                            



                          patient's use of                           



                          biotin.                                

 

             Lab Interpretation (test Normal                                 



             code = 51287-1)                                        



[facility] 18:29:00





             Test Item    Value        Reference Range Interpretation Comments

 

             Procalcitonin (test 0.20 ng/mL   <0.07        H            



             code = 7813437499)                                        

 

             RICK (test code = RICK) INTERPRETATION OF                           



                          PROCALCITONIN RESULTS IN                           



                          ADULTS >= 18 YEARS OF                           



                          AGE Initiation and                           



                          discontinuation of                           



                          antibiotics on patients                           



                          with suspected or                           



                          confirmed Lower                           



                          Respiratory Tract                           



                          Infection in Adults >=                           



                          18 years of age.                           



                          +--------------+--------                           



                          --------+---------------                           



                          +-----------------------                           



                          -----+|Procalcitonin                           



                          |Interpretation                           



                          ?|Antibiotic ? ?                           



                          |Considerations ? ? ? ?                           



                          ? ? ? |ng/mL ? ? ? ? | ?                           



                          ? ? ? ? ? ?                            



                          ?|recommendation | ? ? ?                           



                          ? ? ? ? ? ? ? ? ? ? ?                           



                          +--------------+--------                           



                          --------+---------------                           



                          +-----------------------                           



                          -----+| <0.1 ? ? ? ? |                           



                          Bacterial ? ? ?|                           



                          Strongly ? ? ?| ? ? ? ?                           



                          ? ? ? ? ? ? ? ? ? ? | ?                           



                          ? ? ? ? ? ?| infection                           



                          very | discouraged ? |                           



                          Overruling: ? ? ? ? ? ?                           



                          ? ? | ? ? ? ? ? ? ?|                           



                          unlikely ? ? ? | ? ? ? ?                           



                          ? ? ? | ? Clinically                           



                          unstable ? ? ?                           



                          +--------------+--------                           



                          --------+---------------                           



                          + ? High risk for                           



                          adverse ? ? | <0.25 ? ?                           



                          ? ?| Bacterial ? ? ?|                           



                          Discouraged ? | ?                           



                          outcome ? ? ? ? ? ? ? ?                           



                          ? | ? ? ? ? ? ? ?|                           



                          infection ? ? ?| ? ? ? ?                           



                          ? ? ? | ? SEE IMPORTANT                           



                          NOTE ? ? ? ?| ? ? ? ? ?                           



                          ? ?| unlikely ? ? ? | ?                           



                          ? ? ? ? ? ? | ? ? ? ? ?                           



                          ? ? ? ? ? ? ? ? ?                           



                          +--------------+--------                           



                          --------+---------------                           



                          +-----------------------                           



                          -----+| >=0.25 ? ? ? |                           



                          Bacterial ? ? ?|                           



                          Encouraged ? ?| ? ? ? ?                           



                          ? ? ? ? ? ? ? ? ? ? | ?                           



                          ? ? ? ? ? ?| infection ?                           



                          ? ?| ? ? ? ? ? ? ? | ? ?                           



                          ? ? ? ? ? ? ? ? ? ? ? ?                           



                          | ? ? ? ? ? ? ?| likely                           



                          ? ? ? ? | ? ? ? ? ? ? ?                           



                          | Consider treatment                           



                          failure                                



                          ?+--------------+-------                           



                          ---------+--------------                           



                          -+ if levels does not                           



                          decrease | >0.5 ? ? ? ?                           



                          | Bacterial ? ? ?|                           



                          Strongly ? ? ?|                           



                          appropriately ? ? ? ? ?                           



                          ? ? | ? ? ? ? ? ? ?|                           



                          infection very |                           



                          encouraged ? ?| ? ? ? ?                           



                          ? ? ? ? ? ? ? ? ? ? | ?                           



                          ? ? ? ? ? ?| likely ? ?                           



                          ? ? | ? ? ? ? ? ? ? | ?                           



                          ? ? ? ? ? ? ? ? ? ? ? ?                           



                          ?                                      



                          +--------------+--------                           



                          --------+---------------                           



                          +-----------------------                           



                          -----+ Discontinuation                           



                          of antibiotics in                           



                          high-acuity patients                           



                          with suspected or                           



                          confirmed sepsis in                           



                          Adults >= 18 years of                           



                          age.                                   



                          +--------------+--------                           



                          --------+---------------                           



                          +-----------------------                           



                          -----+|Procalcitonin                           



                          |Interpretation                           



                          ?|Antibiotic ? ?                           



                          |Considerations ? ? ? ?                           



                          ? ? ? |ng/mL ? ? ? ? | ?                           



                          ? ? ? ? ? ?                            



                          ?|recommendation | ? ? ?                           



                          ? ? ? ? ? ? ? ? ? ? ?                           



                          +--------------+--------                           



                          --------+---------------                           



                          +-----------------------                           



                          -----+| <0.25 ? ? ? ?|                           



                          Bacterial ? ? ?|                           



                          Strongly ? ? ?| ? ? ? ?                           



                          ? ? ? ? ? ? ? ? ? ? | ?                           



                          ? ? ? ? ? ?| infection                           



                          very | discouraged ? |                           



                          Overruling: ? ? ? ? ? ?                           



                          ? ? | ? ? ? ? ? ? ?|                           



                          unlikely ? ? ? | ? ? ? ?                           



                          ? ? ? | ? Clinically                           



                          unstable ? ? ?                           



                          +--------------+--------                           



                          --------+---------------                           



                          + ? High risk for                           



                          adverse ? ? | <0.5 or                           



                          drop | Bacterial ? ? ?|                           



                          Discouraged ? | ?                           



                          outcome ? ? ? ? ? ? ? ?                           



                          ? | >80% from ? ?|                           



                          infection ? ? ?| ? ? ? ?                           



                          ? ? ? | ? SEE IMPORTANT                           



                          NOTE ? ? ? ?| highest                           



                          PCT ?| unlikely ? ? ? |                           



                          ? ? ? ? ? ? ? | ? ? ? ?                           



                          ? ? ? ? ? ? ? ? ? ? |                           



                          level ? ? ? ?| ? ? ? ? ?                           



                          ? ? ?| ? ? ? ? ? ? ? | ?                           



                          ? ? ? ? ? ? ? ? ? ? ? ?                           



                          ?                                      



                          +--------------+--------                           



                          --------+---------------                           



                          +-----------------------                           



                          -----+| >=0.5 ? ? ? ?|                           



                          Bacterial ? ? ?|                           



                          Encouraged ? ?| ? ? ? ?                           



                          ? ? ? ? ? ? ? ? ? ? | ?                           



                          ? ? ? ? ? ?| infection ?                           



                          ? ?| ? ? ? ? ? ? ? | ? ?                           



                          ? ? ? ? ? ? ? ? ? ? ? ?                           



                          | ? ? ? ? ? ? ?| likely                           



                          ? ? ? ? | ? ? ? ? ? ? ?                           



                          | Consider treatment                           



                          failure                                



                          ?+--------------+-------                           



                          ---------+--------------                           



                          -+ if levels does not                           



                          decrease | >1.0 ? ? ? ?                           



                          | Bacterial ? ? ?|                           



                          Strongly ? ? ?|                           



                          appropriately ? ? ? ? ?                           



                          ? ? | ? ? ? ? ? ? ?|                           



                          infection very |                           



                          encouraged ? ?| ? ? ? ?                           



                          ? ? ? ? ? ? ? ? ? ? | ?                           



                          ? ? ? ? ? ?| likely ? ?                           



                          ? ? | ? ? ? ? ? ? ? | ?                           



                          ? ? ? ? ? ? ? ? ? ? ? ?                           



                          ?                                      



                          +--------------+--------                           



                          --------+---------------                           



                          +-----------------------                           



                          -----+ Percentage of                           



                          drop of Procalcitonin                           



                          calculation for                           



                          Discontinuation of                           



                          antibiotics in                           



                          high-acuity patients                           



                          with suspected or                           



                          confirmed sepsis in                           



                          Adults >= 18 years of                           



                          age.  ? ? ? ? ? ? ? ? ?                           



                          ? ? Procalcitonin                           



                          highest{}-Procalcitonin                           



                          current{}Delta                           



                          Procalcitonin =                           



                          ________________________                           



                          _______________________                           



                          x100%  ? ? ? ? ? ? ? ? ?                           



                          ? ? ? ? ? ? ?                           



                          Procalcitonin current {}                           



                          IMPORTANT NOTE:                           



                          Procalcitonin may be                           



                          elevated without                           



                          bacterial infection by                           



                          physiologic stress                           



                          related to trauma,                           



                          burns, chronic dialysis,                           



                          metastatic cancer,                           



                          surgery in the past                           



                          seven days, malaria,                           



                          some fungal infections,                           



                          and some forms of                           



                          vasculitis. The                           



                          interpretation algorithm                           



                          may not apply to                           



                          patients with                           



                          immunosuppression                           



                          (equivalent of >10 mg of                           



                          prednisone daily), HIV                           



                          with CD4 cell count <                           



                          350 cells/mm3, active                           



                          malignancy on systemic                           



                          chemotherapy, solid                           



                          organ transplant or                           



                          hematopoietic stem cell                           



                          transplantation, or                           



                          hospital acquired                           



                          pneumonia. Additionally,                           



                          some clinical trials of                           



                          procalcitonin have                           



                          excluded patients with                           



                          shock requiring                           



                          vasopressor use, acute                           



                          respiratory failure                           



                          requiring mechanical                           



                          ventilation, or those                           



                          with known lung                           



                          abscess/empyema. For                           



                          further information                           



                          please refer                           



                          to:[email or web address]                           



                          [email or web address]                           

 

             Lab Interpretation Abnormal                               



             (test code = 59551-3)                                        



[facility] CampusBetahydroxy-Qgrcrnsh9583-62-94 17:55:00





             Test Item    Value        Reference Range Interpretation Comments

 

             BOH (test code = 0.1 mmol/L                             



             1281418120)                                         

 

             RICK (test code = Normal Ranges:  ? ?                           



             RICK)         Nonfasting ? ? ? ? ? Less                           



                          than 0.1 mmol/L ? ?                           



                          Overnight Fast ? ? ? Less                           



                          than 0.4 mmol/L ? ? Fasting                           



                          (1-2 weeks) ?6-8 mmol/L                           



                          Test developed and                           



                          characteristics determined                           



                          by [facility].                          

 



[facility] CampusOsmolality [id number] 17:29:00





             Test Item    Value        Reference Range Interpretation Comments

 

             OSMOLALITY (test code =              See_Comment                [Au

tomated message]



             [id number])                                         The system [employer]



                                                                 generated this 

result



                                                                 transmitted ref

erence



                                                                 range: 278 - 30

5



                                                                 mOsm/kg. The re

ference



                                                                 range was not u

sed to



                                                                 interpret this 

result



                                                                 as normal/abnor

mal.

 

             Lab Interpretation (test Normal                                 



             code = 67032-5)                                        



[facility]2020 14:00:00





             Test Item    Value        Reference Range Interpretation Comments

 

             POCT GLU (test code = 5453781974) 161 mg/dL           H      

      

 

             Lab Interpretation (test code = Abnormal                           

    



             99258-4)                                            



[facility] GLUCOSE (AUTOMATED)2020 11:45:00





             Test Item    Value        Reference Range Interpretation Comments

 

             POCT GLU (test code = 7680016278) 105 mg/dL                  

      

 

             Lab Interpretation (test code = Normal                             

    



             23564-0)                                            



[facility]OL[facility] PANEL (NA, K, CL, CO2, 
GLUCOSE, BUN, CREATININE, CA)2020 10:48:00





             Test Item    Value        Reference Range Interpretation Comments

 

             NA (test code = 137 mmol/L   135-145                   



             1311681472)                                         

 

             K (test code = 3.9 mmol/L   3.5-5                     



             9869747267)                                         

 

             CL (test code = 108 mmol/L                       



             6536922060)                                         

 

             CO2 TOTAL (test code = 23 mmol/L    23-31                     



             1475022688)                                         

 

             AGAP (test code =              2-16                      



             8440995688)                                         

 

             BUN (test code = 21 mg/dL     7-23                      



             5014697879)                                         

 

             GLUCOSE (test code = 119 mg/dL           H            



             9356032286)                                         

 

             CREATININE (test code = 0.83 mg/dL   0.5-1.04                  



             8312778040)                                         

 

             CALCIUM (test code = 8.7 mg/dL    8.6-10.6                  



             8622295866)                                         

 

             eGFR Calculation              mL/min/1.73m2              



             (Non-)                                        



             (test code =                                        



             7597152113)                                         

 

             eGFR Calculation              mL/min/1.73m2              



             (African American)                                        



             (test code =                                        



             3236253167)                                         

 

             RICK (test code = RICK) Association of                           



                          Glomerular Filtration                           



                          Rate (GFR) and Staging                           



                          of Kidney Disease*                           



                          +---------------------                           



                          --+-------------------                           



                          --+-------------------                           



                          ------+| GFR                           



                          (mL/min/1.73 m2) ?|                           



                          With Kidney Damage ?|                           



                          ?Without Kidney                           



                          Damage+---------------                           



                          --------+-------------                           



                          --------+-------------                           



                          ------------+| ?>90 ?                           



                          ? ? ? ? ? ? ? ?|                           



                          ?Stage one ? ? ? ? ?|                           



                          ? Normal ? ? ? ? ? ? ?                           



                          ?+--------------------                           



                          ---+------------------                           



                          ---+------------------                           



                          -------+| ?60-89 ? ? ?                           



                          ? ? ? ? ?| ?Stage two                           



                          ? ? ? ? ?| ? Decreased                           



                          GFR ? ? ? ?                            



                          +---------------------                           



                          --+-------------------                           



                          --+-------------------                           



                          ------+| ?30-59 ? ? ?                           



                          ? ? ? ? ?| ?Stage                           



                          three ? ? ? ?| ? Stage                           



                          three ? ? ? ? ?                           



                          +---------------------                           



                          --+-------------------                           



                          --+-------------------                           



                          ------+| ?15-29 ? ? ?                           



                          ? ? ? ? ?| ?Stage four                           



                          ? ? ? ? | ? Stage four                           



                          ? ? ? ? ?                              



                          ?+--------------------                           



                          ---+------------------                           



                          ---+------------------                           



                          -------+| ?<15 (or                           



                          dialysis) ? ?| ?Stage                           



                          five ? ? ? ? | ? Stage                           



                          five ? ? ? ? ?                           



                          ?+--------------------                           



                          ---+------------------                           



                          ---+------------------                           



                          -------+ *Each stage                           



                          assumes the associated                           



                          GFR level has been in                           



                          effect for at least                           



                          three months. ?Stages                           



                          1 to 5, with or                           



                          without kidney                           



                          disease, indicate                           



                          chronic kidney                           



                          disease. Notes:                           



                          Determination of                           



                          stages one and two                           



                          (with eGFR                             



                          >59mL/min/1.73 m2)                           



                          requires estimation of                           



                          kidney damage for at                           



                          least three months as                           



                          defined by structural                           



                          or functional                           



                          abnormalities of the                           



                          kidney, manifested by                           



                          either:Pathological                           



                          abnormalities or                           



                          Markers of kidney                           



                          damage (including                           



                          abnormalities in the                           



                          composition of the                           



                          blood or urine or                           



                          abnormalities in                           



                          imaging tests).                           

 

             Lab Interpretation Abnormal                               



             (test code = 12525-6)                                        



[facility] GLUCOSE (AUTOMATED)2020 10:33:00





             Test Item    Value        Reference Range Interpretation Comments

 

             POCT GLU (test code = 7274960257) 125 mg/dL           H      

      

 

             Lab Interpretation (test code = Abnormal                           

    



             83968-2)                                            



[facility] GLUCOSE (AUTOMATED)2020 09:28:00





             Test Item    Value        Reference Range Interpretation Comments

 

             POCT GLU (test code = 3756498562) 130 mg/dL           H      

      

 

             Lab Interpretation (test code = Abnormal                           

    



             84718-7)                                            



[facility]ZA A &amp; B 
DIRECT [id number] 08:59:00





             Test Item    Value        Reference Range Interpretation Comments

 

             Influenza A (test code = 38803-6) Negative     Negative            

      

 

             Influenza B (test code = 37159-4) Negative     Negative            

      

 

             Lab Interpretation (test code = Normal                             

    



             01459-8)                                            



[facility] GLUCOSE (AUTOMATED)2020 08:34:00





             Test Item    Value        Reference Range Interpretation Comments

 

             POCT GLU (test code = 2050963759) 127 mg/dL           H      

      

 

             Lab Interpretation (test code = Abnormal                           

    



             00197-6)                                            



[facility] GLUCOSE (AUTOMATED)2020 07:38:00





             Test Item    Value        Reference Range Interpretation Comments

 

             POCT GLU (test code = 9099244206) 137 mg/dL           H      

      

 

             Lab Interpretation (test code = Abnormal                           

    



             82850-3)                                            



[facility] GLUCOSE (AUTOMATED)2020 06:26:00





             Test Item    Value        Reference Range Interpretation Comments

 

             POCT GLU (test code = 6685143334) 151 mg/dL           H      

      

 

             Lab Interpretation (test code = Abnormal                           

    



             80489-1)                                            



[facility] METABOLIC PANEL (NA, K, CL, CO2, 
GLUCOSE, BUN, CREATININE, CA)2020 06:20:00





             Test Item    Value        Reference Range Interpretation Comments

 

             NA (test code = 137 mmol/L   135-145                   



             4618685471)                                         

 

             K (test code = 4.6 mmol/L   3.5-5                     



             5432832006)                                         

 

             CL (test code = 105 mmol/L                       



             0630059189)                                         

 

             CO2 TOTAL (test code = 23 mmol/L    23-31                     



             7065887980)                                         

 

             AGAP (test code =              2-16                      



             5763043105)                                         

 

             BUN (test code = 19 mg/dL     7-23                      



             1397067159)                                         

 

             GLUCOSE (test code = 205 mg/dL           H            



             0031181431)                                         

 

             CREATININE (test code = 0.62 mg/dL   0.5-1.04                  



             3260089668)                                         

 

             CALCIUM (test code = 9.7 mg/dL    8.6-10.6                  



             8142262747)                                         

 

             eGFR Calculation              mL/min/1.73m2              



             (Non-)                                        



             (test code =                                        



             6844076681)                                         

 

             eGFR Calculation              mL/min/1.73m2              



             (African American)                                        



             (test code =                                        



             2555465152)                                         

 

             RICK (test code = RICK) [employer] of                           



                          Glomerular Filtration                           



                          Rate (GFR) and Staging                           



                          of Kidney Disease*                           



                          +---------------------                           



                          --+-------------------                           



                          --+-------------------                           



                          ------+| GFR                           



                          (mL/min/1.73 m2) ?|                           



                          With Kidney Damage ?|                           



                          ?Without Kidney                           



                          Damage+---------------                           



                          --------+-------------                           



                          --------+-------------                           



                          ------------+| ?>90 ?                           



                          ? ? ? ? ? ? ? ?|                           



                          ?Stage one ? ? ? ? ?|                           



                          ? Normal ? ? ? ? ? ? ?                           



                          ?+--------------------                           



                          ---+------------------                           



                          ---+------------------                           



                          -------+| ?60-89 ? ? ?                           



                          ? ? ? ? ?| ?Stage two                           



                          ? ? ? ? ?| ? Decreased                           



                          GFR ? ? ? ?                            



                          +---------------------                           



                          --+-------------------                           



                          --+-------------------                           



                          ------+| ?30-59 ? ? ?                           



                          ? ? ? ? ?| ?Stage                           



                          three ? ? ? ?| ? Stage                           



                          three ? ? ? ? ?                           



                          +---------------------                           



                          --+-------------------                           



                          --+-------------------                           



                          ------+| ?15-29 ? ? ?                           



                          ? ? ? ? ?| ?Stage four                           



                          ? ? ? ? | ? Stage four                           



                          ? ? ? ? ?                              



                          ?+--------------------                           



                          ---+------------------                           



                          ---+------------------                           



                          -------+| ?<15 (or                           



                          dialysis) ? ?| ?Stage                           



                          five ? ? ? ? | ? Stage                           



                          five ? ? ? ? ?                           



                          ?+--------------------                           



                          ---+------------------                           



                          ---+------------------                           



                          -------+ *Each stage                           



                          assumes the associated                           



                          GFR level has been in                           



                          effect for at least                           



                          three months. ?Stages                           



                          1 to 5, with or                           



                          without kidney                           



                          disease, indicate                           



                          chronic kidney                           



                          disease. Notes:                           



                          Determination of                           



                          stages one and two                           



                          (with eGFR                             



                          >59mL/min/1.73 m2)                           



                          requires estimation of                           



                          kidney damage for at                           



                          least three months as                           



                          defined by structural                           



                          or functional                           



                          abnormalities of the                           



                          kidney, manifested by                           



                          either:Pathological                           



                          abnormalities or                           



                          Markers of kidney                           



                          damage (including                           



                          abnormalities in the                           



                          composition of the                           



                          blood or urine or                           



                          abnormalities in                           



                          imaging tests).                           

 

             Lab Interpretation Abnormal                               



             (test code = 82857-8)                                        



[id number] 06:14:00





             Test Item    Value        Reference Range Interpretation Comments

 

             AMMONIA (test code = 6473499991) 20 umol/L    9-33                 

     

 

             Lab Interpretation (test code = Normal                             

    



             07890-6)                                            



[facility] GLUCOSE (AUTOMATED)2020 05:33:00





             Test Item    Value        Reference Range Interpretation Comments

 

             POCT GLU (test code = 6133635361) 170 mg/dL           H      

      

 

             Lab Interpretation (test code = Abnormal                           

    



             50934-3)                                            



[facility] GLUCOSE (AUTOMATED)2020 04:35:00





             Test Item    Value        Reference Range Interpretation Comments

 

             POCT GLU (test code = 0465236014) 339 mg/dL           H      

      

 

             Lab Interpretation (test code = Abnormal                           

    



             37544-8)                                            



[facility] [id number] 04:31:00





             Test Item    Value        Reference Range Interpretation Comments

 

             TSH (test code =              See_Comment  H             [Automated

 message]



             4333329770)                                         The system [employer]



                                                                 generated this 

result



                                                                 transmitted ref

erence



                                                                 range: 0.45 - 4

.70



                                                                 mIU/L. The refe

rence



                                                                 range was not u

sed to



                                                                 interpret this 

result



                                                                 as normal/abnor

mal.

 

             Lab Interpretation (test Abnormal                               



             code = 50531-5)                                        



[facility] 
04:11:00





             Test Item    Value        Reference    Interpretation Comments



                                       Range                     

 

             HGB A1C (test code =              See_Comment  H             [Autom

ated



             4548-4)                                             message] The



                                                                 system which



                                                                 generated this



                                                                 result



                                                                 transmitted



                                                                 reference range

:



                                                                 4.0 - 6.0 %



                                                                 NGSP. The



                                                                 reference range



                                                                 was not used to



                                                                 interpret this



                                                                 result as



                                                                 normal/abnormal

.

 

             RICK (test code = %A1C (NGSP)                            



             RICK)         Interpretation                           



                          (ADA)4.8-5.6 ? ?                           



                          Normal or                              



                          (Non-Diabetic                           



                          Range)5.7-6.4 ? ?                           



                          Increased Risk                           



                          (Pre-Diabetic)>6.5 ?                           



                          ? ? ?Diabetes                           



                          Indicated                              

 

             Lab Interpretation Abnormal                               



             (test code =                                        



             91976-8)                                            



[facility] [id number] 03:59:00





             Test Item    Value        Reference Range Interpretation Comments

 

             MAGNESIUM (test code = 6912010507) 1.9 mg/dL    1.7-2.4            

       

 

             Lab Interpretation (test code = Normal                             

    



             63771-5)                                            



[facility]osph[facility] [id number] 03:59:00





             Test Item    Value        Reference Range Interpretation Comments

 

             PHOSPHORUS (test code = 1252821070) 4.6 mg/dL    2.5-5             

        

 

             Lab Interpretation (test code = Normal                             

    



             12045-4)                                            



[facility] GLUCOSE (AUTOMATED)2020 03:56:00





             Test Item    Value        Reference Range Interpretation Comments

 

             POCT GLU (test code = 576 mg/dL           HH           



             8100518403)                                         

 

             RICK (test code = RICK) Notified Provider                           

 

             Lab Interpretation (test Abnormal                               



             code = 20184-6)                                        



[facility] GLUCOSE (AUTOMATED)2020 03:56:00





             Test Item    Value        Reference Range Interpretation Comments

 

             POCT GLU (test code = 8058399749) 432 mg/dL           H      

      

 

             Lab Interpretation (test code = Abnormal                           

    



             63193-1)                                            



[facility] 03[facility]12:00





             Test Item    Value        Reference Range Interpretation Comments

 

             APPEARANCE (test code = Clear        Clear                     



             2174957105)                                         

 

             COLOR (test code = Straw        Yellow       A            



             7334273746)                                         

 

             PH (test code =              4.8-8.0                   



             8444720584)                                         

 

             SP GRAVITY (test code =              1.003-1.030               



             3374404593)                                         

 

             GLU U QUAL (test code = 500 mg/dL    Normal       A            



             5134627344)                                         

 

             BLOOD (test code = Negative     Negative                  



             0754335049)                                         

 

             KETONES (test code = Negative     Negative                  



             3588435885)                                         

 

             PROTEIN (test code = Negative     Negative                  



             2887-8)                                             

 

             UROBILIN (test code = Normal       Normal                    



             6422321252)                                         

 

             BILIRUBIN (test code = Negative     Negative                  



             6130207167)                                         

 

             NITRITE (test code = Negative     Negative                  



             6407755505)                                         

 

             LEUK MOE (test code = Negative     Negative                  



             1852343852)                                         

 

             RBC/HPF (test code =              See_Comment                [Autom

ated message]



             2427815744)                                         The system [employer]



                                                                 generated this



                                                                 result transmit

eduard



                                                                 reference range

: 0 -



                                                                 3 HPF. The refe

rence



                                                                 range was not u

sed



                                                                 to interpret th

is



                                                                 result as



                                                                 normal/abnormal

.

 

             WBC/HPF (test code = <1           See_Comment                [Autom

ated message]



             0340968452)                                         The system [employer]



                                                                 generated this



                                                                 result transmit

eduard



                                                                 reference range

: 0 -



                                                                 5 HPF. The refe

rence



                                                                 range was not u

sed



                                                                 to interpret th

is



                                                                 result as



                                                                 normal/abnormal

.

 

             BACTERIA (test code = Negative     Negative                  



             7933060223)                                         

 

             SQ EPITH (test code = <1           HPF                       



             4965261830)                                         

 

             Lab Interpretation (test Abnormal                               



             code = 80275-9)                                        



[facility]. METABOLIC PANEL ([id number])2020 
03:10:00





             Test Item    Value        Reference Range Interpretation Comments

 

             NA (test code = 133 mmol/L   135-145      L            



             7611176238)                                         

 

             K (test code = 5.4 mmol/L   3.5-5        H            



             4357009045)                                         

 

             CL (test code = 98 mmol/L                        



             3808139024)                                         

 

             CO2 TOTAL (test code = 23 mmol/L    23-31                     



             2351593533)                                         

 

             AGAP (test code =              2-16                      



             1593473647)                                         

 

             BUN (test code = 19 mg/dL     7-23                      



             5087697009)                                         

 

             GLUCOSE (test code = 678 mg/dL           HH           



             8924085514)                                         

 

             CREATININE (test code = 0.65 mg/dL   0.5-1.04                  



             7715968698)                                         

 

             TOTAL BILI (test code = 1.0 mg/dL    0.1-1.1                   



             8469643829)                                         

 

             CALCIUM (test code = 9.7 mg/dL    8.6-10.6                  



             0623747189)                                         

 

             T PROTEIN (test code = 7.8 g/dL     6.3-8.2                   



             4290576747)                                         

 

             ALBUMIN (test code = 3.9 g/dL     3.5-5                     



             5146104444)                                         

 

             ALK PHOS (test code = 174 U/L             H            



             4759348339)                                         

 

             ALTv (test code = 44 U/L       5-35         H            



             1742-6)                                             

 

             AST(SGOT) (test code = 60 U/L       13-40        H            



             1674975030)                                         

 

             eGFR Calculation              mL/min/1.73m2              



             (Non-)                                        



             (test code =                                        



             1809748010)                                         

 

             eGFR Calculation              mL/min/1.73m2              



             (African American)                                        



             (test code =                                        



             1184741899)                                         

 

             RICK (test code = RICK) Association of                           



                          Glomerular Filtration                           



                          Rate (GFR) and Staging                           



                          of Kidney Disease*                           



                          +---------------------                           



                          --+-------------------                           



                          --+-------------------                           



                          ------+| GFR                           



                          (mL/min/1.73 m2) ?|                           



                          With Kidney Damage ?|                           



                          ?Without Kidney                           



                          Damage+---------------                           



                          --------+-------------                           



                          --------+-------------                           



                          ------------+| ?>90 ?                           



                          ? ? ? ? ? ? ? ?|                           



                          ?Stage one ? ? ? ? ?|                           



                          ? Normal ? ? ? ? ? ? ?                           



                          ?+--------------------                           



                          ---+------------------                           



                          ---+------------------                           



                          -------+| ?60-89 ? ? ?                           



                          ? ? ? ? ?| ?Stage two                           



                          ? ? ? ? ?| ? Decreased                           



                          GFR ? ? ? ?                            



                          +---------------------                           



                          --+-------------------                           



                          --+-------------------                           



                          ------+| ?30-59 ? ? ?                           



                          ? ? ? ? ?| ?Stage                           



                          three ? ? ? ?| ? Stage                           



                          three ? ? ? ? ?                           



                          +---------------------                           



                          --+-------------------                           



                          --+-------------------                           



                          ------+| ?15-29 ? ? ?                           



                          ? ? ? ? ?| ?Stage four                           



                          ? ? ? ? | ? Stage four                           



                          ? ? ? ? ?                              



                          ?+--------------------                           



                          ---+------------------                           



                          ---+------------------                           



                          -------+| ?<15 (or                           



                          dialysis) ? ?| ?Stage                           



                          five ? ? ? ? | ? Stage                           



                          five ? ? ? ? ?                           



                          ?+--------------------                           



                          ---+------------------                           



                          ---+------------------                           



                          -------+ *Each stage                           



                          assumes the associated                           



                          GFR level has been in                           



                          effect for at least                           



                          three months. ?Stages                           



                          1 to 5, with or                           



                          without kidney                           



                          disease, indicate                           



                          chronic kidney                           



                          disease. Notes:                           



                          Determination of                           



                          stages one and two                           



                          (with eGFR                             



                          >59mL/min/1.73 m2)                           



                          requires estimation of                           



                          kidney damage for at                           



                          least three months as                           



                          defined by structural                           



                          or functional                           



                          abnormalities of the                           



                          kidney, manifested by                           



                          either:Pathological                           



                          abnormalities or                           



                          Markers of kidney                           



                          damage (including                           



                          abnormalities in the                           



                          composition of the                           



                          blood or urine or                           



                          abnormalities in                           



                          imaging tests).                           

 

             Lab Interpretation Abnormal                               



             (test code = 21002-3)                                        



[facility] [id number] 02:59:44Impression: No
radiographic evidence for acute cardiopulmonary disease.  RL: 460 AF: [id number] 
Electronically signed by Ivory Brito MD, PhD at 2020 8:59 
PMIndication: Cough Comparison: None Findings: Single AP view of the chest. The 
cardiopericardial silhouette iswithin normal limits. The lungs are clear 
bilaterally. The visualized bonythorax is intact. [facility], Radiant Results Inft 
User - 2020  9:00 PM CSTIndication: CoughComparison: NoneFindings: Single 
AP view of the chest. The cardiopericardial silhouette iswithin normal limits. 
The lungs are clear bilaterally. The visualized bonythorax is 
intact.IMPRESSIONImpression:No radiographic evidence for acute cardiopulmonary 
disease.RL: 460AFC: [id number] signed by Ivory Brito MD, PhD at 
2020 8:59 PMUnB[facility]inteCBC WITH DIFFERENTIAL
2020 02:44:00





             Test Item    Value        Reference Range Interpretation Comments

 

             WBC (test code =              See_Comment                [Automated



             6690-2)                                             message] The sy

stem



                                                                 which generated



                                                                 this result



                                                                 transmitted



                                                                 reference range

:



                                                                 4.30 - 11.10



                                                                 10*3/?L. The



                                                                 reference range

 was



                                                                 not used to



                                                                 interpret this



                                                                 result as



                                                                 normal/abnormal

.

 

             RBC (test code =              See_Comment                [Automated



             789-8)                                              message] The sy

stem



                                                                 which generated



                                                                 this result



                                                                 transmitted



                                                                 reference range

:



                                                                 3.93 - 5.25



                                                                 10*6/?L. The



                                                                 reference range

 was



                                                                 not used to



                                                                 interpret this



                                                                 result as



                                                                 normal/abnormal

.

 

             HGB (test code = 11.6 g/dL    11.6-15                   



             718-7)                                              

 

             HCT (test code = 35.7 %       35.7-45.2                 



             4544-3)                                             

 

             MCV (test code = 90.8 fL      80.6-95.5                 



             787-2)                                              

 

             MCH (test code = 29.5 pg      25.9-32.8                 



             785-6)                                              

 

             MCHC (test code = 32.5 g/dL    31.6-35.1                 



             786-4)                                              

 

             RDW-SD (test code = 49.8 fL      39-49.9                   



             80752-5)                                            

 

             RDW-CV (test code = 15.0 %       12-15.5                   



             788-0)                                              

 

             PLT (test code =              See_Comment  L             [Automated



             777-3)                                              message] The sy

stem



                                                                 which generated



                                                                 this result



                                                                 transmitted



                                                                 reference range

:



                                                                 166 - 358 10*3/

?L.



                                                                 The reference r

olman



                                                                 was not used to



                                                                 interpret this



                                                                 result as



                                                                 normal/abnormal

.

 

             MPV (test code = 10.6 fL      9.5-12.9                  



             88154-3)                                            

 

             NRBC/100 WBC (test              See_Comment                [Automat

ed



             code = 9094216574)                                        message] 

The system



                                                                 which generated



                                                                 this result



                                                                 transmitted



                                                                 reference range

:



                                                                 0.0 - 10.0 /100



                                                                 WBCs. The refer

ence



                                                                 range was not u

sed



                                                                 to interpret th

is



                                                                 result as



                                                                 normal/abnormal

.

 

             NRBC x10^3 (test code <0.01        See_Comment                [Auto

mated



             = 0191441539)                                        message] The s

ystem



                                                                 which generated



                                                                 this result



                                                                 transmitted



                                                                 reference range

:



                                                                 10*3/?L. The



                                                                 reference range

 was



                                                                 not used to



                                                                 interpret this



                                                                 result as



                                                                 normal/abnormal

.

 

             GRAN MAT (NEUT) % 76.7 %                                 



             (test code = 770-8)                                        

 

             IMM GRAN % (test code 0.20 %                                 



             = 8024944624)                                        

 

             LYMPH % (test code = 16.7 %                                 



             736-9)                                              

 

             MONO % (test code = 5.4 %                                  



             5905-5)                                             

 

             EOS % (test code = 0.6 %                                  



             713-8)                                              

 

             BASO % (test code = 0.4 %                                  



             706-2)                                              

 

             GRAN MAT x10^3(ANC) 3.71 10*3/uL 1.88-7.09                 



             (test code =                                        



             8529399101)                                         

 

             IMM GRAN x10^3 (test <0.03        0-0.06                    



             code = 1332801547)                                        

 

             LYMPH x10^3 (test code 0.81 10*3/uL 1.32-3.29    L            



             = 731-0)                                            

 

             MONO x10^3 (test code 0.26 10*3/uL 0.33-0.92    L            



             = 742-7)                                            

 

             EOS x10^3 (test code = 0.03 10*3/uL 0.03-0.39                 



             711-2)                                              

 

             BASO x10^3 (test code <0.03        0.01-0.07                 



             = 704-7)                                            

 

             Lab Interpretation Abnormal                               



             (test code = 49436-7)                                        



[facility] CARE VENOUS BLOOD [id number] 02:12:00





             Test Item    Value        Reference Range Interpretation Comments

 

             PH (test code =              7.32-7.42                 



             1527517713)                                         

 

             PCO2 CHRISTIANO (test code =              See_Comment                [Auto

mated message]



             6559970635)                                         The system [employer]



                                                                 generated this 

result



                                                                 transmitted ref

erence



                                                                 range: 41 - 51 

mmHg.



                                                                 The reference r

olman



                                                                 was not used to



                                                                 interpret this 

result



                                                                 as normal/abnor

mal.

 

             PO2 CHRISTIANO (test code =              See_Comment  H             [Autom

ated message]



             1969469347)                                         The system [employer]



                                                                 generated this 

result



                                                                 transmitted ref

erence



                                                                 range: 25 - 40 

mmHg.



                                                                 The reference r

olman



                                                                 was not used to



                                                                 interpret this 

result



                                                                 as normal/abnor

mal.

 

             HCO3 CHRISTIANO (test code =              See_Comment                [Auto

mated message]



             3687812067)                                         The system [employer]



                                                                 generated this 

result



                                                                 transmitted ref

erence



                                                                 range: 24 - 28 

mEq/L.



                                                                 The reference r

olman



                                                                 was not used to



                                                                 interpret this 

result



                                                                 as normal/abnor

mal.

 

             AC VBE(BEAKER) (test              mEq/L                     



             code = 2166840593)                                        

 

             Lab Interpretation (test Abnormal                               



             code = 85374-6)                                        



[facility] CampusALPHA FETOPROTEIN (AFP), TUMOR MARKER
2019 16:30:00





             Test Item    Value        Reference Range Interpretation Comments

 

             ALPHA-FETOPROTEIN (BEAKER) (test 10.9 ng/mL   <10.0        H       

     



             code = 1094)                                        



HEPATIC FUNCTION [id number] 16:13:00





             Test Item    Value        Reference Range Interpretation Comments

 

             TOTAL PROTEIN (BEAKER) (test code = 7.2 gm/dL    6.0-8.3           

        



             770)                                                

 

             ALBUMIN (BEAKER) (test code = 1145) 3.5 g/dL     3.5-5.0           

        

 

             BILIRUBIN TOTAL (BEAKER) (test code 0.8 mg/dL    0.2-1.2           

        



             = 377)                                              

 

             BILIRUBIN DIRECT (BEAKER) (test 0.4 mg/dL    0.1-0.5               

    



             code = 706)                                         

 

             ALKALINE PHOSPHATASE (BEAKER) (test 107 U/L                  

        



             code = 346)                                         

 

             AST (SGOT) (BEAKER) (test code = 31 U/L       5-34                 

     



             353)                                                

 

             ALT (SGPT) (BEAKER) (test code = 15 U/L       6-55                 

     



             347)                                                



BASIC METABOLIC [id number] 16:13:00





             Test Item    Value        Reference Range Interpretation Comments

 

             SODIUM (BEAKER) 138 meq/L    136-145                   



             (test code = 381)                                        

 

             POTASSIUM (BEAKER) 3.8 meq/L    3.5-5.1                   



             (test code = 379)                                        

 

             CHLORIDE (BEAKER) 103 meq/L                        



             (test code = 382)                                        

 

             CO2 (BEAKER) (test 27 meq/L     22-29                     



             code = 355)                                         

 

             BLOOD UREA NITROGEN 7 mg/dL      7-21                      



             (BEAKER) (test code                                        



             = 354)                                              

 

             CREATININE (BEAKER) 0.78 mg/dL   0.57-1.25                 



             (test code = 358)                                        

 

             GLUCOSE RANDOM 226 mg/dL           H            



             (BEAKER) (test code                                        



             = 652)                                              

 

             CALCIUM (BEAKER) 9.2 mg/dL    8.4-10.2                  



             (test code = 697)                                        

 

             EGFR (BEAKER) (test 76 mL/min/1.73                           ESTIMA

EDUARD GFR IS



             code = 1092) sq m                                   NOT AS ACCURATE

 AS



                                                                 CREATININE



                                                                 CLEARANCE IN



                                                                 PREDICTING



                                                                 GLOMERULAR



                                                                 FILTRATION RATE

.



                                                                 ESTIMATED GFR I

S



                                                                 NOT APPLICABLE 

FOR



                                                                 DIALYSIS PATIEN

TS.



PROTHROMBIN TIME/[id number] 16:05:00





             Test Item    Value        Reference Range Interpretation Comments

 

             PROTIME (BEAKER) (test code = 14.9 seconds 11.7-14.7    H          

  



             759)                                                

 

             INR (BEAKER) (test code = 370) 1.2          <=5.9                  

   



RECOMMENDED COUMADIN/WARFARIN INR THERAPY RANGESSTANDARD DOSE: 2.0 - 3.0   
Includes: PROPHYLAXIS forvenous thrombosis, systemic embolization; TREATMENT for
venous thrombosis and/or pulmonary embolus.HIGH RISK: Target INR is 2.5-3.5 for 
patients with mechanical heart valves.CBC W/PLT COUNT &amp; AUTO DIFFERENTIAL
2019 15:57:00





             Test Item    Value        Reference Range Interpretation Comments

 

             WHITE BLOOD CELL COUNT (BEAKER) 3.3 K/ L     3.5-10.5     L        

    



             (test code = 775)                                        

 

             RED BLOOD CELL COUNT (BEAKER) 3.88 M/ L    3.93-5.22    L          

  



             (test code = 761)                                        

 

             HEMOGLOBIN (BEAKER) (test code = 10.4 GM/DL   11.2-15.7    L       

     



             410)                                                

 

             HEMATOCRIT (BEAKER) (test code = 34.3 %       34.1-44.9            

     



             411)                                                

 

             MEAN CORPUSCULAR VOLUME (BEAKER) 88.4 fL      79.4-94.8            

     



             (test code = 753)                                        

 

             MEAN CORPUSCULAR HEMOGLOBIN 26.8 pg      25.6-32.2                 



             (BEAKER) (test code = 751)                                        

 

             MEAN CORPUSCULAR HEMOGLOBIN CONC 30.3 GM/DL   32.2-35.5    L       

     



             (BEAKER) (test code = 752)                                        

 

             RED CELL DISTRIBUTION WIDTH 17.0 %       11.7-14.4    H            



             (BEAKER) (test code = 412)                                        

 

             PLATELET COUNT (BEAKER) (test code 42 K/CU MM   150-450      L     

       



             = 756)                                              

 

             MEAN PLATELET VOLUME (BEAKER) 10.6 fL      9.4-12.3                

  



             (test code = 754)                                        

 

             NUCLEATED RED BLOOD CELLS (BEAKER) 0 /100 WBC   0-0                

       



             (test code = 413)                                        

 

             NEUTROPHILS RELATIVE PERCENT 68 %                                  

 



             (BEAKER) (test code = 429)                                        

 

             LYMPHOCYTES RELATIVE PERCENT 25 %                                  

 



             (BEAKER) (test code = 430)                                        

 

             MONOCYTES RELATIVE PERCENT 7 %                                    



             (BEAKER) (test code = 431)                                        

 

             EOSINOPHILS RELATIVE PERCENT 1 %                                   

 



             (BEAKER) (test code = 432)                                        

 

             BASOPHILS RELATIVE PERCENT 0 %                                    



             (BEAKER) (test code = 437)                                        

 

             NEUTROPHILS ABSOLUTE COUNT 2.21 K/ L    1.56-6.13                 



             (BEAKER) (test code = 670)                                        

 

             LYMPHOCYTES ABSOLUTE COUNT 0.80 K/ L    1.18-3.74    L            



             (BEAKER) (test code = 414)                                        

 

             MONOCYTES ABSOLUTE COUNT (BEAKER) 0.22 K/ L    0.24-0.36    L      

      



             (test code = 415)                                        

 

             EOSINOPHILS ABSOLUTE COUNT 0.03 K/ L    0.04-0.36    L            



             (BEAKER) (test code = 416)                                        

 

             BASOPHILS ABSOLUTE COUNT (BEAKER) 0.01 K/ L    0.01-0.08           

      



             (test code = 417)                                        

 

             IMMATURE GRANULOCYTES-RELATIVE 0 %          0-1                    

   



             PERCENT (BEAKER) (test code =                                      

  



             2801)                                               



Bedside Mzphvhv2094-15-17 11:44:00





             Test Item    Value        Reference Range Interpretation Comments

 

             Bedside Glucose (test code = 11153-4) 310                 H  

          



Meter ID: [id number] [facility] CenterCreatine Kinase MB
2018 19:51:00





             Test Item    Value        Reference Range Interpretation Comments

 

             Creatine Kinase MB (test code = 0.80         0-5.0                 

    



             78819-9)                                            



[facility] -60[facility]25 19:51:00





             Test Item    Value        Reference Range Interpretation Comments

 

             Troponin I (test code = EJQ6025) -0.001       0-0.300              

     



[facility] HospitalCreatine Dgqzrx3406-13-06 19:44:00





             Test Item    Value        Reference Range Interpretation Comments

 

             Creatine Kinase (test code = 2157-6) 35                      

         



[facility] HospitalVitamin B12 Aupmx0518-04-74 10:03:00





             Test Item    Value        Reference Range Interpretation Comments

 

             Vitamin B12 Level (test code = 02969-1) 789          213-816       

            



[facility] HospitalFolate2018-04-19 10:03:00





             Test Item    Value        Reference Range Interpretation Comments

 

             Folate (test code = 2284-8) 8.8          7.0-15.4                  



[facility] HospitalThyroid Stimulating Hormone (TSH)
2018 09:51:00





             Test Item    Value        Reference Range Interpretation Comments

 

             Thyroid Stimulating Hormone (TSH) (test 2.053        0.350-4.940   

            



             code = 68543-4)                                        



[facility] HospitalBlood Nxwioda6372-22-50 20:02:00





             Test Item    Value        Reference Range Interpretation Comments

 

             Blood Culture (test NO GROWTH AFTER 5                           



             code = 89322829) DAYS, FINAL REPORT                           



[facility] Agrlnzd6845-98-50 20:02:00





             Test Item    Value        Reference Range Interpretation Comments

 

             Blood Culture (test NO GROWTH AFTER 5                           



             code = 17491464) DAYS, FINAL REPORT                           



[facility] [id number] 00:57:00





             Test Item    Value        Reference Range Interpretation Comments

 

             Sodium Level (test code = 2951-2) 139          136-145             

      



[facility] HospitalPotassium Jiwik0890-58-78 00:57:00





             Test Item    Value        Reference Range Interpretation Comments

 

             Potassium Level (test code = 2823-3) 2.9          3.5-5.1      LL  

         



Results called to [SAMMY RN/ER] at 0053 on 17 by Carlie Leiva. RB OK.
[facility] HospitalChloride Nrmvh6279-24-60 00:57:00





             Test Item    Value        Reference Range Interpretation Comments

 

             Chloride Level (test code = 2075-0) 109                 H    

        



[facility] HospitalCarbon Dioxide Wmlhu4148-66-58 00:57:00





             Test Item    Value        Reference Range Interpretation Comments

 

             Carbon Dioxide Level (test code = 20           22-29        L      

      



             8-9)                                             



[facility] HospitalAnion Bqu1499-05-00 00:57:00





             Test Item    Value        Reference Range Interpretation Comments

 

             Anion Gap (test code = 33037-3) 12.9         8-16                  

    



[facility] HospitalBlood Urea Tdtuoghp9091-30-12 00:57:00





             Test Item    Value        Reference Range Interpretation Comments

 

             Blood Urea Nitrogen (test code = 6            7-26         L       

     



             3094-0)                                             



[facility] HospitalCreatinine2017-11-11 00:57:00





             Test Item    Value        Reference Range Interpretation Comments

 

             Creatinine (test code = 2160-0) 0.67         0.57-1.11             

    



[facility] HospitalBUN/Creatinine Wwbvs6853-00-48 00:57:00





             Test Item    Value        Reference Range Interpretation Comments

 

             BUN/Creatinine Ratio (test code = 9            6-25                

      



             3097-3)                                             



[facility] HospitalEstimat Glomerular Filtration Rate
2017 00:57:00





             Test Item    Value        Reference Range Interpretation Comments

 

             Estimat Glomerular Filtration Rate 60-          >60                

       



             (test code = 09722-5)                                        



Ranges were taken from the National Kidney Disease Education Program and the 
National [employer] literature.Reference ranges:60 or greater: Ttiaho86-
59 (for 3 consecutive months): Chronic kidneydisease 15 or less: Kidney failure
[facility] HospitalGlucose Jmdci8985-84-76 00:57:00





             Test Item    Value        Reference Range Interpretation Comments

 

             Glucose Level (test code = UEO0612) 272                 H    

        



[facility] HospitalCalcium Scozp8125-18-35 00:57:00





             Test Item    Value        Reference Range Interpretation Comments

 

             Calcium Level (test code = 77899-7) 8.1          8.4-10.2     L    

        



[facility] Centerodium Gmonv2663-22-80 00:57:00





             Test Item    Value        Reference Range Interpretation Comments

 

             Sodium Level (test code = 2951-2) 139          136-145             

      



[facility] HospitalPotassium Zsdtv9108-28-05 00:57:00





             Test Item    Value        Reference Range Interpretation Comments

 

             Potassium Level (test code = 2823-3) 2.9          3.5-5.1      LL  

         



Results called to [SAMMY RN/ER] at 0053 on 17 by Carlie Leiva. RB OK.
[facility] HospitalChloride Gyjzv2720-99-08 00:57:00





             Test Item    Value        Reference Range Interpretation Comments

 

             Chloride Level (test code = 2075-0) 109                 H    

        



[facility] HospitalCarbon Dioxide Anude5205-08-78 00:57:00





             Test Item    Value        Reference Range Interpretation Comments

 

             Carbon Dioxide Level (test code = 20           22-29        L      

      



             2028)                                             



[facility] HospitalAnion Lre6840-89-81 00:57:00





             Test Item    Value        Reference Range Interpretation Comments

 

             Anion Gap (test code = 33037-3) 12.9         8-16                  

    



[facility] HospitalBlood Urea Vitbriri3309-61-03 00:57:00





             Test Item    Value        Reference Range Interpretation Comments

 

             Blood Urea Nitrogen (test code = 6            7-26         L       

     



             3094-0)                                             



[facility] HospitalCreatinine2017-11-11 00:57:00





             Test Item    Value        Reference Range Interpretation Comments

 

             Creatinine (test code = 2160-0) 0.67         0.57-1.11             

    



[facility] HospitalBUN/Creatinine Jkehw5548-57-55 00:57:00





             Test Item    Value        Reference Range Interpretation Comments

 

             BUN/Creatinine Ratio (test code = 9            6                

      



             3097-3)                                             



[facility] HospitalEstimat Glomerular Filtration Rate
2017 00:57:00





             Test Item    Value        Reference Range Interpretation Comments

 

             Estimat Glomerular Filtration Rate 60-          >60                

       



             (test code = 48736-3)                                        



Ranges were taken from the National Kidney Disease Education Program and the 
National Kid[employer] Foundation literature.Reference ranges:60 or greater: Yixkuc38-
59 (for 3 consecutive months): Chronic kidneydisease 15 or less: Kidney failure
[facility] HospitalGlucose Rbwtp2002-16-37 00:57:00





             Test Item    Value        Reference Range Interpretation Comments

 

             Glucose Level (test code = IZI5539) 272                 H    

        



[facility] HospitalCalcium Iyqbd4492-15-36 00:57:00





             Test Item    Value        Reference Range Interpretation Comments

 

             Calcium Level (test code = 15753-1) 8.1          8.4-10.2     L    

        



[facility] HospitalTotal Bilirubin2017-11-10 21:06:00





             Test Item    Value        Reference Range Interpretation Comments

 

             Total Bilirubin (test code = 1975-2) 0.6          0.2-1.2          

         



[facility] HospitalAspartate Amino Transf (AST/SGOT)
2017-11-10 21:06:00





             Test Item    Value        Reference Range Interpretation Comments

 

             Aspartate Amino Transf (AST/SGOT) (test 50           5-34         H

            



             code = Aspartate Amino Transf                                      

  



             (AST/SGOT))                                         



[facility] HospitalAlanine Aminotransferase (ALT/SGPT)
2017-11-10 21:06:00





             Test Item    Value        Reference Range Interpretation Comments

 

             Alanine Aminotransferase (ALT/SGPT) 46           0-55              

        



             (test code = 1742-6)                                        



[facility] HospitalTotal Protein2017-11-10 21:06:00





             Test Item    Value        Reference Range Interpretation Comments

 

             Total Protein (test code = 2885-2) 7.3          6.5-8.1            

       



[facility] HospitalAlbumin2017-11-10 21:06:00





             Test Item    Value        Reference Range Interpretation Comments

 

             Albumin (test code = 1751-7) 3.1          3.5-5.0      L           

 



[facility] HospitalGlobulin2017-11-10 21:06:00





             Test Item    Value        Reference Range Interpretation Comments

 

             Globulin (test code = 52207-3) 4.2          2.3-3.5      H         

   



[facility] HospitalAlbumin/Globulin Ratio2017-11-10 21:06:00





             Test Item    Value        Reference Range Interpretation Comments

 

             Albumin/Globulin Ratio (test code = 0.7          0.8-2.0      L    

        



             1759-0)                                             



[facility] HospitalAlkaline Phosphatase2017-11-10 21:06:00





             Test Item    Value        Reference Range Interpretation Comments

 

             Alkaline Phosphatase (test code = 196                 H      

      



             6768-6)                                             



[facility] HospitalTotal Bilirubin2017-11-10 21:06:00





             Test Item    Value        Reference Range Interpretation Comments

 

             Total Bilirubin (test code = 1975-2) 0.6          0.2-1.2          

         



[facility] HospitalAspartate Amino Transf (AST/SGOT)
2017-11-10 21:06:00





             Test Item    Value        Reference Range Interpretation Comments

 

             Aspartate Amino Transf (AST/SGOT) (test 50           5-34         H

            



             code = Aspartate Amino Transf                                      

  



             (AST/SGOT))                                         



[facility] HospitalAlanine Aminotransferase (ALT/SGPT)
2017-11-10 21:06:00





             Test Item    Value        Reference Range Interpretation Comments

 

             Alanine Aminotransferase (ALT/SGPT) 46           0-55              

        



             (test code = 1742-6)                                        



[facility] HospitalTotal Protein2017-11-10 21:06:00





             Test Item    Value        Reference Range Interpretation Comments

 

             Total Protein (test code = 2885-2) 7.3          6.5-8.1            

       



[facility] HospitalAlbumin2017-11-10 21:06:00





             Test Item    Value        Reference Range Interpretation Comments

 

             Albumin (test code = 1751-7) 3.1          3.5-5.0      L           

 



[facility] HospitalGlobulin2017-11-10 21:06:00





             Test Item    Value        Reference Range Interpretation Comments

 

             Globulin (test code = 00747-0) 4.2          2.3-3.5      H         

   



[facility] HospitalAlbumin/Globulin Ratio2017-11-10 21:06:00





             Test Item    Value        Reference Range Interpretation Comments

 

             Albumin/Globulin Ratio (test code = 0.7          0.8-2.0      L    

        



             1759-0)                                             



[facility] HospitalAlkaline Phosphatase2017-11-10 21:06:00





             Test Item    Value        Reference Range Interpretation Comments

 

             Alkaline Phosphatase (test code = 196                 H      

      



             6768-6)                                             



[facility] HospitalWhite Blood Count2017-11-10 20:53:00





             Test Item    Value        Reference Range Interpretation Comments

 

             White Blood Count (test code = 6690-2) 6.48         4.8-10.8       

           



[facility] HospitalRed Blood Count2017-11-10 20:53:00





             Test Item    Value        Reference Range Interpretation Comments

 

             Red Blood Count (test code = 789-8) 4.15         3.6-5.1           

        



[facility] HospitalHemoglobin2017-11-10 20:53:00





             Test Item    Value        Reference Range Interpretation Comments

 

             Hemoglobin (test code = 41681-3) 12.4         12.0-16.0            

     



[facility] HospitalHematocrit2017-11-10 20:53:00





             Test Item    Value        Reference Range Interpretation Comments

 

             Hematocrit (test code = 4544-3) 36.8         34.2-44.1             

    



[facility] HospitalMean Corpuscular Volume2017-11-10 
20:53:00





             Test Item    Value        Reference Range Interpretation Comments

 

             Mean Corpuscular Volume (test code = 88.7         81-99            

         



             787-2)                                              



[facility] HospitalMean Corpuscular Hemoglobin2017-11-10 
20:53:00





             Test Item    Value        Reference Range Interpretation Comments

 

             Mean Corpuscular Hemoglobin (test code 29.9         28-32          

           



             = 785-6)                                            



[facility] HospitalMean Corpuscular Hemoglobin Concent
2017-11-10 20:53:00





             Test Item    Value        Reference Range Interpretation Comments

 

             Mean Corpuscular Hemoglobin Concent 33.7         31-35             

        



             (test code = 786-4)                                        



[facility] HospitalRed Cell Distribution Width2017-11-10 
20:53:00





             Test Item    Value        Reference Range Interpretation Comments

 

             Red Cell Distribution Width (test code 14.0         11.7-14.4      

           



             = 76574-6)                                          



[facility] HospitalPlatelet Count2017-11-10 20:53:00





             Test Item    Value        Reference Range Interpretation Comments

 

             Platelet Count (test code = 777-3) 54           140-360      L     

       



[facility]ils [facility])2017-[facility]-10 20:53:00





             Test Item    Value        Reference Range Interpretation Comments

 

             Neutrophils (%) (Auto) (test code = 72.3         38.7-80.0         

        



             98190-1)                                            



[facility] HospitalLymphocytes (%) (Auto)2017-11-10 20:53:00





             Test Item    Value        Reference Range Interpretation Comments

 

             Lymphocytes (%) (Auto) (test code = 21.1         18.0-39.1         

        



             736-9)                                              



[facility] HospitalMonocytes (%) (Auto)2017-11-10 20:53:00





             Test Item    Value        Reference Range Interpretation Comments

 

             Monocytes (%) (Auto) (test code = 5.1          4.4-11.3            

      



             5905-5)                                             



[facility] HospitalEosinophils (%) (Auto)2017-11-10 20:53:00





             Test Item    Value        Reference Range Interpretation Comments

 

             Eosinophils (%) (Auto) (test code = 0.9          0.0-6.0           

        



             713-8)                                              



[facility] HospitalBasophils (%) (Auto)2017-11-10 20:53:00





             Test Item    Value        Reference Range Interpretation Comments

 

             Basophils (%) (Auto) (test code = 0.3          0.0-1.0             

      



             706-2)                                              



[facility] HospitalIM GRANULOCYTES %2017-11-10 20:53:00





             Test Item    Value        Reference Range Interpretation Comments

 

             IM GRANULOCYTES % (test code = IM 0.3          0.0-1.0             

      



             GRANULOCYTES %)                                        



[facility] HospitalNeutrophils # (Auto)2017-11-10 20:53:00





             Test Item    Value        Reference Range Interpretation Comments

 

             Neutrophils # (Auto) (test code = 4.7          2.1-6.9             

      



             751-8)                                              



[facility] HospitalLymphocytes # (Auto)2017-11-10 20:53:00





             Test Item    Value        Reference Range Interpretation Comments

 

             Lymphocytes # (Auto) (test code = 1.4          1.0-3.2             

      



             18608-3)                                            



[facility] HospitalMonocytes # (Auto)2017-11-10 20:53:00





             Test Item    Value        Reference Range Interpretation Comments

 

             Monocytes # (Auto) (test code = 742-7) 0.3          0.2-0.8        

           



[facility] HospitalEosinophils # (Auto)2017-11-10 20:53:00





             Test Item    Value        Reference Range Interpretation Comments

 

             Eosinophils # (Auto) (test code = 0.1          0.0-0.4             

      



             711-2)                                              



[facility] HospitalBasophils # (Auto)2017-11-10 20:53:00





             Test Item    Value        Reference Range Interpretation Comments

 

             Basophils # (Auto) (test code = 704-7) 0.0          0.0-0.1        

           



[facility] HospitalAbsolute Immature Granulocyte (auto
2017-11-10 20:53:00





             Test Item    Value        Reference Range Interpretation Comments

 

             Absolute Immature Granulocyte (auto 0.02         0-0.1             

        



             (test code = Absolute Immature                                     

   



             Granulocyte (auto)                                        



[facility] HospitalWhite Blood Count2017-11-10 20:53:00





             Test Item    Value        Reference Range Interpretation Comments

 

             White Blood Count (test code = 6690-2) 6.48         4.8-10.8       

           



[facility] HospitalRed Blood Count2017-11-10 20:53:00





             Test Item    Value        Reference Range Interpretation Comments

 

             Red Blood Count (test code = 789-8) 4.15         3.6-5.1           

        



[facility] HospitalHemoglobin2017-11-10 20:53:00





             Test Item    Value        Reference Range Interpretation Comments

 

             Hemoglobin (test code = 67333-5) 12.4         12.0-16.0            

     



[facility] HospitalHematocrit2017-11-10 20:53:00





             Test Item    Value        Reference Range Interpretation Comments

 

             Hematocrit (test code = 4544-3) 36.8         34.2-44.1             

    



[facility] HospitalMean Corpuscular Volume2017-11-10 
20:53:00





             Test Item    Value        Reference Range Interpretation Comments

 

             Mean Corpuscular Volume (test code = 88.7         81-99            

         



             787-2)                                              



[facility] HospitalMean Corpuscular Hemoglobin2017-11-10 
20:53:00





             Test Item    Value        Reference Range Interpretation Comments

 

             Mean Corpuscular Hemoglobin (test code 29.9         28-32          

           



             = 785-6)                                            



[facility] HospitalMean Corpuscular Hemoglobin Concent
2017-11-10 20:53:00





             Test Item    Value        Reference Range Interpretation Comments

 

             Mean Corpuscular Hemoglobin Concent 33.7         31-35             

        



             (test code = 786-4)                                        



[facility] HospitalRed Cell Distribution Width2017-11-10 
20:53:00





             Test Item    Value        Reference Range Interpretation Comments

 

             Red Cell Distribution Width (test code 14.0         11.7-14.4      

           



             = 06941-0)                                          



[facility] HospitalPlatelet Count2017-11-10 20:53:00





             Test Item    Value        Reference Range Interpretation Comments

 

             Platelet Count (test code = 777-3) 54           140-360      L     

       



[facility] HospitalNeutrophils (%) (Auto)2017-11-10 20:53:00





             Test Item    Value        Reference Range Interpretation Comments

 

             Neutrophils (%) (Auto) (test code = 72.3         38.7-80.0         

        



             77300-0)                                            



[facility] HospitalLymphocytes (%) (Auto)2017-11-10 20:53:00





             Test Item    Value        Reference Range Interpretation Comments

 

             Lymphocytes (%) (Auto) (test code = 21.1         18.0-39.1         

        



             736-9)                                              



[facility] HospitalMonocytes (%) (Auto)2017-11-10 20:53:00





             Test Item    Value        Reference Range Interpretation Comments

 

             Monocytes (%) (Auto) (test code = 5.1          4.4-11.3            

      



             5905-5)                                             



[facility] HospitalEosinophils (%) (Auto)2017-11-10 20:53:00





             Test Item    Value        Reference Range Interpretation Comments

 

             Eosinophils (%) (Auto) (test code = 0.9          0.0-6.0           

        



             713-8)                                              



[facility] HospitalBasophils (%) (Auto)2017-11-10 20:53:00





             Test Item    Value        Reference Range Interpretation Comments

 

             Basophils (%) (Auto) (test code = 0.3          0.0-1.0             

      



             706-2)                                              



[facility] HospitalIM GRANULOCYTES %2017-11-10 20:53:00





             Test Item    Value        Reference Range Interpretation Comments

 

             IM GRANULOCYTES % (test code = IM 0.3          0.0-1.0             

      



             GRANULOCYTES %)                                        



[facility] HospitalNeutrophils # (Auto)2017-11-10 20:53:00





             Test Item    Value        Reference Range Interpretation Comments

 

             Neutrophils # (Auto) (test code = 4.7          2.1-6.9             

      



             751-8)                                              



[facility] HospitalLymphocytes # (Auto)2017-11-10 20:53:00





             Test Item    Value        Reference Range Interpretation Comments

 

             Lymphocytes # (Auto) (test code = 1.4          1.0-3.2             

      



             21169-0)                                            



[facility] HospitalMonocytes # (Auto)2017-11-10 20:53:00





             Test Item    Value        Reference Range Interpretation Comments

 

             Monocytes # (Auto) (test code = 742-7) 0.3          0.2-0.8        

           



[facility] HospitalEosinophils # (Auto)2017-11-10 20:53:00





             Test Item    Value        Reference Range Interpretation Comments

 

             Eosinophils # (Auto) (test code = 0.1          0.0-0.4             

      



             711-2)                                              



[facility] HospitalBasophils # (Auto)2017-11-10 20:53:00





             Test Item    Value        Reference Range Interpretation Comments

 

             Basophils # (Auto) (test code = 704-7) 0.0          0.0-0.1        

           



[facility] HospitalAbsolute Immature Granulocyte (auto
2017-11-10 20:53:00





             Test Item    Value        Reference Range Interpretation Comments

 

             Absolute Immature Granulocyte (auto 0.02         0-0.1             

        



             (test code = Absolute Immature                                     

   



             Granulocyte (auto)                                        



[facility] HospitalBedside Uaqlrpn0074-78-13 02:22:00





             Test Item    Value        Reference Range Interpretation Comments

 

             Bedside Glucose (test code = 72186-4) 337                 H  

          



Meter ID: [id number] [facility] HospitalCreatine Kinase MB
2017 20:46:00





             Test Item    Value        Reference Range Interpretation Comments

 

             Creatine Kinase MB (test code = 2.30         0.00-5.00             

    



             36249-2)                                            



[facility] HospitalTroponin -32-51 20:46:00





             Test Item    Value        Reference Range Interpretation Comments

 

             Troponin I (test code = MZX8780) 0.007        0-0.300              

     



[facility] HospitalMagnesium Mtvum7658-57-11 20:42:00





             Test Item    Value        Reference Range Interpretation Comments

 

             Magnesium Level (test code = 17558-1) 1.7          1.3-2.1         

          



[facility] HospitalCreatine Uerktf0428-15-60 20:42:00





             Test Item    Value        Reference Range Interpretation Comments

 

             Creatine Kinase (test code = 2157-6) 81                      

         



[facility] HospitalMagnesium Fjbod4600-58-69 20:42:00





             Test Item    Value        Reference Range Interpretation Comments

 

             Magnesium Level (test code = 48597-8) 1.7          1.3-2.1         

          



[facility] HospitalProthrombin [id number] 20:34:00





             Test Item    Value        Reference Range Interpretation Comments

 

             Prothrombin Time (test code = 5902-2) 12.9         11.9-14.5       

          



[facility] HospitalProthromb Time International Ratio
2017 20:34:00





             Test Item    Value        Reference Range Interpretation Comments

 

             Prothromb Time International Ratio 0.93                            

       



             (test code = 6301-6)                                        



Oral Anticoagulant Therapy INR Values:1. Low Intensity Therapy        1.5 - 
2.02. Moderate IntensityTherapy   2.0 - 3.03. High Intensity Therapy(1)    2.5 -
3.54. High Intensity Therapy(2)    3.0 - 4.05. Panic Value INR              &gt;
5.0[facility] HospitalActivated Partial Thromboplast Time
2017 20:34:00





             Test Item    Value        Reference Range Interpretation Comments

 

             Activated Partial Thromboplast Time 27.5         23.8-35.5         

        



             (test code = 18887-6)                                        



[facility] HospitalProthrombin Wymx8081-74-28 20:34:00





             Test Item    Value        Reference Range Interpretation Comments

 

             Prothrombin Time (test code = 5902-2) 12.9         11.9-14.5       

          



[facility] HospitalProthromb Time International Ratio
2017 20:34:00





             Test Item    Value        Reference Range Interpretation Comments

 

             Prothromb Time International Ratio 0.93                            

       



             (test code = 6301-6)                                        



Oral Anticoagulant Therapy INR Values:1. Low Intensity Therapy        1.5 - 
2.02. Moderate IntensityTherapy   2.0 - 3.03. High Intensity Therapy(1)    2.5 -
3.54. High Intensity Therapy(2)    3.0 - 4.05. Panic Value INR              &gt;
5.0[facility] HospitalActivated Partial Thromboplast Time
2017 20:34:00





             Test Item    Value        Reference Range Interpretation Comments

 

             Activated Partial Thromboplast Time 27.5         23.8-35.5         

        



             (test code = 78297-1)                                        



[facility] HospitalUrine [id number] 20:13:00





             Test Item    Value        Reference Range Interpretation Comments

 

             Urine WBC (test code = 5821-4) 0-5          0-5                    

   



[facility] HospitalUrine [id number] 20:13:00





             Test Item    Value        Reference Range Interpretation Comments

 

             Urine RBC (test code = 50913-7) NONE         0-5                   

    



[facility] HospitalUrine Wqgnhwiz6791-43-67 20:13:00





             Test Item    Value        Reference Range Interpretation Comments

 

             Urine Bacteria (test code = 49650-4) FEW          NONE             

         



[facility] HospitalUrine Epithelial Vcpwr8534-95-20 20:13:00





             Test Item    Value        Reference Range Interpretation Comments

 

             Urine Epithelial Cells (test code = FEW          NONE              

        



             81499-2)                                            



[facility] HospitalUrine [id number] 20:13:00





             Test Item    Value        Reference Range Interpretation Comments

 

             Urine WBC (test code = 5821-4) 0-5          0-5                    

   



[facility] HospitalUrine [id number] 20:13:00





             Test Item    Value        Reference Range Interpretation Comments

 

             Urine RBC (test code = 85523-8) NONE         0-5                   

    



[facility] HospitalUrine Eemrpbob4670-29-32 20:13:00





             Test Item    Value        Reference Range Interpretation Comments

 

             Urine Bacteria (test code = 18822-0) FEW          NONE             

         



[facility] HospitalUrine Epithelial Mamrg2524-87-58 20:13:00





             Test Item    Value        Reference Range Interpretation Comments

 

             Urine Epithelial Cells (test code = FEW          NONE              

        



             95432-5)                                            



[facility] HospitalUrine Tyfto4787-30-51 19:42:00





             Test Item    Value        Reference Range Interpretation Comments

 

             Urine Color (test code = 5778-6) STRAW        YELLOW               

     



[facility] HospitalUrine Mxhdjyw4803-32-64 19:42:00





             Test Item    Value        Reference Range Interpretation Comments

 

             Urine Clarity (test code = 55035-0) CLEAR        CLEAR             

        



[facility] HospitalUrine Specific Jrnxexg5917-69-31 19:42:00





             Test Item    Value        Reference Range Interpretation Comments

 

             Urine Specific Gravity (test code = 1.005        1.010-1.025  L    

        



             5811-5)                                             



[facility] HospitalUrine [id number] 19:42:00





             Test Item    Value        Reference Range Interpretation Comments

 

             Urine pH (test code = 63685-7) 7            5-7                    

   



[facility] HospitalUrine Leukocyte Nacfkccp3180-72-50 
19:42:00





             Test Item    Value        Reference Range Interpretation Comments

 

             Urine Leukocyte Esterase (test code NEGATIVE     NEGATIVE          

        



             = 5799-2)                                           



[facility] HospitalUrine Emniwqm4917-70-88 19:42:00





             Test Item    Value        Reference Range Interpretation Comments

 

             Urine Nitrite (test code = 76995-4) NEGATIVE     NEGATIVE          

        



[facility] HospitalUrine Eoendcf5451-79-57 19:42:00





             Test Item    Value        Reference Range Interpretation Comments

 

             Urine Protein (test code = 5804-0) NEGATIVE     NEGATIVE           

       



[facility] HospitalUrine Glucose (UA)2017 19:42:00





             Test Item    Value        Reference Range Interpretation Comments

 

             Urine Glucose (UA) (test code = 2349-9) 3+           NEGATIVE     H

            



[facility] HospitalUrine Ikjwuni7723-04-44 19:42:00





             Test Item    Value        Reference Range Interpretation Comments

 

             Urine Ketones (test code = 22079-3) NEGATIVE     NEGATIVE          

        



[facility] Gfyhqwqmfsae3620-15-69 19:42:00





             Test Item    Value        Reference Range Interpretation Comments

 

             Urine Urobilinogen (test code = 1            0.2-1                 

    



             90623-6)                                            



[facility] HospitalU[facility] Znumimamh6291-96-58 19:42:00





             Test Item    Value        Reference Range Interpretation Comments

 

             Urine Bilirubin (test code = 1978-6) NEGATIVE     NEGATIVE         

         



[facility] Gdltr4988-77-18 19:42:00





             Test Item    Value        Reference Range Interpretation Comments

 

             Urine Blood (test code = 27919-0) NEGATIVE     NEGATIVE            

      



[facility] HospitalUrine Mcily8298-02-47 19:42:00





             Test Item    Value        Reference Range Interpretation Comments

 

             Urine Color (test code = 5778-6) STRAW        YELLOW               

     



[facility] HospitalUrine Bxiofgs1965-74-41 19:42:00





             Test Item    Value        Reference Range Interpretation Comments

 

             Urine Clarity (test code = 83770-0) CLEAR        CLEAR             

        



[facility] Specific Tfvilar4367-47-22 19:42:00





             Test Item    Value        Reference Range Interpretation Comments

 

             Urine Specific Gravity (test code = 1.005        1.010-1.025  L    

        



             5811-5)                                             



[facility] HospitalU[facility] [id number] 19:42:00





             Test Item    Value        Reference Range Interpretation Comments

 

             Urine pH (test code = 47696-2) 7            5-7                    

   



[facility] Leukocyte Stpwywem5616-84-09 
19:42:00





             Test Item    Value        Reference Range Interpretation Comments

 

             Urine Leukocyte Esterase (test code NEGATIVE     NEGATIVE          

        



             = 5799-2)                                           



[facility] Rfdwsqf0736-42-59 19:42:00





             Test Item    Value        Reference Range Interpretation Comments

 

             Urine Nitrite (test code = 26531-2) NEGATIVE     NEGATIVE          

        



[facility] Ttttlfq7776-70-95 19:42:00





             Test Item    Value        Reference Range Interpretation Comments

 

             Urine Protein (test code = 5804-0) NEGATIVE     NEGATIVE           

       



[facility] Glucose (UA)2017 19:42:00





             Test Item    Value        Reference Range Interpretation Comments

 

             Urine Glucose (UA) (test code = 2349-9) 3+           NEGATIVE     H

            



[facility] HospitalU[facility] Jdvquqy2976-47-04 19:42:00





             Test Item    Value        Reference Range Interpretation Comments

 

             Urine Ketones (test code = 60329-1) NEGATIVE     NEGATIVE          

        



[facility] HospitalU[facility] Zukxxdlxgbzm1287-89-77 19:42:00





             Test Item    Value        Reference Range Interpretation Comments

 

             Urine Urobilinogen (test code = 1            0.2-1                 

    



             08673-2)                                            



[facility] HospitalU[facility] Ylldkjint0111-92-63 19:42:00





             Test Item    Value        Reference Range Interpretation Comments

 

             Urine Bilirubin (test code = 1978-6) NEGATIVE     NEGATIVE         

         



[facility] HospitalU[facility] Wqemx9164-16-94 19:42:00





             Test Item    Value        Reference Range Interpretation Comments

 

             Urine Blood (test code = 68561-7) NEGATIVE     NEGATIVE            

      



[facility] HospitalAmylase Level2017-10-06 02:32:00





             Test Item    Value        Reference Range Interpretation Comments

 

             Amylase Level (test code = 1798-8) 49                        

       



[facility] HospitalLipase2017-10-06 02:32:00





             Test Item    Value        Reference Range Interpretation Comments

 

             Lipase (test code = 3040-3) 51           8-78                      



[facility] HospitalAmylase Level2017-10-06 02:32:00





             Test Item    Value        Reference Range Interpretation Comments

 

             Amylase Level (test code = 1798-8) 49                        

       



[facility] HospitalLipase2017-10-06 02:32:00





             Test Item    Value        Reference Range Interpretation Comments

 

             Lipase (test code = 3040-3) 51           8-78                      



[facility] HospitalPlatelet Rzkimvvs2778-61-08 17:00:00





             Test Item    Value        Reference Range Interpretation Comments

 

             Platelet Estimate (test SLIGHTLY DECREASED                         

  



             code = 56956-0)                                        



[facility] HospitalPlatelet Morphology Apljuxv3055-78-99 
17:00:00





             Test Item    Value        Reference Range Interpretation Comments

 

             Platelet Morphology Comment (test FEW LARGE                        

      



             code = 55249-3)                                        



No platelet clumps seen on smearEl [facility] Children's HospitalRed Cell 
Morphology Nkivsef9766-85-73 17:00:00





             Test Item    Value        Reference Range Interpretation Comments

 

             Red Cell Morphology Comment (test code NORMAL                      

           



             = 6742-1)                                           



[facility] HospitalPlatelet Xwauxmyx2355-44-34 17:00:00





             Test Item    Value        Reference Range Interpretation Comments

 

             Platelet Estimate (test SLIGHTLY DECREASED                         

  



             code = 28269-9)                                        



[facility] Wournme2704-03-07 
17:00:00





             Test Item    Value        Reference Range Interpretation Comments

 

             Platelet Morphology Comment (test FEW LARGE                        

      



             code = 00979-7)                                        



No platelet clumps seen on smearEl [facility] HospitalRed Cell 
Morphology Cwaxese2354-10-49 17:00:00





             Test Item    Value        Reference Range Interpretation Comments

 

             Red Cell Morphology Comment (test code NORMAL                      

           



             = 6742-1)                                           



[facility] Hyaline Iqpyn7080-04-14 14:57:00





             Test Item    Value        Reference Range Interpretation Comments

 

             Urine Hyaline Casts (test code = 2-5          0-1          H       

     



             55510-0)                                            



[facility] Hospital[facility] Lulpd2588-40-06 14:57:00





             Test Item    Value        Reference Range Interpretation Comments

 

             Urine Yeast (test code = 12189-7) FEW          NONE         H      

      



[facility] Hospital[facility] Hyaline Cltwt9407-81-61 14:57:00





             Test Item    Value        Reference Range Interpretation Comments

 

             Urine Hyaline Casts (test code = 2-5          0-1          H       

     



             61694-4)                                            



[facility] Kqkcy2232-75-21 14:57:00





             Test Item    Value        Reference Range Interpretation Comments

 

             Urine Yeast (test code = 76325-3) FEW          NONE         H      

      



[facility] [place] [address]    Patient Name : VALENTE GROVER         MR #: [id number]   : 
1960         Age/Sex: 57/F      Acct # : [id number]           Adm 
Physician  : ROSE KITCHEN MD       Admit Date  :  18       Location : [facility]  
 Room/Bed : [place] [address]          
________________________________________________________
___________________________________________     REPORT: Cardiology Report       
DATE OF STUDY:  2018       LEXISCAN MYOVIEW      The patient had 
resting perfusion images after an injection of11    millicuries of technetium-
99m Myoview.  Later, due to inability to    exercise, she was given Lexiscan 0.4
mg intravenously, and shortly    afterwards 33 millicuries of technetium-99m 
Myoview.  Perfusion images were    taken by rotational tomography.      
Comparison resting and Lexiscan stress images show no evidence of any    
perfusion defect.  Uptake is smooth and regular throughout.  No suggestion    of
any scar or any ischemia.  Additionally, gaited wall motion images were    
obtained and calculated ejection fraction normal at 54% without regional    wall
motion abnormality.        FINAL IMPRESSION    1.  Normal Lexiscan Myoview for 
perfusion.   2.  Normal left ventricular function, calculated ejection fraction 
54%.                 DD:  2018 16:58   DT:  2018 18:08   Job#:  [id number]
[id number]      cc:   ROSE KITCHEN MD           Signature                     Date
                          Dictated By: OLIVIA ESTRADA MD  Transcribed By: EDS 
on 18   &lt;Electronically signed by OLIVIA ESTRADA MD&gt;&lt;&lt;Signature on File&gt;&gt;18 2272    COPY TO:CT CHEST WO    
[place] [address]  Patient Name: VALENTE GROVER   MR #: [id number]    : 
1960 Age/Sex: 57/F  Acct #: [id number] Req #: [id number]  [facility] Physician:
ROSE KITCHEN MD    Ordered by: ROSE KITCHEN MD  Report #: [id number]   Location: 
[facility]  Room/Bed: [place] [address]    
_________________________________________________________
__________________________________________    Procedure: [id number] CT/CT CHEST 
WO  Exam Date: 18                            Exam Time: 1115       REPORT 
STATUS: Signed    PROCEDURE: CT CHEST WITHOUT CONTRAST   CT scan of the chest 
WITHOUT intravenous contrast, using standard    protocol.       TECHNIQUE:   The
chest was scanned utilizing a multidetector helical scanner from    the apex to 
the level of the adrenal glands.  No IV contrast was    administered as per 
physician request.  Coronal and sagittal    multiplanar reformations were 
obtained.       COMPARISON: None.       INDICATIONS:   CHEST PAIN           
FINDINGS:   Lines/tubes:  None.       Lungs and Airways:  The lungs and airways 
are normal with no focal    abnormality demonstrated. Right upper lobe scarring.
Bibasilar    dependentatelectasis.       Pleura: The pleural spaces are clear.  
    Heart and mediastinum:  The thyroid gland is normal. No significant    
mediastinal, hilar or axillary lymphadenopathy is seen. The heart and    
pericardium are within normal limits.       Soft tissues: Normal.       Abdomen:
Nodular contour of the liver. The spleen is enlarged,    measuring 17 cm in AP 
length. Multiple splenic and esophageal varices    are partially visualized. 
Trace ascites is present in the right upper    quadrant. Theadrenal glands are 
normal.       Bones: The visualized bony thorax is within normal limits.        
IMPRESSION:        1. No acute abnormality of the chest.    2. Hepatic 
parenchymal appearance consistent with cirrhotic morphology.        3. 
Splenomegaly and varices consistent with portal hypertension.          Dictated 
by:  Rose Alex M.D. on 2018 at 13:18        Electronically approved by: 
Rose Alex M.D. on 2018 at 13:18                Dictated By: ROSE ALEX MD  Electronically Signed By: ROSE ALEX MD on 18 1318  Transcribed By: 
PILAR on 18 1318       COPY TO:   [facility] ACUTE SERIES W/PA 
CXR    [place] [address]  Patient Name: VALENTE GROVER   MR #: [id number]    : 
1960 Age/Sex: 56/F  Acct #: [id number] Req #: [id number]  Adm Physician:
    Ordered by: SMITHA MCMAHAN MD  Report #: [id number] Location: ER  
Room/Bed:     
________________________________________________________________________
___________________________    Procedure: [id number] DX/ABDOMEN ACUTE SERIES W/PA
CXR  Exam Date: 10/06/17                            Exam Time: 0210       REPORT
STATUS: Signed    EXAM: ABDOMEN ACUTE SERIES W/PA CXR, supine and erect views of
the abdomen, AP   view of the chest   DATE: 10/6/2017 1:29 AM  Time stamp on 
exam: 0155 hours   INDICATION: Abdominal pain   COMPARISON: CT of the abdomen 
and pelvis 2017      FINDINGS:   LINES/TUBES: None      LUNGS: No 
consolidations or edema.       PLEURA: No effusions or pneumothorax.      HEART 
AND MEDIASTINUM: Normal size and contour.      BOWEL PATTERN: Non-obstructed 
bowel gas pattern.      BONES AND SOFT TISSUES: No acute bone findings. No abn
ormal calcifications. No   mass effect. Bilateral chest surgical clips. Surgical
clips right upperquadrant of the abdomen.      IMPRESSION:   No acute thoracic 
abnormality.      No evidence for bowel obstruction.               Signed by: 
Dr. Akiko Mehta M.D. on 10/6/2017 2:40 AM        Dictated By: AKIKO MEHTA MD  Electronically Signed By: AKIKO MEHTA MD on 10/06/17 0240  
Transcribed By: CLIFFORD on 10/06/17 0240       COPY TO:   SMITHA MCMAHAN MD

## 2021-12-28 NOTE — P.HP
Certification for Inpatient


Patient admitted to: Observation


With expected LOS: <2 Midnights


Patient will require the following post-hospital care: None


Practitioner: I am a practitioner with admitting privileges, knowledge of 

patient current condition, hospital course, and medical plan of care.


Services: Services provided to patient in accordance with Admission requirements

found in Title 42 Section 412.3 of the Code of Federal Regulations





Patient History


Date of Service: 21


Reason for admission: AMS


History of Present Illness: 





61-year-old  female with history of chronic systolic congestive heart 

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 

hypertension, hypothyroidism, COPD, diabetes mellitus type 2 presents emergency 

department for AMS.  Patient mentation worsened overnight according to the 

mother.  Patient was seen at [facility] recently for similar symptoms.  


Allergies





acetaminophen [From Darvocet-N 100] Allergy (Verified 20 17:06)


   Unknown


azithromycin [From Zithromax Z-Manjeet] Allergy (Verified 20 17:06)


   Unknown


propoxyphene napsylate [From Darvocet-N 100] Allergy (Verified 20 17:06)


   Unknown


tramadol Allergy (Verified 20 17:06)


   Unknown


tramadol HCl [From Ultram] Allergy (Verified 20 17:06)


   Unknown


Erythromycin Allergy (Mild, Uncoded 20 17:07)


   Rash





Home Medications: 








Carvedilol [Coreg] 12.5 mg PO BID 21 


Divalproex Sodium [Depakote] 500 mg PO BID 21 


Furosemide [Lasix] 40 mg PO DAILY 21 


Gabapentin 300 mg PO BID 21 


Insulin Degludec [Tresiba Flextouch U-200] 50 unit SQ DAILY 21 


Insulin Lispro [Humalog*] 10 unit SQ TIDWM 21 


Levothyroxine [Synthroid] 75 mcg PO [id number] 21 


Quetiapine [Seroquel] 300 mg PO BEDTIME 21 


Sacubitril/Valsartan [Entresto 49 mg-51 mg Tablet] 1 tab PO BID 21 








- Past Medical/Surgical History


Diabetic: Yes


-: Cirrohsis, hep C, hepatic carcinoma


-: Lymphedema


-: Diabetes mellitus type 2


-: Glaucoma


-: Hypothyroidism


-: COPD


-: Anxiety


-: Chronic systolic congestive heart failure


-: Cholecystectomy


-: 


-: Left mastectomy


-: Appendectomy


-: Hysterectomy


Psychosocial/ Personal History: Patient lives at home, alone





- Family History


  ** Father


Medical History: Other (see notes)


Notes: 88 y/o healthy man





  ** Mother


Medical History: Other (see notes)


Notes: 77 healthy woman





- Social History


Alcohol use: No


CD- Drugs: No


Caffeine use: No





Physical Examination





- Studies


Laboratory Data (last 24 hrs)





21 11:15: PT 14.4 H, INR 1.25, APTT 29.4


21 11:15: WBC 2.90 L, Hgb 7.9 L, Hct 24.3 L, Plt Count 24 L*


21 11:15: Sodium 138, Potassium 4.6, BUN 44 H, Creatinine 1.89 H, Glucose 

200 H, Total Bilirubin 0.8,  H, ALT 44, Alkaline Phosphatase 77, Amylase 

113, Lipase 97








Assessment & Plan





- Problems (Diagnosis)


(1) AMS (altered mental status)


Status: Acute   





(2) Polysubstance (including opioids) dependence, daily use


Status: Acute   





(3) Cirrhosis


Status: Acute   


Qualifiers: 


   Hepatic cirrhosis type: unspecified hepatic cirrhosis   Ascites presence: 

with ascites   Qualified Code(s): K74.60 - Unspecified cirrhosis of liver; R18.8

- Other ascites   





(4) COPD (chronic obstructive pulmonary disease)


Status: Chronic   


Qualifiers: 


   COPD type: chronic bronchitis   Chronic bronchitis type: mixed simple and 

mucopurulent   Qualified Code(s): J41.8 - Mixed simple and mucopurulent chronic 

bronchitis   





(5) Diabetes mellitus


Status: Chronic   


Qualifiers: 


   Diabetes mellitus type: type 2   Diabetes mellitus long term insulin use: 

without long term use   Diabetes mellitus complication status: with 

hyperglycemia   Qualified Code(s): E11.65 - Type 2 diabetes mellitus with 

hyperglycemia   





(6) HTN (hypertension)


Status: Chronic   


Qualifiers: 


   Hypertension type: essential hypertension 





- Plan





Plan:


1. Continue with IV fluids


2. Monitor labs


3. Check CBC


4. Oncology consultation


5. GI and DVT prophylaxis


Discharge Plan: Home


Plan to discharge in: Greater than 2 days





- Advance Directives


Does patient have a Living Will: No


Does patient have a Durable POA for Healthcare: No





- Code Status/Comfort Care


Code Status Assessed: Yes


Code Status: Full Code


Critical Care: No


Time Spent Managing PTS Care (In Minutes): 45

## 2021-12-28 NOTE — RAD REPORT
EXAM DESCRIPTION:  CT - Abdomen   Pelvis Wo Contrast - 12/28/2021 2:50 pm

 

CLINICAL HISTORY:  ABD PAIN

 

COMPARISON:  Abdomen   Pelvis Wo Contrast dated 4/1/2019; Abdomen   Pelvis W Contrast dated 1/18/2019


 

TECHNIQUE:  Axial 5 mm thick CT imaging of the abdomen and pelvis was performed without IV contrast. 
No IV contrast was given because of allergy, abnormal renal function, patient refusal or physician re
quest.

 

No oral contrast administered.

 

All CT scans are performed using dose optimization technique as appropriate and may include automated
 exposure control or mA/KV adjustment according to patient size.

 

FINDINGS:  No suspicious findings in the lung bases.

 

Advanced liver cirrhosis changes are present within generalized heterogeneity of the liver parenchyma
. Approximately 5 x 2.5 centimeter area of decreased attenuation in the inferior right lobe is simila
r or smaller in size than on prior imaging. Full evaluation of the liver is limited in the absence of
 contrast. No gross evidence for a progressive liver finding.

 

Splenomegaly is again noted not clearly different from prior imaging. No acute pancreatic process see
n. Gallbladder is absent. No biliary tree dilatation.

 

No hydronephrosis or suspicious renal mass. Small lateral upper pole right renal cyst not clearly dif
ferent from prior imaging. No significant adrenal finding. Isodense renal masses and pyelonephritis c
annot be excluded in the absence of IV contrast. Urinary bladder is contracted around a [place] cathete
r. Uterus is absent. Ovaries are absent or atrophic. No significant or worrisome adnexal finding.

 

No gastric dilatation or gastric wall thickening. Large and small bowel loops are not dilated. Modera
te stool volume is present in the colon. Sigmoid is quite tortuous and redundant.

 

No free air or pneumatosis.  Minimal amount of free intraperitoneal fluid is present. Abdominal wall 
postsurgical changes are present. Calcifications are present in the deep subcutaneous fatty tissues a
nd rectus abdominis musculature left-side umbilical level. There is congestion and edema of the subcu
taneous fat of the anterior abdomen that is more pronounced than seen previously. No drainable fluid 
collection, air or other emergent finding in the soft tissues.

 

No suspicious bony findings.

 

 

IMPRESSION:  Non-contrast enhanced CT abdomen and pelvis imaging show no acute or emergent finding.

 

Chronic postsurgical or post trauma changes to the anterior abdominal wall, asymmetrically prominent 
to the left. Patient does have more congestion or edema of the subcutaneous fatty tissues compared to
 prior imaging. No drainable fluid collection, air or other acute or emergent soft tissue finding.

 

Advanced cirrhotic liver changes with splenomegaly not clearly different from comparison. Liver paren
chymal assessment is limited in the absence of contrast.

 

Full assessment is limited is the absence of IV contrast.

## 2021-12-28 NOTE — ER
Nurse's Notes                                                                                     

 [facility]                                                                 

Name: Deborah Alatorre                                                                              

Age: 61 yrs                                                                                       

Sex: Female                                                                                       

: 1960                                                                                   

MRN: [id number]                                                                                   

Arrival Date: 2021                                                                          

Time: 10:45                                                                                       

Account#: [id number]                                                                            

Bed 6                                                                                             

Private MD:                                                                                       

Diagnosis: Altered mental status, unspecified;Anemia, unspecified                                 

                                                                                                  

Presentation:                                                                                     

                                                                                             

10:45 Chief complaint: EMS states: HARD TO AROUSE AT HOME. Coronavirus screen: At this time,  bp  

      the client does not indicate any symptoms associated with coronavirus-19. Ebola Screen:     

      No symptoms or risks identified at this time.                                               

10:45 Method Of Arrival: EMS: [facility]                                                      bp  

11:21 Initial Sepsis Screen: Does the patient meet any 2 criteria? Mean Arterial Pressure     bp  

      (MAP) < 65. Altered Mental Status. HR > 90 bpm. Yes Does the patient have a suspected       

      source of infection? No. Patient's initial sepsis screen is negative. Risk Assessment:      

      Do you want to hurt yourself or someone else? Patient reports no desire to harm self or     

      others. Onset of symptoms is unknown. Care prior to arrival: Oxygen administered. via a     

      non-rebreather mask.                                                                        

11:21 Acuity: CASEY 2                                                                           bp  

                                                                                                  

Triage Assessment:                                                                                

11:15 General: Appears in no apparent distress. Behavior is listless. Pain: Unable to use     bp  

      pain scale. Does not appear to understand pain scale. EENT: No deficits noted. Neuro:       

      Level of Consciousness is obtunded, Oriented to none. Cardiovascular: No deficits           

      noted. Respiratory: No deficits noted. GI: No signs and/or symptoms were reported           

      involving the gastrointestinal system. : No signs and/or symptoms were reported           

      regarding the genitourinary system. Derm: No deficits noted. Musculoskeletal: No            

      deficits noted.                                                                             

                                                                                                  

Historical:                                                                                       

- Allergies:                                                                                      

11:22 Tramadol HCl;                                                                           bp  

11:22 ACETAMINOPHEN;                                                                          bp  

11:22 Darvocet-N 100;                                                                         bp  

11:22 Demerol;                                                                                bp  

11:22 Erythromycin;                                                                           bp  

11:22 Toradol;                                                                                bp  

11:22 tramadol;                                                                               bp  

11:22 Zithromax;                                                                              bp  

- Home Meds:                                                                                      

11:22 carvedilol 6.25 mg Oral tab 1 tab [Active]; Seroquel Oral [Active]; ernestro [Active];  bp  

      Depakote Oral [Active]; Methadone Oral [Active]; gabapentin oral [Active];                  

- PMHx:                                                                                           

11:22 Anxiety; Cancer, Breast; CHF; Chronic pain; Colitis; COPD; Depression; Diabetes - IDDM; bp  

      Hypertension; MUSCLE WEAKNESS; Cirrhosis of liver;                                          

                                                                                                  

- Immunization history:: Client reports having NOT received the Covid vaccine.                    

- Social history:: Smoking status: Patient reports the use of cigarette tobacco                   

  products, unknown amount.                                                                       

                                                                                                  

                                                                                                  

Screenin:15 Abuse screen: Denies threats or abuse. Denies injuries from another. Nutritional        bp  

      screening: No deficits noted. Tuberculosis screening: No symptoms or risk factors           

      identified. Fall Risk None identified.                                                      

                                                                                                  

Assessment:                                                                                       

11:15 General: SEE TRIAGE NOTE.                                                               bp  

13:03 Reassessment: No changes from previously documented assessment. Patient and/or family   bp  

      updated on plan of care and expected duration. Pain level reassessed. ALL CURRENT           

      ORDERS COMPLETED. PT MINIMALLY RESPONSIVE TO NIXON INSERTION.                               

15:00 Reassessment: No changes from previously documented assessment. Patient and/or family   bp  

      updated on plan of care and expected duration. Pain level reassessed. PER MD, PT TBA.       

16:40 Reassessment: DR TONEY AT B/S FOR ADMIT.                                                 bp  

18:30 Reassessment: No changes from previously documented assessment. Patient and/or family   bp  

      updated on plan of care and expected duration. Pain level reassessed. PT AWAKE, SITTING     

      UP IN BED. CLAIMS SHE HAS NOT TAKEN ANY OF HER MEDICATION FOR "A WHILE". PT SPEECH          

      REMAINS SLURRED AND HANDS TREMULOUS.                                                        

20:12 Reassessment: Patient appears in no apparent distress at this time. General: pt         as6 

      sleeping at this time.                                                                      

                                                                                                  

Vital Signs:                                                                                      

10:45 BP 96 / 42; Pulse 88; Resp 16; Temp 98;                                                 bp  

12:00  / 68; Pulse 87; Resp 23; Pulse Ox 93% ;                                          bp  

13:00  / 73; Pulse 79; Resp 13; Pulse Ox 94% ;                                          bp  

15:00  / 55; Pulse 72; Resp 13; Pulse Ox 95% ;                                          bp  

16:00  / 71; Pulse 113; Resp 20; Pulse Ox 96% ;                                         bp  

17:00  / 66; Pulse 65; Resp 14; Pulse Ox 99% ;                                          bp  

18:00  / 71; Pulse 63; Resp 8; Pulse Ox 99% ;                                           bp  

20:12  / 70; Pulse 76; Resp 12 S; Pulse Ox 100% on 2 lpm NC;                            as6 

                                                                                                  

ED Course:                                                                                        

10:45 Patient arrived in ED.                                                                  bp  

10:51 Mahamed Hook MD is Attending Physician.                                              kdr 

11:00 Inserted saline lock: 18 gauge in left EJ, using aseptic technique. Blood collected.    bp  

11:07 Patient has correct armband on for positive identification. Bed in low position. Call   [facility] 

      light in reach. Side rails up X2. Warm blanket given. Cardiac monitor on. Pulse ox on.      

      NIBP on.                                                                                    

11:08 Initial lab(s) drawn, by ED staff, sent to lab. EKG done, by ED staff, reviewed by      [facility] 

      Mahamed Hook MD.                                                                           

11:08 Depakote Sent.                                                                          [facility] 

11:08 Amylase Sent.                                                                           [facility] 

11:08 Amylase, Serum Sent.                                                                    [facility] 

11:09 Basic Metabolic Panel Sent.                                                             [facility] 

11:09 CBC with Diff Sent.                                                                     [facility] 

11:09 CPK Sent.                                                                               [facility] 

11:09 Ckmb Sent.                                                                              [facility] 

11:09 LFT's Sent.                                                                             [facility] 

11:09 Lactate Sent.                                                                           [facility] 

11:09 Lipase Sent.                                                                            [facility] 

11:09 Procalcitonin Sent.                                                                     [facility] 

11:09 Protime (+inr) Sent.                                                                    [facility] 

11:09 Ptt, Activated Sent.                                                                    [facility] 

11:09 Troponin (emerg Dept Use Only) Sent.                                                    [facility] 

11:14 Barry Lerma, RN is Primary Nurse.                                                    bp  

11:19 CT Head Brain wo Cont In Process Unspecified.                                           [facility]

11:21 Chest Single View XRAY In Process Unspecified.                                          [facility]

11:22 Triage completed.                                                                       bp  

13:02 Nixon cath inserted, using sterile technique, 18 Fr., by me, balloon inflated, to       bp  

      gravity drainage, urine specimen collected.                                                 

13:30 Urine Microscopic Only Sent.                                                            [facility] 

13:30 Urine collected: Nixon catheter specimen, nayely colored.                                mh5 

14:50 CT Abd/Pelvis - Without Contrast In Process Unspecified.                                [facility]

17:28 Joaquina Toney MD is Hospitalizing Provider.                                           kdr 

21:05 No provider procedures requiring assistance completed. Patient admitted, IV remains in  as6 

      place.                                                                                      

21:06 Arm band placed on.                                                                     as6 

                                                                                                  

Administered Medications:                                                                         

11:21 Not Given (Physician Discretion): NS 0.9% (30 ml/kg) 30 ml/kg IV at bolus once; Sepsis  bp  

      Protocol                                                                                    

14:45 Drug: Rocephin - (cefTRIAXone) 1 grams Route: IVPB; Infused Over: 30 mins; Site: left   bp  

      jugular;                                                                                    

21:05 Follow up: Response: No adverse reaction; IV Status: Completed infusion; IV Intake: [id number] 

                                                                                                  

                                                                                                  

Intake:                                                                                           

21:05 IV: 50ml; Total: 50ml.                                                                  as6 

                                                                                                  

Outcome:                                                                                          

17:31 Decision to Hospitalize by Provider.                                                    kdr 

21:05 Admitted to Med/surg accompanied by tech, via stretcher, room 201, with chart, Report   as6 

      called to  RN                                                                               

21:05 Condition: stable                                                                           

21:06 Patient left the ED.                                                                    as6 

                                                                                                  

Signatures:                                                                                       

Dispatcher [employer]                           EDMS                                                 

Mahamed Hook MD MD kdr Martinez, Maria                              [facility]                                                  

Barry Lerma RN                      RN   Nick Blancas RN                      RN   as6                                                  

                                                                                                  

Corrections: (The following items were deleted from the chart)                                    

11:25 11:22 PMHx: Liver cell carcinoma; bp                                                    bp  

                                                                                                  

**************************************************************************************************

## 2022-01-03 NOTE — P.DS
Discharge Date: 12/28/21


Disposition: AMA-LEFT AGAINST MEDICAL ADVIC


Discharge Condition: GOOD


Reason for Admission: AMS





- Problems


(1) AMS (altered mental status)


Status: Acute   





(2) Polysubstance (including opioids) dependence, daily use


Status: Acute   





(3) Cirrhosis


Status: Acute   


Qualifiers: 


   Hepatic cirrhosis type: unspecified hepatic cirrhosis   Ascites presence: 

with ascites   Qualified Code(s): K74.60 - Unspecified cirrhosis of liver; R18.8

- Other ascites   





(4) COPD (chronic obstructive pulmonary disease)


Status: Chronic   


Qualifiers: 


   COPD type: chronic bronchitis   Chronic bronchitis type: mixed simple and 

mucopurulent   Qualified Code(s): J41.8 - Mixed simple and mucopurulent chronic 

bronchitis   





(5) Diabetes mellitus


Status: Chronic   


Qualifiers: 


   Diabetes mellitus type: type 2   Diabetes mellitus long term insulin use: 

without long term use   Diabetes mellitus complication status: with 

hyperglycemia   Qualified Code(s): E11.65 - Type 2 diabetes mellitus with 

hyperglycemia   





(6) HTN (hypertension)


Status: Chronic   


Qualifiers: 


   Hypertension type: essential hypertension 


Brief History of Present Illness: 





61-year-old  female with history of chronic systolic congestive heart 

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 

hypertension, hypothyroidism, COPD, diabetes mellitus type 2 presents emergency 

department for AMS.  Patient mentation worsened overnight according to the 

mother.  Patient was seen at [facility] recently for similar symptoms.  


Hospital Course: 





Patient slowly woke up and decided to leave against medical advice.


Vital Signs/Physical Exam: 














Temp Pulse Resp BP Pulse Ox


 


 97.2 F   75   19   162/72 H  100 


 


 12/28/21 21:32  12/28/21 21:32  12/28/21 21:32  12/28/21 21:32  12/28/21 21:32








General: Alert, In no apparent distress, Oriented x3


Laboratory Data at Discharge: 














WBC  Cancelled   12/29/21  05:00    


 


Hgb  Cancelled   12/29/21  05:00    


 


Hct  Cancelled   12/29/21  05:00    


 


Plt Count  Cancelled   12/29/21  05:00    


 


PT  Cancelled   12/29/21  05:00    


 


INR  Cancelled   12/29/21  05:00    


 


APTT  Cancelled   12/29/21  05:00    


 


Sodium  Cancelled   12/29/21  05:00    


 


Potassium  Cancelled   12/29/21  05:00    


 


BUN  Cancelled   12/29/21  05:00    


 


Creatinine  Cancelled   12/29/21  05:00    


 


Glucose  Cancelled   12/29/21  05:00    


 


Phosphorus  Cancelled   12/29/21  05:00    


 


Magnesium  Cancelled   12/29/21  05:00    


 


Total Bilirubin  Cancelled   12/29/21  05:00    


 


AST  Cancelled   12/29/21  05:00    


 


ALT  Cancelled   12/29/21  05:00    


 


Alkaline Phosphatase  Cancelled   12/29/21  05:00    


 


Amylase  113 U/L ()   12/28/21  11:15    


 


Lipase  97 U/L ()   12/28/21  11:15    








Home Medications: 








Carvedilol [Coreg] 12.5 mg PO BID 01/08/21 


Divalproex Sodium [Depakote] 500 mg PO BID 01/08/21 


Furosemide [Lasix] 40 mg PO DAILY 01/08/21 


Gabapentin 300 mg PO BID 01/08/21 


Insulin Degludec [Tresiba Flextouch U-200] 50 unit SQ DAILY 01/08/21 


Insulin Lispro [Humalog*] 10 unit SQ TIDWM 01/08/21 


Levothyroxine [Synthroid] 75 mcg PO [id number] 01/08/21 


Quetiapine [Seroquel] 300 mg PO BEDTIME 01/08/21 


Sacubitril/Valsartan [Entresto 49 mg-51 mg Tablet] 1 tab PO BID 01/08/21 





Physician Discharge Instructions: 


Patient left against medical advice


Followup: 


SHER OLIVAREZ [Primary Care Provider] - 


Time spent managing pt's care (in minutes): 35